# Patient Record
Sex: FEMALE | Race: BLACK OR AFRICAN AMERICAN | NOT HISPANIC OR LATINO | Employment: FULL TIME | URBAN - METROPOLITAN AREA
[De-identification: names, ages, dates, MRNs, and addresses within clinical notes are randomized per-mention and may not be internally consistent; named-entity substitution may affect disease eponyms.]

---

## 2017-01-19 RX ORDER — METFORMIN HYDROCHLORIDE 500 MG/1
TABLET ORAL
Qty: 90 TABLET | Refills: 0 | Status: SHIPPED | OUTPATIENT
Start: 2017-01-19 | End: 2017-06-27 | Stop reason: SDUPTHER

## 2017-01-31 ENCOUNTER — OFFICE VISIT (OUTPATIENT)
Dept: OPHTHALMOLOGY | Facility: CLINIC | Age: 47
End: 2017-01-31
Payer: COMMERCIAL

## 2017-01-31 DIAGNOSIS — H52.03 HYPEROPIA, BILATERAL: ICD-10-CM

## 2017-01-31 DIAGNOSIS — H52.4 BILATERAL PRESBYOPIA: ICD-10-CM

## 2017-01-31 DIAGNOSIS — E11.9 DIABETES MELLITUS TYPE 2 WITHOUT RETINOPATHY: Primary | ICD-10-CM

## 2017-01-31 PROCEDURE — 92015 DETERMINE REFRACTIVE STATE: CPT | Mod: S$GLB,,, | Performed by: OPTOMETRIST

## 2017-01-31 PROCEDURE — 99999 PR PBB SHADOW E&M-EST. PATIENT-LVL I: CPT | Mod: PBBFAC,,, | Performed by: OPTOMETRIST

## 2017-01-31 PROCEDURE — 92014 COMPRE OPH EXAM EST PT 1/>: CPT | Mod: S$GLB,,, | Performed by: OPTOMETRIST

## 2017-01-31 NOTE — PROGRESS NOTES
HPI     NIDDM exam. Decrease near visual acuity. Dizziness when wearing the   reading glasses. Last eye exam 01/26/2016 TRF.       Last edited by Mayelin Castorena on 1/31/2017  8:26 AM.         Assessment /Plan     For exam results, see Encounter Report.    Diabetes mellitus type 2 without retinopathy    Hyperopia, bilateral    Bilateral presbyopia    No Background Diabetic Retinopathy    Dispense Final Rx for glasses.  RTC 1 year

## 2017-02-06 ENCOUNTER — PATIENT MESSAGE (OUTPATIENT)
Dept: FAMILY MEDICINE | Facility: CLINIC | Age: 47
End: 2017-02-06

## 2017-02-06 DIAGNOSIS — S99.921A INJURY OF TOE ON RIGHT FOOT, INITIAL ENCOUNTER: ICD-10-CM

## 2017-02-06 RX ORDER — IBUPROFEN 800 MG/1
800 TABLET ORAL
Qty: 90 TABLET | Refills: 0 | OUTPATIENT
Start: 2017-02-06

## 2017-02-13 ENCOUNTER — TELEPHONE (OUTPATIENT)
Dept: OBSTETRICS AND GYNECOLOGY | Facility: CLINIC | Age: 47
End: 2017-02-13

## 2017-02-13 NOTE — TELEPHONE ENCOUNTER
----- Message from Lenka Zepeda sent at 2/13/2017  8:09 AM CST -----  Contact: [phone]   Pt would like to know if she can be worked in for an appt sooner than 03/16/17.  Please call back @ [phone].  Thanks-[facility]

## 2017-02-13 NOTE — TELEPHONE ENCOUNTER
"Spoke with pt. Pt states that she is having really bad hot flashes lately and "can't hardly take it." Pt did not want to wait until Dr. CARLOS Bazzi' next available appt. Pt desires to see Dr. TAYO Corey instead who had a sooner appt.   "

## 2017-02-13 NOTE — TELEPHONE ENCOUNTER
----- Message from Lenka Zepeda sent at 2/13/2017  8:09 AM CST -----  Contact: [phone]   Pt would like to know if she can be worked in for an appt sooner than 03/16/17.  Please call back @ [phone].  Thanks-[facility]

## 2017-02-21 ENCOUNTER — OFFICE VISIT (OUTPATIENT)
Dept: OBSTETRICS AND GYNECOLOGY | Facility: CLINIC | Age: 47
End: 2017-02-21
Payer: COMMERCIAL

## 2017-02-21 VITALS
HEIGHT: 64 IN | SYSTOLIC BLOOD PRESSURE: 110 MMHG | BODY MASS INDEX: 23.04 KG/M2 | WEIGHT: 134.94 LBS | DIASTOLIC BLOOD PRESSURE: 60 MMHG

## 2017-02-21 DIAGNOSIS — Z78.0 MENOPAUSE: Primary | ICD-10-CM

## 2017-02-21 PROCEDURE — 99999 PR PBB SHADOW E&M-EST. PATIENT-LVL II: CPT | Mod: PBBFAC,,, | Performed by: OBSTETRICS & GYNECOLOGY

## 2017-02-21 PROCEDURE — 99213 OFFICE O/P EST LOW 20 MIN: CPT | Mod: S$GLB,,, | Performed by: OBSTETRICS & GYNECOLOGY

## 2017-02-21 PROCEDURE — 1160F RVW MEDS BY RX/DR IN RCRD: CPT | Mod: S$GLB,,, | Performed by: OBSTETRICS & GYNECOLOGY

## 2017-02-21 RX ORDER — ESTRADIOL 1 MG/1
1 TABLET ORAL DAILY
Qty: 30 TABLET | Refills: 11 | Status: SHIPPED | OUTPATIENT
Start: 2017-02-21 | End: 2017-07-20

## 2017-02-21 NOTE — PROGRESS NOTES
Subjective:       Patient ID: Chan Dwyer is a 46 y.o. female.    Chief Complaint:  menopausal symptoms      History of Present Illness  HPI  Continues to have vasomotor symptoms.   Ovaries are intact, hysterectomy in the past, FSH 45 in ,   Has not started ERT as suggested because of fear of breast cancer.   Discussed risks of cancer and stroke with ERT   Reviewed other options and advised that none are proven effective .     GYN & OB History  No LMP recorded. Patient has had a hysterectomy.   Date of Last Pap: 10/24/2014    OB History    Para Term  AB SAB TAB Ectopic Multiple Living   2 2        2      # Outcome Date GA Lbr Rajesh/2nd Weight Sex Delivery Anes PTL Lv   2 Para            1 Para                   Review of Systems  Review of Systems        Objective:    Physical Exam       Assessment:        1. Menopause                Plan:      Chan was seen today for menopausal symptoms.    Diagnoses and all orders for this visit:    Menopause  -     estradiol (ESTRACE) 1 MG tablet; Take 1 tablet (1 mg total) by mouth once daily.

## 2017-03-14 ENCOUNTER — OFFICE VISIT (OUTPATIENT)
Dept: FAMILY MEDICINE | Facility: CLINIC | Age: 47
End: 2017-03-14
Payer: COMMERCIAL

## 2017-03-14 ENCOUNTER — LAB VISIT (OUTPATIENT)
Dept: LAB | Facility: HOSPITAL | Age: 47
End: 2017-03-14
Attending: FAMILY MEDICINE
Payer: COMMERCIAL

## 2017-03-14 VITALS
SYSTOLIC BLOOD PRESSURE: 116 MMHG | DIASTOLIC BLOOD PRESSURE: 74 MMHG | RESPIRATION RATE: 18 BRPM | OXYGEN SATURATION: 98 % | HEIGHT: 64 IN | HEART RATE: 76 BPM | BODY MASS INDEX: 22.81 KG/M2 | TEMPERATURE: 97 F | WEIGHT: 133.63 LBS

## 2017-03-14 DIAGNOSIS — E55.9 VITAMIN D DEFICIENCY: ICD-10-CM

## 2017-03-14 DIAGNOSIS — Z78.0 MENOPAUSE: ICD-10-CM

## 2017-03-14 DIAGNOSIS — E11.9 TYPE 2 DIABETES MELLITUS WITHOUT COMPLICATION, WITHOUT LONG-TERM CURRENT USE OF INSULIN: Primary | ICD-10-CM

## 2017-03-14 DIAGNOSIS — E11.9 TYPE 2 DIABETES MELLITUS WITHOUT COMPLICATION, WITHOUT LONG-TERM CURRENT USE OF INSULIN: ICD-10-CM

## 2017-03-14 LAB
ALBUMIN SERPL BCP-MCNC: 3.5 G/DL
ALP SERPL-CCNC: 73 U/L
ALT SERPL W/O P-5'-P-CCNC: 11 U/L
ANION GAP SERPL CALC-SCNC: 10 MMOL/L
AST SERPL-CCNC: 14 U/L
BILIRUB SERPL-MCNC: 0.3 MG/DL
BUN SERPL-MCNC: 13 MG/DL
CALCIUM SERPL-MCNC: 9.1 MG/DL
CHLORIDE SERPL-SCNC: 107 MMOL/L
CO2 SERPL-SCNC: 24 MMOL/L
CREAT SERPL-MCNC: 0.8 MG/DL
EST. GFR  (AFRICAN AMERICAN): >60 ML/MIN/1.73 M^2
EST. GFR  (NON AFRICAN AMERICAN): >60 ML/MIN/1.73 M^2
GLUCOSE SERPL-MCNC: 154 MG/DL
POTASSIUM SERPL-SCNC: 4.6 MMOL/L
PROT SERPL-MCNC: 7.2 G/DL
SODIUM SERPL-SCNC: 141 MMOL/L

## 2017-03-14 PROCEDURE — 36415 COLL VENOUS BLD VENIPUNCTURE: CPT | Mod: PO

## 2017-03-14 PROCEDURE — 99999 PR PBB SHADOW E&M-EST. PATIENT-LVL III: CPT | Mod: PBBFAC,,, | Performed by: FAMILY MEDICINE

## 2017-03-14 PROCEDURE — 99214 OFFICE O/P EST MOD 30 MIN: CPT | Mod: S$GLB,,, | Performed by: FAMILY MEDICINE

## 2017-03-14 PROCEDURE — 83036 HEMOGLOBIN GLYCOSYLATED A1C: CPT

## 2017-03-14 PROCEDURE — 80053 COMPREHEN METABOLIC PANEL: CPT

## 2017-03-14 PROCEDURE — 1160F RVW MEDS BY RX/DR IN RCRD: CPT | Mod: S$GLB,,, | Performed by: FAMILY MEDICINE

## 2017-03-14 PROCEDURE — 3044F HG A1C LEVEL LT 7.0%: CPT | Mod: S$GLB,,, | Performed by: FAMILY MEDICINE

## 2017-03-14 PROCEDURE — 3060F POS MICROALBUMINURIA REV: CPT | Mod: S$GLB,,, | Performed by: FAMILY MEDICINE

## 2017-03-14 RX ORDER — IBUPROFEN 800 MG/1
800 TABLET ORAL
Qty: 90 TABLET | Refills: 1 | Status: SHIPPED | OUTPATIENT
Start: 2017-03-14 | End: 2017-11-28

## 2017-03-14 NOTE — PATIENT INSTRUCTIONS
Okay to try over-the-counter Estroven or Black cohosh for hot flashes.    Please correspond with Dr. Manzo via My Chart for your results.     Thanks.

## 2017-03-14 NOTE — MR AVS SNAPSHOT
[facility]  [address] [address]  Phone: [phone]                  Chan Dwyer   3/14/2017 8:30 AM   Office Visit    Description:  Female : 1970   Provider:  Aziza Manzo MD   Department:  [facility]           Reason for Visit     Diabetes     Follow-up           Diagnoses this Visit        Comments    Type 2 diabetes mellitus without complication, without long-term current use of insulin    -  Primary     Vitamin D deficiency         Menopause                To Do List           Future Appointments        Provider Department Dept Phone    3/14/2017 9:30 AM LABORATORY, [address] [facility] [phone]    2017 8:30 AM Aziza Manzo MD [facility] [phone]      Goals (5 Years of Data)     None      Follow-Up and Disposition     Return in about 6 months (around 2017) for physical.       These Medications        Disp Refills Start End    ibuprofen (ADVIL,MOTRIN) 800 MG tablet 90 tablet 1 3/14/2017     Take 1 tablet (800 mg total) by mouth every meal as needed for Pain. - Oral    Pharmacy: [facility] [address] [place] [address] #: [phone]         [facility]     [facility] Nurse Care Line -  Assistance  Registered nurses in the [facility] Center provide clinical advisement, health education, appointment booking, and other advisory services.  Call for this free service at [phone].             Medications           Message regarding Medications     Verify the changes and/or additions to your medication regime listed below are the same as discussed with your clinician today.  If any of these changes or additions are incorrect, please notify your healthcare provider.             Verify that the below list of medications is an accurate representation of the medications you are currently taking.  If none  "reported, the list may be blank. If incorrect, please contact your healthcare provider. Carry this list with you in case of emergency.           Current Medications     blood sugar diagnostic Strp 1 strip by Misc.(Non-Drug; Combo Route) route 2 (two) times daily.    blood-glucose meter (FREESTYLE SYSTEM KIT) kit Use as instructed    lancets Misc 1 lancet by Misc.(Non-Drug; Combo Route) route 3 (three) times daily.    metformin (GLUCOPHAGE) 500 MG tablet TAKE ONE TABLET BY MOUTH ONCE DAILY WITH BREAKFAST    vitamin D 1000 units Tab Take 1,000 Units by mouth once daily.    estradiol (ESTRACE) 1 MG tablet Take 1 tablet (1 mg total) by mouth once daily.    ibuprofen (ADVIL,MOTRIN) 800 MG tablet Take 1 tablet (800 mg total) by mouth every meal as needed for Pain.           Clinical Reference Information           Your Vitals Were     BP Pulse Temp Resp    116/74 (BP Location: Left arm, Patient Position: Sitting, BP Method: Manual) 76 96.7 °F (35.9 °C) (Tympanic) 18    Height Weight SpO2 BMI    5' 3.5" (1.613 m) 60.6 kg (133 lb 9.6 oz) 98% 23.29 kg/m2      Blood Pressure          Most Recent Value    BP  116/74      Allergies as of 3/14/2017     Hydrocodone      Immunizations Administered on Date of Encounter - 3/14/2017     None      Orders Placed During Today's Visit     Future Labs/Procedures Expected by Expires    Comprehensive metabolic panel  3/14/2017 5/13/2018    Hemoglobin A1c  3/14/2017 5/13/2018      Instructions    Okay to try over-the-counter Estroven or Black cohosh for hot flashes.    Please correspond with Dr. Manzo via My Chart for your results.     Thanks.       Language Assistance Services     ATTENTION: Language assistance services are available, free of charge. Please call [phone].      ATENCIÓN: Si suzanla trevon, tiene a bliss disposición servicios gratuitos de asistencia lingüística. Llame al [phone].     CHÚ Ý: N?u b?n nói Ti?ng Vi?t, có các d?ch v? h? tr? ngôn ng? mi?n phí dành cho b?n. " G?i s? [phone].         [facility] complies with applicable Federal civil rights laws and does not discriminate on the basis of race, color, national origin, age, disability, or sex.

## 2017-03-15 LAB
ESTIMATED AVG GLUCOSE: 134 MG/DL
HBA1C MFR BLD HPLC: 6.3 %

## 2017-05-15 ENCOUNTER — TELEPHONE (OUTPATIENT)
Dept: HEMATOLOGY/ONCOLOGY | Facility: CLINIC | Age: 47
End: 2017-05-15

## 2017-05-15 DIAGNOSIS — R92.8 ABNORMAL MAMMOGRAM: Primary | ICD-10-CM

## 2017-05-15 NOTE — TELEPHONE ENCOUNTER
----- Message from Ashely Escobar NP sent at 5/15/2017  7:35 AM CDT -----  Contact: pt  Please call and schedule pt for an ultrasound of the left breast- orders are in epic. Also schedule an appt for her to see me same day for breast exam.  ----- Message -----     From: Ermias Garcia III, LPN     Sent: 5/12/2017   4:05 PM       To: Ashely Escobar NP        ----- Message -----     From: Katty Santana     Sent: 5/12/2017   2:23 PM       To: Shawn BERRIOS Staff    States she needs to schedule her mammogram. Please call pt at [phone]. Thank you

## 2017-05-15 NOTE — TELEPHONE ENCOUNTER
----- Message from Ashely Escobar NP sent at 5/15/2017  7:35 AM CDT -----  Contact: pt  Please call and schedule pt for an ultrasound of the left breast- orders are in epic. Also schedule an appt for her to see me same day for breast exam.  ----- Message -----     From: Ermias Garcia III, LPN     Sent: 5/12/2017   4:05 PM       To: Ashely Escobra NP        ----- Message -----     From: Katty Santana     Sent: 5/12/2017   2:23 PM       To: Shawn BERRIOS Staff    States she needs to schedule her mammogram. Please call pt at [phone]. Thank you

## 2017-06-05 ENCOUNTER — TELEPHONE (OUTPATIENT)
Dept: RADIOLOGY | Facility: HOSPITAL | Age: 47
End: 2017-06-05

## 2017-06-06 ENCOUNTER — OFFICE VISIT (OUTPATIENT)
Dept: HEMATOLOGY/ONCOLOGY | Facility: CLINIC | Age: 47
End: 2017-06-06
Payer: COMMERCIAL

## 2017-06-06 ENCOUNTER — OFFICE VISIT (OUTPATIENT)
Dept: FAMILY MEDICINE | Facility: CLINIC | Age: 47
End: 2017-06-06
Payer: COMMERCIAL

## 2017-06-06 ENCOUNTER — HOSPITAL ENCOUNTER (OUTPATIENT)
Dept: RADIOLOGY | Facility: HOSPITAL | Age: 47
Discharge: HOME | End: 2017-06-06
Attending: NURSE PRACTITIONER
Payer: COMMERCIAL

## 2017-06-06 ENCOUNTER — HOSPITAL ENCOUNTER (OUTPATIENT)
Dept: RADIOLOGY | Facility: HOSPITAL | Age: 47
Discharge: HOME | End: 2017-06-06
Attending: FAMILY MEDICINE
Payer: COMMERCIAL

## 2017-06-06 VITALS
OXYGEN SATURATION: 97 % | BODY MASS INDEX: 23.86 KG/M2 | DIASTOLIC BLOOD PRESSURE: 74 MMHG | SYSTOLIC BLOOD PRESSURE: 120 MMHG | WEIGHT: 134.69 LBS | RESPIRATION RATE: 18 BRPM | HEART RATE: 70 BPM | HEIGHT: 63 IN | TEMPERATURE: 97 F

## 2017-06-06 VITALS
TEMPERATURE: 96 F | HEIGHT: 64 IN | RESPIRATION RATE: 16 BRPM | SYSTOLIC BLOOD PRESSURE: 122 MMHG | WEIGHT: 135.13 LBS | BODY MASS INDEX: 23.07 KG/M2 | DIASTOLIC BLOOD PRESSURE: 80 MMHG | OXYGEN SATURATION: 100 % | HEART RATE: 60 BPM

## 2017-06-06 DIAGNOSIS — M15.1 HEBERDEN'S NODES (WITH ARTHROPATHY): ICD-10-CM

## 2017-06-06 DIAGNOSIS — N60.11 FIBROCYSTIC BREAST CHANGES, BILATERAL: Primary | ICD-10-CM

## 2017-06-06 DIAGNOSIS — H10.13 ALLERGIC CONJUNCTIVITIS OF BOTH EYES: ICD-10-CM

## 2017-06-06 DIAGNOSIS — R92.8 ABNORMAL MAMMOGRAM: ICD-10-CM

## 2017-06-06 DIAGNOSIS — N60.12 FIBROCYSTIC BREAST CHANGES, BILATERAL: Primary | ICD-10-CM

## 2017-06-06 DIAGNOSIS — R51.9 CHRONIC DAILY HEADACHE: ICD-10-CM

## 2017-06-06 DIAGNOSIS — R92.8 FOLLOW-UP EXAMINATION OF ABNORMAL MAMMOGRAM: ICD-10-CM

## 2017-06-06 PROCEDURE — 99213 OFFICE O/P EST LOW 20 MIN: CPT | Mod: S$GLB,,, | Performed by: NURSE PRACTITIONER

## 2017-06-06 PROCEDURE — 99214 OFFICE O/P EST MOD 30 MIN: CPT | Mod: S$GLB,,, | Performed by: FAMILY MEDICINE

## 2017-06-06 PROCEDURE — 99999 PR PBB SHADOW E&M-EST. PATIENT-LVL IV: CPT | Mod: PBBFAC,,, | Performed by: FAMILY MEDICINE

## 2017-06-06 PROCEDURE — 99999 PR PBB SHADOW E&M-EST. PATIENT-LVL III: CPT | Mod: PBBFAC,,, | Performed by: NURSE PRACTITIONER

## 2017-06-06 PROCEDURE — 73130 X-RAY EXAM OF HAND: CPT | Mod: 26,RT,, | Performed by: RADIOLOGY

## 2017-06-06 PROCEDURE — 76642 ULTRASOUND BREAST LIMITED: CPT | Mod: TC,PO,LT

## 2017-06-06 PROCEDURE — 76642 ULTRASOUND BREAST LIMITED: CPT | Mod: 26,LT,, | Performed by: RADIOLOGY

## 2017-06-06 PROCEDURE — 73130 X-RAY EXAM OF HAND: CPT | Mod: TC,PO,RT

## 2017-06-06 RX ORDER — MELOXICAM 15 MG/1
15 TABLET ORAL DAILY
Qty: 30 TABLET | Refills: 1 | Status: SHIPPED | OUTPATIENT
Start: 2017-06-06 | End: 2017-07-20

## 2017-06-06 RX ORDER — OLOPATADINE HYDROCHLORIDE 1 MG/ML
1 SOLUTION/ DROPS OPHTHALMIC 2 TIMES DAILY
Qty: 5 ML | Refills: 1 | Status: SHIPPED | OUTPATIENT
Start: 2017-06-06 | End: 2017-07-20

## 2017-06-06 NOTE — PATIENT INSTRUCTIONS
Manage Stress with a Healthy Lifestyle  Managing stress is easier if you take good care of yourself. Make time for rest and recreation. Eat healthier meals. Take a walk now and then. And don't forget to treat yourself. A little down time can go a long way.    Get enough rest  When you dont get enough sleep, you may be too tired to cope with stress. Also, stress can prevent you from sleeping well or may keep you awake. If this happens to you, try reading or listening to soothing music before you go to sleep.  Make time for yourself  In todays world, there is often too much to do in too little time. It may seem hard to make time for yourself. But try to spend just a few minutes each day doing something you enjoy. This can improve the quality of your life and your mental outlook. Also, youll be more productive when handling your day-to-day duties. And youll be in a better frame of mind to cope with stress.  Eat right  Its easy to react to stress by reaching for a bag of chips or a cup of coffee. This may give you a quick boost but may later drain your energy. To keep your energy level steady, eat healthy meals and snacks at home and at work. Try not to skip meals. Stick with a low-fat diet thats rich in whole grains and fresh fruits and vegetables.  Nourish your spirit  When life is hectic, its easy to forget what your values and goals are. To help prevent this from happening, find out what is most important in your life. Ask yourself, What would I miss most if I had to start a new life alone somewhere else? My work? My family or friends? Something I love doing? Then focus on embracing your values and what you want to achieve in your life.  Stay on the move  Exercise helps burn off the negative energy of stress. Doing something active that you enjoy also helps you get away from stressful situations. Try to walk, jog, skate, swim, dance, take a fitness class, or play a team sport on most days. Or, practice  yoga or jazmyne chi, which can help you relax.  Put some fun into your life  Some things you may enjoy doing are listed below. Check off the ones youll try. Then add your own.  · Try a new hobby  · Plan a fun trip  · Have lunch with a friend  · Learn a new sport or game  · Go see a movie  · Take a class on something you always wanted to learn   Date Last Reviewed: 12/22/2014  © 7385-8810 [employer]. [address] [place] [address], [place] [address]. All rights reserved. This information is not intended as a substitute for professional medical care. Always follow your healthcare professional's instructions.        Self-Care for Headaches  Most headaches aren't serious and can be relieved with self-care. But some headaches may be a sign of another health problem like eye trouble or high blood pressure. To find the best treatment, learn what kind of headaches you get. For tension headaches, self-care will usually help. To treat migraines, ask your healthcare provider for advice. It is also possible to get both tension and migraine headaches. Self-care involves relieving the pain and avoiding headache triggers if you can.    Ways to reduce pain and tension  Try these steps:  · Apply a cold compress or ice pack to the pain site.  · Drink fluids. If nausea makes it hard to drink, try sucking on ice.  · Rest. Protect yourself from bright light and loud noises.  · Calm your emotions by imagining a peaceful scene.  · Massage tight neck, shoulder, and head muscles.  · To relax muscles, soak in a hot bath or use a hot shower.  Use medicines  Aspirin or aspirin substitutes, such as ibuprofen and acetaminophen, can relieve headache. Remember: Never give aspirin to anyone 18 years old or younger because of the risk of developing Reye syndrome. Use pain medicines only when necessary.  Track your headaches  Keeping a headache diary can help you and your healthcare provider identify what's causing your headaches:  · Note when  "each headache happens.  · Identify your activities and the foods you've eaten 6 to 8 hours before the headache began.  · Look for any trends or "triggers."  Signs of tension headache  Any of the following can be signs:  · Dull pain or feeling of pressure in a tight band around your head  · Pain in your neck or shoulders  · Headache without a definite beginning or end  · Headache after an activity such as driving or working on a computer  Signs of migraine  Any of the following can be signs:  · Throbbing pain on one or both sides of your head  · Nausea or vomiting  · Extreme sensitivity to light, sound, and smells  · Bright spots, flashes, or other visual changes  · Pain or nausea so severe that you can't continue your daily activities  Call your healthcare provider   If you have any of the following symptoms, contact your healthcare provider:  · A headache that lingers after a recent injury or bump to the head.  · A fever with a stiff neck or pain when you bend your head toward your chest.  · A headache along with slurred speech, changes in your vision, or numbness or weakness in your arms or legs.  · A headache for longer than 3 days.  · Frequent headaches, especially in the morning.  · Headaches with seizures   · Seek immediate medical attention if you have a headache that you would call "the worst headache you have ever had."   Date Last Reviewed: 10/4/2015  © 7199-4295 [employer]. [address] [place] [address], [place] [address]. All rights reserved. This information is not intended as a substitute for professional medical care. Always follow your healthcare professional's instructions.        "

## 2017-06-06 NOTE — PROGRESS NOTES
Patient ID: Chan Dwyer is a 46 y.o. female.    Chief Complaint: Breast Cancer Screening (6 mon f/u complex cysts)    HPI: Patient presents for 6 mon f/u evaluation of an abnormal mammogram and ultrasound of both breasts- pt has simple and complex cysts that have been monitored since 2/2014. History of right breast complex cysts increasing in size 12/14 so pt was sent for core biopsy at [facility]- biopsy was cancelled since they could not find a complex cyst on exam to biopsy- noted all simple cysts at the time of the biopsy. Pt was scheduled for 6 mon f/u of the right breast in July 2015 and canceled the appts and again in August - canceled again- May 2016 imaging revealed la complex cysts and right breast simple cysts- la mammo and ultrasound rec for 6 mons- November 2016- simple cyst in right breast stable and complex cysts in left stable- 6 mon f/u left breast ultrasound only was recommended.    Pt had her ultrasound of the left breast today- results pending     Review of Systems   Constitutional: Negative.  Negative for appetite change and unexpected weight change.   HENT: Negative.    Eyes: Negative.  Negative for visual disturbance.   Respiratory: Negative.  Negative for cough and shortness of breath.    Cardiovascular: Negative.  Negative for chest pain.   Gastrointestinal: Negative.  Negative for abdominal pain and diarrhea.   Endocrine: Negative.    Genitourinary: Negative.  Negative for frequency.   Musculoskeletal: Negative.  Negative for back pain.   Skin: Negative.  Negative for rash.   Allergic/Immunologic: Negative.    Neurological: Negative.  Negative for headaches.   Hematological: Negative.  Negative for adenopathy.   Psychiatric/Behavioral: Negative.  The patient is not nervous/anxious.      Breast: No breast pain or nipple discharge. No palpable mass.        Current Outpatient Prescriptions   Medication Sig Dispense Refill    blood sugar diagnostic Strp 1 strip by Misc.(Non-Drug;  Combo Route) route 2 (two) times daily. 100 each 5    blood-glucose meter (FREESTYLE SYSTEM KIT) kit Use as instructed 1 each 0    estradiol (ESTRACE) 1 MG tablet Take 1 tablet (1 mg total) by mouth once daily. 30 tablet 11    ibuprofen (ADVIL,MOTRIN) 800 MG tablet Take 1 tablet (800 mg total) by mouth every meal as needed for Pain. 90 tablet 1    lancets Misc 1 lancet by Misc.(Non-Drug; Combo Route) route 3 (three) times daily. 100 each 5    metformin (GLUCOPHAGE) 500 MG tablet TAKE ONE TABLET BY MOUTH ONCE DAILY WITH BREAKFAST 90 tablet 0    vitamin D 1000 units Tab Take 1,000 Units by mouth once daily.       No current facility-administered medications for this visit.        Allergies   Allergen Reactions    Hydrocodone Hives and Rash       Past Medical History:   Diagnosis Date    Abnormal Pap smear     Abnormal Pap smear of cervix     Arthritis, low back     Diabetes 9/2014     BS 162am 09/16/2014    DM (diabetes mellitus) 09/2014     am 01/26/2016    DM (diabetes mellitus) 09/2014    BS didn't check 01/31/2017    GERD (gastroesophageal reflux disease)     Gestational diabetes     History of abnormal Pap smear     Surgical menopause     Vitamin D deficiency        Past Surgical History:   Procedure Laterality Date    COLONOSCOPY N/A 6/22/2016    Procedure: COLONOSCOPY;  Surgeon: Vishal Mary III, MD;  Location: [facility];  Service: Endoscopy;  Laterality: N/A;    Cryotherapy of the cervix  1999    DILATION AND CURETTAGE OF UTERUS      HYSTERECTOMY      [facility]  2/2012    TUBAL LIGATION         Family History   Problem Relation Age of Onset    Hypertension Mother     Diabetes Mother     Diabetes Father     Hypertension Father     Cancer Neg Hx     Heart disease Neg Hx     Stroke Neg Hx     Breast cancer Neg Hx     Colon cancer Neg Hx     Ovarian cancer Neg Hx        Social History     Social History    Marital status: Single     Spouse name: N/A    Number of children: 2     Years of education: N/A     Occupational History     [employer]     Social History Main Topics    Smoking status: Never Smoker    Smokeless tobacco: Never Used    Alcohol use No    Drug use: No    Sexual activity: Yes     Partners: Male     Birth control/ protection: Surgical     Other Topics Concern    Not on file     Social History Narrative    She wears seatbelt.       Vitals:    06/06/17 0933   BP: 122/80   Pulse: 60   Resp: 16   Temp: 96.1 °F (35.6 °C)       Physical Exam   Constitutional: She is oriented to person, place, and time. She appears well-developed and well-nourished.   HENT:   Head: Normocephalic and atraumatic.   Eyes: Conjunctivae are normal. Pupils are equal, round, and reactive to light. No scleral icterus.   Neck: Normal range of motion. Neck supple. No thyromegaly present.   Cardiovascular: Normal rate and regular rhythm.    Pulmonary/Chest: Effort normal and breath sounds normal. Right breast exhibits no inverted nipple, no mass, no nipple discharge, no skin change and no tenderness. Left breast exhibits no inverted nipple, no mass, no nipple discharge, no skin change and no tenderness. Breasts are symmetrical.       Abdominal: Soft. Bowel sounds are normal.   Musculoskeletal: Normal range of motion.   Lymphadenopathy:        Head (right side): No submental, no submandibular, no tonsillar, no preauricular, no posterior auricular and no occipital adenopathy present.        Head (left side): No submental, no submandibular, no tonsillar, no preauricular, no posterior auricular and no occipital adenopathy present.     She has no cervical adenopathy.        Right cervical: No superficial cervical and no posterior cervical adenopathy present.       Left cervical: No superficial cervical and no posterior cervical adenopathy present.     She has no axillary adenopathy.        Right: No supraclavicular adenopathy present.        Left: No  supraclavicular adenopathy present.   Neurological: She is alert and oriented to person, place, and time.   Skin: Skin is warm and dry.   Psychiatric: She has a normal mood and affect. Her behavior is normal. Judgment and thought content normal.     Menarche at 12 y/o    First preg at 15 y/o  LMP: 43 y/o  No oral hormones- uses vaginal hormones     FH: negative for cancer    IMAGING: today- pending left breast ultrasound    Assessment & Plan:  1. Abnormal mammogram bilateral breasts - right- simple cyst, left: 2 complex cysts - 6 mon f/u recommended of left breast with ultrasound only results pending..  2. Negative clinical examination   3. If ultrasound reveals stable complex cysts recommend bilateral mammogram to get back on her annual screening in November/December  4. BSE monthly- call for any changes.
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
## 2017-06-07 ENCOUNTER — DOCUMENTATION ONLY (OUTPATIENT)
Dept: RADIOLOGY | Facility: HOSPITAL | Age: 47
End: 2017-06-07

## 2017-06-07 NOTE — PROGRESS NOTES
Breast Care Management Follow-Up:    Date of Mammogram:06/06/17    Mammogram Reason:Follow-up at short-interval from prior study    Mammogram Results:Left U/S - relatively stable complex cysts in the left breast.      Referrals/Recommendations:6mo f/u left dx mammo and u/s at time of bilateral in November 2017        Patient Status:06/07/17 Results and rec given to pt by Ashely Escobar NP. Results letter and report mailed to pt.

## 2017-06-28 RX ORDER — METFORMIN HYDROCHLORIDE 500 MG/1
TABLET ORAL
Qty: 90 TABLET | Refills: 0 | Status: SHIPPED | OUTPATIENT
Start: 2017-06-28 | End: 2017-07-20 | Stop reason: SDUPTHER

## 2017-07-18 ENCOUNTER — TELEPHONE (OUTPATIENT)
Dept: ADMINISTRATIVE | Facility: HOSPITAL | Age: 47
End: 2017-07-18

## 2017-07-18 NOTE — TELEPHONE ENCOUNTER
Email received from [facility] Quality Navigator 7/18/17 @ 3:35PM:      Received:Jul 18, 2017 3:35 PM   From:@[email or web address]   Subject:[facility] MEMBER @PHI@     Chan Dwyer 1970 (Dr. Aziza Manzo)  The DM nurse spoke with the above patient today, July 18th. She reported experiencing glucose readings above 200 for the past couple of months. She also reports feeling hot and sweaty often. States that she had a f/u appt with Dr. Carmichael today, for finger pain, but she had to cancel. Her next appt with Dr. Manzo is in September. I was unable to access the members chart information in Epic to do further research.  KENDRA Honeycutt, Provider Liaison/Quality Navigator, [employer] [place]  Office: [phone] or [phone] ext. [phone].  Email: @[email or web address]    Please follow up with patient for any changes to plan of care or orders. Thank you,

## 2017-07-20 ENCOUNTER — OFFICE VISIT (OUTPATIENT)
Dept: FAMILY MEDICINE | Facility: CLINIC | Age: 47
End: 2017-07-20
Payer: COMMERCIAL

## 2017-07-20 ENCOUNTER — LAB VISIT (OUTPATIENT)
Dept: LAB | Facility: HOSPITAL | Age: 47
End: 2017-07-20
Attending: FAMILY MEDICINE
Payer: COMMERCIAL

## 2017-07-20 VITALS
OXYGEN SATURATION: 98 % | TEMPERATURE: 96 F | RESPIRATION RATE: 18 BRPM | HEART RATE: 72 BPM | DIASTOLIC BLOOD PRESSURE: 82 MMHG | HEIGHT: 63 IN | BODY MASS INDEX: 23.36 KG/M2 | WEIGHT: 131.81 LBS | SYSTOLIC BLOOD PRESSURE: 124 MMHG

## 2017-07-20 DIAGNOSIS — E11.65 TYPE 2 DIABETES MELLITUS WITH HYPERGLYCEMIA, WITHOUT LONG-TERM CURRENT USE OF INSULIN: ICD-10-CM

## 2017-07-20 DIAGNOSIS — E11.65 TYPE 2 DIABETES MELLITUS WITH HYPERGLYCEMIA, WITHOUT LONG-TERM CURRENT USE OF INSULIN: Primary | ICD-10-CM

## 2017-07-20 DIAGNOSIS — J30.2 SEASONAL ALLERGIC RHINITIS, UNSPECIFIED CHRONICITY, UNSPECIFIED TRIGGER: ICD-10-CM

## 2017-07-20 LAB
ALBUMIN SERPL BCP-MCNC: 3.6 G/DL
ALP SERPL-CCNC: 84 U/L
ALT SERPL W/O P-5'-P-CCNC: 12 U/L
ANION GAP SERPL CALC-SCNC: 9 MMOL/L
AST SERPL-CCNC: 13 U/L
BILIRUB SERPL-MCNC: 0.7 MG/DL
BILIRUB UR QL STRIP: NEGATIVE
BUN SERPL-MCNC: 11 MG/DL
CALCIUM SERPL-MCNC: 10.3 MG/DL
CHLORIDE SERPL-SCNC: 103 MMOL/L
CLARITY UR REFRACT.AUTO: ABNORMAL
CO2 SERPL-SCNC: 25 MMOL/L
COLOR UR AUTO: YELLOW
CREAT SERPL-MCNC: 0.8 MG/DL
EST. GFR  (AFRICAN AMERICAN): >60 ML/MIN/1.73 M^2
EST. GFR  (NON AFRICAN AMERICAN): >60 ML/MIN/1.73 M^2
GLUCOSE SERPL-MCNC: 113 MG/DL
GLUCOSE UR QL STRIP: ABNORMAL
HGB UR QL STRIP: NEGATIVE
KETONES UR QL STRIP: ABNORMAL
LEUKOCYTE ESTERASE UR QL STRIP: NEGATIVE
NITRITE UR QL STRIP: NEGATIVE
PH UR STRIP: 5 [PH] (ref 5–8)
POTASSIUM SERPL-SCNC: 4 MMOL/L
PROT SERPL-MCNC: 7.4 G/DL
PROT UR QL STRIP: NEGATIVE
SODIUM SERPL-SCNC: 137 MMOL/L
SP GR UR STRIP: 1.03 (ref 1–1.03)
URN SPEC COLLECT METH UR: ABNORMAL
UROBILINOGEN UR STRIP-ACNC: NEGATIVE EU/DL

## 2017-07-20 PROCEDURE — 83036 HEMOGLOBIN GLYCOSYLATED A1C: CPT

## 2017-07-20 PROCEDURE — 36415 COLL VENOUS BLD VENIPUNCTURE: CPT | Mod: PO

## 2017-07-20 PROCEDURE — 81003 URINALYSIS AUTO W/O SCOPE: CPT

## 2017-07-20 PROCEDURE — 99214 OFFICE O/P EST MOD 30 MIN: CPT | Mod: S$GLB,,, | Performed by: FAMILY MEDICINE

## 2017-07-20 PROCEDURE — 80053 COMPREHEN METABOLIC PANEL: CPT

## 2017-07-20 PROCEDURE — 3045F PR MOST RECENT HEMOGLOBIN A1C LEVEL 7.0-9.0%: CPT | Mod: S$GLB,,, | Performed by: FAMILY MEDICINE

## 2017-07-20 PROCEDURE — 99999 PR PBB SHADOW E&M-EST. PATIENT-LVL III: CPT | Mod: PBBFAC,,, | Performed by: FAMILY MEDICINE

## 2017-07-20 PROCEDURE — 3008F BODY MASS INDEX DOCD: CPT | Mod: S$GLB,,, | Performed by: FAMILY MEDICINE

## 2017-07-20 RX ORDER — METFORMIN HYDROCHLORIDE 500 MG/1
TABLET ORAL
Qty: 180 TABLET | Refills: 0 | Status: SHIPPED | OUTPATIENT
Start: 2017-07-20 | End: 2017-11-16 | Stop reason: SDUPTHER

## 2017-07-20 RX ORDER — FLUTICASONE PROPIONATE 50 MCG
2 SPRAY, SUSPENSION (ML) NASAL DAILY PRN
Qty: 16 G | Refills: 2 | Status: SHIPPED | OUTPATIENT
Start: 2017-07-20 | End: 2018-07-13

## 2017-07-20 NOTE — PROGRESS NOTES
Subjective:       Patient ID: Chan Dwyer is a 46 y.o. female.    Chief Complaint: Blood Sugar Problem    Ms. Dwyer comes in today with complaint of having elevated blood sugars since April 2017.  She states she awakens with glucose levels of 200's.  She reports glucose levels drop to 189 after eating months.  He currently takes metformin 500 mg daily.  She states she monitors her diet and walks a lot.  She reports having polydipsia, polyphagia without polyuria, urine frequency.  She reports having rare shortness of breath without cough, wheezing, chest pain, palpitations, leg swelling, abdominal pain, nausea, vomiting, diarrhea, constipation, fever, chills, fatigue.  She reports having watery eyes and blurred vision without other associated sinus symptoms not smoke.  Her last eye exam was on January 31, 2017 with Dr. Abrams.    She reports having intermittent headache at 7/10 on pain scale at worse.  She denies having head trauma.  She reports having posterior headache today.    Current Outpatient Prescriptions:  blood sugar diagnostic Strp, 1 strip by Misc.(Non-Drug; Combo Route) route 2 (two) times daily.  blood-glucose meter (FREESTYLE SYSTEM KIT) kit, Use as instructed  ibuprofen (ADVIL,MOTRIN) 800 MG tablet, Take 1 tablet (800 mg total) by mouth every meal as needed for Pain.  lancets Misc, 1 lancet by Misc.(Non-Drug; Combo Route) route 3 (three) times daily.  metformin (GLUCOPHAGE) 500 MG tablet, TAKE ONE TABLET BY MOUTH ONCE DAILY WITH BREAKFAST  vitamin D 1000 units Tab, Take 1,000 Units by mouth once daily.    Labs:               WBC                      6.82                09/13/2016                 HGB                      12.5                09/13/2016                 HCT                      40.0                09/13/2016                 PLT                      242                 09/13/2016                 LDLCALC                  90.0                09/13/2016                          CHOL                     154                 09/13/2016                 TRIG                     60                  09/13/2016                 HDL                      52                  09/13/2016                 ALT                      11                  03/14/2017                 AST                      14                  03/14/2017                 NA                       141                 03/14/2017                 K                        4.6                 03/14/2017                 CL                       107                 03/14/2017                 CREATININE               0.8                 03/14/2017                 BUN                      13                  03/14/2017                 CO2                      24                  03/14/2017                 TSH                      1.512               09/13/2016                 HGBA1C                   6.3 (H)             03/14/2017                  Review of Systems   Constitutional: Negative for activity change, appetite change, chills, fatigue and fever.        Weight 60.6 kg (133 lb 9.6 oz) at March 14, 2017 visit.   HENT: Negative for congestion, postnasal drip, rhinorrhea, sinus pressure, sneezing and sore throat.    Eyes: Positive for discharge and visual disturbance. Negative for pain, redness and itching.   Respiratory: Positive for shortness of breath. Negative for cough and wheezing.         Rare.   Cardiovascular: Negative for chest pain, palpitations and leg swelling.   Gastrointestinal: Negative for abdominal pain, constipation, diarrhea, nausea and vomiting.   Endocrine: Positive for polydipsia and polyphagia. Negative for polyuria.   Genitourinary: Negative for difficulty urinating and frequency.   Musculoskeletal: Negative for back pain.   Neurological: Positive for headaches.   Psychiatric/Behavioral: Negative for dysphoric mood. The patient is not nervous/anxious.        Objective:      Physical Exam   Constitutional: She  is oriented to person, place, and time. She appears well-developed and well-nourished. No distress.   Pleasant.   HENT:   Head: Normocephalic and atraumatic.   Right Ear: External ear normal.   Left Ear: External ear normal.   Nose: Nose normal.   Mouth/Throat: Oropharynx is clear and moist.   Non tender sinuses.  TM's shiny and clear. Nasal mucosa pink, congested without drainage noted.   Eyes: Conjunctivae and EOM are normal. Pupils are equal, round, and reactive to light. Right eye exhibits no discharge. Left eye exhibits no discharge.   Neck: Normal range of motion. Neck supple. No thyromegaly present.   Cardiovascular: Normal rate, regular rhythm and intact distal pulses.    No murmur heard.  Pulses:       Dorsalis pedis pulses are 3+ on the right side, and 3+ on the left side.        Posterior tibial pulses are 3+ on the right side, and 3+ on the left side.   Pulmonary/Chest: Effort normal and breath sounds normal. No respiratory distress. She has no wheezes.   Abdominal: Soft. Bowel sounds are normal. She exhibits no distension and no mass. There is no tenderness. There is no rebound and no guarding.   No CVA tenderness noted.   Musculoskeletal: Normal range of motion. She exhibits no edema or tenderness.   She is ambulatory without problems.  Feet look okay without ulcerations or skin breaks noted.   Feet:   Right Foot:   Protective Sensation: 7 sites tested. 7 sites sensed.   Skin Integrity: Negative for ulcer or skin breakdown.   Left Foot:   Protective Sensation: 7 sites tested. 7 sites sensed.   Skin Integrity: Negative for ulcer or skin breakdown.   Lymphadenopathy:     She has no cervical adenopathy.   Neurological: She is alert and oriented to person, place, and time.   Skin: She is not diaphoretic.   Psychiatric: She has a normal mood and affect. Her behavior is normal. Judgment and thought content normal.   Vitals reviewed.      Assessment:       1. Type 2 diabetes mellitus with hyperglycemia,  without long-term current use of insulin    2. Seasonal allergic rhinitis, unspecified chronicity, unspecified trigger        Plan:       1.  Labs:  CMP, A1c, urinalysis.  Patient advised to call for results.  2.  Increase Metformin 500 mg to twice daily, #180, 0 refill.   3.  Continue current medications, follow low sodium, low cholesterol, low carb diet, daily walks.  4.  Flonase 2 sprays each nostril daily prn, #1, 2 refills.  5.  Flu shot this fall.  6.  Keep 9/14/2017 scheduled physical appointment with me.  7.  Work excuse for today with return tomorrow.

## 2017-07-21 ENCOUNTER — TELEPHONE (OUTPATIENT)
Dept: FAMILY MEDICINE | Facility: CLINIC | Age: 47
End: 2017-07-21

## 2017-07-21 LAB
ESTIMATED AVG GLUCOSE: 174 MG/DL
HBA1C MFR BLD HPLC: 7.7 %

## 2017-07-21 NOTE — TELEPHONE ENCOUNTER
----- Message from Angela Brooks sent at 7/21/2017  1:09 PM CDT -----  Contact: Pt   Pt called and stated she needed to speak to the nurse. She stated she needs her test results. She can be reached at [phone].    Thanks,  TF

## 2017-07-24 NOTE — TELEPHONE ENCOUNTER
Advise pt diabetes level little higher with sugar, sl. Ketones in urine.  Increase Metformin as we discussed.  Drink water! Thanks for call.

## 2017-07-24 NOTE — TELEPHONE ENCOUNTER
----- Message from Renita Kulkarni sent at 7/24/2017  2:24 PM CDT -----  Pt at [phone]//states she is returning your call//please call again//thanks/daryl

## 2017-09-14 ENCOUNTER — LAB VISIT (OUTPATIENT)
Dept: LAB | Facility: HOSPITAL | Age: 47
End: 2017-09-14
Attending: FAMILY MEDICINE
Payer: COMMERCIAL

## 2017-09-14 ENCOUNTER — OFFICE VISIT (OUTPATIENT)
Dept: FAMILY MEDICINE | Facility: CLINIC | Age: 47
End: 2017-09-14
Payer: COMMERCIAL

## 2017-09-14 VITALS
RESPIRATION RATE: 18 BRPM | WEIGHT: 131.38 LBS | BODY MASS INDEX: 23.28 KG/M2 | DIASTOLIC BLOOD PRESSURE: 70 MMHG | HEART RATE: 76 BPM | TEMPERATURE: 98 F | OXYGEN SATURATION: 99 % | HEIGHT: 63 IN | SYSTOLIC BLOOD PRESSURE: 112 MMHG

## 2017-09-14 DIAGNOSIS — Z00.00 ANNUAL PHYSICAL EXAM: ICD-10-CM

## 2017-09-14 DIAGNOSIS — E55.9 VITAMIN D DEFICIENCY: ICD-10-CM

## 2017-09-14 DIAGNOSIS — E11.65 UNCONTROLLED TYPE 2 DIABETES MELLITUS WITH HYPERGLYCEMIA, WITHOUT LONG-TERM CURRENT USE OF INSULIN: ICD-10-CM

## 2017-09-14 DIAGNOSIS — Z23 NEED FOR HEPATITIS B VACCINATION: ICD-10-CM

## 2017-09-14 DIAGNOSIS — M15.1 HEBERDEN'S NODES (WITH ARTHROPATHY): ICD-10-CM

## 2017-09-14 DIAGNOSIS — E89.40 SURGICAL MENOPAUSE: ICD-10-CM

## 2017-09-14 LAB
25(OH)D3+25(OH)D2 SERPL-MCNC: 37 NG/ML
ALBUMIN SERPL BCP-MCNC: 3.4 G/DL
ALP SERPL-CCNC: 84 U/L
ALT SERPL W/O P-5'-P-CCNC: 14 U/L
ANION GAP SERPL CALC-SCNC: 10 MMOL/L
AST SERPL-CCNC: 18 U/L
BASOPHILS # BLD AUTO: 0.01 K/UL
BASOPHILS NFR BLD: 0.1 %
BILIRUB SERPL-MCNC: 0.5 MG/DL
BUN SERPL-MCNC: 10 MG/DL
CALCIUM SERPL-MCNC: 9.7 MG/DL
CHLORIDE SERPL-SCNC: 107 MMOL/L
CHOLEST SERPL-MCNC: 169 MG/DL
CHOLEST/HDLC SERPL: 3.3 {RATIO}
CO2 SERPL-SCNC: 23 MMOL/L
CREAT SERPL-MCNC: 0.7 MG/DL
CREAT UR-MCNC: 148 MG/DL
DIFFERENTIAL METHOD: NORMAL
EOSINOPHIL # BLD AUTO: 0.1 K/UL
EOSINOPHIL NFR BLD: 1.3 %
ERYTHROCYTE [DISTWIDTH] IN BLOOD BY AUTOMATED COUNT: 12.1 %
EST. GFR  (AFRICAN AMERICAN): >60 ML/MIN/1.73 M^2
EST. GFR  (NON AFRICAN AMERICAN): >60 ML/MIN/1.73 M^2
GLUCOSE SERPL-MCNC: 157 MG/DL
HCT VFR BLD AUTO: 41.2 %
HDLC SERPL-MCNC: 51 MG/DL
HDLC SERPL: 30.2 %
HGB BLD-MCNC: 13.3 G/DL
LDLC SERPL CALC-MCNC: 106.6 MG/DL
LYMPHOCYTES # BLD AUTO: 2.5 K/UL
LYMPHOCYTES NFR BLD: 28.4 %
MCH RBC QN AUTO: 28.9 PG
MCHC RBC AUTO-ENTMCNC: 32.3 G/DL
MCV RBC AUTO: 89 FL
MONOCYTES # BLD AUTO: 0.4 K/UL
MONOCYTES NFR BLD: 4.4 %
NEUTROPHILS # BLD AUTO: 5.7 K/UL
NEUTROPHILS NFR BLD: 65.3 %
NONHDLC SERPL-MCNC: 118 MG/DL
PLATELET # BLD AUTO: 253 K/UL
PMV BLD AUTO: 10.8 FL
POTASSIUM SERPL-SCNC: 3.8 MMOL/L
PROT SERPL-MCNC: 7.3 G/DL
PROT UR-MCNC: 13 MG/DL
PROT/CREAT RATIO, UR: 0.09
RBC # BLD AUTO: 4.61 M/UL
SODIUM SERPL-SCNC: 140 MMOL/L
TRIGL SERPL-MCNC: 57 MG/DL
TSH SERPL DL<=0.005 MIU/L-ACNC: 1.88 UIU/ML
WBC # BLD AUTO: 8.67 K/UL

## 2017-09-14 PROCEDURE — 82306 VITAMIN D 25 HYDROXY: CPT

## 2017-09-14 PROCEDURE — 84443 ASSAY THYROID STIM HORMONE: CPT

## 2017-09-14 PROCEDURE — 36415 COLL VENOUS BLD VENIPUNCTURE: CPT | Mod: PO

## 2017-09-14 PROCEDURE — 80053 COMPREHEN METABOLIC PANEL: CPT

## 2017-09-14 PROCEDURE — 99396 PREV VISIT EST AGE 40-64: CPT | Mod: 25,S$GLB,, | Performed by: FAMILY MEDICINE

## 2017-09-14 PROCEDURE — 80061 LIPID PANEL: CPT

## 2017-09-14 PROCEDURE — 90744 HEPB VACC 3 DOSE PED/ADOL IM: CPT | Mod: S$GLB,,, | Performed by: FAMILY MEDICINE

## 2017-09-14 PROCEDURE — 82570 ASSAY OF URINE CREATININE: CPT

## 2017-09-14 PROCEDURE — 99999 PR PBB SHADOW E&M-EST. PATIENT-LVL IV: CPT | Mod: PBBFAC,,, | Performed by: FAMILY MEDICINE

## 2017-09-14 PROCEDURE — 85025 COMPLETE CBC W/AUTO DIFF WBC: CPT

## 2017-09-14 PROCEDURE — 90471 IMMUNIZATION ADMIN: CPT | Mod: S$GLB,,, | Performed by: FAMILY MEDICINE

## 2017-09-14 PROCEDURE — 83036 HEMOGLOBIN GLYCOSYLATED A1C: CPT

## 2017-09-14 NOTE — PROGRESS NOTES
HISTORY OF PRESENT ILLNESS: Ms. Dwyer comes in today fasting and without taking medication for annual wellness examination.     END OF LIFE DECISION: She does not have a living will and does not desire life support.     Current Outpatient Prescriptions   Medication Sig    blood sugar diagnostic Strp 1 strip by Misc.(Non-Drug; Combo Route) route 2 (two) times daily.    fluticasone (FLONASE) 50 mcg/actuation nasal spray 2 sprays by Each Nare route daily as needed.    ibuprofen (ADVIL,MOTRIN) 800 MG tablet Take 1 tablet (800 mg total) by mouth every meal as needed for Pain.    lancets Misc 1 lancet by Misc.(Non-Drug; Combo Route) route 3 (three) times daily.    metformin (GLUCOPHAGE) 500 MG tablet TAKE ONE TABLET BY MOUTH TWICE DAILY WITH BREAKFAST    vitamin D 1000 units Tab Take 1,000 Units by mouth once daily.    blood-glucose meter (FREESTYLE SYSTEM KIT) kit Use as instructed     SCREENINGS:   Cholesterol: September 13, 2016.  FFS/Colonoscopy: June 22, 2016 - fariha; repeat in 10 years.   Mammogram: November 21, 2016 - benign with 6-month repeat advised.  GYN Exam: June 1, 2016 with GYN Dr. Divine Corey - fariha.  Dexa Scan: Never.  Eye Exam: January 31, 2017 with Dr. Abrams.    Dental Exam:  September 2017 with Dr. Lovell per patient.    PPD: Never.  Immunizations: Td/Tdap - September 11, 2014.  Gardasil - N./A.  Zostavax - N./A.  Pneumovax - December 16, 2014.  Seasonal Flu - December 11, 2015.  Hepatitis B vaccine series - October 13, 2016, September 13, 2016. (#3 shot due; she desires).      ROS:  GENERAL: Denies fever, chills, fatigue or unusual weight change. Appetite normal. Weight 59.8 kg (131 lb 13.4 oz) at  July 20, 2017 visit.  Exercises daily - 15 minutes at work. Monitors diet.  SKIN: Denies rashes, itching, changes in mole, color or texture of skin or easy bruising.  HEAD: Denies headaches or recent head trauma.  EYES: Denies change in vision, pain, diplopia, redness or discharge.  EARS:  "Denies ear pain, discharge, vertigo or decreased hearing.  NOSE: Denies loss of smell, epistaxis or rhinitis.  MOUTH & THROAT: Denies hoarseness or change in voice. Denies excessive gum bleeding or mouth sores. Denies sore throat.  NODES: Denies swollen glands.  CHEST: Denies GONZALES, wheezing, cough, or sputum production.  CARDIOVASCULAR: Denies chest pain, PND, orthopnea or reduced exercise tolerance. Denies palpitations.  ABDOMEN: Denies diarrhea, constipation, nausea, vomiting, abdominal pain, or blood in stool.  URINARY: Denies flank pain, dysuria or hematuria.  GENITOURINARY: Denies flank pain, dysuria, frequency or hematuria. Does not perform monthly breast self examination. Reports saw Dr. Divine Corey, GYN, in June 1, 2016 at which time ERT was prescribed but not started; then saw Dr. Herber Corey, GYN, on February 21, 2017 and again started on ERT.  Also, reports saw KARLIE Escobar on June 6, 2017 for breast examination and abnormal mammogram surveillance with 6-month repeat mammogram and breast examination advised.  HEME/LYMPH: Denies bleeding problems.  ENDOCRINE: Denies thyroid or cholesterol problems. Reports performs home glucose checks twice daily (mornings: 190's and evenings after meal: 180's).    PERIPHERAL VASCULAR:Denies claudication or cyanosis.  MUSCULOSKELETAL: Denies joint pain, stiffness, or swelling except reports intermittent painful fingers on both hands but takes ibuprofen with help.   Denies edema.  NEUROLOGIC: Denies history of seizures, tremors, paralysis, alteration of gait or coordination.  PSYCHIATRIC: Denies mood swings, depression, anxiety, homicidal or suicidal thoughts. Denies sleep problems.    PE:   VS: /70 (BP Location: Right arm, Patient Position: Sitting, BP Method: Medium (Manual))   Pulse 76   Temp 97.7 °F (36.5 °C) (Tympanic)   Resp 18   Ht 5' 3" (1.6 m)   Wt 59.6 kg (131 lb 6.3 oz)   SpO2 99%   BMI 23.28 kg/m²    APPEARANCE: Well nourished, well " developed female, pleasant, alert and oriented in no acute distress.   HEAD: Non tender. Full range of motion.  EYES: PERRL, conjunctiva pink, lids no edema.  EARS: External canal patent, no swelling or redness. TM's shiny and clear.  NOSE: Mucosa and turbinates pink, not swollen. No discharge. Non tender sinuses.  THROAT: No pharyngeal erythema or exudate. No stridor.   NECK: Supple, no mass but slight, non tender thyroid enlargement noted.  NODES: No cervical lymph node enlargement.  CHEST: Normal respiratory effort. Lungs clear to auscultation.  CARDIOVASCULAR: Normal S1, S2. No rubs, murmurs or gallops. PMI not displaced. No carotid bruit. Pedal pulses palpable bilaterally. No edema.  ABDOMEN: Bowel sounds present. Not distended. Soft. No tenderness, masses or organomegaly.  BREAST EXAM: No examined as deferred to GYN.  PELVIC EXAM: No examined as deferred to GYN.  RECTAL EXAM: Not examined.  MUSCULOSKELETAL: No joint deformities or stiffness and slightly tender at Heberden's nodes at fingers without warmth, redness or decreased range of motion noted.  She is ambulatory without problems.  SKIN: No rashes or suspicious lesions, normal color and turgor.  NEUROLOGIC: Cranial Nerves: II-XII grossly intact. DTR's: Knees, Ankles 2+ and equal bilaterally. Gait & Posture: Normal gait and fine motion.  PSYCHIATRIC: Patient alert, oriented x 3. Mood/Affect normal without acute anxiety and depression noted. Judgment/insight good as she makes appropriate decisions on today's examination.     Protective Sensation (w/ 10 gram monofilament):  Right: Intact  Left: Intact    Visual Inspection:  Normal -  Bilateral    Pedal Pulses:   Right: Present  Left: Present    Posterior tibialis:   Right:Present  Left: Present    Lab Results   Component Value Date    HGBA1C 7.7 (H) 07/20/2017     ASSESSMENT:    ICD-10-CM ICD-9-CM    1. Annual physical exam Z00.00 V70.0 Vitamin D      Comprehensive metabolic panel      Protein / creatinine  ratio, urine      Lipid panel      CBC auto differential      TSH      Hemoglobin A1c   2. Uncontrolled type 2 diabetes mellitus with hyperglycemia, without long-term current use of insulin E11.65 250.02    3. Vitamin D deficiency E55.9 268.9    4. Heberden's nodes (with arthropathy) M15.1 715.04    5. Surgical menopause E89.40 627.4    6. Need for hepatitis B vaccination Z23 V05.3 Hepatitis B Vaccine (Pediatric/Adolescent) (3-Dose) (IM)      PLAN:  1. Age-appropriate counseling-appropriate low-sodium, low-cholesterol, low carbohydrate diet and exercise daily, monthly breast self exam, annual wellness examination.  2. Patient advised to correspond with [facility] for results.  3.  Continue current medications.  4. Keep follow up with specialists (patient stated she will call for appointment with KARLIE Escobar for breast exam/mammogram follow up).  5. Annual dental examinations advised.  6. Again discussed diabetic prevention of kidney disease and heart disease with use ACE-inhibitor and Statin therapy respectively with patient; she again declines at this time.   7. See me in 3 months for diabetes follow up.  8. Work excuse to today with return tomorrow.

## 2017-09-15 LAB
ESTIMATED AVG GLUCOSE: 154 MG/DL
HBA1C MFR BLD HPLC: 7 %

## 2017-09-21 DIAGNOSIS — R92.8 FOLLOW-UP EXAMINATION OF ABNORMAL MAMMOGRAM: Primary | ICD-10-CM

## 2017-11-17 RX ORDER — METFORMIN HYDROCHLORIDE 500 MG/1
TABLET ORAL
Qty: 180 TABLET | Refills: 1 | Status: SHIPPED | OUTPATIENT
Start: 2017-11-17 | End: 2018-05-24 | Stop reason: SDUPTHER

## 2017-11-27 ENCOUNTER — TELEPHONE (OUTPATIENT)
Dept: FAMILY MEDICINE | Facility: CLINIC | Age: 47
End: 2017-11-27

## 2017-11-27 PROBLEM — H10.13 ALLERGIC CONJUNCTIVITIS OF BOTH EYES: Status: RESOLVED | Noted: 2017-06-06 | Resolved: 2017-11-27

## 2017-11-27 NOTE — TELEPHONE ENCOUNTER
----- Message from Charito Burns sent at 11/27/2017  1:43 PM CST -----  Pt is requesting a call from nurse to discuss pain in hands and knot on foot.          Please call pt back at [phone]

## 2017-11-28 ENCOUNTER — LAB VISIT (OUTPATIENT)
Dept: LAB | Facility: HOSPITAL | Age: 47
End: 2017-11-28
Attending: REGISTERED NURSE
Payer: COMMERCIAL

## 2017-11-28 ENCOUNTER — OFFICE VISIT (OUTPATIENT)
Dept: FAMILY MEDICINE | Facility: CLINIC | Age: 47
End: 2017-11-28
Payer: COMMERCIAL

## 2017-11-28 VITALS
SYSTOLIC BLOOD PRESSURE: 112 MMHG | HEART RATE: 75 BPM | TEMPERATURE: 97 F | RESPIRATION RATE: 17 BRPM | HEIGHT: 63 IN | DIASTOLIC BLOOD PRESSURE: 84 MMHG | OXYGEN SATURATION: 99 % | WEIGHT: 131.19 LBS | BODY MASS INDEX: 23.25 KG/M2

## 2017-11-28 DIAGNOSIS — M15.1 HEBERDEN NODES: ICD-10-CM

## 2017-11-28 DIAGNOSIS — M79.645 PAIN IN FINGER OF BOTH HANDS: Primary | ICD-10-CM

## 2017-11-28 DIAGNOSIS — M67.472 GANGLION CYST OF LEFT FOOT: ICD-10-CM

## 2017-11-28 DIAGNOSIS — M79.644 PAIN IN FINGER OF BOTH HANDS: Primary | ICD-10-CM

## 2017-11-28 DIAGNOSIS — M79.645 PAIN IN FINGER OF BOTH HANDS: ICD-10-CM

## 2017-11-28 DIAGNOSIS — M79.644 PAIN IN FINGER OF BOTH HANDS: ICD-10-CM

## 2017-11-28 LAB
ALBUMIN SERPL BCP-MCNC: 3.5 G/DL
ALP SERPL-CCNC: 85 U/L
ALT SERPL W/O P-5'-P-CCNC: 12 U/L
ANION GAP SERPL CALC-SCNC: 6 MMOL/L
AST SERPL-CCNC: 14 U/L
BASOPHILS # BLD AUTO: 0.03 K/UL
BASOPHILS NFR BLD: 0.5 %
BILIRUB SERPL-MCNC: 0.4 MG/DL
BUN SERPL-MCNC: 10 MG/DL
CALCIUM SERPL-MCNC: 9.3 MG/DL
CHLORIDE SERPL-SCNC: 104 MMOL/L
CO2 SERPL-SCNC: 30 MMOL/L
CREAT SERPL-MCNC: 0.7 MG/DL
CRP SERPL-MCNC: 7.3 MG/L
DIFFERENTIAL METHOD: ABNORMAL
EOSINOPHIL # BLD AUTO: 0.1 K/UL
EOSINOPHIL NFR BLD: 1.8 %
ERYTHROCYTE [DISTWIDTH] IN BLOOD BY AUTOMATED COUNT: 11.5 %
ERYTHROCYTE [SEDIMENTATION RATE] IN BLOOD BY WESTERGREN METHOD: 9 MM/HR
EST. GFR  (AFRICAN AMERICAN): >60 ML/MIN/1.73 M^2
EST. GFR  (NON AFRICAN AMERICAN): >60 ML/MIN/1.73 M^2
GLUCOSE SERPL-MCNC: 162 MG/DL
HCT VFR BLD AUTO: 42.9 %
HGB BLD-MCNC: 13 G/DL
IMM GRANULOCYTES # BLD AUTO: 0.02 K/UL
IMM GRANULOCYTES NFR BLD AUTO: 0.3 %
LYMPHOCYTES # BLD AUTO: 2 K/UL
LYMPHOCYTES NFR BLD: 30.9 %
MCH RBC QN AUTO: 28.8 PG
MCHC RBC AUTO-ENTMCNC: 30.3 G/DL
MCV RBC AUTO: 95 FL
MONOCYTES # BLD AUTO: 0.4 K/UL
MONOCYTES NFR BLD: 5.8 %
NEUTROPHILS # BLD AUTO: 4 K/UL
NEUTROPHILS NFR BLD: 60.7 %
NRBC BLD-RTO: 0 /100 WBC
PLATELET # BLD AUTO: 271 K/UL
PMV BLD AUTO: 10.6 FL
POTASSIUM SERPL-SCNC: 4.1 MMOL/L
PROT SERPL-MCNC: 7.2 G/DL
RBC # BLD AUTO: 4.52 M/UL
RHEUMATOID FACT SERPL-ACNC: <10 IU/ML
SODIUM SERPL-SCNC: 140 MMOL/L
WBC # BLD AUTO: 6.6 K/UL

## 2017-11-28 PROCEDURE — 85025 COMPLETE CBC W/AUTO DIFF WBC: CPT

## 2017-11-28 PROCEDURE — 86431 RHEUMATOID FACTOR QUANT: CPT

## 2017-11-28 PROCEDURE — 80053 COMPREHEN METABOLIC PANEL: CPT

## 2017-11-28 PROCEDURE — 86038 ANTINUCLEAR ANTIBODIES: CPT

## 2017-11-28 PROCEDURE — 99213 OFFICE O/P EST LOW 20 MIN: CPT | Mod: S$GLB,,, | Performed by: REGISTERED NURSE

## 2017-11-28 PROCEDURE — 86140 C-REACTIVE PROTEIN: CPT

## 2017-11-28 PROCEDURE — 85651 RBC SED RATE NONAUTOMATED: CPT

## 2017-11-28 PROCEDURE — 36415 COLL VENOUS BLD VENIPUNCTURE: CPT | Mod: PO

## 2017-11-28 PROCEDURE — 99999 PR PBB SHADOW E&M-EST. PATIENT-LVL III: CPT | Mod: PBBFAC,,, | Performed by: REGISTERED NURSE

## 2017-11-28 RX ORDER — PIROXICAM 20 MG/1
20 CAPSULE ORAL DAILY
Qty: 30 CAPSULE | Refills: 2 | Status: SHIPPED | OUTPATIENT
Start: 2017-11-28 | End: 2018-02-05

## 2017-11-28 NOTE — LETTER
November 28, 2017      [facility]  [address] [address]  Phone: [phone]       Patient: Chan Dwyer   YOB: 1970  Date of Visit: 11/28/2017    To Whom It May Concern:    Chan Dwyer  was at [facility] on 11/28/2017. She may return to work on 11/29/2017 with no restrictions. If you have any questions or concerns, or if I can be of further assistance, please do not hesitate to contact me.    Sincerely,    Eddie Miranda NP

## 2017-11-28 NOTE — PROGRESS NOTES
"Subjective:       Patient ID: Chan Dwyer is a 47 y.o. female.    Chief Complaint: Hand Pain      HPI    Chan is here today with persistent finger pain.  Was last seen in the office this past June for same issue, xrays showed arthritis.  Now developing nodules to DIP joints,  weak, hard to twist objects and turn hand.  Pain increased over the past week.  Mobic and ibuprofen have not helped.  Also with c/o knot to top of LT foot with pain over area.  Denies trauma.      Review of Systems   Constitutional: Negative.    Respiratory: Negative.    Cardiovascular: Negative.    Musculoskeletal: Positive for arthralgias. Negative for gait problem and joint swelling.   Neurological: Negative.          Patient Active Problem List   Diagnosis    Abnormal Pap smear    GERD (gastroesophageal reflux disease)    Arthritis, lumbar spine    Diabetes mellitus type 2, controlled, without complications    Vitamin D deficiency    Heberden nodes    Chronic daily headache    Uncontrolled type 2 diabetes mellitus with hyperglycemia, without long-term current use of insulin    Surgical menopause         Objective:     Vitals:    11/28/17 0823   BP: 112/84   BP Location: Left arm   Patient Position: Sitting   BP Method: Medium (Manual)   Pulse: 75   Resp: 17   Temp: 96.8 °F (36 °C)   TempSrc: Tympanic   SpO2: 99%   Weight: 59.5 kg (131 lb 2.8 oz)   Height: 5' 3" (1.6 m)       Physical Exam   Constitutional: She is oriented to person, place, and time. She appears well-developed and well-nourished.   Musculoskeletal: Normal range of motion. She exhibits tenderness (TTP to fingers with Heberden nodules noted to bilat pinky and index fingers). She exhibits no edema or deformity.        Feet:    Neurological: She is alert and oriented to person, place, and time.   Skin: No rash noted.         Medication List with Changes/Refills   New Medications    PIROXICAM (FELDENE) 20 MG CAPSULE    Take 1 capsule (20 mg total) " by mouth once daily.   Current Medications    BLOOD SUGAR DIAGNOSTIC STRP    1 strip by Misc.(Non-Drug; Combo Route) route 2 (two) times daily.    BLOOD-GLUCOSE METER (FREESTYLE SYSTEM KIT) KIT    Use as instructed    FLUTICASONE (FLONASE) 50 MCG/ACTUATION NASAL SPRAY    2 sprays by Each Nare route daily as needed.    LANCETS MISC    1 lancet by Misc.(Non-Drug; Combo Route) route 3 (three) times daily.    METFORMIN (GLUCOPHAGE) 500 MG TABLET    TAKE ONE TABLET BY MOUTH TWICE DAILY WITH BREAKFAST    VITAMIN D 1000 UNITS TAB    Take 1,000 Units by mouth once daily.   Discontinued Medications    IBUPROFEN (ADVIL,MOTRIN) 800 MG TABLET    Take 1 tablet (800 mg total) by mouth every meal as needed for Pain.           Diagnosis       1. Pain in finger of both hands    2. Heberden nodes    3. Ganglion cyst of left foot          Assessment/ Plan     Pain in finger of both hands  -     CBC auto differential; Future; Expected date: 11/28/2017  -     Comprehensive metabolic panel; Future; Expected date: 11/28/2017  -     Sedimentation rate, manual; Future; Expected date: 11/28/2017  -     NIKOLAS; Future; Expected date: 11/28/2017  -     C-reactive protein; Future; Expected date: 11/28/2017  -     Rheumatoid factor; Future; Expected date: 11/28/2017  -     piroxicam (FELDENE) 20 MG capsule; Take 1 capsule (20 mg total) by mouth once daily.  Dispense: 30 capsule; Refill: 2    Heberden nodes  -     CBC auto differential; Future; Expected date: 11/28/2017  -     Comprehensive metabolic panel; Future; Expected date: 11/28/2017  -     Sedimentation rate, manual; Future; Expected date: 11/28/2017  -     NIKOLAS; Future; Expected date: 11/28/2017  -     C-reactive protein; Future; Expected date: 11/28/2017  -     Rheumatoid factor; Future; Expected date: 11/28/2017    Ganglion cyst of left foot       -     Monitor for any changes.       -     To Surgeon if pain or growth worsens.        Lab pending.  Follow-up in clinic as  needed.        ANITA Sawant  [facility]

## 2017-11-29 ENCOUNTER — TELEPHONE (OUTPATIENT)
Dept: FAMILY MEDICINE | Facility: CLINIC | Age: 47
End: 2017-11-29

## 2017-11-29 LAB — ANA SER QL IF: NORMAL

## 2017-12-04 NOTE — PROGRESS NOTES
Patient ID: Chan Dwyer is a 47 y.o. female.    Chief Complaint: Breast Cancer Screening and complex cysts    HPI: Patient presents for one year f/u evaluation of an abnormal mammogram and ultrasound of both breasts- pt has simple and complex cysts that have been monitored since 2/2014. History of right breast complex cysts increasing in size 12/14 so pt was sent for core biopsy at [facility]- biopsy was cancelled since they could not find a complex cyst on exam to biopsy- noted all simple cysts at the time of the biopsy. Pt was scheduled for 6 mon f/u of the right breast in July 2015 and canceled the appts and again in August - canceled again- May 2016 imaging revealed la complex cysts and right breast simple cysts- la mammo and ultrasound rec for 6 mons- November 2016- simple cyst in right breast stable and complex cysts in left stable- 6 mon f/u left breast ultrasound only was recommended and stable 6/6/17.    Pt had her la mammo and ultrasound of the left breast today- results pending     Review of Systems   Constitutional: Negative.  Negative for appetite change and unexpected weight change.   HENT: Negative.    Eyes: Negative.  Negative for visual disturbance.   Respiratory: Negative.  Negative for cough and shortness of breath.    Cardiovascular: Negative.  Negative for chest pain.   Gastrointestinal: Negative.  Negative for abdominal pain and diarrhea.   Endocrine: Negative.    Genitourinary: Negative.  Negative for frequency.   Musculoskeletal: Negative.  Negative for back pain.   Skin: Negative.  Negative for rash.   Allergic/Immunologic: Negative.    Neurological: Negative.  Negative for headaches.   Hematological: Negative.  Negative for adenopathy.   Psychiatric/Behavioral: Negative.  The patient is not nervous/anxious.      Breast: No breast pain or nipple discharge. No palpable mass.  No change in her family history.      Current Outpatient Prescriptions   Medication Sig Dispense Refill     blood sugar diagnostic Strp 1 strip by Misc.(Non-Drug; Combo Route) route 2 (two) times daily. 100 each 5    blood-glucose meter (FREESTYLE SYSTEM KIT) kit Use as instructed 1 each 0    fluticasone (FLONASE) 50 mcg/actuation nasal spray 2 sprays by Each Nare route daily as needed. 16 g 2    lancets Misc 1 lancet by Misc.(Non-Drug; Combo Route) route 3 (three) times daily. 100 each 5    metFORMIN (GLUCOPHAGE) 500 MG tablet TAKE ONE TABLET BY MOUTH TWICE DAILY WITH BREAKFAST 180 tablet 1    piroxicam (FELDENE) 20 MG capsule Take 1 capsule (20 mg total) by mouth once daily. 30 capsule 2    vitamin D 1000 units Tab Take 1,000 Units by mouth once daily.       No current facility-administered medications for this visit.        Allergies   Allergen Reactions    Hydrocodone Hives and Rash       Past Medical History:   Diagnosis Date    Abnormal Pap smear     Abnormal Pap smear of cervix     Arthritis, low back     Diabetes 9/2014     BS 162am 09/16/2014    DM (diabetes mellitus) 09/2014     am 01/26/2016    DM (diabetes mellitus) 09/2014    BS didn't check 01/31/2017    GERD (gastroesophageal reflux disease)     Gestational diabetes     History of abnormal Pap smear     Surgical menopause     Vitamin D deficiency        Past Surgical History:   Procedure Laterality Date    COLONOSCOPY N/A 6/22/2016    Procedure: COLONOSCOPY;  Surgeon: Vishal Mary III, MD;  Location: [facility];  Service: Endoscopy;  Laterality: N/A;    Cryotherapy of the cervix  1999    DILATION AND CURETTAGE OF UTERUS      HYSTERECTOMY      [facility]  2/2012    TUBAL LIGATION         Family History   Problem Relation Age of Onset    Hypertension Mother     Diabetes Mother     Diabetes Father     Hypertension Father     No Known Problems Son     No Known Problems Son     Cancer Neg Hx     Heart disease Neg Hx     Stroke Neg Hx     Breast cancer Neg Hx     Colon cancer Neg Hx     Ovarian cancer Neg Hx         Social History     Social History    Marital status: Single     Spouse name: N/A    Number of children: 2    Years of education: N/A     Occupational History     [employer]     Social History Main Topics    Smoking status: Never Smoker    Smokeless tobacco: Never Used    Alcohol use No    Drug use: No    Sexual activity: Yes     Partners: Male     Birth control/ protection: Surgical     Other Topics Concern    Not on file     Social History Narrative    She wears seatbelt.       There were no vitals filed for this visit.    Physical Exam   Constitutional: She is oriented to person, place, and time. She appears well-developed and well-nourished.   HENT:   Head: Normocephalic and atraumatic.   Eyes: Conjunctivae are normal. Pupils are equal, round, and reactive to light. No scleral icterus.   Neck: Normal range of motion. Neck supple. No thyromegaly present.   Cardiovascular: Normal rate and regular rhythm.    Pulmonary/Chest: Effort normal and breath sounds normal. Right breast exhibits no inverted nipple, no mass, no nipple discharge, no skin change and no tenderness. Left breast exhibits no inverted nipple, no mass, no nipple discharge, no skin change and no tenderness. Breasts are symmetrical.       Abdominal: Soft. Bowel sounds are normal.   Musculoskeletal: Normal range of motion.   Lymphadenopathy:        Head (right side): No submental, no submandibular, no tonsillar, no preauricular, no posterior auricular and no occipital adenopathy present.        Head (left side): No submental, no submandibular, no tonsillar, no preauricular, no posterior auricular and no occipital adenopathy present.     She has no cervical adenopathy.        Right cervical: No superficial cervical and no posterior cervical adenopathy present.       Left cervical: No superficial cervical and no posterior cervical adenopathy present.     She has no axillary adenopathy.         Right: No supraclavicular adenopathy present.        Left: No supraclavicular adenopathy present.   Neurological: She is alert and oriented to person, place, and time.   Skin: Skin is warm and dry.   Psychiatric: She has a normal mood and affect. Her behavior is normal. Judgment and thought content normal.     Menarche at 14 y/o    First preg at 15 y/o  LMP: 43 y/o  No oral hormones- uses vaginal hormones     FH: negative for cancer    IMAGING: today- pending left breast ultrasound    Assessment & Plan:  1. History of an abnormal mammogram bilateral breasts - right- simple cyst, left: 2 complex cysts -  Bilateral mammo today and left ultrasound- results pending  2. Negative clinical examination   3. Depending on imaging results and recommendations as to when we will recommend follow-up. [facility]  4. BSE monthly- call for any changes.

## 2017-12-06 ENCOUNTER — HOSPITAL ENCOUNTER (OUTPATIENT)
Dept: RADIOLOGY | Facility: HOSPITAL | Age: 47
Discharge: HOME | End: 2017-12-06
Attending: NURSE PRACTITIONER
Payer: COMMERCIAL

## 2017-12-06 ENCOUNTER — OFFICE VISIT (OUTPATIENT)
Dept: SURGERY | Facility: CLINIC | Age: 47
End: 2017-12-06
Attending: NURSE PRACTITIONER
Payer: COMMERCIAL

## 2017-12-06 VITALS
HEART RATE: 60 BPM | BODY MASS INDEX: 21.53 KG/M2 | RESPIRATION RATE: 16 BRPM | SYSTOLIC BLOOD PRESSURE: 110 MMHG | HEIGHT: 64 IN | DIASTOLIC BLOOD PRESSURE: 62 MMHG | WEIGHT: 126.13 LBS

## 2017-12-06 DIAGNOSIS — N60.12 FIBROCYSTIC BREAST CHANGES OF BOTH BREASTS: Primary | ICD-10-CM

## 2017-12-06 DIAGNOSIS — N60.11 FIBROCYSTIC BREAST CHANGES OF BOTH BREASTS: Primary | ICD-10-CM

## 2017-12-06 DIAGNOSIS — R92.8 FOLLOW-UP EXAMINATION OF ABNORMAL MAMMOGRAM: ICD-10-CM

## 2017-12-06 DIAGNOSIS — N60.12 FIBROCYSTIC BREAST CHANGES, LEFT: ICD-10-CM

## 2017-12-06 PROCEDURE — 76642 ULTRASOUND BREAST LIMITED: CPT | Mod: 26,LT,, | Performed by: RADIOLOGY

## 2017-12-06 PROCEDURE — 99999 PR PBB SHADOW E&M-EST. PATIENT-LVL III: CPT | Mod: PBBFAC,,, | Performed by: NURSE PRACTITIONER

## 2017-12-06 PROCEDURE — 77066 DX MAMMO INCL CAD BI: CPT | Mod: 26,,, | Performed by: RADIOLOGY

## 2017-12-06 PROCEDURE — 77062 BREAST TOMOSYNTHESIS BI: CPT | Mod: TC,PO

## 2017-12-06 PROCEDURE — 99214 OFFICE O/P EST MOD 30 MIN: CPT | Mod: S$GLB,,, | Performed by: NURSE PRACTITIONER

## 2017-12-06 PROCEDURE — 77062 BREAST TOMOSYNTHESIS BI: CPT | Mod: 26,,, | Performed by: RADIOLOGY

## 2017-12-19 ENCOUNTER — OFFICE VISIT (OUTPATIENT)
Dept: FAMILY MEDICINE | Facility: CLINIC | Age: 47
End: 2017-12-19
Payer: COMMERCIAL

## 2017-12-19 ENCOUNTER — LAB VISIT (OUTPATIENT)
Dept: LAB | Facility: HOSPITAL | Age: 47
End: 2017-12-19
Payer: COMMERCIAL

## 2017-12-19 VITALS
OXYGEN SATURATION: 98 % | BODY MASS INDEX: 22.1 KG/M2 | TEMPERATURE: 98 F | HEIGHT: 64 IN | SYSTOLIC BLOOD PRESSURE: 100 MMHG | DIASTOLIC BLOOD PRESSURE: 78 MMHG | WEIGHT: 129.44 LBS | HEART RATE: 71 BPM | RESPIRATION RATE: 16 BRPM

## 2017-12-19 DIAGNOSIS — M13.841 ALLERGIC ARTHRITIS OF RIGHT HAND: ICD-10-CM

## 2017-12-19 DIAGNOSIS — E11.65 UNCONTROLLED TYPE 2 DIABETES MELLITUS WITH HYPERGLYCEMIA, WITHOUT LONG-TERM CURRENT USE OF INSULIN: ICD-10-CM

## 2017-12-19 DIAGNOSIS — E11.65 UNCONTROLLED TYPE 2 DIABETES MELLITUS WITH HYPERGLYCEMIA, WITHOUT LONG-TERM CURRENT USE OF INSULIN: Primary | ICD-10-CM

## 2017-12-19 DIAGNOSIS — M67.472 GANGLION CYST OF LEFT FOOT: ICD-10-CM

## 2017-12-19 LAB
ESTIMATED AVG GLUCOSE: 126 MG/DL
HBA1C MFR BLD HPLC: 6 %

## 2017-12-19 PROCEDURE — 99999 PR PBB SHADOW E&M-EST. PATIENT-LVL V: CPT | Mod: PBBFAC,,, | Performed by: FAMILY MEDICINE

## 2017-12-19 PROCEDURE — 36415 COLL VENOUS BLD VENIPUNCTURE: CPT | Mod: PO

## 2017-12-19 PROCEDURE — 90686 IIV4 VACC NO PRSV 0.5 ML IM: CPT | Mod: S$GLB,,, | Performed by: FAMILY MEDICINE

## 2017-12-19 PROCEDURE — 99214 OFFICE O/P EST MOD 30 MIN: CPT | Mod: 25,S$GLB,, | Performed by: FAMILY MEDICINE

## 2017-12-19 PROCEDURE — 90471 IMMUNIZATION ADMIN: CPT | Mod: S$GLB,,, | Performed by: FAMILY MEDICINE

## 2017-12-19 PROCEDURE — 83036 HEMOGLOBIN GLYCOSYLATED A1C: CPT

## 2017-12-19 NOTE — PROGRESS NOTES
Subjective:       Patient ID: Chan Dwyer is a 47 y.o. female.    Chief Complaint: Diabetes and Follow-up    Ms. Dwyer comes in today for diabetes follow-up.  She is fasting and has not taken medication today.  She states she monitors her diet but also states she walks at work 15 minutes every day.  She performs home glucose checks 3 times daily with levels ranging 146-200's; 164 this morning.    She reports having chronic right thumb, hand pain.  She saw NP Eddie Miranda on November 28, 2017 and was prescribed Feldene or pain which she states has not been helpful.  She states she has difficulty using her hand.  She is right handed.  She denies associated swelling, numbness or trauma.  She also states she continues to have ganglion cyst at her left foot.      Current Outpatient Prescriptions:  blood sugar diagnostic Strp, 1 strip by Misc.(Non-Drug; Combo Route) route 2 (two) times daily.  blood-glucose meter (FREESTYLE SYSTEM KIT) kit, Use as instructed  fluticasone (FLONASE) 50 mcg/actuation nasal spray, 2 sprays by Each Nare route daily as needed.  lancets Misc, 1 lancet by Misc.(Non-Drug; Combo Route) route 3 (three) times daily.  metFORMIN (GLUCOPHAGE) 500 MG tablet, TAKE ONE TABLET BY MOUTH TWICE DAILY WITH BREAKFAST  piroxicam (FELDENE) 20 MG capsule, Take 1 capsule (20 mg total) by mouth once daily.  vitamin D 1000 units Tab, Take 1,000 Units by mouth once daily.      Labs:                     WBC                      6.60                11/28/2017                 HGB                      13.0                11/28/2017                 HCT                      42.9                11/28/2017                 PLT                      271                 11/28/2017                 LDLCALC                  106.6               09/14/2017                         CHOL                     169                 09/14/2017                 TRIG                     57                  09/14/2017                  HDL                      51                  09/14/2017                 ALT                      12                  11/28/2017                 AST                      14                  11/28/2017                 NA                       140                 11/28/2017                 K                        4.1                 11/28/2017                 CL                       104                 11/28/2017                 CREATININE               0.7                 11/28/2017                 BUN                      10                  11/28/2017                 CO2                      30 (H)              11/28/2017                 TSH                      1.878               09/14/2017                 GLUF                     118 (H)             06/07/2010                 HGBA1C                   7.0 (H)             09/14/2017             RF                       <10.0               11/28/2017                 SEDRATE                  9                   11/28/2017 06/06/2017  Right hand x-rays: Findings: There is no evidence to suggest acute fracture or dislocation.  There is some joint space narrowing and osteophyte formation involving the interphalangeal joints greatest at the DIP joint of the 5th digit.  No obvious erosive changes are identified.      Diabetes   Pertinent negatives for hypoglycemia include no headaches or nervousness/anxiousness. Pertinent negatives for diabetes include no chest pain, no fatigue, no polydipsia, no polyphagia and no polyuria.     Review of Systems   Constitutional: Negative for activity change, appetite change, chills, fatigue and fever.        Weight 59.6 kg (131 lb 6.3 oz) at September 14, 2017 visit.   Eyes: Negative for discharge.   Respiratory: Negative for cough, shortness of breath and wheezing.         Rare.   Cardiovascular: Negative for chest pain, palpitations and leg swelling.   Gastrointestinal: Negative for abdominal pain, constipation,  diarrhea, nausea and vomiting.   Endocrine: Negative for polydipsia, polyphagia and polyuria.   Genitourinary: Negative for difficulty urinating and frequency.   Musculoskeletal: Positive for arthralgias. Negative for back pain and joint swelling.        Positive for right hand pain, decreased .   Neurological: Negative for headaches.   Psychiatric/Behavioral: Negative for dysphoric mood and suicidal ideas. The patient is not nervous/anxious.         Negative for homicidal ideas.       Objective:      Physical Exam   Constitutional: She is oriented to person, place, and time. She appears well-developed and well-nourished. No distress.   Pleasant.   Neck: Normal range of motion. Neck supple. No thyromegaly present.   Cardiovascular: Normal rate, regular rhythm and intact distal pulses.    No murmur heard.  Pulses:       Dorsalis pedis pulses are 3+ on the right side, and 3+ on the left side.        Posterior tibial pulses are 3+ on the right side, and 3+ on the left side.   Pulmonary/Chest: Effort normal and breath sounds normal. No respiratory distress. She has no wheezes.   Abdominal: Soft. Bowel sounds are normal. She exhibits no distension and no mass. There is no tenderness. There is no rebound and no guarding.   No CVA tenderness noted.   Musculoskeletal: Normal range of motion. She exhibits tenderness. She exhibits no edema.   She is ambulatory without problems.  Feet look okay without ulcerations or skin breaks noted. Slightly tender right hand with full range of motion and with equal  at both hands noted.   Feet:   Right Foot:   Protective Sensation: 7 sites tested. 7 sites sensed.   Skin Integrity: Negative for ulcer or skin breakdown.   Left Foot:   Protective Sensation: 7 sites tested. 7 sites sensed.   Skin Integrity: Negative for ulcer or skin breakdown.   Lymphadenopathy:     She has no cervical adenopathy.   Neurological: She is alert and oriented to person, place, and time.   Skin: She is  not diaphoretic.   Slightly tender ganglion cyst at dorsal-lateral aspect of left foot.   Psychiatric: She has a normal mood and affect. Her behavior is normal. Judgment and thought content normal.   Vitals reviewed.      Assessment:       1. Uncontrolled type 2 diabetes mellitus with hyperglycemia, without long-term current use of insulin    2. Allergic arthritis of right hand    3. Ganglion cyst of left foot        Plan:       1.  Labs:  A1c.  Patient advised to call for results.  2.  Continue current medications, follow low sodium, low cholesterol, low carb diet, daily walks.  3.  Physical Therapy consultation ([facility]/Physical Therapy - closer facility to her home).  4.  Podiatry consultation.  5.  Flu shot today.  6.  See me in 3 months for diabetes follow up.  7.  Work excuse for today with return tomorrow.

## 2018-01-03 ENCOUNTER — OFFICE VISIT (OUTPATIENT)
Dept: OBSTETRICS AND GYNECOLOGY | Facility: CLINIC | Age: 48
End: 2018-01-03
Payer: COMMERCIAL

## 2018-01-03 VITALS
BODY MASS INDEX: 22.47 KG/M2 | DIASTOLIC BLOOD PRESSURE: 84 MMHG | SYSTOLIC BLOOD PRESSURE: 130 MMHG | HEIGHT: 64 IN | WEIGHT: 131.63 LBS

## 2018-01-03 DIAGNOSIS — Z01.419 WELL WOMAN EXAM WITH ROUTINE GYNECOLOGICAL EXAM: Primary | ICD-10-CM

## 2018-01-03 DIAGNOSIS — N95.1 MENOPAUSE SYNDROME: ICD-10-CM

## 2018-01-03 DIAGNOSIS — N95.2 VAGINAL ATROPHY: ICD-10-CM

## 2018-01-03 PROBLEM — E89.40 SURGICAL MENOPAUSE: Status: RESOLVED | Noted: 2017-09-14 | Resolved: 2018-01-03

## 2018-01-03 PROCEDURE — 99396 PREV VISIT EST AGE 40-64: CPT | Mod: S$GLB,,, | Performed by: OBSTETRICS & GYNECOLOGY

## 2018-01-03 PROCEDURE — 99999 PR PBB SHADOW E&M-EST. PATIENT-LVL III: CPT | Mod: PBBFAC,,, | Performed by: OBSTETRICS & GYNECOLOGY

## 2018-01-03 RX ORDER — ESTRADIOL 0.1 MG/G
CREAM VAGINAL
Qty: 42.5 G | Refills: 6 | Status: SHIPPED | OUTPATIENT
Start: 2018-01-03 | End: 2018-04-12

## 2018-01-03 NOTE — PATIENT INSTRUCTIONS
Menopause: Effects of Low Estrogen Levels  When your estrogen level falls, you may have symptoms. You also may be at a greater risk for osteoporosis and heart disease. Your diet, family health history, lifestyle, and other factors affect your symptoms and risks.  Symptoms of low estrogen  · Flashes, flushes, and night sweats are the most common symptoms of low estrogen. At times, blood rushes to your skins surface. This can give you a feeling of warmth (hot flash). Your face may look flushed. Hot flashes while you are sleeping are called night sweats.  · Mood swings are another effect of low estrogen. You may feel sad, anxious, or frustrated. Shifting hormone levels and night sweats may disrupt your sleep. This can cause fatigue, which may make mood swings worse.  · Thinning tissues may cause discomfort. Skin may appear more wrinkled. Thinning in the urinary tract may lead to bladder infections. You may also have an urgent need to urinate. Or, you may lose bladder control (incontinence). Thinning of the vagina may cause dryness and painful sex.  Major health risks of low estrogen  Osteoporosis: Estrogen helps maintain strong bones by preventing calcium loss. Too little calcium can increase the risk of fractures in the spine, hips, and leg and arm bones. Women who drink a lot of alcohol, who smoke, who are not active, and who are thin or petite are at greater risk. A family history of osteoporosis may also increase risk.  Heart disease: Estrogen made by the body seems to protect against heart disease. It may do this by raising the level of HDL (good) cholesterol in the blood. After menopause, the risk of heart disease rises sharply. Talk to your doctor about ways to protect your heart health.  How HT helps  Hormone therapy (HT) replaces hormones your body no longer produces. Because of this, it reduces some symptoms of menopause that are linked to low hormone levels. HT may also help prevent osteoporosis in some  women. But it may increase the risk of other health conditions, including heart disease, breast cancer, and stroke. You may not need HT--not all women do. Talk to your doctor about whether or not HT is right for you.   Date Last Reviewed: 5/13/2015 © 2000-2017 [employer]. [address] [place] [address], [place] [address]. All rights reserved. This information is not intended as a substitute for professional medical care. Always follow your healthcare professional's instructions.        Hormone Therapy for Women    Hormone therapy (HT) increases the levels of the hormones estrogen and progesterone in your body. This can help reduce symptoms of menopause. HT may also help prevent osteoporosis in some women. But HT may increase risk for certain conditions, including blood clots, gallstones, heart disease, and stroke. However, HT may also reduce the risk of heart disease in some women.  How to take hormones  To get the best results, always take your hormones exactly as directed. Hormones can be taken in any of these ways:  · Pills containing estrogen, and sometimes other hormones, are taken as often as every day. This is the most common form of hormone therapy.  · A patch, spray, or gel releases estrogen into the bloodstream through the skin. There is also a patch that contains estrogen and progesterone. The patch can be worn on your hip. Most patches are changed once or twice a week.  · Vaginal ring containing estrogen.  · Cream used inside the vagina releases estrogen locally. Only a very small amount gets into the bloodstream. For this reason, vaginal creams can treat vaginal atrophy and dryness, but are not used to treat hot flashes. The creams do not significantly increase your risk for heart attack or stroke. However, if used more than twice a week in other than very small doses, estrogen does get into the bloodstream in significant amounts. This may affect the uterus if present.  · Prolonged exposure of  estrogen in the blood without the use of a progestin increases the risk of cancer of the uterus.  Follow up visits  Have regular visits with a healthcare provider. These visits are a way to fine-tune your therapy. You can also be checked for any problems that might require you to stop HT.  Call your healthcare provider  If you have any of the following symptoms, call your healthcare provider:  · Unexpected vaginal bleeding  · A breast lump, or breast tenderness that doesnt go away  · Severe headaches  · Aching muscles in your back or legs  · Sudden pain in your legs or chest  · Shortness of breath   Date Last Reviewed: 2/1/2017 © 2000-2017 [employer]. [address] [place] [address], [place] [address]. All rights reserved. This information is not intended as a substitute for professional medical care. Always follow your healthcare professional's instructions.        Paroxetine capsules  What is this medicine?  PAROXETINE (pa STEPH e teen) is used to treat hot flashes due to menopause.  How should I use this medicine?  Take this medicine by mouth once daily at bedtime. Follow the directions on the prescription label. This medicine can be taken with or without food. Take your medicine at regular intervals. Do not take your medicine more often than directed.  A special MedGuide will be given to you by the pharmacist with each prescription and refill. Be sure to read this information carefully each time.  What side effects may I notice from receiving this medicine?  Side effects that you should report to your doctor or health care professional as soon as possible:  · allergic reactions like skin rash, itching or hives, swelling of the face, lips, or tongue  · changes in emotions or moods  · confusion  · depression  · feeling faint or lightheaded, falls  · seizures  · suicidal thoughts or actions  · unusual bleeding or bruising  · unusually weak or tired  · weakness  Side effects that usually do not require medical  attention (Report these to your doctor or health care professional if they continue or are bothersome.):  · change in sex drive or performance  · fatigue  · drowsiness  · headache  · insomnia  · nausea/vomiting  · upset stomach  What may interact with this medicine?  Do not take this medicine with any of the following medications:  · linezolid  · MAOIs like Carbex, Eldepryl, Marplan, Nardil, and Parnate  · methylene blue (injected into a vein)  · pimozide  · thioridazine  This medicine may also interact with the following medications:  · alcohol  · aspirin and aspirin-like medicines  · atomoxetine  · certain medicines for depression, anxiety, or psychotic disturbances  · certain medicines for irregular heart beat like propafenone, flecainide, encainide, and quinidine  · certain medicines for migraine headache like almotriptan, eletriptan, frovatriptan, naratriptan, rizatriptan, sumatriptan, zolmitriptan  · cimetidine  · digoxin  · diuretics  · fentanyl  · fosamprenavir  · furazolidone  · isoniazid  · lithium  · medicines that treat or prevent blood clots like warfarin, enoxaparin, and dalteparin  · medicines for sleep  · NSAIDs, medicines for pain and inflammation, like ibuprofen or naproxen  · phenobarbital  · phenytoin  · procarbazine  · rasagiline  · ritonavir  · supplements like Keli's wort, kava kava, valerian  · tamoxifen  · tramadol  · tryptophan  What if I miss a dose?  If you miss a dose, take it as soon as you can. If it is almost time for your next dose, take only that dose. Do not take double or extra doses.  Where should I keep my medicine?  Keep out of the reach of children.  Store at room temperature between 20 and 25 degrees C (68 and 77 degrees F). Throw away any unused medicine after the expiration date.  What should I tell my health care provider before I take this medicine?  They need to know if you have any of these conditions:  · bleeding disorders  · glaucoma  · heart disease  · kidney  disease  · liver disease  · low levels of sodium in the blood  · jona or bipolar disorder  · seizures  · suicidal thoughts, plans, or attempt; a previous suicide attempt by you or a family member  · take MAOIs like Carbex, Eldepryl, Marplan, Nardil, and Parnate  · take medicines that treat or prevent blood clots  · an unusual or allergic reaction to paroxetine, other medicines, foods, dyes, or preservatives  · pregnant or trying to get pregnant  · breast-feeding  What should I watch for while using this medicine?  Tell your doctor or healthcare professional if your symptoms do not start to get better or if they get worse. Visit your doctor or health care professional for regular checks on your progress.  Patients and their families should watch out for new or worsening thoughts of suicide or depression. Also watch out for sudden changes in feelings such as feeling anxious, agitated, panicky, irritable, hostile, aggressive, impulsive, severely restless, overly excited and hyperactive, or not being able to sleep. If this happens, especially at the beginning of treatment or after a change in dose, call your health care professional.  You may get drowsy or dizzy. Do not drive, use machinery, or do anything that needs mental alertness until you know how this medicine affects you. Do not stand or sit up quickly, especially if you are an older patient. This reduces the risk of dizzy or fainting spells. Alcohol may interfere with the effect of this medicine. Avoid alcoholic drinks.  Your mouth may get dry. Chewing sugarless gum, sucking hard candy and drinking plenty of water will help. Contact your doctor if the problem does not go away or is severe.  Women should inform their doctor if they wish to become pregnant or think they might be pregnant. There is a potential for serious side effects to an unborn child. Talk to your health care professional or pharmacist for more information. Do not become pregnant while taking  this medicine.  NOTE:This sheet is a summary. It may not cover all possible information. If you have questions about this medicine, talk to your doctor, pharmacist, or health care provider. Copyright© 2017 [employer]

## 2018-01-03 NOTE — PROGRESS NOTES
Subjective:       Patient ID: Chan Dwyer is a 47 y.o. female.    Chief Complaint:  Well Woman      History of Present Illness  HPI  Presents for well-woman exam.  Has hx of RATLH for benign indications, still has both ovaries.  Pt has very bothersome vaginal dryness, hot flushes, night sweats, and low libido.  We treated this at her last visit with estrace 0.5mg.  She quit taking it b/c she had palpitations while on it.  She used vaginal premarin cream for vaginal symptoms, and that helped, but was too expensive.     Pt follows with Ashely Escobar for complex breast cysts.  Recent breast imaging 2017 was benign.  Colonoscopy 2016: normal    GYN & OB History  No LMP recorded. Patient has had a hysterectomy.   Date of Last Pap: 10/24/2014    OB History    Para Term  AB Living   2 2 2     2   SAB TAB Ectopic Multiple Live Births                  # Outcome Date GA Lbr Rajesh/2nd Weight Sex Delivery Anes PTL Lv   2 Term      Vag-Spont      1 Term      Vag-Spont             Review of Systems  Review of Systems   Constitutional: Negative for fatigue, fever and unexpected weight change.   Gastrointestinal: Negative for abdominal pain, bloating, blood in stool, constipation, diarrhea, nausea and vomiting.   Endocrine: Positive for hot flashes.   Genitourinary: Positive for decreased libido and vaginal pain (dryness). Negative for dyspareunia, dysuria, flank pain, frequency, genital sores, hematuria, pelvic pain, urgency, vaginal bleeding, vaginal discharge, urinary incontinence, postcoital bleeding, postmenopausal bleeding and vaginal odor.   Skin:  Negative for rash and hair changes.   Psychiatric/Behavioral: Positive for sleep disturbance (insomnia). Negative for depression. The patient is not nervous/anxious.    Breast: Negative for breast mass, breast pain, nipple discharge and skin changes          Objective:    Physical Exam:   Constitutional: She is oriented to person, place, and  time. She appears well-developed and well-nourished. No distress.             Abdominal: Soft. She exhibits no distension and no mass. There is no tenderness. There is no rebound and no guarding. Hernia confirmed negative in the right inguinal area and confirmed negative in the left inguinal area.     Genitourinary: Vagina normal. Pelvic exam was performed with patient supine. There is no rash, tenderness, lesion or injury on the right labia. There is no rash, tenderness, lesion or injury on the left labia. Uterus is absent. Right adnexum displays no mass, no tenderness and no fullness. Left adnexum displays no mass, no tenderness and no fullness. No erythema, tenderness, bleeding, rectocele, cystocele or unspecified prolapse of vaginal walls in the vagina. No foreign body in the vagina. No signs of injury around the vagina. No vaginal discharge found. Cervix exhibits absence.               Neurological: She is alert and oriented to person, place, and time.     Psychiatric: She has a normal mood and affect.          Assessment:        1. Well woman exam with routine gynecological exam    2. Menopause syndrome    3. Vaginal atrophy                Plan:      Chan was seen today for well woman.    Diagnoses and all orders for this visit:    Well woman exam with routine gynecological exam    Menopause syndrome    Vaginal atrophy  -     estradiol (ESTRACE) 0.01 % (0.1 mg/gram) vaginal cream; Place 1 gram vaginally daily x 2 weeks, then 1 gram vaginally twice weekly    A full discussion of the benefit-risk ratio of hormonal replacement therapy was carried out. Improvement in vasomotor and other climacteric symptoms is discussed, including possible improvements in sleep and mood. Reduction of risk for osteoporosis was explained. We discussed the study data showing increased risk of thrombo-embolic events such as myocardial infarction, stroke and also possibly breast cancer with estrogen replacement, and how this might  affect her. The range of side effects such as breast tenderness, weight gain and including possible increases in lifetime risk of breast cancer and possible thrombotic complications was discussed. We also discussed ACOG's recommendation to use hormone replacement therapy for the relief of hot flashes alone and to be on the lowest dose possible for the shortest amount of time.  Alternative such as herbal and soy-based products were reviewed. All of her questions about this therapy were answered.  Pt considering brisdelle or trying a different form of systemic ERT.  Info given in AVS.  She will let me know what she decides.  Reviewed updated recommendations for pap smears (no pap smear needed) in patients with a hysterectomy for benign indications.   Patient needs a pelvic exam every year.  Continue f/u with Ashely Shawn for complex breast cysts.  RTC 1 year or sooner prn.

## 2018-01-04 ENCOUNTER — OFFICE VISIT (OUTPATIENT)
Dept: PODIATRY | Facility: CLINIC | Age: 48
End: 2018-01-04
Payer: COMMERCIAL

## 2018-01-04 ENCOUNTER — HOSPITAL ENCOUNTER (OUTPATIENT)
Dept: RADIOLOGY | Facility: HOSPITAL | Age: 48
Discharge: HOME | End: 2018-01-04
Attending: PODIATRIST
Payer: COMMERCIAL

## 2018-01-04 VITALS
HEART RATE: 65 BPM | HEIGHT: 64 IN | WEIGHT: 129.19 LBS | SYSTOLIC BLOOD PRESSURE: 121 MMHG | BODY MASS INDEX: 22.06 KG/M2 | DIASTOLIC BLOOD PRESSURE: 67 MMHG

## 2018-01-04 DIAGNOSIS — M77.52 BONE SPUR OF LEFT FOOT: ICD-10-CM

## 2018-01-04 DIAGNOSIS — M89.8X9 EXOSTOSIS: ICD-10-CM

## 2018-01-04 DIAGNOSIS — M77.52 BONE SPUR OF LEFT FOOT: Primary | ICD-10-CM

## 2018-01-04 PROCEDURE — 99999 PR PBB SHADOW E&M-EST. PATIENT-LVL III: CPT | Mod: PBBFAC,,, | Performed by: PODIATRIST

## 2018-01-04 PROCEDURE — 73630 X-RAY EXAM OF FOOT: CPT | Mod: 26,FY,LT, | Performed by: RADIOLOGY

## 2018-01-04 PROCEDURE — 73630 X-RAY EXAM OF FOOT: CPT | Mod: TC,FY,PO,LT

## 2018-01-04 PROCEDURE — 99243 OFF/OP CNSLTJ NEW/EST LOW 30: CPT | Mod: S$GLB,,, | Performed by: PODIATRIST

## 2018-01-04 NOTE — PROGRESS NOTES
[facility]  PODIATRIC MEDICINE AND SURGERY  PROGRESS NOTE  1/4/2018    PODIATRY NOTE  PCP: Dr. Aziza Manzo MD    CHIEF COMPLAINT   Chief Complaint   Patient presents with    Foot Problem     Dorsal lateral left foot ganglion cyst. Present for about a couple of months. Tender to touch.       DALLAS Dwyer is a 47 y.o. female who has a past medical history of Abnormal Pap smear; Abnormal Pap smear of cervix; Arthritis, low back; Diabetes (9/2014); DM (diabetes mellitus) (09/2014); DM (diabetes mellitus) (09/2014); GERD (gastroesophageal reflux disease); Gestational diabetes; History of abnormal Pap smear; Surgical menopause; and Vitamin D deficiency.   Chan presents to clinic today complaining of left foot pain .    Patient describes pain as:   Location: dorsal lateral left foot   Quality:  Throbbing   Severity:6/10  Duration: 4 months   Modifying Factors (Aggravating): pressure, shoes   Modifying Factors (Alleviating): no pressure, open shoes     Pt was referred to podiatry for further evaluation by PCP Dr. Manzo.  Patient denies other pedal complaints at this time.     PMH  Past Medical History:   Diagnosis Date    Abnormal Pap smear     Abnormal Pap smear of cervix     Arthritis, low back     Diabetes 9/2014     BS 162am 09/16/2014    DM (diabetes mellitus) 09/2014     am 01/26/2016    DM (diabetes mellitus) 09/2014    BS didn't check 01/31/2017    GERD (gastroesophageal reflux disease)     Gestational diabetes     History of abnormal Pap smear     Surgical menopause     Vitamin D deficiency        PROBLEM LIST  Patient Active Problem List    Diagnosis Date Noted    Menopause syndrome 01/03/2018    Uncontrolled type 2 diabetes mellitus with hyperglycemia, without long-term current use of insulin 09/14/2017    [place] nodes 06/06/2017    Chronic daily headache 06/06/2017    Vitamin D deficiency 12/11/2015    Diabetes mellitus type 2, controlled, without  complications 09/16/2014    Arthritis, lumbar spine 09/01/2014    GERD (gastroesophageal reflux disease) 06/13/2013       MEDS  Current Outpatient Prescriptions on File Prior to Visit   Medication Sig Dispense Refill    blood sugar diagnostic Strp 1 strip by Misc.(Non-Drug; Combo Route) route 2 (two) times daily. 100 each 5    blood-glucose meter (FREESTYLE SYSTEM KIT) kit Use as instructed 1 each 0    estradiol (ESTRACE) 0.01 % (0.1 mg/gram) vaginal cream Place 1 gram vaginally daily x 2 weeks, then 1 gram vaginally twice weekly 42.5 g 6    fluticasone (FLONASE) 50 mcg/actuation nasal spray 2 sprays by Each Nare route daily as needed. 16 g 2    lancets Misc 1 lancet by Misc.(Non-Drug; Combo Route) route 3 (three) times daily. 100 each 5    metFORMIN (GLUCOPHAGE) 500 MG tablet TAKE ONE TABLET BY MOUTH TWICE DAILY WITH BREAKFAST 180 tablet 1    piroxicam (FELDENE) 20 MG capsule Take 1 capsule (20 mg total) by mouth once daily. 30 capsule 2    vitamin D 1000 units Tab Take 1,000 Units by mouth once daily.       No current facility-administered medications on file prior to visit.        Medication List with Changes/Refills   Current Medications    BLOOD SUGAR DIAGNOSTIC STRP    1 strip by Misc.(Non-Drug; Combo Route) route 2 (two) times daily.    BLOOD-GLUCOSE METER (FREESTYLE SYSTEM KIT) KIT    Use as instructed    ESTRADIOL (ESTRACE) 0.01 % (0.1 MG/GRAM) VAGINAL CREAM    Place 1 gram vaginally daily x 2 weeks, then 1 gram vaginally twice weekly    FLUTICASONE (FLONASE) 50 MCG/ACTUATION NASAL SPRAY    2 sprays by Each Nare route daily as needed.    LANCETS MISC    1 lancet by Misc.(Non-Drug; Combo Route) route 3 (three) times daily.    METFORMIN (GLUCOPHAGE) 500 MG TABLET    TAKE ONE TABLET BY MOUTH TWICE DAILY WITH BREAKFAST    PIROXICAM (FELDENE) 20 MG CAPSULE    Take 1 capsule (20 mg total) by mouth once daily.    VITAMIN D 1000 UNITS TAB    Take 1,000 Units by mouth once daily.       PSH     Past  "Surgical History:   Procedure Laterality Date    COLONOSCOPY N/A 6/22/2016    Procedure: COLONOSCOPY;  Surgeon: Vishal Mary III, MD;  Location: [facility];  Service: Endoscopy;  Laterality: N/A;    Cryotherapy of the cervix  1999    DILATION AND CURETTAGE OF UTERUS      HYSTERECTOMY      [facility]  2/2012    TUBAL LIGATION          ALL  Review of patient's allergies indicates:   Allergen Reactions    Hydrocodone Hives and Rash       SOC     Social History   Substance Use Topics    Smoking status: Never Smoker    Smokeless tobacco: Never Used    Alcohol use No         FAMILY HX    Family History   Problem Relation Age of Onset    Hypertension Mother     Diabetes Mother     Diabetes Father     Hypertension Father     No Known Problems Son     No Known Problems Son     Cancer Neg Hx     Heart disease Neg Hx     Stroke Neg Hx     Breast cancer Neg Hx     Colon cancer Neg Hx     Ovarian cancer Neg Hx             REVIEW OF SYSTEMS  Constitutional: Negative for chills and fever.   Respiratory: Negative for shortness of breath.    Cardiovascular: Negative for chest pain, palpitations, orthopnea, claudication and leg swelling.   Gastrointestinal: Negative for diarrhea, nausea and vomiting.   Musculoskeletal: Negative for joint pain.    Skin: Negative for rash.   Neurological: Negative for dizziness, tingling, sensory change, focal weakness and weakness.   Psychiatric/Behavioral: Negative.    PHYSICAL EXAM  Vitals:    01/04/18 0726   BP: 121/67   Pulse: 65   Weight: 58.6 kg (129 lb 3 oz)   Height: 5' 4" (1.626 m)   PainSc:   6   PainLoc: Foot       General: This patient is well-developed, well-nourished and appears stated age, well-oriented to person, place and time, and cooperative and pleasant on today's visit    LOWER EXTREMITY  Vascular exam:   · Dorsalis pedis and posterior tibial pulses palpable 2/4 bilaterally.   · Capillary refill time immediate to the toes.   · Feet are warm to the touch. Skin " temperature warm to warm from proximally to distally   · There are no varicosities, telangiectasias noted to bilateral foot and ankle regions.   · There are no ecchymoses noted to bilateral foot and ankle regions.   · There is no gross lower extremity edema.    Dermatologic exam:   · Skin moist with healthy texture and turgor.  · There are no open ulcerations, lacerations, or fissures to bilateral foot and ankle regions. There are no signs of infection as there is no erythema, no proximal-extending lymphangiitis, no fluctuance, or crepitus noted on palpation to bilateral foot and ankle regions.   · There is no interdigital maceration.   · There are no hyperkeratotic lesions noted to feet. Nails are well-trimmed.  · There is palpable bony prominence overlying left foot just proximal to fourth and fifth metatarsal base, measuring appx 1 cm x 1 cm     Neurologic exam:  · Epicritic sensation is intact as the patient is able to sense light touch to bilateral foot and ankle regions.   · Achilles and patellar deep tendon reflexes intact  · Babinski reflex absent    Musculoskeletal/Orthopedic exam:   · No symptomatic structural abnormalities noted  · Muscle strength AT/EHL/EDL/PT: 5/5; Achilles/Gastroc/Soleus: 5/5; PB/PL: 5/5 Muscle tone is normal.  · Ankle joint ROM  B/L supple DF/PF, non-crepitus  · STJ ROM supple inv/ev, non crepitus   · Mild tenderness overlying lateral aspect LEFT foot    IMAGING  xrays ordered    ASSESSMENT  Encounter Diagnoses   Name Primary?    Bone spur of left foot Yes    Exostosis - Left Foot          PLAN  1. Patient was educated about clinical and imaging findings, and verbalizes understanding of above.     Diagnoses and all orders for this visit:  Bone spur of left foot  -     X-Ray Foot Complete 3 view Left; Future; Expected date: 01/04/2018    Exostosis - Left Foot  -     X-Ray Foot Complete 3 view Left; Future; Expected date: 01/04/2018      2. Treatment plan:   -we discussed likely  etiology bone spur. Surgical and conservative treatment options discussed including steroid injection and topical pain cream, shoe modifications to prevent pressure on painful bone spur.   -pt would like to proceed with topical therapy  -Prescription for musculoskeletal pain prescribed for patient consisting of: Flurbiprofen 10%, cyclobenzaprine 2%,gabapentin 6%, lidocaine 2%, and prilocaine 2%.    3. RTC  for follow up/evaluation as scheduled     Future Appointments  Date Time Provider Department Center   3/22/2018 9:45 AM Aziza Manzo MD [facility] [address]       Report Electronically Signed By:  Roseanne Still DPM   Podiatric Medicine & Surgery  [facility]  1/4/2018

## 2018-01-04 NOTE — LETTER
January 4, 2018      [facility] - Podiatry  [address]  [place] [address]  Phone: [phone]  Fax: [phone]       Patient: Chan Dwyer   YOB: 1970  Date of Visit: 01/04/2018    To Whom It May Concern:    Antonio Dwyer  was at [facility] on 01/04/2018. She may return to work on 01/04/2018 with no restrictions. If you have any questions or concerns, or if I can be of further assistance, please do not hesitate to contact me.    Sincerely,                Arpita Blackman LPN  Office of Roseanne Still DPM  Podiatry Department, [facility]

## 2018-01-04 NOTE — LETTER
January 4, 2018      Aziza Manzo MD  [address] [place] [address]           [facility] - Podiatry  [address] [facility] [address] [address]  [place] [address]  Phone: [phone]  Fax: [phone]          Patient: Chan Dwyer   MR Number: [id number]   YOB: 1970   Date of Visit: 1/4/2018       Dear Dr. Aziza Manzo:    Thank you for referring Chan Dwyer to me for evaluation. Attached you will find relevant portions of my assessment and plan of care.    If you have questions, please do not hesitate to call me. I look forward to following Chan Dwyer along with you.    Sincerely,    Roseanne Still, JAZMIN    Enclosure  CC:  No Recipients    If you would like to receive this communication electronically, please contact [email or web address] or [phone] to request more information on [facility] Link access.    For providers and/or their staff who would like to refer a patient to [facility], please contact us through our one-stop-shop provider referral line, [facility], at [phone].    If you feel you have received this communication in error or would no longer like to receive these types of communications, please e-mail [email or web address]

## 2018-02-05 ENCOUNTER — OFFICE VISIT (OUTPATIENT)
Dept: OPHTHALMOLOGY | Facility: CLINIC | Age: 48
End: 2018-02-05
Payer: COMMERCIAL

## 2018-02-05 DIAGNOSIS — H52.03 HYPEROPIA, BILATERAL: ICD-10-CM

## 2018-02-05 DIAGNOSIS — E11.9 DIABETES MELLITUS TYPE 2 WITHOUT RETINOPATHY: Primary | ICD-10-CM

## 2018-02-05 DIAGNOSIS — H52.4 BILATERAL PRESBYOPIA: ICD-10-CM

## 2018-02-05 PROCEDURE — 92014 COMPRE OPH EXAM EST PT 1/>: CPT | Mod: S$GLB,,, | Performed by: OPTOMETRIST

## 2018-02-05 PROCEDURE — 99999 PR PBB SHADOW E&M-EST. PATIENT-LVL I: CPT | Mod: PBBFAC,,, | Performed by: OPTOMETRIST

## 2018-02-05 PROCEDURE — 92015 DETERMINE REFRACTIVE STATE: CPT | Mod: S$GLB,,, | Performed by: OPTOMETRIST

## 2018-02-05 NOTE — LETTER
[facility] - Ophthalmology  Ophthalmology  [address] [facility] [place] [address]  Phone: [phone]  Fax: [phone]   February 5, 2018     Patient: Chan Dwyer   YOB: 1970   Date of Visit: 2/5/2018       To Whom it May Concern:    Chan Dwyer was seen in my clinic on 2/5/2018. She may return to work on 2/5/2018.    If you have any questions or concerns, please don't hesitate to call.    Sincerely,         Fernie Abrams, OD

## 2018-02-05 NOTE — PROGRESS NOTES
HPI     Diabetic Eye Exam    Additional comments: Yearly           Comments   Last seen by TRF on 1/31/17 for yearly DM exam. Patient here today for   yearly DM exam. Patient states she did not check her blood sugar this   morning.   1. DM x2015  HPI    Any vision changes since last exam: No  Eye pain: No  Other ocular symptoms: No    Do you wear currently wear glasses or contacts? None    Interested in contacts today? No    Do you plan on getting new glasses today? If needed       Last edited by Lara Doan, PCT on 2/5/2018  8:31 AM. (History)            Assessment /Plan     For exam results, see Encounter Report.    Diabetes mellitus type 2 without retinopathy    Hyperopia, bilateral    Bilateral presbyopia      No Background Diabetic Retinopathy    Dispense Final Rx for glasses.  RTC 1 year

## 2018-04-12 ENCOUNTER — LAB VISIT (OUTPATIENT)
Dept: LAB | Facility: HOSPITAL | Age: 48
End: 2018-04-12
Attending: FAMILY MEDICINE
Payer: COMMERCIAL

## 2018-04-12 ENCOUNTER — OFFICE VISIT (OUTPATIENT)
Dept: FAMILY MEDICINE | Facility: CLINIC | Age: 48
End: 2018-04-12
Payer: COMMERCIAL

## 2018-04-12 VITALS
DIASTOLIC BLOOD PRESSURE: 78 MMHG | BODY MASS INDEX: 22.13 KG/M2 | SYSTOLIC BLOOD PRESSURE: 130 MMHG | OXYGEN SATURATION: 97 % | HEIGHT: 64 IN | WEIGHT: 129.63 LBS | TEMPERATURE: 97 F | HEART RATE: 66 BPM

## 2018-04-12 DIAGNOSIS — E11.9 CONTROLLED TYPE 2 DIABETES MELLITUS WITHOUT COMPLICATION, WITHOUT LONG-TERM CURRENT USE OF INSULIN: Primary | ICD-10-CM

## 2018-04-12 DIAGNOSIS — E11.9 CONTROLLED TYPE 2 DIABETES MELLITUS WITHOUT COMPLICATION, WITHOUT LONG-TERM CURRENT USE OF INSULIN: ICD-10-CM

## 2018-04-12 LAB
ALBUMIN SERPL BCP-MCNC: 3.8 G/DL
ALP SERPL-CCNC: 77 U/L
ALT SERPL W/O P-5'-P-CCNC: 16 U/L
ANION GAP SERPL CALC-SCNC: 8 MMOL/L
AST SERPL-CCNC: 15 U/L
BILIRUB SERPL-MCNC: 0.6 MG/DL
BUN SERPL-MCNC: 10 MG/DL
CALCIUM SERPL-MCNC: 9.8 MG/DL
CHLORIDE SERPL-SCNC: 105 MMOL/L
CO2 SERPL-SCNC: 30 MMOL/L
CREAT SERPL-MCNC: 0.7 MG/DL
EST. GFR  (AFRICAN AMERICAN): >60 ML/MIN/1.73 M^2
EST. GFR  (NON AFRICAN AMERICAN): >60 ML/MIN/1.73 M^2
ESTIMATED AVG GLUCOSE: 146 MG/DL
GLUCOSE SERPL-MCNC: 121 MG/DL
HBA1C MFR BLD HPLC: 6.7 %
POTASSIUM SERPL-SCNC: 4.4 MMOL/L
PROT SERPL-MCNC: 7.2 G/DL
SODIUM SERPL-SCNC: 143 MMOL/L

## 2018-04-12 PROCEDURE — 99999 PR PBB SHADOW E&M-EST. PATIENT-LVL III: CPT | Mod: PBBFAC,,, | Performed by: FAMILY MEDICINE

## 2018-04-12 PROCEDURE — 3044F HG A1C LEVEL LT 7.0%: CPT | Mod: CPTII,S$GLB,, | Performed by: FAMILY MEDICINE

## 2018-04-12 PROCEDURE — 99214 OFFICE O/P EST MOD 30 MIN: CPT | Mod: S$GLB,,, | Performed by: FAMILY MEDICINE

## 2018-04-12 PROCEDURE — 36415 COLL VENOUS BLD VENIPUNCTURE: CPT | Mod: PO

## 2018-04-12 PROCEDURE — 83036 HEMOGLOBIN GLYCOSYLATED A1C: CPT

## 2018-04-12 PROCEDURE — 80053 COMPREHEN METABOLIC PANEL: CPT

## 2018-04-12 NOTE — PROGRESS NOTES
Subjective:       Patient ID: Chan Dwyer is a 47 y.o. female.    Chief Complaint: Diabetes and Follow-up    Ms. Dwyer comes in today for 3-month diabetes follow-up.  She is fasting and has not taken medications today.  She states she monitors her diet and exercises by walking 15 minutes daily.  She states she performs home glucose check every morning with levels ranging 180's.      She reports having polyphagia and polydipsia, insomnia with hot flashes and reports seldomly having posterior headache.  She states she did not get estrogen vaginal cream prescription filled due to the expense.    Otherwise, she denies having fever, chills, fatigue, appetite change; shortness of breath, cough, wheezing; chest pain, palpitations, leg swelling; abdominal pain, nausea, vomiting, diarrhea, constipation; polyuria; unusual urinary symptoms; back pain; anxiety, depression, homicidal or suicidal thoughts.      Current Outpatient Prescriptions:  blood sugar diagnostic Strp, 1 strip by Misc.(Non-Drug; Combo Route) route 2 (two) times daily.  fluticasone (FLONASE) 50 mcg/actuation nasal spray, 2 sprays by Each Nare route daily as needed.  lancets Misc, 1 lancet by Misc.(Non-Drug; Combo Route) route 3 (three) times daily.  metFORMIN (GLUCOPHAGE) 500 MG tablet, TAKE ONE TABLET BY MOUTH TWICE DAILY WITH BREAKFAST  vitamin D 1000 units Tab, Take 1,000 Units by mouth once daily.  blood-glucose meter (FREESTYLE SYSTEM KIT) kit, Use as instructed      Labs:               WBC                      6.60                11/28/2017                 HGB                      13.0                11/28/2017                 HCT                      42.9                11/28/2017                 PLT                      271                 11/28/2017                 CHOL                     169                 09/14/2017                 TRIG                     57                  09/14/2017                 HDL                      51                   09/14/2017                 ALT                      12                  11/28/2017                 AST                      14                  11/28/2017                 NA                       140                 11/28/2017                 K                        4.1                 11/28/2017                 CL                       104                 11/28/2017                 CREATININE               0.7                 11/28/2017                 BUN                      10                  11/28/2017                 CO2                      30 (H)              11/28/2017                 TSH                      1.878               09/14/2017                 GLUF                     118 (H)             06/07/2010                 HGBA1C                   6.0 (H)             12/19/2017             LDLCALC                  106.6               09/14/2017                Diabetes   Pertinent negatives for hypoglycemia include no headaches or nervousness/anxiousness. Associated symptoms include polydipsia and polyphagia. Pertinent negatives for diabetes include no chest pain, no fatigue and no polyuria.     Review of Systems   Constitutional: Negative for activity change, appetite change, chills, fatigue and fever.        Weight 58.7 kg (129 lb 6.6 oz) at December 19, 2017 visit.   Respiratory: Negative for cough, shortness of breath and wheezing.    Cardiovascular: Negative for chest pain, palpitations and leg swelling.   Gastrointestinal: Negative for abdominal pain, constipation, diarrhea, nausea and vomiting.   Endocrine: Positive for heat intolerance, polydipsia and polyphagia. Negative for cold intolerance and polyuria.        See history of present illness.   Genitourinary: Negative for difficulty urinating and frequency.   Musculoskeletal: Negative for arthralgias, back pain and joint swelling.   Neurological: Negative for headaches.   Psychiatric/Behavioral: Positive for sleep disturbance.  Negative for dysphoric mood and suicidal ideas. The patient is not nervous/anxious.         Negative for homicidal ideas.       Objective:      Physical Exam   Constitutional: She is oriented to person, place, and time. She appears well-developed and well-nourished. No distress.   Pleasant.   Neck: Normal range of motion. Neck supple. No thyromegaly present.   Cardiovascular: Normal rate, regular rhythm and intact distal pulses.    No murmur heard.  Pulses:       Dorsalis pedis pulses are 3+ on the right side, and 3+ on the left side.        Posterior tibial pulses are 3+ on the right side, and 3+ on the left side.   Pulmonary/Chest: Effort normal and breath sounds normal. No respiratory distress. She has no wheezes.   Abdominal: Soft. Bowel sounds are normal. She exhibits no distension and no mass. There is no tenderness. There is no rebound and no guarding.   No CVA tenderness noted.   Musculoskeletal: Normal range of motion. She exhibits no edema or tenderness.   She is ambulatory without problems.  Feet look okay without ulcerations or skin breaks noted.    Feet:   Right Foot:   Protective Sensation: 7 sites tested. 7 sites sensed.   Skin Integrity: Negative for ulcer or skin breakdown.   Left Foot:   Protective Sensation: 7 sites tested. 7 sites sensed.   Skin Integrity: Negative for ulcer or skin breakdown.   Lymphadenopathy:     She has no cervical adenopathy.   Neurological: She is alert and oriented to person, place, and time.   Skin: She is not diaphoretic.   Psychiatric: She has a normal mood and affect. Her behavior is normal. Judgment and thought content normal.   Vitals reviewed.      Assessment:       1. Controlled type 2 diabetes mellitus without complication, without long-term current use of insulin        Plan:       1.  Labs:  A1c, CMP.  Patient advised to call for results.  2.  Continue current medications, follow low sodium, low cholesterol, low carb diet, daily walks.  3.  Again discussed  ACE-inhibitor and Statin therapies with patient for diabetic renal prevention and heart protection. She again declined.  4.  See me in 3 months for diabetes follow up.  5.  Work excuse for today with return today.

## 2018-04-12 NOTE — LETTER
April 12, 2018      [facility]  [address] [facility] [address] [facility] [address]  Phone: [phone]       Patient: Chan Dwyer   YOB: 1970  Date of Visit: 04/12/2018    To Whom It May Concern:    Antonio Dwyer  was at [facility] on 04/12/2018. She may return to work on 04/12/2018 with no restrictions. If you have any questions or concerns, or if I can be of further assistance, please do not hesitate to contact me.    Sincerely,    Aziza Manzo MD

## 2018-05-24 ENCOUNTER — PATIENT MESSAGE (OUTPATIENT)
Dept: FAMILY MEDICINE | Facility: CLINIC | Age: 48
End: 2018-05-24

## 2018-05-24 RX ORDER — IBUPROFEN 800 MG/1
TABLET ORAL
Qty: 90 TABLET | Refills: 1 | Status: CANCELLED | OUTPATIENT
Start: 2018-05-24

## 2018-05-24 RX ORDER — METFORMIN HYDROCHLORIDE 500 MG/1
TABLET ORAL
Qty: 180 TABLET | Refills: 1 | Status: SHIPPED | OUTPATIENT
Start: 2018-05-24 | End: 2018-07-15 | Stop reason: SDUPTHER

## 2018-05-24 RX ORDER — IBUPROFEN 800 MG/1
800 TABLET ORAL
Qty: 90 TABLET | Refills: 1 | Status: SHIPPED | OUTPATIENT
Start: 2018-05-24 | End: 2019-03-13 | Stop reason: SDUPTHER

## 2018-07-13 ENCOUNTER — LAB VISIT (OUTPATIENT)
Dept: LAB | Facility: HOSPITAL | Age: 48
End: 2018-07-13
Attending: FAMILY MEDICINE
Payer: COMMERCIAL

## 2018-07-13 ENCOUNTER — OFFICE VISIT (OUTPATIENT)
Dept: FAMILY MEDICINE | Facility: CLINIC | Age: 48
End: 2018-07-13
Payer: COMMERCIAL

## 2018-07-13 VITALS
DIASTOLIC BLOOD PRESSURE: 80 MMHG | TEMPERATURE: 96 F | HEIGHT: 64 IN | OXYGEN SATURATION: 97 % | SYSTOLIC BLOOD PRESSURE: 116 MMHG | HEART RATE: 76 BPM | BODY MASS INDEX: 21.53 KG/M2 | WEIGHT: 126.13 LBS

## 2018-07-13 DIAGNOSIS — E11.65 UNCONTROLLED TYPE 2 DIABETES MELLITUS WITH HYPERGLYCEMIA, WITHOUT LONG-TERM CURRENT USE OF INSULIN: Primary | ICD-10-CM

## 2018-07-13 DIAGNOSIS — E11.65 UNCONTROLLED TYPE 2 DIABETES MELLITUS WITH HYPERGLYCEMIA, WITHOUT LONG-TERM CURRENT USE OF INSULIN: ICD-10-CM

## 2018-07-13 DIAGNOSIS — M79.601 PAIN OF RIGHT UPPER EXTREMITY: ICD-10-CM

## 2018-07-13 DIAGNOSIS — R20.2 TINGLING OF RIGHT UPPER EXTREMITY: ICD-10-CM

## 2018-07-13 DIAGNOSIS — R35.0 URINE FREQUENCY: ICD-10-CM

## 2018-07-13 LAB
ANION GAP SERPL CALC-SCNC: 9 MMOL/L
BACTERIA #/AREA URNS AUTO: ABNORMAL /HPF
BILIRUB UR QL STRIP: NEGATIVE
BUN SERPL-MCNC: 11 MG/DL
CALCIUM SERPL-MCNC: 9.9 MG/DL
CHLORIDE SERPL-SCNC: 103 MMOL/L
CLARITY UR REFRACT.AUTO: CLEAR
CO2 SERPL-SCNC: 27 MMOL/L
COLOR UR AUTO: YELLOW
CREAT SERPL-MCNC: 0.9 MG/DL
EST. GFR  (AFRICAN AMERICAN): >60 ML/MIN/1.73 M^2
EST. GFR  (NON AFRICAN AMERICAN): >60 ML/MIN/1.73 M^2
ESTIMATED AVG GLUCOSE: 229 MG/DL
GLUCOSE SERPL-MCNC: 268 MG/DL
GLUCOSE UR QL STRIP: ABNORMAL
HBA1C MFR BLD HPLC: 9.6 %
HGB UR QL STRIP: NEGATIVE
KETONES UR QL STRIP: ABNORMAL
LEUKOCYTE ESTERASE UR QL STRIP: NEGATIVE
MICROSCOPIC COMMENT: ABNORMAL
NITRITE UR QL STRIP: NEGATIVE
PH UR STRIP: 5 [PH] (ref 5–8)
POTASSIUM SERPL-SCNC: 4.3 MMOL/L
PROT UR QL STRIP: NEGATIVE
RBC #/AREA URNS AUTO: 1 /HPF (ref 0–4)
SODIUM SERPL-SCNC: 139 MMOL/L
SP GR UR STRIP: >=1.03 (ref 1–1.03)
SQUAMOUS #/AREA URNS AUTO: 5 /HPF
URN SPEC COLLECT METH UR: ABNORMAL
UROBILINOGEN UR STRIP-ACNC: 2 EU/DL
WBC #/AREA URNS AUTO: 1 /HPF (ref 0–5)
YEAST UR QL AUTO: ABNORMAL

## 2018-07-13 PROCEDURE — 83036 HEMOGLOBIN GLYCOSYLATED A1C: CPT

## 2018-07-13 PROCEDURE — 3008F BODY MASS INDEX DOCD: CPT | Mod: CPTII,S$GLB,, | Performed by: FAMILY MEDICINE

## 2018-07-13 PROCEDURE — 81001 URINALYSIS AUTO W/SCOPE: CPT

## 2018-07-13 PROCEDURE — 36415 COLL VENOUS BLD VENIPUNCTURE: CPT | Mod: PO

## 2018-07-13 PROCEDURE — 99214 OFFICE O/P EST MOD 30 MIN: CPT | Mod: S$GLB,,, | Performed by: FAMILY MEDICINE

## 2018-07-13 PROCEDURE — 99999 PR PBB SHADOW E&M-EST. PATIENT-LVL III: CPT | Mod: PBBFAC,,, | Performed by: FAMILY MEDICINE

## 2018-07-13 PROCEDURE — 87086 URINE CULTURE/COLONY COUNT: CPT

## 2018-07-13 PROCEDURE — 80048 BASIC METABOLIC PNL TOTAL CA: CPT

## 2018-07-13 PROCEDURE — 3046F HEMOGLOBIN A1C LEVEL >9.0%: CPT | Mod: CPTII,S$GLB,, | Performed by: FAMILY MEDICINE

## 2018-07-13 RX ORDER — TIZANIDINE 4 MG/1
4 TABLET ORAL NIGHTLY PRN
Qty: 30 TABLET | Refills: 1 | Status: SHIPPED | OUTPATIENT
Start: 2018-07-13 | End: 2018-07-25

## 2018-07-13 NOTE — PROGRESS NOTES
Chan Gorman Reymundo    Chief Complaint   Patient presents with    Diabetes    Follow-up       History of Present Illness:   Ms. Dwyer comes in today for 3-month diabetes follow-up.  She is fasting and has not taken medications today.  She states she monitors her diet and exercises.  She states for 1 month of glucose levels have been ranging 200's; she checks every morning and at noontime following lunch.    She reports having chronic hot flashes; polyuria, polyphagia; urine frequency.  She denies having fever, chills, fatigue, appetite change; shortness of breath, cough, wheezing; sinus symptoms; chest pain, palpitations, leg swelling; abdominal pain, nausea, vomiting, diarrhea, constipation; polydipsia; back pain; headaches; anxiety, depression, homicidal or suicidal thoughts.    She reports for 2 weeks having intermittent pain at her right shoulder, right arm and fingers with numbness and tingling in all fingers.  She reports pain occurs mainly at night and causes insomnia.  She also reports having slight puffiness in her arm.  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  She states these are new symptoms for her.  She is right handed.  She has been taken ibuprofen, Aleve without help.  She reports history of GERD.    Labs:                     WBC                      6.60                11/28/2017                 HGB                      13.0                11/28/2017                 HCT                      42.9                11/28/2017                 PLT                      271                 11/28/2017                 CHOL                     169                 09/14/2017                 TRIG                     57                  09/14/2017 06/06/2017 Right Hand x-ray - Findings: There is no evidence to suggest acute fracture or dislocation.  There is some joint space narrowing and osteophyte formation involving the interphalangeal  joints greatest at the DIP joint of the 5th digit.  No obvious erosive changes are identified.       HDL                      51                  09/14/2017                 ALT                      16                  04/12/2018                 AST                      15                  04/12/2018                 NA                       143                 04/12/2018                 K                        4.4                 04/12/2018                 CL                       105                 04/12/2018                 CREATININE               0.7                 04/12/2018                 BUN                      10                  04/12/2018                 CO2                      30 (H)              04/12/2018                 TSH                      1.878               09/14/2017                 GLUF                     118 (H)             06/07/2010                 HGBA1C                   6.7 (H)             04/12/2018            LDLCALC                  106.6               09/14/2017                Diabetes   Pertinent negatives for hypoglycemia include no headaches or nervousness/anxiousness. Associated symptoms include polydipsia and polyphagia. Pertinent negatives for diabetes include no chest pain, no fatigue and no polyuria.       Current Outpatient Prescriptions   Medication Sig    blood sugar diagnostic Strp 1 strip by Misc.(Non-Drug; Combo Route) route 2 (two) times daily.    blood-glucose meter (FREESTYLE SYSTEM KIT) kit Use as instructed    ibuprofen (ADVIL,MOTRIN) 800 MG tablet Take 1 tablet (800 mg total) by mouth every meal as needed for Pain.    lancets Misc 1 lancet by Misc.(Non-Drug; Combo Route) route 3 (three) times daily.    metFORMIN (GLUCOPHAGE) 500 MG tablet TAKE ONE TABLET BY MOUTH TWICE DAILY WITH BREAKFAST    vitamin D 1000 units Tab Take 1,000 Units by mouth once daily.       Review of Systems   Constitutional: Negative for activity change, appetite change, chills,  fatigue and fever.        Weight 58.8 kg (129 lb 10.1 oz) at April 12, 2018 2017 visit.   HENT: Negative for congestion, postnasal drip, rhinorrhea, sinus pain, sinus pressure, sneezing and sore throat.    Respiratory: Negative for cough, shortness of breath and wheezing.    Cardiovascular: Negative for chest pain, palpitations and leg swelling.   Gastrointestinal: Negative for abdominal pain, constipation, diarrhea, nausea and vomiting.   Endocrine: Positive for heat intolerance, polydipsia and polyphagia. Negative for cold intolerance and polyuria.        See history of present illness.   Genitourinary: Negative for difficulty urinating, dysuria, frequency and hematuria.   Musculoskeletal: Positive for joint swelling. Negative for arthralgias, back pain and neck pain.   Neurological: Negative for headaches.   Psychiatric/Behavioral: Positive for sleep disturbance. Negative for dysphoric mood and suicidal ideas. The patient is not nervous/anxious.         Negative for homicidal ideas.       Objective:  Physical Exam   Constitutional: She is oriented to person, place, and time. She appears well-developed and well-nourished. No distress.   Pleasant.   Neck: Normal range of motion. Neck supple. No thyromegaly present.   Cardiovascular: Normal rate, regular rhythm and intact distal pulses.    No murmur heard.  Pulses:       Dorsalis pedis pulses are 3+ on the right side, and 3+ on the left side.        Posterior tibial pulses are 3+ on the right side, and 3+ on the left side.   Pulmonary/Chest: Effort normal and breath sounds normal. No respiratory distress. She has no wheezes.   Abdominal: Soft. Bowel sounds are normal. She exhibits no distension and no mass. There is no tenderness. There is no rebound and no guarding.   No CVA tenderness noted.   Musculoskeletal: Normal range of motion. She exhibits tenderness. She exhibits no edema.   She is ambulatory without problems.  Feet look okay without ulcerations or skin  breaks noted. Slightly tender across shoulders, upper arms (right > left) with full range of motion noted.  Non tender neck, back, and hands with full range of motion noted.   Feet:   Right Foot:   Protective Sensation: 7 sites tested. 7 sites sensed.   Skin Integrity: Negative for ulcer or skin breakdown.   Left Foot:   Protective Sensation: 7 sites tested. 7 sites sensed.   Skin Integrity: Negative for ulcer or skin breakdown.   Lymphadenopathy:     She has no cervical adenopathy.   Neurological: She is alert and oriented to person, place, and time.   Negative Tinel's at both wrist.  Faintly positive Phalen's at right wrist but negative at left wrist.   Skin: She is not diaphoretic.   Psychiatric: She has a normal mood and affect. Her behavior is normal. Judgment and thought content normal.   Vitals reviewed.      ASSESSMENT:  1. Uncontrolled type 2 diabetes mellitus with hyperglycemia, without long-term current use of insulin    2. Tingling of right upper extremity    3. Pain of right upper extremity    4. Urine frequency        PLAN:  Chan was seen today for diabetes and follow-up.    Diagnoses and all orders for this visit:    Uncontrolled type 2 diabetes mellitus with hyperglycemia, without long-term current use of insulin  -     Hemoglobin A1c; Future  -     Basic metabolic panel; Future    Tingling of right upper extremity  -     tiZANidine (ZANAFLEX) 4 MG tablet; Take 1 tablet (4 mg total) by mouth nightly as needed (muscle spasm).  -     Nerve conduction test; Future    Pain of right upper extremity  -     tiZANidine (ZANAFLEX) 4 MG tablet; Take 1 tablet (4 mg total) by mouth nightly as needed (muscle spasm).  -     Nerve conduction test; Future    Urine frequency  -     Urinalysis  -     Urine culture    Other orders  -     Urinalysis Microscopic      Patient advised to call for results.  Continue current medications (including NSAID with food), follow low sodium, low cholesterol, low carb diet,  daily walks.  Tizanidine at bedtime prn as directed; medication precautions discussed with patient.  Flu shot this fall.  Work excuse for today with return today.  Follow-up in about 2 months (around 9/14/2018) for physical.

## 2018-07-15 LAB
BACTERIA UR CULT: NORMAL
BACTERIA UR CULT: NORMAL

## 2018-07-15 RX ORDER — METFORMIN HYDROCHLORIDE 500 MG/1
TABLET ORAL
Qty: 270 TABLET | Refills: 1 | Status: SHIPPED | OUTPATIENT
Start: 2018-07-15 | End: 2018-07-25 | Stop reason: SDUPTHER

## 2018-07-16 ENCOUNTER — TELEPHONE (OUTPATIENT)
Dept: FAMILY MEDICINE | Facility: CLINIC | Age: 48
End: 2018-07-16

## 2018-07-16 NOTE — TELEPHONE ENCOUNTER
"Per Dr. Manzo, patient needs to be scheduled for a "NCS [nerve conduction test] on M, W, F ASAP." Please advise and/or schedule test and contact the patient accordingly.   "

## 2018-07-17 NOTE — TELEPHONE ENCOUNTER
Portal msg:  Your diabetes level is high with associated glucose and ketones in your urine but without infection.  I would like you to now take your Metformin 2 pills in the morning and 1 pill in the evening with your meals. Let's plan to recheck at your follow up visit with me. Thanks.       Spoke with pt voiced understanding of results.

## 2018-07-17 NOTE — TELEPHONE ENCOUNTER
----- Message from Aziza Lan sent at 7/17/2018 12:03 PM CDT -----  Contact: Patient  Patient is requesting her lab results, please call her back at [phone]. Thank you

## 2018-07-18 ENCOUNTER — TELEPHONE (OUTPATIENT)
Dept: PHYSICAL MEDICINE AND REHAB | Facility: CLINIC | Age: 48
End: 2018-07-18

## 2018-07-24 ENCOUNTER — OFFICE VISIT (OUTPATIENT)
Dept: URGENT CARE | Facility: CLINIC | Age: 48
End: 2018-07-24
Payer: COMMERCIAL

## 2018-07-24 ENCOUNTER — TELEPHONE (OUTPATIENT)
Dept: FAMILY MEDICINE | Facility: CLINIC | Age: 48
End: 2018-07-24

## 2018-07-24 VITALS
SYSTOLIC BLOOD PRESSURE: 160 MMHG | HEART RATE: 70 BPM | TEMPERATURE: 97 F | HEIGHT: 64 IN | DIASTOLIC BLOOD PRESSURE: 88 MMHG | WEIGHT: 127 LBS | OXYGEN SATURATION: 98 % | BODY MASS INDEX: 21.68 KG/M2 | RESPIRATION RATE: 20 BRPM

## 2018-07-24 DIAGNOSIS — R73.9 HYPERGLYCEMIA: Primary | ICD-10-CM

## 2018-07-24 LAB — GLUCOSE SERPL-MCNC: 269 MG/DL (ref 70–110)

## 2018-07-24 PROCEDURE — 82948 REAGENT STRIP/BLOOD GLUCOSE: CPT | Mod: S$GLB,,, | Performed by: FAMILY MEDICINE

## 2018-07-24 PROCEDURE — 99213 OFFICE O/P EST LOW 20 MIN: CPT | Mod: S$GLB,,, | Performed by: FAMILY MEDICINE

## 2018-07-24 PROCEDURE — 99999 PR PBB SHADOW E&M-EST. PATIENT-LVL III: CPT | Mod: PBBFAC,,, | Performed by: FAMILY MEDICINE

## 2018-07-24 PROCEDURE — 3008F BODY MASS INDEX DOCD: CPT | Mod: CPTII,S$GLB,, | Performed by: FAMILY MEDICINE

## 2018-07-24 NOTE — PROGRESS NOTES
"Subjective:       Patient ID: Chan Dwyer is a 47 y.o. female.    Chief Complaint: High blood sugar (dizziness )    BP (!) 160/88   Pulse 70   Temp 97.2 °F (36.2 °C) (Tympanic)   Resp 20   Ht 5' 4" (1.626 m)   Wt 57.6 kg (127 lb)   SpO2 98%   BMI 21.80 kg/m²     HPI  Pt with hx of diabetes here to have her sugar checked, feeling dizzy since last night. Sugar has been high - 280 this morning fasting, 269 now 3 hour postprandial. Ate a croissant and ham sandwich.   Hx of diabetes, last saw PCP one week ago, metformin changed for 2 pills a day to 3 pills a day. Follow up in 3 months  Pt took her metformin last night but not today    Review of Systems   Constitutional: Positive for activity change.   Neurological: Positive for dizziness and weakness.       Objective:      Physical Exam   Constitutional: She is oriented to person, place, and time. She appears well-developed and well-nourished. No distress.   HENT:   Head: Normocephalic and atraumatic.   Eyes: EOM are normal. Pupils are equal, round, and reactive to light. Right eye exhibits no discharge. Left eye exhibits no discharge.   Neck: Normal range of motion. Neck supple.   Cardiovascular: Normal rate, regular rhythm and normal heart sounds.    No murmur heard.  Pulmonary/Chest: Effort normal and breath sounds normal. No respiratory distress. She has no wheezes.   Musculoskeletal: Normal range of motion.   Neurological: She is alert and oriented to person, place, and time. No cranial nerve deficit.   Skin: Skin is warm and dry. No rash noted. She is not diaphoretic.   Nursing note and vitals reviewed.      Assessment:       1. Hyperglycemia        Plan:     Chan was seen today for high blood sugar.    Diagnoses and all orders for this visit:    Hyperglycemia  -     POCT Glucose - 269,  3 hour postprandial      Instructions  1. Take your metformin when you get home, and again after supper  2. Contact your PCP for follow up this week, bring " your blood log book  3. Go to ER if symptoms worsens  4. Drink plenty of water  5. Work excuse today.    Discussed with pt/family all information and results pertaining to this visit. Discussed diagnosis and plan of treatment.  All questions and concerns were addressed at this time. Pt/family expresses understanding of information and instructions.  Care and follow up instruction provided.

## 2018-07-24 NOTE — PATIENT INSTRUCTIONS
1. Take your metformin when you get home, and again after supper  2. Contact your PCP for follow up this week, bring your blood log book  3. Go to ER if symptoms worsens  4. Drink plenty of water  5. Work excuse today.

## 2018-07-24 NOTE — TELEPHONE ENCOUNTER
----- Message from Param Barajas sent at 7/24/2018  4:40 PM CDT -----  Contact: Renita Giles at [phone] regarding returning pt call about being worked in

## 2018-07-24 NOTE — TELEPHONE ENCOUNTER
----- Message from Heidy Garcias sent at 7/24/2018  1:51 PM CDT -----  Contact: Pt  Please give pt a call at ..[phone] (home) [phone] (work) regarding her sugar levels.

## 2018-07-25 ENCOUNTER — OFFICE VISIT (OUTPATIENT)
Dept: PHYSICAL MEDICINE AND REHAB | Facility: CLINIC | Age: 48
End: 2018-07-25
Payer: COMMERCIAL

## 2018-07-25 ENCOUNTER — OFFICE VISIT (OUTPATIENT)
Dept: FAMILY MEDICINE | Facility: CLINIC | Age: 48
End: 2018-07-25
Payer: COMMERCIAL

## 2018-07-25 VITALS
DIASTOLIC BLOOD PRESSURE: 68 MMHG | SYSTOLIC BLOOD PRESSURE: 126 MMHG | HEART RATE: 63 BPM | HEIGHT: 64 IN | BODY MASS INDEX: 21.68 KG/M2 | WEIGHT: 127 LBS

## 2018-07-25 VITALS
HEIGHT: 64 IN | HEART RATE: 60 BPM | TEMPERATURE: 96 F | BODY MASS INDEX: 21.53 KG/M2 | WEIGHT: 126.13 LBS | OXYGEN SATURATION: 97 % | SYSTOLIC BLOOD PRESSURE: 122 MMHG | DIASTOLIC BLOOD PRESSURE: 80 MMHG

## 2018-07-25 DIAGNOSIS — G56.03 BILATERAL CARPAL TUNNEL SYNDROME: Primary | ICD-10-CM

## 2018-07-25 DIAGNOSIS — G56.03 BILATERAL CARPAL TUNNEL SYNDROME: ICD-10-CM

## 2018-07-25 DIAGNOSIS — E11.65 UNCONTROLLED TYPE 2 DIABETES MELLITUS WITH HYPERGLYCEMIA, WITHOUT LONG-TERM CURRENT USE OF INSULIN: Primary | ICD-10-CM

## 2018-07-25 PROCEDURE — 99204 OFFICE O/P NEW MOD 45 MIN: CPT | Mod: 25,S$GLB,, | Performed by: PHYSICAL MEDICINE & REHABILITATION

## 2018-07-25 PROCEDURE — 3046F HEMOGLOBIN A1C LEVEL >9.0%: CPT | Mod: CPTII,S$GLB,, | Performed by: FAMILY MEDICINE

## 2018-07-25 PROCEDURE — 99214 OFFICE O/P EST MOD 30 MIN: CPT | Mod: S$GLB,,, | Performed by: FAMILY MEDICINE

## 2018-07-25 PROCEDURE — 3008F BODY MASS INDEX DOCD: CPT | Mod: CPTII,S$GLB,, | Performed by: PHYSICAL MEDICINE & REHABILITATION

## 2018-07-25 PROCEDURE — 95909 NRV CNDJ TST 5-6 STUDIES: CPT | Mod: S$GLB,,, | Performed by: PHYSICAL MEDICINE & REHABILITATION

## 2018-07-25 PROCEDURE — 99999 PR PBB SHADOW E&M-EST. PATIENT-LVL III: CPT | Mod: PBBFAC,,, | Performed by: PHYSICAL MEDICINE & REHABILITATION

## 2018-07-25 PROCEDURE — 99999 PR PBB SHADOW E&M-EST. PATIENT-LVL III: CPT | Mod: PBBFAC,,, | Performed by: FAMILY MEDICINE

## 2018-07-25 PROCEDURE — 3008F BODY MASS INDEX DOCD: CPT | Mod: CPTII,S$GLB,, | Performed by: FAMILY MEDICINE

## 2018-07-25 RX ORDER — METFORMIN HYDROCHLORIDE 500 MG/1
TABLET ORAL
Qty: 180 TABLET | Refills: 1
Start: 2018-07-25 | End: 2019-03-13 | Stop reason: SDUPTHER

## 2018-07-25 RX ORDER — GLIPIZIDE 5 MG/1
5 TABLET ORAL
Qty: 180 TABLET | Refills: 1 | Status: SHIPPED | OUTPATIENT
Start: 2018-07-25 | End: 2019-01-02

## 2018-07-25 RX ORDER — GABAPENTIN 100 MG/1
100 CAPSULE ORAL NIGHTLY
Qty: 30 CAPSULE | Refills: 5 | Status: SHIPPED | OUTPATIENT
Start: 2018-07-25 | End: 2019-01-02

## 2018-07-25 NOTE — PROGRESS NOTES
[facility] [address] [place] [address]  Phone: [phone]          Full Name: nick celis YOB: 1970  Patient ID: [id number]      Visit Date: 7/25/2018 08:04  Age: 47 Years 8 Months Old  Examining Physician: Divine Hayward M.D.  Referring Physician: Dr Manzo  Reason for Referral: uex pain        Chief Complaint   Patient presents with    Arm Pain     right       HPI: This is a 47 y.o.  female being seen in clinic today for evaluation of right arm achy pain and numbness/tingling into her fingers.  Her symptoms have been present over the past 3-4 weeks and often worsen at night when she's trying to rest.  Resting her hands, shaking them or changing position provide relief.  She is a  and uses her hands repetitively daily.  She is also a diabetic with her sugars being uncontrolled recently.    History obtained from patient    Past family, medical, social, and surgical history reviewed in chart    Review of Systems:     General- denies lethargy, weight change, fever, chills  Head/neck- denies swallowing difficulties  ENT- denies hearing changes  Cardiovascular-denies chest pain  Pulmonary- denies shortness of breath  GI- denies constipation or bowel incontinence  - denies bladder incontinence  Skin- denies wounds or rashes  Musculoskeletal- denies weakness, +pain  Neurologic- +numbness and tingling  Psychiatric- denies depressive or psychotic features, denies anxiety  Lymphatic-denies swelling  Endocrine- denies hypoglycemic symptoms/+DM history  All other pertinent systems negative     Physical Examination:  General: Well developed, well nourished female, NAD  HEENT:NCAT EOMI bilaterally   Pulmonary:Normal respirations    Spinal Examination: CERVICAL  Active ROM is within normal limits.  Inspection: No deformity of spinal alignment.    Spinal Examination: LUMBAR or THORACIC  Active ROM is within normal limits.  Inspection: No deformity of spinal alignment.       Musculoskeletal Tests:  Phalen:   Elbow compression (ulnar): +right  Tinels at wrist: + right    Bilateral Upper and Lower Extremities:  Pulses are 2+ at radial bilaterally.  Shoulder/Elbow/Wrist/Hand ROM wnl  Hip/Knee/Ankle ROM   Bilateral Extremities show normal capillary refill.  No signs of cyanosis, rubor, edema, skin changes, or dysvascular changes of appendages.  Nails appear intact.    Neurological Exam:  Cranial Nerves:  II-XII grossly intact    Manual Muscle Testing: (Motor 5=normal)  5/5 strength bilateral upper extremities except 4+5 at right apb    No focal atrophy is noted of either upper or lower extremity.    Bilateral Reflexes:2+bic tric br  Mina's response is absent bilaterally.    Sensation: tested to light touch  - intact in arms except dec at right fingertips  Gait: Narrow base and good arm swing.      Entire procedure explained to patient prior to proceeding.  Verbal consent obtained        SNC      Nerve / Sites Rec. Site Onset Lat Peak Lat Amp Segments Distance Velocity     ms ms µV  mm m/s   R Median - Digit II (Antidromic)      Wrist Dig II 3.2 4.1 18.4 Wrist - Dig  43   L Median - Digit II (Antidromic)      Wrist Dig II 3.3 4.1 19.8 Wrist - Dig  43   R Ulnar - Digit V (Antidromic)      Wrist Dig V 2.8 3.6 50.8 Wrist - Dig V 140 50   R Radial - Anatomical snuff box (Forearm)      Forearm Wrist 2.1 2.6 11.8 Forearm - Wrist 100 48       MNC      Nerve / Sites Muscle Latency Amplitude Duration Rel Amp Segments Distance Lat Diff Velocity     ms mV ms %  mm ms m/s   R Median - APB      Wrist APB 3.6 13.8 5.6 100 Wrist - APB 80        Elbow APB 7.6 10.5 6.1 76.4 Elbow - Wrist 200 4.0 51   R Ulnar - ADM      Wrist ADM 3.2 13.3 6.0 100 Wrist - ADM 80        B.Elbow ADM 7.3 12.4 6.1 93.1 B.Elbow - Wrist 210 4.1 52      A.Elbow ADM 9.5 8.1 6.2 65.7 A.Elbow - B.Elbow 110 2.2 50         A.Elbow - Wrist  6.3                               INTERPRETATION  -Right median motor nerve  conduction study showed normal latency, amplitude, and conduction velocity  -Bilateral median sensory nerve conduction study showed prolonged peak latency and normal amplitude  -Right ulnar motor nerve conduction study showed normal latency, amplitude, and conduction velocity  -Right ulnar sensory nerve conduction study showed normal peak latency and amplitude  -Right radial sensory nerve conduction study showed normal peak latency and amplitude      IMPRESSION  1. ABNORMAL study  2. There is electrodiagnostic evidence of a MILD demyelinating median neuropathy (Carpal tunnel syndrome) across BILATERAL wrists    PLAN  1. Follow up with referring provider: Dr. Aziza Manzo  2. Neutral wrist braces and CTS prevention handouts provided. Consider low dose gabapentin and referral to OT to focus on body mechanics and nerve desensitization   3. This study is good for one year. If symptoms worsen or do not improve, please re-consult.    Divine Hayward M.D.  Physical Medicine and Rehab

## 2018-07-25 NOTE — PATIENT INSTRUCTIONS
Carpal Tunnel Syndrome    Carpal tunnel syndrome is a painful condition of the wrist and arm. It is caused by pressure on the median nerve.  The median nerve is one of the nerves that give feeling and movement to the hand. It passes through a tunnel in the wrist called the carpal tunnel. This tunnel is made up of bones and ligaments. Narrowing of this tunnel or swelling of the tissues inside the tunnel puts pressure on the median nerve. This causes numbness, pins and needles, or electric shooting pains in your hand and forearm. Often the pain is worse at night and may wake you when you are asleep.  Carpal tunnel syndrome may occur during pregnancy and with use of birth control pills. It is more common in workers who must often bend their wrists. It is also common in people who work with power tools that cause strong vibrations.  Home care  · Rest the painful wrist. Avoid repeated bending of the wrist back and forth. This puts pressure on the median nerve. Avoid using power tools with strong vibrations.  · If you were given a splint, wear it at night while you sleep. You may also wear it during the day for comfort.  · Move your fingers and wrists often to avoid stiffness.  · Elevate your arms on pillows when you lie down.  · Try using the unaffected hand more.  · Try not to hold your wrists in a bent, downward position.  · Sometimes changes in the work place may ease symptoms. If you type most of the day, it may help to change the position of your keyboard or add a wrist support. Your wrist should be in a neutral position and not bent back when typing.  · You may use over-the-counter pain medicine to treat pain and inflammation, unless another medicine was prescribed. Anti-inflammatory pain medicines, such as ibuprofen or naproxen may be more effective than acetaminophen, which treats pain, but not inflammation. If you have chronic liver or kidney disease or ever had a stomach ulcer or GI bleeding, talk with your  doctor before using these medicines.  · Opioid pain medicine will only give temporary relief and does not treat the problem. If pain continues, you may need a shot of a steroid drug into your wrist.  · If the above methods fail, you may need surgery. This will open the carpal tunnel and release the pressure on the trapped nerve.  Follow-up care  Follow up with your healthcare provider, or as advised, if the pain doesnt begin to improve within the next week.  If X-rays were taken, you will be notified of any new findings that may affect your care.  When to seek medical advice  Call your healthcare provider right away if any of these occur:  · Pain not improving with the above treatment  · Fingers or hand become cold, blue, numb, or tingly  · Your whole arm becomes swollen or weak  Date Last Reviewed: 11/23/2015  © 7717-8684 [employer]. [address] [place] [address]. All rights reserved. This information is not intended as a substitute for professional medical care. Always follow your healthcare professional's instructions.        Carpal Tunnel Syndrome Prevention Tips  Some repetitive hand activities put you at higher risk for carpal tunnel syndrome (CTS). But you can reduce your risk. Learn how to change the way you use your hands. Below are tips for at home and on the job. Be sure to also follow the hand and wrist safety policies at your workplace.      Keep your wrist in a neutral (straight) position when exercising.      Keep your wrist in neutral  Keep a neutral (straight) wrist position as often as you can. Dont use your wrist in a bent (flexed) position for long periods of time. This includes extended or twisted positions.  Watch your   Dont just use your thumb and index finger to grasp or lift. This can put stress on your wrist. When you can, use your whole hand and all its fingers to grasp an object.  Minimize repetition  Dont move your arms or hands or hold an object in  the same way for long periods of time. Even simple, light tasks can cause injury this way. Instead, alternate tasks or switch hands.  Rest your hands  Give your hands a break from time to time with a rest. Even a few minutes once an hour can help.  Reduce speed and force  Slow down the speed in which you do a forceful, repetitive motion. This gives your wrist time to recover from the effort. Use power tools to help reduce the force.  Strengthen the muscles  Weak muscles may lead to a poor wrist or arm position. Exercises will make your hand and arm muscles stronger. This can help you keep a better position.  Date Last Reviewed: 9/11/2015 © 2000-2017 [employer]. [address] [place] [address], [place] [address]. All rights reserved. This information is not intended as a substitute for professional medical care. Always follow your healthcare professional's instructions.        Understanding Carpal Tunnel Syndrome    The carpal tunnel is a narrow space inside the wrist. It is ringed by bone and a band of tough tissue called the transverse carpal ligament. A major nerve called the median nerve runs from the forearm into the hand through the carpal tunnel. Tendons also run through the carpal tunnel.  With carpal tunnel syndrome, the tendons or nearby tissues within the carpal tunnel may swell or thicken. Or the transverse carpal ligament may harden and shorten. This narrows the space in the carpal tunnel and puts pressure on the median nerve. This pressure leads to tingling and numbness of the hand and wrist. In time, the condition can make even simple tasks hard to do.  What causes carpal tunnel syndrome?  Doctors arent entirely clear why the condition occurs. Certain things may make a person more likely to have it. These include:  · Being female  · Being pregnant  · Being overweight  · Having diabetes or rheumatoid arthritis  Symptoms of carpal tunnel syndrome  Symptoms often come and go. At first, symptoms  may occur mainly at night. Later, they may be noticed during the day as well. They may get worse with activities such as driving, reading, typing, or holding a phone. Symptoms can include:  · Tingling and numbness in the hand or wrist  · Sharp pain that shoots up the arm or down to the fingers  · Hand stiffness or cramping, especially in the morning  · Trouble making a fist  · Hand weakness and clumsiness  Treatment for carpal tunnel syndrome  Certain treatments help reduce the pressure on the median nerve and relieve symptoms. Choices for treatment may include one or more of the following:  · Wrist splint. This involves wearing a special brace on the wrist and hand. The splint holds the wrist straight, in a neutral position. This helps keep the carpal tunnel as open as possible.  · Cortisone shots. Cortisone is a medicine that helps reduce swelling. It is injected directly into the wrist. It helps shrink tissues inside the carpal tunnel. This relieves symptoms for a time.  · Pain medicines. You may take over-the-counter or prescription medicines to help reduce swelling and relieve symptoms.  · Surgery. If the condition doesnt respond to other treatments and doesnt go away on its own, you may need surgery. During surgery, the surgeon cuts the transverse carpal ligament to relieve pressure on the median nerve.     When to call your healthcare provider  Call your healthcare provider right away if you have any of these:  · Fever of 100.4°F (38°C) or higher, or as directed  · Symptoms that dont get better, or get worse  · New symptoms   Date Last Reviewed: 3/10/2016  © 0720-1922 [employer] [address] [place] [address], [place] [address]. All rights reserved. This information is not intended as a substitute for professional medical care. Always follow your healthcare professional's instructions.

## 2018-07-25 NOTE — PROGRESS NOTES
"Chan Dwyer    Chief Complaint   Patient presents with    Follow-up     was seen in after care with dizziness and nausea  blood sugar elevated    Dizziness     at present       History of Present Illness:   Ms. Dwyer comes in today with complaint of having problems with her diabetes (high blood sugar levels).  She states on Monday night she became dizzy with shortness of breath and abdominal pain and felt "presyncopal".  She states she again felt this way yesterday and immediately went to Urgent Care for evaluation.  Her postprandial glucose at Urgent Care was to 69.  She states she was told to take metformin 3 times daily as directed and to follow-up with me.    On July 15, 2018 I recommended she increase metformin 500 mg twice daily to 500 mg - 2 pills in the morning and 1 pill in the evening to help improve her diabetes control.  She states the medication changes as causes her to have GI side effects which she states she is not sure if she can continue to tolerate.    She reports having polydipsia, polyuria, occasional abdominal pain and states she feels slightly drowsy and tired today.  She reports fasting glucose was 214 this morning but 300's yesterday after meals. Otherwise, she denies having fever, chills, appetite change; shortness of breath, cough, wheezing; chest pain, palpitations, leg swelling; nausea, vomiting, diarrhea, constipation; polyphagia; dysuria, hematuria; back pain; headaches; anxiety, depression, homicidal or suicidal thoughts.    She reports no personal history of pancreatitis or gallbladder issues and reports no known family history of thyroid cancer.    He states he continues to have numbness in her hands and states she saw Dr. Hayward, physiatrist, today with nerve conduction study performed and was advised for bilateral CTS she needs occupational therapy to focus on body mechanics and nerve desensitization, given neutral wrist braces to wear, and provided CTS " prevention handouts provided. She also advised we consider adding low dose gabapentin.          Current Outpatient Prescriptions   Medication Sig    blood sugar diagnostic Strp 1 strip by Misc.(Non-Drug; Combo Route) route 2 (two) times daily.    blood-glucose meter (FREESTYLE SYSTEM KIT) kit Use as instructed    ibuprofen (ADVIL,MOTRIN) 800 MG tablet Take 1 tablet (800 mg total) by mouth every meal as needed for Pain.    lancets Misc 1 lancet by Misc.(Non-Drug; Combo Route) route 3 (three) times daily.    metFORMIN (GLUCOPHAGE) 500 MG tablet TAKE TWO TABLETS BY MOUTH in the morning and ONE TABLET BY MOUTH in the evening with meals    tiZANidine (ZANAFLEX) 4 MG tablet Take 1 tablet (4 mg total) by mouth nightly as needed (muscle spasm).    vitamin D 1000 units Tab Take 1,000 Units by mouth once daily.       Review of Systems   Constitutional: Positive for fatigue. Negative for appetite change, chills and fever.   Respiratory: Negative for cough, shortness of breath and wheezing.    Cardiovascular: Negative for chest pain, palpitations and leg swelling.   Gastrointestinal: Positive for abdominal pain. Negative for constipation, diarrhea, nausea and vomiting.   Endocrine: Positive for polydipsia and polyuria. Negative for polyphagia.        See history of present illness.   Genitourinary: Positive for frequency. Negative for difficulty urinating.   Musculoskeletal: Negative for back pain.   Neurological: Positive for numbness. Negative for headaches.   Psychiatric/Behavioral: Negative for dysphoric mood. The patient is not nervous/anxious.        Objective:  Physical Exam   Constitutional: She is oriented to person, place, and time. She appears well-developed and well-nourished. No distress.   Pleasant.   Neck: Normal range of motion. Neck supple. No thyromegaly present.   Cardiovascular: Normal rate, regular rhythm and intact distal pulses.    No murmur heard.  Pulses:       Dorsalis pedis pulses are 3+ on the  right side, and 3+ on the left side.        Posterior tibial pulses are 3+ on the right side, and 3+ on the left side.   Pulmonary/Chest: Effort normal and breath sounds normal. No respiratory distress. She has no wheezes.   Abdominal: Soft. Bowel sounds are normal. She exhibits no distension and no mass. There is no tenderness. There is no rebound and no guarding.   Musculoskeletal: Normal range of motion. She exhibits no edema or tenderness.   She is ambulatory without problems. She wears braces at both wrists.   Lymphadenopathy:     She has no cervical adenopathy.   Neurological: She is alert and oriented to person, place, and time.   Negative Tinel's at both wrist.  Faintly positive Phalen's at right wrist but negative at left wrist.   Skin: She is not diaphoretic.   Psychiatric: She has a normal mood and affect. Her behavior is normal. Judgment and thought content normal.   Vitals reviewed.      ASSESSMENT:  1. Uncontrolled type 2 diabetes mellitus with hyperglycemia, without long-term current use of insulin    2. Bilateral carpal tunnel syndrome        PLAN:  Chan was seen today for follow-up and dizziness.    Diagnoses and all orders for this visit:    Uncontrolled type 2 diabetes mellitus with hyperglycemia, without long-term current use of insulin  -     glipiZIDE (GLUCOTROL) 5 MG tablet; Take 1 tablet (5 mg total) by mouth 2 (two) times daily before meals.    Bilateral carpal tunnel syndrome  -     Ambulatory Referral to Physical/Occupational Therapy  -     gabapentin (NEURONTIN) 100 MG capsule; Take 1 capsule (100 mg total) by mouth every evening.    Other orders  -     metFORMIN (GLUCOPHAGE) 500 MG tablet; TAKE one tablet BY MOUTH in the morning and ONE TABLET BY MOUTH in the evening with meals       I discussed additional medication options for treatment of diabetes with patient; she desired to go back to Metformin 500 mg twice daily and add Glipizide5 mg twice daily with meals; medication  precautions discussed with patient.  I advised patient to make sure to drink adequate water and continue to perform home glucose checks but also advised she will need to be patient as glucose levels slowly improve. She verbalized understanding.  Try Gabapentin as noted above, #30, 2 refills; medication precautions discussed with patient.  Work excuse for today with return tomorrow.   Keep 9/26/2018 physical/diabetes follow up appointment with me.

## 2018-07-25 NOTE — LETTER
July 25, 2018      Aziza Manzo MD  [address]  [place] [address]           [facility] - Physiatry  [address]  [place] [address]  Phone: [phone]  Fax: [phone]          Patient: Chan Dwyer   MR Number: [id number]   YOB: 1970   Date of Visit: 7/25/2018       Dear Dr. Aziza Manzo:    Thank you for referring Chan Dwyer to me for evaluation. Attached you will find relevant portions of my assessment and plan of care.    If you have questions, please do not hesitate to call me. I look forward to following Chan Dwyer along with you.    Sincerely,    Divine Hayward MD    Enclosure  CC:  No Recipients    If you would like to receive this communication electronically, please contact [email or web address] or [phone] to request more information on [employer] access.    For providers and/or their staff who would like to refer a patient to [facility], please contact us through our one-stop-shop provider referral line, [facility] [place], at [phone].    If you feel you have received this communication in error or would no longer like to receive these types of communications, please e-mail [email or web address]

## 2018-07-25 NOTE — LETTER
July 25, 2018      [facility]  [address] [facility] [address]  Phone: [phone]       Patient: Chan Dwyer   YOB: 1970  Date of Visit: 07/25/2018    To Whom It May Concern:    Antonio Dwyer  was at [facility] on 07/25/2018. She may return to work on 07/26/2018 with no restrictions. If you have any questions or concerns, or if I can be of further assistance, please do not hesitate to contact me.    Sincerely,    Aziza Manzo MD

## 2018-08-22 ENCOUNTER — PATIENT MESSAGE (OUTPATIENT)
Dept: ADMINISTRATIVE | Facility: OTHER | Age: 48
End: 2018-08-22

## 2018-08-22 DIAGNOSIS — E11.9 TYPE 2 DIABETES MELLITUS: ICD-10-CM

## 2018-09-20 DIAGNOSIS — E11.9 TYPE 2 DIABETES MELLITUS: ICD-10-CM

## 2018-09-21 DIAGNOSIS — E11.9 TYPE 2 DIABETES MELLITUS WITHOUT COMPLICATION: ICD-10-CM

## 2018-09-25 ENCOUNTER — TELEPHONE (OUTPATIENT)
Dept: FAMILY MEDICINE | Facility: CLINIC | Age: 48
End: 2018-09-25

## 2018-09-25 NOTE — TELEPHONE ENCOUNTER
----- Message from Wilda Cameron sent at 9/25/2018 12:28 PM CDT -----  Pt returned the nurses call but did not disclose /please call [phone]/ma

## 2018-09-26 ENCOUNTER — OFFICE VISIT (OUTPATIENT)
Dept: FAMILY MEDICINE | Facility: CLINIC | Age: 48
End: 2018-09-26
Payer: COMMERCIAL

## 2018-09-26 ENCOUNTER — LAB VISIT (OUTPATIENT)
Dept: LAB | Facility: HOSPITAL | Age: 48
End: 2018-09-26
Attending: FAMILY MEDICINE
Payer: COMMERCIAL

## 2018-09-26 VITALS
DIASTOLIC BLOOD PRESSURE: 78 MMHG | TEMPERATURE: 98 F | BODY MASS INDEX: 22.43 KG/M2 | HEIGHT: 64 IN | RESPIRATION RATE: 17 BRPM | WEIGHT: 131.38 LBS | HEART RATE: 73 BPM | SYSTOLIC BLOOD PRESSURE: 102 MMHG | OXYGEN SATURATION: 98 %

## 2018-09-26 DIAGNOSIS — G56.03 BILATERAL CARPAL TUNNEL SYNDROME: ICD-10-CM

## 2018-09-26 DIAGNOSIS — E55.9 VITAMIN D DEFICIENCY: ICD-10-CM

## 2018-09-26 DIAGNOSIS — E89.40 SURGICAL MENOPAUSE: ICD-10-CM

## 2018-09-26 DIAGNOSIS — E11.65 UNCONTROLLED TYPE 2 DIABETES MELLITUS WITH HYPERGLYCEMIA, WITHOUT LONG-TERM CURRENT USE OF INSULIN: ICD-10-CM

## 2018-09-26 DIAGNOSIS — Z23 NEED FOR INFLUENZA VACCINATION: ICD-10-CM

## 2018-09-26 DIAGNOSIS — Z00.00 ANNUAL PHYSICAL EXAM: ICD-10-CM

## 2018-09-26 DIAGNOSIS — Z00.00 ANNUAL PHYSICAL EXAM: Primary | ICD-10-CM

## 2018-09-26 LAB
25(OH)D3+25(OH)D2 SERPL-MCNC: 31 NG/ML
ALBUMIN SERPL BCP-MCNC: 3.7 G/DL
ALBUMIN/CREAT UR: 4.1 UG/MG
ALP SERPL-CCNC: 93 U/L
ALT SERPL W/O P-5'-P-CCNC: 15 U/L
ANION GAP SERPL CALC-SCNC: 8 MMOL/L
AST SERPL-CCNC: 14 U/L
BACTERIA #/AREA URNS AUTO: ABNORMAL /HPF
BASOPHILS # BLD AUTO: 0.03 K/UL
BASOPHILS NFR BLD: 0.5 %
BILIRUB SERPL-MCNC: 0.6 MG/DL
BILIRUB UR QL STRIP: NEGATIVE
BUN SERPL-MCNC: 11 MG/DL
CALCIUM SERPL-MCNC: 9.8 MG/DL
CHLORIDE SERPL-SCNC: 104 MMOL/L
CHOLEST SERPL-MCNC: 152 MG/DL
CHOLEST/HDLC SERPL: 2.5 {RATIO}
CLARITY UR REFRACT.AUTO: ABNORMAL
CO2 SERPL-SCNC: 29 MMOL/L
COLOR UR AUTO: ABNORMAL
CREAT SERPL-MCNC: 0.8 MG/DL
CREAT UR-MCNC: 170 MG/DL
CREAT UR-MCNC: 170 MG/DL
DIFFERENTIAL METHOD: ABNORMAL
EOSINOPHIL # BLD AUTO: 0.1 K/UL
EOSINOPHIL NFR BLD: 1.9 %
ERYTHROCYTE [DISTWIDTH] IN BLOOD BY AUTOMATED COUNT: 11.4 %
EST. GFR  (AFRICAN AMERICAN): >60 ML/MIN/1.73 M^2
EST. GFR  (NON AFRICAN AMERICAN): >60 ML/MIN/1.73 M^2
ESTIMATED AVG GLUCOSE: 157 MG/DL
GLUCOSE SERPL-MCNC: 168 MG/DL
GLUCOSE UR QL STRIP: ABNORMAL
HBA1C MFR BLD HPLC: 7.1 %
HCT VFR BLD AUTO: 42 %
HDLC SERPL-MCNC: 61 MG/DL
HDLC SERPL: 40.1 %
HGB BLD-MCNC: 12.7 G/DL
HGB UR QL STRIP: NEGATIVE
IMM GRANULOCYTES # BLD AUTO: 0.01 K/UL
IMM GRANULOCYTES NFR BLD AUTO: 0.2 %
KETONES UR QL STRIP: NEGATIVE
LDLC SERPL CALC-MCNC: 80.4 MG/DL
LEUKOCYTE ESTERASE UR QL STRIP: NEGATIVE
LYMPHOCYTES # BLD AUTO: 1.9 K/UL
LYMPHOCYTES NFR BLD: 32.1 %
MCH RBC QN AUTO: 29.2 PG
MCHC RBC AUTO-ENTMCNC: 30.2 G/DL
MCV RBC AUTO: 97 FL
MICROALBUMIN UR DL<=1MG/L-MCNC: 7 UG/ML
MICROSCOPIC COMMENT: ABNORMAL
MONOCYTES # BLD AUTO: 0.3 K/UL
MONOCYTES NFR BLD: 5.6 %
NEUTROPHILS # BLD AUTO: 3.5 K/UL
NEUTROPHILS NFR BLD: 59.7 %
NITRITE UR QL STRIP: NEGATIVE
NONHDLC SERPL-MCNC: 91 MG/DL
NRBC BLD-RTO: 0 /100 WBC
PH UR STRIP: 5 [PH] (ref 5–8)
PLATELET # BLD AUTO: 225 K/UL
PMV BLD AUTO: 11 FL
POTASSIUM SERPL-SCNC: 4 MMOL/L
PROT SERPL-MCNC: 7.1 G/DL
PROT UR QL STRIP: NEGATIVE
PROT UR-MCNC: 12 MG/DL
PROT/CREAT UR: 0.07 MG/G{CREAT}
RBC # BLD AUTO: 4.35 M/UL
RBC #/AREA URNS AUTO: 1 /HPF (ref 0–4)
SODIUM SERPL-SCNC: 141 MMOL/L
SP GR UR STRIP: 1.02 (ref 1–1.03)
SQUAMOUS #/AREA URNS AUTO: 11 /HPF
TRIGL SERPL-MCNC: 53 MG/DL
TSH SERPL DL<=0.005 MIU/L-ACNC: 1.47 UIU/ML
URN SPEC COLLECT METH UR: ABNORMAL
UROBILINOGEN UR STRIP-ACNC: NEGATIVE EU/DL
WBC # BLD AUTO: 5.92 K/UL
WBC #/AREA URNS AUTO: 1 /HPF (ref 0–5)

## 2018-09-26 PROCEDURE — 84443 ASSAY THYROID STIM HORMONE: CPT

## 2018-09-26 PROCEDURE — 81001 URINALYSIS AUTO W/SCOPE: CPT

## 2018-09-26 PROCEDURE — 36415 COLL VENOUS BLD VENIPUNCTURE: CPT | Mod: PO

## 2018-09-26 PROCEDURE — 90471 IMMUNIZATION ADMIN: CPT | Mod: S$GLB,,, | Performed by: FAMILY MEDICINE

## 2018-09-26 PROCEDURE — 83036 HEMOGLOBIN GLYCOSYLATED A1C: CPT

## 2018-09-26 PROCEDURE — 85025 COMPLETE CBC W/AUTO DIFF WBC: CPT

## 2018-09-26 PROCEDURE — 90686 IIV4 VACC NO PRSV 0.5 ML IM: CPT | Mod: S$GLB,,, | Performed by: FAMILY MEDICINE

## 2018-09-26 PROCEDURE — 82043 UR ALBUMIN QUANTITATIVE: CPT

## 2018-09-26 PROCEDURE — 80053 COMPREHEN METABOLIC PANEL: CPT

## 2018-09-26 PROCEDURE — 82306 VITAMIN D 25 HYDROXY: CPT

## 2018-09-26 PROCEDURE — 84156 ASSAY OF PROTEIN URINE: CPT

## 2018-09-26 PROCEDURE — 3045F PR MOST RECENT HEMOGLOBIN A1C LEVEL 7.0-9.0%: CPT | Mod: CPTII,S$GLB,, | Performed by: FAMILY MEDICINE

## 2018-09-26 PROCEDURE — 80061 LIPID PANEL: CPT

## 2018-09-26 PROCEDURE — 99999 PR PBB SHADOW E&M-EST. PATIENT-LVL III: CPT | Mod: PBBFAC,,, | Performed by: FAMILY MEDICINE

## 2018-09-26 PROCEDURE — 99396 PREV VISIT EST AGE 40-64: CPT | Mod: 25,S$GLB,, | Performed by: FAMILY MEDICINE

## 2018-09-26 NOTE — PROGRESS NOTES
HISTORY OF PRESENT ILLNESS: Ms. Dwyer comes in today fasting and without taking medication for annual wellness examination.     END OF LIFE DECISION: She does not have a living will and does not desire life support.          Current Outpatient Prescriptions   Medication Sig    blood sugar diagnostic Strp 1 strip by Misc.(Non-Drug; Combo Route) route 2 (two) times daily.    fluticasone (FLONASE) 50 mcg/actuation nasal spray 2 sprays by Each Nare route daily as needed.    ibuprofen (ADVIL,MOTRIN) 800 MG tablet Take 1 tablet (800 mg total) by mouth every meal as needed for Pain.    lancets Misc 1 lancet by Misc.(Non-Drug; Combo Route) route 3 (three) times daily.    metformin (GLUCOPHAGE) 500 MG tablet TAKE ONE TABLET BY MOUTH TWICE DAILY WITH BREAKFAST    vitamin D 1000 units Tab Take 1,000 Units by mouth once daily.    blood-glucose meter (FREESTYLE SYSTEM KIT) kit Use as instructed      SCREENINGS:   Cholesterol: September 14, 2017.  FFS/Colonoscopy: June 22, 2016 - fariha; repeat in 10 years.   Mammogram: December 6, 2017 - benign with 1-year repeat advised.  GYN Exam: January 3, 2018 with GYN Dr. Divine Corey - fariha.  Dexa Scan: Never.  Eye Exam: February 5, 2018 with Dr. Abrams.    Dental Exam:  September 2017 with Dr. Lovell per patient.  Due.  PPD: Never.  Immunizations: Td/Tdap - September 11, 2014.  Gardasil - N./A.  Zostavax - N./A.  Pneumovax - December 16, 2014.  Seasonal Flu - December 19, 2017.  Hepatitis B vaccine series - October 13, 2016, September 13, 2016, September 14, 2017.      ROS:  GENERAL: Denies fever, chills, fatigue or unusual weight change. Appetite normal. Weight 59.8 kg (131 lb 13.4 oz) at  July 20, 2017 visit.  Exercises 5 times per week by walking  - 60 minutes. Monitors diet.  SKIN: Denies rashes, itching, changes in mole, color or texture of skin or easy bruising.  HEAD: Denies headaches or recent head trauma.  EYES: Denies change in vision, pain, diplopia, redness or  "discharge.  EARS: Denies ear pain, discharge, vertigo or decreased hearing.  NOSE: Denies loss of smell, epistaxis or rhinitis.  MOUTH & THROAT: Denies hoarseness or change in voice. Denies excessive gum bleeding or mouth sores. Denies sore throat.  NODES: Denies swollen glands.  CHEST: Denies GONZALES, wheezing, cough, or sputum production.  CARDIOVASCULAR: Denies chest pain, PND, orthopnea or reduced exercise tolerance. Denies palpitations.  ABDOMEN: Denies diarrhea, constipation, nausea, vomiting, abdominal pain, or blood in stool.  URINARY: Denies flank pain, dysuria or hematuria.  GENITOURINARY: Denies flank pain, dysuria, frequency or hematuria. Does not perform monthly breast self examination. Reports saw Dr. Divine Corey, GYN, in January 3, 2018.  Also, reports saw KARLIE Escobar on December 6, 2017 for breast examination and abnormal mammogram surveillance with 1-year repeat mammogram and breast examination advised.  HEME/LYMPH: Denies bleeding problems.  ENDOCRINE: Denies thyroid or cholesterol problems. Reports performs home glucose checks twice daily (160-180's).    PERIPHERAL VASCULAR:Denies claudication or cyanosis.  MUSCULOSKELETAL: Denies joint pain, stiffness, or swelling except reports saw Dr. Hayward on July 25, 2018 for bilataral CTS and wears braces with help. Denies edema.  NEUROLOGIC: Denies history of seizures, tremors, paralysis, alteration of gait or coordination.  PSYCHIATRIC: Denies mood swings, depression, anxiety, homicidal or suicidal thoughts. Denies sleep problems.    PE:   VS: /78 (BP Location: Left arm, Patient Position: Sitting, BP Method: Medium (Manual))   Pulse 73   Temp 98.2 °F (36.8 °C) (Tympanic)   Resp 17   Ht 5' 4" (1.626 m)   Wt 59.6 kg (131 lb 6.3 oz)   SpO2 98%   BMI 22.55 kg/m²   APPEARANCE: Well nourished, well developed female, pleasant, alert and oriented in no acute distress.   HEAD: Non tender. Full range of motion.  EYES: PERRL, conjunctiva pink, " lids no edema.  EARS: External canal patent, no swelling or redness. TM's shiny and clear.  NOSE: Mucosa and turbinates pink, not swollen. No discharge. Non tender sinuses.  THROAT: No pharyngeal erythema or exudate. No stridor.   NECK: Supple, no mass but slight, non tender thyroid enlargement noted.  NODES: No cervical lymph node enlargement.  CHEST: Normal respiratory effort. Lungs clear to auscultation.  CARDIOVASCULAR: Normal S1, S2. No rubs, murmurs or gallops. PMI not displaced. No carotid bruit. Pedal pulses palpable bilaterally. No edema.  ABDOMEN: Bowel sounds present. Not distended. Soft. No tenderness, masses or organomegaly.  BREAST EXAM: No examined as deferred to breast screening nurse.  PELVIC EXAM: No examined as deferred to GYN.  RECTAL EXAM: Not examined.  MUSCULOSKELETAL: No joint deformities or stiffness.  She is ambulatory without problems.  SKIN: No rashes or suspicious lesions, normal color and turgor.  NEUROLOGIC: Cranial Nerves: II-XII grossly intact. DTR's: Knees, Ankles 2+ and equal bilaterally. Gait & Posture: Normal gait and fine motion.  PSYCHIATRIC: Patient alert, oriented x 3. Mood/Affect normal without acute anxiety and depression noted. Judgment/insight good as she makes appropriate decisions on today's examination.     Protective Sensation (w/ 10 gram monofilament):  Right: Intact  Left: Intact    Visual Inspection:  Normal -  Bilateral    Pedal Pulses:   Right: Present  Left: Present    Posterior tibialis:   Right:Present  Left: Present    Lab Results   Component Value Date    HGBA1C 9.6 (H) 07/13/2018     ASSESSMENT:    ICD-10-CM ICD-9-CM    1. Annual physical exam Z00.00 V70.0 Microalbumin/creatinine urine ratio      Comprehensive metabolic panel      Protein / creatinine ratio, urine      Lipid panel      CBC auto differential      TSH      Hemoglobin A1c      Urinalysis      Vitamin D   2. Uncontrolled type 2 diabetes mellitus with hyperglycemia, without long-term current use  of insulin E11.65 250.02    3. Vitamin D deficiency E55.9 268.9    4. Bilateral carpal tunnel syndrome G56.03 354.0    5. Surgical menopause E89.40 627.4    6. Need for influenza vaccination Z23 V04.81 Influenza - Quadrivalent (3 years & older) (PF)     PLAN:  1. Age-appropriate counseling-appropriate low-sodium, low-cholesterol, low carbohydrate diet and exercise daily, monthly breast self exam, annual wellness examination.   2. Patient advised to call for results.  3. Continue current medications.  4. Keep follow up with specialists (patient stated she will call for appointment with KARLIE Escobar for breast exam/mammogram follow up).  5. Annual dental examinations advised.  6. Again discussed diabetic prevention of kidney disease and heart disease with use ACE-inhibitor and Statin therapy respectively with patient; she again declines at this time.   7. Work excuse to today with return today.        8. Follow-up in about 3 months (around 12/19/2018) for diabetes follow up.

## 2018-12-20 NOTE — PROGRESS NOTES
Patient ID: Chan Dwyer is a 48 y.o. female.    Chief Complaint: Breast Cancer Screening    HPI: Patient presents for her annual mammogram and exam.    History of simple and complex cysts that have been monitored since 2/2014. History of right breast complex cysts increasing in size 12/14 so pt was sent for core biopsy at [facility]- biopsy was cancelled since they could not find a complex cyst on exam to biopsy- noted all simple cysts at the time of the biopsy. Pt was scheduled for 6 mon f/u of the right breast in July 2015 and canceled the appts and again in August - canceled again- May 2016 imaging revealed la complex cysts and right breast simple cysts- la mammo and ultrasound rec for 6 mons- November 2016- simple cyst in right breast stable and complex cysts in left stable- 6 mon f/u left breast ultrasound only was recommended and stable 6/6/17.    Pt had her la mammo today- results pending     Review of Systems   Constitutional: Negative.  Negative for appetite change and unexpected weight change.   HENT: Negative.    Eyes: Negative.  Negative for visual disturbance.   Respiratory: Negative.  Negative for cough and shortness of breath.    Cardiovascular: Negative.  Negative for chest pain.   Gastrointestinal: Negative.  Negative for abdominal pain and diarrhea.   Endocrine: Negative.    Genitourinary: Negative.  Negative for frequency.   Musculoskeletal: Negative.  Negative for back pain.   Skin: Negative.  Negative for rash.   Allergic/Immunologic: Negative.    Neurological: Negative.  Negative for headaches.   Hematological: Negative.  Negative for adenopathy.   Psychiatric/Behavioral: Negative.  The patient is not nervous/anxious.      Breast: No breast pain or nipple discharge. No palpable mass.  No change in her family history.      Current Outpatient Medications   Medication Sig Dispense Refill    blood sugar diagnostic Strp 1 strip by Misc.(Non-Drug; Combo Route) route 2 (two) times daily.  100 each 5    blood-glucose meter (FREESTYLE SYSTEM KIT) kit Use as instructed 1 each 0    ibuprofen (ADVIL,MOTRIN) 800 MG tablet Take 1 tablet (800 mg total) by mouth every meal as needed for Pain. 90 tablet 1    lancets Misc 1 lancet by Misc.(Non-Drug; Combo Route) route 3 (three) times daily. 100 each 5    metFORMIN (GLUCOPHAGE) 500 MG tablet TAKE one tablet BY MOUTH in the morning and ONE TABLET BY MOUTH in the evening with meals 180 tablet 1    vitamin D 1000 units Tab Take 1,000 Units by mouth once daily.       No current facility-administered medications for this visit.        Allergies   Allergen Reactions    Hydrocodone Hives and Rash       Past Medical History:   Diagnosis Date    Abnormal Pap smear     Abnormal Pap smear of cervix     Arthritis, low back     Diabetes 9/2014     BS 162am 09/16/2014    DM (diabetes mellitus) 09/2014     am 01/26/2016    DM (diabetes mellitus) 09/2014    BS didn't check 01/31/2017    GERD (gastroesophageal reflux disease)     Gestational diabetes     History of abnormal Pap smear     Surgical menopause     Vitamin D deficiency        Past Surgical History:   Procedure Laterality Date    COLONOSCOPY N/A 6/22/2016    Performed [facility] Vishal Mary III[facility] at [facility]    Cryotherapy of the cervix  1999    DILATION AND CURETTAGE OF UTERUS      HYSTERECTOMY      [facility]  2/2012    TUBAL LIGATION         Family History   Problem Relation Age of Onset    Hypertension Mother     Diabetes Mother     Heart attack Mother     Diabetes Father     Hypertension Father     No Known Problems Son     No Known Problems Son     Cancer Neg Hx     Stroke Neg Hx     Breast cancer Neg Hx     Colon cancer Neg Hx     Ovarian cancer Neg Hx        Social History     Socioeconomic History    Marital status: Single     Spouse name: Not on file    Number of children: 2    Years of education: Not on file    Highest education level: Not on file   Social Needs     Financial resource strain: Not hard at all    Food insecurity - worry: Never true    Food insecurity - inability: Never true    Transportation needs - medical: No    Transportation needs - non-medical: No   Occupational History    Occupation:      Employer: [employer]     Employer: [employer]   Tobacco Use    Smoking status: Never Smoker    Smokeless tobacco: Never Used   Substance and Sexual Activity    Alcohol use: No     Alcohol/week: 0.0 oz    Drug use: No    Sexual activity: Yes     Partners: Male     Birth control/protection: Surgical   Other Topics Concern    Not on file   Social History Narrative    She wears seatbelt.       Vitals:    01/02/19 0814   BP: 130/74   Pulse: (!) 55   Resp: 16   Temp: 97.9 °F (36.6 °C)       Physical Exam   Constitutional: She is oriented to person, place, and time. She appears well-developed and well-nourished.   HENT:   Head: Normocephalic and atraumatic.   Eyes: Conjunctivae are normal. Pupils are equal, round, and reactive to light. No scleral icterus.   Neck: Normal range of motion. Neck supple. No thyromegaly present.   Cardiovascular: Normal rate and regular rhythm.   Pulmonary/Chest: Effort normal and breath sounds normal. Right breast exhibits mass. Right breast exhibits no inverted nipple, no nipple discharge, no skin change and no tenderness. Left breast exhibits no inverted nipple, no mass, no nipple discharge, no skin change and no tenderness. Breasts are symmetrical.       Abdominal: Soft. Bowel sounds are normal.   Musculoskeletal: Normal range of motion.   Lymphadenopathy:        Head (right side): No submental, no submandibular, no tonsillar, no preauricular, no posterior auricular and no occipital adenopathy present.        Head (left side): No submental, no submandibular, no tonsillar, no preauricular, no posterior auricular and no occipital adenopathy present.     She has no cervical adenopathy.        Right cervical:  No superficial cervical and no posterior cervical adenopathy present.       Left cervical: No superficial cervical and no posterior cervical adenopathy present.     She has no axillary adenopathy.        Right: No supraclavicular adenopathy present.        Left: No supraclavicular adenopathy present.   Neurological: She is alert and oriented to person, place, and time.   Skin: Skin is warm and dry.   Psychiatric: She has a normal mood and affect. Her behavior is normal. Judgment and thought content normal.     Menarche at 14 y/o    First preg at 15 y/o  LMP: 43 y/o  No oral hormones- uses vaginal hormones     FH: negative for cancer    IMAGING: today- pending     Assessment & Plan:  1. History of an abnormal mammogram bilateral breasts - right- simple cyst, left: 2 complex cysts -  Bilateral mammo today   2. Right breast 9 colock area of prominence   3. Awaiting mammo results- recommend proceed with right breast ultrasound for further evaluation of the finding at 9 oclock. Scheduled for 19 - will call pt with results.   4. BSE monthly- call for any changes.

## 2018-12-26 ENCOUNTER — OFFICE VISIT (OUTPATIENT)
Dept: FAMILY MEDICINE | Facility: CLINIC | Age: 48
End: 2018-12-26
Payer: COMMERCIAL

## 2018-12-26 ENCOUNTER — LAB VISIT (OUTPATIENT)
Dept: LAB | Facility: HOSPITAL | Age: 48
End: 2018-12-26
Attending: FAMILY MEDICINE
Payer: COMMERCIAL

## 2018-12-26 VITALS
DIASTOLIC BLOOD PRESSURE: 74 MMHG | TEMPERATURE: 98 F | BODY MASS INDEX: 22.17 KG/M2 | RESPIRATION RATE: 17 BRPM | SYSTOLIC BLOOD PRESSURE: 116 MMHG | HEART RATE: 80 BPM | OXYGEN SATURATION: 98 % | HEIGHT: 64 IN | WEIGHT: 129.88 LBS

## 2018-12-26 DIAGNOSIS — E11.9 CONTROLLED TYPE 2 DIABETES MELLITUS WITHOUT COMPLICATION, WITHOUT LONG-TERM CURRENT USE OF INSULIN: ICD-10-CM

## 2018-12-26 DIAGNOSIS — E55.9 VITAMIN D DEFICIENCY: ICD-10-CM

## 2018-12-26 DIAGNOSIS — E89.40 SURGICAL MENOPAUSE: ICD-10-CM

## 2018-12-26 DIAGNOSIS — E11.9 CONTROLLED TYPE 2 DIABETES MELLITUS WITHOUT COMPLICATION, WITHOUT LONG-TERM CURRENT USE OF INSULIN: Primary | ICD-10-CM

## 2018-12-26 LAB
ESTIMATED AVG GLUCOSE: 180 MG/DL
HBA1C MFR BLD HPLC: 7.9 %

## 2018-12-26 PROCEDURE — 3045F PR MOST RECENT HEMOGLOBIN A1C LEVEL 7.0-9.0%: CPT | Mod: CPTII,S$GLB,, | Performed by: FAMILY MEDICINE

## 2018-12-26 PROCEDURE — 99214 OFFICE O/P EST MOD 30 MIN: CPT | Mod: S$GLB,,, | Performed by: FAMILY MEDICINE

## 2018-12-26 PROCEDURE — 99999 PR PBB SHADOW E&M-EST. PATIENT-LVL IV: CPT | Mod: PBBFAC,,, | Performed by: FAMILY MEDICINE

## 2018-12-26 PROCEDURE — 3008F BODY MASS INDEX DOCD: CPT | Mod: CPTII,S$GLB,, | Performed by: FAMILY MEDICINE

## 2018-12-26 PROCEDURE — 83036 HEMOGLOBIN GLYCOSYLATED A1C: CPT

## 2018-12-26 PROCEDURE — 36415 COLL VENOUS BLD VENIPUNCTURE: CPT | Mod: PO

## 2019-01-02 ENCOUNTER — HOSPITAL ENCOUNTER (OUTPATIENT)
Dept: RADIOLOGY | Facility: HOSPITAL | Age: 49
Discharge: HOME | End: 2019-01-02
Attending: NURSE PRACTITIONER
Payer: COMMERCIAL

## 2019-01-02 ENCOUNTER — OFFICE VISIT (OUTPATIENT)
Dept: HEMATOLOGY/ONCOLOGY | Facility: CLINIC | Age: 49
End: 2019-01-02
Payer: COMMERCIAL

## 2019-01-02 VITALS
OXYGEN SATURATION: 96 % | HEART RATE: 55 BPM | BODY MASS INDEX: 22.02 KG/M2 | WEIGHT: 130.31 LBS | HEIGHT: 64 IN | TEMPERATURE: 98 F | HEIGHT: 63 IN | RESPIRATION RATE: 16 BRPM | BODY MASS INDEX: 23.09 KG/M2 | DIASTOLIC BLOOD PRESSURE: 74 MMHG | SYSTOLIC BLOOD PRESSURE: 130 MMHG | WEIGHT: 129 LBS

## 2019-01-02 DIAGNOSIS — Z12.39 BREAST CANCER SCREENING: Primary | ICD-10-CM

## 2019-01-02 DIAGNOSIS — R92.8 FOLLOW-UP EXAMINATION OF ABNORMAL MAMMOGRAM: ICD-10-CM

## 2019-01-02 DIAGNOSIS — N60.11 FIBROCYSTIC BREAST CHANGES OF BOTH BREASTS: ICD-10-CM

## 2019-01-02 DIAGNOSIS — N63.10 BREAST MASS, RIGHT: ICD-10-CM

## 2019-01-02 DIAGNOSIS — N60.12 FIBROCYSTIC BREAST CHANGES OF BOTH BREASTS: ICD-10-CM

## 2019-01-02 PROCEDURE — 77067 MAMMO DIGITAL SCREENING BILAT WITH TOMOSYNTHESIS_CAD: ICD-10-PCS | Mod: 26,,, | Performed by: RADIOLOGY

## 2019-01-02 PROCEDURE — 77063 MAMMO DIGITAL SCREENING BILAT WITH TOMOSYNTHESIS_CAD: ICD-10-PCS | Mod: 26,,, | Performed by: RADIOLOGY

## 2019-01-02 PROCEDURE — 77063 BREAST TOMOSYNTHESIS BI: CPT | Mod: 26,,, | Performed by: RADIOLOGY

## 2019-01-02 PROCEDURE — 77067 SCR MAMMO BI INCL CAD: CPT | Mod: 26,,, | Performed by: RADIOLOGY

## 2019-01-02 PROCEDURE — 77067 SCR MAMMO BI INCL CAD: CPT | Mod: TC,PO

## 2019-01-02 PROCEDURE — 99999 PR PBB SHADOW E&M-EST. PATIENT-LVL III: ICD-10-PCS | Mod: PBBFAC,,, | Performed by: NURSE PRACTITIONER

## 2019-01-02 PROCEDURE — 99999 PR PBB SHADOW E&M-EST. PATIENT-LVL III: CPT | Mod: PBBFAC,,, | Performed by: NURSE PRACTITIONER

## 2019-01-02 PROCEDURE — 99214 PR OFFICE/OUTPT VISIT, EST, LEVL IV, 30-39 MIN: ICD-10-PCS | Mod: S$GLB,,, | Performed by: NURSE PRACTITIONER

## 2019-01-02 PROCEDURE — 99214 OFFICE O/P EST MOD 30 MIN: CPT | Mod: S$GLB,,, | Performed by: NURSE PRACTITIONER

## 2019-01-02 PROCEDURE — 3008F BODY MASS INDEX DOCD: CPT | Mod: CPTII,S$GLB,, | Performed by: NURSE PRACTITIONER

## 2019-01-02 PROCEDURE — 3008F PR BODY MASS INDEX (BMI) DOCUMENTED: ICD-10-PCS | Mod: CPTII,S$GLB,, | Performed by: NURSE PRACTITIONER

## 2019-01-04 ENCOUNTER — HOSPITAL ENCOUNTER (OUTPATIENT)
Dept: RADIOLOGY | Facility: HOSPITAL | Age: 49
Discharge: HOME | End: 2019-01-04
Attending: NURSE PRACTITIONER
Payer: COMMERCIAL

## 2019-01-04 DIAGNOSIS — N63.10 BREAST MASS, RIGHT: ICD-10-CM

## 2019-01-04 PROCEDURE — 76642 ULTRASOUND BREAST LIMITED: CPT | Mod: TC,PO,RT

## 2019-01-04 PROCEDURE — 76642 US BREAST RIGHT LIMITED: ICD-10-PCS | Mod: 26,RT,, | Performed by: RADIOLOGY

## 2019-01-04 PROCEDURE — 76642 ULTRASOUND BREAST LIMITED: CPT | Mod: 26,RT,, | Performed by: RADIOLOGY

## 2019-02-11 ENCOUNTER — OFFICE VISIT (OUTPATIENT)
Dept: OPHTHALMOLOGY | Facility: CLINIC | Age: 49
End: 2019-02-11
Payer: COMMERCIAL

## 2019-02-11 DIAGNOSIS — H52.4 PRESBYOPIA: ICD-10-CM

## 2019-02-11 DIAGNOSIS — E11.9 TYPE 2 DIABETES MELLITUS WITHOUT RETINOPATHY: Primary | ICD-10-CM

## 2019-02-11 DIAGNOSIS — Z13.5 GLAUCOMA SCREENING: ICD-10-CM

## 2019-02-11 PROCEDURE — 92014 COMPRE OPH EXAM EST PT 1/>: CPT | Mod: S$GLB,,, | Performed by: OPTOMETRIST

## 2019-02-11 PROCEDURE — 99999 PR PBB SHADOW E&M-EST. PATIENT-LVL II: CPT | Mod: PBBFAC,,, | Performed by: OPTOMETRIST

## 2019-02-11 PROCEDURE — 92014 PR EYE EXAM, EST PATIENT,COMPREHESV: ICD-10-PCS | Mod: S$GLB,,, | Performed by: OPTOMETRIST

## 2019-02-11 PROCEDURE — 99999 PR PBB SHADOW E&M-EST. PATIENT-LVL II: ICD-10-PCS | Mod: PBBFAC,,, | Performed by: OPTOMETRIST

## 2019-02-11 NOTE — PROGRESS NOTES
HPI     Diabetic Eye Exam      Additional comments: Yearly              Comments     NP to [facility]  Last seen by TRF for DM eye exam  Patient here today for yearly DM eye exam  No noticeable changes in vision since last eye exam  Wears OTC readers when needed +1.00 or +1.25  No other complaints  No drops  1. DM          Last edited by Lara Doan, PCT on 2/11/2019  8:15 AM. (History)              Assessment /Plan     For exam results, see Encounter Report.    Type 2 diabetes mellitus without retinopathy    Glaucoma screening    Presbyopia      No diabetic retinopathy in either eye.  Glaucoma screening negative in both eyes.  Continue over the counter readers.  Return to clinic 1 yr.

## 2019-03-14 RX ORDER — IBUPROFEN 800 MG/1
800 TABLET ORAL
Qty: 90 TABLET | Refills: 1 | Status: SHIPPED | OUTPATIENT
Start: 2019-03-14 | End: 2021-02-05

## 2019-03-14 RX ORDER — METFORMIN HYDROCHLORIDE 500 MG/1
TABLET ORAL
Qty: 180 TABLET | Refills: 1
Start: 2019-03-14 | End: 2020-06-21 | Stop reason: SDUPTHER

## 2019-03-14 RX ORDER — LANCETS
1 EACH MISCELLANEOUS 3 TIMES DAILY
Qty: 100 EACH | Refills: 5 | Status: SHIPPED | OUTPATIENT
Start: 2019-03-14 | End: 2022-02-11 | Stop reason: CLARIF

## 2019-03-29 ENCOUNTER — OFFICE VISIT (OUTPATIENT)
Dept: FAMILY MEDICINE | Facility: CLINIC | Age: 49
End: 2019-03-29
Payer: COMMERCIAL

## 2019-03-29 ENCOUNTER — LAB VISIT (OUTPATIENT)
Dept: LAB | Facility: HOSPITAL | Age: 49
End: 2019-03-29
Attending: FAMILY MEDICINE
Payer: COMMERCIAL

## 2019-03-29 VITALS
RESPIRATION RATE: 17 BRPM | TEMPERATURE: 98 F | OXYGEN SATURATION: 98 % | HEART RATE: 80 BPM | SYSTOLIC BLOOD PRESSURE: 116 MMHG | WEIGHT: 130.5 LBS | HEIGHT: 63 IN | DIASTOLIC BLOOD PRESSURE: 74 MMHG | BODY MASS INDEX: 23.12 KG/M2

## 2019-03-29 DIAGNOSIS — E11.65 UNCONTROLLED TYPE 2 DIABETES MELLITUS WITH HYPERGLYCEMIA, WITHOUT LONG-TERM CURRENT USE OF INSULIN: ICD-10-CM

## 2019-03-29 DIAGNOSIS — J30.2 SEASONAL ALLERGIC RHINITIS, UNSPECIFIED TRIGGER: ICD-10-CM

## 2019-03-29 DIAGNOSIS — M70.62 GREATER TROCHANTERIC BURSITIS, LEFT: ICD-10-CM

## 2019-03-29 LAB
ALBUMIN SERPL BCP-MCNC: 3.7 G/DL (ref 3.5–5.2)
ALP SERPL-CCNC: 93 U/L (ref 55–135)
ALT SERPL W/O P-5'-P-CCNC: 13 U/L (ref 10–44)
ANION GAP SERPL CALC-SCNC: 9 MMOL/L (ref 8–16)
AST SERPL-CCNC: 16 U/L (ref 10–40)
BILIRUB SERPL-MCNC: 0.5 MG/DL (ref 0.1–1)
BUN SERPL-MCNC: 14 MG/DL (ref 6–20)
CALCIUM SERPL-MCNC: 10.1 MG/DL (ref 8.7–10.5)
CHLORIDE SERPL-SCNC: 103 MMOL/L (ref 95–110)
CO2 SERPL-SCNC: 29 MMOL/L (ref 23–29)
CREAT SERPL-MCNC: 0.8 MG/DL (ref 0.5–1.4)
EST. GFR  (AFRICAN AMERICAN): >60 ML/MIN/1.73 M^2
EST. GFR  (NON AFRICAN AMERICAN): >60 ML/MIN/1.73 M^2
ESTIMATED AVG GLUCOSE: 151 MG/DL (ref 68–131)
GLUCOSE SERPL-MCNC: 183 MG/DL (ref 70–110)
HBA1C MFR BLD HPLC: 6.9 % (ref 4–5.6)
POTASSIUM SERPL-SCNC: 4.9 MMOL/L (ref 3.5–5.1)
PROT SERPL-MCNC: 7.2 G/DL (ref 6–8.4)
SODIUM SERPL-SCNC: 141 MMOL/L (ref 136–145)

## 2019-03-29 PROCEDURE — 83036 HEMOGLOBIN GLYCOSYLATED A1C: CPT

## 2019-03-29 PROCEDURE — 36415 COLL VENOUS BLD VENIPUNCTURE: CPT | Mod: PO

## 2019-03-29 PROCEDURE — 99214 PR OFFICE/OUTPT VISIT, EST, LEVL IV, 30-39 MIN: ICD-10-PCS | Mod: S$GLB,,, | Performed by: FAMILY MEDICINE

## 2019-03-29 PROCEDURE — 3045F PR MOST RECENT HEMOGLOBIN A1C LEVEL 7.0-9.0%: CPT | Mod: CPTII,S$GLB,, | Performed by: FAMILY MEDICINE

## 2019-03-29 PROCEDURE — 3008F BODY MASS INDEX DOCD: CPT | Mod: CPTII,S$GLB,, | Performed by: FAMILY MEDICINE

## 2019-03-29 PROCEDURE — 99999 PR PBB SHADOW E&M-EST. PATIENT-LVL III: ICD-10-PCS | Mod: PBBFAC,,, | Performed by: FAMILY MEDICINE

## 2019-03-29 PROCEDURE — 3008F PR BODY MASS INDEX (BMI) DOCUMENTED: ICD-10-PCS | Mod: CPTII,S$GLB,, | Performed by: FAMILY MEDICINE

## 2019-03-29 PROCEDURE — 80053 COMPREHEN METABOLIC PANEL: CPT

## 2019-03-29 PROCEDURE — 3045F PR MOST RECENT HEMOGLOBIN A1C LEVEL 7.0-9.0%: ICD-10-PCS | Mod: CPTII,S$GLB,, | Performed by: FAMILY MEDICINE

## 2019-03-29 PROCEDURE — 99999 PR PBB SHADOW E&M-EST. PATIENT-LVL III: CPT | Mod: PBBFAC,,, | Performed by: FAMILY MEDICINE

## 2019-03-29 PROCEDURE — 99214 OFFICE O/P EST MOD 30 MIN: CPT | Mod: S$GLB,,, | Performed by: FAMILY MEDICINE

## 2019-03-29 RX ORDER — FLUTICASONE PROPIONATE 50 MCG
2 SPRAY, SUSPENSION (ML) NASAL DAILY PRN
Qty: 16 G | Refills: 5 | Status: SHIPPED | OUTPATIENT
Start: 2019-03-29 | End: 2019-07-01

## 2019-03-29 NOTE — LETTER
March 29, 2019      [facility]  [address] [place] [address]  Phone: [phone]  Fax: [phone]       Patient: Chan Dwyer   YOB: 1970  Date of Visit: 03/29/2019    To Whom It May Concern:    Antonio Dwyer  was at [facility] on 03/29/2019.She may return to work on 04/01/2019 with no restrictions. If you have any questions or concerns, or if I can be of further assistance, please do not hesitate to contact me.    Sincerely,    Aziza Manzo MD

## 2019-03-29 NOTE — PROGRESS NOTES
Chan Gorman Reymundo    Chief Complaint   Patient presents with    Diabetes    Follow-up       History of Present Illness:   Ms. Dwyer comes in today for 3-month diabetes follow-up.  She is fasting but has not taken medication today.  She states she just started exercising this week - 1-3 times per week.  She monitors her diet.  She performs home glucose checks 3 times daily with levels ranging 180-200's.    She complains of having new symptom of left posterior hip and thigh pain only with sitting or with lying for 1 week.  She denies associated trauma.  She denies associated numbness or tingling for rash at the area.  She states she takes ibuprofen which eases the pain slightly but mentions she is only taking ibuprofen 2 times since she started having symptoms.  She reports pain at 7/10 on pain scale now but 10/10 on pain scale at worst.  She has history of GERD.    She also complains of having dry cough with nasal congestion, runny nose, postnasal drip and sore throat without other associated sinus, ocular, or respiratory symptoms, fever or chills for 1 week.  She states she has been taking Robitussin DM with little help.  She does not smoke.     She states she feels okay today and denies having other acute problems. She denies having fever, chills, fatigue, appetite changes; shortness of breath, wheezing; chest pain, palpitations, leg swelling; abdominal pain, nausea, vomiting, diarrhea, constipation; unusual urinary symptoms; polydipsia, polyuria, polyphagia; back pain; headache; anxiety, depression, homicidal or suicidal thoughts.        Labs:                  WBC                      5.92                09/26/2018                 HGB                      12.7                09/26/2018                 HCT                      42.0                09/26/2018                 PLT                      225                 09/26/2018                 CHOL                     152                 09/26/2018                  TRIG                     53                  09/26/2018                 HDL                      61                  09/26/2018                 ALT                      15                  09/26/2018                 AST                      14                  09/26/2018                 NA                       141                 09/26/2018                 K                        4.0                 09/26/2018                 CL                       104                 09/26/2018                 CREATININE               0.8                 09/26/2018                 BUN                      11                  09/26/2018                 CO2                      29                  09/26/2018                 TSH                      1.474               09/26/2018                 GLUF                     118 (H)             06/07/2010                 HGBA1C                   7.9 (H)             12/26/2018               LDLCALC                  80.4                09/26/2018                Current Outpatient Medications   Medication Sig    blood sugar diagnostic Strp 1 strip by Misc.(Non-Drug; Combo Route) route 2 (two) times daily.    ibuprofen (ADVIL,MOTRIN) 800 MG tablet Take 1 tablet (800 mg total) by mouth every meal as needed for Pain.    lancets Misc 1 lancet by Misc.(Non-Drug; Combo Route) route 3 (three) times daily.    metFORMIN (GLUCOPHAGE) 500 MG tablet TAKE one tablet BY MOUTH in the morning and ONE TABLET BY MOUTH in the evening with meals    vitamin D 1000 units Tab Take 1,000 Units by mouth once daily.    blood-glucose meter (FREESTYLE SYSTEM KIT) kit Use as instructed       Review of Systems   Constitutional: Positive for activity change. Negative for appetite change, chills, fatigue and fever.        Weight 58.9 kg (129 lb 13.6 oz) at December 26, 2018 visit.   HENT: Positive for congestion, postnasal drip, rhinorrhea and sore throat. Negative for sinus pressure, sinus pain and  sneezing.    Eyes: Negative for pain, discharge, redness and itching.   Respiratory: Positive for cough. Negative for shortness of breath and wheezing.    Cardiovascular: Negative for chest pain, palpitations and leg swelling.   Gastrointestinal: Negative for abdominal pain, constipation, diarrhea, nausea and vomiting.   Endocrine: Negative for polydipsia, polyphagia and polyuria.        See history of present illness.   Genitourinary: Negative for difficulty urinating.   Musculoskeletal: Positive for myalgias. Negative for back pain and joint swelling.   Neurological: Negative for headaches.   Psychiatric/Behavioral: Negative for dysphoric mood and suicidal ideas. The patient is not nervous/anxious.         Negative for homicidal ideas.       Objective:  Physical Exam   Constitutional: She is oriented to person, place, and time. She appears well-developed and well-nourished. No distress.   Pleasant.   HENT:   Head: Normocephalic and atraumatic.   Right Ear: External ear normal.   Left Ear: External ear normal.   Nose: Nose normal.   Mouth/Throat: Oropharynx is clear and moist. No oropharyngeal exudate.   Non tender sinuses.  TM's shiny and clear. Nasal mucosa pale, slightly congested without drainage noted.   Eyes: Pupils are equal, round, and reactive to light. Conjunctivae and EOM are normal. Right eye exhibits no discharge. Left eye exhibits no discharge.   Neck: Normal range of motion. Neck supple. No thyromegaly present.   Cardiovascular: Normal rate, regular rhythm and intact distal pulses.   No murmur heard.  Pulses:       Dorsalis pedis pulses are 3+ on the right side, and 3+ on the left side.        Posterior tibial pulses are 3+ on the right side, and 3+ on the left side.   Pulmonary/Chest: Effort normal and breath sounds normal. No respiratory distress. She has no wheezes.   Abdominal: Soft. Bowel sounds are normal. She exhibits no distension and no mass. There is no tenderness. There is no rebound and  no guarding.   Musculoskeletal: Normal range of motion. She exhibits tenderness. She exhibits no edema.   She is ambulatory without problems.  Feet look okay without ulcerations or skin breaks noted. Slightly tender at left lateral hip/upper thigh with full range of motion noted.   Feet:   Right Foot:   Protective Sensation: 7 sites tested. 7 sites sensed.   Skin Integrity: Negative for ulcer or skin breakdown.   Left Foot:   Protective Sensation: 7 sites tested. 7 sites sensed.   Skin Integrity: Negative for ulcer or skin breakdown.   Lymphadenopathy:     She has no cervical adenopathy.   Neurological: She is alert and oriented to person, place, and time.   Skin: She is not diaphoretic.   Psychiatric: She has a normal mood and affect. Her behavior is normal. Judgment and thought content normal.   Vitals reviewed.      ASSESSMENT:  1. Uncontrolled type 2 diabetes mellitus with hyperglycemia, without long-term current use of insulin    2. Greater trochanteric bursitis, left    3. Seasonal allergic rhinitis, unspecified trigger        PLAN:  Chan was seen today for diabetes and follow-up.    Diagnoses and all orders for this visit:    Uncontrolled type 2 diabetes mellitus with hyperglycemia, without long-term current use of insulin  -     Comprehensive metabolic panel; Future  -     Hemoglobin A1c; Future    Greater trochanteric bursitis, left    Seasonal allergic rhinitis, unspecified trigger  -     fluticasone (FLONASE) 50 mcg/actuation nasal spray; 2 sprays (100 mcg total) by Each Nare route daily as needed for Rhinitis.       Patient advised to call for results.  Continue current medications, follow low sodium, low cholesterol, low carb diet, daily walks.  Increase Ibuprofen 800 mg daily prn to twice daily with food x 1 - 2 weeks.  Heat prn.  Add Flonase as directed.  I again discussed diabetic prevention of kidney disease and heart disease with use ACE-inhibitor and Statin therapy respectively with patient;  she again declined at this time.   Work excuse for today with return April 1, 2019.  Follow up in about 3 months (around 6/28/2019) for diabetes follow up.

## 2019-07-01 ENCOUNTER — OFFICE VISIT (OUTPATIENT)
Dept: FAMILY MEDICINE | Facility: CLINIC | Age: 49
End: 2019-07-01
Payer: COMMERCIAL

## 2019-07-01 ENCOUNTER — LAB VISIT (OUTPATIENT)
Dept: LAB | Facility: HOSPITAL | Age: 49
End: 2019-07-01
Attending: FAMILY MEDICINE
Payer: COMMERCIAL

## 2019-07-01 VITALS
HEART RATE: 64 BPM | DIASTOLIC BLOOD PRESSURE: 72 MMHG | HEIGHT: 64 IN | WEIGHT: 127.56 LBS | BODY MASS INDEX: 21.78 KG/M2 | OXYGEN SATURATION: 97 % | TEMPERATURE: 98 F | SYSTOLIC BLOOD PRESSURE: 128 MMHG

## 2019-07-01 DIAGNOSIS — E11.65 UNCONTROLLED TYPE 2 DIABETES MELLITUS WITH HYPERGLYCEMIA, WITHOUT LONG-TERM CURRENT USE OF INSULIN: ICD-10-CM

## 2019-07-01 DIAGNOSIS — K21.9 GASTROESOPHAGEAL REFLUX DISEASE, ESOPHAGITIS PRESENCE NOT SPECIFIED: ICD-10-CM

## 2019-07-01 DIAGNOSIS — E55.9 VITAMIN D DEFICIENCY: ICD-10-CM

## 2019-07-01 LAB
ESTIMATED AVG GLUCOSE: 174 MG/DL (ref 68–131)
HBA1C MFR BLD HPLC: 7.7 % (ref 4–5.6)

## 2019-07-01 PROCEDURE — 83036 HEMOGLOBIN GLYCOSYLATED A1C: CPT

## 2019-07-01 PROCEDURE — 99214 OFFICE O/P EST MOD 30 MIN: CPT | Mod: S$GLB,,, | Performed by: FAMILY MEDICINE

## 2019-07-01 PROCEDURE — 99214 PR OFFICE/OUTPT VISIT, EST, LEVL IV, 30-39 MIN: ICD-10-PCS | Mod: S$GLB,,, | Performed by: FAMILY MEDICINE

## 2019-07-01 PROCEDURE — 36415 COLL VENOUS BLD VENIPUNCTURE: CPT | Mod: PO

## 2019-07-01 PROCEDURE — 3045F PR MOST RECENT HEMOGLOBIN A1C LEVEL 7.0-9.0%: CPT | Mod: CPTII,S$GLB,, | Performed by: FAMILY MEDICINE

## 2019-07-01 PROCEDURE — 99999 PR PBB SHADOW E&M-EST. PATIENT-LVL III: ICD-10-PCS | Mod: PBBFAC,,, | Performed by: FAMILY MEDICINE

## 2019-07-01 PROCEDURE — 3008F BODY MASS INDEX DOCD: CPT | Mod: CPTII,S$GLB,, | Performed by: FAMILY MEDICINE

## 2019-07-01 PROCEDURE — 3008F PR BODY MASS INDEX (BMI) DOCUMENTED: ICD-10-PCS | Mod: CPTII,S$GLB,, | Performed by: FAMILY MEDICINE

## 2019-07-01 PROCEDURE — 99999 PR PBB SHADOW E&M-EST. PATIENT-LVL III: CPT | Mod: PBBFAC,,, | Performed by: FAMILY MEDICINE

## 2019-07-01 PROCEDURE — 3045F PR MOST RECENT HEMOGLOBIN A1C LEVEL 7.0-9.0%: ICD-10-PCS | Mod: CPTII,S$GLB,, | Performed by: FAMILY MEDICINE

## 2019-07-01 NOTE — PROGRESS NOTES
Chan Gorman Reymundo    Chief Complaint   Patient presents with    Diabetes    Follow-up       History of Present Illness:   Ms. Dwyer comes in today for diabetes follow-up.  She is fasting and has not taken medications today.  She states she walks 3 times a week, 1 hour each time; she states she exercises less now.  She states she monitors her diet.    She performs home glucose checks morning following meals with levels ranging 180's to 200's.    She states she has nausea with taking metformin morning dose; as a result, she states she has only been taking metformin the evening dose for 3 weeks.    Otherwise, she denies having fever, chills, fatigue, appetite changes; shortness of breath, cough, wheezing; chest pain, palpitations, leg swelling; abdominal pain, vomiting, diarrhea, constipation; unusual urinary symptoms; polydipsia, polyuria, polyphagia; back pain; headache; anxiety, depression, homicidal or suicidal thoughts.      Labs:                   WBC                      5.92                09/26/2018                 HGB                      12.7                09/26/2018                 HCT                      42.0                09/26/2018                 PLT                      225                 09/26/2018                 CHOL                     152                 09/26/2018                 TRIG                     53                  09/26/2018                 HDL                      61                  09/26/2018                 ALT                      13                  03/29/2019                 AST                      16                  03/29/2019                 NA                       141                 03/29/2019                 K                        4.9                 03/29/2019                 CL                       103                 03/29/2019                 CREATININE               0.8                 03/29/2019                 BUN                      14                   03/29/2019                 CO2                      29                  03/29/2019                 TSH                      1.474               09/26/2018                 GLUF                     118 (H)             06/07/2010                 HGBA1C                   6.9 (H)             03/29/2019                LDLCALC                  80.4                09/26/2018                Current Outpatient Medications   Medication Sig    blood sugar diagnostic Strp 1 strip by Misc.(Non-Drug; Combo Route) route 2 (two) times daily.    blood-glucose meter (FREESTYLE SYSTEM KIT) kit Use as instructed    ibuprofen (ADVIL,MOTRIN) 800 MG tablet Take 1 tablet (800 mg total) by mouth every meal as needed for Pain.    lancets Misc 1 lancet by Misc.(Non-Drug; Combo Route) route 3 (three) times daily.    metFORMIN (GLUCOPHAGE) 500 MG tablet TAKE one tablet BY MOUTH in the morning and ONE TABLET BY MOUTH in the evening with meals (Patient taking differently: Take 500 mg by mouth every evening. TAKE one tablet BY MOUTH in the morning and ONE TABLET BY MOUTH in the evening with meals) AS STATES AM DOSE CAUSES GI SYMPTOMS    vitamin D 1000 units Tab Take 1,000 Units by mouth once daily.       Review of Systems   Constitutional: Positive for activity change. Negative for appetite change, chills, fatigue and fever.        Weight 59.2 kg (130 lb 8.2 oz) at March 29, 2019 visit.   Respiratory: Negative for cough, shortness of breath and wheezing.    Cardiovascular: Negative for chest pain, palpitations and leg swelling.   Gastrointestinal: Positive for nausea. Negative for abdominal pain, constipation, diarrhea and vomiting.   Endocrine: Negative for polydipsia, polyphagia and polyuria.        See history of present illness.   Genitourinary: Negative for difficulty urinating.   Musculoskeletal: Negative for back pain.   Neurological: Negative for headaches.   Psychiatric/Behavioral: Negative for dysphoric mood and suicidal  ideas. The patient is not nervous/anxious.         Negative for homicidal ideas.       Objective:  Physical Exam   Constitutional: She is oriented to person, place, and time. She appears well-developed and well-nourished. No distress.   Pleasant.   Neck: Normal range of motion. Neck supple. No thyromegaly present.   Cardiovascular: Normal rate, regular rhythm and intact distal pulses.   No murmur heard.  Pulses:       Dorsalis pedis pulses are 3+ on the right side, and 3+ on the left side.        Posterior tibial pulses are 3+ on the right side, and 3+ on the left side.   Pulmonary/Chest: Effort normal and breath sounds normal. No respiratory distress. She has no wheezes.   Abdominal: Soft. Bowel sounds are normal. She exhibits no distension and no mass. There is no tenderness. There is no rebound and no guarding.   Musculoskeletal: Normal range of motion. She exhibits no edema or tenderness.   She is ambulatory without problems.  Feet look okay without ulcerations or skin breaks noted.    Feet:   Right Foot:   Protective Sensation: 7 sites tested. 7 sites sensed.   Skin Integrity: Negative for ulcer or skin breakdown.   Left Foot:   Protective Sensation: 7 sites tested. 7 sites sensed.   Skin Integrity: Negative for ulcer or skin breakdown.   Lymphadenopathy:     She has no cervical adenopathy.   Neurological: She is alert and oriented to person, place, and time.   Skin: She is not diaphoretic.   Psychiatric: She has a normal mood and affect. Her behavior is normal. Judgment and thought content normal.   Vitals reviewed.      ASSESSMENT:  1. Uncontrolled type 2 diabetes mellitus with hyperglycemia, without long-term current use of insulin    2. Vitamin D deficiency    3. Gastroesophageal reflux disease, esophagitis presence not specified        PLAN:  Chan was seen today for diabetes and follow-up.    Diagnoses and all orders for this visit:    Uncontrolled type 2 diabetes mellitus with hyperglycemia, without  long-term current use of insulin  -     Hemoglobin A1c; Future  -     Diabetes Digital Medicine (DDMP) Enrollment Order    Vitamin D deficiency    Gastroesophageal reflux disease, esophagitis presence not specified       Patient advised to call for results.  Continue current medications, follow low sodium, low cholesterol, low carb diet, daily walks.  Advised patient to try to take Metformin 500 mg - 2 pills in evening with meals - for diabetes control.  I again discussed diabetic prevention of kidney disease and heart disease with use ACE-inhibitor and Statin therapy respectively with patient; she again declined at this time.   Work excuse for 7/1/19 - 7/2/19 with return 7/3/19.  Flu shot this fall.  Follow up in about 3 months (around 9/26/2019) for physical.

## 2019-07-01 NOTE — LETTER
July 1, 2019      [facility]  [address] [place] [address]  Phone: [phone]  Fax: [phone]       Patient: Chan Dwyer   YOB: 1970  Date of Visit: 07/01/2019    To Whom It May Concern:    Antonio Dwyer  was at [facility] on 07/01/2019. She may return to work/school on 07/03/2019 with no restrictions. If you have any questions or concerns, or if I can be of further assistance, please do not hesitate to contact me.    Sincerely,      Aditi Patricia LPN

## 2019-10-02 ENCOUNTER — OFFICE VISIT (OUTPATIENT)
Dept: FAMILY MEDICINE | Facility: CLINIC | Age: 49
End: 2019-10-02
Payer: COMMERCIAL

## 2019-10-02 ENCOUNTER — LAB VISIT (OUTPATIENT)
Dept: LAB | Facility: HOSPITAL | Age: 49
End: 2019-10-02
Attending: FAMILY MEDICINE
Payer: COMMERCIAL

## 2019-10-02 VITALS
BODY MASS INDEX: 22.6 KG/M2 | OXYGEN SATURATION: 99 % | SYSTOLIC BLOOD PRESSURE: 112 MMHG | WEIGHT: 132.38 LBS | RESPIRATION RATE: 16 BRPM | TEMPERATURE: 98 F | HEIGHT: 64 IN | DIASTOLIC BLOOD PRESSURE: 72 MMHG | HEART RATE: 72 BPM

## 2019-10-02 DIAGNOSIS — Z00.00 ANNUAL PHYSICAL EXAM: Primary | ICD-10-CM

## 2019-10-02 DIAGNOSIS — Z23 NEED FOR INFLUENZA VACCINATION: ICD-10-CM

## 2019-10-02 DIAGNOSIS — E89.40 SURGICAL MENOPAUSE: ICD-10-CM

## 2019-10-02 DIAGNOSIS — E11.65 UNCONTROLLED TYPE 2 DIABETES MELLITUS WITH HYPERGLYCEMIA, WITHOUT LONG-TERM CURRENT USE OF INSULIN: ICD-10-CM

## 2019-10-02 DIAGNOSIS — Z00.00 ANNUAL PHYSICAL EXAM: ICD-10-CM

## 2019-10-02 DIAGNOSIS — E55.9 VITAMIN D DEFICIENCY: ICD-10-CM

## 2019-10-02 LAB
BASOPHILS # BLD AUTO: 0.02 K/UL (ref 0–0.2)
BASOPHILS NFR BLD: 0.3 % (ref 0–1.9)
DIFFERENTIAL METHOD: ABNORMAL
EOSINOPHIL # BLD AUTO: 0.1 K/UL (ref 0–0.5)
EOSINOPHIL NFR BLD: 1.1 % (ref 0–8)
ERYTHROCYTE [DISTWIDTH] IN BLOOD BY AUTOMATED COUNT: 12.1 % (ref 11.5–14.5)
HCT VFR BLD AUTO: 41.3 % (ref 37–48.5)
HGB BLD-MCNC: 11.9 G/DL (ref 12–16)
IMM GRANULOCYTES # BLD AUTO: 0.03 K/UL (ref 0–0.04)
IMM GRANULOCYTES NFR BLD AUTO: 0.5 % (ref 0–0.5)
LYMPHOCYTES # BLD AUTO: 2.1 K/UL (ref 1–4.8)
LYMPHOCYTES NFR BLD: 33.1 % (ref 18–48)
MCH RBC QN AUTO: 28.4 PG (ref 27–31)
MCHC RBC AUTO-ENTMCNC: 28.8 G/DL (ref 32–36)
MCV RBC AUTO: 99 FL (ref 82–98)
MONOCYTES # BLD AUTO: 0.3 K/UL (ref 0.3–1)
MONOCYTES NFR BLD: 5.2 % (ref 4–15)
NEUTROPHILS # BLD AUTO: 3.7 K/UL (ref 1.8–7.7)
NEUTROPHILS NFR BLD: 59.8 % (ref 38–73)
NRBC BLD-RTO: 0 /100 WBC
PLATELET # BLD AUTO: 242 K/UL (ref 150–350)
PMV BLD AUTO: 11.1 FL (ref 9.2–12.9)
RBC # BLD AUTO: 4.19 M/UL (ref 4–5.4)
WBC # BLD AUTO: 6.2 K/UL (ref 3.9–12.7)

## 2019-10-02 PROCEDURE — 3046F HEMOGLOBIN A1C LEVEL >9.0%: CPT | Mod: CPTII,S$GLB,, | Performed by: FAMILY MEDICINE

## 2019-10-02 PROCEDURE — 85025 COMPLETE CBC W/AUTO DIFF WBC: CPT

## 2019-10-02 PROCEDURE — 99999 PR PBB SHADOW E&M-EST. PATIENT-LVL IV: CPT | Mod: PBBFAC,,, | Performed by: FAMILY MEDICINE

## 2019-10-02 PROCEDURE — 99396 PR PREVENTIVE VISIT,EST,40-64: ICD-10-PCS | Mod: 25,S$GLB,, | Performed by: FAMILY MEDICINE

## 2019-10-02 PROCEDURE — 90471 IMMUNIZATION ADMIN: CPT | Mod: S$GLB,,, | Performed by: FAMILY MEDICINE

## 2019-10-02 PROCEDURE — 99396 PREV VISIT EST AGE 40-64: CPT | Mod: 25,S$GLB,, | Performed by: FAMILY MEDICINE

## 2019-10-02 PROCEDURE — 3046F PR MOST RECENT HEMOGLOBIN A1C LEVEL > 9.0%: ICD-10-PCS | Mod: CPTII,S$GLB,, | Performed by: FAMILY MEDICINE

## 2019-10-02 PROCEDURE — 90686 IIV4 VACC NO PRSV 0.5 ML IM: CPT | Mod: S$GLB,,, | Performed by: FAMILY MEDICINE

## 2019-10-02 PROCEDURE — 82306 VITAMIN D 25 HYDROXY: CPT

## 2019-10-02 PROCEDURE — 82570 ASSAY OF URINE CREATININE: CPT

## 2019-10-02 PROCEDURE — 84443 ASSAY THYROID STIM HORMONE: CPT

## 2019-10-02 PROCEDURE — 80061 LIPID PANEL: CPT

## 2019-10-02 PROCEDURE — 83036 HEMOGLOBIN GLYCOSYLATED A1C: CPT

## 2019-10-02 PROCEDURE — 36415 COLL VENOUS BLD VENIPUNCTURE: CPT | Mod: PO

## 2019-10-02 PROCEDURE — 90686 FLU VACCINE (QUAD) GREATER THAN OR EQUAL TO 3YO PRESERVATIVE FREE IM: ICD-10-PCS | Mod: S$GLB,,, | Performed by: FAMILY MEDICINE

## 2019-10-02 PROCEDURE — 90471 FLU VACCINE (QUAD) GREATER THAN OR EQUAL TO 3YO PRESERVATIVE FREE IM: ICD-10-PCS | Mod: S$GLB,,, | Performed by: FAMILY MEDICINE

## 2019-10-02 PROCEDURE — 99999 PR PBB SHADOW E&M-EST. PATIENT-LVL IV: ICD-10-PCS | Mod: PBBFAC,,, | Performed by: FAMILY MEDICINE

## 2019-10-02 PROCEDURE — 80053 COMPREHEN METABOLIC PANEL: CPT

## 2019-10-02 NOTE — PROGRESS NOTES
HISTORY OF PRESENT ILLNESS: Ms. Dwyer comes in today fasting and without taking medication for annual wellness examination.     END OF LIFE DECISION: She does not have a living will and does desire life support.    Current Outpatient Medications   Medication Sig    blood sugar diagnostic Strp 1 strip by Misc.(Non-Drug; Combo Route) route 2 (two) times daily.    ibuprofen (ADVIL,MOTRIN) 800 MG tablet Take 1 tablet (800 mg total) by mouth every meal as needed for Pain.    lancets Misc 1 lancet by Misc.(Non-Drug; Combo Route) route 3 (three) times daily.    metFORMIN (GLUCOPHAGE) 500 MG tablet TAKE one tablet BY MOUTH in the morning and ONE TABLET BY MOUTH in the evening with meals (Patient taking differently: Take 500 mg by mouth every evening. TAKE one tablet BY MOUTH in the morning and ONE TABLET BY MOUTH in the evening with meals)    vitamin D 1000 units Tab Take 1,000 Units by mouth once daily.    blood-glucose meter (FREESTYLE SYSTEM KIT) kit Use as instructed     SCREENINGS:   Cholesterol: September 26, 2018.  FFS/Colonoscopy: June 22, 2016 - fariha; repeat in 10 years.   Mammogram: January 2, 2019 - benign with 1-year repeat advised.  Breast: January 2, 2019 with NP Ashely Escobar.  GYN Exam: January 3, 2018 with GYN Dr. Divine Corey - fariha. She states she will schedule with her for this year.  Dexa Scan: Never.  Eye Exam: February 11, 2019 with Dr. Abrams.    Dental Exam:  2018 with Dr. Lovell per patient.  Due.  PPD: Never.  Immunizations: Td/Tdap - September 11, 2014.  Gardasil - N./A.  Zostavax - N./A.  Pneumovax - December 16, 2014.  Seasonal Flu - September 26, 2018. She desires.  Hepatitis B vaccine series - October 13, 2016, September 13, 2016, September 14, 2017.      ROS:  GENERAL: Denies fever, chills, fatigue or unusual weight change. Appetite normal. Weight 57.9 kg (127 lb 8.6 oz) at  July 1, 2019 visit.  Exercises 3 times per week by walking  - 30 minutes. Monitors diet.  SKIN: Denies  "rashes, itching, changes in mole, color or texture of skin or easy bruising.  HEAD: Denies headaches or recent head trauma.  EYES: Denies change in vision, pain, diplopia, redness or discharge.  EARS: Denies ear pain, discharge, vertigo or decreased hearing.  NOSE: Denies loss of smell, epistaxis or rhinitis.  MOUTH & THROAT: Denies hoarseness or change in voice. Denies excessive gum bleeding or mouth sores. Denies sore throat.  NODES: Denies swollen glands.  CHEST: Denies GONZALES, wheezing, cough, or sputum production.  CARDIOVASCULAR: Denies chest pain, PND, orthopnea or reduced exercise tolerance. Denies palpitations.  ABDOMEN: Denies diarrhea, constipation, nausea, vomiting, abdominal pain, or blood in stool.  URINARY: Denies flank pain, dysuria or hematuria.  GENITOURINARY: Denies flank pain, dysuria, frequency or hematuria. Does not perform monthly breast self examination. Reports saw Dr. Divine Corey, GYN, in January 3, 2018 and states she will schedule to see her this year.  Also, reports saw NP Ashely Escobar on January 2, 2019 for breast examination and abnormal mammogram surveillance with 1-year repeat mammogram and breast examination advised.  HEME/LYMPH: Denies bleeding problems.  ENDOCRINE: Denies thyroid or cholesterol problems. Reports performs home glucose checks twice daily (180-200's).    PERIPHERAL VASCULAR:Denies claudication or cyanosis.  MUSCULOSKELETAL: Denies joint pain, stiffness, or swelling except reports saw Dr. Hayward on July 25, 2018 for bilataral CTS and no longer wears braces as no longer with problem. Denies edema.  NEUROLOGIC: Denies history of seizures, tremors, paralysis, alteration of gait or coordination.  PSYCHIATRIC: Denies mood swings, depression, anxiety, homicidal or suicidal thoughts. Denies sleep problems.    PE:   VS: /72 (BP Location: Left arm)   Pulse 72   Temp 98.2 °F (36.8 °C) (Oral)   Resp 16   Ht 5' 4" (1.626 m)   Wt 60.1 kg (132 lb 6.2 oz)   SpO2 99% "   BMI 22.72 kg/m²   APPEARANCE: Well nourished, well developed female, pleasant, alert and oriented in no acute distress.   HEAD: Non tender. Full range of motion.  EYES: PERRL, conjunctiva pink, lids no edema.  EARS: External canal patent, no swelling or redness. TM's shiny and clear.  NOSE: Mucosa and turbinates pink, not swollen. No discharge. Non tender sinuses.  THROAT: No pharyngeal erythema or exudate. No stridor.   NECK: Supple, no mass but slight, non tender thyroid enlargement noted.  NODES: No cervical lymph node enlargement.  CHEST: Normal respiratory effort. Lungs clear to auscultation.  CARDIOVASCULAR: Normal S1, S2. No rubs, murmurs or gallops. PMI not displaced. No carotid bruit. Pedal pulses palpable bilaterally. No edema.  ABDOMEN: Bowel sounds present. Not distended. Soft. No tenderness, masses or organomegaly.  BREAST EXAM: No examined as deferred to breast screening nurse.  PELVIC EXAM: No examined as deferred to GYN.  RECTAL EXAM: Not examined.  MUSCULOSKELETAL: No joint deformities or stiffness.  She is ambulatory without problems.  SKIN: No rashes or suspicious lesions, normal color and turgor.  NEUROLOGIC: Cranial Nerves: II-XII grossly intact. DTR's: Knees, Ankles 2+ and equal bilaterally. Gait & Posture: Normal gait and fine motion.  PSYCHIATRIC: Patient alert, oriented x 3. Mood/Affect normal without acute anxiety and depression noted. Judgment/insight good as she makes appropriate decisions on today's examination.     Protective Sensation (w/ 10 gram monofilament):  Right: Intact  Left: Intact    Visual Inspection:  Normal -  Bilateral    Pedal Pulses:   Right: Present  Left: Present    Posterior tibialis:   Right:Present  Left: Present    Lab Results   Component Value Date    HGBA1C 7.7 (H) 07/01/2019     ASSESSMENT:    ICD-10-CM ICD-9-CM    1. Annual physical exam Z00.00 V70.0 Comprehensive metabolic panel      Protein / creatinine ratio, urine      Lipid panel      CBC auto  differential      TSH      Hemoglobin A1c      Vitamin D   2. Uncontrolled type 2 diabetes mellitus with hyperglycemia, without long-term current use of insulin E11.65 250.02    3. Vitamin D deficiency E55.9 268.9    4. Surgical menopause E89.40 627.4    5. Need for influenza vaccination Z23 V04.81 Influenza - Quadrivalent (6 months+) (PF)     PLAN:  1. Age-appropriate counseling-appropriate low-sodium, low-cholesterol, low carbohydrate diet and exercise daily, monthly breast self exam, annual wellness examination.   2. Patient advised to call for results.  3. Continue current medications.  4. Keep follow up with specialists (including see GYN this year).  5. Annual eye and dental examinations advised.   6. Again discussed diabetic prevention of kidney disease and heart disease with use ACE-inhibitor and Statin therapy respectively with patient; she again declines at this time.   7. Work excuse to today with return today.        8. Follow up in about 14 weeks (around 1/8/2020) for diabetes follow up.

## 2019-10-03 LAB
25(OH)D3+25(OH)D2 SERPL-MCNC: 30 NG/ML (ref 30–96)
ALBUMIN SERPL BCP-MCNC: 3.6 G/DL (ref 3.5–5.2)
ALP SERPL-CCNC: 87 U/L (ref 55–135)
ALT SERPL W/O P-5'-P-CCNC: 26 U/L (ref 10–44)
ANION GAP SERPL CALC-SCNC: 9 MMOL/L (ref 8–16)
AST SERPL-CCNC: 27 U/L (ref 10–40)
BILIRUB SERPL-MCNC: 0.3 MG/DL (ref 0.1–1)
BUN SERPL-MCNC: 9 MG/DL (ref 6–20)
CALCIUM SERPL-MCNC: 9.2 MG/DL (ref 8.7–10.5)
CHLORIDE SERPL-SCNC: 104 MMOL/L (ref 95–110)
CHOLEST SERPL-MCNC: 147 MG/DL (ref 120–199)
CHOLEST/HDLC SERPL: 2.8 {RATIO} (ref 2–5)
CO2 SERPL-SCNC: 27 MMOL/L (ref 23–29)
CREAT SERPL-MCNC: 0.7 MG/DL (ref 0.5–1.4)
CREAT UR-MCNC: 92 MG/DL (ref 15–325)
EST. GFR  (AFRICAN AMERICAN): >60 ML/MIN/1.73 M^2
EST. GFR  (NON AFRICAN AMERICAN): >60 ML/MIN/1.73 M^2
ESTIMATED AVG GLUCOSE: 229 MG/DL (ref 68–131)
GLUCOSE SERPL-MCNC: 220 MG/DL (ref 70–110)
HBA1C MFR BLD HPLC: 9.6 % (ref 4–5.6)
HDLC SERPL-MCNC: 53 MG/DL (ref 40–75)
HDLC SERPL: 36.1 % (ref 20–50)
LDLC SERPL CALC-MCNC: 84 MG/DL (ref 63–159)
NONHDLC SERPL-MCNC: 94 MG/DL
POTASSIUM SERPL-SCNC: 4.1 MMOL/L (ref 3.5–5.1)
PROT SERPL-MCNC: 6.8 G/DL (ref 6–8.4)
PROT UR-MCNC: 11 MG/DL (ref 0–15)
PROT/CREAT UR: 0.12 MG/G{CREAT} (ref 0–0.2)
SODIUM SERPL-SCNC: 140 MMOL/L (ref 136–145)
TRIGL SERPL-MCNC: 50 MG/DL (ref 30–150)
TSH SERPL DL<=0.005 MIU/L-ACNC: 1.54 UIU/ML (ref 0.4–4)

## 2019-11-11 ENCOUNTER — OFFICE VISIT (OUTPATIENT)
Dept: FAMILY MEDICINE | Facility: CLINIC | Age: 49
End: 2019-11-11
Payer: COMMERCIAL

## 2019-11-11 VITALS
HEART RATE: 98 BPM | TEMPERATURE: 98 F | HEIGHT: 64 IN | DIASTOLIC BLOOD PRESSURE: 70 MMHG | WEIGHT: 132.06 LBS | BODY MASS INDEX: 22.55 KG/M2 | SYSTOLIC BLOOD PRESSURE: 110 MMHG

## 2019-11-11 DIAGNOSIS — R05.9 COUGH: ICD-10-CM

## 2019-11-11 DIAGNOSIS — R07.89 MUSCULOSKELETAL CHEST PAIN: ICD-10-CM

## 2019-11-11 DIAGNOSIS — J30.9 ALLERGIC RHINITIS, UNSPECIFIED SEASONALITY, UNSPECIFIED TRIGGER: Primary | ICD-10-CM

## 2019-11-11 PROBLEM — E11.65 UNCONTROLLED TYPE 2 DIABETES MELLITUS WITH HYPERGLYCEMIA, WITHOUT LONG-TERM CURRENT USE OF INSULIN: Status: RESOLVED | Noted: 2017-09-14 | Resolved: 2019-11-11

## 2019-11-11 PROBLEM — E89.40 SURGICAL MENOPAUSE: Status: RESOLVED | Noted: 2017-09-14 | Resolved: 2019-11-11

## 2019-11-11 PROCEDURE — 96372 PR INJECTION,THERAP/PROPH/DIAG2ST, IM OR SUBCUT: ICD-10-PCS | Mod: S$GLB,,, | Performed by: REGISTERED NURSE

## 2019-11-11 PROCEDURE — 99999 PR PBB SHADOW E&M-EST. PATIENT-LVL IV: CPT | Mod: PBBFAC,,, | Performed by: REGISTERED NURSE

## 2019-11-11 PROCEDURE — 96372 THER/PROPH/DIAG INJ SC/IM: CPT | Mod: S$GLB,,, | Performed by: REGISTERED NURSE

## 2019-11-11 PROCEDURE — 3008F PR BODY MASS INDEX (BMI) DOCUMENTED: ICD-10-PCS | Mod: CPTII,S$GLB,, | Performed by: REGISTERED NURSE

## 2019-11-11 PROCEDURE — 3008F BODY MASS INDEX DOCD: CPT | Mod: CPTII,S$GLB,, | Performed by: REGISTERED NURSE

## 2019-11-11 PROCEDURE — 99214 PR OFFICE/OUTPT VISIT, EST, LEVL IV, 30-39 MIN: ICD-10-PCS | Mod: 25,S$GLB,, | Performed by: REGISTERED NURSE

## 2019-11-11 PROCEDURE — 99999 PR PBB SHADOW E&M-EST. PATIENT-LVL IV: ICD-10-PCS | Mod: PBBFAC,,, | Performed by: REGISTERED NURSE

## 2019-11-11 PROCEDURE — 99214 OFFICE O/P EST MOD 30 MIN: CPT | Mod: 25,S$GLB,, | Performed by: REGISTERED NURSE

## 2019-11-11 RX ORDER — PROMETHAZINE HYDROCHLORIDE AND DEXTROMETHORPHAN HYDROBROMIDE 6.25; 15 MG/5ML; MG/5ML
5 SYRUP ORAL NIGHTLY PRN
Qty: 35 ML | Refills: 0 | Status: SHIPPED | OUTPATIENT
Start: 2019-11-11 | End: 2020-04-17

## 2019-11-11 RX ORDER — BETAMETHASONE SODIUM PHOSPHATE AND BETAMETHASONE ACETATE 3; 3 MG/ML; MG/ML
12 INJECTION, SUSPENSION INTRA-ARTICULAR; INTRALESIONAL; INTRAMUSCULAR; SOFT TISSUE
Status: COMPLETED | OUTPATIENT
Start: 2019-11-11 | End: 2019-11-11

## 2019-11-11 RX ORDER — LEVOCETIRIZINE DIHYDROCHLORIDE 5 MG/1
5 TABLET, FILM COATED ORAL DAILY
Qty: 90 TABLET | Refills: 0 | Status: SHIPPED | OUTPATIENT
Start: 2019-11-11 | End: 2020-04-17

## 2019-11-11 RX ORDER — BENZONATATE 100 MG/1
100-200 CAPSULE ORAL 3 TIMES DAILY PRN
Qty: 60 CAPSULE | Refills: 0 | Status: SHIPPED | OUTPATIENT
Start: 2019-11-11 | End: 2020-04-17

## 2019-11-11 RX ADMIN — BETAMETHASONE SODIUM PHOSPHATE AND BETAMETHASONE ACETATE 12 MG: 3; 3 INJECTION, SUSPENSION INTRA-ARTICULAR; INTRALESIONAL; INTRAMUSCULAR; SOFT TISSUE at 03:11

## 2019-11-11 NOTE — PROGRESS NOTES
Subjective:       Patient ID: Chan Dwyer is a 48 y.o. female.    Chief Complaint   Patient presents with    Cough       Cough   This is a new problem. The current episode started in the past 7 days. The problem has been gradually worsening. The cough is non-productive. Associated symptoms include chest pain, nasal congestion, postnasal drip, rhinorrhea, a sore throat and wheezing. Pertinent negatives include no chills, ear congestion, ear pain, fever, headaches, hemoptysis, shortness of breath or sweats. The symptoms are aggravated by lying down. She has tried OTC cough suppressant for the symptoms. The treatment provided no relief. Her past medical history is significant for environmental allergies. There is no history of COPD or emphysema.         Review of Systems   Constitutional: Negative for chills and fever.   HENT: Positive for congestion, postnasal drip, rhinorrhea, sneezing and sore throat. Negative for ear pain and sinus pressure.    Eyes: Negative.    Respiratory: Positive for cough and wheezing. Negative for hemoptysis and shortness of breath.    Cardiovascular: Positive for chest pain.   Allergic/Immunologic: Positive for environmental allergies.   Neurological: Negative.  Negative for headaches.         Review of patient's allergies indicates:   Allergen Reactions    Hydrocodone Hives and Rash         Medication List with Changes/Refills   Current Medications    BLOOD SUGAR DIAGNOSTIC STRP    1 strip by Misc.(Non-Drug; Combo Route) route 2 (two) times daily.    BLOOD-GLUCOSE METER (FREESTYLE SYSTEM KIT) KIT    Use as instructed    IBUPROFEN (ADVIL,MOTRIN) 800 MG TABLET    Take 1 tablet (800 mg total) by mouth every meal as needed for Pain.    LANCETS MISC    1 lancet by Misc.(Non-Drug; Combo Route) route 3 (three) times daily.    METFORMIN (GLUCOPHAGE) 500 MG TABLET    TAKE one tablet BY MOUTH in the morning and ONE TABLET BY MOUTH in the evening with meals    VITAMIN D 1000 UNITS TAB   "  Take 1,000 Units by mouth once daily.       Patient Active Problem List   Diagnosis    GERD (gastroesophageal reflux disease)    Arthritis, lumbar spine    Diabetes mellitus type 2, controlled, without complications    Vitamin D deficiency    [place] nodes    Chronic daily headache    Menopause syndrome    Bilateral carpal tunnel syndrome         Past medical, surgical, family and social histories have been reviewed today.        Objective:     Vitals:    11/11/19 1433   BP: 110/70   Pulse: 98   Temp: 98.4 °F (36.9 °C)   Weight: 59.9 kg (132 lb 0.9 oz)   Height: 5' 4" (1.626 m)   PainSc:   7   PainLoc: Chest         Physical Exam   Constitutional: She is oriented to person, place, and time. She appears well-developed and well-nourished.   HENT:   Head: Normocephalic and atraumatic.   Right Ear: Tympanic membrane normal.   Left Ear: Tympanic membrane normal.   Nose: Mucosal edema and rhinorrhea (boggy/clear RN) present. Right sinus exhibits no maxillary sinus tenderness and no frontal sinus tenderness. Left sinus exhibits no maxillary sinus tenderness and no frontal sinus tenderness.   Mouth/Throat: Mucous membranes are normal. No oropharyngeal exudate, posterior oropharyngeal edema or posterior oropharyngeal erythema (increased clear PND noted).   Eyes: Pupils are equal, round, and reactive to light. Conjunctivae are normal. Right eye exhibits no discharge. Left eye exhibits no discharge.   Cardiovascular: Normal rate and regular rhythm.   Pulmonary/Chest: Effort normal and breath sounds normal. She exhibits tenderness. She exhibits no crepitus, no edema, no swelling and no retraction.       Musculoskeletal: Normal range of motion. She exhibits no edema.   Lymphadenopathy:     She has no cervical adenopathy.   Neurological: She is alert and oriented to person, place, and time.   Skin: Skin is warm and dry. Capillary refill takes less than 2 seconds. No rash noted.   Psychiatric: She has a normal mood and " affect. Her behavior is normal. Judgment and thought content normal.   Vitals reviewed.        Diagnosis       1. Allergic rhinitis, unspecified seasonality, unspecified trigger    2. Musculoskeletal chest pain    3. Cough          Assessment/ Plan     Allergic rhinitis, unspecified seasonality, unspecified trigger  -     betamethasone acetate-betamethasone sodium phosphate injection 12 mg  -     levocetirizine (XYZAL) 5 MG tablet; Take 1 tablet (5 mg total) by mouth once daily.  Dispense: 90 tablet; Refill: 0    Musculoskeletal chest pain  -     betamethasone acetate-betamethasone sodium phosphate injection 12 mg    Cough  -     betamethasone acetate-betamethasone sodium phosphate injection 12 mg  -     promethazine-dextromethorphan (PROMETHAZINE-DM) 6.25-15 mg/5 mL Syrp; Take 5 mLs by mouth nightly as needed.  Dispense: 35 mL; Refill: 0  -     benzonatate (TESSALON) 100 MG capsule; Take 1-2 capsules (100-200 mg total) by mouth 3 (three) times daily as needed for Cough.  Dispense: 60 capsule; Refill: 0        Injection today.  Medication discussed, as directed.  Symptomatic care, rest and fluids.  Follow-up in clinic as needed.        Future Appointments   Date Time Provider Department Center   1/6/2020  8:30 AM Aziza Manzo MD [facility]       Patient Care Team:  Aziza Manzo MD as PCP - General (Family Medicine)  Nicholas Adkins LPN as Care Coordinator (Internal Medicine)      ANITA Sawant  [facility]

## 2019-12-12 DIAGNOSIS — Z12.39 BREAST CANCER SCREENING: Primary | ICD-10-CM

## 2020-01-28 ENCOUNTER — HOSPITAL ENCOUNTER (OUTPATIENT)
Dept: RADIOLOGY | Facility: HOSPITAL | Age: 50
Discharge: HOME | End: 2020-01-28
Attending: NURSE PRACTITIONER
Payer: COMMERCIAL

## 2020-01-28 ENCOUNTER — OFFICE VISIT (OUTPATIENT)
Dept: HEMATOLOGY/ONCOLOGY | Facility: CLINIC | Age: 50
End: 2020-01-28
Payer: COMMERCIAL

## 2020-01-28 VITALS
WEIGHT: 132.06 LBS | BODY MASS INDEX: 22.55 KG/M2 | TEMPERATURE: 98 F | SYSTOLIC BLOOD PRESSURE: 106 MMHG | HEIGHT: 64 IN | BODY MASS INDEX: 22.44 KG/M2 | WEIGHT: 130.75 LBS | HEART RATE: 84 BPM | DIASTOLIC BLOOD PRESSURE: 59 MMHG

## 2020-01-28 DIAGNOSIS — Z12.39 BREAST CANCER SCREENING: ICD-10-CM

## 2020-01-28 DIAGNOSIS — Z12.39 BREAST CANCER SCREENING: Primary | ICD-10-CM

## 2020-01-28 PROCEDURE — 99214 PR OFFICE/OUTPT VISIT, EST, LEVL IV, 30-39 MIN: ICD-10-PCS | Mod: S$GLB,,, | Performed by: NURSE PRACTITIONER

## 2020-01-28 PROCEDURE — 3008F BODY MASS INDEX DOCD: CPT | Mod: CPTII,S$GLB,, | Performed by: NURSE PRACTITIONER

## 2020-01-28 PROCEDURE — 77063 MAMMO DIGITAL SCREENING BILAT WITH TOMOSYNTHESIS_CAD: ICD-10-PCS | Mod: 26,,, | Performed by: RADIOLOGY

## 2020-01-28 PROCEDURE — 77067 MAMMO DIGITAL SCREENING BILAT WITH TOMOSYNTHESIS_CAD: ICD-10-PCS | Mod: 26,,, | Performed by: RADIOLOGY

## 2020-01-28 PROCEDURE — 77067 SCR MAMMO BI INCL CAD: CPT | Mod: TC

## 2020-01-28 PROCEDURE — 3008F PR BODY MASS INDEX (BMI) DOCUMENTED: ICD-10-PCS | Mod: CPTII,S$GLB,, | Performed by: NURSE PRACTITIONER

## 2020-01-28 PROCEDURE — 77063 BREAST TOMOSYNTHESIS BI: CPT | Mod: 26,,, | Performed by: RADIOLOGY

## 2020-01-28 PROCEDURE — 99999 PR PBB SHADOW E&M-EST. PATIENT-LVL III: CPT | Mod: PBBFAC,,, | Performed by: NURSE PRACTITIONER

## 2020-01-28 PROCEDURE — 99214 OFFICE O/P EST MOD 30 MIN: CPT | Mod: S$GLB,,, | Performed by: NURSE PRACTITIONER

## 2020-01-28 PROCEDURE — 99999 PR PBB SHADOW E&M-EST. PATIENT-LVL III: ICD-10-PCS | Mod: PBBFAC,,, | Performed by: NURSE PRACTITIONER

## 2020-01-28 PROCEDURE — 77067 SCR MAMMO BI INCL CAD: CPT | Mod: 26,,, | Performed by: RADIOLOGY

## 2020-01-28 NOTE — PATIENT INSTRUCTIONS
If mammogram wnl today - pt may follow-up with her primary care provider for annual mammograms and exams.

## 2020-03-03 ENCOUNTER — PATIENT OUTREACH (OUTPATIENT)
Dept: ADMINISTRATIVE | Facility: HOSPITAL | Age: 50
End: 2020-03-03

## 2020-03-03 NOTE — PROGRESS NOTES
Pt Refused Statin Therapy at this time       Naomy HINOJOSA LPN Care Coordinator  Care Coordination Department  [facility]  [phone]

## 2020-03-10 ENCOUNTER — PATIENT OUTREACH (OUTPATIENT)
Dept: ADMINISTRATIVE | Facility: HOSPITAL | Age: 50
End: 2020-03-10

## 2020-03-10 NOTE — PROGRESS NOTES
PreVisit Chart Audit Perfomed         Naomy HINOJOSA LPN Care Coordinator  Care Coordination Department  [facility]  [phone]

## 2020-04-17 ENCOUNTER — OFFICE VISIT (OUTPATIENT)
Dept: INTERNAL MEDICINE | Facility: CLINIC | Age: 50
End: 2020-04-17
Payer: COMMERCIAL

## 2020-04-17 DIAGNOSIS — R51.9 CHRONIC NONINTRACTABLE HEADACHE, UNSPECIFIED HEADACHE TYPE: Primary | ICD-10-CM

## 2020-04-17 DIAGNOSIS — G89.29 CHRONIC NONINTRACTABLE HEADACHE, UNSPECIFIED HEADACHE TYPE: Primary | ICD-10-CM

## 2020-04-17 PROCEDURE — 99213 PR OFFICE/OUTPT VISIT, EST, LEVL III, 20-29 MIN: ICD-10-PCS | Mod: 95,,, | Performed by: FAMILY MEDICINE

## 2020-04-17 PROCEDURE — 99213 OFFICE O/P EST LOW 20 MIN: CPT | Mod: 95,,, | Performed by: FAMILY MEDICINE

## 2020-04-17 RX ORDER — CYCLOBENZAPRINE HCL 10 MG
10 TABLET ORAL 3 TIMES DAILY PRN
Qty: 30 TABLET | Refills: 0 | Status: SHIPPED | OUTPATIENT
Start: 2020-04-17 | End: 2020-06-19

## 2020-04-17 RX ORDER — BUTALBITAL, ACETAMINOPHEN AND CAFFEINE 50; 325; 40 MG/1; MG/1; MG/1
1 TABLET ORAL 3 TIMES DAILY PRN
Qty: 30 TABLET | Refills: 0 | Status: SHIPPED | OUTPATIENT
Start: 2020-04-17 | End: 2020-05-17

## 2020-04-17 NOTE — PROGRESS NOTES
The patient location is: home  The chief complaint leading to consultation is: headaches  Visit type: audiovisual  Total time spent with patient: 10 minutes  Each patient to whom he or she provides medical services by telemedicine is:  (1) informed of the relationship between the physician and patient and the respective role of any other health care provider with respect to management of the patient; and (2) notified that he or she may decline to receive medical services by telemedicine and may withdraw from such care at any time.    Notes:       Subjective:      Patient ID: Chan Dwyer is a 49 y.o. female.    Chief Complaint: Headache      Patient reports worsening headaches.  Reports she has had similar headaches years ago but for the past month they have been very bad.  Reports they often start on top of her head and will radiate down into the back of her head or the frontal area.  She reports the often wake her up during the night from sleeping, sometimes they happen during the day but have been waking her up every night for the past month.  The only medication she has tried for this is ibuprofen 800 mg, but this is no longer working for her at all.  She denies any associated symptoms such as visual changes, dizziness, paresthesias.    Review of Systems   Eyes: Negative for visual disturbance.   Neurological: Positive for headaches. Negative for dizziness, speech difficulty, weakness and light-headedness.     Past Medical History:   Diagnosis Date    Abnormal Pap smear     Abnormal Pap smear of cervix     Arthritis, low back     Diabetes 9/2014     BS 162am 09/16/2014    DM (diabetes mellitus) 09/2014     am 01/26/2016    DM (diabetes mellitus) 09/2014    BS didn't check 01/31/2017    GERD (gastroesophageal reflux disease)     Gestational diabetes     History of abnormal Pap smear     Surgical menopause     Vitamin D deficiency           Past Surgical History:   Procedure Laterality  Date    COLONOSCOPY N/A 6/22/2016    Procedure: COLONOSCOPY;  Surgeon: Vishal Mary III, MD;  Location: [facility] ENDO;  Service: Endoscopy;  Laterality: N/A;    Cryotherapy of the cervix  1999    DILATION AND CURETTAGE OF UTERUS      HYSTERECTOMY      [facility]  2/2012    TUBAL LIGATION       Family History   Problem Relation Age of Onset    Hypertension Mother     Diabetes Mother     Heart attack Mother     Diabetes Father     Hypertension Father     No Known Problems Son     No Known Problems Son     Cancer Neg Hx     Stroke Neg Hx     Breast cancer Neg Hx     Colon cancer Neg Hx     Ovarian cancer Neg Hx      Social History     Socioeconomic History    Marital status: Single     Spouse name: Not on file    Number of children: 2    Years of education: Not on file    Highest education level: Not on file   Occupational History    Occupation:      Employer: [employer]      Employer: [employer]   Social Needs    Financial resource strain: Not hard at all    Food insecurity:     Worry: Never true     Inability: Never true    Transportation needs:     Medical: No     Non-medical: No   Tobacco Use    Smoking status: Never Smoker    Smokeless tobacco: Never Used   Substance and Sexual Activity    Alcohol use: No     Alcohol/week: 0.0 standard drinks    Drug use: No    Sexual activity: Yes     Partners: Male     Birth control/protection: Surgical   Lifestyle    Physical activity:     Days per week: 3 days     Minutes per session: 30 min    Stress: Not at all   Relationships    Social connections:     Talks on phone: More than three times a week     Gets together: Once a week     Attends [employer] service: More than 4 times per year     Active member of club or organization: No     Attends meetings of clubs or organizations: Never     Relationship status:    Other Topics Concern    Not on file   Social History Narrative    She wears seatbelt.     Review of  patient's allergies indicates:   Allergen Reactions    Hydrocodone Hives and Rash       Objective:       There were no vitals taken for this visit.  Physical Exam   Constitutional: She is oriented to person, place, and time. She appears well-developed and well-nourished. No distress.   Neurological: She is alert and oriented to person, place, and time.   Skin: She is not diaphoretic.   Psychiatric: She has a normal mood and affect. Her behavior is normal. Judgment and thought content normal.     Assessment:     1. Chronic nonintractable headache, unspecified headache type      Plan:   Chronic nonintractable headache, unspecified headache type    Other orders  -     butalbital-acetaminophen-caffeine -40 mg (FIORICET, ESGIC) -40 mg per tablet; Take 1 tablet by mouth 3 (three) times daily as needed for Pain or Headaches.  Dispense: 30 tablet; Refill: 0  -     cyclobenzaprine (FLEXERIL) 10 MG tablet; Take 1 tablet (10 mg total) by mouth 3 (three) times daily as needed (headaches).  Dispense: 30 tablet; Refill: 0     She will update me via my chart message in several weeks to let me know if the medications are helping.  May consider C-spine x-ray in future when COVID restrictions are lifted  Medication List with Changes/Refills   New Medications    BUTALBITAL-ACETAMINOPHEN-CAFFEINE -40 MG (FIORICET, ESGIC) -40 MG PER TABLET    Take 1 tablet by mouth 3 (three) times daily as needed for Pain or Headaches.    CYCLOBENZAPRINE (FLEXERIL) 10 MG TABLET    Take 1 tablet (10 mg total) by mouth 3 (three) times daily as needed (headaches).   Current Medications    BLOOD SUGAR DIAGNOSTIC STRP    1 strip by Misc.(Non-Drug; Combo Route) route 2 (two) times daily.    BLOOD-GLUCOSE METER (FREESTYLE SYSTEM KIT) KIT    Use as instructed    IBUPROFEN (ADVIL,MOTRIN) 800 MG TABLET    Take 1 tablet (800 mg total) by mouth every meal as needed for Pain.    LANCETS MISC    1 lancet by Misc.(Non-Drug; Combo Route) route  3 (three) times daily.    METFORMIN (GLUCOPHAGE) 500 MG TABLET    TAKE one tablet BY MOUTH in the morning and ONE TABLET BY MOUTH in the evening with meals    VITAMIN D 1000 UNITS TAB    Take 1,000 Units by mouth once daily.   Discontinued Medications    BENZONATATE (TESSALON) 100 MG CAPSULE    Take 1-2 capsules (100-200 mg total) by mouth 3 (three) times daily as needed for Cough.    LEVOCETIRIZINE (XYZAL) 5 MG TABLET    Take 1 tablet (5 mg total) by mouth once daily.    PROMETHAZINE-DEXTROMETHORPHAN (PROMETHAZINE-DM) 6.25-15 MG/5 ML SYRP    Take 5 mLs by mouth nightly as needed.

## 2020-06-19 ENCOUNTER — LAB VISIT (OUTPATIENT)
Dept: LAB | Facility: HOSPITAL | Age: 50
End: 2020-06-19
Attending: FAMILY MEDICINE
Payer: COMMERCIAL

## 2020-06-19 ENCOUNTER — OFFICE VISIT (OUTPATIENT)
Dept: INTERNAL MEDICINE | Facility: CLINIC | Age: 50
End: 2020-06-19
Payer: COMMERCIAL

## 2020-06-19 VITALS
OXYGEN SATURATION: 98 % | HEART RATE: 68 BPM | SYSTOLIC BLOOD PRESSURE: 122 MMHG | HEIGHT: 64 IN | BODY MASS INDEX: 21.46 KG/M2 | DIASTOLIC BLOOD PRESSURE: 74 MMHG | WEIGHT: 125.69 LBS

## 2020-06-19 DIAGNOSIS — E55.9 VITAMIN D DEFICIENCY: ICD-10-CM

## 2020-06-19 DIAGNOSIS — E11.65 UNCONTROLLED TYPE 2 DIABETES MELLITUS WITH HYPERGLYCEMIA, WITHOUT LONG-TERM CURRENT USE OF INSULIN: ICD-10-CM

## 2020-06-19 DIAGNOSIS — E11.65 UNCONTROLLED TYPE 2 DIABETES MELLITUS WITH HYPERGLYCEMIA, WITHOUT LONG-TERM CURRENT USE OF INSULIN: Primary | ICD-10-CM

## 2020-06-19 LAB
25(OH)D3+25(OH)D2 SERPL-MCNC: 32 NG/ML (ref 30–96)
ALBUMIN SERPL BCP-MCNC: 3.6 G/DL (ref 3.5–5.2)
ALP SERPL-CCNC: 91 U/L (ref 55–135)
ALT SERPL W/O P-5'-P-CCNC: 16 U/L (ref 10–44)
ANION GAP SERPL CALC-SCNC: 10 MMOL/L (ref 8–16)
AST SERPL-CCNC: 16 U/L (ref 10–40)
BASOPHILS # BLD AUTO: 0.02 K/UL (ref 0–0.2)
BASOPHILS NFR BLD: 0.4 % (ref 0–1.9)
BILIRUB SERPL-MCNC: 0.6 MG/DL (ref 0.1–1)
BUN SERPL-MCNC: 10 MG/DL (ref 6–20)
CALCIUM SERPL-MCNC: 9.5 MG/DL (ref 8.7–10.5)
CHLORIDE SERPL-SCNC: 104 MMOL/L (ref 95–110)
CHOLEST SERPL-MCNC: 144 MG/DL (ref 120–199)
CHOLEST/HDLC SERPL: 2.7 {RATIO} (ref 2–5)
CO2 SERPL-SCNC: 27 MMOL/L (ref 23–29)
CREAT SERPL-MCNC: 0.8 MG/DL (ref 0.5–1.4)
DIFFERENTIAL METHOD: ABNORMAL
EOSINOPHIL # BLD AUTO: 0.1 K/UL (ref 0–0.5)
EOSINOPHIL NFR BLD: 1.9 % (ref 0–8)
ERYTHROCYTE [DISTWIDTH] IN BLOOD BY AUTOMATED COUNT: 11.4 % (ref 11.5–14.5)
EST. GFR  (AFRICAN AMERICAN): >60 ML/MIN/1.73 M^2
EST. GFR  (NON AFRICAN AMERICAN): >60 ML/MIN/1.73 M^2
GLUCOSE SERPL-MCNC: 286 MG/DL (ref 70–110)
HCT VFR BLD AUTO: 40.8 % (ref 37–48.5)
HDLC SERPL-MCNC: 54 MG/DL (ref 40–75)
HDLC SERPL: 37.5 % (ref 20–50)
HGB BLD-MCNC: 12.6 G/DL (ref 12–16)
IMM GRANULOCYTES # BLD AUTO: 0.02 K/UL (ref 0–0.04)
IMM GRANULOCYTES NFR BLD AUTO: 0.4 % (ref 0–0.5)
LDLC SERPL CALC-MCNC: 75 MG/DL (ref 63–159)
LYMPHOCYTES # BLD AUTO: 2.3 K/UL (ref 1–4.8)
LYMPHOCYTES NFR BLD: 40.9 % (ref 18–48)
MCH RBC QN AUTO: 28.8 PG (ref 27–31)
MCHC RBC AUTO-ENTMCNC: 30.9 G/DL (ref 32–36)
MCV RBC AUTO: 93 FL (ref 82–98)
MONOCYTES # BLD AUTO: 0.3 K/UL (ref 0.3–1)
MONOCYTES NFR BLD: 6 % (ref 4–15)
NEUTROPHILS # BLD AUTO: 2.9 K/UL (ref 1.8–7.7)
NEUTROPHILS NFR BLD: 50.4 % (ref 38–73)
NONHDLC SERPL-MCNC: 90 MG/DL
NRBC BLD-RTO: 0 /100 WBC
PLATELET # BLD AUTO: 209 K/UL (ref 150–350)
PMV BLD AUTO: 11.9 FL (ref 9.2–12.9)
POTASSIUM SERPL-SCNC: 3.8 MMOL/L (ref 3.5–5.1)
PROT SERPL-MCNC: 7 G/DL (ref 6–8.4)
RBC # BLD AUTO: 4.38 M/UL (ref 4–5.4)
SODIUM SERPL-SCNC: 141 MMOL/L (ref 136–145)
TRIGL SERPL-MCNC: 75 MG/DL (ref 30–150)
TSH SERPL DL<=0.005 MIU/L-ACNC: 1.06 UIU/ML (ref 0.4–4)
WBC # BLD AUTO: 5.65 K/UL (ref 3.9–12.7)

## 2020-06-19 PROCEDURE — 99999 PR PBB SHADOW E&M-EST. PATIENT-LVL III: CPT | Mod: PBBFAC,,, | Performed by: FAMILY MEDICINE

## 2020-06-19 PROCEDURE — 80061 LIPID PANEL: CPT

## 2020-06-19 PROCEDURE — 3008F PR BODY MASS INDEX (BMI) DOCUMENTED: ICD-10-PCS | Mod: CPTII,S$GLB,, | Performed by: FAMILY MEDICINE

## 2020-06-19 PROCEDURE — 36415 COLL VENOUS BLD VENIPUNCTURE: CPT | Mod: PO

## 2020-06-19 PROCEDURE — 80053 COMPREHEN METABOLIC PANEL: CPT

## 2020-06-19 PROCEDURE — 85025 COMPLETE CBC W/AUTO DIFF WBC: CPT

## 2020-06-19 PROCEDURE — 3008F BODY MASS INDEX DOCD: CPT | Mod: CPTII,S$GLB,, | Performed by: FAMILY MEDICINE

## 2020-06-19 PROCEDURE — 99999 PR PBB SHADOW E&M-EST. PATIENT-LVL III: ICD-10-PCS | Mod: PBBFAC,,, | Performed by: FAMILY MEDICINE

## 2020-06-19 PROCEDURE — 3046F HEMOGLOBIN A1C LEVEL >9.0%: CPT | Mod: CPTII,S$GLB,, | Performed by: FAMILY MEDICINE

## 2020-06-19 PROCEDURE — 99213 PR OFFICE/OUTPT VISIT, EST, LEVL III, 20-29 MIN: ICD-10-PCS | Mod: S$GLB,,, | Performed by: FAMILY MEDICINE

## 2020-06-19 PROCEDURE — 3046F PR MOST RECENT HEMOGLOBIN A1C LEVEL > 9.0%: ICD-10-PCS | Mod: CPTII,S$GLB,, | Performed by: FAMILY MEDICINE

## 2020-06-19 PROCEDURE — 83036 HEMOGLOBIN GLYCOSYLATED A1C: CPT

## 2020-06-19 PROCEDURE — 82306 VITAMIN D 25 HYDROXY: CPT

## 2020-06-19 PROCEDURE — 84443 ASSAY THYROID STIM HORMONE: CPT

## 2020-06-19 PROCEDURE — 99213 OFFICE O/P EST LOW 20 MIN: CPT | Mod: S$GLB,,, | Performed by: FAMILY MEDICINE

## 2020-06-19 NOTE — PROGRESS NOTES
Subjective:      Patient ID: Chan Dwyer is a 49 y.o. female.    Chief Complaint: Annual Exam      Patient here today for routine annual exam.  She is due for routine labs as well.  She reports taking metformin only at night, if she takes it in mornings has severe nausea and diarrhea.  She has never been on any medication for diabetes, she has not been checking her sugars regularly at home.  She does endorse about 10 lb unintended weight loss recently, increased thirst, increased urination.   she has no acute complaints today    Review of Systems   Constitutional: Positive for unexpected weight change. Negative for activity change, appetite change and fatigue.   Respiratory: Negative for shortness of breath.    Cardiovascular: Negative for leg swelling.   Gastrointestinal: Negative for abdominal pain, constipation, diarrhea and vomiting.   Endocrine: Positive for polydipsia, polyphagia and polyuria.   Neurological: Negative for headaches.     Past Medical History:   Diagnosis Date    Abnormal Pap smear     Abnormal Pap smear of cervix     Arthritis, low back     Diabetes 9/2014     BS 162am 09/16/2014    DM (diabetes mellitus) 09/2014     am 01/26/2016    DM (diabetes mellitus) 09/2014    BS didn't check 01/31/2017    GERD (gastroesophageal reflux disease)     Gestational diabetes     History of abnormal Pap smear     Surgical menopause     Vitamin D deficiency           Past Surgical History:   Procedure Laterality Date    COLONOSCOPY N/A 6/22/2016    Procedure: COLONOSCOPY;  Surgeon: Vishal Mary III, MD;  Location: [facility];  Service: Endoscopy;  Laterality: N/A;    Cryotherapy of the cervix  1999    DILATION AND CURETTAGE OF UTERUS      HYSTERECTOMY      [facility]  2/2012    TUBAL LIGATION       Family History   Problem Relation Age of Onset    Hypertension Mother     Diabetes Mother     Heart attack Mother     Diabetes Father     Hypertension Father     No Known  "Problems Son     No Known Problems Son     Cancer Neg Hx     Stroke Neg Hx     Breast cancer Neg Hx     Colon cancer Neg Hx     Ovarian cancer Neg Hx      Social History     Socioeconomic History    Marital status: Single     Spouse name: Not on file    Number of children: 2    Years of education: Not on file    Highest education level: Not on file   Occupational History    Occupation:      Employer: [employer]     Employer: [employer]   Social Needs    Financial resource strain: Not hard at all    Food insecurity     Worry: Never true     Inability: Never true    Transportation needs     Medical: No     Non-medical: No   Tobacco Use    Smoking status: Never Smoker    Smokeless tobacco: Never Used   Substance and Sexual Activity    Alcohol use: No     Alcohol/week: 0.0 standard drinks    Drug use: No    Sexual activity: Yes     Partners: Male     Birth control/protection: Surgical   Lifestyle    Physical activity     Days per week: 3 days     Minutes per session: 30 min    Stress: Not at all   Relationships    Social connections     Talks on phone: More than three times a week     Gets together: Once a week     Attends Christianity service: More than 4 times per year     Active member of club or organization: No     Attends meetings of clubs or organizations: Never     Relationship status:    Other Topics Concern    Not on file   Social History Narrative    She wears seatbelt.     Review of patient's allergies indicates:   Allergen Reactions    Hydrocodone Hives and Rash       Objective:       /74 (BP Location: Right arm)   Pulse 68   Ht 5' 4" (1.626 m)   Wt 57 kg (125 lb 10.6 oz)   SpO2 98%   BMI 21.57 kg/m²   Physical Exam  Vitals signs reviewed.   Constitutional:       General: She is not in acute distress.     Appearance: Normal appearance. She is well-developed. She is not ill-appearing or diaphoretic.   HENT:      Head: Normocephalic and " atraumatic.      Right Ear: Hearing, tympanic membrane, ear canal and external ear normal.      Left Ear: Hearing, tympanic membrane, ear canal and external ear normal.      Nose: Nose normal.      Mouth/Throat:      Pharynx: Uvula midline. No oropharyngeal exudate.   Eyes:      Conjunctiva/sclera: Conjunctivae normal.      Pupils: Pupils are equal, round, and reactive to light.   Neck:      Musculoskeletal: Normal range of motion and neck supple.      Thyroid: No thyromegaly.      Trachea: No tracheal deviation.   Cardiovascular:      Rate and Rhythm: Normal rate and regular rhythm.      Heart sounds: Normal heart sounds. No murmur.   Pulmonary:      Effort: Pulmonary effort is normal. No respiratory distress.      Breath sounds: Normal breath sounds.   Abdominal:      General: Bowel sounds are normal.      Palpations: Abdomen is soft.      Tenderness: There is no abdominal tenderness. There is no guarding.      Hernia: No hernia is present.   Musculoskeletal: Normal range of motion.   Lymphadenopathy:      Cervical: No cervical adenopathy.   Skin:     General: Skin is warm and dry.      Capillary Refill: Capillary refill takes less than 2 seconds.   Neurological:      Mental Status: She is alert and oriented to person, place, and time.   Psychiatric:         Mood and Affect: Mood normal.         Behavior: Behavior normal.         Thought Content: Thought content normal.         Judgment: Judgment normal.       Assessment:     1. Uncontrolled type 2 diabetes mellitus with hyperglycemia, without long-term current use of insulin    2. Vitamin D deficiency      Plan:   Uncontrolled type 2 diabetes mellitus with hyperglycemia, without long-term current use of insulin  -     Comprehensive metabolic panel; Future; Expected date: 06/19/2020  -     Hemoglobin A1C; Future; Expected date: 06/19/2020  -     CBC auto differential; Future; Expected date: 06/19/2020  -     Lipid Panel; Future; Expected date: 06/19/2020  -      Protein / creatinine ratio, urine; Future  -     TSH; Future; Expected date: 06/19/2020    Vitamin D deficiency  -     Vitamin D; Future; Expected date: 06/19/2020    continue current medications. Suspect will need to add different diabetic med when get lab results.  Medication List with Changes/Refills   Current Medications    BLOOD SUGAR DIAGNOSTIC STRP    1 strip by Misc.(Non-Drug; Combo Route) route 2 (two) times daily.    BLOOD-GLUCOSE METER (FREESTYLE SYSTEM KIT) KIT    Use as instructed    IBUPROFEN (ADVIL,MOTRIN) 800 MG TABLET    Take 1 tablet (800 mg total) by mouth every meal as needed for Pain.    LANCETS MISC    1 lancet by Misc.(Non-Drug; Combo Route) route 3 (three) times daily.    METFORMIN (GLUCOPHAGE) 500 MG TABLET    TAKE one tablet BY MOUTH in the morning and ONE TABLET BY MOUTH in the evening with meals    VITAMIN D 1000 UNITS TAB    Take 1,000 Units by mouth once daily.   Discontinued Medications    CYCLOBENZAPRINE (FLEXERIL) 10 MG TABLET    Take 1 tablet (10 mg total) by mouth 3 (three) times daily as needed (headaches).

## 2020-06-20 LAB
ESTIMATED AVG GLUCOSE: 263 MG/DL (ref 68–131)
HBA1C MFR BLD HPLC: 10.8 % (ref 4–5.6)

## 2020-06-21 DIAGNOSIS — E11.65 UNCONTROLLED TYPE 2 DIABETES MELLITUS WITH HYPERGLYCEMIA, WITHOUT LONG-TERM CURRENT USE OF INSULIN: Primary | ICD-10-CM

## 2020-06-21 RX ORDER — METFORMIN HYDROCHLORIDE 500 MG/1
500 TABLET ORAL NIGHTLY
Qty: 90 TABLET | Refills: 0
Start: 2020-06-21 | End: 2020-10-19 | Stop reason: SDUPTHER

## 2020-06-24 ENCOUNTER — TELEPHONE (OUTPATIENT)
Dept: INTERNAL MEDICINE | Facility: CLINIC | Age: 50
End: 2020-06-24

## 2020-06-24 NOTE — TELEPHONE ENCOUNTER
----- Message from Tristian Bearden MD sent at 6/23/2020  4:55 PM CDT -----  Please notify the hot flashes are probably because of menopause. I would recommend she try black cohosh supplement over the counter. Let me know next time she comes in how it is working for her.   ----- Message -----  From: Mayte Arriola MA  Sent: 6/22/2020   5:39 PM CDT  To: Tristian Bearden MD    I informed patient of results. She will take Januvia with Metformin. She will also repeat in 3 months. She said she forgot to mention she's been experiencing a lot of hot flashes. She wanted to know if that had something to do with her diabetes.

## 2020-07-14 ENCOUNTER — PATIENT OUTREACH (OUTPATIENT)
Dept: ADMINISTRATIVE | Facility: OTHER | Age: 50
End: 2020-07-14

## 2020-07-14 NOTE — PROGRESS NOTES
Patient's chart was reviewed for overdue TYRON topics.  Immunizations not reconciled due to LINKS not refreshing.  Eye exam scheduled 7/21/20.

## 2020-07-15 ENCOUNTER — OFFICE VISIT (OUTPATIENT)
Dept: OBSTETRICS AND GYNECOLOGY | Facility: CLINIC | Age: 50
End: 2020-07-15
Payer: COMMERCIAL

## 2020-07-15 VITALS
WEIGHT: 128.31 LBS | HEIGHT: 64 IN | BODY MASS INDEX: 21.91 KG/M2 | SYSTOLIC BLOOD PRESSURE: 116 MMHG | DIASTOLIC BLOOD PRESSURE: 62 MMHG

## 2020-07-15 DIAGNOSIS — Z01.419 WELL WOMAN EXAM WITH ROUTINE GYNECOLOGICAL EXAM: Primary | ICD-10-CM

## 2020-07-15 DIAGNOSIS — N95.1 MENOPAUSE SYNDROME: ICD-10-CM

## 2020-07-15 PROCEDURE — 99396 PREV VISIT EST AGE 40-64: CPT | Mod: S$GLB,,, | Performed by: OBSTETRICS & GYNECOLOGY

## 2020-07-15 PROCEDURE — 99999 PR PBB SHADOW E&M-EST. PATIENT-LVL III: CPT | Mod: PBBFAC,,, | Performed by: OBSTETRICS & GYNECOLOGY

## 2020-07-15 PROCEDURE — 99396 PR PREVENTIVE VISIT,EST,40-64: ICD-10-PCS | Mod: S$GLB,,, | Performed by: OBSTETRICS & GYNECOLOGY

## 2020-07-15 PROCEDURE — 3008F BODY MASS INDEX DOCD: CPT | Mod: CPTII,S$GLB,, | Performed by: OBSTETRICS & GYNECOLOGY

## 2020-07-15 PROCEDURE — 3008F PR BODY MASS INDEX (BMI) DOCUMENTED: ICD-10-PCS | Mod: CPTII,S$GLB,, | Performed by: OBSTETRICS & GYNECOLOGY

## 2020-07-15 PROCEDURE — 99999 PR PBB SHADOW E&M-EST. PATIENT-LVL III: ICD-10-PCS | Mod: PBBFAC,,, | Performed by: OBSTETRICS & GYNECOLOGY

## 2020-07-15 NOTE — PROGRESS NOTES
Subjective:       Patient ID: Chan Dwyer is a 49 y.o. female.    Chief Complaint:  Well Woman      History of Present Illness  HPI  Presents for well-woman exam.  Pt has hx of RATLH for benign indications.  Still has both ovaries.  Tried estradiol ERT in the past for symptomatic menopause, but stopped it due to palpitations.  States hot flushes are gradually improving, and mainly just has them at night now.  No other complaints.  Pt's  is recovering from open heart surgery.   MM20: normal; follows with Ashely Escobar for breast cysts  Colonoscopy: 2016: normal; repeat in 10 years.    GYN & OB History  No LMP recorded. Patient has had a hysterectomy.   Date of Last Pap: 10/24/2014    OB History    Para Term  AB Living   2 2 2     2   SAB TAB Ectopic Multiple Live Births                  # Outcome Date GA Lbr Rajesh/2nd Weight Sex Delivery Anes PTL Lv   2 Term      Vag-Spont      1 Term      Vag-Spont          Review of Systems  Review of Systems   Constitutional: Negative for fatigue, fever and unexpected weight change.   Gastrointestinal: Negative for abdominal pain, bloating, blood in stool, constipation, diarrhea, nausea and vomiting.   Endocrine: Positive for hot flashes (gradually improving).   Genitourinary: Negative for decreased libido, dyspareunia, dysuria, flank pain, frequency, genital sores, hematuria, pelvic pain, urgency, vaginal bleeding, vaginal discharge, vaginal pain, urinary incontinence, postcoital bleeding, postmenopausal bleeding and vaginal odor.   Integumentary:  Negative for rash, hair changes, breast mass, nipple discharge and breast skin changes.   Psychiatric/Behavioral: Negative for depression. The patient is not nervous/anxious.    Breast: Negative for mass, mastodynia, nipple discharge and skin changes          Objective:    Physical Exam:   Constitutional: She is oriented to person, place, and time. She appears well-developed and  well-nourished. No distress.             Abdominal: Soft. She exhibits no distension and no mass. There is no abdominal tenderness. There is no rebound and no guarding. Hernia confirmed negative in the right inguinal area and confirmed negative in the left inguinal area.     Genitourinary:    Vagina normal.      Pelvic exam was performed with patient supine.   There is no rash, tenderness, lesion or injury on the right labia. There is no rash, tenderness, lesion or injury on the left labia. Uterus is absent. Right adnexum displays no mass, no tenderness and no fullness. Left adnexum displays no mass, no tenderness and no fullness. No erythema, tenderness, bleeding, rectocele, cystocele or unspecified prolapse of vaginal walls in the vagina.    No foreign body in the vagina.      No signs of injury in the vagina.   Cervix exhibits absence. negative for vaginal discharge              Neurological: She is alert and oriented to person, place, and time.     Psychiatric: She has a normal mood and affect.          Assessment:        1. Well woman exam with routine gynecological exam    2. Menopause syndrome                Plan:      Chan was seen today for well woman.    Diagnoses and all orders for this visit:    Well woman exam with routine gynecological exam    Menopause syndrome    Reviewed updated recommendations for pap smears (no pap smear needed) in patients with a hysterectomy for benign indications.   Patient needs a pelvic exam every 1-2 years.  Reviewed recommendations for annual CBE and annual MMG.  RTC 1-2 years for WWE.

## 2020-07-16 ENCOUNTER — TELEPHONE (OUTPATIENT)
Dept: INTERNAL MEDICINE | Facility: CLINIC | Age: 50
End: 2020-07-16

## 2020-07-16 NOTE — TELEPHONE ENCOUNTER
----- Message from Renita Kulkarni sent at 7/16/2020  8:28 AM CDT -----  Type:  Needs Medical Advice    Who Called:  Pt  Quintessa  Symptoms (please be specific):  Pt is calling to ask if  can ok time off for her from her job because she is a high risk pt and there are many cases of coronavirus at her work place   How long has patient had these symptoms:     Pharmacy name and phone #:     Would the patient rather a call back or a response via [facility]?    Call back  Best Call Back Number:    [phone]  Additional Information:   Please call to discuss//thanks/deondre

## 2020-07-16 NOTE — TELEPHONE ENCOUNTER
Pt is calling to ask if  can ok time off for her from her job because she is a high risk pt and there are many cases of coronavirus at her work place . Works at [employer]. Is requesting a month off or whatever you recommend.

## 2020-07-21 ENCOUNTER — OFFICE VISIT (OUTPATIENT)
Dept: OPHTHALMOLOGY | Facility: CLINIC | Age: 50
End: 2020-07-21
Payer: COMMERCIAL

## 2020-07-21 DIAGNOSIS — E11.9 TYPE 2 DIABETES MELLITUS WITHOUT RETINOPATHY: Primary | ICD-10-CM

## 2020-07-21 PROCEDURE — 92014 COMPRE OPH EXAM EST PT 1/>: CPT | Mod: S$GLB,,, | Performed by: OPHTHALMOLOGY

## 2020-07-21 PROCEDURE — 99999 PR PBB SHADOW E&M-EST. PATIENT-LVL III: CPT | Mod: PBBFAC,,, | Performed by: OPHTHALMOLOGY

## 2020-07-21 PROCEDURE — 99999 PR PBB SHADOW E&M-EST. PATIENT-LVL III: ICD-10-PCS | Mod: PBBFAC,,, | Performed by: OPHTHALMOLOGY

## 2020-07-21 PROCEDURE — 92014 PR EYE EXAM, EST PATIENT,COMPREHESV: ICD-10-PCS | Mod: S$GLB,,, | Performed by: OPHTHALMOLOGY

## 2020-07-21 NOTE — PROGRESS NOTES
SUBJECTIVE  Chan Dwyer is 49 y.o. female  Uncorrected distance visual acuity was 20/40 in the right eye and 20/40 in the left eye.   Chief Complaint   Patient presents with    Diabetic Eye Exam     Diabetic Exam          HPI     Diabetic Eye Exam      Additional comments: Diabetic Exam              Comments     Patient states VA has decreased at all ranges over the last 2 months,   states blood sugar has been running really high and that her PCP is aware   and following up for medication changes.      [facility] PT.    1. +DM  2. REFRACTIVE ERROR  FBS: 190 a week a go    The patient is here for her annual DM eye exam. The patients last A1C was   10.8. The patient states her eyes are doing fine and she wade any ocular   pain at this time.  Hemoglobin A1C       Date                     Value               Ref Range             Status                06/19/2020               10.8 (H)            4.0 - 5.6 %           Final              Comment:                  Last edited by Ayaka Contreras, Patient Care Assistant on 7/21/2020  3:33   PM. (History)         Assessment /Plan :  1. Type 2 diabetes mellitus without retinopathy No diabetic retinopathy at this time. Reviewed diabetic eye precautions including avoiding tobacco use,  Good glucose control, and importance of regular follow up.      RTC in 1 year or prn any changes

## 2020-09-15 DIAGNOSIS — E11.9 CONTROLLED TYPE 2 DIABETES MELLITUS WITHOUT COMPLICATION, WITHOUT LONG-TERM CURRENT USE OF INSULIN: Primary | ICD-10-CM

## 2020-09-27 ENCOUNTER — PATIENT OUTREACH (OUTPATIENT)
Dept: ADMINISTRATIVE | Facility: OTHER | Age: 50
End: 2020-09-27

## 2020-09-28 ENCOUNTER — LAB VISIT (OUTPATIENT)
Dept: LAB | Facility: HOSPITAL | Age: 50
End: 2020-09-28
Attending: SPECIALIST
Payer: COMMERCIAL

## 2020-09-28 ENCOUNTER — CLINICAL SUPPORT (OUTPATIENT)
Dept: DIABETES | Facility: CLINIC | Age: 50
End: 2020-09-28
Payer: COMMERCIAL

## 2020-09-28 VITALS — BODY MASS INDEX: 20.89 KG/M2 | WEIGHT: 122.38 LBS | HEIGHT: 64 IN

## 2020-09-28 DIAGNOSIS — E11.9 CONTROLLED TYPE 2 DIABETES MELLITUS WITHOUT COMPLICATION, WITHOUT LONG-TERM CURRENT USE OF INSULIN: ICD-10-CM

## 2020-09-28 DIAGNOSIS — E11.65 UNCONTROLLED TYPE 2 DIABETES MELLITUS WITH HYPERGLYCEMIA, WITHOUT LONG-TERM CURRENT USE OF INSULIN: ICD-10-CM

## 2020-09-28 DIAGNOSIS — E11.9 CONTROLLED TYPE 2 DIABETES MELLITUS WITHOUT COMPLICATION, WITHOUT LONG-TERM CURRENT USE OF INSULIN: Primary | ICD-10-CM

## 2020-09-28 LAB
C PEPTIDE SERPL-MCNC: 1.41 NG/ML (ref 0.78–5.19)
ESTIMATED AVG GLUCOSE: 255 MG/DL (ref 68–131)
GLUCOSE SERPL-MCNC: 291 MG/DL (ref 70–110)
HBA1C MFR BLD HPLC: 10.5 % (ref 4–5.6)

## 2020-09-28 PROCEDURE — 84681 ASSAY OF C-PEPTIDE: CPT

## 2020-09-28 PROCEDURE — G0108 DIAB MANAGE TRN  PER INDIV: HCPCS | Mod: S$GLB,,, | Performed by: DIETITIAN, REGISTERED

## 2020-09-28 PROCEDURE — 83036 HEMOGLOBIN GLYCOSYLATED A1C: CPT

## 2020-09-28 PROCEDURE — 82947 ASSAY GLUCOSE BLOOD QUANT: CPT

## 2020-09-28 PROCEDURE — 86341 ISLET CELL ANTIBODY: CPT

## 2020-09-28 PROCEDURE — 99999 PR PBB SHADOW E&M-EST. PATIENT-LVL III: ICD-10-PCS | Mod: PBBFAC,,, | Performed by: DIETITIAN, REGISTERED

## 2020-09-28 PROCEDURE — 99999 PR PBB SHADOW E&M-EST. PATIENT-LVL III: CPT | Mod: PBBFAC,,, | Performed by: DIETITIAN, REGISTERED

## 2020-09-28 PROCEDURE — G0108 PR DIAB MANAGE TRN  PER INDIV: ICD-10-PCS | Mod: S$GLB,,, | Performed by: DIETITIAN, REGISTERED

## 2020-09-28 PROCEDURE — 36415 COLL VENOUS BLD VENIPUNCTURE: CPT

## 2020-09-28 RX ORDER — BLOOD-GLUCOSE CONTROL, NORMAL
1 EACH MISCELLANEOUS 4 TIMES DAILY PRN
Qty: 120 EACH | Refills: 11 | Status: SHIPPED | OUTPATIENT
Start: 2020-09-28 | End: 2021-09-28

## 2020-09-28 RX ORDER — DEXTROSE 4 G
1 TABLET,CHEWABLE ORAL 4 TIMES DAILY
Qty: 120 EACH | Refills: 0 | Status: SHIPPED | OUTPATIENT
Start: 2020-09-28 | End: 2022-02-11 | Stop reason: ALTCHOICE

## 2020-09-28 RX ORDER — LANCING DEVICE
1 EACH MISCELLANEOUS 3 TIMES DAILY PRN
Qty: 1 EACH | Refills: 0 | Status: SHIPPED | OUTPATIENT
Start: 2020-09-28 | End: 2022-02-11 | Stop reason: ALTCHOICE

## 2020-09-28 NOTE — LETTER
September 28, 2020        Tristian Bearden MD  [id number] [facility] [id number] [place] [address] [facility]  [address] [facility] [address]  Phone: [phone]  Fax: [phone]   Patient: Chan Dwyer   MR Number: [id number]   YOB: 1970   Date of Visit: 9/28/2020       Dear Dr. Bearden:    Thank you for referring Chan Dwyer to me for evaluation. Below are the relevant portions of my assessment and plan of care.            If you have questions, please do not hesitate to call me. I look forward to following Chan along with you.    Sincerely,      Jonah Roman RD           CC  No Recipients

## 2020-09-28 NOTE — PROGRESS NOTES
"Diabetes Education  Author: Jonah Roman RD  Date: 9/28/2020    Diabetes Care Management Summary  Diabetes Education Record Assessment/Progress: Initial  Current Diabetes Risk Level: High     Referring Provider: Tirstian Bearden MD  49 y.o. female in clinic today for diabetes education. Patient has taken diabetes education courses in the past.   Pt had GDM 17 yrs ago    Weight: 55.5 kg (122 lb 5.7 oz)   Height: 5' 4" (162.6 cm)   Body mass index is 21 kg/m².    Lab Results   Component Value Date    HGBA1C 10.8 (H) 06/19/2020       Diabetes Type  Diabetes Type : Type II    Diabetes History  Diabetes Diagnosis: >10 years  Current Treatment: Diet, Oral Medication(metformin, 500mg at night   //  Januvia, 100mg)  Reviewed Problem List with Patient: Yes    Pt reports frequent bowel movements with metformin.   She also reports decreased appetite and weight loss over the last couple of months, which could have started with addition of Januvia.     Health Maintenance was reviewed today with patient. Discussed with patient importance of routine eye exams, foot exams/foot care, blood work (i.e.: A1c, microalbumin, and lipid), dental visits, yearly flu vaccine, and pneumonia vaccine as indicated by PCP. Patient verbalized understanding.     Health Maintenance Topics with due status: Not Due       Topic Last Completion Date    TETANUS VACCINE 09/11/2014    Colorectal Cancer Screening 06/22/2016    Mammogram 01/28/2020    Lipid Panel 06/19/2020    Hemoglobin A1c 06/19/2020    Urine Microalbumin 06/19/2020    Eye Exam 07/21/2020     Health Maintenance Due   Topic Date Due    Hepatitis C Screening  1970    HIV Screening  11/13/1984    Shingles Vaccine (1 of 2) 11/13/2019    Influenza Vaccine (1) 08/01/2020    Foot Exam  10/02/2020       Nutrition  Meal Planning: skipping meals, water(will often miss breakfast during workweek and misses lunch during the weekend)  What type of sweetener do you use?: none  What " type of beverages do you drink?: water, diet soda/tea, juice, milk(sometimes drinks Hawaiian punch)  Meal Plan 24 Hour Recall - Breakfast: 10a - grits, eggs, vizcaino, Qatari toast, water  Meal Plan 24 Hour Recall - Lunch: none  Meal Plan 24 Hour Recall - Dinner: 8p - salad, pc of hamburger ross and steak, water  Meal Plan 24 Hour Recall - Snack: none    Monitoring   Self Monitoring : checks 4-5times per week in am // per pt recall, fasting, 180-220  Blood Glucose Logs: No  Do you use a personal continuous glucose monitor?: No  In the last month, how often have you had a low blood sugar reaction?: never  Can you tell when your blood sugar is too high?: yes(blurred vision, headache)  How do you treat high blood sugar?: drink water    Exercise   Exercise Type: walking  Intensity: Low  Frequency: 3-5 Times per week  Duration: 30 min    Current Diabetes Treatment   Current Treatment: Diet, Oral Medication(metformin, 500mg at night   //  Januvia, 100mg)    Social History  Primary Support: Self, Spouse  Occupation: works at [employer]            DDS-2 Score  ( > 3 = SIGNIFICANT DISTRESS): 2                   Barriers to Change  Barriers to Change: None  Learning Challenges : None    Readiness to Learn   Readiness to Learn : Eager    Cultural Influences  Cultural Influences: No    Diabetes Education Assessment/Progress  Diabetes Disease Process (diabetes disease process and treatment options): Discussion, Individual Session, Comprehends Key Points  Nutrition (Incorporating nutritional management into one's lifestyle): Demonstration, Discussion, Instructed, Individual Session, Demonstrates Understanding/Competency (verbalizes/demonstrates), Written Materials Provided  Physical Activity (incorporating physical activity into one's lifestyle): Discussion, Individual Session, Comprehends Key Points, Written Materials Provided  Medications (states correct name, dose, onset, peak, duration, side effects & timing of meds): Discussion,  Individual Session, Comprehends Key Points, Written Materials Provided  Monitoring (monitoring blood glucose/other parameters & using results): Discussion, Individual Session, Demonstrates Understanding/Competency (verbalizes/demonstrates), Instructed, Written Materials Provided  Acute Complications (preventing, detecting, and treating acute complications): Discussion, Instructed, Individual Session, Written Materials Provided  Chronic Complications (preventing, detecting, and treating chronic complications): Discussion, Individual Session, Comprehends Key Points  Clinical (diabetes, other pertinent medical history, and relevant comorbidities reviewed during visit): Discussion, Individual Session  Cognitive (knowledge of self-management skills, functional health literacy): Discussion, Individual Session  Psychosocial (emotional response to diabetes): Discussion, Individual Session  Diabetes Distress and Support Systems: Individual Session  Behavioral (readiness for change, lifestyle practices, self-care behaviors): Discussion, Individual Session    Goals  Patient has selected/evaluated goals during today's session: Yes, selected  Healthy Eating: Set(Include carbs in all meals, 30-45 grams // avoid sugary beverages)  Start Date: 09/28/20  Target Date: 12/28/20  Monitoring: Set(Check BG 2-3 times daily - fasting and pp)  Start Date: 09/28/20  Target Date: 12/28/20         Diabetes Care Plan/Intervention  Education Plan/Intervention: Other, Endocrine Provider Visit Set Up, Individual Follow-Up DSMT   Requested appt with Odilia Shaffer for medication management.   Spoke with Odilia about labs and medication   Scheduled a1c today and added c-peptide and ARISTEO - awaiting results for further recs.   F/u in 1 month      Diabetes Meal Plan  Calories: 1400  Carbohydrate Per Meal: 30-45g  Carbohydrate Per Snack : 15-20g    Today's Self-Management Care Plan was developed with the patient's input and is based on barriers  identified during today's assessment.    The long and short-term goals in the care plan were written with the patient/caregiver's input. The patient has agreed to work toward these goals to improve her overall diabetes control.      The patient received a copy of today's self-management plan and verbalized understanding of the care plan, goals, and all of today's instructions.      The patient was encouraged to communicate with her physician and care team regarding her condition(s) and treatment.  I provided the patient with my contact information today and encouraged her to contact me via phone or patient portal as needed.     Education Units of Time   Time Spent: 60 min

## 2020-10-02 LAB — GAD65 AB SER-SCNC: 0 NMOL/L

## 2020-10-05 ENCOUNTER — IMMUNIZATION (OUTPATIENT)
Dept: INTERNAL MEDICINE | Facility: CLINIC | Age: 50
End: 2020-10-05
Payer: COMMERCIAL

## 2020-10-05 PROCEDURE — 90471 FLU VACCINE (QUAD) GREATER THAN OR EQUAL TO 3YO PRESERVATIVE FREE IM: ICD-10-PCS | Mod: S$GLB,,, | Performed by: FAMILY MEDICINE

## 2020-10-05 PROCEDURE — 90686 IIV4 VACC NO PRSV 0.5 ML IM: CPT | Mod: S$GLB,,, | Performed by: FAMILY MEDICINE

## 2020-10-05 PROCEDURE — 90471 IMMUNIZATION ADMIN: CPT | Mod: S$GLB,,, | Performed by: FAMILY MEDICINE

## 2020-10-05 PROCEDURE — 90686 FLU VACCINE (QUAD) GREATER THAN OR EQUAL TO 3YO PRESERVATIVE FREE IM: ICD-10-PCS | Mod: S$GLB,,, | Performed by: FAMILY MEDICINE

## 2020-10-05 PROCEDURE — 99999 PR PBB SHADOW E&M-EST. PATIENT-LVL II: CPT | Mod: PBBFAC,,,

## 2020-10-05 PROCEDURE — 99999 PR PBB SHADOW E&M-EST. PATIENT-LVL II: ICD-10-PCS | Mod: PBBFAC,,,

## 2020-10-19 ENCOUNTER — OFFICE VISIT (OUTPATIENT)
Dept: INTERNAL MEDICINE | Facility: CLINIC | Age: 50
End: 2020-10-19
Payer: COMMERCIAL

## 2020-10-19 VITALS
RESPIRATION RATE: 20 BRPM | HEART RATE: 68 BPM | BODY MASS INDEX: 22.38 KG/M2 | SYSTOLIC BLOOD PRESSURE: 120 MMHG | TEMPERATURE: 98 F | DIASTOLIC BLOOD PRESSURE: 60 MMHG | WEIGHT: 126.31 LBS | HEIGHT: 63 IN

## 2020-10-19 DIAGNOSIS — M54.12 CERVICAL RADICULOPATHY: ICD-10-CM

## 2020-10-19 DIAGNOSIS — R61 POSTMENOPAUSAL HYPERHIDROSIS: ICD-10-CM

## 2020-10-19 DIAGNOSIS — N95.1 POSTMENOPAUSAL HYPERHIDROSIS: ICD-10-CM

## 2020-10-19 DIAGNOSIS — R51.9 NONINTRACTABLE HEADACHE, UNSPECIFIED CHRONICITY PATTERN, UNSPECIFIED HEADACHE TYPE: ICD-10-CM

## 2020-10-19 DIAGNOSIS — E11.65 UNCONTROLLED TYPE 2 DIABETES MELLITUS WITH HYPERGLYCEMIA, WITHOUT LONG-TERM CURRENT USE OF INSULIN: Primary | ICD-10-CM

## 2020-10-19 PROCEDURE — 3008F BODY MASS INDEX DOCD: CPT | Mod: CPTII,S$GLB,, | Performed by: FAMILY MEDICINE

## 2020-10-19 PROCEDURE — 99214 PR OFFICE/OUTPT VISIT, EST, LEVL IV, 30-39 MIN: ICD-10-PCS | Mod: 25,S$GLB,, | Performed by: FAMILY MEDICINE

## 2020-10-19 PROCEDURE — 3046F PR MOST RECENT HEMOGLOBIN A1C LEVEL > 9.0%: ICD-10-PCS | Mod: CPTII,S$GLB,, | Performed by: FAMILY MEDICINE

## 2020-10-19 PROCEDURE — 3008F PR BODY MASS INDEX (BMI) DOCUMENTED: ICD-10-PCS | Mod: CPTII,S$GLB,, | Performed by: FAMILY MEDICINE

## 2020-10-19 PROCEDURE — 3046F HEMOGLOBIN A1C LEVEL >9.0%: CPT | Mod: CPTII,S$GLB,, | Performed by: FAMILY MEDICINE

## 2020-10-19 PROCEDURE — 96372 THER/PROPH/DIAG INJ SC/IM: CPT | Mod: S$GLB,,, | Performed by: FAMILY MEDICINE

## 2020-10-19 PROCEDURE — 99999 PR PBB SHADOW E&M-EST. PATIENT-LVL III: CPT | Mod: PBBFAC,,, | Performed by: FAMILY MEDICINE

## 2020-10-19 PROCEDURE — 96372 PR INJECTION,THERAP/PROPH/DIAG2ST, IM OR SUBCUT: ICD-10-PCS | Mod: S$GLB,,, | Performed by: FAMILY MEDICINE

## 2020-10-19 PROCEDURE — 99999 PR PBB SHADOW E&M-EST. PATIENT-LVL III: ICD-10-PCS | Mod: PBBFAC,,, | Performed by: FAMILY MEDICINE

## 2020-10-19 PROCEDURE — 99214 OFFICE O/P EST MOD 30 MIN: CPT | Mod: 25,S$GLB,, | Performed by: FAMILY MEDICINE

## 2020-10-19 RX ORDER — TIZANIDINE 2 MG/1
2 TABLET ORAL EVERY 6 HOURS PRN
Qty: 20 TABLET | Refills: 1 | Status: SHIPPED | OUTPATIENT
Start: 2020-10-19 | End: 2020-10-29

## 2020-10-19 RX ORDER — KETOROLAC TROMETHAMINE 30 MG/ML
60 INJECTION, SOLUTION INTRAMUSCULAR; INTRAVENOUS
Status: COMPLETED | OUTPATIENT
Start: 2020-10-19 | End: 2020-10-19

## 2020-10-19 RX ORDER — PAROXETINE HYDROCHLORIDE 20 MG/1
20 TABLET, FILM COATED ORAL EVERY MORNING
Qty: 30 TABLET | Refills: 6 | Status: SHIPPED | OUTPATIENT
Start: 2020-10-19 | End: 2020-12-15

## 2020-10-19 RX ORDER — BUTALBITAL, ACETAMINOPHEN AND CAFFEINE 50; 325; 40 MG/1; MG/1; MG/1
1 TABLET ORAL EVERY 4 HOURS PRN
Qty: 12 TABLET | Refills: 0 | Status: SHIPPED | OUTPATIENT
Start: 2020-10-19 | End: 2020-11-18

## 2020-10-19 RX ORDER — METFORMIN HYDROCHLORIDE 1000 MG/1
1000 TABLET ORAL NIGHTLY
Qty: 30 TABLET | Refills: 3 | Status: SHIPPED | OUTPATIENT
Start: 2020-10-19 | End: 2020-12-10

## 2020-10-19 RX ADMIN — KETOROLAC TROMETHAMINE 60 MG: 30 INJECTION, SOLUTION INTRAMUSCULAR; INTRAVENOUS at 09:10

## 2020-10-19 NOTE — TELEPHONE ENCOUNTER
----- Message from Lela Bena sent at 10/19/2020  2:17 PM CDT -----  Regarding: pharmacy call  .Type:  Pharmacy Calling to Clarify an RX    Name of Caller: Maury  Pharmacy Name: [facility]   Prescription Name: Metformin   What do they need to clarify?: clarification on directions   Best Call Back Number: [phone]   Additional Information:

## 2020-10-19 NOTE — TELEPHONE ENCOUNTER
Pharmacy called to clarify directions of metformin/ 1000mg, taking one nightly/ please advise/written differently /done/

## 2020-10-19 NOTE — PROGRESS NOTES
Subjective:      Patient ID: Chan Dwyer is a 49 y.o. female.    Chief Complaint: Headache and Shoulder Pain (right)      Patient reports right-sided headache which started yesterday, worsened during the night and was very bad when she woke up this morning.  Also this morning having pain extending from right neck down to her right hand, burning in sensation, rates pain 8/10 in severity currently.  She also reports taking metformin only once at bedtime, does not want take in the morning because it causes her to go to the and does not want that to happen when she goes to work.  A1c drawn 2 weeks ago very elevated still at 10.5.  She has started seeing Diabetes Management, has follow-up later this week.  She also reports recent worsening of hot flashes, mostly at night but also during the day.    Review of Systems   Constitutional: Positive for unexpected weight change (loss). Negative for activity change, appetite change and fatigue.   Respiratory: Negative for shortness of breath.    Cardiovascular: Negative for palpitations and leg swelling.   Gastrointestinal: Negative for abdominal pain.   Endocrine: Positive for heat intolerance and polyuria. Negative for cold intolerance, polydipsia and polyphagia.   Genitourinary: Negative for dysuria.   Musculoskeletal: Positive for myalgias. Negative for arthralgias.   Neurological: Positive for headaches. Negative for numbness.     Past Medical History:   Diagnosis Date    Abnormal Pap smear     Abnormal Pap smear of cervix     Arthritis, low back     Diabetes 9/2014     BS 162am 09/16/2014    DM (diabetes mellitus) 09/2014     am 01/26/2016    DM (diabetes mellitus) 09/2014    BS didn't check 01/31/2017    GERD (gastroesophageal reflux disease)     Gestational diabetes     History of abnormal Pap smear     Surgical menopause     Vitamin D deficiency           Past Surgical History:   Procedure Laterality Date    COLONOSCOPY N/A 6/22/2016     Procedure: COLONOSCOPY;  Surgeon: Vishal Mary III, MD;  Location: [facility];  Service: Endoscopy;  Laterality: N/A;    Cryotherapy of the cervix  1999    DILATION AND CURETTAGE OF UTERUS      HYSTERECTOMY      [facility]  2/2012    TUBAL LIGATION       Family History   Problem Relation Age of Onset    Hypertension Mother     Diabetes Mother     Heart attack Mother     Diabetes Father     Hypertension Father     Stomach cancer Sister     No Known Problems Son     No Known Problems Son     Cancer Neg Hx     Stroke Neg Hx     Colon cancer Neg Hx     Ovarian cancer Neg Hx      Social History     Socioeconomic History    Marital status: Single     Spouse name: Not on file    Number of children: 2    Years of education: Not on file    Highest education level: Not on file   Occupational History    Occupation:      Employer: [employer]      Employer: [employer]   Social Needs    Financial resource strain: Not hard at all    Food insecurity     Worry: Never true     Inability: Never true    Transportation needs     Medical: No     Non-medical: No   Tobacco Use    Smoking status: Never Smoker    Smokeless tobacco: Never Used   Substance and Sexual Activity    Alcohol use: Yes     Alcohol/week: 0.0 standard drinks     Comment: rare    Drug use: No    Sexual activity: Yes     Partners: Male     Birth control/protection: Surgical   Lifestyle    Physical activity     Days per week: 3 days     Minutes per session: 30 min    Stress: Not at all   Relationships    Social connections     Talks on phone: More than three times a week     Gets together: Once a week     Attends Spiritism service: More than 4 times per year     Active member of club or organization: No     Attends meetings of clubs or organizations: Never     Relationship status:    Other Topics Concern    Not on file   Social History Narrative    She wears seatbelt.     Review of patient's allergies  "indicates:   Allergen Reactions    Hydrocodone Hives and Rash       Objective:       /60 (BP Location: Left arm, Patient Position: Sitting, BP Method: Medium (Manual))   Pulse 68   Temp 98.1 °F (36.7 °C) (Temporal)   Resp 20   Ht 5' 3" (1.6 m)   Wt 57.3 kg (126 lb 5.2 oz)   BMI 22.38 kg/m²   Physical Exam  Vitals signs reviewed.   Constitutional:       General: She is not in acute distress.     Appearance: Normal appearance. She is well-developed. She is not ill-appearing or diaphoretic.   HENT:      Head: Normocephalic and atraumatic.      Right Ear: Hearing, tympanic membrane, ear canal and external ear normal.      Left Ear: Hearing, tympanic membrane, ear canal and external ear normal.      Nose: Nose normal.      Mouth/Throat:      Pharynx: Uvula midline. No oropharyngeal exudate.   Eyes:      Conjunctiva/sclera: Conjunctivae normal.      Pupils: Pupils are equal, round, and reactive to light.   Neck:      Musculoskeletal: Normal range of motion and neck supple. No neck rigidity or muscular tenderness.      Thyroid: No thyromegaly.      Trachea: No tracheal deviation.   Cardiovascular:      Rate and Rhythm: Normal rate and regular rhythm.      Heart sounds: Normal heart sounds. No murmur.   Pulmonary:      Effort: Pulmonary effort is normal. No respiratory distress.      Breath sounds: Normal breath sounds.   Musculoskeletal: Normal range of motion.         General: Tenderness (right shoulder) present. No swelling or deformity.   Lymphadenopathy:      Cervical: No cervical adenopathy.   Skin:     General: Skin is warm and dry.      Capillary Refill: Capillary refill takes less than 2 seconds.   Neurological:      General: No focal deficit present.      Mental Status: She is alert and oriented to person, place, and time.   Psychiatric:         Mood and Affect: Mood normal.         Behavior: Behavior normal.         Thought Content: Thought content normal.         Judgment: Judgment normal. "       Assessment:     1. Uncontrolled type 2 diabetes mellitus with hyperglycemia, without long-term current use of insulin    2. Nonintractable headache, unspecified chronicity pattern, unspecified headache type    3. Cervical radiculopathy    4. Postmenopausal hyperhidrosis      Plan:   Uncontrolled type 2 diabetes mellitus with hyperglycemia, without long-term current use of insulin    Nonintractable headache, unspecified chronicity pattern, unspecified headache type    Cervical radiculopathy    Postmenopausal hyperhidrosis    Other orders  -     metFORMIN (GLUCOPHAGE) 1000 MG tablet; Take 1 tablet (1,000 mg total) by mouth every evening. TAKE one tablet BY MOUTH in the morning and ONE TABLET BY MOUTH in the evening with meals  Dispense: 30 tablet; Refill: 3  -     ketorolac injection 60 mg  -     tiZANidine (ZANAFLEX) 2 MG tablet; Take 1 tablet (2 mg total) by mouth every 6 (six) hours as needed.  Dispense: 20 tablet; Refill: 1  -     butalbital-acetaminophen-caffeine -40 mg (FIORICET, ESGIC) -40 mg per tablet; Take 1 tablet by mouth every 4 (four) hours as needed for Headaches.  Dispense: 12 tablet; Refill: 0  -     paroxetine (PAXIL) 20 MG tablet; Take 1 tablet (20 mg total) by mouth every morning.  Dispense: 30 tablet; Refill: 6     Will increase evening dose of metformin to 1000 mg as this is the only time patient wants to take it.  Continue all other current medications.     Medication List with Changes/Refills   New Medications    BUTALBITAL-ACETAMINOPHEN-CAFFEINE -40 MG (FIORICET, ESGIC) -40 MG PER TABLET    Take 1 tablet by mouth every 4 (four) hours as needed for Headaches.    PAROXETINE (PAXIL) 20 MG TABLET    Take 1 tablet (20 mg total) by mouth every morning.    TIZANIDINE (ZANAFLEX) 2 MG TABLET    Take 1 tablet (2 mg total) by mouth every 6 (six) hours as needed.   Current Medications    BLOOD SUGAR DIAGNOSTIC STRP    To check BG 4 times daily, to use with insurance preferred  meter    BLOOD-GLUCOSE METER MISC    1 each by Misc.(Non-Drug; Combo Route) route 4 (four) times daily.    IBUPROFEN (ADVIL,MOTRIN) 800 MG TABLET    Take 1 tablet (800 mg total) by mouth every meal as needed for Pain.    LANCETS 30 GAUGE MISC    1 lancet by Misc.(Non-Drug; Combo Route) route 4 (four) times daily as needed.    LANCETS MISC    1 lancet by Misc.(Non-Drug; Combo Route) route 3 (three) times daily.    LANCING DEVICE MISC    1 each by Misc.(Non-Drug; Combo Route) route 3 (three) times daily as needed.    SITAGLIPTIN (JANUVIA) 100 MG TAB    Take 1 tablet (100 mg total) by mouth once daily.    VITAMIN D 1000 UNITS TAB    Take 1,000 Units by mouth once daily.   Changed and/or Refilled Medications    Modified Medication Previous Medication    METFORMIN (GLUCOPHAGE) 1000 MG TABLET metFORMIN (GLUCOPHAGE) 500 MG tablet       Take 1 tablet (1,000 mg total) by mouth every evening. TAKE one tablet BY MOUTH in the morning and ONE TABLET BY MOUTH in the evening with meals    Take 1 tablet (500 mg total) by mouth every evening. TAKE one tablet BY MOUTH in the morning and ONE TABLET BY MOUTH in the evening with meals

## 2020-10-26 ENCOUNTER — CLINICAL SUPPORT (OUTPATIENT)
Dept: DIABETES | Facility: CLINIC | Age: 50
End: 2020-10-26
Payer: COMMERCIAL

## 2020-10-26 VITALS — HEIGHT: 63 IN | WEIGHT: 124.31 LBS | BODY MASS INDEX: 22.03 KG/M2

## 2020-10-26 DIAGNOSIS — E11.9 CONTROLLED TYPE 2 DIABETES MELLITUS WITHOUT COMPLICATION, WITHOUT LONG-TERM CURRENT USE OF INSULIN: Primary | ICD-10-CM

## 2020-10-26 PROCEDURE — G0108 DIAB MANAGE TRN  PER INDIV: HCPCS | Mod: S$GLB,,, | Performed by: DIETITIAN, REGISTERED

## 2020-10-26 PROCEDURE — G0108 PR DIAB MANAGE TRN  PER INDIV: ICD-10-PCS | Mod: S$GLB,,, | Performed by: DIETITIAN, REGISTERED

## 2020-10-26 PROCEDURE — 99999 PR PBB SHADOW E&M-EST. PATIENT-LVL II: CPT | Mod: PBBFAC,,, | Performed by: DIETITIAN, REGISTERED

## 2020-10-26 PROCEDURE — 99999 PR PBB SHADOW E&M-EST. PATIENT-LVL II: ICD-10-PCS | Mod: PBBFAC,,, | Performed by: DIETITIAN, REGISTERED

## 2020-10-26 NOTE — PROGRESS NOTES
"Diabetes Education  Author: Jonah Roman RD  Date: 10/26/2020    Diabetes Care Management Summary  Diabetes Education Record Assessment/Progress: (Follow up)  Current Diabetes Risk Level: High     Referring Provider: Tristian Bearden MD  49 y.o. female in clinic today for diabetes education f/u.     Weight: 56.4 kg (124 lb 5.4 oz)   Height: 5' 3" (160 cm)   Body mass index is 22.03 kg/m².    Lab Results   Component Value Date    HGBA1C 10.5 (H) 09/28/2020     ARISTEO is negative  C-peptide 1.41    Diabetes Type  Diabetes Type : Type II    Diabetes History  Diabetes Diagnosis: >10 years  Current Treatment: Diet, Oral Medication  Reviewed Problem List with Patient: Yes   CURRENT DM MEDICATIONS:    Metformin, 500mg bid   Januvia, 100mg - pt has not taken in 3 wks, needs to  at pharmacy      Health Maintenance was reviewed today with patient. Discussed with patient importance of routine eye exams, foot exams/foot care, blood work (i.e.: A1c, microalbumin, and lipid), dental visits, yearly flu vaccine, and pneumonia vaccine as indicated by PCP. Patient verbalized understanding.     Health Maintenance Topics with due status: Not Due       Topic Last Completion Date    TETANUS VACCINE 09/11/2014    Colorectal Cancer Screening 06/22/2016    Mammogram 01/28/2020    Lipid Panel 06/19/2020    Urine Microalbumin 06/19/2020    Eye Exam 07/21/2020    Hemoglobin A1c 09/28/2020     Health Maintenance Due   Topic Date Due    Hepatitis C Screening  1970    HIV Screening  11/13/1984    Low Dose Statin  11/13/1990    Shingles Vaccine (1 of 2) 11/13/2019    Foot Exam  10/02/2020       Nutrition  What type of sweetener do you use?: none  What type of beverages do you drink?: water, diet soda/tea, milk(milk only with cereal)  Meal Plan 24 Hour Recall - Breakfast: 9a - chicken liver, ti slaw, cornbread; water  Meal Plan 24 Hour Recall - Lunch: butter beans, shrimp, sausage, small portion of rice, green beans, " turkey wings; water  Meal Plan 24 Hour Recall - Dinner: fried breaded pork chop, loaded baked potato, cornbread; water  Meal Plan 24 Hour Recall - Snack: none    Changes since initial appt - cut back on fried food and not eating fast food, avoiding sugary beverages, decreased portion size of cereal      Monitoring   Self Monitoring : checking fasting BG - per record review, 230, 186, 201, 185, 176  Blood Glucose Logs: No  Do you use a personal continuous glucose monitor?: No  In the last month, how often have you had a low blood sugar reaction?: never  Can you tell when your blood sugar is too high?: no    Exercise   Exercise Type: (walks up and down stairs at work)  Intensity: Low    Current Diabetes Treatment   Current Treatment: Diet, Oral Medication    Social History  Preferred Learning Method: Face to Face  Primary Support: Self, Spouse  Occupation:  at [employer]                                Barriers to Change  Barriers to Change: None  Learning Challenges : None    Readiness to Learn   Readiness to Learn : Eager    Cultural Influences  Cultural Influences: No    Diabetes Education Assessment/Progress  Diabetes Disease Process (diabetes disease process and treatment options): Discussion, Individual Session, Comprehends Key Points  Nutrition (Incorporating nutritional management into one's lifestyle): Demonstration, Discussion, Individual Session, Demonstrates Understanding/Competency (verbalizes/demonstrates)  Physical Activity (incorporating physical activity into one's lifestyle): Discussion, Individual Session, Comprehends Key Points  Medications (states correct name, dose, onset, peak, duration, side effects & timing of meds): Discussion, Individual Session, Comprehends Key Points  Monitoring (monitoring blood glucose/other parameters & using results): Discussion, Individual Session, Demonstrates Understanding/Competency (verbalizes/demonstrates), Instructed, Written Materials  Provided  Acute Complications (preventing, detecting, and treating acute complications): Discussion, Instructed, Individual Session  Chronic Complications (preventing, detecting, and treating chronic complications): Discussion, Individual Session, Comprehends Key Points  Clinical (diabetes, other pertinent medical history, and relevant comorbidities reviewed during visit): Discussion, Individual Session  Cognitive (knowledge of self-management skills, functional health literacy): Discussion, Individual Session  Psychosocial (emotional response to diabetes): Discussion, Individual Session  Diabetes Distress and Support Systems: Individual Session  Behavioral (readiness for change, lifestyle practices, self-care behaviors): Discussion, Individual Session    Goals  Patient has selected/evaluated goals during today's session: Yes, selected  Healthy Eating: In Progress(Include carbs in all meals, 30-45 grams // avoid sugary beverages)  Physical Activity: Set(walk around neighborhood for 30 min 3-5 days/wk)  Start Date: 10/26/20  Target Date: 01/29/21  Monitoring: Set(Check BG 2 times daily - fasting and pp; keep BG log)  Start Date: 10/26/20  Target Date: 01/29/21    Pt is to  refills of medication - metformin will be increased to 1000mg bid. If side effects occur, pt should back down to 500mg in am and 1000mg at night until symptoms decrease.        Diabetes Care Plan/Intervention  Education Plan/Intervention: Individual Follow-Up DSMT(f/u in 3 months  // Appt with Odilia Shaffer 12/1/20)         Diabetes Meal Plan  Calories: 1400  Carbohydrate Per Meal: 30-45g  Carbohydrate Per Snack : 15-20g    Today's Self-Management Care Plan was developed with the patient's input and is based on barriers identified during today's assessment.    The long and short-term goals in the care plan were written with the patient/caregiver's input. The patient has agreed to work toward these goals to improve her overall diabetes  control.      The patient received a copy of today's self-management plan and verbalized understanding of the care plan, goals, and all of today's instructions.      The patient was encouraged to communicate with her physician and care team regarding her condition(s) and treatment.  I provided the patient with my contact information today and encouraged her to contact me via phone or patient portal as needed.     Education Units of Time   Time Spent: 60 min

## 2020-11-09 ENCOUNTER — TELEPHONE (OUTPATIENT)
Dept: INTERNAL MEDICINE | Facility: CLINIC | Age: 50
End: 2020-11-09

## 2020-11-09 NOTE — TELEPHONE ENCOUNTER
----- Message from Tristian Bearden MD sent at 11/9/2020  9:36 AM CST -----  Contact: Chan  If she means the pill size, no. There is lower doses but they are the same size pill  ----- Message -----  From: Haley Webster MA  Sent: 11/9/2020   9:35 AM CST  To: Tristian Bearden MD    Is there a smaller form or metformin? Please advise  ----- Message -----  From: Nia Shi  Sent: 11/9/2020   8:08 AM CST  To: Suleiman Giles would like a call back at [phone], Regards to getting a prescription change for a smaller pill size for Metformin 1000.    Thanks  Td

## 2020-11-10 ENCOUNTER — TELEPHONE (OUTPATIENT)
Dept: INTERNAL MEDICINE | Facility: CLINIC | Age: 50
End: 2020-11-10

## 2020-11-10 NOTE — TELEPHONE ENCOUNTER
pts sugars are 289 this morning and yesterday am was 316 she said the metformin is making her sugars go high? She said she is taking it correctly. Please advise

## 2020-11-10 NOTE — TELEPHONE ENCOUNTER
What dose of the metformin is she on now?    It looks like her metformin was increased to 1000 mg b.i.d. about 3 weeks ago by diabetes management.  Is she taking Januvia? at the time of that visit she had been out of it.

## 2020-11-10 NOTE — TELEPHONE ENCOUNTER
----- Message from Alayna Dickerson sent at 11/10/2020  8:31 AM CST -----  Contact: [phone]  Would like to consult with the nurse, patient states that her new Rx is making her sugar go up High, patient would like to speak with the nurse concerning  going back to her other one  please call back thanks sj

## 2020-11-11 NOTE — TELEPHONE ENCOUNTER
Pt says she is on 1000 mg twice daily and is taking januvia  Also/ she has two machines the one touch is the newest one and it says 235 she has a free style a nd her number was 176 so she dont know why /which machine is telling her the truth? Please advise

## 2020-11-11 NOTE — TELEPHONE ENCOUNTER
Please notify I'm not sure which one of her meters is more accurate, would recommend she bring them both next time she comes here or goes to diabetes management to compare which one is more accurate.  It looks like she is scheduled to see the diabetes specialist in about 3 weeks. Would also recommend she bring her log of readings with her when she goes.   I would continue her current medication, make sure she is sticking to her diet.

## 2020-11-11 NOTE — TELEPHONE ENCOUNTER
Spoke with pt inform of Dr. Bearden advices concerning machines, reading from log and diet; advices to bring to Diabetic management office visit; pt verbalized understanding

## 2020-12-01 ENCOUNTER — PATIENT MESSAGE (OUTPATIENT)
Dept: DIABETES | Facility: CLINIC | Age: 50
End: 2020-12-01

## 2020-12-01 ENCOUNTER — OFFICE VISIT (OUTPATIENT)
Dept: DIABETES | Facility: CLINIC | Age: 50
End: 2020-12-01
Payer: COMMERCIAL

## 2020-12-01 VITALS
SYSTOLIC BLOOD PRESSURE: 125 MMHG | WEIGHT: 125.69 LBS | BODY MASS INDEX: 22.26 KG/M2 | DIASTOLIC BLOOD PRESSURE: 69 MMHG | HEART RATE: 71 BPM

## 2020-12-01 DIAGNOSIS — E11.65 TYPE 2 DIABETES MELLITUS WITH HYPERGLYCEMIA, WITHOUT LONG-TERM CURRENT USE OF INSULIN: Primary | ICD-10-CM

## 2020-12-01 DIAGNOSIS — E55.9 VITAMIN D DEFICIENCY: ICD-10-CM

## 2020-12-01 LAB — GLUCOSE SERPL-MCNC: 290 MG/DL (ref 70–110)

## 2020-12-01 PROCEDURE — 3046F PR MOST RECENT HEMOGLOBIN A1C LEVEL > 9.0%: ICD-10-PCS | Mod: CPTII,S$GLB,, | Performed by: PHYSICIAN ASSISTANT

## 2020-12-01 PROCEDURE — 82962 POCT GLUCOSE, HAND-HELD DEVICE: ICD-10-PCS | Mod: S$GLB,,, | Performed by: PHYSICIAN ASSISTANT

## 2020-12-01 PROCEDURE — 99999 PR PBB SHADOW E&M-EST. PATIENT-LVL III: ICD-10-PCS | Mod: PBBFAC,,, | Performed by: PHYSICIAN ASSISTANT

## 2020-12-01 PROCEDURE — 99215 PR OFFICE/OUTPT VISIT, EST, LEVL V, 40-54 MIN: ICD-10-PCS | Mod: S$GLB,,, | Performed by: PHYSICIAN ASSISTANT

## 2020-12-01 PROCEDURE — 99999 PR PBB SHADOW E&M-EST. PATIENT-LVL III: CPT | Mod: PBBFAC,,, | Performed by: PHYSICIAN ASSISTANT

## 2020-12-01 PROCEDURE — 3046F HEMOGLOBIN A1C LEVEL >9.0%: CPT | Mod: CPTII,S$GLB,, | Performed by: PHYSICIAN ASSISTANT

## 2020-12-01 PROCEDURE — 3008F BODY MASS INDEX DOCD: CPT | Mod: CPTII,S$GLB,, | Performed by: PHYSICIAN ASSISTANT

## 2020-12-01 PROCEDURE — 99215 OFFICE O/P EST HI 40 MIN: CPT | Mod: S$GLB,,, | Performed by: PHYSICIAN ASSISTANT

## 2020-12-01 PROCEDURE — 82962 GLUCOSE BLOOD TEST: CPT | Mod: S$GLB,,, | Performed by: PHYSICIAN ASSISTANT

## 2020-12-01 PROCEDURE — 3008F PR BODY MASS INDEX (BMI) DOCUMENTED: ICD-10-PCS | Mod: CPTII,S$GLB,, | Performed by: PHYSICIAN ASSISTANT

## 2020-12-01 RX ORDER — EMPAGLIFLOZIN 10 MG/1
10 TABLET, FILM COATED ORAL DAILY
Qty: 30 TABLET | Refills: 11 | Status: SHIPPED | OUTPATIENT
Start: 2020-12-01 | End: 2021-02-05

## 2020-12-01 RX ORDER — METFORMIN HYDROCHLORIDE 500 MG/1
1000 TABLET, EXTENDED RELEASE ORAL
Qty: 30 TABLET | Refills: 11 | Status: SHIPPED | OUTPATIENT
Start: 2020-12-01 | End: 2022-02-11 | Stop reason: SDUPTHER

## 2020-12-01 RX ORDER — BLOOD-GLUCOSE METER
1 KIT MISCELLANEOUS
Qty: 120 STRIP | Refills: 11 | Status: SHIPPED | OUTPATIENT
Start: 2020-12-01 | End: 2022-02-11 | Stop reason: CLARIF

## 2020-12-01 RX ORDER — LANCETS
1 EACH MISCELLANEOUS 4 TIMES DAILY
Qty: 100 EACH | Refills: 11 | Status: SHIPPED | OUTPATIENT
Start: 2020-12-01 | End: 2022-12-29

## 2020-12-01 NOTE — LETTER
December 1, 2020      [place]  [address] [place] [address]           [facility]  [address]  [place] [address]  Phone: [phone]  Fax: [phone]          Patient: Chan Dwyer   MR Number: [id number]   YOB: 1970   Date of Visit: 12/1/2020       Dear Jonah Roman:    Thank you for referring Chan Dwyer to me for evaluation. Attached you will find relevant portions of my assessment and plan of care.    If you have questions, please do not hesitate to call me. I look forward to following Chan Dwyer along with you.    Sincerely,    Odilia Shaffer PA-C    Enclosure  CC:  No Recipients    If you would like to receive this communication electronically, please contact [email or web address] or [phone] to request more information on [employer] access.    For providers and/or their staff who would like to refer a patient to [facility], please contact us through our one-stop-shop provider referral line, [facility] , at [phone].    If you feel you have received this communication in error or would no longer like to receive these types of communications, please e-mail [email or web address]

## 2020-12-01 NOTE — PATIENT INSTRUCTIONS
CURRENT DM MEDICATIONS:    Metformin XR 1000 mg qhs    Januvia 100 mg daily    Jardiance 10 mg

## 2020-12-01 NOTE — PROGRESS NOTES
PCP: Tristian Bearden MD    Subjective:     Chief Complaint: Diabetes - Established Patient    HISTORY OF PRESENT ILLNESS: 50 y.o.   female presenting for diabetes management visit.   Patient has previously been established with the Diabetes Management Department and was last seen by Leilani Manzo NP on 12/30/2015.   Patient is new to me and is here for establishment of future care .   Patient has had Type II diabetes since 5 years or more.  Pertinent to decision making is the following comorbidities: Vitamin D Deficiency and Gestational DM in Pregnancy 2003  Patient has the following Diabetes complications: without complications  She  has attended diabetes education in the past.     Patient's most recent A1c of 10.5% was completed 2 months ago.   Patient states since Her last A1c Her blood glucose levels have been high  in the morning / fasting.   Patient monitors blood glucose 1 times per day with meter : Fasting.   Patient blood glucose monitoring device will not be uploaded into Media Section today secondary to patient forgetting device.   Per patient recall, fasting blood sugars are ranging from 200 - 300.   Patient endorses the following diabetes related symptoms: None.   Patient is due today for the following diabetes-related health maintenance standards: Foot Exam  and Statin therapy per ADA guidelines.   She denies recent hospital admissions or emergency room visits.   She denies having hypoglycemia.   Patient's concerns today include glycemic control.     GLP-1 Therapy Initiation Considerations:   Family history of pancreatic cancer in first-degree relative: no  Personal history of pancreatitis: no  Family history of MTC/MEN/endocrine tumors: no  Personal history of Gastroparesis: no  Past use of GLP-1 Therapy with adverse effects: no  Past Bariatric Surgery: no  Personal history of Diabetic Retinopathy: no  Most Recent GFR: > 60 completed on 06/2020  Need for Cardiovascular Risk  Reduction: no  Need for Potential Weight Loss: no    Patient medication regimen is as below.     CURRENT DM MEDICATIONS:    Metformin 1000 mg qhs    Januvia 100 mg daily     Patient has failed the following Diabetes medications:    Glipizide      Labs Reviewed.       Lab Results   Component Value Date    CPEPTIDE 1.41 09/28/2020     Lab Results   Component Value Date    GLUTAMICACID 0.00 09/28/2020          //  Weight: 57 kg (125 lb 10.6 oz), Body mass index is 22.26 kg/m².  Her blood sugar in clinic today is:    Lab Results   Component Value Date    POCGLU 290 (A) 12/01/2020       Review of Systems   Constitutional: Negative for activity change, appetite change, chills and fever.   HENT: Negative for dental problem, mouth sores, nosebleeds, sore throat and trouble swallowing.    Eyes: Negative for pain and discharge.   Respiratory: Negative for shortness of breath, wheezing and stridor.    Cardiovascular: Negative for chest pain, palpitations and leg swelling.   Gastrointestinal: Negative for abdominal pain, diarrhea, nausea and vomiting.   Endocrine: Negative for polydipsia, polyphagia and polyuria.   Genitourinary: Negative for dysuria, frequency and urgency.   Musculoskeletal: Negative for joint swelling and myalgias.   Skin: Negative for rash and wound.   Neurological: Negative for dizziness, syncope, weakness and headaches.   Psychiatric/Behavioral: Negative for behavioral problems and dysphoric mood.         Diabetes Management Status  Statin: Not taking  ACE/ARB: Not taking    Screening or Prevention Patient's value Goal Complete/Controlled?   HgA1C Testing and Control   Lab Results   Component Value Date    HGBA1C 10.5 (H) 09/28/2020      Annually/Less than 8% No   Lipid profile : 06/19/2020 Annually Yes   LDL control  Lab Results   Component Value Date    LDLCALC 75.0 06/19/2020    Annually/Less than 100 mg/dl  Yes   Nephropathy screening Lab Results   Component Value Date    MICALBCREAT 4.1 09/26/2018      Lab Results   Component Value Date    PROTEINUA Negative 09/26/2018    Annually No   Blood pressure BP Readings from Last 1 Encounters:   12/01/20 125/69    Less than 140/90 Yes   Dilated retinal exam : 07/21/2020 Annually Yes    Foot exam   : 10/02/2019 Annually No     Social History     Socioeconomic History    Marital status: Single     Spouse name: Not on file    Number of children: 2    Years of education: Not on file    Highest education level: Not on file   Occupational History    Occupation:      Employer: [employer]     Employer: [employer]   Social Needs    Financial resource strain: Not hard at all    Food insecurity     Worry: Never true     Inability: Never true    Transportation needs     Medical: No     Non-medical: No   Tobacco Use    Smoking status: Never Smoker    Smokeless tobacco: Never Used   Substance and Sexual Activity    Alcohol use: Yes     Alcohol/week: 0.0 standard drinks     Comment: rare    Drug use: No    Sexual activity: Yes     Partners: Male     Birth control/protection: Surgical   Lifestyle    Physical activity     Days per week: 3 days     Minutes per session: 30 min    Stress: Not at all   Relationships    Social connections     Talks on phone: More than three times a week     Gets together: Once a week     Attends Mandaeism service: More than 4 times per year     Active member of club or organization: No     Attends meetings of clubs or organizations: Never     Relationship status:    Other Topics Concern    Not on file   Social History Narrative    She wears seatbelt.     Past Medical History:   Diagnosis Date    Abnormal Pap smear     Abnormal Pap smear of cervix     Arthritis, low back     Diabetes 9/2014     BS 162am 09/16/2014    DM (diabetes mellitus) 09/2014     am 01/26/2016    DM (diabetes mellitus) 09/2014    BS didn't check 01/31/2017    GERD (gastroesophageal reflux disease)     Gestational  diabetes     History of abnormal Pap smear     Surgical menopause     Vitamin D deficiency        Objective:        Physical Exam  Constitutional:       General: She is not in acute distress.     Appearance: She is well-developed. She is not diaphoretic.   HENT:      Head: Normocephalic and atraumatic.      Right Ear: External ear normal.      Left Ear: External ear normal.      Nose: Nose normal.   Eyes:      General:         Right eye: No discharge.         Left eye: No discharge.      Pupils: Pupils are equal, round, and reactive to light.   Neck:      Musculoskeletal: Normal range of motion and neck supple.   Cardiovascular:      Rate and Rhythm: Normal rate and regular rhythm.      Pulses:           Dorsalis pedis pulses are 2+ on the right side and 2+ on the left side.        Posterior tibial pulses are 2+ on the right side and 2+ on the left side.      Heart sounds: Normal heart sounds.   Pulmonary:      Effort: Pulmonary effort is normal.      Breath sounds: Normal breath sounds.   Abdominal:      Palpations: Abdomen is soft.   Musculoskeletal: Normal range of motion.      Right foot: Normal range of motion. No deformity.      Left foot: Normal range of motion. No deformity.   Feet:      Right foot:      Protective Sensation: 6 sites tested. 6 sites sensed.      Skin integrity: Dry skin present. No ulcer, blister, skin breakdown, erythema, warmth or callus.      Left foot:      Protective Sensation: 6 sites tested. 6 sites sensed.      Skin integrity: Dry skin present. No ulcer, blister, skin breakdown, erythema, warmth or callus.   Skin:     General: Skin is warm and dry.      Capillary Refill: Capillary refill takes less than 2 seconds.   Neurological:      Mental Status: She is oriented to person, place, and time.   Psychiatric:         Behavior: Behavior normal.         Thought Content: Thought content normal.         Judgment: Judgment normal.           Assessment / Plan:     Type 2 diabetes mellitus  with hyperglycemia, without long-term current use of insulin  -     POCT Glucose, Hand-Held Device  -     blood sugar diagnostic Strp; 1 each by Misc.(Non-Drug; Combo Route) route 4 (four) times daily.  Dispense: 100 each; Refill: 11  -     metFORMIN (GLUCOPHAGE-XR) 500 MG ER 24hr tablet; Take 2 tablets (1,000 mg total) by mouth daily with breakfast.  Dispense: 30 tablet; Refill: 11  -     empagliflozin (JARDIANCE) 10 mg tablet; Take 1 tablet (10 mg total) by mouth once daily.  Dispense: 30 tablet; Refill: 11  -     Hemoglobin A1C; Standing  -     blood sugar diagnostic (FREESTYLE LITE STRIPS) Strp; 1 strip by Misc.(Non-Drug; Combo Route) route 4 (four) times daily with meals and nightly.  Dispense: 120 strip; Refill: 11  -     lancets Misc; 1 each by Misc.(Non-Drug; Combo Route) route 4 (four) times daily.  Dispense: 100 each; Refill: 11    Vitamin D deficiency      Additional Plan Details:    - POCT Glucose  - Encouraged continuation of lifestyle changes including regular exercise and limiting carbohydrates to 30-45 grams per meal threes times daily and 15 grams per snack with a limit of two daily.   - Encouraged continued monitoring of blood glucose with an increase to 2 times daily at Fasting and Before Bed. Rx Strips and Lancets.   - Current DM Medication Regimen: Start Jardiance 10 mg - educated about potential side effects and need to increase fluid intake. Change Metformin to XR 1000 mg at night. Continue Januvia 100 mg daily. Discussed changing Januvia to once weekly GLP-1. Patient would like to opt out and maximize SGLT-2 prior.   - Health Maintenance standards addressed today: Foot Exam - completed today and documented in physical exam with feedback to patient about proper diabetic foot care and findings and Statin therapy - defer to PCP  - Nursing Visit: Patient is age 79 or younger with an A1c of 7.5 or greater and will have check in message in 4 weeks.   - Follow up in 3 months with A1c prior. Follow  up with COLEMAN Cooper in 6 weeks.       Blakeney McKnight, PA-C [facility]    A total of 60 minutes was spent in face to face time, of which over 50 % was spent in counseling patient on disease process, complications, treatment, and side effects of medications.

## 2020-12-10 ENCOUNTER — OFFICE VISIT (OUTPATIENT)
Dept: INTERNAL MEDICINE | Facility: CLINIC | Age: 50
End: 2020-12-10
Payer: COMMERCIAL

## 2020-12-10 ENCOUNTER — TELEPHONE (OUTPATIENT)
Dept: INTERNAL MEDICINE | Facility: CLINIC | Age: 50
End: 2020-12-10

## 2020-12-10 VITALS
BODY MASS INDEX: 21.56 KG/M2 | OXYGEN SATURATION: 97 % | TEMPERATURE: 98 F | DIASTOLIC BLOOD PRESSURE: 86 MMHG | HEART RATE: 78 BPM | SYSTOLIC BLOOD PRESSURE: 122 MMHG | HEIGHT: 64 IN | WEIGHT: 126.31 LBS

## 2020-12-10 DIAGNOSIS — R51.9 INTRACTABLE HEADACHE, UNSPECIFIED CHRONICITY PATTERN, UNSPECIFIED HEADACHE TYPE: ICD-10-CM

## 2020-12-10 DIAGNOSIS — R51.9 CHRONIC DAILY HEADACHE: Primary | ICD-10-CM

## 2020-12-10 PROCEDURE — 1125F PR PAIN SEVERITY QUANTIFIED, PAIN PRESENT: ICD-10-PCS | Mod: S$GLB,,, | Performed by: FAMILY MEDICINE

## 2020-12-10 PROCEDURE — 99999 PR PBB SHADOW E&M-EST. PATIENT-LVL V: ICD-10-PCS | Mod: PBBFAC,,, | Performed by: FAMILY MEDICINE

## 2020-12-10 PROCEDURE — 99213 PR OFFICE/OUTPT VISIT, EST, LEVL III, 20-29 MIN: ICD-10-PCS | Mod: S$GLB,,, | Performed by: FAMILY MEDICINE

## 2020-12-10 PROCEDURE — 3008F PR BODY MASS INDEX (BMI) DOCUMENTED: ICD-10-PCS | Mod: CPTII,S$GLB,, | Performed by: FAMILY MEDICINE

## 2020-12-10 PROCEDURE — 3008F BODY MASS INDEX DOCD: CPT | Mod: CPTII,S$GLB,, | Performed by: FAMILY MEDICINE

## 2020-12-10 PROCEDURE — 99213 OFFICE O/P EST LOW 20 MIN: CPT | Mod: S$GLB,,, | Performed by: FAMILY MEDICINE

## 2020-12-10 PROCEDURE — 99999 PR PBB SHADOW E&M-EST. PATIENT-LVL V: CPT | Mod: PBBFAC,,, | Performed by: FAMILY MEDICINE

## 2020-12-10 PROCEDURE — 1125F AMNT PAIN NOTED PAIN PRSNT: CPT | Mod: S$GLB,,, | Performed by: FAMILY MEDICINE

## 2020-12-10 RX ORDER — SUMATRIPTAN SUCCINATE 100 MG/1
TABLET ORAL
Qty: 4 TABLET | Refills: 0 | Status: SHIPPED | OUTPATIENT
Start: 2020-12-10 | End: 2020-12-11

## 2020-12-10 NOTE — PROGRESS NOTES
Subjective:      Patient ID: Chan Dwyer is a 50 y.o. female.    Chief Complaint: Headache      Patient reports continued headaches, every day, often wake her up in the middle the night and are there when she 1st awakens.  She reports this has been going on daily since June of this year.  Discussed her last visit with me about 3 months ago, no improvement with Fioricet, Flexeril, over-the-counter medications.  She describes the pain as aching, says it often seems to move around to different areas of her head, currently on right parietal area.  She denies any visual changes or aura.  No photophobia.    Review of Systems   Constitutional: Positive for fatigue. Negative for activity change, appetite change and fever.   HENT: Negative for congestion, postnasal drip, sinus pressure, sinus pain and sore throat.    Neurological: Positive for headaches. Negative for dizziness and light-headedness.     Past Medical History:   Diagnosis Date    Abnormal Pap smear     Abnormal Pap smear of cervix     Arthritis, low back     Diabetes 9/2014     BS 162am 09/16/2014    DM (diabetes mellitus) 09/2014     am 01/26/2016    DM (diabetes mellitus) 09/2014    BS didn't check 01/31/2017    GERD (gastroesophageal reflux disease)     Gestational diabetes     History of abnormal Pap smear     Surgical menopause     Vitamin D deficiency           Past Surgical History:   Procedure Laterality Date    COLONOSCOPY N/A 6/22/2016    Procedure: COLONOSCOPY;  Surgeon: Vishal Mary III, MD;  Location[facility];  Service: Endoscopy;  Laterality: N/A;    Cryotherapy of the cervix  1999    DILATION AND CURETTAGE OF UTERUS      HYSTERECTOMY      [facility]  2/2012    TUBAL LIGATION       Family History   Problem Relation Age of Onset    Hypertension Mother     Diabetes Mother     Heart attack Mother     Diabetes Father     Hypertension Father     Stomach cancer Sister     No Known Problems Son     No Known  "Problems Son     Cancer Neg Hx     Stroke Neg Hx     Colon cancer Neg Hx     Ovarian cancer Neg Hx      Social History     Socioeconomic History    Marital status: Single     Spouse name: Not on file    Number of children: 2    Years of education: Not on file    Highest education level: Not on file   Occupational History    Occupation:      Employer: [employer]     Employer: [employer]   Social Needs    Financial resource strain: Not hard at all    Food insecurity     Worry: Never true     Inability: Never true    Transportation needs     Medical: No     Non-medical: No   Tobacco Use    Smoking status: Never Smoker    Smokeless tobacco: Never Used   Substance and Sexual Activity    Alcohol use: Yes     Alcohol/week: 0.0 standard drinks     Comment: rare    Drug use: No    Sexual activity: Yes     Partners: Male     Birth control/protection: Surgical   Lifestyle    Physical activity     Days per week: 3 days     Minutes per session: 30 min    Stress: Not at all   Relationships    Social connections     Talks on phone: More than three times a week     Gets together: Once a week     Attends Mandaeism service: More than 4 times per year     Active member of club or organization: No     Attends meetings of clubs or organizations: Never     Relationship status:    Other Topics Concern    Not on file   Social History Narrative    She wears seatbelt.     Review of patient's allergies indicates:   Allergen Reactions    Hydrocodone Hives and Rash       Objective:       /86 (BP Location: Left arm)   Pulse 78   Temp 98.1 °F (36.7 °C) (Tympanic)   Ht 5' 4" (1.626 m)   Wt 57.3 kg (126 lb 5.2 oz)   SpO2 97%   BMI 21.68 kg/m²   Physical Exam  Constitutional:       General: She is not in acute distress.     Appearance: Normal appearance. She is well-developed. She is not ill-appearing or diaphoretic.   Cardiovascular:      Rate and Rhythm: Normal rate and " regular rhythm.      Heart sounds: Normal heart sounds.   Neurological:      General: No focal deficit present.      Mental Status: She is alert and oriented to person, place, and time.   Psychiatric:         Mood and Affect: Mood normal.         Behavior: Behavior normal.         Thought Content: Thought content normal.         Judgment: Judgment normal.       Assessment:     1. Chronic daily headache    2. Intractable headache, unspecified chronicity pattern, unspecified headache type      Plan:   Chronic daily headache    Intractable headache, unspecified chronicity pattern, unspecified headache type  -     MRI Brain Without Contrast; Future; Expected date: 12/10/2020  -     Ambulatory referral/consult to Neurology; Future; Expected date: 12/17/2020    Other orders  -     sumatriptan (IMITREX) 100 MG tablet; Take one tablet at onset of headache, may repeat once if no improvement in 2 hours.  Dispense: 4 tablet; Refill: 0      Medication List with Changes/Refills   New Medications    SUMATRIPTAN (IMITREX) 100 MG TABLET    Take one tablet at onset of headache, may repeat once if no improvement in 2 hours.   Current Medications    BLOOD SUGAR DIAGNOSTIC (FREESTYLE LITE STRIPS) STRP    1 strip by Misc.(Non-Drug; Combo Route) route 4 (four) times daily with meals and nightly.    BLOOD SUGAR DIAGNOSTIC STRP    To check BG 4 times daily, to use with insurance preferred meter    BLOOD SUGAR DIAGNOSTIC STRP    1 each by Misc.(Non-Drug; Combo Route) route 4 (four) times daily.    BLOOD-GLUCOSE METER MISC    1 each by Misc.(Non-Drug; Combo Route) route 4 (four) times daily.    EMPAGLIFLOZIN (JARDIANCE) 10 MG TABLET    Take 1 tablet (10 mg total) by mouth once daily.    IBUPROFEN (ADVIL,MOTRIN) 800 MG TABLET    Take 1 tablet (800 mg total) by mouth every meal as needed for Pain.    LANCETS 30 GAUGE MISC    1 lancet by Misc.(Non-Drug; Combo Route) route 4 (four) times daily as needed.    LANCETS MISC    1 lancet by  Misc.(Non-Drug; Combo Route) route 3 (three) times daily.    LANCETS MISC    1 each by Misc.(Non-Drug; Combo Route) route 4 (four) times daily.    LANCING DEVICE MISC    1 each by Misc.(Non-Drug; Combo Route) route 3 (three) times daily as needed.    METFORMIN (GLUCOPHAGE-XR) 500 MG ER 24HR TABLET    Take 2 tablets (1,000 mg total) by mouth daily with breakfast.    PAROXETINE (PAXIL) 20 MG TABLET    Take 1 tablet (20 mg total) by mouth every morning.    SITAGLIPTIN (JANUVIA) 100 MG TAB    Take 1 tablet (100 mg total) by mouth once daily.    VITAMIN D 1000 UNITS TAB    Take 1,000 Units by mouth once daily.   Discontinued Medications    METFORMIN (GLUCOPHAGE) 1000 MG TABLET    Take 1 tablet (1,000 mg total) by mouth every evening. TAKE one tablet BY MOUTH in the morning and ONE TABLET BY MOUTH in the evening with meals

## 2020-12-11 ENCOUNTER — OFFICE VISIT (OUTPATIENT)
Dept: NEUROLOGY | Facility: CLINIC | Age: 50
End: 2020-12-11
Payer: COMMERCIAL

## 2020-12-11 DIAGNOSIS — K21.9 GASTROESOPHAGEAL REFLUX DISEASE, UNSPECIFIED WHETHER ESOPHAGITIS PRESENT: ICD-10-CM

## 2020-12-11 DIAGNOSIS — E11.65 UNCONTROLLED TYPE 2 DIABETES MELLITUS WITH HYPERGLYCEMIA, WITHOUT LONG-TERM CURRENT USE OF INSULIN: ICD-10-CM

## 2020-12-11 DIAGNOSIS — E11.9 CONTROLLED TYPE 2 DIABETES MELLITUS WITHOUT COMPLICATION, WITHOUT LONG-TERM CURRENT USE OF INSULIN: ICD-10-CM

## 2020-12-11 DIAGNOSIS — E55.9 VITAMIN D DEFICIENCY: ICD-10-CM

## 2020-12-11 DIAGNOSIS — M15.1 HEBERDEN NODES: ICD-10-CM

## 2020-12-11 DIAGNOSIS — N95.1 MENOPAUSE SYNDROME: ICD-10-CM

## 2020-12-11 DIAGNOSIS — M47.816 ARTHRITIS, LUMBAR SPINE: ICD-10-CM

## 2020-12-11 DIAGNOSIS — R51.9 INTRACTABLE HEADACHE, UNSPECIFIED CHRONICITY PATTERN, UNSPECIFIED HEADACHE TYPE: ICD-10-CM

## 2020-12-11 DIAGNOSIS — R51.9 CHRONIC DAILY HEADACHE: ICD-10-CM

## 2020-12-11 DIAGNOSIS — G56.03 BILATERAL CARPAL TUNNEL SYNDROME: ICD-10-CM

## 2020-12-11 DIAGNOSIS — M54.81 OCCIPITAL NEURALGIA, UNSPECIFIED LATERALITY: Primary | ICD-10-CM

## 2020-12-11 PROCEDURE — 99243 OFF/OP CNSLTJ NEW/EST LOW 30: CPT | Mod: 95,,, | Performed by: PSYCHIATRY & NEUROLOGY

## 2020-12-11 PROCEDURE — 99243 PR OFFICE CONSULTATION,LEVEL III: ICD-10-PCS | Mod: 95,,, | Performed by: PSYCHIATRY & NEUROLOGY

## 2020-12-11 RX ORDER — AMITRIPTYLINE HYDROCHLORIDE 50 MG/1
50 TABLET, FILM COATED ORAL NIGHTLY
Qty: 90 TABLET | Refills: 3 | Status: SHIPPED | OUTPATIENT
Start: 2020-12-11 | End: 2021-01-22

## 2020-12-11 NOTE — PROGRESS NOTES
Subjective:       Patient ID: Chan Dwyer is a 50 y.o. female.    Chief Complaint: Headache          HPI     The patient was referred by Dr. Bearden for headache evaluation.      The headaches started in .  The pain starts in the neck and radiates upwards to the scalp and sometimes wake the patient up from sleep with 05-10/10 severity, almost on daily basis. Reports downwards radiation to the arms. No associated symptoms. No specific triggering or alleviating factors. OTC NSAIDs and Tylenol in addition to Triptans (Imitrex) did not help at all. No history of trauma. The patient is scheduled for Brain MRI on 12-.  The patient reports similar headaches back in 2015 that ultimately subsided. On 04- CTH was NL.            The originating site (patient location) is: Home.      The distant site (neurologist location) is: Neurology Clinic at [facility].      The chief complaint leading to consultation is: HEADACHE      Visit type: Virtual visit with synchronous audio and video.      Total time spent with patient: 40 minutes       Special circumstances: This visit occurred during COVID-19 Pandemic Public Health Emergency.       Consent: The patient verbally consented to participating in the video visit and informed that may decline to receive medical services by telemedicine and may withdraw from such care at any time.      I discussed with the patient the nature of our telemedicine visits, that:      I  would evaluate the patient and recommend diagnostics and treatments based on my assessment.    Our sessions are not being recorded and that personal health information is protected.    Our team would provide follow up care in person if/when the patient needs it.    Virtual (video/telemedicine) visits have significant limitations. A telemedicine exam is primarily focused on the history and what I can observe. Several critical parts of the neurological exam cannot be performed.          Review of Systems   Constitutional: Positive for fatigue. Negative for appetite change.   HENT: Negative for hearing loss and tinnitus.    Eyes: Negative for photophobia and visual disturbance.   Respiratory: Negative for apnea and shortness of breath.    Cardiovascular: Negative for chest pain and palpitations.   Gastrointestinal: Negative for nausea and vomiting.   Endocrine: Negative for cold intolerance and heat intolerance.   Genitourinary: Negative for difficulty urinating and urgency.   Musculoskeletal: Positive for back pain and neck pain. Negative for arthralgias, gait problem, joint swelling, myalgias and neck stiffness.   Skin: Negative for color change and rash.   Allergic/Immunologic: Negative for environmental allergies and immunocompromised state.   Neurological: Positive for numbness and headaches. Negative for dizziness, tremors, seizures, syncope, facial asymmetry, speech difficulty, weakness and light-headedness.   Hematological: Negative for adenopathy. Does not bruise/bleed easily.   Psychiatric/Behavioral: Positive for sleep disturbance. Negative for agitation, behavioral problems, confusion, decreased concentration, dysphoric mood, hallucinations, self-injury and suicidal ideas. The patient is not hyperactive.                  Current Outpatient Medications:     blood sugar diagnostic (FREESTYLE LITE STRIPS) Strp, 1 strip by Misc.(Non-Drug; Combo Route) route 4 (four) times daily with meals and nightly., Disp: 120 strip, Rfl: 11    blood sugar diagnostic Strp, To check BG 4 times daily, to use with insurance preferred meter, Disp: 120 each, Rfl: 11    blood sugar diagnostic Strp, 1 each by Misc.(Non-Drug; Combo Route) route 4 (four) times daily., Disp: 100 each, Rfl: 11    blood-glucose meter Misc, 1 each by Misc.(Non-Drug; Combo Route) route 4 (four) times daily., Disp: 120 each, Rfl: 0    empagliflozin (JARDIANCE) 10 mg tablet, Take 1 tablet (10 mg total) by mouth once daily.,  Disp: 30 tablet, Rfl: 11    ibuprofen (ADVIL,MOTRIN) 800 MG tablet, Take 1 tablet (800 mg total) by mouth every meal as needed for Pain., Disp: 90 tablet, Rfl: 1    lancets 30 gauge Misc, 1 lancet by Misc.(Non-Drug; Combo Route) route 4 (four) times daily as needed., Disp: 120 each, Rfl: 11    lancets Misc, 1 lancet by Misc.(Non-Drug; Combo Route) route 3 (three) times daily., Disp: 100 each, Rfl: 5    lancets Misc, 1 each by Misc.(Non-Drug; Combo Route) route 4 (four) times daily., Disp: 100 each, Rfl: 11    lancing device Misc, 1 each by Misc.(Non-Drug; Combo Route) route 3 (three) times daily as needed., Disp: 1 each, Rfl: 0    metFORMIN (GLUCOPHAGE-XR) 500 MG ER 24hr tablet, Take 2 tablets (1,000 mg total) by mouth daily with breakfast., Disp: 30 tablet, Rfl: 11    paroxetine (PAXIL) 20 MG tablet, Take 1 tablet (20 mg total) by mouth every morning., Disp: 30 tablet, Rfl: 6    SITagliptin (JANUVIA) 100 MG Tab, Take 1 tablet (100 mg total) by mouth once daily., Disp: 90 tablet, Rfl: 3    vitamin D 1000 units Tab, Take 1,000 Units by mouth once daily., Disp: , Rfl:     amitriptyline (ELAVIL) 50 MG tablet, Take 1 tablet (50 mg total) by mouth every evening. Start 1/2 tab QHS for 1 week then 1 tab QHS., Disp: 90 tablet, Rfl: 3  Past Medical History:   Diagnosis Date    Abnormal Pap smear     Abnormal Pap smear of cervix     Arthritis, low back     Diabetes 9/2014     BS 162am 09/16/2014    DM (diabetes mellitus) 09/2014     am 01/26/2016    DM (diabetes mellitus) 09/2014    BS didn't check 01/31/2017    GERD (gastroesophageal reflux disease)     Gestational diabetes     History of abnormal Pap smear     Surgical menopause     Vitamin D deficiency      Past Surgical History:   Procedure Laterality Date    COLONOSCOPY N/A 6/22/2016    Procedure: COLONOSCOPY;  Surgeon: Vishal Mary III, MD;  Location: [facility];  Service: Endoscopy;  Laterality: N/A;    Cryotherapy of the cervix  1999     DILATION AND CURETTAGE OF UTERUS      HYSTERECTOMY      [facility]  2/2012    TUBAL LIGATION       Social History     Socioeconomic History    Marital status: Single     Spouse name: Not on file    Number of children: 2    Years of education: Not on file    Highest education level: Not on file   Occupational History    Occupation:      Employer: [employer]      Employer: [employer]   Social Needs    Financial resource strain: Not hard at all    Food insecurity     Worry: Never true     Inability: Never true    Transportation needs     Medical: No     Non-medical: No   Tobacco Use    Smoking status: Never Smoker    Smokeless tobacco: Never Used   Substance and Sexual Activity    Alcohol use: Yes     Alcohol/week: 0.0 standard drinks     Comment: rare    Drug use: No    Sexual activity: Yes     Partners: Male     Birth control/protection: Surgical   Lifestyle    Physical activity     Days per week: 3 days     Minutes per session: 30 min    Stress: Not at all   Relationships    Social connections     Talks on phone: More than three times a week     Gets together: Once a week     Attends Zoroastrianism service: More than 4 times per year     Active member of club or organization: No     Attends meetings of clubs or organizations: Never     Relationship status:    Other Topics Concern    Not on file   Social History Narrative    She wears seatbelt.             Past/Current Medical/Surgical History, Past/Current Social History, Past/Current Family History and Past/Current Medications were reviewed in detail.        Objective:             GENERAL APPEARANCE:     The patient looks comfortable.    Body habitus is normal.    No signs of respiratory distress.    Normal breathing pattern.    No dysmorphic features    Normal eye contact.     GENERAL MEDICAL EXAM:    HEENT:  Head is atraumatic normocephalic.     Neck and Axillae: No JVD. No visible lesions.    Cardiopulmonary: No  cyanosis. No tachypnea. Normal respiratory effort.    Gastrointestinal/Urogenital:  No jaundice. No stomas or lesions. No visible hernias. No catheters.     Skin, Hair and Nails: No pathognonomic skin rash. No neurofibromatosis. No visible lesions.No stigmata of autoimmune disease. No clubbing.    Limbs: No varicose veins. No visible swelling.    Muskoskeletal: No visible deformities.No visible lesions.           Neurologic Exam     Mental Status   Oriented to person, place, and time.   Registration: recalls 3 of 3 objects. Recall at 5 minutes: recalls 3 of 3 objects. Follows 3 step commands.   Attention: normal. Concentration: normal.   Speech: speech is normal   Level of consciousness: alert  Knowledge: good. Able to perform simple calculations.   Able to name object. Able to read. Able to repeat. Able to write. Normal comprehension.     Cranial Nerves     CN III, IV, VI   Extraocular motions are normal.   Right pupil: Shape: regular.   Left pupil: Shape: regular.   CN III: no CN III palsy  CN VI: no CN VI palsy  Nystagmus: none   Upgaze: normal  Downgaze: normal  Conjugate gaze: present    Motor Exam   Muscle bulk: normal  Right arm pronator drift: absent  Left arm pronator drift: absent  Moves 4 extremities symmetrically      Sensory Exam     Sensory exam cannot be done.      Gait, Coordination, and Reflexes     Gait  Gait: normal    Coordination   Romberg: negative  Heel to shin coordination: normal    Tremor   Resting tremor: absent  Intention tremor: absent  Action tremor: absent  MSRs cannot be done.       Lab Results   Component Value Date    WBC 5.65 06/19/2020    HGB 12.6 06/19/2020    HCT 40.8 06/19/2020    MCV 93 06/19/2020     06/19/2020     Sodium   Date Value Ref Range Status   06/19/2020 141 136 - 145 mmol/L Final     Potassium   Date Value Ref Range Status   06/19/2020 3.8 3.5 - 5.1 mmol/L Final     Chloride   Date Value Ref Range Status   06/19/2020 104 95 - 110 mmol/L Final     CO2   Date  Value Ref Range Status   06/19/2020 27 23 - 29 mmol/L Final     Glucose   Date Value Ref Range Status   06/19/2020 286 (H) 70 - 110 mg/dL Final     BUN   Date Value Ref Range Status   06/19/2020 10 6 - 20 mg/dL Final     Creatinine   Date Value Ref Range Status   06/19/2020 0.8 0.5 - 1.4 mg/dL Final     Calcium   Date Value Ref Range Status   06/19/2020 9.5 8.7 - 10.5 mg/dL Final     Total Protein   Date Value Ref Range Status   06/19/2020 7.0 6.0 - 8.4 g/dL Final     Albumin   Date Value Ref Range Status   06/19/2020 3.6 3.5 - 5.2 g/dL Final     Total Bilirubin   Date Value Ref Range Status   06/19/2020 0.6 0.1 - 1.0 mg/dL Final     Comment:     For infants and newborns, interpretation of results should be based  on gestational age, weight and in agreement with clinical  observations.  Premature Infant recommended reference ranges:  Up to 24 hours.............<8.0 mg/dL  Up to 48 hours............<12.0 mg/dL  3-5 days..................<15.0 mg/dL  6-29 days.................<15.0 mg/dL       Alkaline Phosphatase   Date Value Ref Range Status   06/19/2020 91 55 - 135 U/L Final     AST   Date Value Ref Range Status   06/19/2020 16 10 - 40 U/L Final     ALT   Date Value Ref Range Status   06/19/2020 16 10 - 44 U/L Final     Anion Gap   Date Value Ref Range Status   06/19/2020 10 8 - 16 mmol/L Final     eGFR if    Date Value Ref Range Status   06/19/2020 >60.0 >60 mL/min/1.73 m^2 Final     eGFR if non    Date Value Ref Range Status   06/19/2020 >60.0 >60 mL/min/1.73 m^2 Final     Comment:     Calculation used to obtain the estimated glomerular filtration  rate (eGFR) is the CKD-EPI equation.        Lab Results   Component Value Date    KRBWMGWK95 970 (H) 06/13/2013     Lab Results   Component Value Date    TSH 1.061 06/19/2020    FREET4 1.09 05/28/2007 04-    CT NL       Reviewed the neuroimaging independently       Assessment:       1. Occipital neuralgia, unspecified  laterality    2. Intractable headache, unspecified chronicity pattern, unspecified headache type    3. Gastroesophageal reflux disease, unspecified whether esophagitis present    4. Arthritis, lumbar spine    5. Controlled type 2 diabetes mellitus without complication, without long-term current use of insulin    6. Bilateral carpal tunnel syndrome    7. Menopause syndrome    8. Uncontrolled type 2 diabetes mellitus with hyperglycemia, without long-term current use of insulin    9. Chronic daily headache    10. [place] nodes    11. Vitamin D deficiency        Plan:       OCCIPITAL NEURALGIA       Try Amitriptyline/Elavil. Will titrate very slowly to 50 mg QHS which can cause sleepiness, dry eyes, dry mouth, urinary retention and rarely cardiac arrhythmias. The patient verbalized understanding of the most common SEs and was encouraged to read the leaflet for more details.     Will review Brain MRI once completed.     Heating pads PRN    Improve posture    Avoid heavy lifting or sudden spine movements     Will discuss PT and/or Pain Management down the road if symptoms become intractable.          MEDICAL/SURGICAL COMORBIDITIES     All relevant medical comorbidities noted and managed by primary care physician and medical care team.          MISCELLANEOUS MEDICAL PROBLEMS       HEALTHY LIFESTYLE AND PREVENTATIVE CARE    Encouraged the patient to adhere to the age-appropriate health maintenance guidelines including screening tests and vaccinations.     Discussed the overall importance of healthy lifestyle, optimal weight, exercise, healthy diet, good sleep hygiene and avoiding drugs including smoking, alcohol and recreational drugs. The patient verbalized full understanding.       Advised the patient to follow COVID-19 prevention measures.       I spent 40 minutes face to face with the patient    More than 20  minutes of the time spent in counseling and coordination of care including discussions etiology of diagnosis  (OCCIPITAL NEURALGIA), pathogenesis of diagnosis, prognosis of diagnosis,, diagnostic results, impression and recommendations, diagnostic studies, management, risks and benefits of treatment, instructions of disease self-management, treatment instructions, follow up requirements, patient and family counseling/involvement in care compliance with treatment regimen. All of the patient's questions were answered during this discussion.        RTC in 8 weeks               Steve Rosales MD, FAAN    Attending Neurologist/Epileptologist         Diplomate, American Board of Psychiatry and Neurology    Diplomate, American Board of Clinical Neurophysiology     Fellow, American Academy of Neurology

## 2020-12-11 NOTE — LETTER
December 11, 2020      Tristian Bearden MD  [address] [address]  [id number] [place] [address]  Phone: [phone]  Fax: [phone]          Patient: Chan Dwyer   MR Number: [id number]   YOB: 1970   Date of Visit: 12/11/2020       Dear Dr. Tristian Bearden:    Thank you for referring Chan Dwyer to me for evaluation. Attached you will find relevant portions of my assessment and plan of care.    If you have questions, please do not hesitate to call me. I look forward to following Chan Dwyer along with you.    Sincerely,    Steve Rosales MD    Enclosure  CC:  No Recipients    If you would like to receive this communication electronically, please contact [email or web address] or [phone] to request more information on [employer] Link access.    For providers and/or their staff who would like to refer a patient to [facility], please contact us through our one-stop-shop provider referral line, [facility], at [phone].    If you feel you have received this communication in error or would no longer like to receive these types of communications, please e-mail [email or web address]

## 2020-12-15 ENCOUNTER — OFFICE VISIT (OUTPATIENT)
Dept: URGENT CARE | Facility: CLINIC | Age: 50
End: 2020-12-15
Payer: COMMERCIAL

## 2020-12-15 ENCOUNTER — TELEPHONE (OUTPATIENT)
Dept: INTERNAL MEDICINE | Facility: CLINIC | Age: 50
End: 2020-12-15

## 2020-12-15 VITALS
DIASTOLIC BLOOD PRESSURE: 72 MMHG | HEART RATE: 89 BPM | HEIGHT: 64 IN | RESPIRATION RATE: 16 BRPM | BODY MASS INDEX: 21.1 KG/M2 | SYSTOLIC BLOOD PRESSURE: 114 MMHG | OXYGEN SATURATION: 97 % | WEIGHT: 123.56 LBS

## 2020-12-15 DIAGNOSIS — U07.1 COVID-19: Primary | ICD-10-CM

## 2020-12-15 DIAGNOSIS — E11.65 UNCONTROLLED TYPE 2 DIABETES MELLITUS WITH HYPERGLYCEMIA, WITHOUT LONG-TERM CURRENT USE OF INSULIN: ICD-10-CM

## 2020-12-15 LAB
CTP QC/QA: YES
SARS-COV-2 RDRP RESP QL NAA+PROBE: POSITIVE

## 2020-12-15 PROCEDURE — 3046F PR MOST RECENT HEMOGLOBIN A1C LEVEL > 9.0%: ICD-10-PCS | Mod: CPTII,S$GLB,, | Performed by: PHYSICIAN ASSISTANT

## 2020-12-15 PROCEDURE — U0002: ICD-10-PCS | Mod: QW,S$GLB,, | Performed by: PHYSICIAN ASSISTANT

## 2020-12-15 PROCEDURE — 3008F PR BODY MASS INDEX (BMI) DOCUMENTED: ICD-10-PCS | Mod: CPTII,S$GLB,, | Performed by: PHYSICIAN ASSISTANT

## 2020-12-15 PROCEDURE — 3008F BODY MASS INDEX DOCD: CPT | Mod: CPTII,S$GLB,, | Performed by: PHYSICIAN ASSISTANT

## 2020-12-15 PROCEDURE — 99214 PR OFFICE/OUTPT VISIT, EST, LEVL IV, 30-39 MIN: ICD-10-PCS | Mod: S$GLB,,, | Performed by: PHYSICIAN ASSISTANT

## 2020-12-15 PROCEDURE — 1125F PR PAIN SEVERITY QUANTIFIED, PAIN PRESENT: ICD-10-PCS | Mod: S$GLB,,, | Performed by: PHYSICIAN ASSISTANT

## 2020-12-15 PROCEDURE — 99999 PR PBB SHADOW E&M-EST. PATIENT-LVL V: ICD-10-PCS | Mod: PBBFAC,,, | Performed by: PHYSICIAN ASSISTANT

## 2020-12-15 PROCEDURE — 99214 OFFICE O/P EST MOD 30 MIN: CPT | Mod: S$GLB,,, | Performed by: PHYSICIAN ASSISTANT

## 2020-12-15 PROCEDURE — 1125F AMNT PAIN NOTED PAIN PRSNT: CPT | Mod: S$GLB,,, | Performed by: PHYSICIAN ASSISTANT

## 2020-12-15 PROCEDURE — 99999 PR PBB SHADOW E&M-EST. PATIENT-LVL V: CPT | Mod: PBBFAC,,, | Performed by: PHYSICIAN ASSISTANT

## 2020-12-15 PROCEDURE — U0002 COVID-19 LAB TEST NON-CDC: HCPCS | Mod: QW,S$GLB,, | Performed by: PHYSICIAN ASSISTANT

## 2020-12-15 PROCEDURE — 3046F HEMOGLOBIN A1C LEVEL >9.0%: CPT | Mod: CPTII,S$GLB,, | Performed by: PHYSICIAN ASSISTANT

## 2020-12-15 RX ORDER — PROMETHAZINE HYDROCHLORIDE AND DEXTROMETHORPHAN HYDROBROMIDE 6.25; 15 MG/5ML; MG/5ML
5 SYRUP ORAL NIGHTLY PRN
Qty: 118 ML | Refills: 0 | Status: SHIPPED | OUTPATIENT
Start: 2020-12-15 | End: 2020-12-25

## 2020-12-15 NOTE — TELEPHONE ENCOUNTER
----- Message from Renita Kulkarni sent at 12/15/2020 10:54 AM CST -----  Type:  Needs Medical Advice    Who Called:  Pt  Quinannelisesa   Symptoms (please be specific):   States her son has been tested for the coronavirus and he is positive//she is concerned about herself//if she should be taking medication    How long has patient had these symptoms:     Pharmacy name and phone #:     Would the patient rather a call back or a response via [facility]?   Call back   Best Call Back Number:   [phone]  Additional Information:   please call to discuss//thanks/daryl

## 2020-12-15 NOTE — PATIENT INSTRUCTIONS
Instructions for Patients with Confirmed or Suspected COVID-19    If you are awaiting your test result, you will either be called or it will be released to the patient portal.  If you have any questions about your test, please visit [email or web address] or call our COVID-19 information line at [phone].      Instructions for non-hospitalized or discharged patients with confirmed or suspected COVID-19:       Stay home except to get medical care.    Separate yourself from other people and animals in your home.    Call ahead before visiting your doctor.    Wear a face mask.    Cover your coughs and sneezes.    Clean your hands often.    Avoid sharing personal household items.    Clean all high-touch surfaces every day.    Monitor your symptoms. Seek prompt medical attention if your illness is worsening (e.g., difficulty breathing). Before seeking care, call your healthcare provider.    If you have a medical emergency and must call 911, notify the dispatcher that you have or are being evaluated for COVID-19. If possible, put on a face mask before emergency medical services arrive.    Use the following symptom-based strategy to return to normal activity following a suspected or confirmed case of COVID-19. Continue isolation until:   o At least 3 days (72 hours) have passed since recovery defined as resolution of fever without the use of fever-reducing medications and improvement in respiratory symptoms (e.g. cough, shortness of breath), and   o At least 10 days have passed since the first positive test.       As one of the next steps, you will receive a call or text from the [place] Department of Health ([facility]) COVID-19 Tracing Team. See the contact information below so you know not to ignore the health departments call. It is important that you contact them back immediately so they can help.     Contact Tracer Number:  [phone]  Caller ID for most carriers: [place] Dept [facility]    What is  contact tracing?   Contact tracing is a process that helps identify everyone who has been in close contact with an infected person. Contact tracers let those people know they may have been exposed and guide them on next steps. Confidentiality is important for everyone; no one will be told who may have exposed them to the virus.   Your involvement is important. The more we know about where and how this virus is spreading, the better chance we have at stopping it from spreading further.  What does exposure mean?   Exposure means you have been within 6 feet for more than 15 minutes with a person who has or had COVID-19.  What kind of questions do the contact tracers ask?   A contact tracer will confirm your basic contact information including name, address, phone number, and next of kin, as well as asking about any symptoms you may have had. Theyll also ask you how you think you may have gotten sick, such as places where you may have been exposed to the virus, and people you were with. Those names will never be shared with anyone outside of that call, and will only be used to help trace and stop the spread of the virus.   I have privacy concerns. How will the state use my information?   Your privacy about your health is important. All calls are completed using call centers that use the appropriate health privacy protection measures (HIPAA compliance), meaning that your patient information is safe. No one will ever ask you any questions related to immigration status. Your health comes first.   Do I have to participate?   You do not have to participate, but we strongly encourage you to. Contact tracing can help us catch and control new outbreaks as theyre developing to keep your friends and family safe.   What if I dont hear from anyone?   If you dont receive a call within 24 hours, you can call the number above right away to inquire about your status. That line is open from 8:00 am - 8:00 p.m., 7 days a  week.  Contact tracing saves lives! Together, we have the power to beat this virus and keep our loved ones and neighbors safe.       Instructions for household members, intimate partners and caregivers in a non-healthcare setting of a patient with confirmed or suspected COVID-19:         Close contacts should monitor their health and call their healthcare provider right away if they develop symptoms suggestive of COVID-19 (e.g., fever, cough, shortness of breath).    Stay home except to get medical care. Separate yourself from other people and animals in the home.   Monitor the patients symptoms. If the patient is getting sicker, call his or her healthcare provider. If the patient has a medical emergency and you need to call 911, notify the dispatch personnel that the patient has or is being evaluated for COVID-19.    Wear a facemask when around other people such as sharing a room or vehicle and before entering a healthcare provider's office.   Cover coughs and sneezes with a tissue. Throw used tissues in a lined trash can immediately and wash hands.   Clean hands often with soap and water for at least 20 seconds or with an alcohol-based hand , rubbing hands together until they feel dry. Avoid touching your eyes, nose, and mouth with unwashed hands.   Clean all high-touch; surfaces every day, including counters, tabletops, doorknobs, bathroom fixtures, toilets, phones, keyboards, tablets, bedside tables, etc. Use a household cleaning spray or wipe according to label instructions.   Avoid sharing personal household items such as dishes, drinking glasses, cups, towels, bedding, etc. After these items are used, they should be washed thoroughly with soap and water.   Continue isolation until:   At least 3 days (72 hours) have passed since recovery defined as resolution of fever without the use of fever-reducing medications and improvement in respiratory symptoms (e.g. cough, shortness of breath),  and    At least 10 days have passed since the patients first positive test.    https://www.cdc.gov/coronavirus/2019-ncov/your-health/index.htm

## 2020-12-15 NOTE — TELEPHONE ENCOUNTER
Spoke with pt advices given to take isolate son in room, good hand washing technique,  usage of single bedroom, plastic dinnerware and follow instruction given by physician and CDC ; Mother verbalized understanding

## 2020-12-15 NOTE — PROGRESS NOTES
"Subjective:      Patient ID: Chan Dwyer is a 50 y.o. female.    Chief Complaint: Headache    Chan Dwyer is a 50 y.o. female w/ PMH Diabetes who presents to clinic for URI symptoms that first began last night.  Admits headache, cough, stomach ache, diarrhea.  No nausea/vomiting.  No fevers/chills.  No sob. Took theraflu this morning.  Was exposed to positive COVID-19 patient on Saturday.      Review of Systems   Constitutional: Negative for chills, diaphoresis, fatigue and fever.   HENT: Negative for congestion, ear discharge, ear pain, postnasal drip, rhinorrhea, sinus pressure, sinus pain, sneezing and sore throat.    Respiratory: Positive for cough. Negative for shortness of breath and wheezing.    Cardiovascular: Negative for chest pain and palpitations.   Gastrointestinal: Positive for abdominal pain and diarrhea.   Musculoskeletal: Negative for back pain and myalgias.   Neurological: Positive for headaches.       Objective:   /72 (BP Location: Right arm)   Pulse 89   Resp 16   Ht 5' 4" (1.626 m)   Wt 56.1 kg (123 lb 9.1 oz)   SpO2 97%   BMI 21.21 kg/m²   Physical Exam  Vitals signs reviewed.   Constitutional:       General: She is not in acute distress.     Appearance: She is well-developed. She is not diaphoretic.      Comments: Video visit performed    HENT:      Head: Normocephalic and atraumatic.      Right Ear: External ear normal.      Left Ear: External ear normal.      Nose: Nose normal. No rhinorrhea.   Pulmonary:      Effort: Pulmonary effort is normal. No respiratory distress.   Musculoskeletal: Normal range of motion.   Skin:     Capillary Refill: Capillary refill takes less than 2 seconds.   Neurological:      Mental Status: She is alert and oriented to person, place, and time.      Motor: No abnormal muscle tone.   Psychiatric:         Mood and Affect: Mood normal.         Behavior: Behavior normal.         Thought Content: Thought content normal. "       Assessment:      1. COVID-19    2. Uncontrolled type 2 diabetes mellitus with hyperglycemia, without long-term current use of insulin       Plan:   COVID-19  Comments:  rapid COVID-19 positive, discussed infusion treatment, gave infusion handout, COVID surveillance program, disc. CDC guidelines   Orders:  -     POCT COVID-19 Rapid Screening; Future; Expected date: 12/15/2020  -     promethazine-dextromethorphan (PROMETHAZINE-DM) 6.25-15 mg/5 mL Syrp; Take 5 mLs by mouth nightly as needed.  Dispense: 118 mL; Refill: 0  -     promethazine-dextromethorphan (PROMETHAZINE-DM) 6.25-15 mg/5 mL Syrp; Take 5 mLs by mouth nightly as needed.  Dispense: 118 mL; Refill: 0  -     Ambulatory referral/consult to [facility]; Future; Expected date: 12/16/2020  -     COVID-19 Surveillance Program    Uncontrolled type 2 diabetes mellitus with hyperglycemia, without long-term current use of insulin  Comments:  patient is at inc risk of developing severe symptoms from COVID-19, enrolled in covid monitoring program, infusion treatment disc. w/ patient and order placed           Deepika Pascual PA-C   Physician Assistant   [facility]

## 2020-12-16 ENCOUNTER — TELEPHONE (OUTPATIENT)
Dept: INTERNAL MEDICINE | Facility: CLINIC | Age: 50
End: 2020-12-16

## 2020-12-16 NOTE — TELEPHONE ENCOUNTER
----- Message from Cate Enciso sent at 12/16/2020  8:15 AM CST -----  Contact: Chan Giles is calling because she has tested positive for Covid and wont be able to make it to her MRI and needs it rescheduled. I tried to reschedule it but nothing was available. May you please give her a call back at [phone].    Thanks  KT

## 2020-12-16 NOTE — TELEPHONE ENCOUNTER
S/AVE Giles and she states she cancelled her MRI due to her testing positive for Covid on yesterday. She would like to reschedule the MRI 10 days out to be sure she has done her quarantine. Advised her I will call to schedule it and give her a call back. She understood.     Called patient back and advised her the MRI has been rescheduled to 12/31/20 @ 6:15 pm. She understood with no further questions.

## 2020-12-17 ENCOUNTER — NURSE TRIAGE (OUTPATIENT)
Dept: ADMINISTRATIVE | Facility: CLINIC | Age: 50
End: 2020-12-17

## 2020-12-17 ENCOUNTER — TELEPHONE (OUTPATIENT)
Dept: ADMINISTRATIVE | Facility: CLINIC | Age: 50
End: 2020-12-17

## 2020-12-17 ENCOUNTER — PATIENT MESSAGE (OUTPATIENT)
Dept: ADMINISTRATIVE | Facility: OTHER | Age: 50
End: 2020-12-17

## 2020-12-17 NOTE — TELEPHONE ENCOUNTER
Pt contacted for Surveillance no response escalation - Pt reports she just picked up her pulse ox and will enter VS once she is in her house.  Denies any fever or SOB, pt able to speak in full sentences without noted SOB or cough. Info only  protocol used and pt advised on home care. Pt instructed to call OOC for worsening of symptoms or health questions.

## 2020-12-17 NOTE — TELEPHONE ENCOUNTER
Called pt for enrollment in Covid Surveillance program. Pt verified name and . Pulse ox was not ordered for pt so had ARLEN place order. Explained how to use pulse ox and advised if SpO2 ever falls below 94 and does not increase after a few deep breaths to go to ED for evaluation. Got symptom info over the phone and all WNL so no further triage required at this time. Gave number to OOC for worsening symptoms or any further concerns. PES agent, AS, will call pt back for completion of enrollment process.    Sp02: no pulse ox    Pulse: no pulse ox    Temperature: no thermometer    SOB at rest (0-5): 0    SOB with activity (0-5): 0    Worsening symptoms: no    Fever symptoms: no    Increased home oxygen: n/a    Called patient to review COVID-19 Surveillance Program enrollment process.    Smartphone: yes    [facility]: yes    Program consent: pending    Pulse oximeter status: pending p/u    Verified emergency contacts: pending    Program Overview: Reviewed , no response process, and importance of correct emergency contacts in event that well-being check is warranted. Patient will call [phone]  to speak with an OnCall nurse, if needed. Pt aware that if Sp02 <94% or if they have any worsening symptoms, they need to go to the emergency department. If they are having a medical emergency, they will call 911. Otherwise, patient will continue to submit data as scheduled. Reviewed importance of wearing mask if self or family members leave the house.     Patient had no further questions.        Reason for Disposition   Information only question and nurse able to answer    Protocols used: INFORMATION ONLY CALL - NO TRIAGE-A-OH

## 2020-12-18 ENCOUNTER — NURSE TRIAGE (OUTPATIENT)
Dept: ADMINISTRATIVE | Facility: CLINIC | Age: 50
End: 2020-12-18

## 2020-12-18 ENCOUNTER — PATIENT MESSAGE (OUTPATIENT)
Dept: ADMINISTRATIVE | Facility: OTHER | Age: 50
End: 2020-12-18

## 2020-12-18 NOTE — TELEPHONE ENCOUNTER
O2 98%, HR 88, T ?, Fever N, Worse N, Home oxygen N/A, SOB at rest 0 / with activity 0       Covid surveillance program escalation due to no response after 8 am push. Follow up call, assessment above. Patient reports headache and nausea without vomiting. denies diarrhea, SOB, chest pain or pressure.     Reason for Disposition   Information only question and nurse able to answer    Additional Information   Negative: Nursing judgment   Negative: Nursing judgment   Negative: Nursing judgment   Negative: Nursing judgment    Protocols used: INFORMATION ONLY CALL - NO TRIAGE-A-OH

## 2020-12-18 NOTE — TELEPHONE ENCOUNTER
Patient escalated for SOB 4/5 at rest and with activity. Unable to reach pt or EC listed. Televerdet message sent.    Reason for Disposition   Caller has cancelled the call before the first contact    Additional Information   Negative: Caller has already spoken with the PCP (or office), and has no further questions   Negative: Caller has already spoken with another triager and has no further questions   Negative: Caller has already spoken with another triager or PCP (or office), and has further questions and triager able to answer questions.   Negative: Busy signal.  First attempt to contact caller.  Follow-up call scheduled within 15 minutes.   Negative: No answer.  First attempt to contact caller.  Follow-up call scheduled within 15 minutes.   Negative: Message left on identified voicemail   Negative: Message left on unidentified voice mail. Phone number verified.   Negative: Message left with person in household   Negative: Wrong number reached. Phone number verified.   Negative: Second attempt to contact family AND no contact made. Phone number verified.   Negative: Cell phone out of range. Phone number verified.   Negative: Patient already left for the hospital/clinic   Negative: Unable to complete triage due to phone connection issues    Protocols used: NO CONTACT OR DUPLICATE CONTACT CALL-A-OH

## 2020-12-18 NOTE — TELEPHONE ENCOUNTER
Unable to reach patient at this time.    Reason for Disposition   Caller has already spoken with another triager and has no further questions    Additional Information   Negative: Caller has already spoken with the PCP (or office), and has no further questions    Protocols used: NO CONTACT OR DUPLICATE CONTACT CALL-A-OH

## 2020-12-18 NOTE — TELEPHONE ENCOUNTER
Patient initially escalated due to no response, 1st attempt to call: no answer; no contact; verified phone number; LM on VM to call for further assistance [phone] or call 911 or go to nearest ER in an emergency situation. DropShip reminder sent.      Reason for Disposition   Message left on unidentified voice mail. Phone number verified.    Protocols used: NO CONTACT OR DUPLICATE CONTACT CALL-A-OH

## 2020-12-19 ENCOUNTER — NURSE TRIAGE (OUTPATIENT)
Dept: ADMINISTRATIVE | Facility: CLINIC | Age: 50
End: 2020-12-19

## 2020-12-19 ENCOUNTER — PATIENT MESSAGE (OUTPATIENT)
Dept: ADMINISTRATIVE | Facility: OTHER | Age: 50
End: 2020-12-19

## 2020-12-19 NOTE — TELEPHONE ENCOUNTER
Second consecutive no response escalation    Covid surveillance escalation due to no response after 8 am push. Follow up call attempted, no contact made. Left VM message. Sent Catapultt message.       Reason for Disposition   Message left on unidentified voice mail.  Phone number verified.    Additional Information   Negative: Caller is angry or rude (e.g., hangs up, verbally abusive, yelling)   Negative: Caller hangs up   Negative: Caller has already spoken with the PCP and has no further questions.   Negative: Caller has already spoken with another triager and has no further questions.   Negative: Caller has already spoken with another triager or PCP AND has further questions AND triager able to answer questions.   Negative: Busy signal.  First attempt to contact caller.  Follow-up call scheduled within 15 minutes.   Negative: No answer.  First attempt to contact caller.  Follow-up call scheduled within 15 minutes.   Negative: Message left on identified voice mail    Protocols used: NO CONTACT OR DUPLICATE CONTACT CALL-A-

## 2020-12-20 ENCOUNTER — NURSE TRIAGE (OUTPATIENT)
Dept: ADMINISTRATIVE | Facility: CLINIC | Age: 50
End: 2020-12-20

## 2020-12-20 NOTE — TELEPHONE ENCOUNTER
Pt contacted through Covid Surveillance Program for No response escalation. Pt in ED with  and is not howard to take vitals right now. States she is feeling fine. Speaking in complete sentences without difficulty. No audible wheezes noted. Advised to call back with concerns or worsening condition. Verbalized understanding.     Reason for Disposition   [1] Follow-up call to recent contact AND [2] information only call, no triage required    Additional Information   Negative: [1] Caller is not with the adult (patient) AND [2] reporting urgent symptoms   Negative: Lab result questions   Negative: Medication questions   Negative: Caller can't be reached by phone   Negative: Caller has already spoken to PCP or another triager   Negative: RN needs further essential information from caller in order to complete triage   Negative: Requesting regular office appointment   Negative: [1] Caller requesting NON-URGENT health information AND [2] PCP's office is the best resource    Protocols used: INFORMATION ONLY CALL - NO TRIAGE-A-

## 2020-12-20 NOTE — TELEPHONE ENCOUNTER
Patient escalated for no response. Patient stated that she's in the hospital with her  ( admitted).  Pt denies SOB, CP, or fever.     Reason for Disposition   Information only question and nurse able to answer    Additional Information   Negative: Nursing judgment   Negative: Nursing judgment   Negative: Nursing judgment   Negative: Nursing judgment    Protocols used: INFORMATION ONLY CALL - NO TRIAGE-A-OH

## 2020-12-21 ENCOUNTER — PATIENT MESSAGE (OUTPATIENT)
Dept: ADMINISTRATIVE | Facility: OTHER | Age: 50
End: 2020-12-21

## 2020-12-21 ENCOUNTER — NURSE TRIAGE (OUTPATIENT)
Dept: ADMINISTRATIVE | Facility: CLINIC | Age: 50
End: 2020-12-21

## 2020-12-21 NOTE — TELEPHONE ENCOUNTER
[id number] Call placed to [phone]: COVID-19 surveillance program patient; Patient initially escalated due to no response, however patient states that she is at the hospital with her  and will enter her VS/Symptoms in when she gets home later; patient answered symptoms questions during call and they did not trigger a second escalation; therefore, no follow up call is needed at this time.    SOB at rest (0-5): 0  SOB with activity (0-5): 0  Worsening symptoms: no  Fever symptoms: no  Increased home oxygen: n/a      Reason for Disposition   Health Information question, no triage required and triager able to answer question    Protocols used: INFORMATION ONLY CALL - NO TRIAGE-A-

## 2020-12-22 ENCOUNTER — PATIENT MESSAGE (OUTPATIENT)
Dept: ADMINISTRATIVE | Facility: OTHER | Age: 50
End: 2020-12-22

## 2020-12-22 ENCOUNTER — NURSE TRIAGE (OUTPATIENT)
Dept: ADMINISTRATIVE | Facility: CLINIC | Age: 50
End: 2020-12-22

## 2020-12-22 NOTE — TELEPHONE ENCOUNTER
"Call placed to [phone]: COVID-19 surveillance program patient; patient's identity verified with name and ; patient denies chest pain, fever, SOB, or difficulty breathing; Patient initially escalated due to answering "yes" to worsening symptoms, however patient states that she felt worse this morning but feels better now; Vital signs and symptoms did not trigger a second escalation and patient has no further questions therefore, no further triagel is needed at this time. Advised to call back if further assistance is needed; with chest pains, 93% SpO2 or lower, or difficulty breathing to call 911 or go to the nearest ED, wear a mask and call ahead to alert them of COVID-19 status Patient verbalizes understanding and agrees to follow care advice given.    Sp02: 97  Pulse: 90  Temperature: none entered  SOB at rest (0-5): 1  SOB with activity (0-5): 1  Worsening symptoms: yes (states that she is better now)  Fever symptoms: no  Increased home oxygen: no      Reason for Disposition   Information only question and nurse able to answer    Protocols used: INFORMATION ONLY CALL - NO TRIAGE-A-OH      "

## 2020-12-22 NOTE — TELEPHONE ENCOUNTER
Pt called due to no response and she said that she had worsening symptoms of no taste pt denied any issues she said that she jusr got home with her  and will restart his as well. Explained that she had been with her  but told her she has to be laura to put in vitals everyday and she said yes.    Reason for Disposition   Health Information question, no triage required and triager able to answer question    Protocols used: INFORMATION ONLY CALL - NO TRIAGE-A-

## 2020-12-23 ENCOUNTER — NURSE TRIAGE (OUTPATIENT)
Dept: ADMINISTRATIVE | Facility: CLINIC | Age: 50
End: 2020-12-23

## 2020-12-23 ENCOUNTER — PATIENT MESSAGE (OUTPATIENT)
Dept: ADMINISTRATIVE | Facility: OTHER | Age: 50
End: 2020-12-23

## 2020-12-24 ENCOUNTER — NURSE TRIAGE (OUTPATIENT)
Dept: ADMINISTRATIVE | Facility: CLINIC | Age: 50
End: 2020-12-24

## 2020-12-24 ENCOUNTER — PATIENT MESSAGE (OUTPATIENT)
Dept: ADMINISTRATIVE | Facility: OTHER | Age: 50
End: 2020-12-24

## 2020-12-25 ENCOUNTER — PATIENT MESSAGE (OUTPATIENT)
Dept: ADMINISTRATIVE | Facility: OTHER | Age: 50
End: 2020-12-25

## 2020-12-25 ENCOUNTER — TELEPHONE (OUTPATIENT)
Dept: ADMINISTRATIVE | Facility: CLINIC | Age: 50
End: 2020-12-25

## 2020-12-26 ENCOUNTER — PATIENT MESSAGE (OUTPATIENT)
Dept: ADMINISTRATIVE | Facility: OTHER | Age: 50
End: 2020-12-26

## 2020-12-26 ENCOUNTER — NURSE TRIAGE (OUTPATIENT)
Dept: ADMINISTRATIVE | Facility: CLINIC | Age: 50
End: 2020-12-26

## 2020-12-27 ENCOUNTER — PATIENT MESSAGE (OUTPATIENT)
Dept: ADMINISTRATIVE | Facility: OTHER | Age: 50
End: 2020-12-27

## 2020-12-27 ENCOUNTER — TELEPHONE (OUTPATIENT)
Dept: ADMINISTRATIVE | Facility: CLINIC | Age: 50
End: 2020-12-27

## 2020-12-28 ENCOUNTER — PATIENT MESSAGE (OUTPATIENT)
Dept: ADMINISTRATIVE | Facility: OTHER | Age: 50
End: 2020-12-28

## 2020-12-29 ENCOUNTER — PATIENT MESSAGE (OUTPATIENT)
Dept: ADMINISTRATIVE | Facility: OTHER | Age: 50
End: 2020-12-29

## 2020-12-30 ENCOUNTER — HOSPITAL ENCOUNTER (OUTPATIENT)
Dept: RADIOLOGY | Facility: HOSPITAL | Age: 50
Discharge: HOME | End: 2020-12-30
Attending: FAMILY MEDICINE
Payer: COMMERCIAL

## 2020-12-30 DIAGNOSIS — R51.9 INTRACTABLE HEADACHE, UNSPECIFIED CHRONICITY PATTERN, UNSPECIFIED HEADACHE TYPE: ICD-10-CM

## 2020-12-30 PROCEDURE — 70551 MRI BRAIN WITHOUT CONTRAST: ICD-10-PCS | Mod: 26,,, | Performed by: RADIOLOGY

## 2020-12-30 PROCEDURE — 70551 MRI BRAIN STEM W/O DYE: CPT | Mod: 26,,, | Performed by: RADIOLOGY

## 2020-12-30 PROCEDURE — 70551 MRI BRAIN STEM W/O DYE: CPT | Mod: TC

## 2020-12-31 ENCOUNTER — OFFICE VISIT (OUTPATIENT)
Dept: INTERNAL MEDICINE | Facility: CLINIC | Age: 50
End: 2020-12-31
Payer: COMMERCIAL

## 2020-12-31 DIAGNOSIS — R05.9 COUGH: Primary | ICD-10-CM

## 2020-12-31 PROCEDURE — 3072F LOW RISK FOR RETINOPATHY: CPT | Mod: ,,, | Performed by: FAMILY MEDICINE

## 2020-12-31 PROCEDURE — 3072F PR LOW RISK FOR RETINOPATHY: ICD-10-PCS | Mod: ,,, | Performed by: FAMILY MEDICINE

## 2020-12-31 PROCEDURE — 99213 PR OFFICE/OUTPT VISIT, EST, LEVL III, 20-29 MIN: ICD-10-PCS | Mod: 95,,, | Performed by: FAMILY MEDICINE

## 2020-12-31 PROCEDURE — 99213 OFFICE O/P EST LOW 20 MIN: CPT | Mod: 95,,, | Performed by: FAMILY MEDICINE

## 2020-12-31 RX ORDER — AZITHROMYCIN 250 MG/1
TABLET, FILM COATED ORAL
Qty: 6 TABLET | Refills: 0 | Status: SHIPPED | OUTPATIENT
Start: 2020-12-31 | End: 2021-01-05

## 2020-12-31 RX ORDER — PROMETHAZINE HYDROCHLORIDE AND DEXTROMETHORPHAN HYDROBROMIDE 6.25; 15 MG/5ML; MG/5ML
5 SYRUP ORAL EVERY 4 HOURS PRN
Qty: 150 ML | Refills: 1 | Status: SHIPPED | OUTPATIENT
Start: 2020-12-31 | End: 2021-01-10

## 2021-01-07 ENCOUNTER — OFFICE VISIT (OUTPATIENT)
Dept: INTERNAL MEDICINE | Facility: CLINIC | Age: 51
End: 2021-01-07
Payer: COMMERCIAL

## 2021-01-07 VITALS
DIASTOLIC BLOOD PRESSURE: 70 MMHG | TEMPERATURE: 98 F | HEART RATE: 78 BPM | WEIGHT: 122.38 LBS | HEIGHT: 64 IN | SYSTOLIC BLOOD PRESSURE: 122 MMHG | BODY MASS INDEX: 20.89 KG/M2 | OXYGEN SATURATION: 95 %

## 2021-01-07 DIAGNOSIS — R35.0 URINARY FREQUENCY: Primary | ICD-10-CM

## 2021-01-07 DIAGNOSIS — H60.93 OTITIS EXTERNA OF BOTH EARS, UNSPECIFIED CHRONICITY, UNSPECIFIED TYPE: ICD-10-CM

## 2021-01-07 LAB
BILIRUB SERPL-MCNC: ABNORMAL MG/DL
BLOOD URINE, POC: ABNORMAL
CLARITY, POC UA: CLEAR
COLOR, POC UA: ABNORMAL
GLUCOSE UR QL STRIP: ABNORMAL
KETONES UR QL STRIP: ABNORMAL
LEUKOCYTE ESTERASE URINE, POC: ABNORMAL
NITRITE, POC UA: ABNORMAL
PH, POC UA: 6
PROTEIN, POC: ABNORMAL
SPECIFIC GRAVITY, POC UA: 1.01
UROBILINOGEN, POC UA: NORMAL

## 2021-01-07 PROCEDURE — 87077 CULTURE AEROBIC IDENTIFY: CPT

## 2021-01-07 PROCEDURE — 1125F PR PAIN SEVERITY QUANTIFIED, PAIN PRESENT: ICD-10-PCS | Mod: S$GLB,,, | Performed by: FAMILY MEDICINE

## 2021-01-07 PROCEDURE — 81002 POCT URINE DIPSTICK WITHOUT MICROSCOPE: ICD-10-PCS | Mod: S$GLB,,, | Performed by: FAMILY MEDICINE

## 2021-01-07 PROCEDURE — 87086 URINE CULTURE/COLONY COUNT: CPT

## 2021-01-07 PROCEDURE — 87088 URINE BACTERIA CULTURE: CPT

## 2021-01-07 PROCEDURE — 1125F AMNT PAIN NOTED PAIN PRSNT: CPT | Mod: S$GLB,,, | Performed by: FAMILY MEDICINE

## 2021-01-07 PROCEDURE — 87186 SC STD MICRODIL/AGAR DIL: CPT

## 2021-01-07 PROCEDURE — 81002 URINALYSIS NONAUTO W/O SCOPE: CPT | Mod: S$GLB,,, | Performed by: FAMILY MEDICINE

## 2021-01-07 PROCEDURE — 99213 PR OFFICE/OUTPT VISIT, EST, LEVL III, 20-29 MIN: ICD-10-PCS | Mod: 25,S$GLB,, | Performed by: FAMILY MEDICINE

## 2021-01-07 PROCEDURE — 99999 PR PBB SHADOW E&M-EST. PATIENT-LVL III: ICD-10-PCS | Mod: PBBFAC,,, | Performed by: FAMILY MEDICINE

## 2021-01-07 PROCEDURE — 99213 OFFICE O/P EST LOW 20 MIN: CPT | Mod: 25,S$GLB,, | Performed by: FAMILY MEDICINE

## 2021-01-07 PROCEDURE — 3072F PR LOW RISK FOR RETINOPATHY: ICD-10-PCS | Mod: S$GLB,,, | Performed by: FAMILY MEDICINE

## 2021-01-07 PROCEDURE — 99999 PR PBB SHADOW E&M-EST. PATIENT-LVL III: CPT | Mod: PBBFAC,,, | Performed by: FAMILY MEDICINE

## 2021-01-07 PROCEDURE — 3008F PR BODY MASS INDEX (BMI) DOCUMENTED: ICD-10-PCS | Mod: CPTII,S$GLB,, | Performed by: FAMILY MEDICINE

## 2021-01-07 PROCEDURE — 3072F LOW RISK FOR RETINOPATHY: CPT | Mod: S$GLB,,, | Performed by: FAMILY MEDICINE

## 2021-01-07 PROCEDURE — 3008F BODY MASS INDEX DOCD: CPT | Mod: CPTII,S$GLB,, | Performed by: FAMILY MEDICINE

## 2021-01-07 RX ORDER — NEOMYCIN SULFATE, POLYMYXIN B SULFATE AND HYDROCORTISONE 10; 3.5; 1 MG/ML; MG/ML; [USP'U]/ML
3 SUSPENSION/ DROPS AURICULAR (OTIC) 3 TIMES DAILY
Qty: 10 ML | Refills: 1 | Status: SHIPPED | OUTPATIENT
Start: 2021-01-07 | End: 2021-01-22

## 2021-01-10 LAB — BACTERIA UR CULT: ABNORMAL

## 2021-01-11 RX ORDER — AMOXICILLIN 875 MG/1
875 TABLET, FILM COATED ORAL EVERY 12 HOURS
Qty: 20 TABLET | Refills: 0 | Status: SHIPPED | OUTPATIENT
Start: 2021-01-11 | End: 2021-01-22

## 2021-01-15 ENCOUNTER — PATIENT OUTREACH (OUTPATIENT)
Dept: ADMINISTRATIVE | Facility: HOSPITAL | Age: 51
End: 2021-01-15

## 2021-01-22 ENCOUNTER — TELEPHONE (OUTPATIENT)
Dept: INTERNAL MEDICINE | Facility: CLINIC | Age: 51
End: 2021-01-22

## 2021-01-22 ENCOUNTER — OFFICE VISIT (OUTPATIENT)
Dept: INTERNAL MEDICINE | Facility: CLINIC | Age: 51
End: 2021-01-22
Payer: COMMERCIAL

## 2021-01-22 DIAGNOSIS — R63.4 WEIGHT LOSS: ICD-10-CM

## 2021-01-22 DIAGNOSIS — Z00.129 ENCOUNTER FOR ROUTINE CHILD HEALTH EXAMINATION WITHOUT ABNORMAL FINDINGS: ICD-10-CM

## 2021-01-22 DIAGNOSIS — Z12.31 ENCOUNTER FOR SCREENING MAMMOGRAM FOR MALIGNANT NEOPLASM OF BREAST: Primary | ICD-10-CM

## 2021-01-22 DIAGNOSIS — R30.0 DYSURIA: Primary | ICD-10-CM

## 2021-01-22 PROCEDURE — 99213 OFFICE O/P EST LOW 20 MIN: CPT | Mod: 95,,, | Performed by: FAMILY MEDICINE

## 2021-01-22 PROCEDURE — 3072F PR LOW RISK FOR RETINOPATHY: ICD-10-PCS | Mod: ,,, | Performed by: FAMILY MEDICINE

## 2021-01-22 PROCEDURE — 99213 PR OFFICE/OUTPT VISIT, EST, LEVL III, 20-29 MIN: ICD-10-PCS | Mod: 95,,, | Performed by: FAMILY MEDICINE

## 2021-01-22 PROCEDURE — 3072F LOW RISK FOR RETINOPATHY: CPT | Mod: ,,, | Performed by: FAMILY MEDICINE

## 2021-01-22 RX ORDER — CIPROFLOXACIN 500 MG/1
500 TABLET ORAL 2 TIMES DAILY
Qty: 10 TABLET | Refills: 0 | Status: SHIPPED | OUTPATIENT
Start: 2021-01-22 | End: 2021-02-05

## 2021-01-22 RX ORDER — FLUCONAZOLE 150 MG/1
TABLET ORAL
Qty: 2 TABLET | Refills: 0 | Status: SHIPPED | OUTPATIENT
Start: 2021-01-22 | End: 2021-02-05

## 2021-01-26 ENCOUNTER — LAB VISIT (OUTPATIENT)
Dept: LAB | Facility: HOSPITAL | Age: 51
End: 2021-01-26
Payer: COMMERCIAL

## 2021-01-26 ENCOUNTER — LAB VISIT (OUTPATIENT)
Dept: LAB | Facility: HOSPITAL | Age: 51
End: 2021-01-26
Attending: PHYSICIAN ASSISTANT
Payer: COMMERCIAL

## 2021-01-26 ENCOUNTER — CLINICAL SUPPORT (OUTPATIENT)
Dept: DIABETES | Facility: CLINIC | Age: 51
End: 2021-01-26
Payer: COMMERCIAL

## 2021-01-26 VITALS — HEIGHT: 64 IN | BODY MASS INDEX: 20.85 KG/M2 | WEIGHT: 122.13 LBS

## 2021-01-26 DIAGNOSIS — E11.65 TYPE 2 DIABETES MELLITUS WITH HYPERGLYCEMIA, WITHOUT LONG-TERM CURRENT USE OF INSULIN: Primary | ICD-10-CM

## 2021-01-26 DIAGNOSIS — E11.65 TYPE 2 DIABETES MELLITUS WITH HYPERGLYCEMIA, WITHOUT LONG-TERM CURRENT USE OF INSULIN: ICD-10-CM

## 2021-01-26 DIAGNOSIS — R30.0 DYSURIA: ICD-10-CM

## 2021-01-26 LAB
BACTERIA #/AREA URNS HPF: ABNORMAL /HPF
BILIRUB UR QL STRIP: NEGATIVE
CLARITY UR: ABNORMAL
COLOR UR: YELLOW
ESTIMATED AVG GLUCOSE: 137 MG/DL (ref 68–131)
GLUCOSE UR QL STRIP: ABNORMAL
HBA1C MFR BLD HPLC: 6.4 % (ref 4–5.6)
HGB UR QL STRIP: NEGATIVE
KETONES UR QL STRIP: ABNORMAL
LEUKOCYTE ESTERASE UR QL STRIP: NEGATIVE
MICROSCOPIC COMMENT: ABNORMAL
NITRITE UR QL STRIP: NEGATIVE
PH UR STRIP: 6 [PH] (ref 5–8)
PROT UR QL STRIP: NEGATIVE
RBC #/AREA URNS HPF: 5 /HPF (ref 0–4)
SP GR UR STRIP: >=1.03 (ref 1–1.03)
SQUAMOUS #/AREA URNS HPF: 13 /HPF
URN SPEC COLLECT METH UR: ABNORMAL
YEAST URNS QL MICRO: ABNORMAL

## 2021-01-26 PROCEDURE — 87086 URINE CULTURE/COLONY COUNT: CPT

## 2021-01-26 PROCEDURE — 83036 HEMOGLOBIN GLYCOSYLATED A1C: CPT

## 2021-01-26 PROCEDURE — G0108 DIAB MANAGE TRN  PER INDIV: HCPCS | Mod: S$GLB,,, | Performed by: DIETITIAN, REGISTERED

## 2021-01-26 PROCEDURE — 99999 PR PBB SHADOW E&M-EST. PATIENT-LVL II: ICD-10-PCS | Mod: PBBFAC,,, | Performed by: DIETITIAN, REGISTERED

## 2021-01-26 PROCEDURE — 99999 PR PBB SHADOW E&M-EST. PATIENT-LVL II: CPT | Mod: PBBFAC,,, | Performed by: DIETITIAN, REGISTERED

## 2021-01-26 PROCEDURE — 81000 URINALYSIS NONAUTO W/SCOPE: CPT

## 2021-01-26 PROCEDURE — G0108 PR DIAB MANAGE TRN  PER INDIV: ICD-10-PCS | Mod: S$GLB,,, | Performed by: DIETITIAN, REGISTERED

## 2021-01-26 PROCEDURE — 36415 COLL VENOUS BLD VENIPUNCTURE: CPT

## 2021-01-27 LAB — BACTERIA UR CULT: NO GROWTH

## 2021-01-28 ENCOUNTER — TELEPHONE (OUTPATIENT)
Dept: INTERNAL MEDICINE | Facility: CLINIC | Age: 51
End: 2021-01-28

## 2021-01-28 ENCOUNTER — PATIENT OUTREACH (OUTPATIENT)
Dept: ADMINISTRATIVE | Facility: HOSPITAL | Age: 51
End: 2021-01-28

## 2021-02-04 ENCOUNTER — PATIENT OUTREACH (OUTPATIENT)
Dept: ADMINISTRATIVE | Facility: OTHER | Age: 51
End: 2021-02-04

## 2021-02-05 ENCOUNTER — OFFICE VISIT (OUTPATIENT)
Dept: NEUROLOGY | Facility: CLINIC | Age: 51
End: 2021-02-05
Payer: COMMERCIAL

## 2021-02-05 VITALS
HEART RATE: 66 BPM | HEIGHT: 64 IN | SYSTOLIC BLOOD PRESSURE: 124 MMHG | DIASTOLIC BLOOD PRESSURE: 70 MMHG | BODY MASS INDEX: 20.82 KG/M2 | WEIGHT: 121.94 LBS

## 2021-02-05 DIAGNOSIS — N95.1 MENOPAUSE SYNDROME: ICD-10-CM

## 2021-02-05 DIAGNOSIS — M54.81 OCCIPITAL NEURALGIA, UNSPECIFIED LATERALITY: Primary | ICD-10-CM

## 2021-02-05 DIAGNOSIS — M47.816 ARTHRITIS, LUMBAR SPINE: ICD-10-CM

## 2021-02-05 DIAGNOSIS — E11.65 UNCONTROLLED TYPE 2 DIABETES MELLITUS WITH HYPERGLYCEMIA, WITHOUT LONG-TERM CURRENT USE OF INSULIN: ICD-10-CM

## 2021-02-05 DIAGNOSIS — M15.1 HEBERDEN NODES: ICD-10-CM

## 2021-02-05 DIAGNOSIS — R51.9 INTRACTABLE HEADACHE, UNSPECIFIED CHRONICITY PATTERN, UNSPECIFIED HEADACHE TYPE: ICD-10-CM

## 2021-02-05 DIAGNOSIS — G56.03 BILATERAL CARPAL TUNNEL SYNDROME: ICD-10-CM

## 2021-02-05 DIAGNOSIS — E11.9 CONTROLLED TYPE 2 DIABETES MELLITUS WITHOUT COMPLICATION, WITHOUT LONG-TERM CURRENT USE OF INSULIN: ICD-10-CM

## 2021-02-05 DIAGNOSIS — K21.9 GASTROESOPHAGEAL REFLUX DISEASE, UNSPECIFIED WHETHER ESOPHAGITIS PRESENT: ICD-10-CM

## 2021-02-05 DIAGNOSIS — E55.9 VITAMIN D DEFICIENCY: ICD-10-CM

## 2021-02-05 DIAGNOSIS — R51.9 CHRONIC DAILY HEADACHE: ICD-10-CM

## 2021-02-05 PROCEDURE — 99214 PR OFFICE/OUTPT VISIT, EST, LEVL IV, 30-39 MIN: ICD-10-PCS | Mod: S$GLB,,, | Performed by: NURSE PRACTITIONER

## 2021-02-05 PROCEDURE — 3044F PR MOST RECENT HEMOGLOBIN A1C LEVEL <7.0%: ICD-10-PCS | Mod: CPTII,S$GLB,, | Performed by: NURSE PRACTITIONER

## 2021-02-05 PROCEDURE — 3072F LOW RISK FOR RETINOPATHY: CPT | Mod: S$GLB,,, | Performed by: NURSE PRACTITIONER

## 2021-02-05 PROCEDURE — 99214 OFFICE O/P EST MOD 30 MIN: CPT | Mod: S$GLB,,, | Performed by: NURSE PRACTITIONER

## 2021-02-05 PROCEDURE — 3072F PR LOW RISK FOR RETINOPATHY: ICD-10-PCS | Mod: S$GLB,,, | Performed by: NURSE PRACTITIONER

## 2021-02-05 PROCEDURE — 3044F HG A1C LEVEL LT 7.0%: CPT | Mod: CPTII,S$GLB,, | Performed by: NURSE PRACTITIONER

## 2021-02-05 PROCEDURE — 99999 PR PBB SHADOW E&M-EST. PATIENT-LVL IV: CPT | Mod: PBBFAC,,, | Performed by: NURSE PRACTITIONER

## 2021-02-05 PROCEDURE — 99999 PR PBB SHADOW E&M-EST. PATIENT-LVL IV: ICD-10-PCS | Mod: PBBFAC,,, | Performed by: NURSE PRACTITIONER

## 2021-02-05 PROCEDURE — 3008F BODY MASS INDEX DOCD: CPT | Mod: CPTII,S$GLB,, | Performed by: NURSE PRACTITIONER

## 2021-02-05 PROCEDURE — 3008F PR BODY MASS INDEX (BMI) DOCUMENTED: ICD-10-PCS | Mod: CPTII,S$GLB,, | Performed by: NURSE PRACTITIONER

## 2021-02-05 PROCEDURE — 1126F PR PAIN SEVERITY QUANTIFIED, NO PAIN PRESENT: ICD-10-PCS | Mod: S$GLB,,, | Performed by: NURSE PRACTITIONER

## 2021-02-05 PROCEDURE — 1126F AMNT PAIN NOTED NONE PRSNT: CPT | Mod: S$GLB,,, | Performed by: NURSE PRACTITIONER

## 2021-02-05 RX ORDER — AMITRIPTYLINE HYDROCHLORIDE 50 MG/1
50 TABLET, FILM COATED ORAL NIGHTLY
Qty: 30 TABLET | Refills: 11 | Status: SHIPPED | OUTPATIENT
Start: 2021-02-05 | End: 2021-07-22

## 2021-02-09 ENCOUNTER — HOSPITAL ENCOUNTER (OUTPATIENT)
Dept: RADIOLOGY | Facility: HOSPITAL | Age: 51
Discharge: HOME | End: 2021-02-09
Attending: FAMILY MEDICINE
Payer: COMMERCIAL

## 2021-02-09 ENCOUNTER — TELEPHONE (OUTPATIENT)
Dept: INTERNAL MEDICINE | Facility: CLINIC | Age: 51
End: 2021-02-09

## 2021-02-09 DIAGNOSIS — Z12.31 ENCOUNTER FOR SCREENING MAMMOGRAM FOR MALIGNANT NEOPLASM OF BREAST: ICD-10-CM

## 2021-02-09 PROCEDURE — 77063 MAMMO DIGITAL SCREENING BILAT WITH TOMO: ICD-10-PCS | Mod: 26,,, | Performed by: RADIOLOGY

## 2021-02-09 PROCEDURE — 77067 SCR MAMMO BI INCL CAD: CPT | Mod: TC

## 2021-02-09 PROCEDURE — 77063 BREAST TOMOSYNTHESIS BI: CPT | Mod: 26,,, | Performed by: RADIOLOGY

## 2021-02-09 PROCEDURE — 77067 SCR MAMMO BI INCL CAD: CPT | Mod: 26,,, | Performed by: RADIOLOGY

## 2021-02-09 PROCEDURE — 77067 MAMMO DIGITAL SCREENING BILAT WITH TOMO: ICD-10-PCS | Mod: 26,,, | Performed by: RADIOLOGY

## 2021-02-19 ENCOUNTER — OFFICE VISIT (OUTPATIENT)
Dept: INTERNAL MEDICINE | Facility: CLINIC | Age: 51
End: 2021-02-19
Payer: COMMERCIAL

## 2021-02-19 ENCOUNTER — APPOINTMENT (OUTPATIENT)
Dept: RADIOLOGY | Facility: HOSPITAL | Age: 51
End: 2021-02-19
Attending: FAMILY MEDICINE
Payer: COMMERCIAL

## 2021-02-19 VITALS — BODY MASS INDEX: 21.5 KG/M2 | WEIGHT: 125.94 LBS | HEIGHT: 64 IN | TEMPERATURE: 98 F

## 2021-02-19 DIAGNOSIS — M79.672 LEFT FOOT PAIN: Primary | ICD-10-CM

## 2021-02-19 DIAGNOSIS — M79.672 LEFT FOOT PAIN: ICD-10-CM

## 2021-02-19 PROCEDURE — 73630 X-RAY EXAM OF FOOT: CPT | Mod: 26,LT,, | Performed by: RADIOLOGY

## 2021-02-19 PROCEDURE — 3072F PR LOW RISK FOR RETINOPATHY: ICD-10-PCS | Mod: S$GLB,,, | Performed by: FAMILY MEDICINE

## 2021-02-19 PROCEDURE — 73630 X-RAY EXAM OF FOOT: CPT | Mod: TC,PO,LT

## 2021-02-19 PROCEDURE — 99999 PR PBB SHADOW E&M-EST. PATIENT-LVL IV: ICD-10-PCS | Mod: PBBFAC,,, | Performed by: FAMILY MEDICINE

## 2021-02-19 PROCEDURE — 3008F PR BODY MASS INDEX (BMI) DOCUMENTED: ICD-10-PCS | Mod: CPTII,S$GLB,, | Performed by: FAMILY MEDICINE

## 2021-02-19 PROCEDURE — 3072F LOW RISK FOR RETINOPATHY: CPT | Mod: S$GLB,,, | Performed by: FAMILY MEDICINE

## 2021-02-19 PROCEDURE — 99213 PR OFFICE/OUTPT VISIT, EST, LEVL III, 20-29 MIN: ICD-10-PCS | Mod: S$GLB,,, | Performed by: FAMILY MEDICINE

## 2021-02-19 PROCEDURE — 3008F BODY MASS INDEX DOCD: CPT | Mod: CPTII,S$GLB,, | Performed by: FAMILY MEDICINE

## 2021-02-19 PROCEDURE — 99999 PR PBB SHADOW E&M-EST. PATIENT-LVL IV: CPT | Mod: PBBFAC,,, | Performed by: FAMILY MEDICINE

## 2021-02-19 PROCEDURE — 1125F PR PAIN SEVERITY QUANTIFIED, PAIN PRESENT: ICD-10-PCS | Mod: S$GLB,,, | Performed by: FAMILY MEDICINE

## 2021-02-19 PROCEDURE — 1125F AMNT PAIN NOTED PAIN PRSNT: CPT | Mod: S$GLB,,, | Performed by: FAMILY MEDICINE

## 2021-02-19 PROCEDURE — 73630 XR FOOT COMPLETE 3 VIEW LEFT: ICD-10-PCS | Mod: 26,LT,, | Performed by: RADIOLOGY

## 2021-02-19 PROCEDURE — 99213 OFFICE O/P EST LOW 20 MIN: CPT | Mod: S$GLB,,, | Performed by: FAMILY MEDICINE

## 2021-02-19 RX ORDER — DICLOFENAC SODIUM 10 MG/G
2 GEL TOPICAL 4 TIMES DAILY
Qty: 100 G | Refills: 1 | Status: SHIPPED | OUTPATIENT
Start: 2021-02-19 | End: 2021-04-09

## 2021-02-19 RX ORDER — MELOXICAM 15 MG/1
15 TABLET ORAL DAILY
Qty: 10 TABLET | Refills: 0 | Status: SHIPPED | OUTPATIENT
Start: 2021-02-19 | End: 2021-04-09

## 2021-03-11 ENCOUNTER — OFFICE VISIT (OUTPATIENT)
Dept: DIABETES | Facility: CLINIC | Age: 51
End: 2021-03-11
Payer: COMMERCIAL

## 2021-03-11 VITALS
WEIGHT: 127.88 LBS | RESPIRATION RATE: 18 BRPM | SYSTOLIC BLOOD PRESSURE: 121 MMHG | DIASTOLIC BLOOD PRESSURE: 69 MMHG | HEIGHT: 64 IN | BODY MASS INDEX: 21.83 KG/M2 | HEART RATE: 66 BPM

## 2021-03-11 DIAGNOSIS — E11.65 TYPE 2 DIABETES MELLITUS WITH HYPERGLYCEMIA, WITHOUT LONG-TERM CURRENT USE OF INSULIN: Primary | ICD-10-CM

## 2021-03-11 DIAGNOSIS — E55.9 VITAMIN D DEFICIENCY: ICD-10-CM

## 2021-03-11 LAB — GLUCOSE SERPL-MCNC: 170 MG/DL (ref 70–110)

## 2021-03-11 PROCEDURE — 99214 PR OFFICE/OUTPT VISIT, EST, LEVL IV, 30-39 MIN: ICD-10-PCS | Mod: S$GLB,,, | Performed by: PHYSICIAN ASSISTANT

## 2021-03-11 PROCEDURE — 3008F PR BODY MASS INDEX (BMI) DOCUMENTED: ICD-10-PCS | Mod: CPTII,S$GLB,, | Performed by: PHYSICIAN ASSISTANT

## 2021-03-11 PROCEDURE — 3072F LOW RISK FOR RETINOPATHY: CPT | Mod: S$GLB,,, | Performed by: PHYSICIAN ASSISTANT

## 2021-03-11 PROCEDURE — 99999 PR PBB SHADOW E&M-EST. PATIENT-LVL IV: ICD-10-PCS | Mod: PBBFAC,,, | Performed by: PHYSICIAN ASSISTANT

## 2021-03-11 PROCEDURE — 3072F PR LOW RISK FOR RETINOPATHY: ICD-10-PCS | Mod: S$GLB,,, | Performed by: PHYSICIAN ASSISTANT

## 2021-03-11 PROCEDURE — 3044F PR MOST RECENT HEMOGLOBIN A1C LEVEL <7.0%: ICD-10-PCS | Mod: CPTII,S$GLB,, | Performed by: PHYSICIAN ASSISTANT

## 2021-03-11 PROCEDURE — 1126F PR PAIN SEVERITY QUANTIFIED, NO PAIN PRESENT: ICD-10-PCS | Mod: S$GLB,,, | Performed by: PHYSICIAN ASSISTANT

## 2021-03-11 PROCEDURE — 99999 PR PBB SHADOW E&M-EST. PATIENT-LVL IV: CPT | Mod: PBBFAC,,, | Performed by: PHYSICIAN ASSISTANT

## 2021-03-11 PROCEDURE — 99214 OFFICE O/P EST MOD 30 MIN: CPT | Mod: S$GLB,,, | Performed by: PHYSICIAN ASSISTANT

## 2021-03-11 PROCEDURE — 3044F HG A1C LEVEL LT 7.0%: CPT | Mod: CPTII,S$GLB,, | Performed by: PHYSICIAN ASSISTANT

## 2021-03-11 PROCEDURE — 82962 POCT GLUCOSE, HAND-HELD DEVICE: ICD-10-PCS | Mod: S$GLB,,, | Performed by: PHYSICIAN ASSISTANT

## 2021-03-11 PROCEDURE — 82962 GLUCOSE BLOOD TEST: CPT | Mod: S$GLB,,, | Performed by: PHYSICIAN ASSISTANT

## 2021-03-11 PROCEDURE — 1126F AMNT PAIN NOTED NONE PRSNT: CPT | Mod: S$GLB,,, | Performed by: PHYSICIAN ASSISTANT

## 2021-03-11 PROCEDURE — 3008F BODY MASS INDEX DOCD: CPT | Mod: CPTII,S$GLB,, | Performed by: PHYSICIAN ASSISTANT

## 2021-03-11 RX ORDER — ORAL SEMAGLUTIDE 3 MG/1
3 TABLET ORAL DAILY
Qty: 30 TABLET | Refills: 0 | Status: SHIPPED | OUTPATIENT
Start: 2021-03-11 | End: 2021-04-12

## 2021-03-22 ENCOUNTER — IMMUNIZATION (OUTPATIENT)
Dept: PRIMARY CARE CLINIC | Facility: CLINIC | Age: 51
End: 2021-03-22

## 2021-03-22 DIAGNOSIS — Z23 NEED FOR VACCINATION: Primary | ICD-10-CM

## 2021-03-22 PROCEDURE — 0001A COVID-19, MRNA, LNP-S, PF, 30 MCG/0.3 ML DOSE VACCINE: CPT | Mod: CV19,S$GLB,, | Performed by: FAMILY MEDICINE

## 2021-03-22 PROCEDURE — 91300 COVID-19, MRNA, LNP-S, PF, 30 MCG/0.3 ML DOSE VACCINE: ICD-10-PCS | Mod: S$GLB,,, | Performed by: FAMILY MEDICINE

## 2021-03-22 PROCEDURE — 0001A COVID-19, MRNA, LNP-S, PF, 30 MCG/0.3 ML DOSE VACCINE: ICD-10-PCS | Mod: CV19,S$GLB,, | Performed by: FAMILY MEDICINE

## 2021-03-22 PROCEDURE — 91300 COVID-19, MRNA, LNP-S, PF, 30 MCG/0.3 ML DOSE VACCINE: CPT | Mod: S$GLB,,, | Performed by: FAMILY MEDICINE

## 2021-04-09 ENCOUNTER — OFFICE VISIT (OUTPATIENT)
Dept: NEUROLOGY | Facility: CLINIC | Age: 51
End: 2021-04-09
Payer: COMMERCIAL

## 2021-04-09 ENCOUNTER — TELEPHONE (OUTPATIENT)
Dept: NEUROLOGY | Facility: CLINIC | Age: 51
End: 2021-04-09

## 2021-04-09 DIAGNOSIS — E11.65 UNCONTROLLED TYPE 2 DIABETES MELLITUS WITH HYPERGLYCEMIA, WITHOUT LONG-TERM CURRENT USE OF INSULIN: ICD-10-CM

## 2021-04-09 DIAGNOSIS — G56.03 BILATERAL CARPAL TUNNEL SYNDROME: ICD-10-CM

## 2021-04-09 DIAGNOSIS — N95.1 MENOPAUSE SYNDROME: ICD-10-CM

## 2021-04-09 DIAGNOSIS — E55.9 VITAMIN D DEFICIENCY: ICD-10-CM

## 2021-04-09 DIAGNOSIS — E11.9 CONTROLLED TYPE 2 DIABETES MELLITUS WITHOUT COMPLICATION, WITHOUT LONG-TERM CURRENT USE OF INSULIN: ICD-10-CM

## 2021-04-09 DIAGNOSIS — M54.81 OCCIPITAL NEURALGIA, UNSPECIFIED LATERALITY: Primary | ICD-10-CM

## 2021-04-09 DIAGNOSIS — K21.9 GASTROESOPHAGEAL REFLUX DISEASE, UNSPECIFIED WHETHER ESOPHAGITIS PRESENT: ICD-10-CM

## 2021-04-09 DIAGNOSIS — R51.9 CHRONIC DAILY HEADACHE: ICD-10-CM

## 2021-04-09 DIAGNOSIS — M15.1 HEBERDEN NODES: ICD-10-CM

## 2021-04-09 DIAGNOSIS — R51.9 INTRACTABLE HEADACHE, UNSPECIFIED CHRONICITY PATTERN, UNSPECIFIED HEADACHE TYPE: ICD-10-CM

## 2021-04-09 DIAGNOSIS — M47.816 ARTHRITIS, LUMBAR SPINE: ICD-10-CM

## 2021-04-09 PROCEDURE — 99214 OFFICE O/P EST MOD 30 MIN: CPT | Mod: 95,,, | Performed by: NURSE PRACTITIONER

## 2021-04-09 PROCEDURE — 3072F LOW RISK FOR RETINOPATHY: CPT | Mod: ,,, | Performed by: NURSE PRACTITIONER

## 2021-04-09 PROCEDURE — 3072F PR LOW RISK FOR RETINOPATHY: ICD-10-PCS | Mod: ,,, | Performed by: NURSE PRACTITIONER

## 2021-04-09 PROCEDURE — 99214 PR OFFICE/OUTPT VISIT, EST, LEVL IV, 30-39 MIN: ICD-10-PCS | Mod: 95,,, | Performed by: NURSE PRACTITIONER

## 2021-04-12 ENCOUNTER — OFFICE VISIT (OUTPATIENT)
Dept: DIABETES | Facility: CLINIC | Age: 51
End: 2021-04-12
Payer: COMMERCIAL

## 2021-04-12 ENCOUNTER — IMMUNIZATION (OUTPATIENT)
Dept: PRIMARY CARE CLINIC | Facility: CLINIC | Age: 51
End: 2021-04-12

## 2021-04-12 DIAGNOSIS — E11.65 TYPE 2 DIABETES MELLITUS WITH HYPERGLYCEMIA, WITHOUT LONG-TERM CURRENT USE OF INSULIN: Primary | ICD-10-CM

## 2021-04-12 DIAGNOSIS — E55.9 VITAMIN D DEFICIENCY: ICD-10-CM

## 2021-04-12 DIAGNOSIS — Z23 NEED FOR VACCINATION: Primary | ICD-10-CM

## 2021-04-12 PROCEDURE — 0002A COVID-19, MRNA, LNP-S, PF, 30 MCG/0.3 ML DOSE VACCINE: CPT | Mod: CV19,S$GLB,, | Performed by: FAMILY MEDICINE

## 2021-04-12 PROCEDURE — 3044F PR MOST RECENT HEMOGLOBIN A1C LEVEL <7.0%: ICD-10-PCS | Mod: CPTII,,, | Performed by: PHYSICIAN ASSISTANT

## 2021-04-12 PROCEDURE — 0002A COVID-19, MRNA, LNP-S, PF, 30 MCG/0.3 ML DOSE VACCINE: ICD-10-PCS | Mod: CV19,S$GLB,, | Performed by: FAMILY MEDICINE

## 2021-04-12 PROCEDURE — 3044F HG A1C LEVEL LT 7.0%: CPT | Mod: CPTII,,, | Performed by: PHYSICIAN ASSISTANT

## 2021-04-12 PROCEDURE — 99212 PR OFFICE/OUTPT VISIT, EST, LEVL II, 10-19 MIN: ICD-10-PCS | Mod: 95,,, | Performed by: PHYSICIAN ASSISTANT

## 2021-04-12 PROCEDURE — 3072F LOW RISK FOR RETINOPATHY: CPT | Mod: ,,, | Performed by: PHYSICIAN ASSISTANT

## 2021-04-12 PROCEDURE — 91300 COVID-19, MRNA, LNP-S, PF, 30 MCG/0.3 ML DOSE VACCINE: ICD-10-PCS | Mod: S$GLB,,, | Performed by: FAMILY MEDICINE

## 2021-04-12 PROCEDURE — 91300 COVID-19, MRNA, LNP-S, PF, 30 MCG/0.3 ML DOSE VACCINE: CPT | Mod: S$GLB,,, | Performed by: FAMILY MEDICINE

## 2021-04-12 PROCEDURE — 99212 OFFICE O/P EST SF 10 MIN: CPT | Mod: 95,,, | Performed by: PHYSICIAN ASSISTANT

## 2021-04-12 PROCEDURE — 3072F PR LOW RISK FOR RETINOPATHY: ICD-10-PCS | Mod: ,,, | Performed by: PHYSICIAN ASSISTANT

## 2021-04-12 RX ORDER — ORAL SEMAGLUTIDE 7 MG/1
7 TABLET ORAL DAILY
Qty: 30 TABLET | Refills: 11 | Status: SHIPPED | OUTPATIENT
Start: 2021-04-12 | End: 2021-06-09

## 2021-04-14 ENCOUNTER — PATIENT MESSAGE (OUTPATIENT)
Dept: DIABETES | Facility: CLINIC | Age: 51
End: 2021-04-14

## 2021-04-28 ENCOUNTER — TELEPHONE (OUTPATIENT)
Dept: DIABETES | Facility: CLINIC | Age: 51
End: 2021-04-28

## 2021-04-28 ENCOUNTER — LAB VISIT (OUTPATIENT)
Dept: LAB | Facility: HOSPITAL | Age: 51
End: 2021-04-28
Payer: COMMERCIAL

## 2021-04-28 ENCOUNTER — LAB VISIT (OUTPATIENT)
Dept: LAB | Facility: HOSPITAL | Age: 51
End: 2021-04-28
Attending: FAMILY MEDICINE
Payer: COMMERCIAL

## 2021-04-28 ENCOUNTER — CLINICAL SUPPORT (OUTPATIENT)
Dept: DIABETES | Facility: CLINIC | Age: 51
End: 2021-04-28
Payer: COMMERCIAL

## 2021-04-28 VITALS — BODY MASS INDEX: 21 KG/M2 | WEIGHT: 123 LBS | HEIGHT: 64 IN

## 2021-04-28 DIAGNOSIS — E55.9 VITAMIN D DEFICIENCY: ICD-10-CM

## 2021-04-28 DIAGNOSIS — E11.65 TYPE 2 DIABETES MELLITUS WITH HYPERGLYCEMIA, WITHOUT LONG-TERM CURRENT USE OF INSULIN: ICD-10-CM

## 2021-04-28 DIAGNOSIS — E11.65 TYPE 2 DIABETES MELLITUS WITH HYPERGLYCEMIA, WITHOUT LONG-TERM CURRENT USE OF INSULIN: Primary | ICD-10-CM

## 2021-04-28 LAB
25(OH)D3+25(OH)D2 SERPL-MCNC: 35 NG/ML (ref 30–96)
ALBUMIN SERPL BCP-MCNC: 4 G/DL (ref 3.5–5.2)
ALBUMIN/CREAT UR: 8.3 UG/MG (ref 0–30)
ALP SERPL-CCNC: 106 U/L (ref 55–135)
ALT SERPL W/O P-5'-P-CCNC: 16 U/L (ref 10–44)
ANION GAP SERPL CALC-SCNC: 9 MMOL/L (ref 8–16)
AST SERPL-CCNC: 14 U/L (ref 10–40)
BASOPHILS # BLD AUTO: 0.02 K/UL (ref 0–0.2)
BASOPHILS NFR BLD: 0.3 % (ref 0–1.9)
BILIRUB SERPL-MCNC: 0.7 MG/DL (ref 0.1–1)
BUN SERPL-MCNC: 11 MG/DL (ref 6–20)
C PEPTIDE SERPL-MCNC: 1.58 NG/ML (ref 0.78–5.19)
CALCIUM SERPL-MCNC: 9.7 MG/DL (ref 8.7–10.5)
CHLORIDE SERPL-SCNC: 104 MMOL/L (ref 95–110)
CHOLEST SERPL-MCNC: 185 MG/DL (ref 120–199)
CHOLEST/HDLC SERPL: 2.7 {RATIO} (ref 2–5)
CO2 SERPL-SCNC: 29 MMOL/L (ref 23–29)
CREAT SERPL-MCNC: 0.8 MG/DL (ref 0.5–1.4)
CREAT UR-MCNC: 121 MG/DL (ref 15–325)
DIFFERENTIAL METHOD: ABNORMAL
EOSINOPHIL # BLD AUTO: 0.1 K/UL (ref 0–0.5)
EOSINOPHIL NFR BLD: 1.9 % (ref 0–8)
ERYTHROCYTE [DISTWIDTH] IN BLOOD BY AUTOMATED COUNT: 11.8 % (ref 11.5–14.5)
EST. GFR  (AFRICAN AMERICAN): >60 ML/MIN/1.73 M^2
EST. GFR  (NON AFRICAN AMERICAN): >60 ML/MIN/1.73 M^2
ESTIMATED AVG GLUCOSE: 194 MG/DL (ref 68–131)
GLUCOSE SERPL-MCNC: 293 MG/DL (ref 70–110)
GLUCOSE SERPL-MCNC: 293 MG/DL (ref 70–110)
HBA1C MFR BLD: 8.4 % (ref 4–5.6)
HCT VFR BLD AUTO: 42.8 % (ref 37–48.5)
HDLC SERPL-MCNC: 69 MG/DL (ref 40–75)
HDLC SERPL: 37.3 % (ref 20–50)
HGB BLD-MCNC: 13.6 G/DL (ref 12–16)
IMM GRANULOCYTES # BLD AUTO: 0.02 K/UL (ref 0–0.04)
IMM GRANULOCYTES NFR BLD AUTO: 0.3 % (ref 0–0.5)
LDLC SERPL CALC-MCNC: 99.4 MG/DL (ref 63–159)
LYMPHOCYTES # BLD AUTO: 2.3 K/UL (ref 1–4.8)
LYMPHOCYTES NFR BLD: 36.9 % (ref 18–48)
MCH RBC QN AUTO: 28.2 PG (ref 27–31)
MCHC RBC AUTO-ENTMCNC: 31.8 G/DL (ref 32–36)
MCV RBC AUTO: 89 FL (ref 82–98)
MICROALBUMIN UR DL<=1MG/L-MCNC: 10 UG/ML
MONOCYTES # BLD AUTO: 0.2 K/UL (ref 0.3–1)
MONOCYTES NFR BLD: 3.3 % (ref 4–15)
NEUTROPHILS # BLD AUTO: 3.6 K/UL (ref 1.8–7.7)
NEUTROPHILS NFR BLD: 57.3 % (ref 38–73)
NONHDLC SERPL-MCNC: 116 MG/DL
NRBC BLD-RTO: 0 /100 WBC
PLATELET # BLD AUTO: 273 K/UL (ref 150–450)
PMV BLD AUTO: 11.3 FL (ref 9.2–12.9)
POTASSIUM SERPL-SCNC: 3.9 MMOL/L (ref 3.5–5.1)
PROT SERPL-MCNC: 8 G/DL (ref 6–8.4)
RBC # BLD AUTO: 4.82 M/UL (ref 4–5.4)
SODIUM SERPL-SCNC: 142 MMOL/L (ref 136–145)
TRIGL SERPL-MCNC: 83 MG/DL (ref 30–150)
TSH SERPL DL<=0.005 MIU/L-ACNC: 1.37 UIU/ML (ref 0.4–4)
WBC # BLD AUTO: 6.31 K/UL (ref 3.9–12.7)

## 2021-04-28 PROCEDURE — 83036 HEMOGLOBIN GLYCOSYLATED A1C: CPT | Performed by: PHYSICIAN ASSISTANT

## 2021-04-28 PROCEDURE — G0108 DIAB MANAGE TRN  PER INDIV: HCPCS | Mod: S$GLB,,, | Performed by: DIETITIAN, REGISTERED

## 2021-04-28 PROCEDURE — 82570 ASSAY OF URINE CREATININE: CPT | Performed by: PHYSICIAN ASSISTANT

## 2021-04-28 PROCEDURE — 80053 COMPREHEN METABOLIC PANEL: CPT | Performed by: PHYSICIAN ASSISTANT

## 2021-04-28 PROCEDURE — 84681 ASSAY OF C-PEPTIDE: CPT | Performed by: PHYSICIAN ASSISTANT

## 2021-04-28 PROCEDURE — 99999 PR PBB SHADOW E&M-EST. PATIENT-LVL II: ICD-10-PCS | Mod: PBBFAC,,, | Performed by: DIETITIAN, REGISTERED

## 2021-04-28 PROCEDURE — G0108 PR DIAB MANAGE TRN  PER INDIV: ICD-10-PCS | Mod: S$GLB,,, | Performed by: DIETITIAN, REGISTERED

## 2021-04-28 PROCEDURE — 82043 UR ALBUMIN QUANTITATIVE: CPT | Performed by: PHYSICIAN ASSISTANT

## 2021-04-28 PROCEDURE — 80061 LIPID PANEL: CPT | Performed by: PHYSICIAN ASSISTANT

## 2021-04-28 PROCEDURE — 85025 COMPLETE CBC W/AUTO DIFF WBC: CPT | Performed by: PHYSICIAN ASSISTANT

## 2021-04-28 PROCEDURE — 82306 VITAMIN D 25 HYDROXY: CPT | Performed by: PHYSICIAN ASSISTANT

## 2021-04-28 PROCEDURE — 36415 COLL VENOUS BLD VENIPUNCTURE: CPT | Performed by: PHYSICIAN ASSISTANT

## 2021-04-28 PROCEDURE — 82947 ASSAY GLUCOSE BLOOD QUANT: CPT | Performed by: PHYSICIAN ASSISTANT

## 2021-04-28 PROCEDURE — 99999 PR PBB SHADOW E&M-EST. PATIENT-LVL II: CPT | Mod: PBBFAC,,, | Performed by: DIETITIAN, REGISTERED

## 2021-04-28 PROCEDURE — 84443 ASSAY THYROID STIM HORMONE: CPT | Performed by: PHYSICIAN ASSISTANT

## 2021-04-28 RX ORDER — PIOGLITAZONEHYDROCHLORIDE 15 MG/1
15 TABLET ORAL DAILY
Qty: 30 TABLET | Refills: 11 | Status: SHIPPED | OUTPATIENT
Start: 2021-04-28 | End: 2022-02-24 | Stop reason: SDUPTHER

## 2021-06-03 ENCOUNTER — OFFICE VISIT (OUTPATIENT)
Dept: INTERNAL MEDICINE | Facility: CLINIC | Age: 51
End: 2021-06-03
Payer: COMMERCIAL

## 2021-06-03 VITALS
RESPIRATION RATE: 16 BRPM | OXYGEN SATURATION: 97 % | BODY MASS INDEX: 21.12 KG/M2 | HEART RATE: 80 BPM | TEMPERATURE: 98 F | WEIGHT: 123 LBS | DIASTOLIC BLOOD PRESSURE: 80 MMHG | SYSTOLIC BLOOD PRESSURE: 118 MMHG

## 2021-06-03 DIAGNOSIS — K59.00 CONSTIPATION, UNSPECIFIED CONSTIPATION TYPE: ICD-10-CM

## 2021-06-03 DIAGNOSIS — K64.9 HEMORRHOIDS, UNSPECIFIED HEMORRHOID TYPE: Primary | ICD-10-CM

## 2021-06-03 PROCEDURE — 99214 PR OFFICE/OUTPT VISIT, EST, LEVL IV, 30-39 MIN: ICD-10-PCS | Mod: S$GLB,,, | Performed by: FAMILY MEDICINE

## 2021-06-03 PROCEDURE — 99214 OFFICE O/P EST MOD 30 MIN: CPT | Mod: S$GLB,,, | Performed by: FAMILY MEDICINE

## 2021-06-03 PROCEDURE — 3072F LOW RISK FOR RETINOPATHY: CPT | Mod: S$GLB,,, | Performed by: FAMILY MEDICINE

## 2021-06-03 PROCEDURE — 1125F AMNT PAIN NOTED PAIN PRSNT: CPT | Mod: S$GLB,,, | Performed by: FAMILY MEDICINE

## 2021-06-03 PROCEDURE — 1125F PR PAIN SEVERITY QUANTIFIED, PAIN PRESENT: ICD-10-PCS | Mod: S$GLB,,, | Performed by: FAMILY MEDICINE

## 2021-06-03 PROCEDURE — 99999 PR PBB SHADOW E&M-EST. PATIENT-LVL IV: ICD-10-PCS | Mod: PBBFAC,,, | Performed by: FAMILY MEDICINE

## 2021-06-03 PROCEDURE — 3072F PR LOW RISK FOR RETINOPATHY: ICD-10-PCS | Mod: S$GLB,,, | Performed by: FAMILY MEDICINE

## 2021-06-03 PROCEDURE — 3008F PR BODY MASS INDEX (BMI) DOCUMENTED: ICD-10-PCS | Mod: CPTII,S$GLB,, | Performed by: FAMILY MEDICINE

## 2021-06-03 PROCEDURE — 3008F BODY MASS INDEX DOCD: CPT | Mod: CPTII,S$GLB,, | Performed by: FAMILY MEDICINE

## 2021-06-03 PROCEDURE — 99999 PR PBB SHADOW E&M-EST. PATIENT-LVL IV: CPT | Mod: PBBFAC,,, | Performed by: FAMILY MEDICINE

## 2021-06-03 RX ORDER — HYDROCORTISONE ACETATE 25 MG/1
25 SUPPOSITORY RECTAL 2 TIMES DAILY
Qty: 20 SUPPOSITORY | Refills: 0 | Status: SHIPPED | OUTPATIENT
Start: 2021-06-03 | End: 2021-06-13

## 2021-06-09 ENCOUNTER — CLINICAL SUPPORT (OUTPATIENT)
Dept: DIABETES | Facility: CLINIC | Age: 51
End: 2021-06-09
Payer: COMMERCIAL

## 2021-06-09 ENCOUNTER — LAB VISIT (OUTPATIENT)
Dept: LAB | Facility: HOSPITAL | Age: 51
End: 2021-06-09
Attending: PHYSICIAN ASSISTANT
Payer: COMMERCIAL

## 2021-06-09 ENCOUNTER — OFFICE VISIT (OUTPATIENT)
Dept: DIABETES | Facility: CLINIC | Age: 51
End: 2021-06-09
Payer: COMMERCIAL

## 2021-06-09 VITALS
BODY MASS INDEX: 20.63 KG/M2 | SYSTOLIC BLOOD PRESSURE: 117 MMHG | DIASTOLIC BLOOD PRESSURE: 62 MMHG | HEIGHT: 64 IN | WEIGHT: 120.81 LBS | RESPIRATION RATE: 18 BRPM | HEART RATE: 72 BPM

## 2021-06-09 DIAGNOSIS — E55.9 VITAMIN D DEFICIENCY: ICD-10-CM

## 2021-06-09 DIAGNOSIS — E11.65 TYPE 2 DIABETES MELLITUS WITH HYPERGLYCEMIA, WITHOUT LONG-TERM CURRENT USE OF INSULIN: ICD-10-CM

## 2021-06-09 DIAGNOSIS — E11.65 TYPE 2 DIABETES MELLITUS WITH HYPERGLYCEMIA, WITHOUT LONG-TERM CURRENT USE OF INSULIN: Primary | ICD-10-CM

## 2021-06-09 LAB
ESTIMATED AVG GLUCOSE: 214 MG/DL (ref 68–131)
GLUCOSE SERPL-MCNC: 225 MG/DL (ref 70–110)
HBA1C MFR BLD: 9.1 % (ref 4–5.6)

## 2021-06-09 PROCEDURE — 1126F PR PAIN SEVERITY QUANTIFIED, NO PAIN PRESENT: ICD-10-PCS | Mod: S$GLB,,, | Performed by: PHYSICIAN ASSISTANT

## 2021-06-09 PROCEDURE — 3008F PR BODY MASS INDEX (BMI) DOCUMENTED: ICD-10-PCS | Mod: CPTII,S$GLB,, | Performed by: PHYSICIAN ASSISTANT

## 2021-06-09 PROCEDURE — 99999 PR PBB SHADOW E&M-EST. PATIENT-LVL IV: CPT | Mod: PBBFAC,,, | Performed by: PHYSICIAN ASSISTANT

## 2021-06-09 PROCEDURE — 82962 POCT GLUCOSE, HAND-HELD DEVICE: ICD-10-PCS | Mod: S$GLB,,, | Performed by: PHYSICIAN ASSISTANT

## 2021-06-09 PROCEDURE — 3072F LOW RISK FOR RETINOPATHY: CPT | Mod: S$GLB,,, | Performed by: PHYSICIAN ASSISTANT

## 2021-06-09 PROCEDURE — 36415 COLL VENOUS BLD VENIPUNCTURE: CPT | Performed by: PHYSICIAN ASSISTANT

## 2021-06-09 PROCEDURE — 99999 PR PBB SHADOW E&M-EST. PATIENT-LVL IV: ICD-10-PCS | Mod: PBBFAC,,, | Performed by: PHYSICIAN ASSISTANT

## 2021-06-09 PROCEDURE — 82962 GLUCOSE BLOOD TEST: CPT | Mod: S$GLB,,, | Performed by: PHYSICIAN ASSISTANT

## 2021-06-09 PROCEDURE — 99214 PR OFFICE/OUTPT VISIT, EST, LEVL IV, 30-39 MIN: ICD-10-PCS | Mod: S$GLB,,, | Performed by: PHYSICIAN ASSISTANT

## 2021-06-09 PROCEDURE — 3008F BODY MASS INDEX DOCD: CPT | Mod: CPTII,S$GLB,, | Performed by: PHYSICIAN ASSISTANT

## 2021-06-09 PROCEDURE — 1126F AMNT PAIN NOTED NONE PRSNT: CPT | Mod: S$GLB,,, | Performed by: PHYSICIAN ASSISTANT

## 2021-06-09 PROCEDURE — 83036 HEMOGLOBIN GLYCOSYLATED A1C: CPT | Performed by: PHYSICIAN ASSISTANT

## 2021-06-09 PROCEDURE — 99214 OFFICE O/P EST MOD 30 MIN: CPT | Mod: S$GLB,,, | Performed by: PHYSICIAN ASSISTANT

## 2021-06-09 PROCEDURE — 3072F PR LOW RISK FOR RETINOPATHY: ICD-10-PCS | Mod: S$GLB,,, | Performed by: PHYSICIAN ASSISTANT

## 2021-06-09 PROCEDURE — 3046F HEMOGLOBIN A1C LEVEL >9.0%: CPT | Mod: CPTII,S$GLB,, | Performed by: PHYSICIAN ASSISTANT

## 2021-06-09 PROCEDURE — 3046F PR MOST RECENT HEMOGLOBIN A1C LEVEL > 9.0%: ICD-10-PCS | Mod: CPTII,S$GLB,, | Performed by: PHYSICIAN ASSISTANT

## 2021-06-09 RX ORDER — INSULIN GLARGINE 100 [IU]/ML
10 INJECTION, SOLUTION SUBCUTANEOUS NIGHTLY
Qty: 3 ML | Refills: 11 | Status: SHIPPED | OUTPATIENT
Start: 2021-06-09 | End: 2021-06-23 | Stop reason: SDUPTHER

## 2021-06-09 RX ORDER — ISOPROPYL ALCOHOL 70 ML/100ML
1 SWAB TOPICAL 4 TIMES DAILY
Qty: 100 EACH | Refills: 11 | Status: SHIPPED | OUTPATIENT
Start: 2021-06-09 | End: 2023-08-14

## 2021-06-09 RX ORDER — PEN NEEDLE, DIABETIC 30 GX3/16"
1 NEEDLE, DISPOSABLE MISCELLANEOUS
Qty: 100 EACH | Refills: 3 | Status: SHIPPED | OUTPATIENT
Start: 2021-06-09 | End: 2022-06-09

## 2021-06-10 ENCOUNTER — PATIENT MESSAGE (OUTPATIENT)
Dept: DIABETES | Facility: CLINIC | Age: 51
End: 2021-06-10

## 2021-06-14 ENCOUNTER — OFFICE VISIT (OUTPATIENT)
Dept: DIABETES | Facility: CLINIC | Age: 51
End: 2021-06-14
Payer: COMMERCIAL

## 2021-06-14 DIAGNOSIS — E11.65 TYPE 2 DIABETES MELLITUS WITH HYPERGLYCEMIA, WITHOUT LONG-TERM CURRENT USE OF INSULIN: Primary | ICD-10-CM

## 2021-06-14 DIAGNOSIS — E55.9 VITAMIN D DEFICIENCY: ICD-10-CM

## 2021-06-14 PROCEDURE — 3072F LOW RISK FOR RETINOPATHY: CPT | Mod: ,,, | Performed by: PHYSICIAN ASSISTANT

## 2021-06-14 PROCEDURE — 99214 OFFICE O/P EST MOD 30 MIN: CPT | Mod: 95,,, | Performed by: PHYSICIAN ASSISTANT

## 2021-06-14 PROCEDURE — 99214 PR OFFICE/OUTPT VISIT, EST, LEVL IV, 30-39 MIN: ICD-10-PCS | Mod: 95,,, | Performed by: PHYSICIAN ASSISTANT

## 2021-06-14 PROCEDURE — 3072F PR LOW RISK FOR RETINOPATHY: ICD-10-PCS | Mod: ,,, | Performed by: PHYSICIAN ASSISTANT

## 2021-06-14 PROCEDURE — 3046F HEMOGLOBIN A1C LEVEL >9.0%: CPT | Mod: CPTII,,, | Performed by: PHYSICIAN ASSISTANT

## 2021-06-14 PROCEDURE — 3046F PR MOST RECENT HEMOGLOBIN A1C LEVEL > 9.0%: ICD-10-PCS | Mod: CPTII,,, | Performed by: PHYSICIAN ASSISTANT

## 2021-06-14 RX ORDER — BLOOD-GLUCOSE SENSOR
1 EACH MISCELLANEOUS
Qty: 3 EACH | Refills: 11 | Status: SHIPPED | OUTPATIENT
Start: 2021-06-14 | End: 2021-10-05

## 2021-06-14 RX ORDER — BLOOD-GLUCOSE TRANSMITTER
1 EACH MISCELLANEOUS
Qty: 1 EACH | Refills: 3 | Status: SHIPPED | OUTPATIENT
Start: 2021-06-14 | End: 2021-10-05

## 2021-06-18 ENCOUNTER — OFFICE VISIT (OUTPATIENT)
Dept: DIABETES | Facility: CLINIC | Age: 51
End: 2021-06-18
Payer: COMMERCIAL

## 2021-06-18 DIAGNOSIS — E11.65 TYPE 2 DIABETES MELLITUS WITH HYPERGLYCEMIA, WITHOUT LONG-TERM CURRENT USE OF INSULIN: Primary | ICD-10-CM

## 2021-06-18 DIAGNOSIS — E55.9 VITAMIN D DEFICIENCY: ICD-10-CM

## 2021-06-18 PROCEDURE — 99213 OFFICE O/P EST LOW 20 MIN: CPT | Mod: 95,,, | Performed by: PHYSICIAN ASSISTANT

## 2021-06-18 PROCEDURE — 3072F LOW RISK FOR RETINOPATHY: CPT | Mod: ,,, | Performed by: PHYSICIAN ASSISTANT

## 2021-06-18 PROCEDURE — 3046F HEMOGLOBIN A1C LEVEL >9.0%: CPT | Mod: CPTII,,, | Performed by: PHYSICIAN ASSISTANT

## 2021-06-18 PROCEDURE — 3046F PR MOST RECENT HEMOGLOBIN A1C LEVEL > 9.0%: ICD-10-PCS | Mod: CPTII,,, | Performed by: PHYSICIAN ASSISTANT

## 2021-06-18 PROCEDURE — 99213 PR OFFICE/OUTPT VISIT, EST, LEVL III, 20-29 MIN: ICD-10-PCS | Mod: 95,,, | Performed by: PHYSICIAN ASSISTANT

## 2021-06-18 PROCEDURE — 3072F PR LOW RISK FOR RETINOPATHY: ICD-10-PCS | Mod: ,,, | Performed by: PHYSICIAN ASSISTANT

## 2021-06-23 ENCOUNTER — OFFICE VISIT (OUTPATIENT)
Dept: DIABETES | Facility: CLINIC | Age: 51
End: 2021-06-23
Payer: COMMERCIAL

## 2021-06-23 ENCOUNTER — TELEPHONE (OUTPATIENT)
Dept: DIABETES | Facility: CLINIC | Age: 51
End: 2021-06-23

## 2021-06-23 DIAGNOSIS — E11.65 TYPE 2 DIABETES MELLITUS WITH HYPERGLYCEMIA, WITHOUT LONG-TERM CURRENT USE OF INSULIN: ICD-10-CM

## 2021-06-23 DIAGNOSIS — E55.9 VITAMIN D DEFICIENCY: Primary | ICD-10-CM

## 2021-06-23 PROCEDURE — 3046F PR MOST RECENT HEMOGLOBIN A1C LEVEL > 9.0%: ICD-10-PCS | Mod: CPTII,,, | Performed by: PHYSICIAN ASSISTANT

## 2021-06-23 PROCEDURE — 99499 NO LOS: ICD-10-PCS | Mod: 95,,, | Performed by: PHYSICIAN ASSISTANT

## 2021-06-23 PROCEDURE — 3046F HEMOGLOBIN A1C LEVEL >9.0%: CPT | Mod: CPTII,,, | Performed by: PHYSICIAN ASSISTANT

## 2021-06-23 PROCEDURE — 3072F PR LOW RISK FOR RETINOPATHY: ICD-10-PCS | Mod: ,,, | Performed by: PHYSICIAN ASSISTANT

## 2021-06-23 PROCEDURE — 99499 UNLISTED E&M SERVICE: CPT | Mod: 95,,, | Performed by: PHYSICIAN ASSISTANT

## 2021-06-23 PROCEDURE — 3072F LOW RISK FOR RETINOPATHY: CPT | Mod: ,,, | Performed by: PHYSICIAN ASSISTANT

## 2021-06-23 RX ORDER — INSULIN GLARGINE 100 [IU]/ML
20 INJECTION, SOLUTION SUBCUTANEOUS NIGHTLY
Qty: 6 ML | Refills: 11 | Status: SHIPPED | OUTPATIENT
Start: 2021-06-23 | End: 2022-01-27 | Stop reason: SDUPTHER

## 2021-07-01 ENCOUNTER — CLINICAL SUPPORT (OUTPATIENT)
Dept: DIABETES | Facility: CLINIC | Age: 51
End: 2021-07-01
Payer: COMMERCIAL

## 2021-07-01 ENCOUNTER — PATIENT OUTREACH (OUTPATIENT)
Dept: ADMINISTRATIVE | Facility: OTHER | Age: 51
End: 2021-07-01

## 2021-07-01 VITALS — BODY MASS INDEX: 21.3 KG/M2 | WEIGHT: 124.75 LBS | HEIGHT: 64 IN

## 2021-07-01 DIAGNOSIS — E11.65 TYPE 2 DIABETES MELLITUS WITH HYPERGLYCEMIA, WITHOUT LONG-TERM CURRENT USE OF INSULIN: Primary | ICD-10-CM

## 2021-07-01 PROCEDURE — 99999 PR PBB SHADOW E&M-EST. PATIENT-LVL II: ICD-10-PCS | Mod: PBBFAC,,, | Performed by: DIETITIAN, REGISTERED

## 2021-07-01 PROCEDURE — 99999 PR PBB SHADOW E&M-EST. PATIENT-LVL II: CPT | Mod: PBBFAC,,, | Performed by: DIETITIAN, REGISTERED

## 2021-07-01 PROCEDURE — G0108 PR DIAB MANAGE TRN  PER INDIV: ICD-10-PCS | Mod: S$GLB,,, | Performed by: DIETITIAN, REGISTERED

## 2021-07-01 PROCEDURE — G0108 DIAB MANAGE TRN  PER INDIV: HCPCS | Mod: S$GLB,,, | Performed by: DIETITIAN, REGISTERED

## 2021-07-13 ENCOUNTER — PATIENT MESSAGE (OUTPATIENT)
Dept: DIABETES | Facility: CLINIC | Age: 51
End: 2021-07-13

## 2021-07-14 DIAGNOSIS — E11.65 TYPE 2 DIABETES MELLITUS WITH HYPERGLYCEMIA, WITHOUT LONG-TERM CURRENT USE OF INSULIN: Primary | ICD-10-CM

## 2021-07-21 ENCOUNTER — TELEPHONE (OUTPATIENT)
Dept: DIABETES | Facility: CLINIC | Age: 51
End: 2021-07-21

## 2021-07-22 ENCOUNTER — OFFICE VISIT (OUTPATIENT)
Dept: OBSTETRICS AND GYNECOLOGY | Facility: CLINIC | Age: 51
End: 2021-07-22
Payer: COMMERCIAL

## 2021-07-22 VITALS
HEIGHT: 64 IN | BODY MASS INDEX: 22.1 KG/M2 | WEIGHT: 129.44 LBS | SYSTOLIC BLOOD PRESSURE: 132 MMHG | DIASTOLIC BLOOD PRESSURE: 76 MMHG

## 2021-07-22 DIAGNOSIS — Z01.419 WELL WOMAN EXAM WITH ROUTINE GYNECOLOGICAL EXAM: Primary | ICD-10-CM

## 2021-07-22 PROCEDURE — 99999 PR PBB SHADOW E&M-EST. PATIENT-LVL III: CPT | Mod: PBBFAC,,, | Performed by: OBSTETRICS & GYNECOLOGY

## 2021-07-22 PROCEDURE — 3046F PR MOST RECENT HEMOGLOBIN A1C LEVEL > 9.0%: ICD-10-PCS | Mod: CPTII,S$GLB,, | Performed by: OBSTETRICS & GYNECOLOGY

## 2021-07-22 PROCEDURE — 3072F PR LOW RISK FOR RETINOPATHY: ICD-10-PCS | Mod: CPTII,S$GLB,, | Performed by: OBSTETRICS & GYNECOLOGY

## 2021-07-22 PROCEDURE — 3046F HEMOGLOBIN A1C LEVEL >9.0%: CPT | Mod: CPTII,S$GLB,, | Performed by: OBSTETRICS & GYNECOLOGY

## 2021-07-22 PROCEDURE — 99396 PR PREVENTIVE VISIT,EST,40-64: ICD-10-PCS | Mod: S$GLB,,, | Performed by: OBSTETRICS & GYNECOLOGY

## 2021-07-22 PROCEDURE — 1126F PR PAIN SEVERITY QUANTIFIED, NO PAIN PRESENT: ICD-10-PCS | Mod: CPTII,S$GLB,, | Performed by: OBSTETRICS & GYNECOLOGY

## 2021-07-22 PROCEDURE — 1126F AMNT PAIN NOTED NONE PRSNT: CPT | Mod: CPTII,S$GLB,, | Performed by: OBSTETRICS & GYNECOLOGY

## 2021-07-22 PROCEDURE — 3008F BODY MASS INDEX DOCD: CPT | Mod: CPTII,S$GLB,, | Performed by: OBSTETRICS & GYNECOLOGY

## 2021-07-22 PROCEDURE — 99396 PREV VISIT EST AGE 40-64: CPT | Mod: S$GLB,,, | Performed by: OBSTETRICS & GYNECOLOGY

## 2021-07-22 PROCEDURE — 99999 PR PBB SHADOW E&M-EST. PATIENT-LVL III: ICD-10-PCS | Mod: PBBFAC,,, | Performed by: OBSTETRICS & GYNECOLOGY

## 2021-07-22 PROCEDURE — 3008F PR BODY MASS INDEX (BMI) DOCUMENTED: ICD-10-PCS | Mod: CPTII,S$GLB,, | Performed by: OBSTETRICS & GYNECOLOGY

## 2021-07-22 PROCEDURE — 3072F LOW RISK FOR RETINOPATHY: CPT | Mod: CPTII,S$GLB,, | Performed by: OBSTETRICS & GYNECOLOGY

## 2021-07-23 ENCOUNTER — OFFICE VISIT (OUTPATIENT)
Dept: OPHTHALMOLOGY | Facility: CLINIC | Age: 51
End: 2021-07-23
Payer: COMMERCIAL

## 2021-07-23 DIAGNOSIS — E11.9 TYPE 2 DIABETES MELLITUS WITHOUT RETINOPATHY: Primary | ICD-10-CM

## 2021-07-23 PROCEDURE — 3046F HEMOGLOBIN A1C LEVEL >9.0%: CPT | Mod: CPTII,S$GLB,, | Performed by: OPHTHALMOLOGY

## 2021-07-23 PROCEDURE — 3046F PR MOST RECENT HEMOGLOBIN A1C LEVEL > 9.0%: ICD-10-PCS | Mod: CPTII,S$GLB,, | Performed by: OPHTHALMOLOGY

## 2021-07-23 PROCEDURE — 2023F DILAT RTA XM W/O RTNOPTHY: CPT | Mod: CPTII,S$GLB,, | Performed by: OPHTHALMOLOGY

## 2021-07-23 PROCEDURE — 99999 PR PBB SHADOW E&M-EST. PATIENT-LVL III: CPT | Mod: PBBFAC,,, | Performed by: OPHTHALMOLOGY

## 2021-07-23 PROCEDURE — 2023F PR DILATED RETINAL EXAM W/O EVID OF RETINOPATHY: ICD-10-PCS | Mod: CPTII,S$GLB,, | Performed by: OPHTHALMOLOGY

## 2021-07-23 PROCEDURE — 1126F PR PAIN SEVERITY QUANTIFIED, NO PAIN PRESENT: ICD-10-PCS | Mod: CPTII,S$GLB,, | Performed by: OPHTHALMOLOGY

## 2021-07-23 PROCEDURE — 92014 COMPRE OPH EXAM EST PT 1/>: CPT | Mod: S$GLB,,, | Performed by: OPHTHALMOLOGY

## 2021-07-23 PROCEDURE — 99999 PR PBB SHADOW E&M-EST. PATIENT-LVL III: ICD-10-PCS | Mod: PBBFAC,,, | Performed by: OPHTHALMOLOGY

## 2021-07-23 PROCEDURE — 1126F AMNT PAIN NOTED NONE PRSNT: CPT | Mod: CPTII,S$GLB,, | Performed by: OPHTHALMOLOGY

## 2021-07-23 PROCEDURE — 92014 PR EYE EXAM, EST PATIENT,COMPREHESV: ICD-10-PCS | Mod: S$GLB,,, | Performed by: OPHTHALMOLOGY

## 2021-08-04 ENCOUNTER — LAB VISIT (OUTPATIENT)
Dept: LAB | Facility: HOSPITAL | Age: 51
End: 2021-08-04
Attending: FAMILY MEDICINE
Payer: COMMERCIAL

## 2021-08-04 ENCOUNTER — CLINICAL SUPPORT (OUTPATIENT)
Dept: DIABETES | Facility: CLINIC | Age: 51
End: 2021-08-04
Payer: COMMERCIAL

## 2021-08-04 VITALS — BODY MASS INDEX: 21.71 KG/M2 | WEIGHT: 127.19 LBS | HEIGHT: 64 IN

## 2021-08-04 DIAGNOSIS — E11.65 TYPE 2 DIABETES MELLITUS WITH HYPERGLYCEMIA, WITHOUT LONG-TERM CURRENT USE OF INSULIN: ICD-10-CM

## 2021-08-04 DIAGNOSIS — E11.65 UNCONTROLLED TYPE 2 DIABETES MELLITUS WITH HYPERGLYCEMIA, WITHOUT LONG-TERM CURRENT USE OF INSULIN: ICD-10-CM

## 2021-08-04 LAB
ALBUMIN SERPL BCP-MCNC: 3.8 G/DL (ref 3.5–5.2)
ALP SERPL-CCNC: 97 U/L (ref 55–135)
ALT SERPL W/O P-5'-P-CCNC: 15 U/L (ref 10–44)
ANION GAP SERPL CALC-SCNC: 8 MMOL/L (ref 8–16)
AST SERPL-CCNC: 20 U/L (ref 10–40)
BILIRUB SERPL-MCNC: 0.6 MG/DL (ref 0.1–1)
BUN SERPL-MCNC: 8 MG/DL (ref 6–20)
CALCIUM SERPL-MCNC: 10 MG/DL (ref 8.7–10.5)
CHLORIDE SERPL-SCNC: 103 MMOL/L (ref 95–110)
CO2 SERPL-SCNC: 26 MMOL/L (ref 23–29)
CREAT SERPL-MCNC: 0.6 MG/DL (ref 0.5–1.4)
EST. GFR  (AFRICAN AMERICAN): >60 ML/MIN/1.73 M^2
EST. GFR  (NON AFRICAN AMERICAN): >60 ML/MIN/1.73 M^2
GLUCOSE SERPL-MCNC: 95 MG/DL (ref 70–110)
POTASSIUM SERPL-SCNC: 3.6 MMOL/L (ref 3.5–5.1)
PROT SERPL-MCNC: 7.4 G/DL (ref 6–8.4)
SODIUM SERPL-SCNC: 137 MMOL/L (ref 136–145)

## 2021-08-04 PROCEDURE — 99999 PR PBB SHADOW E&M-EST. PATIENT-LVL III: ICD-10-PCS | Mod: PBBFAC,,, | Performed by: DIETITIAN, REGISTERED

## 2021-08-04 PROCEDURE — G0108 DIAB MANAGE TRN  PER INDIV: HCPCS | Mod: S$GLB,,, | Performed by: DIETITIAN, REGISTERED

## 2021-08-04 PROCEDURE — 80053 COMPREHEN METABOLIC PANEL: CPT | Performed by: FAMILY MEDICINE

## 2021-08-04 PROCEDURE — 83036 HEMOGLOBIN GLYCOSYLATED A1C: CPT | Performed by: PHYSICIAN ASSISTANT

## 2021-08-04 PROCEDURE — G0108 PR DIAB MANAGE TRN  PER INDIV: ICD-10-PCS | Mod: S$GLB,,, | Performed by: DIETITIAN, REGISTERED

## 2021-08-04 PROCEDURE — 36415 COLL VENOUS BLD VENIPUNCTURE: CPT | Performed by: FAMILY MEDICINE

## 2021-08-04 PROCEDURE — 99999 PR PBB SHADOW E&M-EST. PATIENT-LVL III: CPT | Mod: PBBFAC,,, | Performed by: DIETITIAN, REGISTERED

## 2021-08-05 ENCOUNTER — HOSPITAL ENCOUNTER (OUTPATIENT)
Dept: RADIOLOGY | Facility: HOSPITAL | Age: 51
Discharge: HOME | End: 2021-08-05
Attending: FAMILY MEDICINE
Payer: COMMERCIAL

## 2021-08-05 ENCOUNTER — OFFICE VISIT (OUTPATIENT)
Dept: INTERNAL MEDICINE | Facility: CLINIC | Age: 51
End: 2021-08-05
Payer: COMMERCIAL

## 2021-08-05 VITALS
HEIGHT: 64 IN | SYSTOLIC BLOOD PRESSURE: 144 MMHG | DIASTOLIC BLOOD PRESSURE: 65 MMHG | OXYGEN SATURATION: 98 % | WEIGHT: 128.06 LBS | TEMPERATURE: 99 F | BODY MASS INDEX: 21.86 KG/M2 | HEART RATE: 63 BPM

## 2021-08-05 DIAGNOSIS — M53.3 COCCYX PAIN: ICD-10-CM

## 2021-08-05 DIAGNOSIS — G89.29 CHRONIC RIGHT SHOULDER PAIN: Primary | ICD-10-CM

## 2021-08-05 DIAGNOSIS — G89.29 CHRONIC RIGHT SHOULDER PAIN: ICD-10-CM

## 2021-08-05 DIAGNOSIS — M25.511 CHRONIC RIGHT SHOULDER PAIN: ICD-10-CM

## 2021-08-05 DIAGNOSIS — M25.511 CHRONIC RIGHT SHOULDER PAIN: Primary | ICD-10-CM

## 2021-08-05 LAB
ESTIMATED AVG GLUCOSE: 183 MG/DL (ref 68–131)
HBA1C MFR BLD: 8 % (ref 4–5.6)

## 2021-08-05 PROCEDURE — 1125F AMNT PAIN NOTED PAIN PRSNT: CPT | Mod: CPTII,S$GLB,, | Performed by: FAMILY MEDICINE

## 2021-08-05 PROCEDURE — 72220 XR SACRUM AND COCCYX: ICD-10-PCS | Mod: 26,,, | Performed by: RADIOLOGY

## 2021-08-05 PROCEDURE — 3078F PR MOST RECENT DIASTOLIC BLOOD PRESSURE < 80 MM HG: ICD-10-PCS | Mod: CPTII,S$GLB,, | Performed by: FAMILY MEDICINE

## 2021-08-05 PROCEDURE — 1125F PR PAIN SEVERITY QUANTIFIED, PAIN PRESENT: ICD-10-PCS | Mod: CPTII,S$GLB,, | Performed by: FAMILY MEDICINE

## 2021-08-05 PROCEDURE — 99999 PR PBB SHADOW E&M-EST. PATIENT-LVL IV: ICD-10-PCS | Mod: PBBFAC,,, | Performed by: FAMILY MEDICINE

## 2021-08-05 PROCEDURE — 3077F SYST BP >= 140 MM HG: CPT | Mod: CPTII,S$GLB,, | Performed by: FAMILY MEDICINE

## 2021-08-05 PROCEDURE — 73030 X-RAY EXAM OF SHOULDER: CPT | Mod: TC,PO,RT

## 2021-08-05 PROCEDURE — 99213 PR OFFICE/OUTPT VISIT, EST, LEVL III, 20-29 MIN: ICD-10-PCS | Mod: S$GLB,,, | Performed by: FAMILY MEDICINE

## 2021-08-05 PROCEDURE — 3052F PR MOST RECENT HEMOGLOBIN A1C LEVEL 8.0 - < 9.0%: ICD-10-PCS | Mod: CPTII,S$GLB,, | Performed by: FAMILY MEDICINE

## 2021-08-05 PROCEDURE — 99213 OFFICE O/P EST LOW 20 MIN: CPT | Mod: S$GLB,,, | Performed by: FAMILY MEDICINE

## 2021-08-05 PROCEDURE — 1159F MED LIST DOCD IN RCRD: CPT | Mod: CPTII,S$GLB,, | Performed by: FAMILY MEDICINE

## 2021-08-05 PROCEDURE — 72220 X-RAY EXAM SACRUM TAILBONE: CPT | Mod: TC,PO

## 2021-08-05 PROCEDURE — 3052F HG A1C>EQUAL 8.0%<EQUAL 9.0%: CPT | Mod: CPTII,S$GLB,, | Performed by: FAMILY MEDICINE

## 2021-08-05 PROCEDURE — 3008F BODY MASS INDEX DOCD: CPT | Mod: CPTII,S$GLB,, | Performed by: FAMILY MEDICINE

## 2021-08-05 PROCEDURE — 3072F PR LOW RISK FOR RETINOPATHY: ICD-10-PCS | Mod: CPTII,S$GLB,, | Performed by: FAMILY MEDICINE

## 2021-08-05 PROCEDURE — 1159F PR MEDICATION LIST DOCUMENTED IN MEDICAL RECORD: ICD-10-PCS | Mod: CPTII,S$GLB,, | Performed by: FAMILY MEDICINE

## 2021-08-05 PROCEDURE — 3078F DIAST BP <80 MM HG: CPT | Mod: CPTII,S$GLB,, | Performed by: FAMILY MEDICINE

## 2021-08-05 PROCEDURE — 73030 X-RAY EXAM OF SHOULDER: CPT | Mod: 26,RT,, | Performed by: RADIOLOGY

## 2021-08-05 PROCEDURE — 3072F LOW RISK FOR RETINOPATHY: CPT | Mod: CPTII,S$GLB,, | Performed by: FAMILY MEDICINE

## 2021-08-05 PROCEDURE — 72220 X-RAY EXAM SACRUM TAILBONE: CPT | Mod: 26,,, | Performed by: RADIOLOGY

## 2021-08-05 PROCEDURE — 3077F PR MOST RECENT SYSTOLIC BLOOD PRESSURE >= 140 MM HG: ICD-10-PCS | Mod: CPTII,S$GLB,, | Performed by: FAMILY MEDICINE

## 2021-08-05 PROCEDURE — 99999 PR PBB SHADOW E&M-EST. PATIENT-LVL IV: CPT | Mod: PBBFAC,,, | Performed by: FAMILY MEDICINE

## 2021-08-05 PROCEDURE — 3008F PR BODY MASS INDEX (BMI) DOCUMENTED: ICD-10-PCS | Mod: CPTII,S$GLB,, | Performed by: FAMILY MEDICINE

## 2021-08-05 PROCEDURE — 73030 XR SHOULDER COMPLETE 2 OR MORE VIEWS RIGHT: ICD-10-PCS | Mod: 26,RT,, | Performed by: RADIOLOGY

## 2021-08-05 RX ORDER — MELOXICAM 15 MG/1
15 TABLET ORAL DAILY
Qty: 14 TABLET | Refills: 0 | Status: SHIPPED | OUTPATIENT
Start: 2021-08-05 | End: 2021-08-08 | Stop reason: ALTCHOICE

## 2021-08-08 ENCOUNTER — OFFICE VISIT (OUTPATIENT)
Dept: URGENT CARE | Facility: CLINIC | Age: 51
End: 2021-08-08
Payer: COMMERCIAL

## 2021-08-08 VITALS
TEMPERATURE: 97 F | DIASTOLIC BLOOD PRESSURE: 76 MMHG | HEART RATE: 55 BPM | OXYGEN SATURATION: 97 % | WEIGHT: 122.81 LBS | BODY MASS INDEX: 20.97 KG/M2 | HEIGHT: 64 IN | SYSTOLIC BLOOD PRESSURE: 132 MMHG

## 2021-08-08 DIAGNOSIS — J01.90 ACUTE BACTERIAL SINUSITIS: ICD-10-CM

## 2021-08-08 DIAGNOSIS — Z20.822 CLOSE EXPOSURE TO COVID-19 VIRUS: ICD-10-CM

## 2021-08-08 DIAGNOSIS — B96.89 ACUTE BACTERIAL SINUSITIS: ICD-10-CM

## 2021-08-08 DIAGNOSIS — R51.9 SINUS HEADACHE: ICD-10-CM

## 2021-08-08 DIAGNOSIS — R05.9 COUGH: Primary | ICD-10-CM

## 2021-08-08 DIAGNOSIS — E11.9 CONTROLLED TYPE 2 DIABETES MELLITUS WITHOUT COMPLICATION, WITHOUT LONG-TERM CURRENT USE OF INSULIN: ICD-10-CM

## 2021-08-08 LAB
CTP QC/QA: YES
SARS-COV-2 RDRP RESP QL NAA+PROBE: NEGATIVE

## 2021-08-08 PROCEDURE — 3078F PR MOST RECENT DIASTOLIC BLOOD PRESSURE < 80 MM HG: ICD-10-PCS | Mod: CPTII,S$GLB,, | Performed by: NURSE PRACTITIONER

## 2021-08-08 PROCEDURE — 1126F AMNT PAIN NOTED NONE PRSNT: CPT | Mod: CPTII,S$GLB,, | Performed by: NURSE PRACTITIONER

## 2021-08-08 PROCEDURE — 99999 PR PBB SHADOW E&M-EST. PATIENT-LVL V: CPT | Mod: PBBFAC,,, | Performed by: NURSE PRACTITIONER

## 2021-08-08 PROCEDURE — 99999 PR PBB SHADOW E&M-EST. PATIENT-LVL V: ICD-10-PCS | Mod: PBBFAC,,, | Performed by: NURSE PRACTITIONER

## 2021-08-08 PROCEDURE — 3075F SYST BP GE 130 - 139MM HG: CPT | Mod: CPTII,S$GLB,, | Performed by: NURSE PRACTITIONER

## 2021-08-08 PROCEDURE — 3052F HG A1C>EQUAL 8.0%<EQUAL 9.0%: CPT | Mod: CPTII,S$GLB,, | Performed by: NURSE PRACTITIONER

## 2021-08-08 PROCEDURE — 99214 OFFICE O/P EST MOD 30 MIN: CPT | Mod: S$GLB,,, | Performed by: NURSE PRACTITIONER

## 2021-08-08 PROCEDURE — 3008F PR BODY MASS INDEX (BMI) DOCUMENTED: ICD-10-PCS | Mod: CPTII,S$GLB,, | Performed by: NURSE PRACTITIONER

## 2021-08-08 PROCEDURE — 3072F LOW RISK FOR RETINOPATHY: CPT | Mod: CPTII,S$GLB,, | Performed by: NURSE PRACTITIONER

## 2021-08-08 PROCEDURE — U0002 COVID-19 LAB TEST NON-CDC: HCPCS | Mod: QW,S$GLB,, | Performed by: NURSE PRACTITIONER

## 2021-08-08 PROCEDURE — 99214 PR OFFICE/OUTPT VISIT, EST, LEVL IV, 30-39 MIN: ICD-10-PCS | Mod: S$GLB,,, | Performed by: NURSE PRACTITIONER

## 2021-08-08 PROCEDURE — 3052F PR MOST RECENT HEMOGLOBIN A1C LEVEL 8.0 - < 9.0%: ICD-10-PCS | Mod: CPTII,S$GLB,, | Performed by: NURSE PRACTITIONER

## 2021-08-08 PROCEDURE — 3075F PR MOST RECENT SYSTOLIC BLOOD PRESS GE 130-139MM HG: ICD-10-PCS | Mod: CPTII,S$GLB,, | Performed by: NURSE PRACTITIONER

## 2021-08-08 PROCEDURE — 1160F RVW MEDS BY RX/DR IN RCRD: CPT | Mod: CPTII,S$GLB,, | Performed by: NURSE PRACTITIONER

## 2021-08-08 PROCEDURE — U0002: ICD-10-PCS | Mod: QW,S$GLB,, | Performed by: NURSE PRACTITIONER

## 2021-08-08 PROCEDURE — 3078F DIAST BP <80 MM HG: CPT | Mod: CPTII,S$GLB,, | Performed by: NURSE PRACTITIONER

## 2021-08-08 PROCEDURE — 1159F PR MEDICATION LIST DOCUMENTED IN MEDICAL RECORD: ICD-10-PCS | Mod: CPTII,S$GLB,, | Performed by: NURSE PRACTITIONER

## 2021-08-08 PROCEDURE — 1160F PR REVIEW ALL MEDS BY PRESCRIBER/CLIN PHARMACIST DOCUMENTED: ICD-10-PCS | Mod: CPTII,S$GLB,, | Performed by: NURSE PRACTITIONER

## 2021-08-08 PROCEDURE — 3008F BODY MASS INDEX DOCD: CPT | Mod: CPTII,S$GLB,, | Performed by: NURSE PRACTITIONER

## 2021-08-08 PROCEDURE — 1159F MED LIST DOCD IN RCRD: CPT | Mod: CPTII,S$GLB,, | Performed by: NURSE PRACTITIONER

## 2021-08-08 PROCEDURE — 1126F PR PAIN SEVERITY QUANTIFIED, NO PAIN PRESENT: ICD-10-PCS | Mod: CPTII,S$GLB,, | Performed by: NURSE PRACTITIONER

## 2021-08-08 PROCEDURE — 3072F PR LOW RISK FOR RETINOPATHY: ICD-10-PCS | Mod: CPTII,S$GLB,, | Performed by: NURSE PRACTITIONER

## 2021-08-08 RX ORDER — PROMETHAZINE HYDROCHLORIDE AND DEXTROMETHORPHAN HYDROBROMIDE 6.25; 15 MG/5ML; MG/5ML
5 SYRUP ORAL
Qty: 120 ML | Refills: 0 | Status: SHIPPED | OUTPATIENT
Start: 2021-08-08 | End: 2021-11-09

## 2021-08-08 RX ORDER — AMOXICILLIN AND CLAVULANATE POTASSIUM 875; 125 MG/1; MG/1
1 TABLET, FILM COATED ORAL EVERY 12 HOURS
Qty: 14 TABLET | Refills: 0 | Status: SHIPPED | OUTPATIENT
Start: 2021-08-08 | End: 2021-08-15

## 2021-08-08 RX ORDER — FLUTICASONE PROPIONATE 50 MCG
2 SPRAY, SUSPENSION (ML) NASAL DAILY
Qty: 16 G | Refills: 1 | Status: SHIPPED | OUTPATIENT
Start: 2021-08-08 | End: 2021-11-09

## 2021-08-11 ENCOUNTER — TELEPHONE (OUTPATIENT)
Dept: INTERNAL MEDICINE | Facility: CLINIC | Age: 51
End: 2021-08-11

## 2021-08-12 ENCOUNTER — TELEPHONE (OUTPATIENT)
Dept: INTERNAL MEDICINE | Facility: CLINIC | Age: 51
End: 2021-08-12

## 2021-08-12 DIAGNOSIS — G89.29 CHRONIC RIGHT SHOULDER PAIN: ICD-10-CM

## 2021-08-12 DIAGNOSIS — M25.511 CHRONIC RIGHT SHOULDER PAIN: ICD-10-CM

## 2021-08-16 ENCOUNTER — TELEPHONE (OUTPATIENT)
Dept: DIABETES | Facility: CLINIC | Age: 51
End: 2021-08-16

## 2021-08-21 ENCOUNTER — HOSPITAL ENCOUNTER (OUTPATIENT)
Dept: RADIOLOGY | Facility: HOSPITAL | Age: 51
Discharge: HOME | End: 2021-08-21
Attending: FAMILY MEDICINE
Payer: COMMERCIAL

## 2021-08-21 DIAGNOSIS — G89.29 CHRONIC RIGHT SHOULDER PAIN: ICD-10-CM

## 2021-08-21 DIAGNOSIS — M25.511 CHRONIC RIGHT SHOULDER PAIN: ICD-10-CM

## 2021-08-21 PROCEDURE — 73221 MRI JOINT UPR EXTREM W/O DYE: CPT | Mod: TC,RT

## 2021-08-21 PROCEDURE — 73221 MRI JOINT UPR EXTREM W/O DYE: CPT | Mod: 26,RT,, | Performed by: RADIOLOGY

## 2021-08-21 PROCEDURE — 73221 MRI SHOULDER WITHOUT CONTRAST RIGHT: ICD-10-PCS | Mod: 26,RT,, | Performed by: RADIOLOGY

## 2021-08-23 ENCOUNTER — TELEPHONE (OUTPATIENT)
Dept: INTERNAL MEDICINE | Facility: CLINIC | Age: 51
End: 2021-08-23

## 2021-08-23 RX ORDER — MELOXICAM 15 MG/1
15 TABLET ORAL DAILY
Qty: 14 TABLET | Refills: 0 | Status: SHIPPED | OUTPATIENT
Start: 2021-08-23 | End: 2022-01-27 | Stop reason: SDUPTHER

## 2021-10-05 ENCOUNTER — OFFICE VISIT (OUTPATIENT)
Dept: INTERNAL MEDICINE | Facility: CLINIC | Age: 51
End: 2021-10-05
Payer: COMMERCIAL

## 2021-10-05 VITALS
WEIGHT: 129.63 LBS | DIASTOLIC BLOOD PRESSURE: 63 MMHG | OXYGEN SATURATION: 98 % | SYSTOLIC BLOOD PRESSURE: 137 MMHG | BODY MASS INDEX: 22.13 KG/M2 | HEART RATE: 67 BPM | HEIGHT: 64 IN | TEMPERATURE: 98 F

## 2021-10-05 DIAGNOSIS — M77.8 SUBSCAPULARIS TENDINITIS OF RIGHT SHOULDER: Primary | ICD-10-CM

## 2021-10-05 DIAGNOSIS — M25.511 CHRONIC RIGHT SHOULDER PAIN: ICD-10-CM

## 2021-10-05 DIAGNOSIS — G89.29 CHRONIC RIGHT SHOULDER PAIN: ICD-10-CM

## 2021-10-05 PROCEDURE — 1160F RVW MEDS BY RX/DR IN RCRD: CPT | Mod: CPTII,S$GLB,, | Performed by: NURSE PRACTITIONER

## 2021-10-05 PROCEDURE — 3066F PR DOCUMENTATION OF TREATMENT FOR NEPHROPATHY: ICD-10-PCS | Mod: CPTII,S$GLB,, | Performed by: NURSE PRACTITIONER

## 2021-10-05 PROCEDURE — 99213 OFFICE O/P EST LOW 20 MIN: CPT | Mod: 25,S$GLB,, | Performed by: NURSE PRACTITIONER

## 2021-10-05 PROCEDURE — 1159F MED LIST DOCD IN RCRD: CPT | Mod: CPTII,S$GLB,, | Performed by: NURSE PRACTITIONER

## 2021-10-05 PROCEDURE — 96372 THER/PROPH/DIAG INJ SC/IM: CPT | Mod: S$GLB,,, | Performed by: NURSE PRACTITIONER

## 2021-10-05 PROCEDURE — 1159F PR MEDICATION LIST DOCUMENTED IN MEDICAL RECORD: ICD-10-PCS | Mod: CPTII,S$GLB,, | Performed by: NURSE PRACTITIONER

## 2021-10-05 PROCEDURE — 3008F PR BODY MASS INDEX (BMI) DOCUMENTED: ICD-10-PCS | Mod: CPTII,S$GLB,, | Performed by: NURSE PRACTITIONER

## 2021-10-05 PROCEDURE — 3052F HG A1C>EQUAL 8.0%<EQUAL 9.0%: CPT | Mod: CPTII,S$GLB,, | Performed by: NURSE PRACTITIONER

## 2021-10-05 PROCEDURE — 3075F PR MOST RECENT SYSTOLIC BLOOD PRESS GE 130-139MM HG: ICD-10-PCS | Mod: CPTII,S$GLB,, | Performed by: NURSE PRACTITIONER

## 2021-10-05 PROCEDURE — 3072F LOW RISK FOR RETINOPATHY: CPT | Mod: CPTII,S$GLB,, | Performed by: NURSE PRACTITIONER

## 2021-10-05 PROCEDURE — 96372 PR INJECTION,THERAP/PROPH/DIAG2ST, IM OR SUBCUT: ICD-10-PCS | Mod: S$GLB,,, | Performed by: NURSE PRACTITIONER

## 2021-10-05 PROCEDURE — 3075F SYST BP GE 130 - 139MM HG: CPT | Mod: CPTII,S$GLB,, | Performed by: NURSE PRACTITIONER

## 2021-10-05 PROCEDURE — 99213 PR OFFICE/OUTPT VISIT, EST, LEVL III, 20-29 MIN: ICD-10-PCS | Mod: 25,S$GLB,, | Performed by: NURSE PRACTITIONER

## 2021-10-05 PROCEDURE — 99999 PR PBB SHADOW E&M-EST. PATIENT-LVL V: CPT | Mod: PBBFAC,,, | Performed by: NURSE PRACTITIONER

## 2021-10-05 PROCEDURE — 3052F PR MOST RECENT HEMOGLOBIN A1C LEVEL 8.0 - < 9.0%: ICD-10-PCS | Mod: CPTII,S$GLB,, | Performed by: NURSE PRACTITIONER

## 2021-10-05 PROCEDURE — 3078F PR MOST RECENT DIASTOLIC BLOOD PRESSURE < 80 MM HG: ICD-10-PCS | Mod: CPTII,S$GLB,, | Performed by: NURSE PRACTITIONER

## 2021-10-05 PROCEDURE — 3061F PR NEG MICROALBUMINURIA RESULT DOCUMENTED/REVIEW: ICD-10-PCS | Mod: CPTII,S$GLB,, | Performed by: NURSE PRACTITIONER

## 2021-10-05 PROCEDURE — 3061F NEG MICROALBUMINURIA REV: CPT | Mod: CPTII,S$GLB,, | Performed by: NURSE PRACTITIONER

## 2021-10-05 PROCEDURE — 99999 PR PBB SHADOW E&M-EST. PATIENT-LVL V: ICD-10-PCS | Mod: PBBFAC,,, | Performed by: NURSE PRACTITIONER

## 2021-10-05 PROCEDURE — 3066F NEPHROPATHY DOC TX: CPT | Mod: CPTII,S$GLB,, | Performed by: NURSE PRACTITIONER

## 2021-10-05 PROCEDURE — 1160F PR REVIEW ALL MEDS BY PRESCRIBER/CLIN PHARMACIST DOCUMENTED: ICD-10-PCS | Mod: CPTII,S$GLB,, | Performed by: NURSE PRACTITIONER

## 2021-10-05 PROCEDURE — 3072F PR LOW RISK FOR RETINOPATHY: ICD-10-PCS | Mod: CPTII,S$GLB,, | Performed by: NURSE PRACTITIONER

## 2021-10-05 PROCEDURE — 3008F BODY MASS INDEX DOCD: CPT | Mod: CPTII,S$GLB,, | Performed by: NURSE PRACTITIONER

## 2021-10-05 PROCEDURE — 3078F DIAST BP <80 MM HG: CPT | Mod: CPTII,S$GLB,, | Performed by: NURSE PRACTITIONER

## 2021-10-05 RX ORDER — CYCLOBENZAPRINE HCL 10 MG
10 TABLET ORAL 2 TIMES DAILY PRN
Qty: 20 TABLET | Refills: 0 | Status: SHIPPED | OUTPATIENT
Start: 2021-10-05 | End: 2021-10-15

## 2021-10-05 RX ORDER — KETOROLAC TROMETHAMINE 30 MG/ML
30 INJECTION, SOLUTION INTRAMUSCULAR; INTRAVENOUS
Status: COMPLETED | OUTPATIENT
Start: 2021-10-05 | End: 2021-10-05

## 2021-10-05 RX ADMIN — KETOROLAC TROMETHAMINE 30 MG: 30 INJECTION, SOLUTION INTRAMUSCULAR; INTRAVENOUS at 09:10

## 2021-10-25 ENCOUNTER — TELEPHONE (OUTPATIENT)
Dept: INTERNAL MEDICINE | Facility: CLINIC | Age: 51
End: 2021-10-25
Payer: COMMERCIAL

## 2021-10-27 ENCOUNTER — PATIENT OUTREACH (OUTPATIENT)
Dept: ADMINISTRATIVE | Facility: OTHER | Age: 51
End: 2021-10-27
Payer: COMMERCIAL

## 2021-10-28 ENCOUNTER — OFFICE VISIT (OUTPATIENT)
Dept: DIABETES | Facility: CLINIC | Age: 51
End: 2021-10-28
Payer: COMMERCIAL

## 2021-10-28 ENCOUNTER — TELEPHONE (OUTPATIENT)
Dept: INTERNAL MEDICINE | Facility: CLINIC | Age: 51
End: 2021-10-28
Payer: COMMERCIAL

## 2021-10-28 ENCOUNTER — LAB VISIT (OUTPATIENT)
Dept: LAB | Facility: HOSPITAL | Age: 51
End: 2021-10-28
Attending: PHYSICIAN ASSISTANT
Payer: COMMERCIAL

## 2021-10-28 VITALS
HEART RATE: 69 BPM | DIASTOLIC BLOOD PRESSURE: 80 MMHG | BODY MASS INDEX: 21.83 KG/M2 | SYSTOLIC BLOOD PRESSURE: 154 MMHG | WEIGHT: 127.19 LBS

## 2021-10-28 DIAGNOSIS — E55.9 VITAMIN D DEFICIENCY: ICD-10-CM

## 2021-10-28 DIAGNOSIS — M75.80: Primary | ICD-10-CM

## 2021-10-28 DIAGNOSIS — E11.65 TYPE 2 DIABETES MELLITUS WITH HYPERGLYCEMIA, WITHOUT LONG-TERM CURRENT USE OF INSULIN: ICD-10-CM

## 2021-10-28 DIAGNOSIS — E11.65 TYPE 2 DIABETES MELLITUS WITH HYPERGLYCEMIA, WITHOUT LONG-TERM CURRENT USE OF INSULIN: Primary | ICD-10-CM

## 2021-10-28 LAB
ESTIMATED AVG GLUCOSE: 203 MG/DL (ref 68–131)
HBA1C MFR BLD: 8.7 % (ref 4–5.6)

## 2021-10-28 PROCEDURE — 99999 PR PBB SHADOW E&M-EST. PATIENT-LVL III: ICD-10-PCS | Mod: PBBFAC,,, | Performed by: PHYSICIAN ASSISTANT

## 2021-10-28 PROCEDURE — 3079F PR MOST RECENT DIASTOLIC BLOOD PRESSURE 80-89 MM HG: ICD-10-PCS | Mod: CPTII,S$GLB,, | Performed by: PHYSICIAN ASSISTANT

## 2021-10-28 PROCEDURE — 3066F NEPHROPATHY DOC TX: CPT | Mod: CPTII,S$GLB,, | Performed by: PHYSICIAN ASSISTANT

## 2021-10-28 PROCEDURE — 3079F DIAST BP 80-89 MM HG: CPT | Mod: CPTII,S$GLB,, | Performed by: PHYSICIAN ASSISTANT

## 2021-10-28 PROCEDURE — 3077F SYST BP >= 140 MM HG: CPT | Mod: CPTII,S$GLB,, | Performed by: PHYSICIAN ASSISTANT

## 2021-10-28 PROCEDURE — 99999 PR PBB SHADOW E&M-EST. PATIENT-LVL III: CPT | Mod: PBBFAC,,, | Performed by: PHYSICIAN ASSISTANT

## 2021-10-28 PROCEDURE — 3072F LOW RISK FOR RETINOPATHY: CPT | Mod: CPTII,S$GLB,, | Performed by: PHYSICIAN ASSISTANT

## 2021-10-28 PROCEDURE — 3066F PR DOCUMENTATION OF TREATMENT FOR NEPHROPATHY: ICD-10-PCS | Mod: CPTII,S$GLB,, | Performed by: PHYSICIAN ASSISTANT

## 2021-10-28 PROCEDURE — 36415 COLL VENOUS BLD VENIPUNCTURE: CPT | Performed by: PHYSICIAN ASSISTANT

## 2021-10-28 PROCEDURE — 3077F PR MOST RECENT SYSTOLIC BLOOD PRESSURE >= 140 MM HG: ICD-10-PCS | Mod: CPTII,S$GLB,, | Performed by: PHYSICIAN ASSISTANT

## 2021-10-28 PROCEDURE — 3008F BODY MASS INDEX DOCD: CPT | Mod: CPTII,S$GLB,, | Performed by: PHYSICIAN ASSISTANT

## 2021-10-28 PROCEDURE — 99214 OFFICE O/P EST MOD 30 MIN: CPT | Mod: S$GLB,,, | Performed by: PHYSICIAN ASSISTANT

## 2021-10-28 PROCEDURE — 3061F NEG MICROALBUMINURIA REV: CPT | Mod: CPTII,S$GLB,, | Performed by: PHYSICIAN ASSISTANT

## 2021-10-28 PROCEDURE — 83036 HEMOGLOBIN GLYCOSYLATED A1C: CPT | Performed by: PHYSICIAN ASSISTANT

## 2021-10-28 PROCEDURE — 3072F PR LOW RISK FOR RETINOPATHY: ICD-10-PCS | Mod: CPTII,S$GLB,, | Performed by: PHYSICIAN ASSISTANT

## 2021-10-28 PROCEDURE — 3061F PR NEG MICROALBUMINURIA RESULT DOCUMENTED/REVIEW: ICD-10-PCS | Mod: CPTII,S$GLB,, | Performed by: PHYSICIAN ASSISTANT

## 2021-10-28 PROCEDURE — 3008F PR BODY MASS INDEX (BMI) DOCUMENTED: ICD-10-PCS | Mod: CPTII,S$GLB,, | Performed by: PHYSICIAN ASSISTANT

## 2021-10-28 PROCEDURE — 3052F PR MOST RECENT HEMOGLOBIN A1C LEVEL 8.0 - < 9.0%: ICD-10-PCS | Mod: CPTII,S$GLB,, | Performed by: PHYSICIAN ASSISTANT

## 2021-10-28 PROCEDURE — 3052F HG A1C>EQUAL 8.0%<EQUAL 9.0%: CPT | Mod: CPTII,S$GLB,, | Performed by: PHYSICIAN ASSISTANT

## 2021-10-28 PROCEDURE — 99214 PR OFFICE/OUTPT VISIT, EST, LEVL IV, 30-39 MIN: ICD-10-PCS | Mod: S$GLB,,, | Performed by: PHYSICIAN ASSISTANT

## 2021-11-01 ENCOUNTER — CLINICAL SUPPORT (OUTPATIENT)
Dept: DIABETES | Facility: CLINIC | Age: 51
End: 2021-11-01
Payer: COMMERCIAL

## 2021-11-01 ENCOUNTER — TELEPHONE (OUTPATIENT)
Dept: INTERNAL MEDICINE | Facility: CLINIC | Age: 51
End: 2021-11-01
Payer: COMMERCIAL

## 2021-11-01 VITALS — BODY MASS INDEX: 21.98 KG/M2 | WEIGHT: 128.75 LBS | HEIGHT: 64 IN

## 2021-11-01 DIAGNOSIS — E11.65 TYPE 2 DIABETES MELLITUS WITH HYPERGLYCEMIA, WITHOUT LONG-TERM CURRENT USE OF INSULIN: Primary | ICD-10-CM

## 2021-11-01 PROCEDURE — 99999 PR PBB SHADOW E&M-EST. PATIENT-LVL I: CPT | Mod: PBBFAC,,, | Performed by: DIETITIAN, REGISTERED

## 2021-11-01 PROCEDURE — G0108 DIAB MANAGE TRN  PER INDIV: HCPCS | Mod: S$GLB,,, | Performed by: DIETITIAN, REGISTERED

## 2021-11-01 PROCEDURE — G0108 PR DIAB MANAGE TRN  PER INDIV: ICD-10-PCS | Mod: S$GLB,,, | Performed by: DIETITIAN, REGISTERED

## 2021-11-01 PROCEDURE — 99999 PR PBB SHADOW E&M-EST. PATIENT-LVL I: ICD-10-PCS | Mod: PBBFAC,,, | Performed by: DIETITIAN, REGISTERED

## 2021-11-08 ENCOUNTER — CLINICAL SUPPORT (OUTPATIENT)
Dept: REHABILITATION | Facility: HOSPITAL | Age: 51
End: 2021-11-08
Payer: COMMERCIAL

## 2021-11-08 DIAGNOSIS — M75.80: ICD-10-CM

## 2021-11-08 DIAGNOSIS — M25.511 CHRONIC RIGHT SHOULDER PAIN: ICD-10-CM

## 2021-11-08 DIAGNOSIS — G89.29 CHRONIC RIGHT SHOULDER PAIN: ICD-10-CM

## 2021-11-08 DIAGNOSIS — M25.611 SHOULDER STIFFNESS, RIGHT: ICD-10-CM

## 2021-11-08 PROCEDURE — 97110 THERAPEUTIC EXERCISES: CPT

## 2021-11-08 PROCEDURE — 97161 PT EVAL LOW COMPLEX 20 MIN: CPT

## 2021-11-09 ENCOUNTER — OFFICE VISIT (OUTPATIENT)
Dept: INTERNAL MEDICINE | Facility: CLINIC | Age: 51
End: 2021-11-09
Payer: COMMERCIAL

## 2021-11-09 VITALS
WEIGHT: 131.38 LBS | SYSTOLIC BLOOD PRESSURE: 135 MMHG | HEIGHT: 64 IN | OXYGEN SATURATION: 99 % | TEMPERATURE: 98 F | DIASTOLIC BLOOD PRESSURE: 63 MMHG | HEART RATE: 72 BPM | BODY MASS INDEX: 22.43 KG/M2

## 2021-11-09 DIAGNOSIS — E11.65 UNCONTROLLED TYPE 2 DIABETES MELLITUS WITH HYPERGLYCEMIA, WITHOUT LONG-TERM CURRENT USE OF INSULIN: Primary | ICD-10-CM

## 2021-11-09 DIAGNOSIS — R03.0 ELEVATED BLOOD PRESSURE READING: ICD-10-CM

## 2021-11-09 PROCEDURE — 1159F MED LIST DOCD IN RCRD: CPT | Mod: CPTII,S$GLB,, | Performed by: FAMILY MEDICINE

## 2021-11-09 PROCEDURE — 99999 PR PBB SHADOW E&M-EST. PATIENT-LVL IV: CPT | Mod: PBBFAC,,, | Performed by: FAMILY MEDICINE

## 2021-11-09 PROCEDURE — 90471 IMMUNIZATION ADMIN: CPT | Mod: S$GLB,,, | Performed by: FAMILY MEDICINE

## 2021-11-09 PROCEDURE — 3066F PR DOCUMENTATION OF TREATMENT FOR NEPHROPATHY: ICD-10-PCS | Mod: CPTII,S$GLB,, | Performed by: FAMILY MEDICINE

## 2021-11-09 PROCEDURE — 99999 PR PBB SHADOW E&M-EST. PATIENT-LVL IV: ICD-10-PCS | Mod: PBBFAC,,, | Performed by: FAMILY MEDICINE

## 2021-11-09 PROCEDURE — 3008F BODY MASS INDEX DOCD: CPT | Mod: CPTII,S$GLB,, | Performed by: FAMILY MEDICINE

## 2021-11-09 PROCEDURE — 3072F PR LOW RISK FOR RETINOPATHY: ICD-10-PCS | Mod: CPTII,S$GLB,, | Performed by: FAMILY MEDICINE

## 2021-11-09 PROCEDURE — 3078F PR MOST RECENT DIASTOLIC BLOOD PRESSURE < 80 MM HG: ICD-10-PCS | Mod: CPTII,S$GLB,, | Performed by: FAMILY MEDICINE

## 2021-11-09 PROCEDURE — 99214 OFFICE O/P EST MOD 30 MIN: CPT | Mod: 25,S$GLB,, | Performed by: FAMILY MEDICINE

## 2021-11-09 PROCEDURE — 99214 PR OFFICE/OUTPT VISIT, EST, LEVL IV, 30-39 MIN: ICD-10-PCS | Mod: 25,S$GLB,, | Performed by: FAMILY MEDICINE

## 2021-11-09 PROCEDURE — 3052F PR MOST RECENT HEMOGLOBIN A1C LEVEL 8.0 - < 9.0%: ICD-10-PCS | Mod: CPTII,S$GLB,, | Performed by: FAMILY MEDICINE

## 2021-11-09 PROCEDURE — 90694 VACC AIIV4 NO PRSRV 0.5ML IM: CPT | Mod: S$GLB,,, | Performed by: FAMILY MEDICINE

## 2021-11-09 PROCEDURE — 90694 FLU VACCINE - QUADRIVALENT - ADJUVANTED: ICD-10-PCS | Mod: S$GLB,,, | Performed by: FAMILY MEDICINE

## 2021-11-09 PROCEDURE — 3061F PR NEG MICROALBUMINURIA RESULT DOCUMENTED/REVIEW: ICD-10-PCS | Mod: CPTII,S$GLB,, | Performed by: FAMILY MEDICINE

## 2021-11-09 PROCEDURE — 3066F NEPHROPATHY DOC TX: CPT | Mod: CPTII,S$GLB,, | Performed by: FAMILY MEDICINE

## 2021-11-09 PROCEDURE — 90471 FLU VACCINE - QUADRIVALENT - ADJUVANTED: ICD-10-PCS | Mod: S$GLB,,, | Performed by: FAMILY MEDICINE

## 2021-11-09 PROCEDURE — 3052F HG A1C>EQUAL 8.0%<EQUAL 9.0%: CPT | Mod: CPTII,S$GLB,, | Performed by: FAMILY MEDICINE

## 2021-11-09 PROCEDURE — 3008F PR BODY MASS INDEX (BMI) DOCUMENTED: ICD-10-PCS | Mod: CPTII,S$GLB,, | Performed by: FAMILY MEDICINE

## 2021-11-09 PROCEDURE — 3075F PR MOST RECENT SYSTOLIC BLOOD PRESS GE 130-139MM HG: ICD-10-PCS | Mod: CPTII,S$GLB,, | Performed by: FAMILY MEDICINE

## 2021-11-09 PROCEDURE — 3072F LOW RISK FOR RETINOPATHY: CPT | Mod: CPTII,S$GLB,, | Performed by: FAMILY MEDICINE

## 2021-11-09 PROCEDURE — 3061F NEG MICROALBUMINURIA REV: CPT | Mod: CPTII,S$GLB,, | Performed by: FAMILY MEDICINE

## 2021-11-09 PROCEDURE — 3075F SYST BP GE 130 - 139MM HG: CPT | Mod: CPTII,S$GLB,, | Performed by: FAMILY MEDICINE

## 2021-11-09 PROCEDURE — 1159F PR MEDICATION LIST DOCUMENTED IN MEDICAL RECORD: ICD-10-PCS | Mod: CPTII,S$GLB,, | Performed by: FAMILY MEDICINE

## 2021-11-09 PROCEDURE — 3078F DIAST BP <80 MM HG: CPT | Mod: CPTII,S$GLB,, | Performed by: FAMILY MEDICINE

## 2021-11-17 ENCOUNTER — OFFICE VISIT (OUTPATIENT)
Dept: HEMATOLOGY/ONCOLOGY | Facility: CLINIC | Age: 51
End: 2021-11-17
Payer: COMMERCIAL

## 2021-11-17 VITALS
DIASTOLIC BLOOD PRESSURE: 68 MMHG | OXYGEN SATURATION: 99 % | HEART RATE: 84 BPM | WEIGHT: 130.75 LBS | HEIGHT: 64 IN | TEMPERATURE: 97 F | SYSTOLIC BLOOD PRESSURE: 141 MMHG | BODY MASS INDEX: 22.32 KG/M2

## 2021-11-17 DIAGNOSIS — Z71.89 COUNSELING ON HEALTH PROMOTION AND DISEASE PREVENTION: ICD-10-CM

## 2021-11-17 DIAGNOSIS — Z71.9 HEALTH EDUCATION/COUNSELING: ICD-10-CM

## 2021-11-17 DIAGNOSIS — N64.4 MASTODYNIA: ICD-10-CM

## 2021-11-17 DIAGNOSIS — N63.0 BREAST MASS: Primary | ICD-10-CM

## 2021-11-17 DIAGNOSIS — N60.11: ICD-10-CM

## 2021-11-17 DIAGNOSIS — Z71.89 COUNSELING AND COORDINATION OF CARE: ICD-10-CM

## 2021-11-17 DIAGNOSIS — Z12.39 ENCOUNTER FOR BREAST CANCER SCREENING USING NON-MAMMOGRAM MODALITY: ICD-10-CM

## 2021-11-17 PROCEDURE — 3078F PR MOST RECENT DIASTOLIC BLOOD PRESSURE < 80 MM HG: ICD-10-PCS | Mod: CPTII,S$GLB,, | Performed by: NURSE PRACTITIONER

## 2021-11-17 PROCEDURE — 99214 OFFICE O/P EST MOD 30 MIN: CPT | Mod: S$GLB,,, | Performed by: NURSE PRACTITIONER

## 2021-11-17 PROCEDURE — 3072F LOW RISK FOR RETINOPATHY: CPT | Mod: CPTII,S$GLB,, | Performed by: NURSE PRACTITIONER

## 2021-11-17 PROCEDURE — 3052F HG A1C>EQUAL 8.0%<EQUAL 9.0%: CPT | Mod: CPTII,S$GLB,, | Performed by: NURSE PRACTITIONER

## 2021-11-17 PROCEDURE — 3008F BODY MASS INDEX DOCD: CPT | Mod: CPTII,S$GLB,, | Performed by: NURSE PRACTITIONER

## 2021-11-17 PROCEDURE — 1159F MED LIST DOCD IN RCRD: CPT | Mod: CPTII,S$GLB,, | Performed by: NURSE PRACTITIONER

## 2021-11-17 PROCEDURE — 99999 PR PBB SHADOW E&M-EST. PATIENT-LVL IV: CPT | Mod: PBBFAC,,, | Performed by: NURSE PRACTITIONER

## 2021-11-17 PROCEDURE — 3077F SYST BP >= 140 MM HG: CPT | Mod: CPTII,S$GLB,, | Performed by: NURSE PRACTITIONER

## 2021-11-17 PROCEDURE — 3061F NEG MICROALBUMINURIA REV: CPT | Mod: CPTII,S$GLB,, | Performed by: NURSE PRACTITIONER

## 2021-11-17 PROCEDURE — 3066F PR DOCUMENTATION OF TREATMENT FOR NEPHROPATHY: ICD-10-PCS | Mod: CPTII,S$GLB,, | Performed by: NURSE PRACTITIONER

## 2021-11-17 PROCEDURE — 1159F PR MEDICATION LIST DOCUMENTED IN MEDICAL RECORD: ICD-10-PCS | Mod: CPTII,S$GLB,, | Performed by: NURSE PRACTITIONER

## 2021-11-17 PROCEDURE — 3072F PR LOW RISK FOR RETINOPATHY: ICD-10-PCS | Mod: CPTII,S$GLB,, | Performed by: NURSE PRACTITIONER

## 2021-11-17 PROCEDURE — 3061F PR NEG MICROALBUMINURIA RESULT DOCUMENTED/REVIEW: ICD-10-PCS | Mod: CPTII,S$GLB,, | Performed by: NURSE PRACTITIONER

## 2021-11-17 PROCEDURE — 3052F PR MOST RECENT HEMOGLOBIN A1C LEVEL 8.0 - < 9.0%: ICD-10-PCS | Mod: CPTII,S$GLB,, | Performed by: NURSE PRACTITIONER

## 2021-11-17 PROCEDURE — 1160F RVW MEDS BY RX/DR IN RCRD: CPT | Mod: CPTII,S$GLB,, | Performed by: NURSE PRACTITIONER

## 2021-11-17 PROCEDURE — 1160F PR REVIEW ALL MEDS BY PRESCRIBER/CLIN PHARMACIST DOCUMENTED: ICD-10-PCS | Mod: CPTII,S$GLB,, | Performed by: NURSE PRACTITIONER

## 2021-11-17 PROCEDURE — 3077F PR MOST RECENT SYSTOLIC BLOOD PRESSURE >= 140 MM HG: ICD-10-PCS | Mod: CPTII,S$GLB,, | Performed by: NURSE PRACTITIONER

## 2021-11-17 PROCEDURE — 99999 PR PBB SHADOW E&M-EST. PATIENT-LVL IV: ICD-10-PCS | Mod: PBBFAC,,, | Performed by: NURSE PRACTITIONER

## 2021-11-17 PROCEDURE — 3066F NEPHROPATHY DOC TX: CPT | Mod: CPTII,S$GLB,, | Performed by: NURSE PRACTITIONER

## 2021-11-17 PROCEDURE — 3078F DIAST BP <80 MM HG: CPT | Mod: CPTII,S$GLB,, | Performed by: NURSE PRACTITIONER

## 2021-11-17 PROCEDURE — 99214 PR OFFICE/OUTPT VISIT, EST, LEVL IV, 30-39 MIN: ICD-10-PCS | Mod: S$GLB,,, | Performed by: NURSE PRACTITIONER

## 2021-11-17 PROCEDURE — 3008F PR BODY MASS INDEX (BMI) DOCUMENTED: ICD-10-PCS | Mod: CPTII,S$GLB,, | Performed by: NURSE PRACTITIONER

## 2021-11-18 ENCOUNTER — TELEPHONE (OUTPATIENT)
Dept: RADIOLOGY | Facility: HOSPITAL | Age: 51
End: 2021-11-18
Payer: COMMERCIAL

## 2021-11-18 ENCOUNTER — CLINICAL SUPPORT (OUTPATIENT)
Dept: REHABILITATION | Facility: HOSPITAL | Age: 51
End: 2021-11-18
Payer: COMMERCIAL

## 2021-11-18 DIAGNOSIS — G89.29 CHRONIC RIGHT SHOULDER PAIN: ICD-10-CM

## 2021-11-18 DIAGNOSIS — M25.511 CHRONIC RIGHT SHOULDER PAIN: ICD-10-CM

## 2021-11-18 DIAGNOSIS — M25.611 SHOULDER STIFFNESS, RIGHT: ICD-10-CM

## 2021-11-18 PROCEDURE — 97110 THERAPEUTIC EXERCISES: CPT

## 2021-11-18 PROCEDURE — 97140 MANUAL THERAPY 1/> REGIONS: CPT

## 2021-11-19 ENCOUNTER — HOSPITAL ENCOUNTER (OUTPATIENT)
Dept: RADIOLOGY | Facility: HOSPITAL | Age: 51
Discharge: HOME | End: 2021-11-19
Attending: NURSE PRACTITIONER
Payer: COMMERCIAL

## 2021-11-19 ENCOUNTER — CLINICAL SUPPORT (OUTPATIENT)
Dept: REHABILITATION | Facility: HOSPITAL | Age: 51
End: 2021-11-19
Payer: COMMERCIAL

## 2021-11-19 DIAGNOSIS — M25.611 SHOULDER STIFFNESS, RIGHT: ICD-10-CM

## 2021-11-19 DIAGNOSIS — G89.29 CHRONIC RIGHT SHOULDER PAIN: ICD-10-CM

## 2021-11-19 DIAGNOSIS — M25.511 CHRONIC RIGHT SHOULDER PAIN: ICD-10-CM

## 2021-11-19 DIAGNOSIS — N63.0 BREAST MASS: Primary | ICD-10-CM

## 2021-11-19 DIAGNOSIS — N63.0 BREAST MASS: ICD-10-CM

## 2021-11-19 PROCEDURE — 77061 BREAST TOMOSYNTHESIS UNI: CPT | Mod: 26,RT,, | Performed by: RADIOLOGY

## 2021-11-19 PROCEDURE — 76642 US BREAST RIGHT LIMITED: ICD-10-PCS | Mod: 26,RT,, | Performed by: RADIOLOGY

## 2021-11-19 PROCEDURE — 76642 ULTRASOUND BREAST LIMITED: CPT | Mod: TC,RT

## 2021-11-19 PROCEDURE — 77065 MAMMO DIGITAL DIAGNOSTIC RIGHT WITH TOMO: ICD-10-PCS | Mod: 26,RT,, | Performed by: RADIOLOGY

## 2021-11-19 PROCEDURE — 76642 ULTRASOUND BREAST LIMITED: CPT | Mod: 26,RT,, | Performed by: RADIOLOGY

## 2021-11-19 PROCEDURE — 77065 DX MAMMO INCL CAD UNI: CPT | Mod: 26,RT,, | Performed by: RADIOLOGY

## 2021-11-19 PROCEDURE — 77061 BREAST TOMOSYNTHESIS UNI: CPT | Mod: TC,RT

## 2021-11-19 PROCEDURE — 77061 MAMMO DIGITAL DIAGNOSTIC RIGHT WITH TOMO: ICD-10-PCS | Mod: 26,RT,, | Performed by: RADIOLOGY

## 2021-11-19 PROCEDURE — 97110 THERAPEUTIC EXERCISES: CPT

## 2021-11-29 ENCOUNTER — PATIENT OUTREACH (OUTPATIENT)
Dept: ADMINISTRATIVE | Facility: OTHER | Age: 51
End: 2021-11-29
Payer: COMMERCIAL

## 2021-11-30 ENCOUNTER — OFFICE VISIT (OUTPATIENT)
Dept: DIABETES | Facility: CLINIC | Age: 51
End: 2021-11-30
Payer: COMMERCIAL

## 2021-11-30 DIAGNOSIS — E11.65 TYPE 2 DIABETES MELLITUS WITH HYPERGLYCEMIA, WITHOUT LONG-TERM CURRENT USE OF INSULIN: Primary | ICD-10-CM

## 2021-11-30 DIAGNOSIS — E55.9 VITAMIN D DEFICIENCY: ICD-10-CM

## 2021-11-30 PROCEDURE — 3072F LOW RISK FOR RETINOPATHY: CPT | Mod: CPTII,95,, | Performed by: PHYSICIAN ASSISTANT

## 2021-11-30 PROCEDURE — 3066F PR DOCUMENTATION OF TREATMENT FOR NEPHROPATHY: ICD-10-PCS | Mod: CPTII,95,, | Performed by: PHYSICIAN ASSISTANT

## 2021-11-30 PROCEDURE — 3066F NEPHROPATHY DOC TX: CPT | Mod: CPTII,95,, | Performed by: PHYSICIAN ASSISTANT

## 2021-11-30 PROCEDURE — 99214 OFFICE O/P EST MOD 30 MIN: CPT | Mod: 95,,, | Performed by: PHYSICIAN ASSISTANT

## 2021-11-30 PROCEDURE — 3061F PR NEG MICROALBUMINURIA RESULT DOCUMENTED/REVIEW: ICD-10-PCS | Mod: CPTII,95,, | Performed by: PHYSICIAN ASSISTANT

## 2021-11-30 PROCEDURE — 3072F PR LOW RISK FOR RETINOPATHY: ICD-10-PCS | Mod: CPTII,95,, | Performed by: PHYSICIAN ASSISTANT

## 2021-11-30 PROCEDURE — 99214 PR OFFICE/OUTPT VISIT, EST, LEVL IV, 30-39 MIN: ICD-10-PCS | Mod: 95,,, | Performed by: PHYSICIAN ASSISTANT

## 2021-11-30 PROCEDURE — 3061F NEG MICROALBUMINURIA REV: CPT | Mod: CPTII,95,, | Performed by: PHYSICIAN ASSISTANT

## 2021-12-02 ENCOUNTER — CLINICAL SUPPORT (OUTPATIENT)
Dept: REHABILITATION | Facility: HOSPITAL | Age: 51
End: 2021-12-02
Payer: COMMERCIAL

## 2021-12-02 ENCOUNTER — TELEPHONE (OUTPATIENT)
Dept: RADIOLOGY | Facility: HOSPITAL | Age: 51
End: 2021-12-02
Payer: COMMERCIAL

## 2021-12-02 DIAGNOSIS — G89.29 CHRONIC RIGHT SHOULDER PAIN: ICD-10-CM

## 2021-12-02 DIAGNOSIS — M25.511 CHRONIC RIGHT SHOULDER PAIN: ICD-10-CM

## 2021-12-02 DIAGNOSIS — M25.611 SHOULDER STIFFNESS, RIGHT: ICD-10-CM

## 2021-12-02 PROCEDURE — 97140 MANUAL THERAPY 1/> REGIONS: CPT

## 2021-12-02 PROCEDURE — 97110 THERAPEUTIC EXERCISES: CPT

## 2021-12-03 ENCOUNTER — HOSPITAL ENCOUNTER (OUTPATIENT)
Dept: RADIOLOGY | Facility: HOSPITAL | Age: 51
Discharge: HOME | End: 2021-12-03
Attending: NURSE PRACTITIONER
Payer: COMMERCIAL

## 2021-12-03 DIAGNOSIS — N63.0 BREAST MASS: ICD-10-CM

## 2021-12-03 PROCEDURE — 19001 PUNCTURE ASPIR CYST BRST EA: CPT

## 2021-12-03 PROCEDURE — 19000 US ASPIRATION BREAST CYST: ICD-10-PCS | Mod: ,,, | Performed by: RADIOLOGY

## 2021-12-03 PROCEDURE — 76942 US ASPIRATION BREAST CYST: ICD-10-PCS | Mod: 26,,, | Performed by: RADIOLOGY

## 2021-12-03 PROCEDURE — 19000 PUNCTURE ASPIR CYST BREAST: CPT | Mod: ,,, | Performed by: RADIOLOGY

## 2021-12-03 PROCEDURE — 76942 ECHO GUIDE FOR BIOPSY: CPT | Mod: 26,,, | Performed by: RADIOLOGY

## 2021-12-03 PROCEDURE — 76942 ECHO GUIDE FOR BIOPSY: CPT | Mod: TC

## 2021-12-06 ENCOUNTER — CLINICAL SUPPORT (OUTPATIENT)
Dept: REHABILITATION | Facility: HOSPITAL | Age: 51
End: 2021-12-06
Payer: COMMERCIAL

## 2021-12-06 DIAGNOSIS — M25.611 SHOULDER STIFFNESS, RIGHT: ICD-10-CM

## 2021-12-06 DIAGNOSIS — M25.511 CHRONIC RIGHT SHOULDER PAIN: ICD-10-CM

## 2021-12-06 DIAGNOSIS — G89.29 CHRONIC RIGHT SHOULDER PAIN: ICD-10-CM

## 2021-12-06 PROCEDURE — 97110 THERAPEUTIC EXERCISES: CPT

## 2021-12-06 PROCEDURE — 97140 MANUAL THERAPY 1/> REGIONS: CPT

## 2021-12-09 ENCOUNTER — CLINICAL SUPPORT (OUTPATIENT)
Dept: REHABILITATION | Facility: HOSPITAL | Age: 51
End: 2021-12-09
Payer: COMMERCIAL

## 2021-12-09 DIAGNOSIS — G89.29 CHRONIC RIGHT SHOULDER PAIN: ICD-10-CM

## 2021-12-09 DIAGNOSIS — M25.511 CHRONIC RIGHT SHOULDER PAIN: ICD-10-CM

## 2021-12-09 DIAGNOSIS — M25.611 SHOULDER STIFFNESS, RIGHT: ICD-10-CM

## 2021-12-09 PROCEDURE — 97110 THERAPEUTIC EXERCISES: CPT

## 2021-12-09 PROCEDURE — 97140 MANUAL THERAPY 1/> REGIONS: CPT

## 2021-12-13 ENCOUNTER — IMMUNIZATION (OUTPATIENT)
Dept: PHARMACY | Facility: CLINIC | Age: 51
End: 2021-12-13
Payer: COMMERCIAL

## 2021-12-13 DIAGNOSIS — Z23 NEED FOR VACCINATION: Primary | ICD-10-CM

## 2021-12-22 ENCOUNTER — CLINICAL SUPPORT (OUTPATIENT)
Dept: REHABILITATION | Facility: HOSPITAL | Age: 51
End: 2021-12-22
Payer: COMMERCIAL

## 2021-12-22 DIAGNOSIS — M25.611 SHOULDER STIFFNESS, RIGHT: ICD-10-CM

## 2021-12-22 DIAGNOSIS — G89.29 CHRONIC RIGHT SHOULDER PAIN: ICD-10-CM

## 2021-12-22 DIAGNOSIS — M25.511 CHRONIC RIGHT SHOULDER PAIN: ICD-10-CM

## 2021-12-22 PROCEDURE — 97110 THERAPEUTIC EXERCISES: CPT

## 2021-12-22 PROCEDURE — 97140 MANUAL THERAPY 1/> REGIONS: CPT

## 2021-12-27 ENCOUNTER — TELEPHONE (OUTPATIENT)
Dept: INTERNAL MEDICINE | Facility: CLINIC | Age: 51
End: 2021-12-27
Payer: COMMERCIAL

## 2021-12-29 ENCOUNTER — CLINICAL SUPPORT (OUTPATIENT)
Dept: REHABILITATION | Facility: HOSPITAL | Age: 51
End: 2021-12-29
Payer: COMMERCIAL

## 2021-12-29 DIAGNOSIS — G89.29 CHRONIC RIGHT SHOULDER PAIN: ICD-10-CM

## 2021-12-29 DIAGNOSIS — M25.511 CHRONIC RIGHT SHOULDER PAIN: ICD-10-CM

## 2021-12-29 DIAGNOSIS — M25.611 SHOULDER STIFFNESS, RIGHT: ICD-10-CM

## 2021-12-29 PROCEDURE — 97110 THERAPEUTIC EXERCISES: CPT

## 2021-12-29 PROCEDURE — 97140 MANUAL THERAPY 1/> REGIONS: CPT

## 2022-01-06 ENCOUNTER — TELEPHONE (OUTPATIENT)
Dept: REHABILITATION | Facility: HOSPITAL | Age: 52
End: 2022-01-06
Payer: COMMERCIAL

## 2022-01-06 NOTE — TELEPHONE ENCOUNTER
Patient reports that she was going to try and call and cancel but she was in an area that had no service.

## 2022-01-13 ENCOUNTER — OFFICE VISIT (OUTPATIENT)
Dept: INTERNAL MEDICINE | Facility: CLINIC | Age: 52
End: 2022-01-13
Payer: COMMERCIAL

## 2022-01-13 ENCOUNTER — HOSPITAL ENCOUNTER (OUTPATIENT)
Dept: RADIOLOGY | Facility: HOSPITAL | Age: 52
Discharge: HOME | End: 2022-01-13
Attending: NURSE PRACTITIONER
Payer: COMMERCIAL

## 2022-01-13 ENCOUNTER — CLINICAL SUPPORT (OUTPATIENT)
Dept: REHABILITATION | Facility: HOSPITAL | Age: 52
End: 2022-01-13
Payer: COMMERCIAL

## 2022-01-13 VITALS
BODY MASS INDEX: 21.61 KG/M2 | DIASTOLIC BLOOD PRESSURE: 72 MMHG | WEIGHT: 125.88 LBS | SYSTOLIC BLOOD PRESSURE: 118 MMHG | RESPIRATION RATE: 16 BRPM | TEMPERATURE: 98 F

## 2022-01-13 DIAGNOSIS — M25.611 SHOULDER STIFFNESS, RIGHT: ICD-10-CM

## 2022-01-13 DIAGNOSIS — M65.341 TRIGGER RING FINGER OF RIGHT HAND: ICD-10-CM

## 2022-01-13 DIAGNOSIS — M79.641 RIGHT HAND PAIN: Primary | ICD-10-CM

## 2022-01-13 DIAGNOSIS — M25.511 CHRONIC RIGHT SHOULDER PAIN: ICD-10-CM

## 2022-01-13 DIAGNOSIS — G89.29 CHRONIC RIGHT SHOULDER PAIN: ICD-10-CM

## 2022-01-13 PROCEDURE — 3074F PR MOST RECENT SYSTOLIC BLOOD PRESSURE < 130 MM HG: ICD-10-PCS | Mod: CPTII,S$GLB,, | Performed by: NURSE PRACTITIONER

## 2022-01-13 PROCEDURE — 3072F LOW RISK FOR RETINOPATHY: CPT | Mod: CPTII,S$GLB,, | Performed by: NURSE PRACTITIONER

## 2022-01-13 PROCEDURE — 99999 PR PBB SHADOW E&M-EST. PATIENT-LVL V: CPT | Mod: PBBFAC,,, | Performed by: NURSE PRACTITIONER

## 2022-01-13 PROCEDURE — 99213 OFFICE O/P EST LOW 20 MIN: CPT | Mod: S$GLB,,, | Performed by: NURSE PRACTITIONER

## 2022-01-13 PROCEDURE — 3008F BODY MASS INDEX DOCD: CPT | Mod: CPTII,S$GLB,, | Performed by: NURSE PRACTITIONER

## 2022-01-13 PROCEDURE — 73130 X-RAY EXAM OF HAND: CPT | Mod: 26,LT,, | Performed by: RADIOLOGY

## 2022-01-13 PROCEDURE — 1159F PR MEDICATION LIST DOCUMENTED IN MEDICAL RECORD: ICD-10-PCS | Mod: CPTII,S$GLB,, | Performed by: NURSE PRACTITIONER

## 2022-01-13 PROCEDURE — 97110 THERAPEUTIC EXERCISES: CPT

## 2022-01-13 PROCEDURE — 1160F RVW MEDS BY RX/DR IN RCRD: CPT | Mod: CPTII,S$GLB,, | Performed by: NURSE PRACTITIONER

## 2022-01-13 PROCEDURE — 3074F SYST BP LT 130 MM HG: CPT | Mod: CPTII,S$GLB,, | Performed by: NURSE PRACTITIONER

## 2022-01-13 PROCEDURE — 73130 X-RAY EXAM OF HAND: CPT | Mod: TC,LT

## 2022-01-13 PROCEDURE — 3078F PR MOST RECENT DIASTOLIC BLOOD PRESSURE < 80 MM HG: ICD-10-PCS | Mod: CPTII,S$GLB,, | Performed by: NURSE PRACTITIONER

## 2022-01-13 PROCEDURE — 99999 PR PBB SHADOW E&M-EST. PATIENT-LVL V: ICD-10-PCS | Mod: PBBFAC,,, | Performed by: NURSE PRACTITIONER

## 2022-01-13 PROCEDURE — 1160F PR REVIEW ALL MEDS BY PRESCRIBER/CLIN PHARMACIST DOCUMENTED: ICD-10-PCS | Mod: CPTII,S$GLB,, | Performed by: NURSE PRACTITIONER

## 2022-01-13 PROCEDURE — 3078F DIAST BP <80 MM HG: CPT | Mod: CPTII,S$GLB,, | Performed by: NURSE PRACTITIONER

## 2022-01-13 PROCEDURE — 99213 PR OFFICE/OUTPT VISIT, EST, LEVL III, 20-29 MIN: ICD-10-PCS | Mod: S$GLB,,, | Performed by: NURSE PRACTITIONER

## 2022-01-13 PROCEDURE — 97140 MANUAL THERAPY 1/> REGIONS: CPT

## 2022-01-13 PROCEDURE — 73130 XR HAND COMPLETE 3 VIEW LEFT: ICD-10-PCS | Mod: 26,LT,, | Performed by: RADIOLOGY

## 2022-01-13 PROCEDURE — 3072F PR LOW RISK FOR RETINOPATHY: ICD-10-PCS | Mod: CPTII,S$GLB,, | Performed by: NURSE PRACTITIONER

## 2022-01-13 PROCEDURE — 3008F PR BODY MASS INDEX (BMI) DOCUMENTED: ICD-10-PCS | Mod: CPTII,S$GLB,, | Performed by: NURSE PRACTITIONER

## 2022-01-13 PROCEDURE — 1159F MED LIST DOCD IN RCRD: CPT | Mod: CPTII,S$GLB,, | Performed by: NURSE PRACTITIONER

## 2022-01-13 NOTE — PROGRESS NOTES
[facility]   Physical Therapy Treatment Note     Name: Chan Gorman [facility]  Clinic Number: [id number]    Therapy Diagnosis:   Encounter Diagnoses   Name Primary?    Chronic right shoulder pain     Shoulder stiffness, right      Physician: Meme Enciso NP    Visit Date: 1/13/2022    Physician Orders: PT Eval and Treat  Medical Diagnosis from Referral: M75.80 (ICD-10-CM) - Subscapularis tendinitis, unspecified laterality  Evaluation Date: 11/8/2021  Authorization Period Expiration: 12/31/21  Plan of Care Expiration: 2/23/22  Progress Note Due: 1/29/2022  Visit # / Visits authorized: 1/20 (+1 Evaluation) - Episode Visits: 8  FOTO: 2/3      Precautions: Standard and Diabetes    PTA Visit #: 0/5     Time In: 12:15 pm  Time Out: 12:52 pm  Total Billable Time: 31 minutes    SUBJECTIVE     Patient reports: that her pain is no longer as intense as it has been previously - she feels that her motion is also progressing well since we last saw her.    He/She was compliant with home exercise program.  Response to previous treatment: no significant change  Functional change: improvements in range of motion   Pain: 4/10     Location: right shoulder    OBJECTIVE     Objective Measures updated at progress report unless specified.     TREATMENT     Chan received the treatments listed below:     MANUAL THERAPY TECHNIQUES including Joint mobilizations were applied to right shoulder for 8 minutes.    Manual Intervention Performed Today    Soft Tissue Mobilization x Subscapularis, latissimus dorsi, scapular tilting, upper trapezial, levator scapula, pectoral major and minor, posterior deltoid muscle.    Joint Mobilizations x right GH mobilization - inferior glide/posterior glide grade 1-2      Plan for Next Visit: prn       THERAPEUTIC EXERCISES to develop  strength, endurance, ROM, flexibility, posture and core stabilization for 23 minutes including assessment, patient education, and the  following:    Intervention    1/13/2022 Sets/Reps/Resistance   UBE x 2' forward/ 2' backward   Pulleys Flexion  3 minutes   Pulleys Scaption  3 minutes   Standing Shoulder Flexion x 3x10 - 0#   Standing Shoulder Abduction x 3x10 - 0#   active assited range of motion - PVC - Flexion  10x /3 sets   active assited range of motion - PVC - external rotation supine  10x /3 sets   Standing Lower trapezial activation  10x   Standing shoulder extension with stick  10x   Seated lat stretch  10s holds, 10x   side lying external rotation  x 30x - 1#   Wall Slides - Flexion  30x   Wall Slides - Abduction   30x   theraband rows x 3x10 - 20#   theraband extensions x 3x10 - 10#   theraband external rotation - isometric walkouts  3x10 - Yellow Theraband    1/2 foam roll thoracic mob - in chair  x 2 minutes   1/2 foam roll stretch - pectorals x 3 min   sidelying abduction   10x   sidelying flexion   10x   Foam roll series  Hugs, scapular retraction/protraction 8x/each   Open books  5x each     Plan for Next Visit: continue to progress shoulder strength and range of motion        PATIENT EDUCATION AND HOME EXERCISES     Home Exercises Provided and Patient Education Provided     Education provided: (included in therex) minutes  -importance of maintaining ROM and scapular mobility, pain will decrease as ROM improves.    Written Home Exercises Provided: Patient instructed to cont prior HEP. Exercises were reviewed and Chan was able to demonstrate them prior to the end of the session.  Chna demonstrated good  understanding of the education provided. See EMR under Patient Instructions for exercises provided during therapy sessions      ASSESSMENT     Patient tolerated the addition of all exercises with reports of only muscle fatigue. Patient required verbal cueing and tactile cueing in order to eliminate upper trap compensation with forward flexion. Patient remains limited with passive range of motion. Patient's range of motion  is progressing well compared to her intial evaluation. Continue to progress patient's POC as tolerated.    Chan is progressing well towards her goals.   Pt prognosis is Good.     Pt will continue to benefit from skilled outpatient physical therapy to address the deficits listed in the problem list box on initial evaluation, provide pt/family education and to maximize pt's level of independence in the home and community environment.     Pt's spiritual, cultural and educational needs considered and pt agreeable to plan of care and goals.     Anticipated Barriers for therapy: co-morbidities      Reviewed: 1/13/2022         SHORT TERM GOALS:  4 weeks     1. Recent signs and systems trend is improving in order to progress towards LTG's. MET, 12/29/21   2. Patient will be independent with HEP in order to further progress and return to maximal function. PC, 12/29/21     3. Pain rating at Worst: 5/10 in order to progress towards increased independence with activity. PC, 12/29/21   4. Patient will be able to correct postural deviations in sitting and standing, to decrease pain and promote postural awareness for injury prevention.  PC, 12/29/21      LONG TERM GOALS: 8 weeks     1. Patient will return to normal ADL, recreational, and work related activities with less pain and limitation.  PC, 12/29/21   2. Patient will improve AROM to stated goals in order to return to maximal functional potential.  PC, 12/29/21   3. Patient will improve Strength to stated goals of appropriate musculature in order to improve functional independence.  PC, 12/29/21   4. Pain Rating at Best: 1/10 to improve Quality of Life.  PC, 12/29/21   5. Patient will meet predicted functional outcome (FOTO) score: 65% to increase self-worth & perceived functional ability. Not tested, 12/29/21   6. Patient will have met/partially met personal goal of: return to working without pain.  PC, 12/29/21          Goals Key:  PC= progressing/continue;        PM=  partially met;             DC= discontinue        PLAN   Updated Certification Period: 12/29/21 to 2/23/22   Recommended Treatment Plan: 2 times per week for 8 weeks:  Electrical Stimulation PRN, Manual Therapy, Moist Heat/ Ice, Neuromuscular Re-ed, Patient Education, Self Care, Therapeutic Activities, Therapeutic Exercise and FDN/PRN    Monitor response to today's treatment and progress as tolerated.      Yehuda Shi, PT

## 2022-01-13 NOTE — PROGRESS NOTES
Subjective:       Patient ID: Chan Dwyer is a 51 y.o. female.    Chief Complaint: Hand Pain    Patient presents for right hand pain.  Started about one month ago.  Intermittent.  Reports stiffness, swelling, and pain.   Very active with hands on her job.  Currently in therapy for her right shoulder.          Review of Systems   Constitutional: Negative for chills, fatigue and fever.   Respiratory: Negative for cough and shortness of breath.    Gastrointestinal: Negative for abdominal pain.   Musculoskeletal: Positive for arthralgias and joint swelling.   Integumentary:  Negative for color change and wound.   Psychiatric/Behavioral: Negative for agitation and confusion.         Objective:      Physical Exam  Vitals reviewed.   Constitutional:       Appearance: Normal appearance.   Cardiovascular:      Rate and Rhythm: Normal rate and regular rhythm.   Musculoskeletal:         General: Swelling and tenderness present.      Comments: Unable to perform finger  to left hand-more to left ring finger. Some swelling noted.  No erythema.     Neurological:      General: No focal deficit present.      Mental Status: She is alert and oriented to person, place, and time.   Psychiatric:         Mood and Affect: Mood normal.         Behavior: Behavior normal. Behavior is cooperative.         Assessment:       Problem List Items Addressed This Visit    None     Visit Diagnoses     Right hand pain    -  Primary    Trigger ring finger of right hand        Relevant Orders    X-Ray Hand Complete Right    Ambulatory referral/consult to Physical/Occupational Therapy          Plan:         Right hand pain    Trigger ring finger of right hand  -     X-Ray Hand Complete Right; Future; Expected date: 01/13/2022  -     Ambulatory referral/consult to Physical/Occupational Therapy; Future; Expected date: 01/20/2022          Will send referral to add PT of right hand to sess.     X-ray today after therapy     Ortho referral if  no improvement.

## 2022-01-13 NOTE — LETTER
January 13, 2022    Chan Dwyer  [id number] [facility] [id number]             [facility]  Internal Medicine  [address] [place] [address]  [place] [address]  Phone: [phone]  Fax: [phone]   January 13, 2022     Patient: Chan Dwyer   YOB: 1970   Date of Visit: 1/13/2022       To Whom it May Concern:    Chan Dwyer was seen in my clinic on 1/13/2022. She may return to work on 01/18/2022..    Please excuse her from any work missed.    If you have any questions or concerns, please don't hesitate to call.    Sincerely,         Meme Enciso NP

## 2022-01-18 NOTE — PROGRESS NOTES
Patient ID: Chan Dwyer is a 51 y.o. female.    Chief Complaint: history of right breast mass s/p ultrasound guided aspiration    HPI: Patient presents for f/u after undergoing right breast cyst aspiration on 12/3/2021- pt had a history of right breast pain and palpable thickening noted Nov 2021.    History of simple and complex cysts that have been monitored since 2/2014. History of right breast complex cysts increasing in size 12/14 so pt was sent for core biopsy at [facility]- biopsy was cancelled since they could not find a complex cyst on exam to biopsy- noted all simple cysts at the time of the biopsy. Pt was scheduled for 6 mon f/u of the right breast in July 2015 and canceled the appts and again in August - canceled again- May 2016 imaging revealed la complex cysts and right breast simple cysts- la mammo and ultrasound rec for 6 mons- November 2016- simple cyst in right breast stable and complex cysts in left stable- 6 mon f/u left breast ultrasound only was recommended and stable 6/6/17.    Pt had her la mammo 2/9/2021- wnl- risk score is 3.25%     November 2021 pt presented with palpable mass and thickening right breast- breast imaging revealed large cyst that correlates with palpable abnormality. Ultrasound guided cyst aspiration preformed 12/3/2021 with complete resolution of the cyst.    Pt denies any recurrence of the cyst or pain    Review of Systems   Constitutional: Negative.  Negative for appetite change and unexpected weight change.   HENT: Negative.    Eyes: Negative.  Negative for visual disturbance.   Respiratory: Negative.  Negative for cough and shortness of breath.    Cardiovascular: Negative.  Negative for chest pain.   Gastrointestinal: Negative.  Negative for abdominal pain and diarrhea.        No reflux   Endocrine: Negative.    Genitourinary: Negative.  Negative for frequency.   Musculoskeletal: Negative.  Negative for back pain.   Skin: Negative.  Negative for rash.  "  Allergic/Immunologic: Negative.    Neurological: Negative.  Negative for headaches.   Hematological: Negative.  Negative for adenopathy.   Psychiatric/Behavioral: Negative.  The patient is not nervous/anxious.      Breast: right breast pain resolved and no palpable mass since aspiration.  No change in her family history.      Current Outpatient Medications   Medication Sig Dispense Refill    alcohol swabs (EASY COMFORT ALCOHOL PAD) PadM Apply 1 each topically 4 (four) times daily. 100 each 11    blood sugar diagnostic (FREESTYLE LITE STRIPS) Strp 1 strip by Misc.(Non-Drug; Combo Route) route 4 (four) times daily with meals and nightly. 120 strip 11    blood sugar diagnostic Strp To check BG 4 times daily, to use with insurance preferred meter 120 each 11    blood sugar diagnostic Strp 1 each by Misc.(Non-Drug; Combo Route) route 4 (four) times daily. 100 each 11    blood-glucose meter Misc 1 each by Misc.(Non-Drug; Combo Route) route 4 (four) times daily. 120 each 0    insulin (LANTUS SOLOSTAR U-100 INSULIN) glargine 100 units/mL (3mL) SubQ pen Inject 20 Units into the skin every evening. (Patient taking differently: Inject 23 Units into the skin every evening.) 6 mL 11    lancets Misc 1 lancet by Misc.(Non-Drug; Combo Route) route 3 (three) times daily. 100 each 5    lancets Misc 1 each by Misc.(Non-Drug; Combo Route) route 4 (four) times daily. 100 each 11    lancing device Misc 1 each by Misc.(Non-Drug; Combo Route) route 3 (three) times daily as needed. 1 each 0    meloxicam (MOBIC) 15 MG tablet Take 1 tablet (15 mg total) by mouth once daily. Take with meal. 14 tablet 0    metFORMIN (GLUCOPHAGE-XR) 500 MG ER 24hr tablet Take 2 tablets (1,000 mg total) by mouth daily with breakfast. 30 tablet 11    pen needle, diabetic (BD ULTRA-FINE KIYA PEN NEEDLE) 32 gauge x 5/32" Ndle 1 each by Misc.(Non-Drug; Combo Route) route 4 (four) times daily with meals and nightly. 100 each 3    pioglitazone (ACTOS) 15 " MG tablet Take 1 tablet (15 mg total) by mouth once daily. 30 tablet 11    sars-cov-2, covid-19, (PFIZER COVID-19) 30 mcg/0.3 ml injection Inject into the muscle. 0.3 mL 0    SITagliptin (JANUVIA) 100 MG Tab Take 1 tablet (100 mg total) by mouth once daily. 90 tablet 3    vitamin D 1000 units Tab Take 1,000 Units by mouth once daily.       No current facility-administered medications for this visit.       Allergies   Allergen Reactions    Hydrocodone Hives and Rash       Past Medical History:   Diagnosis Date    Abnormal Pap smear     Abnormal Pap smear of cervix     Arthritis, low back     Diabetes 9/2014     BS 162am 09/16/2014    DM (diabetes mellitus) 09/2014     am 01/26/2016    DM (diabetes mellitus) 09/2014    BS didn't check 01/31/2017    GERD (gastroesophageal reflux disease)     Gestational diabetes     History of abnormal Pap smear     Surgical menopause     Vitamin D deficiency        Past Surgical History:   Procedure Laterality Date    COLONOSCOPY N/A 6/22/2016    Procedure: COLONOSCOPY;  Surgeon: Vishal Mary III, MD;  Location: [facility];  Service: Endoscopy;  Laterality: N/A;    Cryotherapy of the cervix  1999    DILATION AND CURETTAGE OF UTERUS      HYSTERECTOMY      [facility]  2/2012    TUBAL LIGATION         Family History   Problem Relation Age of Onset    Hypertension Mother     Diabetes Mother     Heart attack Mother     Diabetes Father     Hypertension Father     Stomach cancer Sister     No Known Problems Son     No Known Problems Son     Cancer Neg Hx     Stroke Neg Hx     Colon cancer Neg Hx     Ovarian cancer Neg Hx        Social History     Socioeconomic History    Marital status: Single    Number of children: 2   Occupational History    Occupation:      Employer: [employer]     Employer: [employer]   Tobacco Use    Smoking status: Never Smoker    Smokeless tobacco: Never Used   Substance and Sexual Activity     Alcohol use: Yes     Alcohol/week: 0.0 standard drinks     Comment: rare    Drug use: No    Sexual activity: Yes     Partners: Male     Birth control/protection: Surgical   Social History Narrative    She wears seatbelt.       There were no vitals filed for this visit.    Physical Exam  Constitutional:       Appearance: Normal appearance. She is well-developed.   HENT:      Head: Normocephalic and atraumatic.      Nose: Nose normal.      Mouth/Throat:      Pharynx: No oropharyngeal exudate.   Eyes:      General: No scleral icterus.     Conjunctiva/sclera: Conjunctivae normal.      Pupils: Pupils are equal, round, and reactive to light.   Neck:      Thyroid: No thyromegaly.      Vascular: No JVD.      Trachea: No tracheal deviation.   Cardiovascular:      Rate and Rhythm: Normal rate and regular rhythm.      Heart sounds: Normal heart sounds. No murmur heard.  No friction rub. No gallop.    Pulmonary:      Effort: Pulmonary effort is normal.      Breath sounds: Normal breath sounds. No wheezing or rales.   Chest:      Chest wall: No tenderness.   Breasts: Breasts are symmetrical.      Right: No inverted nipple, mass, nipple discharge, skin change, tenderness, axillary adenopathy or supraclavicular adenopathy.      Left: No inverted nipple, mass, nipple discharge, skin change, tenderness, axillary adenopathy or supraclavicular adenopathy.         Abdominal:      General: Bowel sounds are normal. There is no distension.      Palpations: Abdomen is soft. There is no mass.      Tenderness: There is no abdominal tenderness. There is no guarding or rebound.   Genitourinary:     Vagina: Normal. No vaginal discharge.      Rectum: Guaiac result negative.   Musculoskeletal:         General: No tenderness. Normal range of motion.      Right shoulder: No crepitus. Normal strength.      Cervical back: Normal range of motion and neck supple.   Lymphadenopathy:      Head:      Right side of head: No submental, submandibular,  tonsillar, preauricular, posterior auricular or occipital adenopathy.      Left side of head: No submental, submandibular, tonsillar, preauricular, posterior auricular or occipital adenopathy.      Cervical: No cervical adenopathy.      Right cervical: No superficial or posterior cervical adenopathy.     Left cervical: No superficial or posterior cervical adenopathy.      Upper Body:      Right upper body: No supraclavicular or axillary adenopathy.      Left upper body: No supraclavicular or axillary adenopathy.      Lower Body: No right inguinal adenopathy. No left inguinal adenopathy.   Skin:     General: Skin is warm and dry.      Coloration: Skin is not pale.      Findings: No erythema or rash.   Neurological:      Mental Status: She is alert and oriented to person, place, and time.      Cranial Nerves: No cranial nerve deficit.      Motor: No abnormal muscle tone.      Deep Tendon Reflexes: Reflexes are normal and symmetric.   Psychiatric:         Behavior: Behavior normal.         Thought Content: Thought content normal.         Judgment: Judgment normal.       Menarche at 12 y/o    First preg at 15 y/o  LMP: 43 y/o- partial hyst  No oral hormones- uses vaginal hormones     FH: sister stomach cancer -63 y/o    IMAGING: la breast 2021- wnl    2021  Result:   Mammo Digital Diagnostic Right with Haroon  US Breast Right Limited     History:  Patient is 51 y.o. and is seen for diagnostic imaging. She presents for evaluation of a tender palpable area of concern within the right breast.         Films Compared:  Prior images (if available) were compared.     Findings:  This procedure was performed using tomosynthesis. Computer-aided detection was utilized in the interpretation of this examination.      The right breast has scattered areas of fibroglandular density.      Mammogram:      Triangular cutaneous marker was placed on the outer right breast at the patient's palpable area of concern.   Corresponding to the palpable area there is a 3 x 2.4 cm oval circumscribed mass which is similar in size and appearance compared to prior exam from February 2021.  There are smaller oval circumscribed and obscured masses seen adjacent to the posterior aspect of this palpable mass.  For example, there is a 10 mm oval mass which has decreased from size of 13 mm on prior study.      No new abnormality identified within the right breast.      Ultrasound:      Targeted sonogram of the right breast was performed at the 9 o'clock position 5 cm from the nipple at the patient's palpable area of concern.  Corresponding to the palpable abnormality there is a 2.9 x 1.8 x 2.0 cm oval complicated cyst which lacks detectable internal vascularity.  This cyst has developed diffuse internal debris in comparison to prior ultrasound from January 2019 likely secondary to inflammation or hemorrhage.       Adjacent to the dominant palpable cyst is a smaller 8 mm oval complicated cyst also containing internal debris likely corresponding to the decreasing oval mass seen on mammogram.      No suspicious abnormality visualized.         Impression:  Multiple benign appearing complicated cysts at the 9 o'clock position of the right breast including a dominant 3 cm cyst which corresponds to the patient's tender palpable area of concern. These cysts demonstrate new diffuse internal debris in comparison to prior sonogram from 2019 likely representing sequela of cyst inflammation or hemorrhage. Consider cyst aspiration for symptom relief given focal tenderness at this site.         BI-RADS Category:   Overall: 2 - Benign     Recommendation:  Routine screening mammogram in 1 year is recommended.  Consider cyst aspiration for symptom relief.      Your estimated lifetime risk of breast cancer (to age 85) based on Tyrer-Cuzick risk assessment model is Tyrer-Cuzick: 3.11 %. According to the American Cancer Society, patients with a lifetime breast  cancer risk of 20% or higher might benefit from supplemental screening tests.    12/3/2021  Result:   US Aspiration Breast Cyst     History: 51-year-old woman presented for evaluation of a tender palpable area of concern of the right breast. Previous diagnostic workup revealed a 3 cm complicated cyst corresponding to the palpable abnormality. Patient presents today for ultrasound-guided cyst aspiration for symptomatic relief.      A cyst aspiration was performed with sonographic guidance on the right breast at 9 o'clock, 5 cm from the nipple, using a 18 gauge needle and approximately 8 ml of brown fluid was removed. Lesion did completely resolve. Fluid not sent for cytologic evaluation.    Assessment & Plan:  1. History of an abnormal mammogram bilateral breasts - right- simple cyst, left: 2 complex cysts -   2. Right breast palpable mass - 3 cm complex cyst   Pt underwent ultrasound guided cyst aspiration 12/3/2021- with complete resolution of the cyst  3. recommend annual mammo after 2//9/2022. If wnl recommend la mammo and exam 2/2023 and f/u with pcp  4. BSE monthly- call for any changes.

## 2022-01-24 ENCOUNTER — CLINICAL SUPPORT (OUTPATIENT)
Dept: REHABILITATION | Facility: HOSPITAL | Age: 52
End: 2022-01-24
Payer: COMMERCIAL

## 2022-01-24 DIAGNOSIS — M25.60 DECREASED RANGE OF MOTION: ICD-10-CM

## 2022-01-24 DIAGNOSIS — Z78.9 SELF-CARE DEFICIT: ICD-10-CM

## 2022-01-24 DIAGNOSIS — M65.341 TRIGGER RING FINGER OF RIGHT HAND: ICD-10-CM

## 2022-01-24 PROCEDURE — 97165 OT EVAL LOW COMPLEX 30 MIN: CPT | Performed by: OCCUPATIONAL THERAPIST

## 2022-01-24 NOTE — PROGRESS NOTES
[facility]  Occupational Therapy Initial Evaluation    Date: 1/24/2022  [facility]  [facility] Number[facility] [id number]    Therapy Diagnosis:   Encounter Diagnosis   Name Primary?    Trigger ring finger of right hand      Physician: Meme Enciso NP    Physician Orders: Evaluation and treatment   Medical Diagnosis: M65.341 (ICD-10-CM) - Trigger ring finger of right hand  Surgical Procedure and Date: NA,   Evaluation Date: 1/24/2022  Insurance Authorization Period Expiration: 1/13/2023  Plan of Care Certification Period: 2/24/2022  Progress Note Due: 2/24/2022   Date of Return to MD: None scheduled  Visit # / Visits authorized: 1 / 1  FOTO: 1/3    Precautions:  Diabetes    Time In:1:25 pm  Time Out: 2:15 pm  Total Appointment Time (timed & untimed codes): 50 minutes    SUBJECTIVE     Date of Onset: 4-5 months    History of Current Condition/Mechanism of Injury: Chan reports: states she couldn't get an appointment with her physician. She tries not to use her left hand and only use her right hand It locks all the time    Falls: NONE    Involved Side: left  Dominant Side: Right  Imaging: X-ray FINDINGS:  There is no radiographic evidence of acute osseous, articular, or soft tissue abnormality.  Mild-to-moderate scattered degenerative findings are seen throughout the DIP joints, most severe at the 5th digit.  No erosive changes demonstrated.        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     Current Functional Status   Home/Living environment: lives with their family      Limitation of Functional Status as follows:   ADLs/IADLs:     - Feeding: Mild difficulty    - Bathing: mild difficulty    - Dressing/Grooming:  Moderate difficulty    - Driving: only uses her right hand     Leisure: work    Pain:  Functional Pain Scale Rating 0-10: Current 8/10, worst 10/10, best 2/10   Location: ring finger A-1 pully in pal  Description: Aching  Aggravating Factors: Flexing, Lifting .  Easing Factors: rest    Patient's Goals for Therapy: to be able to drive with her left hand.    Medical History:   Past Medical History:   Diagnosis Date    Abnormal Pap smear     Abnormal Pap smear of cervix     Arthritis, low back     Diabetes 9/2014     BS 162am 09/16/2014    DM (diabetes mellitus) 09/2014     am 01/26/2016    DM (diabetes mellitus) 09/2014    BS didn't check 01/31/2017    GERD (gastroesophageal reflux disease)     Gestational diabetes     History of abnormal Pap smear     Surgical menopause     Vitamin D deficiency        Surgical History:    has a past surgical history that includes Tubal ligation; Cryotherapy of the cervix (1999); RALH (2/2012); Dilation and curettage of uterus; Hysterectomy; and Colonoscopy (N/A, 6/22/2016).    Medications:   has a current medication list which includes the following prescription(s): alcohol swabs, freestyle lite strips, blood sugar diagnostic, blood sugar diagnostic, blood-glucose meter, lantus solostar u-100 insulin, lancets, lancets, lancing device, meloxicam, metformin, pen needle, diabetic, pioglitazone, sars-cov-2 (covid-19), sitagliptin, and vitamin d.    Allergies:   Review of patient's allergies indicates:   Allergen Reactions    Hydrocodone Hives and Rash          OBJECTIVE     Observation: Skin intact  Palpation: pain with palpation over the A-1 pulley   Sensation:  Intact for light touch    Edema: Circumferential measurements: N/A  Range of Motion: left Active    Right ring finger MP:73  PROXIMAL INTERPHALANGEAL JOINT:72 distal interphalangeal joint: 54    Thumb Opposition: WITHIN NORMAL LIMITS   Palmar Abduction: WITHIN NORMAL LIMITS   Radial Abduction: WITHIN NORMAL  LIMITS     Wrist Ext/Flex: WITHIN NORMAL LIMITS   Wrist RD/UD: WITHIN NORMAL LIMITS   Supination/Pronation: WITHIN NORMAL LIMITS   Elbow extension/flexion: WITHIN NORMAL LIMITS     Manual Muscle Test:   Muscle   Strength  Elbow extension: 4-/5  Elbow flexion: 4+/5  Forearm pronation/supination: -4/5  Wrist flexion/extension: 5/5  Shoulder flexion: 5/5  Shoulder abduction: -4/5  Shoulder extension: -4/5  External/internal rotation:     Strength: (KATLIN Dynamometer in lbs.) Average 3 trials, Position II  Right: 30#  Left: 10#    Pinch Strength: (Pinch Gauge in psi's), Average 3 trials  Shaw Pinch    L) with pain  3pt Pinch     L) with pain  2pt Pinch     L) with pain    Fine Yady Coordination Tests: within normal limits       Functional Limitations: Patient present    Limitation/Restriction for FOTO Hand Survey    Therapist reviewed FOTO scores for Chan Dwyer on 1/24/2022.   FOTO documents entered into EPIC - see Media section.    Limitation Score: 73%         Treatment   Total Treatment time (time-based codes) separate from Evaluation: 5 minutes    Chan received the treatments listed below:     Therapeutic exercises to develop ROM and flexibility for 5 minutes, including:  Ring finger extension stretches: 6/10 second holds  Passive range of motion of digit 2-5 at the proximal interphalangeal joint and distal interphalangeal joint's.  Distal interphalangeal joint blocking: 10x    Patient fitted with Oval 8 splint size 8       Patient Education and Home Exercises      Education provided:   - soft tissue massage of the palm of her left hand  -reviewed importance of skin precautions and joint mobility and instructed patient to wear brace during the during functional activity.    Written Home Exercises Provided: yes.  Exercises were reviewed and Chan was able to demonstrate them prior to the end of the session.  Chan demonstrated good  understanding of the education provided. See EMR under  Patient Instructions for exercises provided during therapy sessions.     Pt was advised to perform these exercises free of pain, and to stop performing them if pain occurs.    Patient/Family Education: role of OT, goals for OT, scheduling/cancellations - pt verbalized understanding. Discussed insurance limitations with patient.      ASSESSMENT     Chan Dwyer is a 51 y.o. female referred to outpatient occupational therapy and presents with a medical diagnosis of Trigger ring finger of left hand.  Patient presents with the following therapy deficits: Decreased ROM, Decreased  strength, Decreased pinch strength, Decreased muscle strength, Decreased functional hand use, Increased pain and Joint Stiffness and demonstrates limitations as described in the chart below. Following medical record review it is determined that pt will benefit from occupational therapy services in order to maximize pain free and/or functional use of left hand. The following goals were discussed with the patient and patient is in agreement with them as to be addressed in the treatment plan. The patient's rehab potential is Good.     Anticipated barriers to occupational therapy: bilateral upper extremity weakness.  Pt has no cultural, educational or language barriers to learning provided.    Profile and History Assessment of Occupational Performance Level of Clinical Decision Making Complexity Score   Occupational Profile:   Chan Dwyer is a 51 y.o. female who lives with their family and is currently employed Chan Dwyer has difficulty with  ADLs and IADLs as listed previously, which  Affecting herdaily functional abilities.      Comorbidities:    has a past medical history of Abnormal Pap smear, Abnormal Pap smear of cervix, Arthritis, low back, Diabetes, DM (diabetes mellitus), DM (diabetes mellitus), GERD (gastroesophageal reflux disease), Gestational diabetes, History of abnormal Pap smear, Surgical  menopause, and Vitamin D deficiency.    Medical and Therapy History Review:   Brief               Performance Deficits    Physical:  Muscle Power/Strength  Muscle Endurance   Strength  Pinch Strength  Postural Control  Pain    Cognitive:  No Deficits    Psychosocial:    No Deficits     Clinical Decision Making:  low    Assessment Process:  Problem-Focused Assessments    Modification/Need for Assistance:  Not Necessary    Intervention Selection:  Limited Treatment Options       low  Based on PMHX, co morbidities , data from assessments and functional level of assistance required with task and clinical presentation directly impacting function.       The following goals were discussed with the patient and patient is in agreement with them as to be addressed in the treatment plan.     GOALS:     Short Term Goals:  2 weeks     1. Pain: Pt will demonstrate improved pain by reports of less than or equal to 1/10 worst pain on the verbal rating scale in order to progress toward maximal functional ability and improve QOL.     2. Mobility: Patient will improve AROM to 50% of stated goals, listed in objective measures above, in order to progress towards independence with functional activities.      3. Strength: Patient will improve strength to 50% of stated goals, listed in objective measures above, in order to progress towards independence with functional activities.      4. Self Care: Patient will demonstrate improved self care skills listed in objective measure above,in order to progress towards independence with functional activities.      5. HEP: Patient will demonstrate independence with HEP in order to progress toward functional independence.        Long Term Goals:  4 weeks     1. Pain: Pt will demonstrate improved pain by reports of less than or equal to 0/10 worst pain on the verbal rating scale in order to progress toward maximal functional ability and improve QOL.       2. Function: Patient will demonstrate  improved function as indicated by a functional limitation score of less than or equal to 43 out of 100 on FOTO.     3. Mobility: Patient will improve AROM to stated goals, listed in objective measures above, in order to return to maximal functional potential and improve quality of life.     4. Strength: Patient will improve strength to stated goals, listed in objective measures above, in order to improve functional independence and quality of life.     5. Self Care: Patient will demonstrate increased self care skills to an independent level or modified independent level with adaptive equipment as needed.     6. Patient will return to normal ADL's, IADL's, community involvement, recreational activities, and work-related activities with less than or equal to 0/10 pain and maximal function.              PLAN   Plan of Care Certification: 1/24/2022 to 2/24/2022.     Outpatient Occupational Therapy 2 times weekly for 4 weeks to include the following interventions: Manual therapy/joint mobilizations, Modalities for pain management, Therapeutic exercises/activities., Strengthening and postural control during upper extremity activities.      Nisa Hayes OTR      I CERTIFY THE NEED FOR THESE SERVICES FURNISHED UNDER THIS PLAN OF TREATMENT AND WHILE UNDER MY CARE

## 2022-01-25 ENCOUNTER — OFFICE VISIT (OUTPATIENT)
Dept: URGENT CARE | Facility: CLINIC | Age: 52
End: 2022-01-25
Payer: COMMERCIAL

## 2022-01-25 VITALS
OXYGEN SATURATION: 98 % | HEART RATE: 65 BPM | TEMPERATURE: 99 F | SYSTOLIC BLOOD PRESSURE: 127 MMHG | WEIGHT: 119 LBS | HEIGHT: 64 IN | BODY MASS INDEX: 20.32 KG/M2 | DIASTOLIC BLOOD PRESSURE: 62 MMHG | RESPIRATION RATE: 18 BRPM

## 2022-01-25 DIAGNOSIS — H93.8X3 EAR FULLNESS, BILATERAL: Primary | ICD-10-CM

## 2022-01-25 DIAGNOSIS — H61.23 IMPACTED CERUMEN OF BOTH EARS: ICD-10-CM

## 2022-01-25 PROCEDURE — 3074F SYST BP LT 130 MM HG: CPT | Mod: CPTII,S$GLB,, | Performed by: PHYSICIAN ASSISTANT

## 2022-01-25 PROCEDURE — 1160F RVW MEDS BY RX/DR IN RCRD: CPT | Mod: CPTII,S$GLB,, | Performed by: PHYSICIAN ASSISTANT

## 2022-01-25 PROCEDURE — 1160F PR REVIEW ALL MEDS BY PRESCRIBER/CLIN PHARMACIST DOCUMENTED: ICD-10-PCS | Mod: CPTII,S$GLB,, | Performed by: PHYSICIAN ASSISTANT

## 2022-01-25 PROCEDURE — 3078F PR MOST RECENT DIASTOLIC BLOOD PRESSURE < 80 MM HG: ICD-10-PCS | Mod: CPTII,S$GLB,, | Performed by: PHYSICIAN ASSISTANT

## 2022-01-25 PROCEDURE — 99213 OFFICE O/P EST LOW 20 MIN: CPT | Mod: S$GLB,,, | Performed by: PHYSICIAN ASSISTANT

## 2022-01-25 PROCEDURE — 3008F BODY MASS INDEX DOCD: CPT | Mod: CPTII,S$GLB,, | Performed by: PHYSICIAN ASSISTANT

## 2022-01-25 PROCEDURE — 99213 PR OFFICE/OUTPT VISIT, EST, LEVL III, 20-29 MIN: ICD-10-PCS | Mod: S$GLB,,, | Performed by: PHYSICIAN ASSISTANT

## 2022-01-25 PROCEDURE — 3072F PR LOW RISK FOR RETINOPATHY: ICD-10-PCS | Mod: CPTII,S$GLB,, | Performed by: PHYSICIAN ASSISTANT

## 2022-01-25 PROCEDURE — 3078F DIAST BP <80 MM HG: CPT | Mod: CPTII,S$GLB,, | Performed by: PHYSICIAN ASSISTANT

## 2022-01-25 PROCEDURE — 1159F MED LIST DOCD IN RCRD: CPT | Mod: CPTII,S$GLB,, | Performed by: PHYSICIAN ASSISTANT

## 2022-01-25 PROCEDURE — 3074F PR MOST RECENT SYSTOLIC BLOOD PRESSURE < 130 MM HG: ICD-10-PCS | Mod: CPTII,S$GLB,, | Performed by: PHYSICIAN ASSISTANT

## 2022-01-25 PROCEDURE — 1159F PR MEDICATION LIST DOCUMENTED IN MEDICAL RECORD: ICD-10-PCS | Mod: CPTII,S$GLB,, | Performed by: PHYSICIAN ASSISTANT

## 2022-01-25 PROCEDURE — 3008F PR BODY MASS INDEX (BMI) DOCUMENTED: ICD-10-PCS | Mod: CPTII,S$GLB,, | Performed by: PHYSICIAN ASSISTANT

## 2022-01-25 PROCEDURE — 3072F LOW RISK FOR RETINOPATHY: CPT | Mod: CPTII,S$GLB,, | Performed by: PHYSICIAN ASSISTANT

## 2022-01-25 NOTE — PROGRESS NOTES
"Subjective:       Patient ID: Chan Dwyer is a 51 y.o. female.    Vitals:  height is 5' 4" (1.626 m) and weight is 54 kg (119 lb). Her tympanic temperature is 98.7 °F (37.1 °C). Her blood pressure is 127/62 and her pulse is 65. Her respiration is 18 and oxygen saturation is 98%.     Chief Complaint: Ear Fullness    Pt presents with ear fullness (both). Pt states that for appx 1 week she has been experiencing ear fullness and loss of hearing. She states that its a constant feeling. Pt hasnt taking anything for her symptoms. Denies pain, drainage, or fever. Has had ear wax build up in the past.     Ear Fullness   There is pain in both ears. This is a new problem. The current episode started in the past 7 days. The problem occurs constantly. The problem has been gradually worsening. The patient is experiencing no pain. Associated symptoms include hearing loss. Pertinent negatives include no abdominal pain, coughing, diarrhea, ear discharge, headaches, neck pain, rash, rhinorrhea, sore throat or vomiting. She has tried nothing for the symptoms. The treatment provided no relief.       Constitution: Negative.   HENT: Positive for hearing loss. Negative for ear pain, ear discharge and sore throat.    Neck: Negative for neck pain.   Respiratory: Negative for cough.    Gastrointestinal: Negative for abdominal pain, vomiting and diarrhea.   Skin: Negative for rash.   Neurological: Negative for headaches.       Objective:      Physical Exam   Constitutional: She does not appear ill. No distress. normal  HENT:   Head: Normocephalic and atraumatic.   Ears:   Right Ear: No drainage or tenderness. impacted cerumen  Left Ear: No drainage or tenderness. impacted cerumen  Nose: Nose normal.   Cerumen removal performed by MA using irrigation and curette. Cerumen completely removed from bilaterally. TMs visualized- no erythema, bulging, or perforation. Pt tolerated procedure well without complication.         Comments: " Cerumen removal performed by MA using irrigation and curette. Cerumen completely removed from bilaterally. TMs visualized- no erythema, bulging, or perforation. Pt tolerated procedure well without complication.     Eyes: Conjunctivae are normal.   Neck: Neck supple.   Pulmonary/Chest: Effort normal.   Abdominal: Normal appearance.   Neurological: no focal deficit. She is alert.   Skin: Skin is warm and dry.         Assessment:       1. Ear fullness, bilateral    2. Impacted cerumen of both ears          Plan:       Pt reports symptoms improved after ear wax removal. No sign of infection and no c/o pain at this time. Continue to monitor. Debrox drops otc to help prevent wax build up. RTC or see pcp if any further issues.     Ear fullness, bilateral    Impacted cerumen of both ears

## 2022-01-25 NOTE — PATIENT INSTRUCTIONS
"Patient Education       Ear Wax Impaction   The Basics   Written by the doctors and editors at [facility]   What is ear wax impaction? -- Ear wax impaction is when ear wax builds up enough to cause symptoms. Normally, ear wax helps to protect the insides of the ears and prevents injury or infection (figure 1). But having too much ear wax can cause symptoms such as pain and trouble hearing. The medical term for ear wax is "cerumen."  Young children and older adults are more likely than others to have ear wax impaction.  What causes ear wax impaction? -- Several different things can cause ear wax impaction:  · Diseases that affect the ear - Some health problems can affect the shape of the inside of the ear, and make it hard for wax to move out. For example, skin problems that cause skin cells to shed a lot can lead to wax build-up in the ears.  · A narrow ear canal (figure 2) - In some people, the ear canals are narrower than in others. These people might be more likely to have ear wax impaction. A person's ear canal can become narrower after an ear injury or after severe or multiple ear infections.  · Changes in ear wax and lining due to aging - As people get older, their ear wax gets harder and thicker. This makes it difficult for the wax to move out of the ear as it should.  · Bad ear-cleaning habits - Some people try to clean their ears using cotton swabs (Q-Tips) or other tools. This can actually push the wax deeper into the ear instead of getting it out. Over time, this can cause ear wax impaction.  · Making too much ear wax - Some people make more ear wax than others. This can happen when water gets trapped in the ear, or when the ear is injured. But some people have a lot of ear wax for no obvious reason.  What are the symptoms of ear wax impaction? -- The symptoms include:  · Trouble hearing  · Pain in the ear  · Hearing a ringing noise in the ear  · Feeling like the ear is blocked or plugged  These symptoms " "can happen in one or both ears.  Should I see a doctor or nurse? -- Yes. If you or your child has any of these symptoms, see a doctor or nurse. They can check the insides of the ears to figure out if the symptoms are caused by ear wax impaction or another problem, such as an ear infection.  Should I clean my (or my child's) ears at home? -- No. The insides of the ears do not usually need to be cleaned. Sticking anything into the ears can push the wax in deeper and cause impaction.  "Ear candling" involves lighting one end of a hollow candle, and putting the other end in the ear. Ear candling is not recommended for ear wax removal. It does not remove ear wax and can even cause ear injuries and burns.  How is ear wax impaction treated? -- There are several treatments to remove impacted ear wax. Doctors and nurses offer these treatments only to people who have bothersome symptoms. They do not recommend treatments for removing ear wax in people who have no symptoms, even if their ears are impacted.  In some cases, doctors and nurses will remove ear wax in people whose ears are impacted and who aren't able to let others know if they have symptoms or not. This can include young children, and people who are confused or have trouble speaking, including some older adults.  There are several different ways to remove ear wax:  · Ear drops - Special ear drops can soften ear wax and help it to drain out. Ear drops are not usually safe for people with an ear infection or damage to the eardrum.  · Rinsing - In some cases, a doctor or nurse can remove impacted ear wax by squirting water (or a special liquid) into the ear to rinse it out.  · Special tools - A doctor or nurse might use a special tool to remove ear wax. There are different types of tools that can do this safely. These include small sticks, hooks, and spoons. There are also tools that use suction to pull the wax out.  All topics are updated as new evidence becomes " available and our peer review process is complete.  This topic retrieved from [employer] on: Sep 21, 2021.  Topic 47243 Version 12.0  Release: 29.4.2 - C29.263  © 2021 UpToDate, Inc. and/or its affiliates. All rights reserved.  figure 1: Ear wax impaction     This drawing shows where ear wax can build up (become impacted) in the ear canal.  Graphic 84638 Version 2.0    figure 2: Normal ear     This figure shows the normal parts of the outer, middle, and inner ear.  Graphic 83304 Version 5.0    Consumer Information Use and Disclaimer   This information is not specific medical advice and does not replace information you receive from your health care provider. This is only a brief summary of general information. It does NOT include all information about conditions, illnesses, injuries, tests, procedures, treatments, therapies, discharge instructions or life-style choices that may apply to you. You must talk with your health care provider for complete information about your health and treatment options. This information should not be used to decide whether or not to accept your health care provider's advice, instructions or recommendations. Only your health care provider has the knowledge and training to provide advice that is right for you. The use of this information is governed by the [employer] End User License Agreement, available at https://www.[employer]/en/solutions/Ki[employer]/about/baljit.The use of [employer] content is governed by the [employer] Terms of Use. ©2021 UpToDate, Inc. All rights reserved.  Copyright   © 2021 UpToDate, Inc. and/or its affiliates. All rights reserved.

## 2022-01-26 ENCOUNTER — CLINICAL SUPPORT (OUTPATIENT)
Dept: REHABILITATION | Facility: HOSPITAL | Age: 52
End: 2022-01-26
Payer: COMMERCIAL

## 2022-01-26 DIAGNOSIS — M25.511 CHRONIC RIGHT SHOULDER PAIN: ICD-10-CM

## 2022-01-26 DIAGNOSIS — M25.611 SHOULDER STIFFNESS, RIGHT: ICD-10-CM

## 2022-01-26 DIAGNOSIS — G89.29 CHRONIC RIGHT SHOULDER PAIN: ICD-10-CM

## 2022-01-26 PROCEDURE — 97110 THERAPEUTIC EXERCISES: CPT

## 2022-01-26 PROCEDURE — 97140 MANUAL THERAPY 1/> REGIONS: CPT

## 2022-01-26 NOTE — PROGRESS NOTES
[facility]   Physical Therapy Treatment Note     Name: Chan Gorman [facility]  Clinic Number: [id number]    Therapy Diagnosis:   Encounter Diagnoses   Name Primary?    Chronic right shoulder pain     Shoulder stiffness, right      Physician: Meme Enciso NP    Visit Date: 1/26/2022    Physician Orders: PT Eval and Treat  Medical Diagnosis from Referral: M75.80 (ICD-10-CM) - Subscapularis tendinitis, unspecified laterality  Evaluation Date: 11/8/2021  Authorization Period Expiration: 12/31/21  Plan of Care Expiration: 2/23/22  Progress Note Due: 1/29/2022  Visit # / Visits authorized: 2/20 (+1 Evaluation) - Episode Visits: 9  FOTO: 3/3      Precautions: Standard and Diabetes    PTA Visit #: 0/5     Time In: 11:48 pm  Time Out: 12:25 pm  Total Billable Time: 33 minutes    SUBJECTIVE     Patient reports: that her pain is no longer as intense as it has been previously - she feels her shoulder has improved greatly.    He/She was compliant with home exercise program.  Response to previous treatment: no significant change  Functional change: improvements in range of motion   Pain: 2/10     Location: right shoulder    OBJECTIVE     Objective Measures updated at progress report unless specified.         TREATMENT     Chan received the treatments listed below:     MANUAL THERAPY TECHNIQUES including Joint mobilizations were applied to right shoulder for 10 minutes.    Manual Intervention Performed Today    Soft Tissue Mobilization x Subscapularis, latissimus dorsi, scapular tilting, upper trapezial, levator scapula, pectoral major and minor, posterior deltoid muscle.    Joint Mobilizations x right GH mobilization - inferior glide/posterior glide grade 1-2      Plan for Next Visit: prn       THERAPEUTIC EXERCISES to develop  strength, endurance, ROM, flexibility, posture and core stabilization for 23 minutes including assessment, patient education, and the following:    Intervention     "1/26/2022 Sets/Reps/Resistance   UBE x 2' forward/ 2' backward   Pulleys Flexion  3 minutes   Pulleys Scaption  3 minutes   Standing Shoulder Flexion x 3x10 - 1#   Standing Shoulder Abduction x 3x10 - 1#   active assited range of motion - PVC - Flexion  10x /3 sets   active assited range of motion - PVC - external rotation supine  10x /3 sets   Standing Lower trapezial activation  10x   Standing shoulder extension with stick  10x   Seated lat stretch  10s holds, 10x   Belly Press Manual Isometrics x 7x 5" holds   side lying external rotation  x 30x - 1#   Wall Slides - Flexion  30x   Wall Slides - Abduction   30x   theraband rows x 3x10 - 20#   theraband extensions x 3x10 - 10#   theraband internal rotations x 3x10 - Yellow Theraband    theraband external rotation - isometric walkouts  3x10 - Yellow Theraband    1/2 foam roll thoracic mob - in chair   2 minutes   1/2 foam roll stretch - pectorals  3 min   sidelying abduction   10x   sidelying flexion   10x   Foam roll series  Hugs, scapular retraction/protraction 8x/each   Open books  5x each     Plan for Next Visit: continue to progress shoulder strength and range of motion        PATIENT EDUCATION AND HOME EXERCISES     Home Exercises Provided and Patient Education Provided     Education provided: (included in therex) minutes  -importance of maintaining ROM and scapular mobility, pain will decrease as ROM improves.    Written Home Exercises Provided: Patient instructed to cont prior HEP. Exercises were reviewed and Chan was able to demonstrate them prior to the end of the session.  Chan demonstrated good  understanding of the education provided. See EMR under Patient Instructions for exercises provided during therapy sessions      ASSESSMENT     Patient tolerated the addition of all exercises with reports of only muscle fatigue. Patient required verbal cueing and tactile cueing in order to eliminate upper trap compensation with forward flexion but was " able to tolerate increased loads. Patient remains limited with passive range of motion and combined movements of extension, internal rotation, and adduction. Patient's range of motion is progressing well compared to her intial evaluation. Continue to progress patient's POC as tolerated.    Chan is progressing well towards her goals.   Pt prognosis is Good.     Pt will continue to benefit from skilled outpatient physical therapy to address the deficits listed in the problem list box on initial evaluation, provide pt/family education and to maximize pt's level of independence in the home and community environment.     Pt's spiritual, cultural and educational needs considered and pt agreeable to plan of care and goals.     Anticipated Barriers for therapy: co-morbidities      Reviewed: 1/26/2022         SHORT TERM GOALS:  4 weeks     1. Recent signs and systems trend is improving in order to progress towards LTG's. MET, 12/29/21   2. Patient will be independent with HEP in order to further progress and return to maximal function. PC, 12/29/21     3. Pain rating at Worst: 5/10 in order to progress towards increased independence with activity. PC, 12/29/21   4. Patient will be able to correct postural deviations in sitting and standing, to decrease pain and promote postural awareness for injury prevention.  PC, 12/29/21      LONG TERM GOALS: 8 weeks     1. Patient will return to normal ADL, recreational, and work related activities with less pain and limitation.  PC, 12/29/21   2. Patient will improve AROM to stated goals in order to return to maximal functional potential.  PC, 12/29/21   3. Patient will improve Strength to stated goals of appropriate musculature in order to improve functional independence.  PC, 12/29/21   4. Pain Rating at Best: 1/10 to improve Quality of Life.  PC, 12/29/21   5. Patient will meet predicted functional outcome (FOTO) score: 65% to increase self-worth & perceived functional ability.  Not tested, 12/29/21   6. Patient will have met/partially met personal goal of: return to working without pain.  PC, 12/29/21          Goals Key:  PC= progressing/continue;        PM= partially met;             DC= discontinue        PLAN   Updated Certification Period: 12/29/21 to 2/23/22   Recommended Treatment Plan: 2 times per week for 8 weeks:  Electrical Stimulation PRN, Manual Therapy, Moist Heat/ Ice, Neuromuscular Re-ed, Patient Education, Self Care, Therapeutic Activities, Therapeutic Exercise and FDN/PRN    Monitor response to today's treatment and progress as tolerated.      Yehuda Shi, PT

## 2022-01-27 ENCOUNTER — OFFICE VISIT (OUTPATIENT)
Dept: INTERNAL MEDICINE | Facility: CLINIC | Age: 52
End: 2022-01-27
Payer: COMMERCIAL

## 2022-01-27 ENCOUNTER — LAB VISIT (OUTPATIENT)
Dept: LAB | Facility: HOSPITAL | Age: 52
End: 2022-01-27
Attending: FAMILY MEDICINE
Payer: COMMERCIAL

## 2022-01-27 VITALS
WEIGHT: 126.75 LBS | DIASTOLIC BLOOD PRESSURE: 63 MMHG | TEMPERATURE: 98 F | BODY MASS INDEX: 21.64 KG/M2 | OXYGEN SATURATION: 100 % | HEIGHT: 64 IN | SYSTOLIC BLOOD PRESSURE: 134 MMHG | HEART RATE: 59 BPM

## 2022-01-27 DIAGNOSIS — M79.641 RIGHT HAND PAIN: Primary | ICD-10-CM

## 2022-01-27 DIAGNOSIS — E11.65 TYPE 2 DIABETES MELLITUS WITH HYPERGLYCEMIA, WITHOUT LONG-TERM CURRENT USE OF INSULIN: ICD-10-CM

## 2022-01-27 DIAGNOSIS — M65.341 TRIGGER RING FINGER OF RIGHT HAND: ICD-10-CM

## 2022-01-27 DIAGNOSIS — E11.65 UNCONTROLLED TYPE 2 DIABETES MELLITUS WITH HYPERGLYCEMIA, WITHOUT LONG-TERM CURRENT USE OF INSULIN: ICD-10-CM

## 2022-01-27 DIAGNOSIS — H93.8X9 PRESSURE SENSATION IN EAR, UNSPECIFIED LATERALITY: ICD-10-CM

## 2022-01-27 LAB
BASOPHILS # BLD AUTO: 0.04 K/UL (ref 0–0.2)
BASOPHILS NFR BLD: 0.7 % (ref 0–1.9)
CHOLEST SERPL-MCNC: 158 MG/DL (ref 120–199)
CHOLEST/HDLC SERPL: 3 {RATIO} (ref 2–5)
DIFFERENTIAL METHOD: ABNORMAL
EOSINOPHIL # BLD AUTO: 0.1 K/UL (ref 0–0.5)
EOSINOPHIL NFR BLD: 1.5 % (ref 0–8)
ERYTHROCYTE [DISTWIDTH] IN BLOOD BY AUTOMATED COUNT: 11.4 % (ref 11.5–14.5)
HCT VFR BLD AUTO: 40.4 % (ref 37–48.5)
HDLC SERPL-MCNC: 52 MG/DL (ref 40–75)
HDLC SERPL: 32.9 % (ref 20–50)
HGB BLD-MCNC: 12.5 G/DL (ref 12–16)
IMM GRANULOCYTES # BLD AUTO: 0.01 K/UL (ref 0–0.04)
IMM GRANULOCYTES NFR BLD AUTO: 0.2 % (ref 0–0.5)
LDLC SERPL CALC-MCNC: 95.6 MG/DL (ref 63–159)
LYMPHOCYTES # BLD AUTO: 2.6 K/UL (ref 1–4.8)
LYMPHOCYTES NFR BLD: 42.5 % (ref 18–48)
MCH RBC QN AUTO: 28.9 PG (ref 27–31)
MCHC RBC AUTO-ENTMCNC: 30.9 G/DL (ref 32–36)
MCV RBC AUTO: 93 FL (ref 82–98)
MONOCYTES # BLD AUTO: 0.3 K/UL (ref 0.3–1)
MONOCYTES NFR BLD: 4.6 % (ref 4–15)
NEUTROPHILS # BLD AUTO: 3.1 K/UL (ref 1.8–7.7)
NEUTROPHILS NFR BLD: 50.5 % (ref 38–73)
NONHDLC SERPL-MCNC: 106 MG/DL
NRBC BLD-RTO: 0 /100 WBC
PLATELET # BLD AUTO: 234 K/UL (ref 150–450)
PMV BLD AUTO: 10.9 FL (ref 9.2–12.9)
RBC # BLD AUTO: 4.33 M/UL (ref 4–5.4)
TRIGL SERPL-MCNC: 52 MG/DL (ref 30–150)
TSH SERPL DL<=0.005 MIU/L-ACNC: 1.16 UIU/ML (ref 0.4–4)
WBC # BLD AUTO: 6.05 K/UL (ref 3.9–12.7)

## 2022-01-27 PROCEDURE — 3075F PR MOST RECENT SYSTOLIC BLOOD PRESS GE 130-139MM HG: ICD-10-PCS | Mod: CPTII,S$GLB,, | Performed by: FAMILY MEDICINE

## 2022-01-27 PROCEDURE — 3078F DIAST BP <80 MM HG: CPT | Mod: CPTII,S$GLB,, | Performed by: FAMILY MEDICINE

## 2022-01-27 PROCEDURE — 85025 COMPLETE CBC W/AUTO DIFF WBC: CPT | Performed by: FAMILY MEDICINE

## 2022-01-27 PROCEDURE — 3072F PR LOW RISK FOR RETINOPATHY: ICD-10-PCS | Mod: CPTII,S$GLB,, | Performed by: FAMILY MEDICINE

## 2022-01-27 PROCEDURE — 84443 ASSAY THYROID STIM HORMONE: CPT | Performed by: FAMILY MEDICINE

## 2022-01-27 PROCEDURE — 3075F SYST BP GE 130 - 139MM HG: CPT | Mod: CPTII,S$GLB,, | Performed by: FAMILY MEDICINE

## 2022-01-27 PROCEDURE — 99999 PR PBB SHADOW E&M-EST. PATIENT-LVL V: CPT | Mod: PBBFAC,,, | Performed by: FAMILY MEDICINE

## 2022-01-27 PROCEDURE — 1159F PR MEDICATION LIST DOCUMENTED IN MEDICAL RECORD: ICD-10-PCS | Mod: CPTII,S$GLB,, | Performed by: FAMILY MEDICINE

## 2022-01-27 PROCEDURE — 3078F PR MOST RECENT DIASTOLIC BLOOD PRESSURE < 80 MM HG: ICD-10-PCS | Mod: CPTII,S$GLB,, | Performed by: FAMILY MEDICINE

## 2022-01-27 PROCEDURE — 36415 COLL VENOUS BLD VENIPUNCTURE: CPT | Mod: PO | Performed by: FAMILY MEDICINE

## 2022-01-27 PROCEDURE — 3008F PR BODY MASS INDEX (BMI) DOCUMENTED: ICD-10-PCS | Mod: CPTII,S$GLB,, | Performed by: FAMILY MEDICINE

## 2022-01-27 PROCEDURE — 3072F LOW RISK FOR RETINOPATHY: CPT | Mod: CPTII,S$GLB,, | Performed by: FAMILY MEDICINE

## 2022-01-27 PROCEDURE — 80061 LIPID PANEL: CPT | Performed by: FAMILY MEDICINE

## 2022-01-27 PROCEDURE — 3052F PR MOST RECENT HEMOGLOBIN A1C LEVEL 8.0 - < 9.0%: ICD-10-PCS | Mod: CPTII,S$GLB,, | Performed by: FAMILY MEDICINE

## 2022-01-27 PROCEDURE — 3052F HG A1C>EQUAL 8.0%<EQUAL 9.0%: CPT | Mod: CPTII,S$GLB,, | Performed by: FAMILY MEDICINE

## 2022-01-27 PROCEDURE — 99214 OFFICE O/P EST MOD 30 MIN: CPT | Mod: S$GLB,,, | Performed by: FAMILY MEDICINE

## 2022-01-27 PROCEDURE — 3008F BODY MASS INDEX DOCD: CPT | Mod: CPTII,S$GLB,, | Performed by: FAMILY MEDICINE

## 2022-01-27 PROCEDURE — 1159F MED LIST DOCD IN RCRD: CPT | Mod: CPTII,S$GLB,, | Performed by: FAMILY MEDICINE

## 2022-01-27 PROCEDURE — 99999 PR PBB SHADOW E&M-EST. PATIENT-LVL V: ICD-10-PCS | Mod: PBBFAC,,, | Performed by: FAMILY MEDICINE

## 2022-01-27 PROCEDURE — 99214 PR OFFICE/OUTPT VISIT, EST, LEVL IV, 30-39 MIN: ICD-10-PCS | Mod: S$GLB,,, | Performed by: FAMILY MEDICINE

## 2022-01-27 RX ORDER — MELOXICAM 15 MG/1
15 TABLET ORAL DAILY
Qty: 14 TABLET | Refills: 0 | Status: SHIPPED | OUTPATIENT
Start: 2022-01-27 | End: 2022-02-11 | Stop reason: ALTCHOICE

## 2022-01-27 RX ORDER — AZELASTINE 1 MG/ML
1 SPRAY, METERED NASAL 2 TIMES DAILY
Qty: 30 ML | Refills: 1 | Status: SHIPPED | OUTPATIENT
Start: 2022-01-27 | End: 2022-08-19 | Stop reason: SDUPTHER

## 2022-01-27 RX ORDER — INSULIN GLARGINE 100 [IU]/ML
20 INJECTION, SOLUTION SUBCUTANEOUS NIGHTLY
Qty: 6 ML | Refills: 11 | Status: SHIPPED | OUTPATIENT
Start: 2022-01-27 | End: 2022-02-24 | Stop reason: SDUPTHER

## 2022-01-27 NOTE — PROGRESS NOTES
Subjective:      Patient ID: Chan Dwyer is a 51 y.o. female.    Chief Complaint: Ear Fullness and Hand Pain      Patient reports popping in both ears-went to urgent care for this recently, had bilateral ear cerumen lavaged, reports improvement but still not feeling back to normal  Also reports continued pain in her right hand, had seen nurse practitioner for this earlier in the month, had x-ray done at that time, 4th finger particularly painful, no recent injury or trauma to the area.  Requesting work excuse for light duty-works as  at [employer]    Review of Systems   Constitutional: Negative for activity change and appetite change.   Musculoskeletal: Positive for arthralgias and joint swelling.     Past Medical History:   Diagnosis Date    Abnormal Pap smear     Abnormal Pap smear of cervix     Arthritis, low back     Diabetes 9/2014     BS 162am 09/16/2014    DM (diabetes mellitus) 09/2014     am 01/26/2016    DM (diabetes mellitus) 09/2014    BS didn't check 01/31/2017    GERD (gastroesophageal reflux disease)     Gestational diabetes     History of abnormal Pap smear     Surgical menopause     Vitamin D deficiency           Past Surgical History:   Procedure Laterality Date    COLONOSCOPY N/A 6/22/2016    Procedure: COLONOSCOPY;  Surgeon: Vishal Mary III, MD;  Location: [facility];  Service: Endoscopy;  Laterality: N/A;    Cryotherapy of the cervix  1999    DILATION AND CURETTAGE OF UTERUS      HYSTERECTOMY      [facility]  2/2012    TUBAL LIGATION       Family History   Problem Relation Age of Onset    Hypertension Mother     Diabetes Mother     Heart attack Mother     Diabetes Father     Hypertension Father     Stomach cancer Sister     No Known Problems Son     No Known Problems Son     Cancer Neg Hx     Stroke Neg Hx     Colon cancer Neg Hx     Ovarian cancer Neg Hx      Social History     Socioeconomic History    Marital status: Single    Number of  "children: 2   Occupational History    Occupation:      Employer: [employer]     Employer: [employer]   Tobacco Use    Smoking status: Never Smoker    Smokeless tobacco: Never Used   Substance and Sexual Activity    Alcohol use: Yes     Alcohol/week: 0.0 standard drinks     Comment: rare    Drug use: No    Sexual activity: Yes     Partners: Male     Birth control/protection: Surgical   Social History Narrative    She wears seatbelt.     Review of patient's allergies indicates:   Allergen Reactions    Hydrocodone Hives and Rash       Objective:       /63   Pulse (!) 59   Temp 97.5 °F (36.4 °C) (Temporal)   Ht 5' 4" (1.626 m)   Wt 57.5 kg (126 lb 12.2 oz)   SpO2 100%   BMI 21.76 kg/m²   Physical Exam  Constitutional:       General: She is not in acute distress.     Appearance: Normal appearance. She is well-developed. She is not ill-appearing or diaphoretic.   HENT:      Right Ear: Ear canal and external ear normal. There is no impacted cerumen. Tympanic membrane is bulging.      Left Ear: Ear canal and external ear normal. There is no impacted cerumen. Tympanic membrane is bulging.   Cardiovascular:      Rate and Rhythm: Normal rate and regular rhythm.   Pulmonary:      Effort: Pulmonary effort is normal.      Breath sounds: Normal breath sounds.   Neurological:      Mental Status: She is alert and oriented to person, place, and time.   Psychiatric:         Mood and Affect: Mood normal.         Behavior: Behavior normal.         Thought Content: Thought content normal.         Judgment: Judgment normal.       Assessment:     1. Right hand pain    2. Trigger ring finger of right hand    3. Pressure sensation in ear, unspecified laterality    4. Uncontrolled type 2 diabetes mellitus with hyperglycemia, without long-term current use of insulin    5. Type 2 diabetes mellitus with hyperglycemia, without long-term current use of insulin      Plan:   Right hand pain    Trigger " ring finger of right hand  -     Ambulatory referral/consult to Orthopedics; Future; Expected date: 02/03/2022    Pressure sensation in ear, unspecified laterality    Uncontrolled type 2 diabetes mellitus with hyperglycemia, without long-term current use of insulin  -     Ambulatory referral/consult to Diabetic Advanced Practice Providers (Medical Management); Future; Expected date: 02/03/2022    Type 2 diabetes mellitus with hyperglycemia, without long-term current use of insulin  -     insulin (LANTUS SOLOSTAR U-100 INSULIN) glargine 100 units/mL (3mL) SubQ pen; Inject 20 Units into the skin every evening.  Dispense: 6 mL; Refill: 11    Other orders  -     meloxicam (MOBIC) 15 MG tablet; Take 1 tablet (15 mg total) by mouth once daily. Take with meal.  Dispense: 14 tablet; Refill: 0  -     azelastine (ASTELIN) 137 mcg (0.1 %) nasal spray; 1 spray (137 mcg total) by Nasal route 2 (two) times daily.  Dispense: 30 mL; Refill: 1    Will have routine labs drawn today previously ordered  Discussed I believe popping she is feeling in her ear probably from chronic seasonal allergies, will try Astelin      Medication List with Changes/Refills   New Medications    AZELASTINE (ASTELIN) 137 MCG (0.1 %) NASAL SPRAY    1 spray (137 mcg total) by Nasal route 2 (two) times daily.   Current Medications    ALCOHOL SWABS (EASY COMFORT ALCOHOL PAD) PADM    Apply 1 each topically 4 (four) times daily.    BLOOD SUGAR DIAGNOSTIC (FREESTYLE LITE STRIPS) STRP    1 strip by Misc.(Non-Drug; Combo Route) route 4 (four) times daily with meals and nightly.    BLOOD SUGAR DIAGNOSTIC STRP    To check BG 4 times daily, to use with insurance preferred meter    BLOOD SUGAR DIAGNOSTIC STRP    1 each by Misc.(Non-Drug; Combo Route) route 4 (four) times daily.    BLOOD-GLUCOSE METER MISC    1 each by Misc.(Non-Drug; Combo Route) route 4 (four) times daily.    LANCETS MISC    1 lancet by Misc.(Non-Drug; Combo Route) route 3 (three) times daily.     "LANCETS MISC    1 each by Misc.(Non-Drug; Combo Route) route 4 (four) times daily.    LANCING DEVICE MISC    1 each by Misc.(Non-Drug; Combo Route) route 3 (three) times daily as needed.    METFORMIN (GLUCOPHAGE-XR) 500 MG ER 24HR TABLET    Take 2 tablets (1,000 mg total) by mouth daily with breakfast.    PEN NEEDLE, DIABETIC (BD ULTRA-FINE KIYA PEN NEEDLE) 32 GAUGE X 5/32" NDLE    1 each by Misc.(Non-Drug; Combo Route) route 4 (four) times daily with meals and nightly.    PIOGLITAZONE (ACTOS) 15 MG TABLET    Take 1 tablet (15 mg total) by mouth once daily.    SITAGLIPTIN (JANUVIA) 100 MG TAB    Take 1 tablet (100 mg total) by mouth once daily.    VITAMIN D 1000 UNITS TAB    Take 1,000 Units by mouth once daily.   Changed and/or Refilled Medications    Modified Medication Previous Medication    INSULIN (LANTUS SOLOSTAR U-100 INSULIN) GLARGINE 100 UNITS/ML (3ML) SUBQ PEN insulin (LANTUS SOLOSTAR U-100 INSULIN) glargine 100 units/mL (3mL) SubQ pen       Inject 20 Units into the skin every evening.    Inject 20 Units into the skin every evening.    MELOXICAM (MOBIC) 15 MG TABLET meloxicam (MOBIC) 15 MG tablet       Take 1 tablet (15 mg total) by mouth once daily. Take with meal.    Take 1 tablet (15 mg total) by mouth once daily. Take with meal.   Discontinued Medications    SARS-COV-2, COVID-19, (PFIZER COVID-19) 30 MCG/0.3 ML INJECTION    Inject into the muscle.       "

## 2022-01-28 ENCOUNTER — TELEPHONE (OUTPATIENT)
Dept: URGENT CARE | Facility: CLINIC | Age: 52
End: 2022-01-28
Payer: COMMERCIAL

## 2022-01-28 ENCOUNTER — CLINICAL SUPPORT (OUTPATIENT)
Dept: REHABILITATION | Facility: HOSPITAL | Age: 52
End: 2022-01-28
Payer: COMMERCIAL

## 2022-01-28 DIAGNOSIS — M25.611 SHOULDER STIFFNESS, RIGHT: ICD-10-CM

## 2022-01-28 DIAGNOSIS — G89.29 CHRONIC RIGHT SHOULDER PAIN: ICD-10-CM

## 2022-01-28 DIAGNOSIS — M25.511 CHRONIC RIGHT SHOULDER PAIN: ICD-10-CM

## 2022-01-28 PROCEDURE — 97140 MANUAL THERAPY 1/> REGIONS: CPT

## 2022-01-28 PROCEDURE — 97110 THERAPEUTIC EXERCISES: CPT

## 2022-01-28 NOTE — PROGRESS NOTES
[facility]   Physical Therapy Treatment Note     Name: Chan Gorman [facility] Number: [id number]    Therapy Diagnosis:   Encounter Diagnoses   Name Primary?    Chronic right shoulder pain     Shoulder stiffness, right      Physician: Meme Enciso NP    Visit Date: 1/28/2022    Physician Orders: PT Eval and Treat  Medical Diagnosis from Referral: M75.80 (ICD-10-CM) - Subscapularis tendinitis, unspecified laterality  Evaluation Date: 11/8/2021  Authorization Period Expiration: 12/31/21  Plan of Care Expiration: 2/23/22  Progress Note Due: 1/29/2022  Visit # / Visits authorized: 3/20 (+1 Evaluation) - Episode Visits: 9  FOTO: 3/3      Precautions: Standard and Diabetes    PTA Visit #: 0/5     Time In: 12:00 pm  Time Out: 12:45 pm  Total Billable Time: 40 minutes    SUBJECTIVE     Patient reports: she has no pain today.  She did great after last visit and had no pain or soreness.  She feels 60% improved and needs 40% more improvement.  She is unable to reach behind her back.  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    He/She was compliant with home exercise program.  Response to previous treatment: no adverse effects noted.  Functional change: She is able to get dressed better and drive better.    Pain: 0/10     Location: right shoulder    OBJECTIVE     Objective Measures updated at progress report unless specified.         TREATMENT     Chan received the treatments listed below:     MANUAL THERAPY TECHNIQUES including Joint mobilizations were applied to right shoulder for 10 minutes.    Manual Intervention Performed Today    Soft Tissue Mobilization x Subscapularis, latissimus dorsi, scapular tilting, upper trapezial, levator scapula, pectoral major and minor, posterior deltoid muscle.  Scapular releases   Joint Mobilizations x right GH mobilization - inferior glide/posterior glide grade 1-2      Plan for Next Visit: prn       THERAPEUTIC EXERCISES to develop   "strength, endurance, ROM, flexibility, posture and core stabilization for 30 minutes including assessment, patient education, and the following:    Intervention    1/28/2022 Sets/Reps/Resistance   UBE x 2' forward/ 2' backward   Pulleys Flexion X 3 minutes   Pulleys Scaption X 3 minutes   Standing Shoulder Flexion  3x10 - 1#   Standing Shoulder Abduction  3x10 - 1#   active assited range of motion - PVC - Flexion X 10x /3 sets, 2 pound weight on wand   active assited range of motion - PVC - external rotation supine X 10x /3 sets, 2 pound weight on wand   Standing Lower trapezial activation  10x   Standing shoulder extension with stick  10x   Seated lat stretch  10s holds, 10x   Belly Press Manual Isometrics  7x 5" holds   side lying external rotation  X 30x - 1#   Wall Slides - Flexion  30x   Wall Slides - Abduction   30x   theraband rows x 3x10 - 20# single arm   theraband extensions x 3x10 - 20# single arm   theraband internal rotations x 3x10 - Yellow Theraband , patient required assistance from therapy for technique   theraband external rotation - isometric walkouts X 3x10 - Yellow Theraband , no isometric walkouts, patient required assistance from therapist for technique   1/2 foam roll thoracic mob - in chair   2 minutes   1/2 foam roll stretch - pectorals  3 min   sidelying abduction  X 10x   sidelying flexion   10x   Foam roll series  Hugs, scapular retraction/protraction 8x/each   Open books  5x each   Active assisted range of motion -PVC- abduction X 10x/3 sets, 2 pound weight on wand   Standing active assisted range of motion - PVC - internal rotation X 10x/3 sets with wand   passive range of motion to right shoulder X 5 minutes     Plan for Next Visit: continue to progress shoulder strength and range of motion        PATIENT EDUCATION AND HOME EXERCISES     Home Exercises Provided and Patient Education Provided     Education provided: (included in therex) minutes  -importance of maintaining ROM and " scapular mobility, pain will decrease as ROM improves.    Written Home Exercises Provided: Patient instructed to cont prior HEP. Exercises were reviewed and Chan was able to demonstrate them prior to the end of the session.  Chan demonstrated good  understanding of the education provided. See EMR under Patient Instructions for exercises provided during therapy sessions      ASSESSMENT     Patient with increased tissue tension noted in right biceps, triceps, deltoid, subscapularis, and latissimus dorsi.  Patient tolerated exercises well but did require maximal tactile cues with standing internal and external rotation with theraband for technique.  Patient was trying to use excessive trunk rotation and write extension to get more shoulder mobility.  Patient also required verbal cues for decreased speed of exercises, to take rest breaks as well as not use trunk rotation to move right arm.  Patient unable to perform scapular retractions and shoulder extension with bilateral upper extremities today.     Chan is progressing well towards her goals.   Pt prognosis is Good.     Pt will continue to benefit from skilled outpatient physical therapy to address the deficits listed in the problem list box on initial evaluation, provide pt/family education and to maximize pt's level of independence in the home and community environment.     Pt's spiritual, cultural and educational needs considered and pt agreeable to plan of care and goals.     Anticipated Barriers for therapy: co-morbidities      Reviewed: 1/28/2022         SHORT TERM GOALS:  4 weeks     1. Recent signs and systems trend is improving in order to progress towards LTG's. MET, 12/29/21   2. Patient will be independent with HEP in order to further progress and return to maximal function. PC, 12/29/21     3. Pain rating at Worst: 5/10 in order to progress towards increased independence with activity. PC, 12/29/21   4. Patient will be able to correct  postural deviations in sitting and standing, to decrease pain and promote postural awareness for injury prevention.  PC, 12/29/21      LONG TERM GOALS: 8 weeks     1. Patient will return to normal ADL, recreational, and work related activities with less pain and limitation.  PC, 12/29/21   2. Patient will improve AROM to stated goals in order to return to maximal functional potential.  PC, 12/29/21   3. Patient will improve Strength to stated goals of appropriate musculature in order to improve functional independence.  PC, 12/29/21   4. Pain Rating at Best: 1/10 to improve Quality of Life.  PC, 12/29/21   5. Patient will meet predicted functional outcome (FOTO) score: 65% to increase self-worth & perceived functional ability. Not tested, 12/29/21   6. Patient will have met/partially met personal goal of: return to working without pain.  PC, 12/29/21          Goals Key:  PC= progressing/continue;        PM= partially met;             DC= discontinue        PLAN   Updated Certification Period: 12/29/21 to 2/23/22   Recommended Treatment Plan: 2 times per week for 8 weeks:  Electrical Stimulation PRN, Manual Therapy, Moist Heat/ Ice, Neuromuscular Re-ed, Patient Education, Self Care, Therapeutic Activities, Therapeutic Exercise and FDN/PRN    Monitor response to today's treatment and progress as tolerated.      Azalia Morgan, PT, DPT

## 2022-01-31 ENCOUNTER — CLINICAL SUPPORT (OUTPATIENT)
Dept: REHABILITATION | Facility: HOSPITAL | Age: 52
End: 2022-01-31
Payer: COMMERCIAL

## 2022-01-31 DIAGNOSIS — M25.511 CHRONIC RIGHT SHOULDER PAIN: ICD-10-CM

## 2022-01-31 DIAGNOSIS — G89.29 CHRONIC RIGHT SHOULDER PAIN: ICD-10-CM

## 2022-01-31 DIAGNOSIS — M25.611 SHOULDER STIFFNESS, RIGHT: ICD-10-CM

## 2022-01-31 PROCEDURE — 97140 MANUAL THERAPY 1/> REGIONS: CPT

## 2022-01-31 PROCEDURE — 97110 THERAPEUTIC EXERCISES: CPT

## 2022-01-31 NOTE — PROGRESS NOTES
[facility]   Physical Therapy Treatment Note     Name: Chan Gorman [facility]  Clinic Number: [id number]    Therapy Diagnosis:   Encounter Diagnoses   Name Primary?    Chronic right shoulder pain     Shoulder stiffness, right      Physician: Meme Enciso NP    Visit Date: 1/31/2022    Physician Orders: PT Eval and Treat  Medical Diagnosis from Referral: M75.80 (ICD-10-CM) - Subscapularis tendinitis, unspecified laterality  Evaluation Date: 11/8/2021  Authorization Period Expiration: 12/31/21  Plan of Care Expiration: 2/23/22  Progress Note Due: 1/29/2022  Visit # / Visits authorized: 4/20 (+1 Evaluation) - Episode Visits: 10  FOTO: 3/3      Precautions: Standard and Diabetes    PTA Visit #: 0/5     Time In: 12:16 pm  Time Out: 12:53 pm  Total Billable Time: 31 minutes    SUBJECTIVE     Patient reports: no adverse reactions to therapy session - still remains limited with internal rotation.    He/She was compliant with home exercise program.  Response to previous treatment: no adverse effects noted.  Functional change: She is able to get dressed better and drive better.    Pain: 0/10     Location: right shoulder    OBJECTIVE     Objective Measures updated at progress report unless specified.         TREATMENT     Chan received the treatments listed below:     MANUAL THERAPY TECHNIQUES including Joint mobilizations were applied to right shoulder for 8 minutes.    Manual Intervention Performed Today    Soft Tissue Mobilization x Subscapularis, latissimus dorsi, scapular tilting, upper trapezial, levator scapula, pectoral major and minor, posterior deltoid muscle.  Scapular releases   Joint Mobilizations x right GH mobilization - inferior glide/posterior glide grade 1-2      Plan for Next Visit: prn       THERAPEUTIC EXERCISES to develop  strength, endurance, ROM, flexibility, posture and core stabilization for 23 minutes including assessment, patient education, and the  "following:    Intervention    1/31/2022 Sets/Reps/Resistance   UBE x 2' forward/ 2' backward   Standing Shoulder Flexion  3x10 - 1#   Standing Shoulder internal rotation stretch strap  x 10x10"   Standing Shoulder Abduction  3x10 - 1#   Standing Lower trapezial activation  10x   Standing shoulder extension with stick  10x   Seated lat stretch  10s holds, 10x   Belly Press Manual Isometrics  7x 5" holds   side lying external rotation  X 3x10 - 1#   Wall Slides - Flexion x 2 minutes   Wall Slides - Abduction  x 2 minutes   Supine Serratus Punch x 3x10 - 1#   theraband rows  3x10 - 20# single arm   theraband extensions  3x10 - 20# single arm   theraband internal rotations  3x10 - Yellow Theraband , patient required assistance from therapy for technique   theraband external rotation - isometric walkouts X 3x10 - Yellow Theraband , no isometric walkouts, patient required assistance from therapist for technique   1/2 foam roll thoracic mob - in chair   2 minutes   1/2 foam roll stretch - pectorals  3 min   sidelying flexion   10x   Foam roll series  Hugs, scapular retraction/protraction 8x/each   Open books  5x each     Plan for Next Visit: continue to progress shoulder strength and range of motion        PATIENT EDUCATION AND HOME EXERCISES     Home Exercises Provided and Patient Education Provided     Education provided: (included in therex) minutes  -importance of maintaining ROM and scapular mobility, pain will decrease as ROM improves.    Written Home Exercises Provided: Patient instructed to cont prior HEP. Exercises were reviewed and Chan was able to demonstrate them prior to the end of the session.  Chan demonstrated good  understanding of the education provided. See EMR under Patient Instructions for exercises provided during therapy sessions      ASSESSMENT     Patient tolerated the addition of all exercises with reports of only muscle fatigue. Increased volume with wall slides in order to promote " rotator cuff endurance. Patient continues to demonstrate improvements in motor control but her gross upper extremity strength is limited. Continue to progress plan of care with an emphasis on rotator cuff strengthening an rotator cuff endurance.    Chan is progressing well towards her goals.   Pt prognosis is Good.     Pt will continue to benefit from skilled outpatient physical therapy to address the deficits listed in the problem list box on initial evaluation, provide pt/family education and to maximize pt's level of independence in the home and community environment.     Pt's spiritual, cultural and educational needs considered and pt agreeable to plan of care and goals.     Anticipated Barriers for therapy: co-morbidities      Reviewed: 1/31/2022         SHORT TERM GOALS:  4 weeks     1. Recent signs and systems trend is improving in order to progress towards LTG's. MET, 12/29/21   2. Patient will be independent with HEP in order to further progress and return to maximal function. PC, 12/29/21     3. Pain rating at Worst: 5/10 in order to progress towards increased independence with activity. PC, 12/29/21   4. Patient will be able to correct postural deviations in sitting and standing, to decrease pain and promote postural awareness for injury prevention.  PC, 12/29/21      LONG TERM GOALS: 8 weeks     1. Patient will return to normal ADL, recreational, and work related activities with less pain and limitation.  PC, 12/29/21   2. Patient will improve AROM to stated goals in order to return to maximal functional potential.  PC, 12/29/21   3. Patient will improve Strength to stated goals of appropriate musculature in order to improve functional independence.  PC, 12/29/21   4. Pain Rating at Best: 1/10 to improve Quality of Life.  PC, 12/29/21   5. Patient will meet predicted functional outcome (FOTO) score: 65% to increase self-worth & perceived functional ability. Not tested, 12/29/21   6. Patient will  have met/partially met personal goal of: return to working without pain.  PC, 12/29/21          Goals Key:  PC= progressing/continue;        PM= partially met;             DC= discontinue        PLAN   Updated Certification Period: 12/29/21 to 2/23/22   Recommended Treatment Plan: 2 times per week for 8 weeks:  Electrical Stimulation PRN, Manual Therapy, Moist Heat/ Ice, Neuromuscular Re-ed, Patient Education, Self Care, Therapeutic Activities, Therapeutic Exercise and FDN/PRN    Monitor response to today's treatment and progress as tolerated.      Yehuda Shi, PT, DPT

## 2022-02-01 ENCOUNTER — OFFICE VISIT (OUTPATIENT)
Dept: HEMATOLOGY/ONCOLOGY | Facility: CLINIC | Age: 52
End: 2022-02-01
Payer: COMMERCIAL

## 2022-02-01 ENCOUNTER — CLINICAL SUPPORT (OUTPATIENT)
Dept: DIABETES | Facility: CLINIC | Age: 52
End: 2022-02-01
Payer: COMMERCIAL

## 2022-02-01 ENCOUNTER — PATIENT OUTREACH (OUTPATIENT)
Dept: ADMINISTRATIVE | Facility: OTHER | Age: 52
End: 2022-02-01
Payer: COMMERCIAL

## 2022-02-01 ENCOUNTER — LAB VISIT (OUTPATIENT)
Dept: LAB | Facility: HOSPITAL | Age: 52
End: 2022-02-01
Attending: PHYSICIAN ASSISTANT
Payer: COMMERCIAL

## 2022-02-01 VITALS
BODY MASS INDEX: 21.38 KG/M2 | TEMPERATURE: 98 F | OXYGEN SATURATION: 99 % | RESPIRATION RATE: 14 BRPM | HEIGHT: 64 IN | SYSTOLIC BLOOD PRESSURE: 135 MMHG | HEART RATE: 58 BPM | WEIGHT: 125.25 LBS | DIASTOLIC BLOOD PRESSURE: 73 MMHG

## 2022-02-01 VITALS — WEIGHT: 125.88 LBS | BODY MASS INDEX: 21.49 KG/M2 | HEIGHT: 64 IN

## 2022-02-01 DIAGNOSIS — Z71.89 COUNSELING ON HEALTH PROMOTION AND DISEASE PREVENTION: ICD-10-CM

## 2022-02-01 DIAGNOSIS — Z12.39 ENCOUNTER FOR BREAST CANCER SCREENING USING NON-MAMMOGRAM MODALITY: ICD-10-CM

## 2022-02-01 DIAGNOSIS — Z71.9 HEALTH EDUCATION/COUNSELING: ICD-10-CM

## 2022-02-01 DIAGNOSIS — E11.65 TYPE 2 DIABETES MELLITUS WITH HYPERGLYCEMIA, WITHOUT LONG-TERM CURRENT USE OF INSULIN: Primary | ICD-10-CM

## 2022-02-01 DIAGNOSIS — Z71.89 COUNSELING AND COORDINATION OF CARE: ICD-10-CM

## 2022-02-01 DIAGNOSIS — E11.65 TYPE 2 DIABETES MELLITUS WITH HYPERGLYCEMIA, WITHOUT LONG-TERM CURRENT USE OF INSULIN: ICD-10-CM

## 2022-02-01 DIAGNOSIS — N60.11 FIBROCYSTIC BREAST CHANGES, RIGHT: Primary | ICD-10-CM

## 2022-02-01 LAB
ESTIMATED AVG GLUCOSE: 160 MG/DL (ref 68–131)
HBA1C MFR BLD: 7.2 % (ref 4–5.6)

## 2022-02-01 PROCEDURE — 3072F LOW RISK FOR RETINOPATHY: CPT | Mod: CPTII,S$GLB,, | Performed by: NURSE PRACTITIONER

## 2022-02-01 PROCEDURE — 1160F PR REVIEW ALL MEDS BY PRESCRIBER/CLIN PHARMACIST DOCUMENTED: ICD-10-PCS | Mod: CPTII,S$GLB,, | Performed by: NURSE PRACTITIONER

## 2022-02-01 PROCEDURE — 83036 HEMOGLOBIN GLYCOSYLATED A1C: CPT | Performed by: PHYSICIAN ASSISTANT

## 2022-02-01 PROCEDURE — 99999 PR PBB SHADOW E&M-EST. PATIENT-LVL V: ICD-10-PCS | Mod: PBBFAC,,, | Performed by: NURSE PRACTITIONER

## 2022-02-01 PROCEDURE — 99214 PR OFFICE/OUTPT VISIT, EST, LEVL IV, 30-39 MIN: ICD-10-PCS | Mod: S$GLB,,, | Performed by: NURSE PRACTITIONER

## 2022-02-01 PROCEDURE — 1159F MED LIST DOCD IN RCRD: CPT | Mod: CPTII,S$GLB,, | Performed by: NURSE PRACTITIONER

## 2022-02-01 PROCEDURE — 3075F SYST BP GE 130 - 139MM HG: CPT | Mod: CPTII,S$GLB,, | Performed by: NURSE PRACTITIONER

## 2022-02-01 PROCEDURE — 99999 PR PBB SHADOW E&M-EST. PATIENT-LVL II: CPT | Mod: PBBFAC,,, | Performed by: DIETITIAN, REGISTERED

## 2022-02-01 PROCEDURE — G0108 DIAB MANAGE TRN  PER INDIV: HCPCS | Mod: S$GLB,,, | Performed by: DIETITIAN, REGISTERED

## 2022-02-01 PROCEDURE — 1160F RVW MEDS BY RX/DR IN RCRD: CPT | Mod: CPTII,S$GLB,, | Performed by: NURSE PRACTITIONER

## 2022-02-01 PROCEDURE — G0108 PR DIAB MANAGE TRN  PER INDIV: ICD-10-PCS | Mod: S$GLB,,, | Performed by: DIETITIAN, REGISTERED

## 2022-02-01 PROCEDURE — 3078F DIAST BP <80 MM HG: CPT | Mod: CPTII,S$GLB,, | Performed by: NURSE PRACTITIONER

## 2022-02-01 PROCEDURE — 3008F PR BODY MASS INDEX (BMI) DOCUMENTED: ICD-10-PCS | Mod: CPTII,S$GLB,, | Performed by: NURSE PRACTITIONER

## 2022-02-01 PROCEDURE — 1159F PR MEDICATION LIST DOCUMENTED IN MEDICAL RECORD: ICD-10-PCS | Mod: CPTII,S$GLB,, | Performed by: NURSE PRACTITIONER

## 2022-02-01 PROCEDURE — 99999 PR PBB SHADOW E&M-EST. PATIENT-LVL V: CPT | Mod: PBBFAC,,, | Performed by: NURSE PRACTITIONER

## 2022-02-01 PROCEDURE — 99214 OFFICE O/P EST MOD 30 MIN: CPT | Mod: S$GLB,,, | Performed by: NURSE PRACTITIONER

## 2022-02-01 PROCEDURE — 3078F PR MOST RECENT DIASTOLIC BLOOD PRESSURE < 80 MM HG: ICD-10-PCS | Mod: CPTII,S$GLB,, | Performed by: NURSE PRACTITIONER

## 2022-02-01 PROCEDURE — 3072F PR LOW RISK FOR RETINOPATHY: ICD-10-PCS | Mod: CPTII,S$GLB,, | Performed by: NURSE PRACTITIONER

## 2022-02-01 PROCEDURE — 36415 COLL VENOUS BLD VENIPUNCTURE: CPT | Performed by: PHYSICIAN ASSISTANT

## 2022-02-01 PROCEDURE — 3075F PR MOST RECENT SYSTOLIC BLOOD PRESS GE 130-139MM HG: ICD-10-PCS | Mod: CPTII,S$GLB,, | Performed by: NURSE PRACTITIONER

## 2022-02-01 PROCEDURE — 99999 PR PBB SHADOW E&M-EST. PATIENT-LVL II: ICD-10-PCS | Mod: PBBFAC,,, | Performed by: DIETITIAN, REGISTERED

## 2022-02-01 PROCEDURE — 3008F BODY MASS INDEX DOCD: CPT | Mod: CPTII,S$GLB,, | Performed by: NURSE PRACTITIONER

## 2022-02-01 NOTE — LETTER
February 1, 2022      Tristian Bearden MD  [address] [address]         Patient: Chan Dwyer   MR Number: [id number]   YOB: 1970   Date of Visit: 2/1/2022       Dear Dr. Bearden:    Thank you for referring Chan for diabetes self-management education and support services. She has completed all components of our Diabetes Management Program. Below is a summary of her clinical outcomes and goal progress.    Patient Outcomes:    A1c Status:   Lab Results   Component Value Date    HGBA1C 8.7 (H) 10/28/2021    HGBA1C 8.0 (H) 08/04/2021     A1c Pre Diabetes Care Specialist Intervention:  10.5%    Care Plan: Diabetes Management   Updates made since 1/2/2022 12:00 AM      Problem: Healthy Eating       Goal: Eat 3 meals daily with 30-45g/2-3 servings of Carbohydrate per meal.    Start Date: 1/26/2021   Expected End Date: 4/26/2021   This Visit's Progress: Met   Recent Progress: On track   Priority: High   Barriers: No Barriers Identified   Note:    Reviewed carb foods and portion sizes  Pt was aware of her carb goal per meal. She does avoid carbs at times.   Reminded to try to keep carb intake consistent for each meal.      Problem: Blood Glucose Self-Monitoring       Goal: Patient agrees to check and record blood sugars 2 times day.    Start Date: 1/26/2021   Expected End Date: 4/26/2021   This Visit's Progress: Met   Recent Progress: On track   Priority: High   Barriers: No Barriers Identified   Note:    Pt is keeping a BG log, but forgot it at work today.   BG readings per pt report are within goal.      Problem: Medications       Goal: Patient Agrees to take Diabetes Medication(s) as prescribed.    This Visit's Progress: Met   Recent Progress: On track      Problem: Physical Activity and Exercise       Goal: Patient agrees to increase physical activity - walking for 30 min, 3-5 days per week.    Start Date: 7/1/2021   Expected End Date: 10/1/2021   This Visit's Progress: Not met    Recent Progress: Not met   Priority: Low   Note:    Pt does get exercise on work days, usually reaching 6,000 to 10,000 steps daily.   Pt plans to walk in the afternoon on days she is less active at work.          Follow up:   · Chan to attend medical appointments as scheduled  · Chan to update you on her DM education progress as needed      If you have questions, please do not hesitate to call me. I look forward to providing additional education and support as needed.    Sincerely,    Jonah Roman RD  Diabetes Care and

## 2022-02-01 NOTE — PROGRESS NOTES
"Diabetes Care Specialist Follow-up Note  Author: Jonah Roman RD  Date: 2/1/2022    Program Intake  Reason for Diabetes Program Visit:: Intervention  Type of Intervention:: Individual  Individual: Education  Education: Self-Management Skill Review,Nutrition and Meal Planning    Lab Results   Component Value Date    HGBA1C 8.7 (H) 10/28/2021     A1c Pre Diabetes Care Specialist Intervention:  10.5%    CURRENT DM MEDICATIONS:   · Metformin ER, 500 mg daily   · Pioglitazone, 15 mg  · Lantus, 25 units at night      Clinical    Weight: 57.1 kg (125 lb 14.1 oz)   Height: 5' 4" (162.6 cm)   Body mass index is 21.61 kg/m².  Wt loss of 3  lbs since last visit on 11/1/2021    Nutritional Status  Meal Plan 24 Hour Recall: Breakfast,Lunch,Dinner,Snack  Meal Plan 24 Hour Recall - Breakfast: small pc of lasagna, water  Meal Plan 24 Hour Recall - Lunch: baked fish with crabmeat, shrimp and crawfish, steamed vegetables, water  Meal Plan 24 Hour Recall - Dinner: [employer] red beans and rice, 1/2 pc of chicken breast; water  Meal Plan 24 Hour Recall - Snack: none     Physical activity/Exercise:   No formal exercise, but walks up and down steps all day with job - 10,000 steps daily     SMBG: checking BG 2-3 times per day   Fasting,   Post prandial lunch, 160 or less   Bedtime,        Diabetes Self-Support Plan    Diabetes Self-Management Support Plan  Diabetes learning:: DM websites  Exercise/nutrition:: apps,websites  Stress management:: Baptism,family  Medication:: pharmacy  Review status:: Patient has selected and agrees to support plan.    During today's follow-up visit,  the following areas required further assessment and content was provided/reviewed.    Based on today's diabetes care assessment, the following areas of need were identified:      Social 1/26/2021   Support No   Access to Mass Media/Tech No   Cognitive/Behavioral Health No   Culture/Rastafari No   Communication No   Health Literacy No        Clinical " 1/26/2021   Medication Adherence No   Lab Compliance No   Nutritional Status No        Diabetes Self-Management Skills 7/1/2021   Diabetes Disease Process/Treatment Options    Nutrition/Healthy Eating  Reviewed    Physical Activity/Exercise  Reviewed    Medication  Reviewed    Home Blood Glucose Monitoring  Reviewed    Acute Complications    Chronic Complications    Psychosocial/Coping         Today's interventions were provided through individual discussion, instruction, and written materials were provided.    Patient verbalized understanding of instruction and written materials.  Pt was able to return back demonstration of instructions today. Patient understood key points, needs reinforcement and further instruction.     Diabetes Self-Management Care Plan Review:  Diabetes Self-Management Care Plan Review and Evaluation of Progress:    During today's follow-up Chan's Diabetes Self-Management Care Plan progress was reviewed and progress was evaluated including his/her input. Chan has agreed to continue his/her journey to improve/maintain overall diabetes control by continuing to set health goals. See care plan progress below.      Care Plan: Diabetes Management   Updates made since 1/2/2022 12:00 AM      Problem: Healthy Eating       Goal: Eat 3 meals daily with 30-45g/2-3 servings of Carbohydrate per meal.    Start Date: 1/26/2021   Expected End Date: 4/26/2021   This Visit's Progress: Met   Recent Progress: On track   Priority: High   Barriers: No Barriers Identified   Note:    Reviewed carb foods and portion sizes  Pt was aware of her carb goal per meal. She does avoid carbs at times.   Reminded to try to keep carb intake consistent for each meal.      Problem: Blood Glucose Self-Monitoring       Goal: Patient agrees to check and record blood sugars 2 times day.    Start Date: 1/26/2021   Expected End Date: 4/26/2021   This Visit's Progress: Met   Recent Progress: On track   Priority: High   Barriers:  No Barriers Identified   Note:    Pt is keeping a BG log, but forgot it at work today.   BG readings per pt report are within goal.      Problem: Medications       Goal: Patient Agrees to take Diabetes Medication(s) as prescribed.    This Visit's Progress: Met   Recent Progress: On track      Problem: Physical Activity and Exercise       Goal: Patient agrees to increase physical activity - walking for 30 min, 3-5 days per week.    Start Date: 7/1/2021   Expected End Date: 10/1/2021   This Visit's Progress: Not met   Recent Progress: Not met   Priority: Low   Note:    Pt does get exercise on work days, usually reaching 6,000 to 10,000 steps daily.   Pt plans to walk in the afternoon on days she is less active at work.          Follow Up Plan     Follow up in about 1 year (around 2/1/2023).    Today's care plan and follow up schedule was discussed with patient.  Quintessa verbalized understanding of the care plan, goals, and agrees to follow up plan.        The patient was encouraged to communicate with his/her health care provider/physician and care team regarding his/her condition(s) and treatment.  I provided the patient with my contact information today and encouraged to contact me via phone or [facility] Patient Portal as needed.     Length of Visit   Total Time: 60 Minutes

## 2022-02-01 NOTE — LETTER
February 1, 2022    Chan Dwyer  [address] [address] [facility] [address]             [facility] Diabetes Education  Diabetes  [address] [address] [address]  Phone: [phone]  Fax: [phone]   February 1, 2022     Patient: Chan Dwyer   YOB: 1970   Date of Visit: 2/1/2022       To Whom it May Concern:    Chan Dwyer was seen in my clinic on 2/1/2022.  Please excuse her from any work missed.    If you have any questions or concerns, please don't hesitate to call.    Sincerely,         Jonah Roman RD

## 2022-02-03 ENCOUNTER — CLINICAL SUPPORT (OUTPATIENT)
Dept: REHABILITATION | Facility: HOSPITAL | Age: 52
End: 2022-02-03
Payer: COMMERCIAL

## 2022-02-03 DIAGNOSIS — M25.511 CHRONIC RIGHT SHOULDER PAIN: ICD-10-CM

## 2022-02-03 DIAGNOSIS — M25.611 SHOULDER STIFFNESS, RIGHT: ICD-10-CM

## 2022-02-03 DIAGNOSIS — G89.29 CHRONIC RIGHT SHOULDER PAIN: ICD-10-CM

## 2022-02-03 PROCEDURE — 97140 MANUAL THERAPY 1/> REGIONS: CPT

## 2022-02-03 PROCEDURE — 97110 THERAPEUTIC EXERCISES: CPT

## 2022-02-03 NOTE — PROGRESS NOTES
[facility]   Physical Therapy Treatment Note     Name: Chan Gorman [facility]  Clinic Number: [id number]    Therapy Diagnosis:   Encounter Diagnoses   Name Primary?    Chronic right shoulder pain     Shoulder stiffness, right      Physician: Meme Enciso NP    Visit Date: 2/3/2022    Physician Orders: PT Eval and Treat  Medical Diagnosis from Referral: M75.80 (ICD-10-CM) - Subscapularis tendinitis, unspecified laterality  Evaluation Date: 11/8/2021  Authorization Period Expiration: 12/31/21  Plan of Care Expiration: 2/23/22  Progress Note Due: 1/29/2022  Visit # / Visits authorized: 5/20 (+1 Evaluation) - Episode Visits: 12  FOTO: 3/3      Precautions: Standard and Diabetes    PTA Visit #: 0/5     Time In: 12:16 pm  Time Out: 1:00 pm  Total Billable Time: 39 minutes    SUBJECTIVE     Patient reports: no adverse reactions to therapy session - still remains limited with internal rotation.    He/She was compliant with home exercise program.  Response to previous treatment: no adverse effects noted.  Functional change: She is able to get dressed better and drive better.    Pain: 0/10     Location: right shoulder    OBJECTIVE     Objective Measures updated at progress report unless specified.         TREATMENT     Chan received the treatments listed below:     MANUAL THERAPY TECHNIQUES including Joint mobilizations were applied to right shoulder for 10 minutes.    Manual Intervention Performed Today    Soft Tissue Mobilization x Subscapularis, latissimus dorsi, scapular tilting, upper trapezial, levator scapula, pectoral major and minor, posterior deltoid muscle.  Scapular releases   Joint Mobilizations x right GH mobilization - inferior glide/posterior glide grade 1-2      Plan for Next Visit: prn       THERAPEUTIC EXERCISES to develop  strength, endurance, ROM, flexibility, posture and core stabilization for 29 minutes including assessment, patient education, and the  "following:    Intervention    2/3/2022 Sets/Reps/Resistance   UBE x 3' forward/ 3' backward   Standing Shoulder Flexion x 3x10 - 0#   Standing Shoulder internal rotation stretch strap   10x10"   Standing Shoulder Abduction x 3x10 - 0#   Belly Press Manual Isometrics  7x 5" holds   side lying external rotation  x 2x10 - 2#   Wall Slides - Flexion x 20x   Wall Slides - Abduction  x 20x   Supine Serratus Punch x 3x10 - 2#   theraband rows x 3x10 - 20# single arm   theraband extensions x 3x10 - 20# single arm   theraband internal rotations  3x10 - Yellow Theraband , patient required assistance from therapy for technique   theraband external rotation - isometric walkouts  3x10 - Yellow Theraband , no isometric walkouts, patient required assistance from therapist for technique   1/2 foam roll stretch - pectorals x 3 min   sidelying flexion   10x   Foam roll series  Hugs, scapular retraction/protraction 8x/each   Open books  5x each     Plan for Next Visit: continue to progress shoulder strength and range of motion        PATIENT EDUCATION AND HOME EXERCISES     Home Exercises Provided and Patient Education Provided     Education provided: (included in therex) minutes  -importance of maintaining ROM and scapular mobility, pain will decrease as ROM improves.    Written Home Exercises Provided: Patient instructed to cont prior HEP. Exercises were reviewed and Chan was able to demonstrate them prior to the end of the session.  Chan demonstrated good  understanding of the education provided. See EMR under Patient Instructions for exercises provided during therapy sessions      ASSESSMENT     Patient tolerated the addition of all exercises with reports of only muscle fatigue. Increased load with side lying external rotation in order to promote rotator cuff strength. Increased tissue tension noted with subscapularis muscle release. Patient continues to demonstrate improvements in motor control but her gross upper " extremity strength is limited. Continue to progress plan of care with an emphasis on rotator cuff strengthening an rotator cuff endurance.    Chan is progressing well towards her goals.   Pt prognosis is Good.     Pt will continue to benefit from skilled outpatient physical therapy to address the deficits listed in the problem list box on initial evaluation, provide pt/family education and to maximize pt's level of independence in the home and community environment.     Pt's spiritual, cultural and educational needs considered and pt agreeable to plan of care and goals.     Anticipated Barriers for therapy: co-morbidities      Reviewed: 2/3/2022         SHORT TERM GOALS:  4 weeks     1. Recent signs and systems trend is improving in order to progress towards LTG's. MET, 12/29/21   2. Patient will be independent with HEP in order to further progress and return to maximal function. PC, 12/29/21     3. Pain rating at Worst: 5/10 in order to progress towards increased independence with activity. PC, 12/29/21   4. Patient will be able to correct postural deviations in sitting and standing, to decrease pain and promote postural awareness for injury prevention.  PC, 12/29/21      LONG TERM GOALS: 8 weeks     1. Patient will return to normal ADL, recreational, and work related activities with less pain and limitation.  PC, 12/29/21   2. Patient will improve AROM to stated goals in order to return to maximal functional potential.  PC, 12/29/21   3. Patient will improve Strength to stated goals of appropriate musculature in order to improve functional independence.  PC, 12/29/21   4. Pain Rating at Best: 1/10 to improve Quality of Life.  PC, 12/29/21   5. Patient will meet predicted functional outcome (FOTO) score: 65% to increase self-worth & perceived functional ability. Not tested, 12/29/21   6. Patient will have met/partially met personal goal of: return to working without pain.  PC, 12/29/21          Goals Key:  PC=  progressing/continue;        PM= partially met;             DC= discontinue        PLAN   Updated Certification Period: 12/29/21 to 2/23/22   Recommended Treatment Plan: 2 times per week for 8 weeks:  Electrical Stimulation PRN, Manual Therapy, Moist Heat/ Ice, Neuromuscular Re-ed, Patient Education, Self Care, Therapeutic Activities, Therapeutic Exercise and FDN/PRN    Monitor response to today's treatment and progress as tolerated.      Yehuda Shi, PT, DPT

## 2022-02-04 ENCOUNTER — CLINICAL SUPPORT (OUTPATIENT)
Dept: REHABILITATION | Facility: HOSPITAL | Age: 52
End: 2022-02-04
Payer: COMMERCIAL

## 2022-02-04 DIAGNOSIS — Z78.9 SELF-CARE DEFICIT: ICD-10-CM

## 2022-02-04 DIAGNOSIS — M25.60 DECREASED RANGE OF MOTION: ICD-10-CM

## 2022-02-04 PROCEDURE — 97110 THERAPEUTIC EXERCISES: CPT | Performed by: OCCUPATIONAL THERAPIST

## 2022-02-04 PROCEDURE — 97140 MANUAL THERAPY 1/> REGIONS: CPT | Performed by: OCCUPATIONAL THERAPIST

## 2022-02-04 NOTE — PROGRESS NOTES
OCCUPATIONAL THERAPY DAILY NOTE    Name: Chan Gorman [facility] Number: [id number]    Therapy Diagnosis:   Encounter Diagnoses   Name Primary?    Decreased range of motion     Self-care deficit      Physician: Meme Enciso NP    Visit Date: 2/4/2022  Therapy Diagnosis:        Encounter Diagnosis   Name Primary?    Trigger ring finger of right hand        Physician: Meme Enciso NP     Physician Orders: Evaluation and treatment   Medical Diagnosis: M65.341 (ICD-10-CM) - Trigger ring finger of right hand  Surgical Procedure and Date: NA,   Evaluation Date: 1/24/2022  Insurance Authorization Period Expiration: 1/13/2023  Plan of Care Certification Period: 2/24/2022  Progress Note Due: 2/24/2022   Date of Return to MD: None scheduled  Visit # / Visits authorized: 2/ 20  FOTO: 1/3     Precautions:  Diabetes     Time In:12:00 pm  Time Out: 12:30 pm  Total Appointment Time (timed & untimed codes): 30 minutes      SUBJECTIVE     Today, pt reports: that her left hand pain is severe.  He/She was compliant with home exercise program.  Response to previous treatment: No change  Functional change: No change    Pre-Treatment Pain: 7/10  Post-Treatment Pain: 3/10  Location: A-1 pulley of the ring finger left hand  TREATMENT     Chan received therapeutic exercises to develop ROM and flexibility for 19 minutes including:    Exercise 2/4/2022   Therapeutic exercises to develop ROM and flexibility for 5 minutes, including:  Ring finger extension stretches: 6/10 second holds  Passive range of motion of digit 2-5 at the proximal interphalangeal joint and distal interphalangeal joint's.  Distal interphalangeal joint blocking: 10x                                    Chan received the following manual therapy techniques: Soft tissue Mobilization were applied to the: left hand for 8 minutes, including:  STM /IASTMof left palm    Chan received the following supervised modalities after being cleared for  contradictions:     Chan received hot pack for 5 minutes to left hand.    Chan received cold pack for 0 minutes .      Home Exercises Provided and Patient Education Provided   Education/Self-Care provided: (1 minutes)   Patient educated on biomechanical justification for therapeutic exercise and importance of compliance with HEP in order to improve overall impairments and QOL    Patient educated on postural awareness. Not today    Patient educated on proper ergonomics at the work station in order to maintain optimal alignment of the musculoskeletal system and improve efficiency in the work environment. Not today    Reviewed precautions for oval 8 brace and donning instructions.    Written Home Exercises Provided: Patient instructed to cont prior HEP.  Exercises were reviewed and Chan was able to demonstrate them prior to the end of the session.  Chan demonstrated good  understanding of the education provided.     See EMR under Patient Instructions for exercises provided 1/24/2022.    ASSESSMENT   Pt tolerated manual therapy well with reports of decreased pain and tension in musculature following intervention. Pt tolerated exercise well with reports of increased fatigue but no increased pain. Pt demonstrated good understanding of exercises and required minimal cueing to maintain proper form.  Patient was wearing her oval 8 brace on the wrong side of her finger without change in symptoms.    Chan Is not progressing well towards her goals.   Pt prognosis is Good.     Pt will continue to benefit from skilled outpatient occupational therapy to address the deficits listed in the problem list box on initial evaluation, provide pt/family education and to maximize pt's level of independence in the home and community environment.     Pt's spiritual, cultural and educational needs considered and pt agreeable to plan of care and goals.     Anticipated barriers to occupational therapy: bilateral  upper extremity weakness    GOALS:     Short Term Goals:  2 weeks     1. Pain: Pt will demonstrate improved pain by reports of less than or equal to 1/10 worst pain on the verbal rating scale in order to progress toward maximal functional ability and improve QOL.     2. Mobility: Patient will improve AROM to 50% of stated goals, listed in objective measures above, in order to progress towards independence with functional activities.      3. Strength: Patient will improve strength to 50% of stated goals, listed in objective measures above, in order to progress towards independence with functional activities.      4. Self Care: Patient will demonstrate improved self care skills listed in objective measure above,in order to progress towards independence with functional activities.      5. HEP: Patient will demonstrate independence with HEP in order to progress toward functional independence.        Long Term Goals:  4 weeks     1. Pain: Pt will demonstrate improved pain by reports of less than or equal to 0/10 worst pain on the verbal rating scale in order to progress toward maximal functional ability and improve QOL.       2. Function: Patient will demonstrate improved function as indicated by a functional limitation score of less than or equal to 43 out of 100 on FOTO.     3. Mobility: Patient will improve AROM to stated goals, listed in objective measures above, in order to return to maximal functional potential and improve quality of life.     4. Strength: Patient will improve strength to stated goals, listed in objective measures above, in order to improve functional independence and quality of life.     5. Self Care: Patient will demonstrate increased self care skills to an independent level or modified independent level with adaptive equipment as needed.     6. Patient will return to normal ADL's, IADL's, community involvement, recreational activities, and work-related activities with less than or equal to 0/10  pain and maximal function.              PLAN   Continue Plan of Care (POC) and progress per patient tolerance.    Nisa Hayes, OTR, ADAM

## 2022-02-10 ENCOUNTER — OFFICE VISIT (OUTPATIENT)
Dept: URGENT CARE | Facility: CLINIC | Age: 52
End: 2022-02-10
Payer: COMMERCIAL

## 2022-02-10 ENCOUNTER — CLINICAL SUPPORT (OUTPATIENT)
Dept: REHABILITATION | Facility: HOSPITAL | Age: 52
End: 2022-02-10
Payer: COMMERCIAL

## 2022-02-10 VITALS
OXYGEN SATURATION: 98 % | RESPIRATION RATE: 18 BRPM | BODY MASS INDEX: 21.34 KG/M2 | TEMPERATURE: 98 F | HEIGHT: 64 IN | HEART RATE: 79 BPM | WEIGHT: 125 LBS | DIASTOLIC BLOOD PRESSURE: 63 MMHG | SYSTOLIC BLOOD PRESSURE: 142 MMHG

## 2022-02-10 DIAGNOSIS — R06.02 SOB (SHORTNESS OF BREATH): ICD-10-CM

## 2022-02-10 DIAGNOSIS — M25.511 CHRONIC RIGHT SHOULDER PAIN: ICD-10-CM

## 2022-02-10 DIAGNOSIS — M25.611 SHOULDER STIFFNESS, RIGHT: ICD-10-CM

## 2022-02-10 DIAGNOSIS — R07.89 CHEST WALL PAIN: ICD-10-CM

## 2022-02-10 DIAGNOSIS — R51.9 SINUS HEADACHE: ICD-10-CM

## 2022-02-10 DIAGNOSIS — R07.9 CHEST PAIN, UNSPECIFIED TYPE: Primary | ICD-10-CM

## 2022-02-10 DIAGNOSIS — G89.29 CHRONIC RIGHT SHOULDER PAIN: ICD-10-CM

## 2022-02-10 DIAGNOSIS — Z20.822 SUSPECTED COVID-19 VIRUS INFECTION: ICD-10-CM

## 2022-02-10 LAB
CTP QC/QA: YES
SARS-COV-2 RDRP RESP QL NAA+PROBE: NEGATIVE

## 2022-02-10 PROCEDURE — 3051F PR MOST RECENT HEMOGLOBIN A1C LEVEL 7.0 - < 8.0%: ICD-10-PCS | Mod: CPTII,S$GLB,, | Performed by: NURSE PRACTITIONER

## 2022-02-10 PROCEDURE — 93010 EKG 12-LEAD: ICD-10-PCS | Mod: S$GLB,,, | Performed by: INTERNAL MEDICINE

## 2022-02-10 PROCEDURE — 3008F PR BODY MASS INDEX (BMI) DOCUMENTED: ICD-10-PCS | Mod: CPTII,S$GLB,, | Performed by: NURSE PRACTITIONER

## 2022-02-10 PROCEDURE — 1159F PR MEDICATION LIST DOCUMENTED IN MEDICAL RECORD: ICD-10-PCS | Mod: CPTII,S$GLB,, | Performed by: NURSE PRACTITIONER

## 2022-02-10 PROCEDURE — 99214 OFFICE O/P EST MOD 30 MIN: CPT | Mod: S$GLB,,, | Performed by: NURSE PRACTITIONER

## 2022-02-10 PROCEDURE — 93005 ELECTROCARDIOGRAM TRACING: CPT | Mod: S$GLB,,, | Performed by: NURSE PRACTITIONER

## 2022-02-10 PROCEDURE — 3078F PR MOST RECENT DIASTOLIC BLOOD PRESSURE < 80 MM HG: ICD-10-PCS | Mod: CPTII,S$GLB,, | Performed by: NURSE PRACTITIONER

## 2022-02-10 PROCEDURE — 3077F PR MOST RECENT SYSTOLIC BLOOD PRESSURE >= 140 MM HG: ICD-10-PCS | Mod: CPTII,S$GLB,, | Performed by: NURSE PRACTITIONER

## 2022-02-10 PROCEDURE — 1159F MED LIST DOCD IN RCRD: CPT | Mod: CPTII,S$GLB,, | Performed by: NURSE PRACTITIONER

## 2022-02-10 PROCEDURE — 1160F PR REVIEW ALL MEDS BY PRESCRIBER/CLIN PHARMACIST DOCUMENTED: ICD-10-PCS | Mod: CPTII,S$GLB,, | Performed by: NURSE PRACTITIONER

## 2022-02-10 PROCEDURE — 3077F SYST BP >= 140 MM HG: CPT | Mod: CPTII,S$GLB,, | Performed by: NURSE PRACTITIONER

## 2022-02-10 PROCEDURE — 3072F PR LOW RISK FOR RETINOPATHY: ICD-10-PCS | Mod: CPTII,S$GLB,, | Performed by: NURSE PRACTITIONER

## 2022-02-10 PROCEDURE — 1160F RVW MEDS BY RX/DR IN RCRD: CPT | Mod: CPTII,S$GLB,, | Performed by: NURSE PRACTITIONER

## 2022-02-10 PROCEDURE — 97110 THERAPEUTIC EXERCISES: CPT

## 2022-02-10 PROCEDURE — 3078F DIAST BP <80 MM HG: CPT | Mod: CPTII,S$GLB,, | Performed by: NURSE PRACTITIONER

## 2022-02-10 PROCEDURE — 3051F HG A1C>EQUAL 7.0%<8.0%: CPT | Mod: CPTII,S$GLB,, | Performed by: NURSE PRACTITIONER

## 2022-02-10 PROCEDURE — 93005 EKG 12-LEAD: ICD-10-PCS | Mod: S$GLB,,, | Performed by: NURSE PRACTITIONER

## 2022-02-10 PROCEDURE — 3072F LOW RISK FOR RETINOPATHY: CPT | Mod: CPTII,S$GLB,, | Performed by: NURSE PRACTITIONER

## 2022-02-10 PROCEDURE — 99214 PR OFFICE/OUTPT VISIT, EST, LEVL IV, 30-39 MIN: ICD-10-PCS | Mod: S$GLB,,, | Performed by: NURSE PRACTITIONER

## 2022-02-10 PROCEDURE — 93010 ELECTROCARDIOGRAM REPORT: CPT | Mod: S$GLB,,, | Performed by: INTERNAL MEDICINE

## 2022-02-10 PROCEDURE — U0002 COVID-19 LAB TEST NON-CDC: HCPCS | Mod: QW,S$GLB,, | Performed by: NURSE PRACTITIONER

## 2022-02-10 PROCEDURE — U0002: ICD-10-PCS | Mod: QW,S$GLB,, | Performed by: NURSE PRACTITIONER

## 2022-02-10 PROCEDURE — 3008F BODY MASS INDEX DOCD: CPT | Mod: CPTII,S$GLB,, | Performed by: NURSE PRACTITIONER

## 2022-02-10 NOTE — PROGRESS NOTES
[facility]   Physical Therapy Treatment Note     Name: Chan Gorman [facility]  Clinic Number: [id number]    Therapy Diagnosis:   Encounter Diagnoses   Name Primary?    Chronic right shoulder pain     Shoulder stiffness, right      Physician: Meme Enciso NP    Visit Date: 2/10/2022    Physician Orders: PT Eval and Treat  Medical Diagnosis from Referral: M75.80 (ICD-10-CM) - Subscapularis tendinitis, unspecified laterality  Evaluation Date: 11/8/2021  Authorization Period Expiration: 12/31/21  Plan of Care Expiration: 2/23/22  Progress Note Due: 03/11/2022  Visit # / Visits authorized: 6/20 (+1 Evaluation) - Episode Visits: 13  FOTO: 3/3      Precautions: Standard and Diabetes    PTA Visit #: 0/5     Time In: 12:16 pm  Time Out: 1:00 pm  Total Billable Time: 38 minutes    SUBJECTIVE     Patient reports: no adverse reaction to last therapy session - shoulder feels like it is improving.    He/She was compliant with home exercise program.  Response to previous treatment: no adverse effects noted.  Functional change: She is able to get dressed better and drive better.    Pain: 0/10     Location: right shoulder    OBJECTIVE     Objective Measures updated at progress report unless specified.        Shoulder Left Right Goals   Shoulder flexion: 4-/5 4-/5 5/5   Shoulder Abduction: 4-/5 4-/5 5/5   Shoulder ER 4-/5 4-/5 5/5   Shoulder IR 4-/5 4-/5 5/5      Shoulder Left Right Goals   Supraspinatus 4-/5 4-/5 5/5         Shoulder Left Right Goal   Flexion full 165 (180) 180   Abduction full 155 (165) 180   ER at 90 full 60 (70) 90   IR full 55 (65) 80         TREATMENT     Chan received the treatments listed below:     MANUAL THERAPY TECHNIQUES including Joint mobilizations were applied to right shoulder for 0 minutes.    Manual Intervention Performed Today    Soft Tissue Mobilization  Subscapularis, latissimus dorsi, scapular tilting, upper trapezial, levator scapula, pectoral major and  "minor, posterior deltoid muscle.  Scapular releases   Joint Mobilizations  right GH mobilization - inferior glide/posterior glide grade 1-2      Plan for Next Visit: prn       THERAPEUTIC EXERCISES to develop  strength, endurance, ROM, flexibility, posture and core stabilization for 38 minutes including assessment, patient education, and the following:    Intervention    2/10/2022 Sets/Reps/Resistance   UBE x 3' forward/ 3' backward   Standing Shoulder Flexion x 3x10 - 1#   Standing Shoulder internal rotation stretch strap   10x10"   Standing Shoulder Abduction x 3x10 - 1#   Belly Press Manual Isometrics  7x 5" holds   side lying external rotation  x 3x12 - 1#   Wall Slides - Flexion x 20x   Wall Slides - Abduction  x 20x   Supine Serratus Punch  3x10 - 2#   theraband rows x 3x10 - 20#    theraband extensions  3x10 - 20#    theraband internal rotations  3x10 - Yellow Theraband , patient required assistance from therapy for technique   theraband external rotation - isometric walkouts  3x10 - Yellow Theraband , no isometric walkouts, patient required assistance from therapist for technique   Corner stretch x 3x30"   sidelying flexion   10x   Foam roll series  Hugs, scapular retraction/protraction 8x/each   Open books  5x each     Plan for Next Visit: continue to progress shoulder strength and range of motion        PATIENT EDUCATION AND HOME EXERCISES     Home Exercises Provided and Patient Education Provided     Education provided: (included in therex) minutes  -importance of maintaining ROM and scapular mobility, pain will decrease as ROM improves.    Written Home Exercises Provided: Patient instructed to cont prior HEP. Exercises were reviewed and Chan was able to demonstrate them prior to the end of the session.  Chan demonstrated good  understanding of the education provided. See EMR under Patient Instructions for exercises provided during therapy sessions      ASSESSMENT     Patient tolerated the " addition of all exercises with reports of only muscle fatigue. Increased load with active shoulder flexion and abduction in order to promote rotator cuff strength. Patient continues to demonstrate improvements in motor control but her gross upper extremity strength is limited. Continue to progress plan of care with an emphasis on rotator cuff strengthening an rotator cuff endurance.    Chan is progressing well towards her goals.   Pt prognosis is Good.     Pt will continue to benefit from skilled outpatient physical therapy to address the deficits listed in the problem list box on initial evaluation, provide pt/family education and to maximize pt's level of independence in the home and community environment.     Pt's spiritual, cultural and educational needs considered and pt agreeable to plan of care and goals.     Anticipated Barriers for therapy: co-morbidities      Reviewed: 2/10/2022         SHORT TERM GOALS:  4 weeks     1. Recent signs and systems trend is improving in order to progress towards LTG's. MET, 12/29/21   2. Patient will be independent with HEP in order to further progress and return to maximal function. PC, 12/29/21     3. Pain rating at Worst: 5/10 in order to progress towards increased independence with activity. PC, 12/29/21   4. Patient will be able to correct postural deviations in sitting and standing, to decrease pain and promote postural awareness for injury prevention.  PC, 12/29/21      LONG TERM GOALS: 8 weeks     1. Patient will return to normal ADL, recreational, and work related activities with less pain and limitation.  PC, 12/29/21   2. Patient will improve AROM to stated goals in order to return to maximal functional potential.  PC, 12/29/21   3. Patient will improve Strength to stated goals of appropriate musculature in order to improve functional independence.  PC, 12/29/21   4. Pain Rating at Best: 1/10 to improve Quality of Life.  PC, 12/29/21   5. Patient will meet  predicted functional outcome (FOTO) score: 65% to increase self-worth & perceived functional ability. Not tested, 12/29/21   6. Patient will have met/partially met personal goal of: return to working without pain.  PC, 12/29/21          Goals Key:  PC= progressing/continue;        PM= partially met;             DC= discontinue        PLAN   Updated Certification Period: 12/29/21 to 2/23/22   Recommended Treatment Plan: 2 times per week for 8 weeks:  Electrical Stimulation PRN, Manual Therapy, Moist Heat/ Ice, Neuromuscular Re-ed, Patient Education, Self Care, Therapeutic Activities, Therapeutic Exercise and FDN/PRN    Monitor response to today's treatment and progress as tolerated.      Yehuda Shi, PT, DPT

## 2022-02-10 NOTE — LETTER
February 10, 2022      [facility]  [address], [address]  [place] [address]  Phone: [phone]  Fax: [phone]       Patient: Chan Dwyer   YOB: 1970  Date of Visit: 02/10/2022    To Whom It May Concern:    Antonio Dwyer  was at [facility] on 02/10/2022. She may return to work/school on 2/14/2022 with no restrictions. If you have any questions or concerns, or if I can be of further assistance, please do not hesitate to contact me.    Sincerely,    Nalini Rust MA

## 2022-02-11 ENCOUNTER — HOSPITAL ENCOUNTER (EMERGENCY)
Facility: HOSPITAL | Age: 52
Discharge: HOME | End: 2022-02-11
Attending: EMERGENCY MEDICINE
Payer: COMMERCIAL

## 2022-02-11 VITALS
RESPIRATION RATE: 17 BRPM | WEIGHT: 128.75 LBS | HEIGHT: 64 IN | DIASTOLIC BLOOD PRESSURE: 65 MMHG | OXYGEN SATURATION: 99 % | BODY MASS INDEX: 21.98 KG/M2 | HEART RATE: 63 BPM | TEMPERATURE: 98 F | SYSTOLIC BLOOD PRESSURE: 154 MMHG

## 2022-02-11 DIAGNOSIS — R07.9 CHEST PAIN: Primary | ICD-10-CM

## 2022-02-11 LAB
ALBUMIN SERPL BCP-MCNC: 3.5 G/DL (ref 3.5–5.2)
ALP SERPL-CCNC: 101 U/L (ref 55–135)
ALT SERPL W/O P-5'-P-CCNC: 14 U/L (ref 10–44)
ANION GAP SERPL CALC-SCNC: 7 MMOL/L (ref 8–16)
AST SERPL-CCNC: 14 U/L (ref 10–40)
BASOPHILS # BLD AUTO: 0.03 K/UL (ref 0–0.2)
BASOPHILS NFR BLD: 0.5 % (ref 0–1.9)
BILIRUB SERPL-MCNC: 0.7 MG/DL (ref 0.1–1)
BNP SERPL-MCNC: 14 PG/ML (ref 0–99)
BUN SERPL-MCNC: 10 MG/DL (ref 6–20)
CALCIUM SERPL-MCNC: 8.9 MG/DL (ref 8.7–10.5)
CHLORIDE SERPL-SCNC: 105 MMOL/L (ref 95–110)
CK SERPL-CCNC: 50 U/L (ref 20–180)
CO2 SERPL-SCNC: 28 MMOL/L (ref 23–29)
CREAT SERPL-MCNC: 0.6 MG/DL (ref 0.5–1.4)
DIFFERENTIAL METHOD: ABNORMAL
EOSINOPHIL # BLD AUTO: 0.1 K/UL (ref 0–0.5)
EOSINOPHIL NFR BLD: 1.6 % (ref 0–8)
ERYTHROCYTE [DISTWIDTH] IN BLOOD BY AUTOMATED COUNT: 11.8 % (ref 11.5–14.5)
EST. GFR  (AFRICAN AMERICAN): >60 ML/MIN/1.73 M^2
EST. GFR  (NON AFRICAN AMERICAN): >60 ML/MIN/1.73 M^2
GLUCOSE SERPL-MCNC: 119 MG/DL (ref 70–110)
HCT VFR BLD AUTO: 38.9 % (ref 37–48.5)
HCV AB SERPL QL IA: POSITIVE
HGB BLD-MCNC: 12 G/DL (ref 12–16)
HIV 1+2 AB+HIV1 P24 AG SERPL QL IA: NEGATIVE
IMM GRANULOCYTES # BLD AUTO: 0.01 K/UL (ref 0–0.04)
IMM GRANULOCYTES NFR BLD AUTO: 0.2 % (ref 0–0.5)
LYMPHOCYTES # BLD AUTO: 2.2 K/UL (ref 1–4.8)
LYMPHOCYTES NFR BLD: 34.4 % (ref 18–48)
MCH RBC QN AUTO: 28.5 PG (ref 27–31)
MCHC RBC AUTO-ENTMCNC: 30.8 G/DL (ref 32–36)
MCV RBC AUTO: 92 FL (ref 82–98)
MONOCYTES # BLD AUTO: 0.4 K/UL (ref 0.3–1)
MONOCYTES NFR BLD: 5.8 % (ref 4–15)
NEUTROPHILS # BLD AUTO: 3.6 K/UL (ref 1.8–7.7)
NEUTROPHILS NFR BLD: 57.5 % (ref 38–73)
NRBC BLD-RTO: 0 /100 WBC
PLATELET # BLD AUTO: 224 K/UL (ref 150–450)
PLATELET BLD QL SMEAR: ABNORMAL
PMV BLD AUTO: 10.1 FL (ref 9.2–12.9)
POTASSIUM SERPL-SCNC: 4 MMOL/L (ref 3.5–5.1)
PROT SERPL-MCNC: 6.7 G/DL (ref 6–8.4)
RBC # BLD AUTO: 4.21 M/UL (ref 4–5.4)
SODIUM SERPL-SCNC: 140 MMOL/L (ref 136–145)
TROPONIN I SERPL DL<=0.01 NG/ML-MCNC: <0.006 NG/ML (ref 0–0.03)
WBC # BLD AUTO: 6.25 K/UL (ref 3.9–12.7)

## 2022-02-11 PROCEDURE — 93005 ELECTROCARDIOGRAM TRACING: CPT

## 2022-02-11 PROCEDURE — 36415 COLL VENOUS BLD VENIPUNCTURE: CPT | Performed by: EMERGENCY MEDICINE

## 2022-02-11 PROCEDURE — 84484 ASSAY OF TROPONIN QUANT: CPT | Performed by: EMERGENCY MEDICINE

## 2022-02-11 PROCEDURE — 85025 COMPLETE CBC W/AUTO DIFF WBC: CPT | Performed by: EMERGENCY MEDICINE

## 2022-02-11 PROCEDURE — 80053 COMPREHEN METABOLIC PANEL: CPT | Performed by: EMERGENCY MEDICINE

## 2022-02-11 PROCEDURE — 93010 ELECTROCARDIOGRAM REPORT: CPT | Mod: ,,, | Performed by: INTERNAL MEDICINE

## 2022-02-11 PROCEDURE — 87522 HEPATITIS C REVRS TRNSCRPJ: CPT | Performed by: EMERGENCY MEDICINE

## 2022-02-11 PROCEDURE — 93010 EKG 12-LEAD: ICD-10-PCS | Mod: ,,, | Performed by: INTERNAL MEDICINE

## 2022-02-11 PROCEDURE — 99284 EMERGENCY DEPT VISIT MOD MDM: CPT | Mod: 25

## 2022-02-11 PROCEDURE — 82550 ASSAY OF CK (CPK): CPT | Performed by: EMERGENCY MEDICINE

## 2022-02-11 PROCEDURE — 87389 HIV-1 AG W/HIV-1&-2 AB AG IA: CPT | Performed by: EMERGENCY MEDICINE

## 2022-02-11 PROCEDURE — 83880 ASSAY OF NATRIURETIC PEPTIDE: CPT | Performed by: EMERGENCY MEDICINE

## 2022-02-11 PROCEDURE — 86803 HEPATITIS C AB TEST: CPT | Performed by: EMERGENCY MEDICINE

## 2022-02-11 RX ORDER — ACETAMINOPHEN 500 MG
1000 TABLET ORAL EVERY 6 HOURS PRN
COMMUNITY
End: 2022-12-29

## 2022-02-11 RX ORDER — METFORMIN HYDROCHLORIDE 500 MG/1
500 TABLET, EXTENDED RELEASE ORAL
COMMUNITY
End: 2022-02-24 | Stop reason: SINTOL

## 2022-02-11 NOTE — PHARMACY MED REC
"Admission Medication History     The home medication history was taken by Marco A Dahl.    You may go to "Admission" then "Reconcile Home Medications" tabs to review and/or act upon these items.      The home medication list has been updated by the Pharmacy department.    Please read ALL comments highlighted in yellow.    Please address this information as you see fit.     Feel free to contact us if you have any questions or require assistance.      The medications listed below were removed from the home medication list. Please reorder if appropriate:  Patient reports no longer taking the following medication(s):   MELOXICAM 15 MG TABLET (Therapy completed.)    Medications listed below were obtained from: Patient/family, Analytic software- [employer] and Medical records  (Not in a hospital admission)      Potential issues to be addressed PRIOR TO DISCHARGE: NONE      Marco A Dahl [facility] [phone]      Current Outpatient Medications on File Prior to Encounter   Medication Sig Dispense Refill Last Dose    acetaminophen (TYLENOL) 500 MG tablet Take 1,000 mg by mouth every 6 (six) hours as needed for Pain.   2/10/2022 at Unknown time    azelastine (ASTELIN) 137 mcg (0.1 %) nasal spray 1 spray (137 mcg total) by Nasal route 2 (two) times daily. 30 mL 1 2/10/2022 at Unknown time    insulin (LANTUS SOLOSTAR U-100 INSULIN) glargine 100 units/mL (3mL) SubQ pen Inject 20 Units into the skin every evening. (Patient taking differently: Inject 22 Units into the skin every evening.) 6 mL 11 2/9/2022    metFORMIN (GLUCOPHAGE-XR) 500 MG ER 24hr tablet Take 500 mg by mouth daily with breakfast.   2/10/2022 at Unknown time    pioglitazone (ACTOS) 15 MG tablet Take 1 tablet (15 mg total) by mouth once daily. 30 tablet 11 2/10/2022 at Unknown time    SITagliptin (JANUVIA) 100 MG Tab Take 1 tablet (100 mg total) by mouth once daily. 90 tablet 3 2/10/2022 at Unknown time    vitamin D 1000 units Tab Take 1,000 " "Units by mouth once daily.   2/10/2022 at Unknown time    alcohol swabs (EASY COMFORT ALCOHOL PAD) PadM Apply 1 each topically 4 (four) times daily. 100 each 11     blood sugar diagnostic Strp To check BG 4 times daily, to use with insurance preferred meter 120 each 11     lancets Misc 1 each by Misc.(Non-Drug; Combo Route) route 4 (four) times daily. 100 each 11     pen needle, diabetic (BD ULTRA-FINE KIYA PEN NEEDLE) 32 gauge x 5/32" Ndle 1 each by Misc.(Non-Drug; Combo Route) route 4 (four) times daily with meals and nightly. 100 each 3     [DISCONTINUED] meloxicam (MOBIC) 15 MG tablet Take 1 tablet (15 mg total) by mouth once daily. Take with meal. 14 tablet 0                                 .          "

## 2022-02-11 NOTE — PATIENT INSTRUCTIONS
PLAN: Lab work POCT rapid Covid 19 screen/   EKG  Advise increase p.o. fluids--water/juice & rest  Advise use of Flonase   Simply saline nasal wash to irrigate sinuses and for congestion/runny nose.  Cool mist humidifier/vaporizer.  Practice good handwashing.  Advise use of green tea with honey  Tylenol or Ibuprofen for fever, headache and body aches.  Warm salt water gargles for throat comfort.  Chloraseptic spray or lozenges for throat comfort.  Advise follow up with PCP in 2-3 days for recheck  Advise go to ER if symptoms worsen or fail to improve with treatment.  Instructions, follow up, and supportive care as per AVS.  AVS provided and reviewed with patient including supportive care, follow up, and red flag symptoms.   Patient verbalizes understanding and agrees with treatment plan. Discharged from Urgent Care in stable condition.  You have tested negative for COVID-19 today. If you did not have any close exposure as defined below, then effective today, you can return to your normal daily activities including social distancing, wearing masks, and frequent handwashing.

## 2022-02-11 NOTE — PROGRESS NOTES
"Subjective:       Patient ID: Chan Dwyer is a 51 y.o. female.    Vitals:  height is 5' 4" (1.626 m) and weight is 56.7 kg (125 lb). Her tympanic temperature is 98.2 °F (36.8 °C). Her blood pressure is 142/63 (abnormal) and her pulse is 79. Her respiration is 18 and oxygen saturation is 98%.     Chief Complaint: Chest Pain and Shortness of Breath    Pt presents today with Chest Pressure/ fullness. Pt states that starting at 4 am this morning she starting experieincing headaches, feeling like her head is spinning, disorientation. Pt states that she hadn't taken anything over the counter for her symptoms.     Chest Pain   This is a new problem. The current episode started today. The onset quality is sudden. The problem occurs constantly. The problem has been gradually worsening. The pain is at a severity of 8/10. The pain is moderate. The quality of the pain is described as heavy, pressure and tightness. Associated symptoms include dizziness, headaches and shortness of breath. Pertinent negatives include no abdominal pain, back pain, claudication, cough, diaphoresis, exertional chest pressure, fever, hemoptysis, irregular heartbeat, leg pain, lower extremity edema, malaise/fatigue, nausea, near-syncope, numbness, palpitations, PND, sputum production, syncope, vomiting or weakness. The pain is aggravated by nothing. The treatment provided no relief.   Shortness of Breath  Associated symptoms include chest pain and headaches. Pertinent negatives include no abdominal pain, claudication, fever, hemoptysis, leg pain, PND, sputum production, syncope or vomiting.       Constitution: Negative for sweating and fever.   Cardiovascular: Positive for chest pain. Negative for palpitations and passing out.   Respiratory: Positive for shortness of breath. Negative for cough, sputum production and bloody sputum.    Gastrointestinal: Negative for abdominal pain, nausea and vomiting.   Musculoskeletal: Negative for back " pain.   Neurological: Positive for dizziness and headaches. Negative for numbness.          Past Medical History:   Diagnosis Date    Abnormal Pap smear     Abnormal Pap smear of cervix     Arthritis, low back     Diabetes 9/2014     BS 162am 09/16/2014    DM (diabetes mellitus) 09/2014     am 01/26/2016    DM (diabetes mellitus) 09/2014    BS didn't check 01/31/2017    GERD (gastroesophageal reflux disease)     Gestational diabetes     History of abnormal Pap smear     Surgical menopause     Vitamin D deficiency          .  Past Surgical History:   Procedure Laterality Date    COLONOSCOPY N/A 6/22/2016    Procedure: COLONOSCOPY;  Surgeon: Vishal Mary III, MD;  [facility]: [facility] ENDO;  Service: Endoscopy;  Laterality: N/A;    Cryotherapy of the cervix  1999    DILATION AND CURETTAGE OF UTERUS      HYSTERECTOMY      [facility]  2/2012    TUBAL LIGATION           Social History     Socioeconomic History    Marital status: Single    Number of children: 2   Occupational History    Occupation:      Employer: [employer]      Employer: [employer]   Tobacco Use    Smoking status: Never Smoker    Smokeless tobacco: Never Used   Substance and Sexual Activity    Alcohol use: Yes     Alcohol/week: 0.0 standard drinks     Comment: rare    Drug use: No    Sexual activity: Yes     Partners: Male     Birth control/protection: Surgical   Social History Narrative    She wears seatbelt.       Family History   Problem Relation Age of Onset    Hypertension Mother     Diabetes Mother     Heart attack Mother     Diabetes Father     Hypertension Father     Stomach cancer Sister     No Known Problems Son     No Known Problems Son     Cancer Neg Hx     Stroke Neg Hx     Colon cancer Neg Hx     Ovarian cancer Neg Hx          ROS:  GENERAL: fever, chills with body aches  SKIN: No rashes, itching or changes in color or texture of skin.   HEENT:  Reports  runny nose, nasal  congestion, dizziness, sinus headache  NODES: No masses or lesions. Denies swollen glands.   CHEST: reports cough   CARDIOVASCULAR: chest pain, shortness of breath.  ABDOMEN: Appetite fine. No weight loss. Denies diarrhea, abdominal pain  MUSCULOSKELETAL: No joint stiffness or swelling. Denies back pain.  NEUROLOGIC: No history of seizures, paralysis, alteration of gait or coordination.  PSYCHIATRIC: Denies mood swings, depression or suicidal thoughts.    PE:   APPEARANCE: Well nourished, well developed, in mild distress.  TEMP 98.2°, PULSE OX 90%  V/S: Reviewed.  SKIN: Normal skin turgor, no lesions.  HEENT: Turbinates injected, TM's poor light reflex bilateral, no facial tenderness.  CHEST: Lungs clear to auscultation. No wheezing  CARDIOVASCULAR: Regular rate and rhythm.  MUSCULOSKELETAL:  Cervical region with minimal limited range of motion due to muscle pain, right anterior chest wall with tenderness to touch  NEUROLOGIC: No sensory deficits. Gait & Posture: Normal, No cerebellar signs.  MENTAL STATUS: Patient alert, oriented x 3 & conversant.    PLAN: Lab work POCT rapid Covid 19 screen/   EKG  Advise increase p.o. fluids--water/juice & rest  Advise use of Flonase   Simply saline nasal wash to irrigate sinuses and for congestion/runny nose.  Cool mist humidifier/vaporizer.  Practice good handwashing.  Advise use of green tea with honey  Tylenol or Ibuprofen for fever, headache and body aches.  Warm salt water gargles for throat comfort.  Chloraseptic spray or lozenges for throat comfort.  Advise follow up with PCP in 2-3 days for recheck  Advise go to ER if symptoms worsen or fail to improve with treatment.  Instructions, follow up, and supportive care as per AVS.  AVS provided and reviewed with patient including supportive care, follow up, and red flag symptoms.   Patient verbalizes understanding and agrees with treatment plan. Discharged from Urgent Care in stable condition.  You have tested negative for  COVID-19 today. If you did not have any close exposure as defined below, then effective today, you can return to your normal daily activities including social distancing, wearing masks, and frequent handwashing.    DIAGNOSIS:  Sinus headache  Chest wall pain  Flu like symptoms  Suspect COVID-19  Right shoulder pain

## 2022-02-11 NOTE — ED PROVIDER NOTES
SCRIBE #1 NOTE: IRaúl, felix scribing for, and in the presence of, Prem Bose MD. I have scribed the entire note.       History     Chief Complaint   Patient presents with    Shortness of Breath     Pt complaining of shortness of breath since yesterday, pt also complaining of headaches and stomach pain     Review of patient's allergies indicates:   Allergen Reactions    Hydrocodone Hives and Rash         History of Present Illness     HPI    2/11/2022, 8:33 AM  History obtained from the patient      History of Present Illness: Chan Dwyer is a 51 y.o. female patient with a PMHx of DM, GERD who presents to the Emergency Department for evaluation of shortness of breath which onset gradually yesterday PTA. Symptoms are constant and moderate in severity. No mitigating or exacerbating factors reported. Associated sxs include R-sided chest pain. Patient denies any n/v/d, fever, chills, and all other sxs at this time. No prior Tx reported. No further complaints or concerns at this time.       Arrival mode: Personal vehicle    PCP: Tristian Bearden MD        Past Medical History:  Past Medical History:   Diagnosis Date    Abnormal Pap smear     Abnormal Pap smear of cervix     Arthritis, low back     Diabetes 9/2014     BS 162am 09/16/2014    DM (diabetes mellitus) 09/2014     am 01/26/2016    DM (diabetes mellitus) 09/2014    BS didn't check 01/31/2017    GERD (gastroesophageal reflux disease)     Gestational diabetes     History of abnormal Pap smear     Surgical menopause     Vitamin D deficiency        Past Surgical History:  Past Surgical History:   Procedure Laterality Date    COLONOSCOPY N/A 6/22/2016    Procedure: COLONOSCOPY;  Surgeon: Vishal Mary III, MD;  [facility];  Service: Endoscopy;  Laterality: N/A;    Cryotherapy of the cervix  1999    DILATION AND CURETTAGE OF UTERUS      HYSTERECTOMY      [facility]  2/2012    TUBAL LIGATION            Family History:  Family History   Problem Relation Age of Onset    Hypertension Mother     Diabetes Mother     Heart attack Mother     Diabetes Father     Hypertension Father     Stomach cancer Sister     No Known Problems Son     No Known Problems Son     Cancer Neg Hx     Stroke Neg Hx     Colon cancer Neg Hx     Ovarian cancer Neg Hx        Social History:  Social History     Tobacco Use    Smoking status: Never Smoker    Smokeless tobacco: Never Used   Substance and Sexual Activity    Alcohol use: Yes     Alcohol/week: 0.0 standard drinks     Comment: rare    Drug use: No    Sexual activity: Yes     Partners: Male     Birth control/protection: Surgical        Review of Systems     Review of Systems   Constitutional: Negative for chills and fever.   HENT: Negative for sore throat.    Respiratory: Positive for shortness of breath.    Cardiovascular: Positive for chest pain (R-sided).   Gastrointestinal: Negative for diarrhea, nausea and vomiting.   Genitourinary: Negative for dysuria.   Musculoskeletal: Negative for back pain.   Skin: Negative for rash.   Neurological: Negative for weakness.   Hematological: Does not bruise/bleed easily.   All other systems reviewed and are negative.     Physical Exam     Initial Vitals [02/11/22 0826]   BP Pulse Resp Temp SpO2   (!) 145/68 70 20 98.2 °F (36.8 °C) 100 %      MAP       --          Physical Exam  Nursing Notes and Vital Signs Reviewed.  Constitutional: Patient is in no acute distress. Well-developed and well-nourished.  Head: Atraumatic. Normocephalic.  Eyes: PERRL. EOM intact. Conjunctivae are not pale. No scleral icterus.  ENT: Mucous membranes are moist. Oropharynx is clear and symmetric.    Neck: Supple. Full ROM. No lymphadenopathy.  Cardiovascular: Regular rate. Regular rhythm. No murmurs, rubs, or gallops. Distal pulses are 2+ and symmetric.  Pulmonary/Chest: No respiratory distress. Clear to auscultation bilaterally. No wheezing or  "rales.  Abdominal: Soft and non-distended.  There is no tenderness.  No rebound, guarding, or rigidity. Good bowel sounds.  Genitourinary: No CVA tenderness  Musculoskeletal: Moves all extremities. No obvious deformities. No edema. No calf tenderness.  Skin: Warm and dry.  Neurological:  Alert, awake, and appropriate.  Normal speech.  No acute focal neurological deficits are appreciated.  Psychiatric: Normal affect. Good eye contact. Appropriate in content.     ED Course   Procedures  ED Vital Signs:  Vitals:    02/11/22 0826 02/11/22 0844   BP: (!) 145/68 (!) 146/65   Pulse: 70 63   Resp: 20 19   Temp: 98.2 °F (36.8 °C)    TempSrc: Oral    SpO2: 100% 99%   Weight: 58.4 kg (128 lb 12 oz)    Height: 5' 4" (1.626 m)        Abnormal Lab Results:  Labs Reviewed   CBC W/ AUTO DIFFERENTIAL - Abnormal; Notable for the following components:       Result Value    MCHC 30.8 (*)     All other components within normal limits   COMPREHENSIVE METABOLIC PANEL - Abnormal; Notable for the following components:    Glucose 119 (*)     Anion Gap 7 (*)     All other components within normal limits    Narrative:     Release to patient->Immediate   B-TYPE NATRIURETIC PEPTIDE    Narrative:     Release to patient->Immediate   CK    Narrative:     Release to patient->Immediate   TROPONIN I    Narrative:     Release to patient->Immediate   URINALYSIS, REFLEX TO URINE CULTURE   HEPATITIS C ANTIBODY   HIV 1 / 2 ANTIBODY        All Lab Results:  Results for orders placed or performed during the hospital encounter of 02/11/22   CBC Auto Differential   Result Value Ref Range    WBC 6.25 3.90 - 12.70 K/uL    RBC 4.21 4.00 - 5.40 M/uL    Hemoglobin 12.0 12.0 - 16.0 g/dL    Hematocrit 38.9 37.0 - 48.5 %    MCV 92 82 - 98 fL    MCH 28.5 27.0 - 31.0 pg    MCHC 30.8 (L) 32.0 - 36.0 g/dL    RDW 11.8 11.5 - 14.5 %    Platelets 224 150 - 450 K/uL    MPV 10.1 9.2 - 12.9 fL    Immature Granulocytes 0.2 0.0 - 0.5 %    Gran # (ANC) 3.6 1.8 - 7.7 K/uL    " Immature Grans (Abs) 0.01 0.00 - 0.04 K/uL    Lymph # 2.2 1.0 - 4.8 K/uL    Mono # 0.4 0.3 - 1.0 K/uL    Eos # 0.1 0.0 - 0.5 K/uL    Baso # 0.03 0.00 - 0.20 K/uL    nRBC 0 0 /100 WBC    Gran % 57.5 38.0 - 73.0 %    Lymph % 34.4 18.0 - 48.0 %    Mono % 5.8 4.0 - 15.0 %    Eosinophil % 1.6 0.0 - 8.0 %    Basophil % 0.5 0.0 - 1.9 %    Platelet Estimate Appears normal     Differential Method Automated    Comprehensive metabolic panel   Result Value Ref Range    Sodium 140 136 - 145 mmol/L    Potassium 4.0 3.5 - 5.1 mmol/L    Chloride 105 95 - 110 mmol/L    CO2 28 23 - 29 mmol/L    Glucose 119 (H) 70 - 110 mg/dL    BUN 10 6 - 20 mg/dL    Creatinine 0.6 0.5 - 1.4 mg/dL    Calcium 8.9 8.7 - 10.5 mg/dL    Total Protein 6.7 6.0 - 8.4 g/dL    Albumin 3.5 3.5 - 5.2 g/dL    Total Bilirubin 0.7 0.1 - 1.0 mg/dL    Alkaline Phosphatase 101 55 - 135 U/L    AST 14 10 - 40 U/L    ALT 14 10 - 44 U/L    Anion Gap 7 (L) 8 - 16 mmol/L    eGFR if African American >60 >60 mL/min/1.73 m^2    eGFR if non African American >60 >60 mL/min/1.73 m^2   Brain natriuretic peptide   Result Value Ref Range    BNP 14 0 - 99 pg/mL   CPK   Result Value Ref Range    CPK 50 20 - 180 U/L   Troponin I   Result Value Ref Range    Troponin I <0.006 0.000 - 0.026 ng/mL         Imaging Results:  Imaging Results          X-Ray Chest AP Portable (Final result)  Result time 02/11/22 08:53:25    Final result by Darius Cai MD (02/11/22 08:53:25)                 Impression:      No acute process seen.      Electronically signed by: Darius Cai MD  Date:    02/11/2022  Time:    08:53             Narrative:    EXAMINATION:  XR CHEST AP PORTABLE    CLINICAL HISTORY:  chest pain;    FINDINGS:  Single view of the chest.    No comparison    Cardiac silhouette is normal.  The lungs demonstrate no evidence of active disease.  No evidence of pleural effusion or pneumothorax.  Bones appear intact.  Mild scattered degenerative change.                                 The  EKG was ordered, reviewed, and independently interpreted by the ED provider.  Interpretation time: 0834  Rate: 58 BPM  Rhythm: sinus bradycardia  Interpretation: Nonspecific T wave abnormality. Abnormal EKG. No STEMI.             The Emergency Provider reviewed the vital signs and test results, which are outlined above.     ED Discussion       10:54 AM: Reassessed pt at this time.  Discussed with pt all pertinent ED information and results. Discussed pt dx and plan of tx. Gave pt all f/u and return to the ED instructions. All questions and concerns were addressed at this time. Pt expresses understanding of information and instructions, and is comfortable with plan to discharge. Pt is stable for discharge.    I discussed with patient and/or family/caretaker that evaluation in the ED does not suggest any emergent or life threatening medical conditions requiring immediate intervention beyond what was provided in the ED, and I believe patient is safe for discharge.  Regardless, an unremarkable evaluation in the ED does not preclude the development or presence of a serious of life threatening condition. As such, patient was instructed to return immediately for any worsening or change in current symptoms.         Medical Decision Making:   Clinical Tests:   Lab Tests: Ordered and Reviewed  Radiological Study: Ordered and Reviewed  Medical Tests: Ordered and Reviewed           ED Medication(s):  Medications - No data to display    New Prescriptions    No medications on file        Follow-up Information     Tristian Bearden MD.    Specialty: Internal Medicine  Contact information:  [address] [facility] [address]  [phone]                             Scribe Attestation:   Scribe #1: I performed the above scribed service and the documentation accurately describes the services I performed. I attest to the accuracy of the note.     Attending:   Physician Attestation Statement for Scribe #1: IPrem,  MD, personally performed the services described in this documentation, as scribed by Raúl Chi, in my presence, and it is both accurate and complete.           Clinical Impression       ICD-10-CM ICD-9-CM   1. Chest pain  R07.9 786.50       Disposition:   Disposition: Discharged  Condition: Stable         Prem Bose MD  02/11/22 1109

## 2022-02-14 ENCOUNTER — OFFICE VISIT (OUTPATIENT)
Dept: CARDIOLOGY | Facility: CLINIC | Age: 52
End: 2022-02-14
Payer: COMMERCIAL

## 2022-02-14 ENCOUNTER — CLINICAL SUPPORT (OUTPATIENT)
Dept: REHABILITATION | Facility: HOSPITAL | Age: 52
End: 2022-02-14
Payer: COMMERCIAL

## 2022-02-14 ENCOUNTER — OFFICE VISIT (OUTPATIENT)
Dept: INTERNAL MEDICINE | Facility: CLINIC | Age: 52
End: 2022-02-14
Payer: COMMERCIAL

## 2022-02-14 VITALS
WEIGHT: 129 LBS | DIASTOLIC BLOOD PRESSURE: 80 MMHG | SYSTOLIC BLOOD PRESSURE: 130 MMHG | HEART RATE: 67 BPM | OXYGEN SATURATION: 99 % | BODY MASS INDEX: 22.02 KG/M2 | HEIGHT: 64 IN

## 2022-02-14 VITALS
TEMPERATURE: 98 F | DIASTOLIC BLOOD PRESSURE: 61 MMHG | HEIGHT: 64 IN | WEIGHT: 127.88 LBS | SYSTOLIC BLOOD PRESSURE: 138 MMHG | HEART RATE: 62 BPM | BODY MASS INDEX: 21.83 KG/M2 | OXYGEN SATURATION: 100 %

## 2022-02-14 DIAGNOSIS — K21.00 GASTROESOPHAGEAL REFLUX DISEASE WITH ESOPHAGITIS WITHOUT HEMORRHAGE: ICD-10-CM

## 2022-02-14 DIAGNOSIS — M25.60 DECREASED RANGE OF MOTION: ICD-10-CM

## 2022-02-14 DIAGNOSIS — G89.29 CHRONIC RIGHT SHOULDER PAIN: ICD-10-CM

## 2022-02-14 DIAGNOSIS — R07.9 CHEST PAIN, UNSPECIFIED TYPE: Primary | ICD-10-CM

## 2022-02-14 DIAGNOSIS — R06.09 DOE (DYSPNEA ON EXERTION): ICD-10-CM

## 2022-02-14 DIAGNOSIS — M25.511 CHRONIC RIGHT SHOULDER PAIN: ICD-10-CM

## 2022-02-14 DIAGNOSIS — E11.9 CONTROLLED TYPE 2 DIABETES MELLITUS WITHOUT COMPLICATION, WITHOUT LONG-TERM CURRENT USE OF INSULIN: Primary | ICD-10-CM

## 2022-02-14 DIAGNOSIS — Z78.9 SELF-CARE DEFICIT: ICD-10-CM

## 2022-02-14 DIAGNOSIS — R07.9 CHEST PAIN, MODERATE CORONARY ARTERY RISK: ICD-10-CM

## 2022-02-14 DIAGNOSIS — R07.9 CHEST PAIN, UNSPECIFIED TYPE: ICD-10-CM

## 2022-02-14 DIAGNOSIS — R76.8 HEPATITIS C ANTIBODY POSITIVE IN BLOOD: ICD-10-CM

## 2022-02-14 DIAGNOSIS — M25.611 SHOULDER STIFFNESS, RIGHT: ICD-10-CM

## 2022-02-14 PROCEDURE — 3051F HG A1C>EQUAL 7.0%<8.0%: CPT | Mod: CPTII,S$GLB,, | Performed by: FAMILY MEDICINE

## 2022-02-14 PROCEDURE — 1159F PR MEDICATION LIST DOCUMENTED IN MEDICAL RECORD: ICD-10-PCS | Mod: CPTII,S$GLB,, | Performed by: FAMILY MEDICINE

## 2022-02-14 PROCEDURE — 97140 MANUAL THERAPY 1/> REGIONS: CPT

## 2022-02-14 PROCEDURE — 3078F DIAST BP <80 MM HG: CPT | Mod: CPTII,S$GLB,, | Performed by: FAMILY MEDICINE

## 2022-02-14 PROCEDURE — 3075F PR MOST RECENT SYSTOLIC BLOOD PRESS GE 130-139MM HG: ICD-10-PCS | Mod: CPTII,S$GLB,, | Performed by: FAMILY MEDICINE

## 2022-02-14 PROCEDURE — 99205 PR OFFICE/OUTPT VISIT, NEW, LEVL V, 60-74 MIN: ICD-10-PCS | Mod: S$GLB,,, | Performed by: INTERNAL MEDICINE

## 2022-02-14 PROCEDURE — 3072F PR LOW RISK FOR RETINOPATHY: ICD-10-PCS | Mod: CPTII,S$GLB,, | Performed by: FAMILY MEDICINE

## 2022-02-14 PROCEDURE — 99999 PR PBB SHADOW E&M-EST. PATIENT-LVL V: ICD-10-PCS | Mod: PBBFAC,,, | Performed by: FAMILY MEDICINE

## 2022-02-14 PROCEDURE — 99205 OFFICE O/P NEW HI 60 MIN: CPT | Mod: S$GLB,,, | Performed by: INTERNAL MEDICINE

## 2022-02-14 PROCEDURE — 3072F LOW RISK FOR RETINOPATHY: CPT | Mod: CPTII,S$GLB,, | Performed by: FAMILY MEDICINE

## 2022-02-14 PROCEDURE — 3075F SYST BP GE 130 - 139MM HG: CPT | Mod: CPTII,S$GLB,, | Performed by: FAMILY MEDICINE

## 2022-02-14 PROCEDURE — 99213 PR OFFICE/OUTPT VISIT, EST, LEVL III, 20-29 MIN: ICD-10-PCS | Mod: S$GLB,,, | Performed by: FAMILY MEDICINE

## 2022-02-14 PROCEDURE — 99999 PR PBB SHADOW E&M-EST. PATIENT-LVL V: CPT | Mod: PBBFAC,,, | Performed by: FAMILY MEDICINE

## 2022-02-14 PROCEDURE — 3078F PR MOST RECENT DIASTOLIC BLOOD PRESSURE < 80 MM HG: ICD-10-PCS | Mod: CPTII,S$GLB,, | Performed by: FAMILY MEDICINE

## 2022-02-14 PROCEDURE — 99213 OFFICE O/P EST LOW 20 MIN: CPT | Mod: S$GLB,,, | Performed by: FAMILY MEDICINE

## 2022-02-14 PROCEDURE — 1159F MED LIST DOCD IN RCRD: CPT | Mod: CPTII,S$GLB,, | Performed by: FAMILY MEDICINE

## 2022-02-14 PROCEDURE — 99999 PR PBB SHADOW E&M-EST. PATIENT-LVL III: ICD-10-PCS | Mod: PBBFAC,,, | Performed by: INTERNAL MEDICINE

## 2022-02-14 PROCEDURE — 3008F BODY MASS INDEX DOCD: CPT | Mod: CPTII,S$GLB,, | Performed by: FAMILY MEDICINE

## 2022-02-14 PROCEDURE — 97110 THERAPEUTIC EXERCISES: CPT | Performed by: OCCUPATIONAL THERAPIST

## 2022-02-14 PROCEDURE — 3051F PR MOST RECENT HEMOGLOBIN A1C LEVEL 7.0 - < 8.0%: ICD-10-PCS | Mod: CPTII,S$GLB,, | Performed by: FAMILY MEDICINE

## 2022-02-14 PROCEDURE — 99999 PR PBB SHADOW E&M-EST. PATIENT-LVL III: CPT | Mod: PBBFAC,,, | Performed by: INTERNAL MEDICINE

## 2022-02-14 PROCEDURE — 97110 THERAPEUTIC EXERCISES: CPT

## 2022-02-14 PROCEDURE — 3008F PR BODY MASS INDEX (BMI) DOCUMENTED: ICD-10-PCS | Mod: CPTII,S$GLB,, | Performed by: FAMILY MEDICINE

## 2022-02-14 RX ORDER — ASPIRIN 81 MG/1
81 TABLET ORAL DAILY
Qty: 30 TABLET | Refills: 11
Start: 2022-02-14 | End: 2024-01-30

## 2022-02-14 NOTE — PROGRESS NOTES
Subjective:      Patient ID: Chan Dwyer is a 51 y.o. female.    Chief Complaint: Hospital Follow Up      Patient here today for ER visit follow-up.  Had gone to urgent care on Thursday then ER on Friday, was having substernal chest pain with exertion, also shortness of breath with exertion.  Reports over the weekend still having symptoms, has never had cardiac workup.  Cardiac workup in ER was negative, however hep C antibody was positive, confirmatory test is still pending.  Patient has never been told she had hepatitis C in the past.    Review of Systems   Constitutional: Negative for fatigue and fever.   Respiratory: Positive for shortness of breath.    Cardiovascular: Positive for chest pain.   Gastrointestinal: Negative for abdominal pain.     Past Medical History:   Diagnosis Date    Abnormal Pap smear     Abnormal Pap smear of cervix     Arthritis, low back     Diabetes 9/2014     BS 162am 09/16/2014    DM (diabetes mellitus) 09/2014     am 01/26/2016    DM (diabetes mellitus) 09/2014    BS didn't check 01/31/2017    GERD (gastroesophageal reflux disease)     Gestational diabetes     Hepatitis C antibody positive in blood 02/11/2022    History of abnormal Pap smear     Surgical menopause     Vitamin D deficiency           Past Surgical History:   Procedure Laterality Date    COLONOSCOPY N/A 6/22/2016    Procedure: COLONOSCOPY;  Surgeon: Vishal Mary III, MD;  Location: [facility];  Service: Endoscopy;  Laterality: N/A;    Cryotherapy of the cervix  1999    DILATION AND CURETTAGE OF UTERUS      HYSTERECTOMY      [facility]  2/2012    TUBAL LIGATION       Family History   Problem Relation Age of Onset    Hypertension Mother     Diabetes Mother     Heart attack Mother     Diabetes Father     Hypertension Father     Stomach cancer Sister     No Known Problems Son     No Known Problems Son     Cancer Neg Hx     Stroke Neg Hx     Colon cancer Neg Hx     Ovarian cancer  "Neg Hx      Social History     Socioeconomic History    Marital status: Single    Number of children: 2   Occupational History    Occupation:      Employer: [employer]     Employer: [employer]   Tobacco Use    Smoking status: Never Smoker    Smokeless tobacco: Never Used   Substance and Sexual Activity    Alcohol use: Yes     Alcohol/week: 0.0 standard drinks     Comment: rare    Drug use: No    Sexual activity: Yes     Partners: Male     Birth control/protection: Surgical   Social History Narrative    She wears seatbelt.     Review of patient's allergies indicates:   Allergen Reactions    Hydrocodone Hives and Rash       Objective:       /61 (BP Location: Left arm, Patient Position: Sitting, BP Method: Large (Automatic))   Pulse 62   Temp 98.2 °F (36.8 °C) (Tympanic)   Ht 5' 4" (1.626 m)   Wt 58 kg (127 lb 13.9 oz)   SpO2 100%   BMI 21.95 kg/m²   Physical Exam  Constitutional:       General: She is not in acute distress.     Appearance: Normal appearance. She is well-developed. She is not ill-appearing or diaphoretic.   Cardiovascular:      Rate and Rhythm: Normal rate and regular rhythm.      Heart sounds: Normal heart sounds.   Pulmonary:      Effort: Pulmonary effort is normal.      Breath sounds: Normal breath sounds.   Neurological:      Mental Status: She is alert and oriented to person, place, and time.   Psychiatric:         Mood and Affect: Mood normal.         Behavior: Behavior normal.         Thought Content: Thought content normal.         Judgment: Judgment normal.       Assessment:     1. Chest pain, unspecified type    2. GONZALES (dyspnea on exertion)    3. Hepatitis C antibody positive in blood      Plan:   Chest pain, unspecified type  -     Ambulatory referral/consult to Cardiology; Future; Expected date: 02/21/2022    GONZALES (dyspnea on exertion)  -     Ambulatory referral/consult to Cardiology; Future; Expected date: 02/21/2022    Hepatitis C antibody " "positive in blood    Discussed with patient that I think she needs full cardiac workup, we will get her scheduled with Dr. Lambert per her request as he is her 's cardiologist.  Will follow hep C viral load test still pending  Medication List with Changes/Refills   Current Medications    ACETAMINOPHEN (TYLENOL) 500 MG TABLET    Take 1,000 mg by mouth every 6 (six) hours as needed for Pain.    ALCOHOL SWABS (EASY COMFORT ALCOHOL PAD) PADM    Apply 1 each topically 4 (four) times daily.    AZELASTINE (ASTELIN) 137 MCG (0.1 %) NASAL SPRAY    1 spray (137 mcg total) by Nasal route 2 (two) times daily.    BLOOD SUGAR DIAGNOSTIC STRP    To check BG 4 times daily, to use with insurance preferred meter    INSULIN (LANTUS SOLOSTAR U-100 INSULIN) GLARGINE 100 UNITS/ML (3ML) SUBQ PEN    Inject 20 Units into the skin every evening.    LANCETS MISC    1 each by Misc.(Non-Drug; Combo Route) route 4 (four) times daily.    METFORMIN (GLUCOPHAGE-XR) 500 MG ER 24HR TABLET    Take 500 mg by mouth daily with breakfast.    PEN NEEDLE, DIABETIC (BD ULTRA-FINE KIYA PEN NEEDLE) 32 GAUGE X 5/32" NDLE    1 each by Misc.(Non-Drug; Combo Route) route 4 (four) times daily with meals and nightly.    PIOGLITAZONE (ACTOS) 15 MG TABLET    Take 1 tablet (15 mg total) by mouth once daily.    SITAGLIPTIN (JANUVIA) 100 MG TAB    Take 1 tablet (100 mg total) by mouth once daily.    VITAMIN D 1000 UNITS TAB    Take 1,000 Units by mouth once daily.       "

## 2022-02-14 NOTE — PROGRESS NOTES
OCCUPATIONAL THERAPY DAILY NOTE     Name: Chan Gorman [facility] Number: [id number]     Therapy Diagnosis:        Encounter Diagnoses   Name Primary?    Decreased range of motion      Self-care deficit        Physician: Meme Enciso NP     Visit Date: 2/4/2022       Physician: Meme Enciso NP     Physician Orders: Evaluation and treatment   Medical Diagnosis: M65.341 (ICD-10-CM) - Trigger ring finger of right hand(left)  Surgical Procedure and Date: NA,   Evaluation Date: 1/24/2022  Insurance Authorization Period Expiration: 1/13/2023  Plan of Care Certification Period: 2/24/2022  Progress Note Due: 2/24/2022   Date of Return to MD: None scheduled  Visit # / Visits authorized: 3/ 20  FOTO: 1/3     Precautions:  Diabetes     Time In:12:50 pm  Time Out: 1:20 pm  Total Appointment Time (timed & untimed codes): 30 minutes        SUBJECTIVE      Today, pt reports: that her left hand pain is worst at night. She used ice on her hand but like the heat better.She is seeing  for an injection.    He/She was compliant with home exercise program.  Response to previous treatment: No change  Functional change: No change     Pre-Treatment Pain: 7/10  Post-Treatment Pain: 7/10  Location: A-1 pulley of the ring finger left hand  TREATMENT      Chan received therapeutic exercises to develop ROM and flexibility for 20 minutes including:     Exercise 2/4/2022   Therapeutic exercises to develop ROM and flexibility for 5 minutes, including:  Ring finger extension stretches: 6/10 second holds  Passive range of motion of digit 2-5 at the proximal interphalangeal joint and distal interphalangeal joint's.  Distal interphalangeal joint blocking: 10x                                                     Chan received the following manual therapy techniques: Soft tissue Mobilization were applied to the: left hand for 5 minutes, including:  STM /IASMT left palm     Chan received the following  supervised modalities after being cleared for contradictions:      Chan received hot pack for 5 minutes to left hand.     Chan received cold pack for 0 minutes .        Home Exercises Provided and Patient Education Provided   Education/Self-Care provided: (1 minutes)  · Patient educated on biomechanical justification for therapeutic exercise and importance of compliance with HEP in order to improve overall impairments and QOL   · Patient educated on postural awareness. Not today   · Patient educated on proper ergonomics at the work station in order to maintain optimal alignment of the musculoskeletal system and improve efficiency in the work environment. Not today   · Reviewed precautions for oval 8 brace and donning instructions.     Written Home Exercises Provided: Patient instructed to cont prior HEP.  Exercises were reviewed and Chan was able to demonstrate them prior to the end of the session.  Chan demonstrated good  understanding of the education provided.      See EMR under Patient Instructions for exercises provided 1/24/2022.     ASSESSMENT   Pt did not tolerate manual therapy well. Pt tolerated exercise well with reports of increased fatigue but no increased pain. Pt demonstrated good understanding of exercises and required minimal cueing to maintain proper form.  Patient does not appear to be responding to treatment. Recommended that she try to wear her splint at night and avoid locking. She may benefit from another brace to block MP flexion. Recommendations were made on where to purchase one online and orderd some for her next visit.     Chan Is not progressing well towards her goals.   Pt prognosis is Good.      Pt will continue to benefit from skilled outpatient occupational therapy to address the deficits listed in the problem list box on initial evaluation, provide pt/family education and to maximize pt's level of independence in the home and community environment.      Pt's  spiritual, cultural and educational needs considered and pt agreeable to plan of care and goals.     Anticipated barriers to occupational therapy: bilateral upper extremity weakness     GOALS:     Short Term Goals:  2 weeks     1. Pain: Pt will demonstrate improved pain by reports of less than or equal to 1/10 worst pain on the verbal rating scale in order to progress toward maximal functional ability and improve QOL.     2. Mobility: Patient will improve AROM to 50% of stated goals, listed in objective measures above, in order to progress towards independence with functional activities.      3. Strength: Patient will improve strength to 50% of stated goals, listed in objective measures above, in order to progress towards independence with functional activities.      4. Self Care: Patient will demonstrate improved self care skills listed in objective measure above,in order to progress towards independence with functional activities.      5. HEP: Patient will demonstrate independence with HEP in order to progress toward functional independence.        Long Term Goals:  4 weeks     1. Pain: Pt will demonstrate improved pain by reports of less than or equal to 0/10 worst pain on the verbal rating scale in order to progress toward maximal functional ability and improve QOL.       2. Function: Patient will demonstrate improved function as indicated by a functional limitation score of less than or equal to 43 out of 100 on FOTO.     3. Mobility: Patient will improve AROM to stated goals, listed in objective measures above, in order to return to maximal functional potential and improve quality of life.     4. Strength: Patient will improve strength to stated goals, listed in objective measures above, in order to improve functional independence and quality of life.     5. Self Care: Patient will demonstrate increased self care skills to an independent level or modified independent level with adaptive equipment as needed.      6. Patient will return to normal ADL's, IADL's, community involvement, recreational activities, and work-related activities with less than or equal to 0/10 pain and maximal function.               PLAN   Continue Plan of Care (POC) and progress per patient tolerance.     Nisa Hayes, OTR, LOTR

## 2022-02-14 NOTE — PROGRESS NOTES
Subjective:   Patient ID:  Chan Dwyer is a 51 y.o. female who presents for follow-up of No chief complaint on file.  Pt with CP presenting to ER. W/u apparently negative.  CP at rest, constant, sharp, R sided. Associated sx SOB,   BP apparently ok.EKG wnl    CRF- DM, family hx of CAD    Chest Pain   This is a new problem. The current episode started 1 to 4 weeks ago. The problem occurs intermittently. The problem has been waxing and waning. The pain is present in the lateral region. The pain is mild. The quality of the pain is described as dull. The pain does not radiate. Associated symptoms include shortness of breath. Pertinent negatives include no dizziness or palpitations. The pain is aggravated by exertion. She has tried rest for the symptoms. The treatment provided mild relief.   Her past medical history is significant for diabetes.   Pertinent negatives for past medical history include no muscle weakness.       Review of Systems   Constitutional: Negative. Negative for weight gain.   HENT: Negative.    Eyes: Negative.    Cardiovascular: Positive for chest pain. Negative for leg swelling and palpitations.   Respiratory: Positive for shortness of breath.    Endocrine: Negative.    Hematologic/Lymphatic: Negative.    Skin: Negative.    Musculoskeletal: Negative for muscle weakness.   Gastrointestinal: Negative.    Genitourinary: Negative.    Neurological: Negative.  Negative for dizziness.   Psychiatric/Behavioral: Negative.    Allergic/Immunologic: Negative.      Family History   Problem Relation Age of Onset    Hypertension Mother     Diabetes Mother     Heart attack Mother     Diabetes Father     Hypertension Father     Stomach cancer Sister     No Known Problems Son     No Known Problems Son     Cancer Neg Hx     Stroke Neg Hx     Colon cancer Neg Hx     Ovarian cancer Neg Hx      Past Medical History:   Diagnosis Date    Abnormal Pap smear     Abnormal Pap smear of cervix      Arthritis, low back     Diabetes 9/2014     BS 162am 09/16/2014    DM (diabetes mellitus) 09/2014     am 01/26/2016    DM (diabetes mellitus) 09/2014    BS didn't check 01/31/2017    GERD (gastroesophageal reflux disease)     Gestational diabetes     Hepatitis C antibody positive in blood 02/11/2022    History of abnormal Pap smear     Surgical menopause     Vitamin D deficiency      Social History     Socioeconomic History    Marital status: Single    Number of children: 2   Occupational History    Occupation:      Employer: [employer]     Employer: [employer]   Tobacco Use    Smoking status: Never Smoker    Smokeless tobacco: Never Used   Substance and Sexual Activity    Alcohol use: Yes     Alcohol/week: 0.0 standard drinks     Comment: rare    Drug use: No    Sexual activity: Yes     Partners: Male     Birth control/protection: Surgical   Social History Narrative    She wears seatbelt.     Current Outpatient Medications on File Prior to Visit   Medication Sig Dispense Refill    acetaminophen (TYLENOL) 500 MG tablet Take 1,000 mg by mouth every 6 (six) hours as needed for Pain.      alcohol swabs (EASY COMFORT ALCOHOL PAD) PadM Apply 1 each topically 4 (four) times daily. 100 each 11    azelastine (ASTELIN) 137 mcg (0.1 %) nasal spray 1 spray (137 mcg total) by Nasal route 2 (two) times daily. 30 mL 1    blood sugar diagnostic Strp To check BG 4 times daily, to use with insurance preferred meter 120 each 11    insulin (LANTUS SOLOSTAR U-100 INSULIN) glargine 100 units/mL (3mL) SubQ pen Inject 20 Units into the skin every evening. (Patient taking differently: Inject 22 Units into the skin every evening.) 6 mL 11    lancets Misc 1 each by Misc.(Non-Drug; Combo Route) route 4 (four) times daily. 100 each 11    metFORMIN (GLUCOPHAGE-XR) 500 MG ER 24hr tablet Take 500 mg by mouth daily with breakfast.      pen needle, diabetic (BD ULTRA-FINE KIYA PEN NEEDLE)  "32 gauge x 5/32" Ndle 1 each by Misc.(Non-Drug; Combo Route) route 4 (four) times daily with meals and nightly. 100 each 3    pioglitazone (ACTOS) 15 MG tablet Take 1 tablet (15 mg total) by mouth once daily. 30 tablet 11    SITagliptin (JANUVIA) 100 MG Tab Take 1 tablet (100 mg total) by mouth once daily. 90 tablet 3    vitamin D 1000 units Tab Take 1,000 Units by mouth once daily.       No current facility-administered medications on file prior to visit.     Review of patient's allergies indicates:   Allergen Reactions    Hydrocodone Hives and Rash       Objective:     Physical Exam  Vitals and nursing note reviewed.   Constitutional:       Appearance: She is well-developed.   HENT:      Head: Normocephalic and atraumatic.   Eyes:      Conjunctiva/sclera: Conjunctivae normal.      Pupils: Pupils are equal, round, and reactive to light.   Cardiovascular:      Rate and Rhythm: Normal rate and regular rhythm.      Pulses: Intact distal pulses.      Heart sounds: Normal heart sounds.   Pulmonary:      Effort: Pulmonary effort is normal.      Breath sounds: Normal breath sounds.   Abdominal:      General: Bowel sounds are normal.      Palpations: Abdomen is soft.   Musculoskeletal:         General: Normal range of motion.      Cervical back: Normal range of motion and neck supple.   Skin:     General: Skin is warm and dry.   Neurological:      Mental Status: She is alert and oriented to person, place, and time.         Assessment:     1. Controlled type 2 diabetes mellitus without complication, without long-term current use of insulin    2. Chest pain, unspecified type    3. GONZALES (dyspnea on exertion)    4. Gastroesophageal reflux disease with esophagitis without hemorrhage    5. Chest pain, moderate coronary artery risk        Plan:     Controlled type 2 diabetes mellitus without complication, without long-term current use of insulin    Chest pain, unspecified type  -     Ambulatory referral/consult to " Cardiology    GONZALES (dyspnea on exertion)  -     Ambulatory referral/consult to Cardiology    Gastroesophageal reflux disease with esophagitis without hemorrhage    Chest pain, moderate coronary artery risk      Asa- primary prevention  Echo, stress test

## 2022-02-14 NOTE — LETTER
February 14, 2022      [facility]  [address] [address]  Phone: [phone]  Fax: [phone]       Patient: Chan Dwyer   YOB: 1970  Date of Visit: 02/14/2022    To Whom It May Concern:    Antonio Dwyer  was at [facility] on 02/14/2022. The patient may return to work/school on 2/15/2022 with no restrictions. If you have any questions or concerns, or if I can be of further assistance, please do not hesitate to contact me.    Sincerely,      Jean-Pierre Yanez LPN

## 2022-02-14 NOTE — PROGRESS NOTES
[facility]   Physical Therapy Treatment Note     Name: Chan Gorman [facility]  Clinic Number: [id number]    Therapy Diagnosis:   Encounter Diagnoses   Name Primary?    Chronic right shoulder pain     Shoulder stiffness, right      Physician: Meme Enciso NP    Visit Date: 2/14/2022    Physician Orders: PT Eval and Treat  Medical Diagnosis from Referral: M75.80 (ICD-10-CM) - Subscapularis tendinitis, unspecified laterality  Evaluation Date: 11/8/2021  Authorization Period Expiration: 12/31/21  Plan of Care Expiration: 2/23/22  Progress Note Due: 03/11/2022  Visit # / Visits authorized: 7/20 (+1 Evaluation) - Episode Visits: 13  FOTO: 3/3      Precautions: Standard and Diabetes    PTA Visit #: 0/5     Time In: 11:56 am  Time Out: 12:40 pm  Total Billable Time: 37 minutes    SUBJECTIVE     Patient reports: no adverse reaction to last therapy session - she reports that she is still having trouble with reaching overhead    He/She was compliant with home exercise program.  Response to previous treatment: no adverse effects noted.  Functional change: She is able to get dressed better and drive better.    Pain: 0/10     Location: right shoulder    OBJECTIVE     Objective Measures updated at progress report unless specified.     TREATMENT     Chan received the treatments listed below:     MANUAL THERAPY TECHNIQUES including Joint mobilizations were applied to right shoulder for 8 minutes.    Manual Intervention Performed Today    Soft Tissue Mobilization x Subscapularis, latissimus dorsi, scapular tilting, upper trapezial, levator scapula, pectoral major and minor, posterior deltoid muscle.  Scapular releases   Joint Mobilizations x right GH mobilization - inferior glide/posterior glide grade 1-2      Plan for Next Visit: prn       THERAPEUTIC EXERCISES to develop  strength, endurance, ROM, flexibility, posture and core stabilization for 29 minutes including assessment, patient  "education, and the following:    Intervention    2/14/2022 Sets/Reps/Resistance   UBE  3' forward/ 3' backward   Lacrosse Ball mobilization + shoulder flexion x 30x   Standing Shoulder Flexion x 3x10 - 1#   Standing Shoulder internal rotation stretch strap   10x10"   Standing Shoulder Abduction x 3x10 - 1#   side lying external rotation  x 3x12 - 1#   Wall Slides - Flexion x 20x - lean into stretch   Wall Slides - Abduction  x 20x   theraband rows x 3x10 - 20#    theraband extensions x 3x10 - 20#    Corner stretch x 3x30"   theraband external rotation  x Yellow Theraband - 3x10    Serratus wall slide + foam roller x 3x12   sidelying flexion   10x   Open books  5x each     Plan for Next Visit: continue to progress shoulder strength and range of motion        PATIENT EDUCATION AND HOME EXERCISES     Home Exercises Provided and Patient Education Provided     Education provided: (included in therex) minutes  -importance of maintaining ROM and scapular mobility, pain will decrease as ROM improves.    Written Home Exercises Provided: Patient instructed to cont prior HEP. Exercises were reviewed and Chan was able to demonstrate them prior to the end of the session.  Chan demonstrated good  understanding of the education provided. See EMR under Patient Instructions for exercises provided during therapy sessions      ASSESSMENT     Patient tolerated the addition of all exercises with reports of only muscle fatigue. Addition of serratus wall slides in order to promote motor control of the scapula with dynamic movements. Patient continues to demonstrate improvements in motor control but her gross upper extremity strength is limited. Continue to progress plan of care with an emphasis on rotator cuff strengthening an rotator cuff endurance.    Chan is progressing well towards her goals.   Pt prognosis is Good.     Pt will continue to benefit from skilled outpatient physical therapy to address the deficits listed " in the problem list box on initial evaluation, provide pt/family education and to maximize pt's level of independence in the home and community environment.     Pt's spiritual, cultural and educational needs considered and pt agreeable to plan of care and goals.     Anticipated Barriers for therapy: co-morbidities      Reviewed: 2/14/2022         SHORT TERM GOALS:  4 weeks     1. Recent signs and systems trend is improving in order to progress towards LTG's. MET, 12/29/21   2. Patient will be independent with HEP in order to further progress and return to maximal function. PC, 12/29/21     3. Pain rating at Worst: 5/10 in order to progress towards increased independence with activity. PC, 12/29/21   4. Patient will be able to correct postural deviations in sitting and standing, to decrease pain and promote postural awareness for injury prevention.  PC, 12/29/21      LONG TERM GOALS: 8 weeks     1. Patient will return to normal ADL, recreational, and work related activities with less pain and limitation.  PC, 12/29/21   2. Patient will improve AROM to stated goals in order to return to maximal functional potential.  PC, 12/29/21   3. Patient will improve Strength to stated goals of appropriate musculature in order to improve functional independence.  PC, 12/29/21   4. Pain Rating at Best: 1/10 to improve Quality of Life.  PC, 12/29/21   5. Patient will meet predicted functional outcome (FOTO) score: 65% to increase self-worth & perceived functional ability. Not tested, 12/29/21   6. Patient will have met/partially met personal goal of: return to working without pain.  PC, 12/29/21          Goals Key:  PC= progressing/continue;        PM= partially met;             DC= discontinue        PLAN   Updated Certification Period: 12/29/21 to 2/23/22   Recommended Treatment Plan: 2 times per week for 8 weeks:  Electrical Stimulation PRN, Manual Therapy, Moist Heat/ Ice, Neuromuscular Re-ed, Patient Education, Self Care,  Therapeutic Activities, Therapeutic Exercise and FDN/PRN    Monitor response to today's treatment and progress as tolerated.      Yehuda Shi, PT, DPT

## 2022-02-15 LAB — HEPATITIS C VIRUS (HCV) RNA DETECTION/QUANTIFICATION RT-PCR: NORMAL IU/ML

## 2022-02-16 ENCOUNTER — PATIENT MESSAGE (OUTPATIENT)
Dept: INTERNAL MEDICINE | Facility: CLINIC | Age: 52
End: 2022-02-16
Payer: COMMERCIAL

## 2022-02-16 ENCOUNTER — OFFICE VISIT (OUTPATIENT)
Dept: ORTHOPEDICS | Facility: CLINIC | Age: 52
End: 2022-02-16
Payer: COMMERCIAL

## 2022-02-16 VITALS
WEIGHT: 128 LBS | HEART RATE: 66 BPM | SYSTOLIC BLOOD PRESSURE: 126 MMHG | BODY MASS INDEX: 21.85 KG/M2 | DIASTOLIC BLOOD PRESSURE: 68 MMHG | HEIGHT: 64 IN

## 2022-02-16 DIAGNOSIS — M65.341 TRIGGER RING FINGER OF RIGHT HAND: Primary | ICD-10-CM

## 2022-02-16 PROCEDURE — 3074F PR MOST RECENT SYSTOLIC BLOOD PRESSURE < 130 MM HG: ICD-10-PCS | Mod: CPTII,S$GLB,, | Performed by: ORTHOPAEDIC SURGERY

## 2022-02-16 PROCEDURE — 1159F PR MEDICATION LIST DOCUMENTED IN MEDICAL RECORD: ICD-10-PCS | Mod: CPTII,S$GLB,, | Performed by: ORTHOPAEDIC SURGERY

## 2022-02-16 PROCEDURE — 1160F PR REVIEW ALL MEDS BY PRESCRIBER/CLIN PHARMACIST DOCUMENTED: ICD-10-PCS | Mod: CPTII,S$GLB,, | Performed by: ORTHOPAEDIC SURGERY

## 2022-02-16 PROCEDURE — 3074F SYST BP LT 130 MM HG: CPT | Mod: CPTII,S$GLB,, | Performed by: ORTHOPAEDIC SURGERY

## 2022-02-16 PROCEDURE — 99999 PR PBB SHADOW E&M-EST. PATIENT-LVL IV: CPT | Mod: PBBFAC,,, | Performed by: ORTHOPAEDIC SURGERY

## 2022-02-16 PROCEDURE — 99999 PR PBB SHADOW E&M-EST. PATIENT-LVL IV: ICD-10-PCS | Mod: PBBFAC,,, | Performed by: ORTHOPAEDIC SURGERY

## 2022-02-16 PROCEDURE — 1160F RVW MEDS BY RX/DR IN RCRD: CPT | Mod: CPTII,S$GLB,, | Performed by: ORTHOPAEDIC SURGERY

## 2022-02-16 PROCEDURE — 1159F MED LIST DOCD IN RCRD: CPT | Mod: CPTII,S$GLB,, | Performed by: ORTHOPAEDIC SURGERY

## 2022-02-16 PROCEDURE — 3051F HG A1C>EQUAL 7.0%<8.0%: CPT | Mod: CPTII,S$GLB,, | Performed by: ORTHOPAEDIC SURGERY

## 2022-02-16 PROCEDURE — 3072F LOW RISK FOR RETINOPATHY: CPT | Mod: CPTII,S$GLB,, | Performed by: ORTHOPAEDIC SURGERY

## 2022-02-16 PROCEDURE — 20550 TENDON SHEATH: ICD-10-PCS | Mod: F3,S$GLB,, | Performed by: ORTHOPAEDIC SURGERY

## 2022-02-16 PROCEDURE — 99203 PR OFFICE/OUTPT VISIT, NEW, LEVL III, 30-44 MIN: ICD-10-PCS | Mod: 25,S$GLB,, | Performed by: ORTHOPAEDIC SURGERY

## 2022-02-16 PROCEDURE — 3051F PR MOST RECENT HEMOGLOBIN A1C LEVEL 7.0 - < 8.0%: ICD-10-PCS | Mod: CPTII,S$GLB,, | Performed by: ORTHOPAEDIC SURGERY

## 2022-02-16 PROCEDURE — 3072F PR LOW RISK FOR RETINOPATHY: ICD-10-PCS | Mod: CPTII,S$GLB,, | Performed by: ORTHOPAEDIC SURGERY

## 2022-02-16 PROCEDURE — 3078F DIAST BP <80 MM HG: CPT | Mod: CPTII,S$GLB,, | Performed by: ORTHOPAEDIC SURGERY

## 2022-02-16 PROCEDURE — 3008F BODY MASS INDEX DOCD: CPT | Mod: CPTII,S$GLB,, | Performed by: ORTHOPAEDIC SURGERY

## 2022-02-16 PROCEDURE — 20550 NJX 1 TENDON SHEATH/LIGAMENT: CPT | Mod: F3,S$GLB,, | Performed by: ORTHOPAEDIC SURGERY

## 2022-02-16 PROCEDURE — 3078F PR MOST RECENT DIASTOLIC BLOOD PRESSURE < 80 MM HG: ICD-10-PCS | Mod: CPTII,S$GLB,, | Performed by: ORTHOPAEDIC SURGERY

## 2022-02-16 PROCEDURE — 99203 OFFICE O/P NEW LOW 30 MIN: CPT | Mod: 25,S$GLB,, | Performed by: ORTHOPAEDIC SURGERY

## 2022-02-16 PROCEDURE — 3008F PR BODY MASS INDEX (BMI) DOCUMENTED: ICD-10-PCS | Mod: CPTII,S$GLB,, | Performed by: ORTHOPAEDIC SURGERY

## 2022-02-16 RX ORDER — TRIAMCINOLONE ACETONIDE 40 MG/ML
40 INJECTION, SUSPENSION INTRA-ARTICULAR; INTRAMUSCULAR
Status: DISCONTINUED | OUTPATIENT
Start: 2022-02-16 | End: 2022-02-16 | Stop reason: HOSPADM

## 2022-02-16 RX ADMIN — TRIAMCINOLONE ACETONIDE 40 MG: 40 INJECTION, SUSPENSION INTRA-ARTICULAR; INTRAMUSCULAR at 09:02

## 2022-02-16 NOTE — PROCEDURES
Tendon Sheath    Date/Time: 2/16/2022 9:00 AM  Performed by: Adarsh Chavez MD  Authorized by: Adarsh Chavez MD     Consent Done?:  Yes (Verbal)  Indications:  Pain  Timeout: prior to procedure the correct patient, procedure, and site was verified    Prep: patient was prepped and draped in usual sterile fashion      Local anesthesia used?: Yes    Local anesthetic:  Lidocaine 2% without epinephrine  Anesthetic total (ml):  0.5    Location:  Ring finger  Site:  L ring flexor tendon sheath  Ultrasonic guidance for needle placement?: No    Needle size:  25 G  Approach:  Volar  Medications:  40 mg triamcinolone acetonide 40 mg/mL

## 2022-02-16 NOTE — PROGRESS NOTES
Subjective:     Patient ID: Chan Dwyer is a 51 y.o. female.    Chief Complaint: Pain of the Left Hand      HPI:  The patient is a 51-year-old female with left ring trigger finger for the last 3 months.  She has not tried injection as yet    Past Medical History:   Diagnosis Date    Abnormal Pap smear     Abnormal Pap smear of cervix     Arthritis, low back     Diabetes 9/2014     BS 162am 09/16/2014    DM (diabetes mellitus) 09/2014     am 01/26/2016    DM (diabetes mellitus) 09/2014    BS didn't check 01/31/2017    GERD (gastroesophageal reflux disease)     Gestational diabetes     Hepatitis C antibody positive in blood 02/11/2022    History of abnormal Pap smear     Surgical menopause     Vitamin D deficiency      Past Surgical History:   Procedure Laterality Date    COLONOSCOPY N/A 6/22/2016    Procedure: COLONOSCOPY;  Surgeon: Vishal Mary III, MD;  Location: [facility];  Service: Endoscopy;  Laterality: N/A;    Cryotherapy of the cervix  1999    DILATION AND CURETTAGE OF UTERUS      HYSTERECTOMY      [facility]  2/2012    TUBAL LIGATION       Family History   Problem Relation Age of Onset    Hypertension Mother     Diabetes Mother     Heart attack Mother     Diabetes Father     Hypertension Father     Stomach cancer Sister     No Known Problems Son     No Known Problems Son     Cancer Neg Hx     Stroke Neg Hx     Colon cancer Neg Hx     Ovarian cancer Neg Hx      Social History     Socioeconomic History    Marital status: Single    Number of children: 2   Occupational History    Occupation:      Employer: [employer]      Employer: [employer]   Tobacco Use    Smoking status: Never Smoker    Smokeless tobacco: Never Used   Substance and Sexual Activity    Alcohol use: Yes     Alcohol/week: 0.0 standard drinks     Comment: rare    Drug use: No    Sexual activity: Yes     Partners: Male     Birth control/protection: Surgical  "  Social History Narrative    She wears seatbelt.     Medication List with Changes/Refills   Current Medications    ACETAMINOPHEN (TYLENOL) 500 MG TABLET    Take 1,000 mg by mouth every 6 (six) hours as needed for Pain.    ALCOHOL SWABS (EASY COMFORT ALCOHOL PAD) PADM    Apply 1 each topically 4 (four) times daily.    ASPIRIN (ECOTRIN) 81 MG EC TABLET    Take 1 tablet (81 mg total) by mouth once daily.    AZELASTINE (ASTELIN) 137 MCG (0.1 %) NASAL SPRAY    1 spray (137 mcg total) by Nasal route 2 (two) times daily.    BLOOD SUGAR DIAGNOSTIC STRP    To check BG 4 times daily, to use with insurance preferred meter    INSULIN (LANTUS SOLOSTAR U-100 INSULIN) GLARGINE 100 UNITS/ML (3ML) SUBQ PEN    Inject 20 Units into the skin every evening.    LANCETS MISC    1 each by Misc.(Non-Drug; Combo Route) route 4 (four) times daily.    METFORMIN (GLUCOPHAGE-XR) 500 MG ER 24HR TABLET    Take 500 mg by mouth daily with breakfast.    PEN NEEDLE, DIABETIC (BD ULTRA-FINE KIYA PEN NEEDLE) 32 GAUGE X 5/32" NDLE    1 each by Misc.(Non-Drug; Combo Route) route 4 (four) times daily with meals and nightly.    PIOGLITAZONE (ACTOS) 15 MG TABLET    Take 1 tablet (15 mg total) by mouth once daily.    SITAGLIPTIN (JANUVIA) 100 MG TAB    Take 1 tablet (100 mg total) by mouth once daily.    VITAMIN D 1000 UNITS TAB    Take 1,000 Units by mouth once daily.     Review of patient's allergies indicates:   Allergen Reactions    Hydrocodone Hives and Rash     Review of Systems   Constitutional: Negative for malaise/fatigue.   HENT: Negative for hearing loss.    Eyes: Negative for double vision and visual disturbance.   Cardiovascular: Negative for chest pain.   Respiratory: Negative for shortness of breath.    Endocrine: Negative for cold intolerance.   Hematologic/Lymphatic: Does not bruise/bleed easily.   Skin: Negative for poor wound healing and suspicious lesions.   Musculoskeletal: Positive for arthritis, joint pain, joint swelling and " stiffness. Negative for gout.   Gastrointestinal: Positive for heartburn. Negative for nausea and vomiting.   Genitourinary: Negative for dysuria.   Neurological: Positive for headaches, numbness, paresthesias and sensory change.   Psychiatric/Behavioral: Negative for depression, memory loss and substance abuse. The patient is not nervous/anxious.    Allergic/Immunologic: Negative for persistent infections.       Objective:   Body mass index is 21.97 kg/m².  Vitals:    02/16/22 0853   BP: 126/68   Pulse: 66                General    Constitutional: She is oriented to person, place, and time. She appears well-developed and well-nourished. No distress.   HENT:   Head: Normocephalic.   Eyes: EOM are normal.   Pulmonary/Chest: Effort normal.   Neurological: She is oriented to person, place, and time.   Psychiatric: She has a normal mood and affect.         Left Hand/Wrist Exam     Inspection   Scars: Wrist - absent Hand -  absent  Effusion: Wrist - absent Hand -  absent    Pain   Hand - The patient exhibits pain of the ring MCP.    Other     Sensory Exam  Median Distribution: normal  Ulnar Distribution: normal  Radial Distribution: normal    Comments:  There is active triggering left ring finger with a palpable nodule that moves with tendon excursion.  There are no motor or sensory deficits.          Vascular Exam       Capillary Refill  Left Hand: normal capillary refill      Relevant imaging results reviewed and interpreted by me, discussed with the patient and / or family today radiographs left hand showed osteoarthritic change in multiple small joints and is otherwise unrevealing  Assessment:     Encounter Diagnosis   Name Primary?    Trigger ring finger of left ring finger      Plan:       The patient injected left ring trigger finger with 0.5 cc of Kenalog and 0.5 cc of 2% plain lidocaine under sterile technique.  She will return in 3 weeks if not improved.              Disclaimer: This note was prepared using a  voice recognition system and is likely to have sound alike errors within the text.

## 2022-02-24 ENCOUNTER — OFFICE VISIT (OUTPATIENT)
Dept: DIABETES | Facility: CLINIC | Age: 52
End: 2022-02-24
Payer: COMMERCIAL

## 2022-02-24 ENCOUNTER — PATIENT MESSAGE (OUTPATIENT)
Dept: DIABETES | Facility: CLINIC | Age: 52
End: 2022-02-24

## 2022-02-24 VITALS
DIASTOLIC BLOOD PRESSURE: 63 MMHG | BODY MASS INDEX: 21.65 KG/M2 | HEART RATE: 58 BPM | SYSTOLIC BLOOD PRESSURE: 116 MMHG | WEIGHT: 126.13 LBS

## 2022-02-24 DIAGNOSIS — E11.65 UNCONTROLLED TYPE 2 DIABETES MELLITUS WITH HYPERGLYCEMIA, WITHOUT LONG-TERM CURRENT USE OF INSULIN: Primary | ICD-10-CM

## 2022-02-24 DIAGNOSIS — E55.9 VITAMIN D DEFICIENCY: ICD-10-CM

## 2022-02-24 LAB — GLUCOSE SERPL-MCNC: 146 MG/DL (ref 70–110)

## 2022-02-24 PROCEDURE — 82962 GLUCOSE BLOOD TEST: CPT | Mod: S$GLB,,, | Performed by: PHYSICIAN ASSISTANT

## 2022-02-24 PROCEDURE — 3051F PR MOST RECENT HEMOGLOBIN A1C LEVEL 7.0 - < 8.0%: ICD-10-PCS | Mod: CPTII,S$GLB,, | Performed by: PHYSICIAN ASSISTANT

## 2022-02-24 PROCEDURE — 99214 PR OFFICE/OUTPT VISIT, EST, LEVL IV, 30-39 MIN: ICD-10-PCS | Mod: S$GLB,,, | Performed by: PHYSICIAN ASSISTANT

## 2022-02-24 PROCEDURE — 1159F MED LIST DOCD IN RCRD: CPT | Mod: CPTII,S$GLB,, | Performed by: PHYSICIAN ASSISTANT

## 2022-02-24 PROCEDURE — 3074F PR MOST RECENT SYSTOLIC BLOOD PRESSURE < 130 MM HG: ICD-10-PCS | Mod: CPTII,S$GLB,, | Performed by: PHYSICIAN ASSISTANT

## 2022-02-24 PROCEDURE — 3078F PR MOST RECENT DIASTOLIC BLOOD PRESSURE < 80 MM HG: ICD-10-PCS | Mod: CPTII,S$GLB,, | Performed by: PHYSICIAN ASSISTANT

## 2022-02-24 PROCEDURE — 99214 OFFICE O/P EST MOD 30 MIN: CPT | Mod: S$GLB,,, | Performed by: PHYSICIAN ASSISTANT

## 2022-02-24 PROCEDURE — 3051F HG A1C>EQUAL 7.0%<8.0%: CPT | Mod: CPTII,S$GLB,, | Performed by: PHYSICIAN ASSISTANT

## 2022-02-24 PROCEDURE — 99999 PR PBB SHADOW E&M-EST. PATIENT-LVL IV: ICD-10-PCS | Mod: PBBFAC,,, | Performed by: PHYSICIAN ASSISTANT

## 2022-02-24 PROCEDURE — 3074F SYST BP LT 130 MM HG: CPT | Mod: CPTII,S$GLB,, | Performed by: PHYSICIAN ASSISTANT

## 2022-02-24 PROCEDURE — 3072F PR LOW RISK FOR RETINOPATHY: ICD-10-PCS | Mod: CPTII,S$GLB,, | Performed by: PHYSICIAN ASSISTANT

## 2022-02-24 PROCEDURE — 3008F PR BODY MASS INDEX (BMI) DOCUMENTED: ICD-10-PCS | Mod: CPTII,S$GLB,, | Performed by: PHYSICIAN ASSISTANT

## 2022-02-24 PROCEDURE — 3072F LOW RISK FOR RETINOPATHY: CPT | Mod: CPTII,S$GLB,, | Performed by: PHYSICIAN ASSISTANT

## 2022-02-24 PROCEDURE — 1159F PR MEDICATION LIST DOCUMENTED IN MEDICAL RECORD: ICD-10-PCS | Mod: CPTII,S$GLB,, | Performed by: PHYSICIAN ASSISTANT

## 2022-02-24 PROCEDURE — 82962 POCT GLUCOSE, HAND-HELD DEVICE: ICD-10-PCS | Mod: S$GLB,,, | Performed by: PHYSICIAN ASSISTANT

## 2022-02-24 PROCEDURE — 3078F DIAST BP <80 MM HG: CPT | Mod: CPTII,S$GLB,, | Performed by: PHYSICIAN ASSISTANT

## 2022-02-24 PROCEDURE — 99999 PR PBB SHADOW E&M-EST. PATIENT-LVL IV: CPT | Mod: PBBFAC,,, | Performed by: PHYSICIAN ASSISTANT

## 2022-02-24 PROCEDURE — 3008F BODY MASS INDEX DOCD: CPT | Mod: CPTII,S$GLB,, | Performed by: PHYSICIAN ASSISTANT

## 2022-02-24 RX ORDER — PIOGLITAZONEHYDROCHLORIDE 15 MG/1
15 TABLET ORAL DAILY
Qty: 30 TABLET | Refills: 11 | Status: SHIPPED | OUTPATIENT
Start: 2022-02-24 | End: 2022-06-29

## 2022-02-24 RX ORDER — INSULIN GLARGINE 100 [IU]/ML
22 INJECTION, SOLUTION SUBCUTANEOUS NIGHTLY
Qty: 9 ML | Refills: 8 | Status: SHIPPED | OUTPATIENT
Start: 2022-02-24 | End: 2022-05-06 | Stop reason: ALTCHOICE

## 2022-02-24 NOTE — PROGRESS NOTES
PCP: Tristian Bearden MD    Subjective:     Chief Complaint: Diabetes - Established Patient    HISTORY OF PRESENT ILLNESS: 51 y.o.   female presenting for diabetes management visit.   The patient's last visit with me was on 11/30/2021.  Patient has had Type II diabetes since 5 years or more.  Pertinent to decision making is the following comorbidities: Vitamin D Deficiency and Gestational DM in Pregnancy 2003  Patient has the following Diabetes complications: without complications  She  has attended diabetes education in the past.     Patient's most recent A1c of 7.2% was completed 2 weeks ago.   Patient states since Her last A1c Her blood glucose levels have been high  after meals.   Patient monitors blood glucose 2 times per day with meter : Fasting and Before Bed.   Patient blood glucose monitoring device will not be uploaded into Media Section today secondary to patient forgetting device.   Per patient recall, fasting blood sugars are ranging from 61 - 148, after breakfast 122 - 172, after lunch 190, after dinner 166 - 170, and before bed 99 - 180. Infrequent evening checks.   Patient endorses the following diabetes related symptoms: Diarrhea. Associated with Metformin use.   Patient is due today for the following diabetes-related health maintenance standards: Statin therapy per ADA guidelines.   She denies recent hospital admissions or emergency room visits.   She voices having hypoglycemia fasting.   Patient's concerns today include glycemic control.   Patient medication regimen is as below.     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       Lab Results    Component Value Date    CPEPTIDE 1.58 04/28/2021     Lab Results   Component Value Date    GLUTAMICACID 0.00 09/28/2020          //  Weight: 57.2 kg (126 lb 1.7 oz), Body mass index is 21.65 kg/m².  Her blood sugar in clinic today is:    Lab Results   Component Value Date    POCGLU 146 (A) 02/24/2022       Review of Systems   Constitutional: Negative for activity change, appetite change, chills and fever.   HENT: Negative for dental problem, mouth sores, nosebleeds, sore throat and trouble swallowing.    Eyes: Negative for pain and discharge.   Respiratory: Negative for shortness of breath, wheezing and stridor.    Cardiovascular: Negative for chest pain, palpitations and leg swelling.   Gastrointestinal: Negative for abdominal pain, diarrhea, nausea and vomiting.   Endocrine: Negative for polydipsia, polyphagia and polyuria.   Genitourinary: Negative for dysuria, frequency and urgency.   Musculoskeletal: Negative for joint swelling and myalgias.   Skin: Negative for rash and wound.   Neurological: Negative for dizziness, syncope, weakness and headaches.   Psychiatric/Behavioral: Negative for behavioral problems and dysphoric mood.         Diabetes Management Status  Statin: Not taking  ACE/ARB: Not taking    Screening or Prevention Patient's value Goal Complete/Controlled?   HgA1C Testing and Control   Lab Results   Component Value Date    HGBA1C 7.2 (H) 02/01/2022      Annually/Less than 8% No   Lipid profile : 01/27/2022 Annually Yes   LDL control  Lab Results   Component Value Date    LDLCALC 95.6 01/27/2022    Annually/Less than 100 mg/dl  Yes   Nephropathy screening Lab Results   Component Value Date    MICALBCREAT 8.3 04/28/2021     Lab Results   Component Value Date    PROTEINUA Negative 01/26/2021    Annually No   Blood pressure BP Readings from Last 1 Encounters:   02/24/22 116/63    Less than 140/90 Yes   Dilated retinal exam : 07/23/2021 Annually Yes    Foot exam   : 11/30/2021 Annually No     Social  History     Socioeconomic History    Marital status: Single    Number of children: 2   Occupational History    Occupation:      Employer: [employer]     Employer: [employer]   Tobacco Use    Smoking status: Never Smoker    Smokeless tobacco: Never Used   Substance and Sexual Activity    Alcohol use: Yes     Alcohol/week: 0.0 standard drinks     Comment: rare    Drug use: No    Sexual activity: Yes     Partners: Male     Birth control/protection: Surgical   Social History Narrative    She wears seatbelt.     Past Medical History:   Diagnosis Date    Abnormal Pap smear     Abnormal Pap smear of cervix     Arthritis, low back     Diabetes 09/2014     BS 162am 09/16/2014    DM (diabetes mellitus) 09/2014     am 01/26/2016    DM (diabetes mellitus) 09/2014    BS didn't check 01/31/2017    GERD (gastroesophageal reflux disease)     Gestational diabetes     Hepatitis C antibody positive in blood 02/11/2022    RNA NEGATIVE    History of abnormal Pap smear     Surgical menopause     Vitamin D deficiency        Objective:        Physical Exam  Constitutional:       General: She is not in acute distress.     Appearance: She is well-developed. She is not diaphoretic.   HENT:      Head: Normocephalic and atraumatic.      Right Ear: External ear normal.      Left Ear: External ear normal.      Nose: Nose normal.   Eyes:      General:         Right eye: No discharge.         Left eye: No discharge.      Pupils: Pupils are equal, round, and reactive to light.   Cardiovascular:      Rate and Rhythm: Normal rate and regular rhythm.      Heart sounds: Normal heart sounds.   Pulmonary:      Effort: Pulmonary effort is normal.      Breath sounds: Normal breath sounds.   Abdominal:      Palpations: Abdomen is soft.   Musculoskeletal:         General: Normal range of motion.      Cervical back: Normal range of motion and neck supple.   Skin:     General: Skin is warm and dry.       Capillary Refill: Capillary refill takes less than 2 seconds.   Neurological:      Mental Status: She is alert and oriented to person, place, and time. Mental status is at baseline.      Motor: No abnormal muscle tone.      Coordination: Coordination normal.   Psychiatric:         Mood and Affect: Mood normal.         Behavior: Behavior normal.         Thought Content: Thought content normal.         Judgment: Judgment normal.             Assessment / Plan:     Uncontrolled type 2 diabetes mellitus with hyperglycemia, without long-term current use of insulin  -     Ambulatory referral/consult to Diabetic Advanced Practice Providers (Medical Management)  -     POCT Glucose, Hand-Held Device  -     pioglitazone (ACTOS) 15 MG tablet; Take 1 tablet (15 mg total) by mouth once daily.  Dispense: 30 tablet; Refill: 11  -     SITagliptin (JANUVIA) 100 MG Tab; Take 1 tablet (100 mg total) by mouth once daily.  Dispense: 30 tablet; Refill: 11  -     insulin (LANTUS SOLOSTAR U-100 INSULIN) glargine 100 units/mL (3mL) SubQ pen; Inject 22 Units into the skin every evening.  Dispense: 9 mL; Refill: 8    Vitamin D deficiency      Additional Plan Details:    - POCT Glucose  - Encouraged continuation of lifestyle changes including regular exercise and limiting carbohydrates to 30-45 grams per meal threes times daily and 15 grams per snack with a limit of two daily.   - Encouraged continued monitoring of blood glucose with maintenance of 4 times daily at Fasting, Before Lunch, Before Dinner and Before Bed.   - Current DM Medication Regimen: Change Lantus 22 units daily. Stop Metformin  mg at night. Continue Actos 15 mg daily - will plan to transition away if meal time insulin utilized. Dietary changes. Continue Januvia 100 mg daily. If no improvement - will start meal time insulin.   - Health Maintenance standards addressed today: Statin therapy - - defer to PCP  - Nursing Visit: Patient is below goal range for nursing visit  for age group and will not need nursing visit at this time .   - Follow up in 6 weeks.       Blakeney McKnight, PA-C [facility]     A total of 30 minutes was spent in face to face time, of which over 50 % was spent in counseling patient on disease process, complications, treatment, and side effects of medications.

## 2022-02-24 NOTE — PATIENT INSTRUCTIONS
CURRENT DM MEDICATIONS:   Metformin XR - stop   Lantus 22 units at night  Actos 15 mg daily - will plan to transition away   Januvia 100 mg daily

## 2022-02-25 ENCOUNTER — TELEPHONE (OUTPATIENT)
Dept: DIABETES | Facility: CLINIC | Age: 52
End: 2022-02-25
Payer: COMMERCIAL

## 2022-03-02 ENCOUNTER — CLINICAL SUPPORT (OUTPATIENT)
Dept: REHABILITATION | Facility: HOSPITAL | Age: 52
End: 2022-03-02
Payer: COMMERCIAL

## 2022-03-02 DIAGNOSIS — M25.611 SHOULDER STIFFNESS, RIGHT: ICD-10-CM

## 2022-03-02 DIAGNOSIS — M25.511 CHRONIC RIGHT SHOULDER PAIN: Primary | ICD-10-CM

## 2022-03-02 DIAGNOSIS — G89.29 CHRONIC RIGHT SHOULDER PAIN: Primary | ICD-10-CM

## 2022-03-02 PROCEDURE — 97110 THERAPEUTIC EXERCISES: CPT

## 2022-03-02 NOTE — PROGRESS NOTES
[facility]   Physical Therapy Discharge Note     Name: Chan Gorman [facility] Number: [id number]    Therapy Diagnosis:   Encounter Diagnoses   Name Primary?    Chronic right shoulder pain Yes    Shoulder stiffness, right      Physician: Meme Enciso NP    Visit Date: 3/2/2022    Physician Orders: PT Eval and Treat  Medical Diagnosis from Referral: M75.80 (ICD-10-CM) - Subscapularis tendinitis, unspecified laterality  Evaluation Date: 11/8/2021  Authorization Period Expiration: 12/31/21  Plan of Care Expiration: 2/23/22  Progress Note Due: 03/11/2022  Visit # / Visits authorized: 8/20 (+1 Evaluation) - Episode Visits: 16  FOTO: 3/3      Precautions: Standard and Diabetes    PTA Visit #: 0/5     Time In: 11:45 am  Time Out: 12:15 pm  Total Billable Time: 25 minutes    SUBJECTIVE     Patient reports: no adverse reaction to last therapy session - ready to discharge today     He/She was compliant with home exercise program.  Response to previous treatment: no adverse effects noted.  Functional change: She is able to get dressed better and drive better.    Pain: 0/10     Location: right shoulder    OBJECTIVE     Objective Measures updated at progress report unless specified.          Shoulder Left Right Goal   Flexion full full 180   Abduction full full 180   ER at 90 full full 90   IR full full 80        Shoulder Left Right Goals   Shoulder flexion: 4-/5 4-/5 5/5   Shoulder Abduction: 4-/5 4-/5 5/5   Shoulder ER 4-/5 4-/5 5/5   Shoulder IR 4-/5 4-/5 5/5      Shoulder Left Right Goals   Supraspinatus 4-/5 4-/5 5/5          TREATMENT     Chan received the treatments listed below:     MANUAL THERAPY TECHNIQUES including Joint mobilizations were applied to right shoulder for 8 minutes.    Manual Intervention Performed Today    Soft Tissue Mobilization  Subscapularis, latissimus dorsi, scapular tilting, upper trapezial, levator scapula, pectoral major and minor, posterior  "deltoid muscle.  Scapular releases   Joint Mobilizations  right GH mobilization - inferior glide/posterior glide grade 1-2      Plan for Next Visit: prn       THERAPEUTIC EXERCISES to develop  strength, endurance, ROM, flexibility, posture and core stabilization for 29 minutes including assessment, patient education, and the following:    Intervention    3/2/2022 Sets/Reps/Resistance   UBE  3' forward/ 3' backward   Lacrosse Ball mobilization + shoulder flexion x 30x   Standing Shoulder Flexion  3x10 - 1#   Standing Shoulder internal rotation stretch strap   10x10"   Standing Shoulder Abduction  3x10 - 1#   side lying external rotation  x 3x12 - 1#   Wall Slides - Flexion x 30x - lean into stretch   Wall Slides - Abduction  x 30x   Cable rows (increased) x 3x10 - 30#    Cable extensions x 3x10 - 20#    Single arm lat pulldown - cable - seated (increased) x 3x10 - 40#   Corner stretch  3x30"   theraband external rotation   Yellow Theraband - 3x10    Serratus wall slide + foam roller  3x12   sidelying flexion   10x   Open books  5x each     Plan for Next Visit: continue to progress shoulder strength and range of motion        PATIENT EDUCATION AND HOME EXERCISES     Home Exercises Provided and Patient Education Provided     Education provided: (included in therex) minutes  -importance of maintaining ROM and scapular mobility, pain will decrease as ROM improves.  -home exercise program progression    Written Home Exercises Provided: yes. Exercises were reviewed and Chan was able to demonstrate them prior to the end of the session.  Chan demonstrated good  understanding of the education provided. See EMR under Patient Instructions for exercises provided during therapy sessions      ASSESSMENT     Patient range of motion is now full and strength equal bilaterally. Patient reports that she would like to continue working at home on her home exercise program and if she need to come back she will get in touch. " Patient to be discharged today with home exercise program.    Chan is progressing well towards her goals.   Pt prognosis is Good.     Pt will continue to benefit from skilled outpatient physical therapy to address the deficits listed in the problem list box on initial evaluation, provide pt/family education and to maximize pt's level of independence in the home and community environment.     Pt's spiritual, cultural and educational needs considered and pt agreeable to plan of care and goals.     Anticipated Barriers for therapy: co-morbidities      Reviewed: 3/2/2022         SHORT TERM GOALS:  4 weeks     1. Recent signs and systems trend is improving in order to progress towards LTG's. MET, 12/29/21   2. Patient will be independent with HEP in order to further progress and return to maximal function. MET, 12/29/21     3. Pain rating at Worst: 5/10 in order to progress towards increased independence with activity. MET, 12/29/21   4. Patient will be able to correct postural deviations in sitting and standing, to decrease pain and promote postural awareness for injury prevention.  MET 12/29/21      LONG TERM GOALS: 8 weeks     1. Patient will return to normal ADL, recreational, and work related activities with less pain and limitation.  MET, 12/29/21   2. Patient will improve AROM to stated goals in order to return to maximal functional potential.  MET 12/29/21   3. Patient will improve Strength to stated goals of appropriate musculature in order to improve functional independence.  PM 12/29/21   4. Pain Rating at Best: 1/10 to improve Quality of Life.  MET 12/29/21   5. Patient will meet predicted functional outcome (FOTO) score: 65% to increase self-worth & perceived functional ability. PM 12/29/21   6. Patient will have met/partially met personal goal of: return to working without pain.  MET, 12/29/21          Goals Key:  PC= progressing/continue;        PM= partially met;             DC=  discontinue        PLAN     Patient to be discharged today 03/02/2022 with home exercise program.    Yehuda Shi, PT, DPT

## 2022-03-09 ENCOUNTER — HOSPITAL ENCOUNTER (OUTPATIENT)
Dept: RADIOLOGY | Facility: HOSPITAL | Age: 52
Discharge: HOME | End: 2022-03-09
Attending: INTERNAL MEDICINE
Payer: COMMERCIAL

## 2022-03-09 ENCOUNTER — HOSPITAL ENCOUNTER (OUTPATIENT)
Dept: CARDIOLOGY | Facility: HOSPITAL | Age: 52
Discharge: HOME | End: 2022-03-09
Attending: INTERNAL MEDICINE
Payer: COMMERCIAL

## 2022-03-09 DIAGNOSIS — R07.9 CHEST PAIN, MODERATE CORONARY ARTERY RISK: ICD-10-CM

## 2022-03-09 DIAGNOSIS — R07.9 CHEST PAIN, UNSPECIFIED TYPE: ICD-10-CM

## 2022-03-09 LAB
CV STRESS BASE HR: 52 BPM
DIASTOLIC BLOOD PRESSURE: 83 MMHG
NUC REST EJECTION FRACTION: 61
NUC STRESS EJECTION FRACTION: 56 %
OHS CV CPX 1 MINUTE RECOVERY HEART RATE: 120 BPM
OHS CV CPX 85 PERCENT MAX PREDICTED HEART RATE MALE: 136
OHS CV CPX ESTIMATED METS: 7
OHS CV CPX MAX PREDICTED HEART RATE: 160
OHS CV CPX PATIENT IS FEMALE: 1
OHS CV CPX PATIENT IS MALE: 0
OHS CV CPX PEAK DIASTOLIC BLOOD PRESSURE: 93 MMHG
OHS CV CPX PEAK HEAR RATE: 144 BPM
OHS CV CPX PEAK RATE PRESSURE PRODUCT: NORMAL
OHS CV CPX PEAK SYSTOLIC BLOOD PRESSURE: 171 MMHG
OHS CV CPX PERCENT MAX PREDICTED HEART RATE ACHIEVED: 90
OHS CV CPX RATE PRESSURE PRODUCT PRESENTING: 7592
STRESS ECHO POST EXERCISE DUR MIN: 6 MINUTES
SYSTOLIC BLOOD PRESSURE: 146 MMHG

## 2022-03-09 PROCEDURE — 93016 STRESS TEST WITH MYOCARDIAL PERFUSION (CUPID ONLY): ICD-10-PCS | Mod: ,,, | Performed by: INTERNAL MEDICINE

## 2022-03-09 PROCEDURE — 93016 CV STRESS TEST SUPVJ ONLY: CPT | Mod: ,,, | Performed by: INTERNAL MEDICINE

## 2022-03-09 PROCEDURE — 93018 CV STRESS TEST I&R ONLY: CPT | Mod: ,,, | Performed by: INTERNAL MEDICINE

## 2022-03-09 PROCEDURE — 93306 ECHO (CUPID ONLY): ICD-10-PCS | Mod: 26,,, | Performed by: INTERNAL MEDICINE

## 2022-03-09 PROCEDURE — 78452 HT MUSCLE IMAGE SPECT MULT: CPT | Mod: 26,,, | Performed by: INTERNAL MEDICINE

## 2022-03-09 PROCEDURE — A9502 TC99M TETROFOSMIN: HCPCS

## 2022-03-09 PROCEDURE — 93306 TTE W/DOPPLER COMPLETE: CPT

## 2022-03-09 PROCEDURE — 93017 CV STRESS TEST TRACING ONLY: CPT

## 2022-03-09 PROCEDURE — 93306 TTE W/DOPPLER COMPLETE: CPT | Mod: 26,,, | Performed by: INTERNAL MEDICINE

## 2022-03-09 PROCEDURE — 78452 STRESS TEST WITH MYOCARDIAL PERFUSION (CUPID ONLY): ICD-10-PCS | Mod: 26,,, | Performed by: INTERNAL MEDICINE

## 2022-03-09 PROCEDURE — 93018 STRESS TEST WITH MYOCARDIAL PERFUSION (CUPID ONLY): ICD-10-PCS | Mod: ,,, | Performed by: INTERNAL MEDICINE

## 2022-03-10 LAB
AV INDEX (PROSTH): 0.6
AV MEAN GRADIENT: 4 MMHG
AV PEAK GRADIENT: 6 MMHG
AV VALVE AREA: 1.69 CM2
AV VELOCITY RATIO: 0.59
CV ECHO LV RWT: 0.49 CM
DOP CALC AO PEAK VEL: 1.26 M/S
DOP CALC AO VTI: 29.5 CM
DOP CALC LVOT AREA: 2.8 CM2
DOP CALC LVOT DIAMETER: 1.9 CM
DOP CALC LVOT PEAK VEL: 0.74 M/S
DOP CALC LVOT STROKE VOLUME: 49.88 CM3
DOP CALC RVOT PEAK VEL: 0.48 M/S
DOP CALC RVOT VTI: 12.9 CM
DOP CALCLVOT PEAK VEL VTI: 17.6 CM
E WAVE DECELERATION TIME: 165.94 MSEC
E/A RATIO: 1.55
E/E' RATIO: 7.81 M/S
ECHO EF ESTIMATED: 60 %
ECHO LV POSTERIOR WALL: 1.13 CM (ref 0.6–1.1)
EJECTION FRACTION: 55 %
FRACTIONAL SHORTENING: 32 % (ref 28–44)
INTERVENTRICULAR SEPTUM: 1.09 CM (ref 0.6–1.1)
IVRT: 97.05 MSEC
LA MAJOR: 3.8 CM
LA WIDTH: 3.27 CM
LEFT ATRIUM SIZE: 3.17 CM
LEFT INTERNAL DIMENSION IN SYSTOLE: 3.13 CM (ref 2.1–4)
LEFT VENTRICLE DIASTOLIC VOLUME: 97.62 ML
LEFT VENTRICLE SYSTOLIC VOLUME: 38.85 ML
LEFT VENTRICULAR INTERNAL DIMENSION IN DIASTOLE: 4.61 CM (ref 3.5–6)
LEFT VENTRICULAR MASS: 184.17 G
LV LATERAL E/E' RATIO: 6.31 M/S
LV SEPTAL E/E' RATIO: 10.25 M/S
LVOT MG: 1.11 MMHG
LVOT MV: 0.48 CM/S
MV PEAK A VEL: 0.53 M/S
MV PEAK E VEL: 0.82 M/S
PULM VEIN S/D RATIO: 1.35
PV MEAN GRADIENT: 0.59 MMHG
PV PEAK D VEL: 0.37 M/S
PV PEAK S VEL: 0.5 M/S
PV PEAK VELOCITY: 0.61 CM/S
RA MAJOR: 3.72 CM
RA PRESSURE: 3 MMHG
RA WIDTH: 2.39 CM
RIGHT VENTRICULAR END-DIASTOLIC DIMENSION: 2.65 CM
SINUS: 2.76 CM
STJ: 2.75 CM
TDI LATERAL: 0.13 M/S
TDI SEPTAL: 0.08 M/S
TDI: 0.11 M/S
TRICUSPID ANNULAR PLANE SYSTOLIC EXCURSION: 1.76 CM

## 2022-03-11 ENCOUNTER — TELEPHONE (OUTPATIENT)
Dept: CARDIOLOGY | Facility: CLINIC | Age: 52
End: 2022-03-11
Payer: COMMERCIAL

## 2022-03-11 ENCOUNTER — HOSPITAL ENCOUNTER (OUTPATIENT)
Dept: RADIOLOGY | Facility: HOSPITAL | Age: 52
Discharge: HOME | End: 2022-03-11
Attending: NURSE PRACTITIONER
Payer: COMMERCIAL

## 2022-03-11 DIAGNOSIS — N60.11 FIBROCYSTIC BREAST CHANGES, RIGHT: ICD-10-CM

## 2022-03-11 PROCEDURE — 77063 BREAST TOMOSYNTHESIS BI: CPT | Mod: TC

## 2022-03-11 PROCEDURE — 77067 MAMMO DIGITAL SCREENING BILAT WITH TOMO: ICD-10-PCS | Mod: 26,,, | Performed by: RADIOLOGY

## 2022-03-11 PROCEDURE — 77067 SCR MAMMO BI INCL CAD: CPT | Mod: 26,,, | Performed by: RADIOLOGY

## 2022-03-11 PROCEDURE — 77063 MAMMO DIGITAL SCREENING BILAT WITH TOMO: ICD-10-PCS | Mod: 26,,, | Performed by: RADIOLOGY

## 2022-03-11 PROCEDURE — 77063 BREAST TOMOSYNTHESIS BI: CPT | Mod: 26,,, | Performed by: RADIOLOGY

## 2022-03-11 NOTE — TELEPHONE ENCOUNTER
The patient has been notified of this information and all questions answered.  Stress test is nml. Patient verbalized understanding.

## 2022-03-11 NOTE — TELEPHONE ENCOUNTER
----- Message from Lee Lambert MD sent at 3/10/2022 11:14 AM CST -----  Please tell pt:  Stress test is nml

## 2022-03-11 NOTE — TELEPHONE ENCOUNTER
----- Message from Lee Lambert MD sent at 3/11/2022  8:20 AM CST -----  Please tell pt:  Echo is wnl limits

## 2022-04-01 ENCOUNTER — OFFICE VISIT (OUTPATIENT)
Dept: CARDIOLOGY | Facility: CLINIC | Age: 52
End: 2022-04-01
Payer: COMMERCIAL

## 2022-04-01 VITALS
DIASTOLIC BLOOD PRESSURE: 84 MMHG | SYSTOLIC BLOOD PRESSURE: 112 MMHG | HEIGHT: 64 IN | OXYGEN SATURATION: 96 % | RESPIRATION RATE: 16 BRPM | WEIGHT: 127.63 LBS | HEART RATE: 57 BPM | BODY MASS INDEX: 21.79 KG/M2

## 2022-04-01 DIAGNOSIS — R07.9 CHEST PAIN, MODERATE CORONARY ARTERY RISK: Primary | ICD-10-CM

## 2022-04-01 DIAGNOSIS — K21.00 GASTROESOPHAGEAL REFLUX DISEASE WITH ESOPHAGITIS WITHOUT HEMORRHAGE: ICD-10-CM

## 2022-04-01 DIAGNOSIS — E11.9 CONTROLLED TYPE 2 DIABETES MELLITUS WITHOUT COMPLICATION, WITHOUT LONG-TERM CURRENT USE OF INSULIN: ICD-10-CM

## 2022-04-01 PROCEDURE — 1160F RVW MEDS BY RX/DR IN RCRD: CPT | Mod: CPTII,S$GLB,, | Performed by: INTERNAL MEDICINE

## 2022-04-01 PROCEDURE — 3051F HG A1C>EQUAL 7.0%<8.0%: CPT | Mod: CPTII,S$GLB,, | Performed by: INTERNAL MEDICINE

## 2022-04-01 PROCEDURE — 99999 PR PBB SHADOW E&M-EST. PATIENT-LVL IV: ICD-10-PCS | Mod: PBBFAC,,, | Performed by: INTERNAL MEDICINE

## 2022-04-01 PROCEDURE — 3079F DIAST BP 80-89 MM HG: CPT | Mod: CPTII,S$GLB,, | Performed by: INTERNAL MEDICINE

## 2022-04-01 PROCEDURE — 3051F PR MOST RECENT HEMOGLOBIN A1C LEVEL 7.0 - < 8.0%: ICD-10-PCS | Mod: CPTII,S$GLB,, | Performed by: INTERNAL MEDICINE

## 2022-04-01 PROCEDURE — 3079F PR MOST RECENT DIASTOLIC BLOOD PRESSURE 80-89 MM HG: ICD-10-PCS | Mod: CPTII,S$GLB,, | Performed by: INTERNAL MEDICINE

## 2022-04-01 PROCEDURE — 3072F PR LOW RISK FOR RETINOPATHY: ICD-10-PCS | Mod: CPTII,S$GLB,, | Performed by: INTERNAL MEDICINE

## 2022-04-01 PROCEDURE — 3074F PR MOST RECENT SYSTOLIC BLOOD PRESSURE < 130 MM HG: ICD-10-PCS | Mod: CPTII,S$GLB,, | Performed by: INTERNAL MEDICINE

## 2022-04-01 PROCEDURE — 3074F SYST BP LT 130 MM HG: CPT | Mod: CPTII,S$GLB,, | Performed by: INTERNAL MEDICINE

## 2022-04-01 PROCEDURE — 1159F MED LIST DOCD IN RCRD: CPT | Mod: CPTII,S$GLB,, | Performed by: INTERNAL MEDICINE

## 2022-04-01 PROCEDURE — 99214 PR OFFICE/OUTPT VISIT, EST, LEVL IV, 30-39 MIN: ICD-10-PCS | Mod: S$GLB,,, | Performed by: INTERNAL MEDICINE

## 2022-04-01 PROCEDURE — 3008F BODY MASS INDEX DOCD: CPT | Mod: CPTII,S$GLB,, | Performed by: INTERNAL MEDICINE

## 2022-04-01 PROCEDURE — 3008F PR BODY MASS INDEX (BMI) DOCUMENTED: ICD-10-PCS | Mod: CPTII,S$GLB,, | Performed by: INTERNAL MEDICINE

## 2022-04-01 PROCEDURE — 99214 OFFICE O/P EST MOD 30 MIN: CPT | Mod: S$GLB,,, | Performed by: INTERNAL MEDICINE

## 2022-04-01 PROCEDURE — 99999 PR PBB SHADOW E&M-EST. PATIENT-LVL IV: CPT | Mod: PBBFAC,,, | Performed by: INTERNAL MEDICINE

## 2022-04-01 PROCEDURE — 3072F LOW RISK FOR RETINOPATHY: CPT | Mod: CPTII,S$GLB,, | Performed by: INTERNAL MEDICINE

## 2022-04-01 PROCEDURE — 1159F PR MEDICATION LIST DOCUMENTED IN MEDICAL RECORD: ICD-10-PCS | Mod: CPTII,S$GLB,, | Performed by: INTERNAL MEDICINE

## 2022-04-01 PROCEDURE — 1160F PR REVIEW ALL MEDS BY PRESCRIBER/CLIN PHARMACIST DOCUMENTED: ICD-10-PCS | Mod: CPTII,S$GLB,, | Performed by: INTERNAL MEDICINE

## 2022-04-01 RX ORDER — PANTOPRAZOLE SODIUM 20 MG/1
20 TABLET, DELAYED RELEASE ORAL DAILY
Qty: 30 TABLET | Refills: 11 | Status: SHIPPED | OUTPATIENT
Start: 2022-04-01 | End: 2023-04-10

## 2022-04-01 NOTE — PROGRESS NOTES
Subjective:   Patient ID:  Chan Dwyer is a 51 y.o. female who presents for follow-up of No chief complaint on file.  echo and stress test normal. Pt still with CP at rest or exertion.    Chest Pain   This is a new problem. The current episode started 1 to 4 weeks ago. The problem occurs intermittently. The problem has been waxing and waning. The pain is present in the lateral region. The pain is mild. The quality of the pain is described as dull. The pain does not radiate. Pertinent negatives include no dizziness, palpitations or shortness of breath. The pain is aggravated by exertion. She has tried rest for the symptoms. The treatment provided mild relief.   Her past medical history is significant for diabetes.   Pertinent negatives for past medical history include no muscle weakness.       Review of Systems   Constitutional: Negative. Negative for weight gain.   HENT: Negative.    Eyes: Negative.    Cardiovascular: Negative.  Negative for chest pain, leg swelling and palpitations.   Respiratory: Negative.  Negative for shortness of breath.    Endocrine: Negative.    Hematologic/Lymphatic: Negative.    Skin: Negative.    Musculoskeletal: Negative for muscle weakness.   Gastrointestinal: Negative.    Genitourinary: Negative.    Neurological: Negative.  Negative for dizziness.   Psychiatric/Behavioral: Negative.    Allergic/Immunologic: Negative.      Family History   Problem Relation Age of Onset    Hypertension Mother     Diabetes Mother     Heart attack Mother     Diabetes Father     Hypertension Father     Stomach cancer Sister     No Known Problems Son     No Known Problems Son     Cancer Neg Hx     Stroke Neg Hx     Colon cancer Neg Hx     Ovarian cancer Neg Hx      Past Medical History:   Diagnosis Date    Abnormal Pap smear     Abnormal Pap smear of cervix     Arthritis, low back     Diabetes 09/2014     BS 162am 09/16/2014    DM (diabetes mellitus) 09/2014     am  "01/26/2016    DM (diabetes mellitus) 09/2014    BS didn't check 01/31/2017    GERD (gastroesophageal reflux disease)     Gestational diabetes     Hepatitis C antibody positive in blood 02/11/2022    RNA NEGATIVE    History of abnormal Pap smear     Surgical menopause     Vitamin D deficiency      Social History     Socioeconomic History    Marital status: Single    Number of children: 2   Occupational History    Occupation:      Employer: [employer]     Employer: [employer]   Tobacco Use    Smoking status: Never Smoker    Smokeless tobacco: Never Used   Substance and Sexual Activity    Alcohol use: Yes     Alcohol/week: 0.0 standard drinks     Comment: rare    Drug use: No    Sexual activity: Yes     Partners: Male     Birth control/protection: Surgical   Social History Narrative    She wears seatbelt.     Current Outpatient Medications on File Prior to Visit   Medication Sig Dispense Refill    acetaminophen (TYLENOL) 500 MG tablet Take 1,000 mg by mouth every 6 (six) hours as needed for Pain.      alcohol swabs (EASY COMFORT ALCOHOL PAD) PadM Apply 1 each topically 4 (four) times daily. 100 each 11    aspirin (ECOTRIN) 81 MG EC tablet Take 1 tablet (81 mg total) by mouth once daily. 30 tablet 11    azelastine (ASTELIN) 137 mcg (0.1 %) nasal spray 1 spray (137 mcg total) by Nasal route 2 (two) times daily. 30 mL 1    blood sugar diagnostic Strp To check BG 4 times daily, to use with insurance preferred meter 120 each 11    insulin (LANTUS SOLOSTAR U-100 INSULIN) glargine 100 units/mL (3mL) SubQ pen Inject 22 Units into the skin every evening. 9 mL 8    lancets Misc 1 each by Misc.(Non-Drug; Combo Route) route 4 (four) times daily. 100 each 11    pen needle, diabetic (BD ULTRA-FINE KIYA PEN NEEDLE) 32 gauge x 5/32" Ndle 1 each by Misc.(Non-Drug; Combo Route) route 4 (four) times daily with meals and nightly. 100 each 3    pioglitazone (ACTOS) 15 MG tablet Take 1 " tablet (15 mg total) by mouth once daily. 30 tablet 11    SITagliptin (JANUVIA) 100 MG Tab Take 1 tablet (100 mg total) by mouth once daily. 30 tablet 11    vitamin D 1000 units Tab Take 1,000 Units by mouth once daily.       No current facility-administered medications on file prior to visit.     Review of patient's allergies indicates:   Allergen Reactions    Hydrocodone Hives and Rash       Objective:     Physical Exam  Vitals and nursing note reviewed.   Constitutional:       Appearance: She is well-developed.   HENT:      Head: Normocephalic and atraumatic.   Eyes:      Conjunctiva/sclera: Conjunctivae normal.      Pupils: Pupils are equal, round, and reactive to light.   Cardiovascular:      Rate and Rhythm: Normal rate and regular rhythm.      Pulses: Intact distal pulses.      Heart sounds: Normal heart sounds.   Pulmonary:      Effort: Pulmonary effort is normal.      Breath sounds: Normal breath sounds.   Abdominal:      General: Bowel sounds are normal.      Palpations: Abdomen is soft.   Musculoskeletal:         General: Normal range of motion.      Cervical back: Normal range of motion and neck supple.   Skin:     General: Skin is warm and dry.   Neurological:      Mental Status: She is alert and oriented to person, place, and time.         Assessment:     1. Chest pain, moderate coronary artery risk    2. Controlled type 2 diabetes mellitus without complication, without long-term current use of insulin    3. Gastroesophageal reflux disease with esophagitis without hemorrhage        Plan:     Chest pain, moderate coronary artery risk    Controlled type 2 diabetes mellitus without complication, without long-term current use of insulin    Gastroesophageal reflux disease with esophagitis without hemorrhage      Asa- primary prevention  ADD PPI

## 2022-04-27 ENCOUNTER — PATIENT OUTREACH (OUTPATIENT)
Dept: ADMINISTRATIVE | Facility: OTHER | Age: 52
End: 2022-04-27
Payer: COMMERCIAL

## 2022-04-27 ENCOUNTER — OFFICE VISIT (OUTPATIENT)
Dept: DIABETES | Facility: CLINIC | Age: 52
End: 2022-04-27
Payer: COMMERCIAL

## 2022-04-27 VITALS
BODY MASS INDEX: 21.91 KG/M2 | WEIGHT: 127.63 LBS | SYSTOLIC BLOOD PRESSURE: 139 MMHG | HEART RATE: 63 BPM | DIASTOLIC BLOOD PRESSURE: 99 MMHG

## 2022-04-27 DIAGNOSIS — E55.9 VITAMIN D DEFICIENCY: ICD-10-CM

## 2022-04-27 DIAGNOSIS — E11.65 UNCONTROLLED TYPE 2 DIABETES MELLITUS WITH HYPERGLYCEMIA, WITHOUT LONG-TERM CURRENT USE OF INSULIN: Primary | ICD-10-CM

## 2022-04-27 LAB — GLUCOSE SERPL-MCNC: 180 MG/DL (ref 70–110)

## 2022-04-27 PROCEDURE — 99999 PR PBB SHADOW E&M-EST. PATIENT-LVL III: ICD-10-PCS | Mod: PBBFAC,,, | Performed by: PHYSICIAN ASSISTANT

## 2022-04-27 PROCEDURE — 3051F HG A1C>EQUAL 7.0%<8.0%: CPT | Mod: CPTII,S$GLB,, | Performed by: PHYSICIAN ASSISTANT

## 2022-04-27 PROCEDURE — 99999 PR PBB SHADOW E&M-EST. PATIENT-LVL III: CPT | Mod: PBBFAC,,, | Performed by: PHYSICIAN ASSISTANT

## 2022-04-27 PROCEDURE — 3080F DIAST BP >= 90 MM HG: CPT | Mod: CPTII,S$GLB,, | Performed by: PHYSICIAN ASSISTANT

## 2022-04-27 PROCEDURE — 3072F PR LOW RISK FOR RETINOPATHY: ICD-10-PCS | Mod: CPTII,S$GLB,, | Performed by: PHYSICIAN ASSISTANT

## 2022-04-27 PROCEDURE — 99214 PR OFFICE/OUTPT VISIT, EST, LEVL IV, 30-39 MIN: ICD-10-PCS | Mod: S$GLB,,, | Performed by: PHYSICIAN ASSISTANT

## 2022-04-27 PROCEDURE — 3080F PR MOST RECENT DIASTOLIC BLOOD PRESSURE >= 90 MM HG: ICD-10-PCS | Mod: CPTII,S$GLB,, | Performed by: PHYSICIAN ASSISTANT

## 2022-04-27 PROCEDURE — 82962 POCT GLUCOSE, HAND-HELD DEVICE: ICD-10-PCS | Mod: S$GLB,,, | Performed by: PHYSICIAN ASSISTANT

## 2022-04-27 PROCEDURE — 99214 OFFICE O/P EST MOD 30 MIN: CPT | Mod: S$GLB,,, | Performed by: PHYSICIAN ASSISTANT

## 2022-04-27 PROCEDURE — 3008F BODY MASS INDEX DOCD: CPT | Mod: CPTII,S$GLB,, | Performed by: PHYSICIAN ASSISTANT

## 2022-04-27 PROCEDURE — 3072F LOW RISK FOR RETINOPATHY: CPT | Mod: CPTII,S$GLB,, | Performed by: PHYSICIAN ASSISTANT

## 2022-04-27 PROCEDURE — 82962 GLUCOSE BLOOD TEST: CPT | Mod: S$GLB,,, | Performed by: PHYSICIAN ASSISTANT

## 2022-04-27 PROCEDURE — 3061F PR NEG MICROALBUMINURIA RESULT DOCUMENTED/REVIEW: ICD-10-PCS | Mod: CPTII,S$GLB,, | Performed by: PHYSICIAN ASSISTANT

## 2022-04-27 PROCEDURE — 3008F PR BODY MASS INDEX (BMI) DOCUMENTED: ICD-10-PCS | Mod: CPTII,S$GLB,, | Performed by: PHYSICIAN ASSISTANT

## 2022-04-27 PROCEDURE — 3066F PR DOCUMENTATION OF TREATMENT FOR NEPHROPATHY: ICD-10-PCS | Mod: CPTII,S$GLB,, | Performed by: PHYSICIAN ASSISTANT

## 2022-04-27 PROCEDURE — 3075F SYST BP GE 130 - 139MM HG: CPT | Mod: CPTII,S$GLB,, | Performed by: PHYSICIAN ASSISTANT

## 2022-04-27 PROCEDURE — 3075F PR MOST RECENT SYSTOLIC BLOOD PRESS GE 130-139MM HG: ICD-10-PCS | Mod: CPTII,S$GLB,, | Performed by: PHYSICIAN ASSISTANT

## 2022-04-27 PROCEDURE — 3061F NEG MICROALBUMINURIA REV: CPT | Mod: CPTII,S$GLB,, | Performed by: PHYSICIAN ASSISTANT

## 2022-04-27 PROCEDURE — 3051F PR MOST RECENT HEMOGLOBIN A1C LEVEL 7.0 - < 8.0%: ICD-10-PCS | Mod: CPTII,S$GLB,, | Performed by: PHYSICIAN ASSISTANT

## 2022-04-27 PROCEDURE — 3066F NEPHROPATHY DOC TX: CPT | Mod: CPTII,S$GLB,, | Performed by: PHYSICIAN ASSISTANT

## 2022-04-27 NOTE — PROGRESS NOTES
PCP: Tristian Bearden MD    Subjective:     Chief Complaint: Diabetes - Established Patient    HISTORY OF PRESENT ILLNESS: 51 y.o.   female presenting for diabetes management visit.   The patient's last visit with me was on 2/24/2022.  Patient has had Type II diabetes since 5 years or more.  Pertinent to decision making is the following comorbidities: Vitamin D Deficiency and Gestational DM in Pregnancy 2003  Patient has the following Diabetes complications: without complications  She  has attended diabetes education in the past.     Patient's most recent A1c of 7.2% was completed 2 months ago.   Patient states since Her last A1c Her blood glucose levels have been high  after meals.   Patient monitors blood glucose 2 times per day with meter : Fasting and Before Bed.   Patient blood glucose monitoring device will not be uploaded into [employer] today secondary to patient forgetting device.   Per patient recall, fasting blood sugars are ranging from 60 - 65 and after meals up to 180.  Patient endorses the following diabetes related symptoms: None.   Patient is due today for the following diabetes-related health maintenance standards: Statin therapy per ADA guidelines and Urine Microalbumin/creatinine ratio.   She denies recent hospital admissions or emergency room visits.   She voices having hypoglycemia fasting.   Patient's concerns today include glycemic control.   Patient medication regimen is as below.     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       Lab Results   Component Value Date    CPEPTIDE 1.58 04/28/2021     Lab Results   Component Value Date    GLUTAMICACID 0.00 09/28/2020           //  Weight: 57.9 kg (127 lb 10.3 oz), Body mass index is 21.91 kg/m².  Her blood sugar in clinic today is:    Lab Results   Component Value Date    POCGLU 180 (A) 04/27/2022       Review of Systems   Constitutional: Negative for activity change, appetite change, chills and fever.   HENT: Negative for dental problem, mouth sores, nosebleeds, sore throat and trouble swallowing.    Eyes: Negative for pain and discharge.   Respiratory: Negative for shortness of breath, wheezing and stridor.    Cardiovascular: Negative for chest pain, palpitations and leg swelling.   Gastrointestinal: Negative for abdominal pain, diarrhea, nausea and vomiting.   Endocrine: Negative for polydipsia, polyphagia and polyuria.   Genitourinary: Negative for dysuria, frequency and urgency.   Musculoskeletal: Negative for joint swelling and myalgias.   Skin: Negative for rash and wound.   Neurological: Negative for dizziness, syncope, weakness and headaches.   Psychiatric/Behavioral: Negative for behavioral problems and dysphoric mood.         Diabetes Management Status  Statin: Not taking  ACE/ARB: Not taking    Screening or Prevention Patient's value Goal Complete/Controlled?   HgA1C Testing and Control   Lab Results   Component Value Date    HGBA1C 7.2 (H) 02/01/2022      Annually/Less than 8% No   Lipid profile : 01/27/2022 Annually Yes   LDL control  Lab Results   Component Value Date    LDLCALC 95.6 01/27/2022    Annually/Less than 100 mg/dl  Yes   Nephropathy screening Lab Results   Component Value Date    MICALBCREAT 8.3 04/28/2021     Lab Results   Component Value Date    PROTEINUA Negative 01/26/2021    Annually No   Blood pressure BP Readings from Last 1 Encounters:   04/01/22 112/84    Less than 140/90 Yes   Dilated retinal exam : 07/23/2021 Annually Yes    Foot exam   : 11/30/2021 Annually No     Social History     Socioeconomic History    Marital status: Single    Number of children: 2   Occupational History    Occupation:       Employer: [employer]     Employer: [employer]   Tobacco Use    Smoking status: Never Smoker    Smokeless tobacco: Never Used   Substance and Sexual Activity    Alcohol use: Yes     Alcohol/week: 0.0 standard drinks     Comment: rare    Drug use: No    Sexual activity: Yes     Partners: Male     Birth control/protection: Surgical   Social History Narrative    She wears seatbelt.     Past Medical History:   Diagnosis Date    Abnormal Pap smear     Abnormal Pap smear of cervix     Arthritis, low back     Diabetes 09/2014     BS 162am 09/16/2014    DM (diabetes mellitus) 09/2014     am 01/26/2016    DM (diabetes mellitus) 09/2014    BS didn't check 01/31/2017    GERD (gastroesophageal reflux disease)     Gestational diabetes     Hepatitis C antibody positive in blood 02/11/2022    RNA NEGATIVE    History of abnormal Pap smear     Surgical menopause     Vitamin D deficiency        Objective:        Physical Exam  Constitutional:       General: She is not in acute distress.     Appearance: She is well-developed. She is not diaphoretic.   HENT:      Head: Normocephalic and atraumatic.      Right Ear: External ear normal.      Left Ear: External ear normal.      Nose: Nose normal.   Eyes:      General:         Right eye: No discharge.         Left eye: No discharge.      Pupils: Pupils are equal, round, and reactive to light.   Cardiovascular:      Rate and Rhythm: Normal rate and regular rhythm.      Heart sounds: Normal heart sounds.   Pulmonary:      Effort: Pulmonary effort is normal.      Breath sounds: Normal breath sounds.   Abdominal:      Palpations: Abdomen is soft.   Musculoskeletal:         General: Normal range of motion.      Cervical back: Normal range of motion and neck supple.   Skin:     General: Skin is warm and dry.      Capillary Refill: Capillary refill takes less than 2 seconds.   Neurological:      Mental Status: She is alert and oriented to  person, place, and time. Mental status is at baseline.      Motor: No abnormal muscle tone.      Coordination: Coordination normal.   Psychiatric:         Mood and Affect: Mood normal.         Behavior: Behavior normal.         Thought Content: Thought content normal.         Judgment: Judgment normal.             Assessment / Plan:     Uncontrolled type 2 diabetes mellitus with hyperglycemia, without long-term current use of insulin  -     POCT Glucose, Hand-Held Device  -     ANTI-ISLET CELL ANTIBODY; Future; Expected date: 04/27/2022  -     INSULIN ANTIBODY; Future; Expected date: 04/27/2022  -     C-Peptide; Future; Expected date: 04/27/2022  -     Glucose, Fasting; Future; Expected date: 04/27/2022  -     Microalbumin/Creatinine Ratio, Urine; Future; Expected date: 04/27/2022    Vitamin D deficiency      Additional Plan Details:    - POCT Glucose  - Encouraged continuation of lifestyle changes including regular exercise and limiting carbohydrates to 30-45 grams per meal threes times daily and 15 grams per snack with a limit of two daily.   - Encouraged continued monitoring of blood glucose with maintenance of 4 times daily at Fasting, Before Lunch, Before Dinner and Before Bed. Dexcom sample.   - Current DM Medication Regimen: Change Lantus 20 units daily. Continue Actos 15 mg daily. Continue Januvia 100 mg daily. Will update following Dexcom sample.   - Fasting labs scheduled  - Health Maintenance standards addressed today: Statin therapy - - defer to PCP, Urine Microalbumin / Creatinine Ratio scheduled and COVID - 19 Vaccine - booster to be scheduled  - Nursing Visit: Patient is below goal range for nursing visit for age group and will not need nursing visit at this time .   - Follow up in 8 weeks and call in 10 days.       Blakeney McKnight, PA-C [facility]     A total of 30 minutes was spent in face to face time, of which over 50 % was spent in counseling patient on disease process,  complications, treatment, and side effects of medications.

## 2022-04-27 NOTE — LETTER
April 27, 2022      [facility] [address]  [address] [facility]  [place] [address]  Phone: [phone]  Fax: [phone]       Patient: Chan Dwyer   YOB: 1970  Date of Visit: 04/27/2022    To Whom It May Concern:    Antonio Dwyer  was at [facility] on 04/27/2022. The patient may return to work/school on 04/28/2022 with no restrictions. If you have any questions or concerns, or if I can be of further assistance, please do not hesitate to contact me.    Sincerely,    Magdiel Santana MA

## 2022-04-28 ENCOUNTER — LAB VISIT (OUTPATIENT)
Dept: LAB | Facility: HOSPITAL | Age: 52
End: 2022-04-28
Attending: PHYSICIAN ASSISTANT
Payer: COMMERCIAL

## 2022-04-28 ENCOUNTER — OFFICE VISIT (OUTPATIENT)
Dept: INTERNAL MEDICINE | Facility: CLINIC | Age: 52
End: 2022-04-28
Payer: COMMERCIAL

## 2022-04-28 VITALS
SYSTOLIC BLOOD PRESSURE: 120 MMHG | BODY MASS INDEX: 22.06 KG/M2 | OXYGEN SATURATION: 98 % | DIASTOLIC BLOOD PRESSURE: 78 MMHG | TEMPERATURE: 96 F | HEIGHT: 64 IN | HEART RATE: 72 BPM | WEIGHT: 129.19 LBS

## 2022-04-28 DIAGNOSIS — H10.10 ALLERGIC CONJUNCTIVITIS, UNSPECIFIED LATERALITY: Primary | ICD-10-CM

## 2022-04-28 DIAGNOSIS — E11.65 UNCONTROLLED TYPE 2 DIABETES MELLITUS WITH HYPERGLYCEMIA, WITHOUT LONG-TERM CURRENT USE OF INSULIN: ICD-10-CM

## 2022-04-28 PROCEDURE — 3072F LOW RISK FOR RETINOPATHY: CPT | Mod: CPTII,S$GLB,, | Performed by: FAMILY MEDICINE

## 2022-04-28 PROCEDURE — 3008F PR BODY MASS INDEX (BMI) DOCUMENTED: ICD-10-PCS | Mod: CPTII,S$GLB,, | Performed by: FAMILY MEDICINE

## 2022-04-28 PROCEDURE — 82947 ASSAY GLUCOSE BLOOD QUANT: CPT | Performed by: PHYSICIAN ASSISTANT

## 2022-04-28 PROCEDURE — 3074F SYST BP LT 130 MM HG: CPT | Mod: CPTII,S$GLB,, | Performed by: FAMILY MEDICINE

## 2022-04-28 PROCEDURE — 99213 OFFICE O/P EST LOW 20 MIN: CPT | Mod: S$GLB,,, | Performed by: FAMILY MEDICINE

## 2022-04-28 PROCEDURE — 3074F PR MOST RECENT SYSTOLIC BLOOD PRESSURE < 130 MM HG: ICD-10-PCS | Mod: CPTII,S$GLB,, | Performed by: FAMILY MEDICINE

## 2022-04-28 PROCEDURE — 3051F HG A1C>EQUAL 7.0%<8.0%: CPT | Mod: CPTII,S$GLB,, | Performed by: FAMILY MEDICINE

## 2022-04-28 PROCEDURE — 99999 PR PBB SHADOW E&M-EST. PATIENT-LVL IV: CPT | Mod: PBBFAC,,, | Performed by: FAMILY MEDICINE

## 2022-04-28 PROCEDURE — 99999 PR PBB SHADOW E&M-EST. PATIENT-LVL IV: ICD-10-PCS | Mod: PBBFAC,,, | Performed by: FAMILY MEDICINE

## 2022-04-28 PROCEDURE — 1159F MED LIST DOCD IN RCRD: CPT | Mod: CPTII,S$GLB,, | Performed by: FAMILY MEDICINE

## 2022-04-28 PROCEDURE — 84681 ASSAY OF C-PEPTIDE: CPT | Performed by: PHYSICIAN ASSISTANT

## 2022-04-28 PROCEDURE — 86341 ISLET CELL ANTIBODY: CPT | Performed by: PHYSICIAN ASSISTANT

## 2022-04-28 PROCEDURE — 3051F PR MOST RECENT HEMOGLOBIN A1C LEVEL 7.0 - < 8.0%: ICD-10-PCS | Mod: CPTII,S$GLB,, | Performed by: FAMILY MEDICINE

## 2022-04-28 PROCEDURE — 3078F PR MOST RECENT DIASTOLIC BLOOD PRESSURE < 80 MM HG: ICD-10-PCS | Mod: CPTII,S$GLB,, | Performed by: FAMILY MEDICINE

## 2022-04-28 PROCEDURE — 1159F PR MEDICATION LIST DOCUMENTED IN MEDICAL RECORD: ICD-10-PCS | Mod: CPTII,S$GLB,, | Performed by: FAMILY MEDICINE

## 2022-04-28 PROCEDURE — 3008F BODY MASS INDEX DOCD: CPT | Mod: CPTII,S$GLB,, | Performed by: FAMILY MEDICINE

## 2022-04-28 PROCEDURE — 3078F DIAST BP <80 MM HG: CPT | Mod: CPTII,S$GLB,, | Performed by: FAMILY MEDICINE

## 2022-04-28 PROCEDURE — 3072F PR LOW RISK FOR RETINOPATHY: ICD-10-PCS | Mod: CPTII,S$GLB,, | Performed by: FAMILY MEDICINE

## 2022-04-28 PROCEDURE — 86337 INSULIN ANTIBODIES: CPT | Performed by: PHYSICIAN ASSISTANT

## 2022-04-28 PROCEDURE — 36415 COLL VENOUS BLD VENIPUNCTURE: CPT | Mod: PO | Performed by: PHYSICIAN ASSISTANT

## 2022-04-28 PROCEDURE — 99213 PR OFFICE/OUTPT VISIT, EST, LEVL III, 20-29 MIN: ICD-10-PCS | Mod: S$GLB,,, | Performed by: FAMILY MEDICINE

## 2022-04-28 RX ORDER — OLOPATADINE HYDROCHLORIDE 2 MG/ML
1 SOLUTION/ DROPS OPHTHALMIC DAILY
Qty: 5 ML | Refills: 6 | Status: SHIPPED | OUTPATIENT
Start: 2022-04-28 | End: 2022-12-29 | Stop reason: ALTCHOICE

## 2022-04-28 NOTE — PROGRESS NOTES
Subjective:      Patient ID: Chan Dwyer is a 51 y.o. female.    Chief Complaint: Eye Problem      Patient reports swelling of eyelids for about a week now, right worse than left, no pain, visual changes or drainage.  No itching.  She does have seasonal allergies, currently using Astelin intermittently.    Review of Systems   Constitutional: Negative for activity change and appetite change.   HENT: Positive for facial swelling and postnasal drip.    Eyes: Negative for discharge, redness, itching and visual disturbance.     Past Medical History:   Diagnosis Date    Abnormal Pap smear     Abnormal Pap smear of cervix     Arthritis, low back     Diabetes 09/2014     BS 162am 09/16/2014    DM (diabetes mellitus) 09/2014     am 01/26/2016    DM (diabetes mellitus) 09/2014    BS didn't check 01/31/2017    GERD (gastroesophageal reflux disease)     Gestational diabetes     Hepatitis C antibody positive in blood 02/11/2022    RNA NEGATIVE    History of abnormal Pap smear     Surgical menopause     Vitamin D deficiency           Past Surgical History:   Procedure Laterality Date    COLONOSCOPY N/A 6/22/2016    Procedure: COLONOSCOPY;  Surgeon: Vishal Mary III, MD;  Location: [facility];  Service: Endoscopy;  Laterality: N/A;    Cryotherapy of the cervix  1999    DILATION AND CURETTAGE OF UTERUS      HYSTERECTOMY      [facility]  2/2012    TUBAL LIGATION       Family History   Problem Relation Age of Onset    Hypertension Mother     Diabetes Mother     Heart attack Mother     Diabetes Father     Hypertension Father     Stomach cancer Sister     No Known Problems Son     No Known Problems Son     Cancer Neg Hx     Stroke Neg Hx     Colon cancer Neg Hx     Ovarian cancer Neg Hx      Social History     Socioeconomic History    Marital status: Single    Number of children: 2   Occupational History    Occupation:      Employer: [employer]     Employer: [employer]  "[employer]   Tobacco Use    Smoking status: Never Smoker    Smokeless tobacco: Never Used   Substance and Sexual Activity    Alcohol use: Yes     Alcohol/week: 0.0 standard drinks     Comment: rare    Drug use: No    Sexual activity: Yes     Partners: Male     Birth control/protection: Surgical   Social History Narrative    She wears seatbelt.     Review of patient's allergies indicates:   Allergen Reactions    Hydrocodone Hives and Rash       Objective:       /78   Pulse 72   Temp 96 °F (35.6 °C) (Temporal)   Ht 5' 4" (1.626 m)   Wt 58.6 kg (129 lb 3 oz)   SpO2 98%   BMI 22.18 kg/m²   Physical Exam  Vitals reviewed.   Constitutional:       General: She is not in acute distress.     Appearance: She is well-developed. She is not diaphoretic.   HENT:      Head: Normocephalic.      Right Ear: Hearing, ear canal and external ear normal. Tympanic membrane is bulging.      Left Ear: Hearing, ear canal and external ear normal. Tympanic membrane is bulging.      Nose: Mucosal edema present.      Right Sinus: No maxillary sinus tenderness or frontal sinus tenderness.      Left Sinus: No maxillary sinus tenderness or frontal sinus tenderness.      Mouth/Throat:      Pharynx: Uvula midline.   Eyes:      General: Allergic shiner present.      Extraocular Movements: Extraocular movements intact.      Conjunctiva/sclera: Conjunctivae normal.      Pupils: Pupils are equal, round, and reactive to light.      Comments: Mild swelling of bilateral upper and lower eyelids   Neck:      Thyroid: No thyromegaly.      Trachea: No tracheal deviation.   Skin:     General: Skin is warm and dry.   Psychiatric:         Mood and Affect: Mood normal.         Behavior: Behavior normal.       Assessment:     1. Allergic conjunctivitis, unspecified laterality      Plan:   Allergic conjunctivitis, unspecified laterality    Other orders  -     olopatadine (PATADAY) 0.2 % Drop; Place 1 drop into both eyes once daily.  Dispense: 5 mL; " "Refill: 6    Continue Astelin b.i.d. as well  Medication List with Changes/Refills   New Medications    OLOPATADINE (PATADAY) 0.2 % DROP    Place 1 drop into both eyes once daily.   Current Medications    ACETAMINOPHEN (TYLENOL) 500 MG TABLET    Take 1,000 mg by mouth every 6 (six) hours as needed for Pain.    ALCOHOL SWABS (EASY COMFORT ALCOHOL PAD) PADM    Apply 1 each topically 4 (four) times daily.    ASPIRIN (ECOTRIN) 81 MG EC TABLET    Take 1 tablet (81 mg total) by mouth once daily.    AZELASTINE (ASTELIN) 137 MCG (0.1 %) NASAL SPRAY    1 spray (137 mcg total) by Nasal route 2 (two) times daily.    BLOOD SUGAR DIAGNOSTIC STRP    To check BG 4 times daily, to use with insurance preferred meter    INSULIN (LANTUS SOLOSTAR U-100 INSULIN) GLARGINE 100 UNITS/ML (3ML) SUBQ PEN    Inject 22 Units into the skin every evening.    LANCETS MISC    1 each by Misc.(Non-Drug; Combo Route) route 4 (four) times daily.    PANTOPRAZOLE (PROTONIX) 20 MG TABLET    Take 1 tablet (20 mg total) by mouth once daily.    PEN NEEDLE, DIABETIC (BD ULTRA-FINE KIYA PEN NEEDLE) 32 GAUGE X 5/32" NDLE    1 each by Misc.(Non-Drug; Combo Route) route 4 (four) times daily with meals and nightly.    PIOGLITAZONE (ACTOS) 15 MG TABLET    Take 1 tablet (15 mg total) by mouth once daily.    SITAGLIPTIN (JANUVIA) 100 MG TAB    Take 1 tablet (100 mg total) by mouth once daily.    VITAMIN D 1000 UNITS TAB    Take 1,000 Units by mouth once daily.       "

## 2022-04-29 LAB
C PEPTIDE SERPL-MCNC: 0.72 NG/ML (ref 0.78–5.19)
GLUCOSE SERPL-MCNC: 103 MG/DL (ref 70–110)

## 2022-05-03 LAB
INSULIN AB SER-SCNC: 0 NMOL/L (ref 0–0.02)
PANC ISLET CELL IGG SER-ACNC: NORMAL

## 2022-05-06 ENCOUNTER — OFFICE VISIT (OUTPATIENT)
Dept: DIABETES | Facility: CLINIC | Age: 52
End: 2022-05-06
Payer: COMMERCIAL

## 2022-05-06 DIAGNOSIS — E10.65 TYPE 1 DIABETES MELLITUS WITH HYPERGLYCEMIA: Primary | ICD-10-CM

## 2022-05-06 DIAGNOSIS — E55.9 VITAMIN D DEFICIENCY: ICD-10-CM

## 2022-05-06 PROCEDURE — 3066F PR DOCUMENTATION OF TREATMENT FOR NEPHROPATHY: ICD-10-PCS | Mod: CPTII,95,, | Performed by: PHYSICIAN ASSISTANT

## 2022-05-06 PROCEDURE — 99214 OFFICE O/P EST MOD 30 MIN: CPT | Mod: 95,,, | Performed by: PHYSICIAN ASSISTANT

## 2022-05-06 PROCEDURE — 99214 PR OFFICE/OUTPT VISIT, EST, LEVL IV, 30-39 MIN: ICD-10-PCS | Mod: 95,,, | Performed by: PHYSICIAN ASSISTANT

## 2022-05-06 PROCEDURE — 3061F NEG MICROALBUMINURIA REV: CPT | Mod: CPTII,95,, | Performed by: PHYSICIAN ASSISTANT

## 2022-05-06 PROCEDURE — 3061F PR NEG MICROALBUMINURIA RESULT DOCUMENTED/REVIEW: ICD-10-PCS | Mod: CPTII,95,, | Performed by: PHYSICIAN ASSISTANT

## 2022-05-06 PROCEDURE — 3051F HG A1C>EQUAL 7.0%<8.0%: CPT | Mod: CPTII,95,, | Performed by: PHYSICIAN ASSISTANT

## 2022-05-06 PROCEDURE — 3072F LOW RISK FOR RETINOPATHY: CPT | Mod: CPTII,95,, | Performed by: PHYSICIAN ASSISTANT

## 2022-05-06 PROCEDURE — 3066F NEPHROPATHY DOC TX: CPT | Mod: CPTII,95,, | Performed by: PHYSICIAN ASSISTANT

## 2022-05-06 PROCEDURE — 3072F PR LOW RISK FOR RETINOPATHY: ICD-10-PCS | Mod: CPTII,95,, | Performed by: PHYSICIAN ASSISTANT

## 2022-05-06 PROCEDURE — 3051F PR MOST RECENT HEMOGLOBIN A1C LEVEL 7.0 - < 8.0%: ICD-10-PCS | Mod: CPTII,95,, | Performed by: PHYSICIAN ASSISTANT

## 2022-05-06 RX ORDER — INSULIN LISPRO-AABC 100 [IU]/ML
INJECTION, SOLUTION SUBCUTANEOUS
Qty: 2 PEN | Refills: 11 | Status: SHIPPED | OUTPATIENT
Start: 2022-05-06 | End: 2022-06-16 | Stop reason: SDUPTHER

## 2022-05-06 RX ORDER — INSULIN GLARGINE 100 [IU]/ML
16 INJECTION, SOLUTION SUBCUTANEOUS DAILY
Qty: 6 ML | Refills: 11 | Status: SHIPPED | OUTPATIENT
Start: 2022-05-06 | End: 2022-06-16 | Stop reason: SDUPTHER

## 2022-05-06 NOTE — PROGRESS NOTES
PCP: Tristian Bearden MD    Subjective:     Chief Complaint: Diabetes - Established Patient    Established Patient - Audio Only Telehealth Visit     The patient location is: Home  The chief complaint leading to consultation is: Diabetes follow up  Visit type: Virtual visit with audio only (telephone)  Total Time Spent with Patient: 14 minutes     The reason for the audio only service rather than synchronous audio and video virtual visit was related to technical difficulties or patient preference/necessity.     Each patient to whom I provide medical services by telemedicine is:  (1) informed of the relationship between the physician and patient and the respective role of any other health care provider with respect to management of the patient; and (2) notified that they may decline to receive medical services by telemedicine and may withdraw from such care at any time. Patient verbally consented to receive this service via voice-only telephone call.    This service was not originating from a related E/M service provided within the previous 7 days nor will  to an E/M service or procedure within the next 24 hours or my soonest available appointment.  Prevailing standard of care was able to be met in this audio-only visit.       HISTORY OF PRESENT ILLNESS: 51 y.o.   female presenting for diabetes management visit.   The patient's last visit with me was on 4/27/2022.  Patient has had Type II diabetes since 5 years or more.  Pertinent to decision making is the following comorbidities: Vitamin D Deficiency and Gestational DM in Pregnancy 2003  Patient has the following Diabetes complications: without complications  She  has attended diabetes education in the past.     Patient's most recent A1c of 7.2% was completed 2 months ago.   Patient states since Her last A1c Her blood glucose levels have been high  after meals.   Patient monitors blood glucose 2 times per day with meter : Fasting and Before  Bed.   Patient blood glucose monitoring device will not be uploaded into [employer] [facility] today secondary to patient forgetting device.   Interpretation of CGM includes an average blood glucose of 206 mg/dL with a standard deviation of 71. GMI of N/A%. Patient's time in range of 38%, time above range of 61%, and time below range of <1%.   Patients records show near hypoglycemia and some postprandial hyperglycemia.   Patient endorses the following diabetes related symptoms: None.   Patient is due today for the following diabetes-related health maintenance standards: COVID-19 Vaccine .   She denies recent hospital admissions or emergency room visits.   She voices having hypoglycemia as above.   Patient's concerns today include glycemic control.   Patient medication regimen is as below.     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       Lab Results   Component Value Date    CPEPTIDE 0.72 (L) 04/28/2022     Lab Results   Component Value Date    GLUTAMICACID 0.00 09/28/2020          //   , There is no height or weight on file to calculate BMI.  Her blood sugar in clinic today is:    Lab Results   Component Value Date    POCGLU 180 (A) 04/27/2022       Review of Systems   Constitutional: Negative for activity change, appetite change, chills and fever.   HENT: Negative for dental problem, mouth sores, nosebleeds, sore throat and trouble swallowing.    Eyes: Negative for pain and discharge.   Respiratory: Negative for shortness of breath, wheezing and stridor.    Cardiovascular: Negative for chest pain, palpitations and leg swelling.   Gastrointestinal: Negative for abdominal pain, diarrhea, nausea and vomiting.   Endocrine:  Negative for polydipsia, polyphagia and polyuria.   Genitourinary: Negative for dysuria, frequency and urgency.   Musculoskeletal: Negative for joint swelling and myalgias.   Skin: Negative for rash and wound.   Neurological: Negative for dizziness, syncope, weakness and headaches.   Psychiatric/Behavioral: Negative for behavioral problems and dysphoric mood.         Diabetes Management Status  Statin: Not taking  ACE/ARB: Not taking    Screening or Prevention Patient's value Goal Complete/Controlled?   HgA1C Testing and Control   Lab Results   Component Value Date    HGBA1C 7.2 (H) 02/01/2022      Annually/Less than 8% No   Lipid profile : 01/27/2022 Annually Yes   LDL control  Lab Results   Component Value Date    LDLCALC 95.6 01/27/2022    Annually/Less than 100 mg/dl  Yes   Nephropathy screening Lab Results   Component Value Date    MICALBCREAT 8.5 04/28/2022     Lab Results   Component Value Date    PROTEINUA Negative 01/26/2021    Annually No   Blood pressure BP Readings from Last 1 Encounters:   04/28/22 120/78    Less than 140/90 Yes   Dilated retinal exam : 07/23/2021 Annually Yes    Foot exam   : 11/30/2021 Annually No     Social History     Socioeconomic History    Marital status: Single    Number of children: 2   Occupational History    Occupation:      Employer: [employer]     Employer: [employer]   Tobacco Use    Smoking status: Never Smoker    Smokeless tobacco: Never Used   Substance and Sexual Activity    Alcohol use: Yes     Alcohol/week: 0.0 standard drinks     Comment: rare    Drug use: No    Sexual activity: Yes     Partners: Male     Birth control/protection: Surgical   Social History Narrative    She wears seatbelt.     Past Medical History:   Diagnosis Date    Abnormal Pap smear     Abnormal Pap smear of cervix     Arthritis, low back     Diabetes 09/2014     BS 162am 09/16/2014    DM (diabetes mellitus) 09/2014     am 01/26/2016    DM  (diabetes mellitus) 09/2014    BS didn't check 01/31/2017    GERD (gastroesophageal reflux disease)     Gestational diabetes     Hepatitis C antibody positive in blood 02/11/2022    RNA NEGATIVE    History of abnormal Pap smear     Surgical menopause     Vitamin D deficiency        Objective:        Physical Exam  Neurological:      Mental Status: She is alert and oriented to person, place, and time. Mental status is at baseline.   Psychiatric:         Mood and Affect: Mood normal.         Behavior: Behavior normal.         Thought Content: Thought content normal.         Judgment: Judgment normal.             Assessment / Plan:     Type 1 diabetes mellitus with hyperglycemia  -     insulin lispro-aabc (LYUMJEV KWIKPEN U-100 INSULIN) 100 unit/mL pen; Titrate up to 20 units daily.  Dispense: 2 pen; Refill: 11  -     insulin (BASAGLAR KWIKPEN U-100 INSULIN) glargine 100 units/mL (3mL) SubQ pen; Inject 16 Units into the skin once daily.  Dispense: 6 mL; Refill: 11    Vitamin D deficiency      Additional Plan Details:    - POCT Glucose  - Encouraged continuation of lifestyle changes including regular exercise and limiting carbohydrates to 30-45 grams per meal threes times daily and 15 grams per snack with a limit of two daily.   - Encouraged continued monitoring of blood glucose with maintenance of 4 times daily at Fasting, Before Lunch, Before Dinner and Before Bed.   - Current DM Medication Regimen: Change Lantus to Basaglar 16 units daily. Stop Metformin  mg at night. Stop Actos 15 mg daily. Continue Januvia 100 mg daily. Start Lyumjev 3 units with meals and correction dosing every 3 hours as needed - as below.   - Health Maintenance standards addressed today: Statin therapy - - defer to PCP and COVID - 19 Vaccine [facility]   - Nursing Visit: Patient is below goal range for nursing visit for age group and will not need nursing visit at this time .   - Follow up in 6 weeks.        Blakeney McKnight, PA-C [facility]

## 2022-05-06 NOTE — PATIENT INSTRUCTIONS
CURRENT DM MEDICATIONS:   Basaglar 16 units daily - finish Lantus 16 units daily supply first   STOP Actos 15 mg   Januvia 100 mg daily  Lyumjev 3 units with meals  Lyumjev correction dosing every 3 hours as needed - as below    Lyumjev Correction Dosing every 3 hours as needed OUTSIDE OF EATING  If  - 250, may take 2 units of Lyumjev  If  - 300, may take 3 units of Lyumjev  If  - 350, may take 4 units of Lyumjev  If  - 400, may take 5 units of Lyumjev  If +, may take 6 units of Lyumjev      [facility]  Call [phone]; ask for coupon for Basaglar and Lyumjev

## 2022-05-17 ENCOUNTER — PATIENT MESSAGE (OUTPATIENT)
Dept: DIABETES | Facility: CLINIC | Age: 52
End: 2022-05-17
Payer: COMMERCIAL

## 2022-06-01 ENCOUNTER — PATIENT MESSAGE (OUTPATIENT)
Dept: DIABETES | Facility: CLINIC | Age: 52
End: 2022-06-01
Payer: COMMERCIAL

## 2022-06-01 PROBLEM — E11.65 UNCONTROLLED TYPE 2 DIABETES MELLITUS WITH HYPERGLYCEMIA, WITHOUT LONG-TERM CURRENT USE OF INSULIN: Status: RESOLVED | Noted: 2017-09-14 | Resolved: 2022-06-01

## 2022-06-01 PROBLEM — E10.65 TYPE 1 DIABETES MELLITUS WITH HYPERGLYCEMIA: Status: ACTIVE | Noted: 2022-06-01

## 2022-06-03 ENCOUNTER — OFFICE VISIT (OUTPATIENT)
Dept: CARDIOLOGY | Facility: CLINIC | Age: 52
End: 2022-06-03
Payer: COMMERCIAL

## 2022-06-03 VITALS
HEART RATE: 66 BPM | WEIGHT: 128.5 LBS | HEIGHT: 64 IN | SYSTOLIC BLOOD PRESSURE: 112 MMHG | BODY MASS INDEX: 21.94 KG/M2 | DIASTOLIC BLOOD PRESSURE: 56 MMHG | RESPIRATION RATE: 16 BRPM | OXYGEN SATURATION: 98 %

## 2022-06-03 DIAGNOSIS — R07.9 CHEST PAIN, MODERATE CORONARY ARTERY RISK: Primary | ICD-10-CM

## 2022-06-03 DIAGNOSIS — E10.65 TYPE 1 DIABETES MELLITUS WITH HYPERGLYCEMIA: ICD-10-CM

## 2022-06-03 PROCEDURE — 99999 PR PBB SHADOW E&M-EST. PATIENT-LVL IV: CPT | Mod: PBBFAC,,, | Performed by: INTERNAL MEDICINE

## 2022-06-03 PROCEDURE — 99999 PR PBB SHADOW E&M-EST. PATIENT-LVL IV: ICD-10-PCS | Mod: PBBFAC,,, | Performed by: INTERNAL MEDICINE

## 2022-06-03 PROCEDURE — 1160F PR REVIEW ALL MEDS BY PRESCRIBER/CLIN PHARMACIST DOCUMENTED: ICD-10-PCS | Mod: CPTII,S$GLB,, | Performed by: INTERNAL MEDICINE

## 2022-06-03 PROCEDURE — 99214 PR OFFICE/OUTPT VISIT, EST, LEVL IV, 30-39 MIN: ICD-10-PCS | Mod: S$GLB,,, | Performed by: INTERNAL MEDICINE

## 2022-06-03 PROCEDURE — 99214 OFFICE O/P EST MOD 30 MIN: CPT | Mod: S$GLB,,, | Performed by: INTERNAL MEDICINE

## 2022-06-03 PROCEDURE — 3051F PR MOST RECENT HEMOGLOBIN A1C LEVEL 7.0 - < 8.0%: ICD-10-PCS | Mod: CPTII,S$GLB,, | Performed by: INTERNAL MEDICINE

## 2022-06-03 PROCEDURE — 3072F LOW RISK FOR RETINOPATHY: CPT | Mod: CPTII,S$GLB,, | Performed by: INTERNAL MEDICINE

## 2022-06-03 PROCEDURE — 3066F PR DOCUMENTATION OF TREATMENT FOR NEPHROPATHY: ICD-10-PCS | Mod: CPTII,S$GLB,, | Performed by: INTERNAL MEDICINE

## 2022-06-03 PROCEDURE — 3066F NEPHROPATHY DOC TX: CPT | Mod: CPTII,S$GLB,, | Performed by: INTERNAL MEDICINE

## 2022-06-03 PROCEDURE — 3074F PR MOST RECENT SYSTOLIC BLOOD PRESSURE < 130 MM HG: ICD-10-PCS | Mod: CPTII,S$GLB,, | Performed by: INTERNAL MEDICINE

## 2022-06-03 PROCEDURE — 3008F PR BODY MASS INDEX (BMI) DOCUMENTED: ICD-10-PCS | Mod: CPTII,S$GLB,, | Performed by: INTERNAL MEDICINE

## 2022-06-03 PROCEDURE — 3072F PR LOW RISK FOR RETINOPATHY: ICD-10-PCS | Mod: CPTII,S$GLB,, | Performed by: INTERNAL MEDICINE

## 2022-06-03 PROCEDURE — 3061F PR NEG MICROALBUMINURIA RESULT DOCUMENTED/REVIEW: ICD-10-PCS | Mod: CPTII,S$GLB,, | Performed by: INTERNAL MEDICINE

## 2022-06-03 PROCEDURE — 3074F SYST BP LT 130 MM HG: CPT | Mod: CPTII,S$GLB,, | Performed by: INTERNAL MEDICINE

## 2022-06-03 PROCEDURE — 1159F MED LIST DOCD IN RCRD: CPT | Mod: CPTII,S$GLB,, | Performed by: INTERNAL MEDICINE

## 2022-06-03 PROCEDURE — 1160F RVW MEDS BY RX/DR IN RCRD: CPT | Mod: CPTII,S$GLB,, | Performed by: INTERNAL MEDICINE

## 2022-06-03 PROCEDURE — 3078F PR MOST RECENT DIASTOLIC BLOOD PRESSURE < 80 MM HG: ICD-10-PCS | Mod: CPTII,S$GLB,, | Performed by: INTERNAL MEDICINE

## 2022-06-03 PROCEDURE — 1159F PR MEDICATION LIST DOCUMENTED IN MEDICAL RECORD: ICD-10-PCS | Mod: CPTII,S$GLB,, | Performed by: INTERNAL MEDICINE

## 2022-06-03 PROCEDURE — 3008F BODY MASS INDEX DOCD: CPT | Mod: CPTII,S$GLB,, | Performed by: INTERNAL MEDICINE

## 2022-06-03 PROCEDURE — 3061F NEG MICROALBUMINURIA REV: CPT | Mod: CPTII,S$GLB,, | Performed by: INTERNAL MEDICINE

## 2022-06-03 PROCEDURE — 3051F HG A1C>EQUAL 7.0%<8.0%: CPT | Mod: CPTII,S$GLB,, | Performed by: INTERNAL MEDICINE

## 2022-06-03 PROCEDURE — 3078F DIAST BP <80 MM HG: CPT | Mod: CPTII,S$GLB,, | Performed by: INTERNAL MEDICINE

## 2022-06-03 RX ORDER — ISOSORBIDE MONONITRATE 30 MG/1
30 TABLET, EXTENDED RELEASE ORAL DAILY
Qty: 30 TABLET | Refills: 11 | Status: SHIPPED | OUTPATIENT
Start: 2022-06-03 | End: 2023-04-10

## 2022-06-03 NOTE — PROGRESS NOTES
Subjective:   Patient ID:  Chan Dwyer is a 51 y.o. female who presents for follow-up of No chief complaint on file.  echo and stress test is nml. Pt still with exertional CP    CRF- DM, age/female, family hx of CAD    Chest Pain   This is a new problem. The current episode started 1 to 4 weeks ago. The problem occurs intermittently. The problem has been waxing and waning. The pain is present in the lateral region. The pain is mild. The quality of the pain is described as dull. The pain does not radiate. Pertinent negatives include no dizziness, palpitations or shortness of breath. The pain is aggravated by exertion. She has tried rest for the symptoms. The treatment provided mild relief.   Her past medical history is significant for diabetes.   Pertinent negatives for past medical history include no muscle weakness.       Review of Systems   Constitutional: Negative. Negative for weight gain.   HENT: Negative.    Eyes: Negative.    Cardiovascular: Negative.  Negative for chest pain, leg swelling and palpitations.   Respiratory: Negative.  Negative for shortness of breath.    Endocrine: Negative.    Hematologic/Lymphatic: Negative.    Skin: Negative.    Musculoskeletal: Negative for muscle weakness.   Gastrointestinal: Negative.    Genitourinary: Negative.    Neurological: Negative.  Negative for dizziness.   Psychiatric/Behavioral: Negative.    Allergic/Immunologic: Negative.      Family History   Problem Relation Age of Onset    Hypertension Mother     Diabetes Mother     Heart attack Mother     Diabetes Father     Hypertension Father     Stomach cancer Sister     No Known Problems Son     No Known Problems Son     Cancer Neg Hx     Stroke Neg Hx     Colon cancer Neg Hx     Ovarian cancer Neg Hx      Past Medical History:   Diagnosis Date    Abnormal Pap smear     Abnormal Pap smear of cervix     Arthritis, low back     Diabetes 09/2014     BS 162am 09/16/2014    DM (diabetes  mellitus) 09/2014     am 01/26/2016    DM (diabetes mellitus) 09/2014    BS didn't check 01/31/2017    GERD (gastroesophageal reflux disease)     Gestational diabetes     Hepatitis C antibody positive in blood 02/11/2022    RNA NEGATIVE    History of abnormal Pap smear     Surgical menopause     Vitamin D deficiency      Social History     Socioeconomic History    Marital status: Single    Number of children: 2   Occupational History    Occupation:      Employer: [employer]     Employer: [employer]   Tobacco Use    Smoking status: Never Smoker    Smokeless tobacco: Never Used   Substance and Sexual Activity    Alcohol use: Yes     Alcohol/week: 0.0 standard drinks     Comment: rare    Drug use: No    Sexual activity: Yes     Partners: Male     Birth control/protection: Surgical   Social History Narrative    She wears seatbelt.     Current Outpatient Medications on File Prior to Visit   Medication Sig Dispense Refill    acetaminophen (TYLENOL) 500 MG tablet Take 1,000 mg by mouth every 6 (six) hours as needed for Pain.      alcohol swabs (EASY COMFORT ALCOHOL PAD) PadM Apply 1 each topically 4 (four) times daily. 100 each 11    aspirin (ECOTRIN) 81 MG EC tablet Take 1 tablet (81 mg total) by mouth once daily. 30 tablet 11    azelastine (ASTELIN) 137 mcg (0.1 %) nasal spray 1 spray (137 mcg total) by Nasal route 2 (two) times daily. 30 mL 1    blood sugar diagnostic Strp To check BG 4 times daily, to use with insurance preferred meter 120 each 11    insulin (BASAGLAR KWIKPEN U-100 INSULIN) glargine 100 units/mL (3mL) SubQ pen Inject 16 Units into the skin once daily. 6 mL 11    insulin lispro-aabc (LYUMJEV KWIKPEN U-100 INSULIN) 100 unit/mL pen Titrate up to 20 units daily. 2 pen 11    lancets Misc 1 each by Misc.(Non-Drug; Combo Route) route 4 (four) times daily. 100 each 11    olopatadine (PATADAY) 0.2 % Drop Place 1 drop into both eyes once daily. 5 mL 6  "   pantoprazole (PROTONIX) 20 MG tablet Take 1 tablet (20 mg total) by mouth once daily. 30 tablet 11    pen needle, diabetic (BD ULTRA-FINE KIYA PEN NEEDLE) 32 gauge x 5/32" Ndle 1 each by Misc.(Non-Drug; Combo Route) route 4 (four) times daily with meals and nightly. 100 each 3    SITagliptin (JANUVIA) 100 MG Tab Take 1 tablet (100 mg total) by mouth once daily. 30 tablet 11    vitamin D 1000 units Tab Take 1,000 Units by mouth once daily.      pioglitazone (ACTOS) 15 MG tablet Take 1 tablet (15 mg total) by mouth once daily. 30 tablet 11     No current facility-administered medications on file prior to visit.     Review of patient's allergies indicates:   Allergen Reactions    Hydrocodone Hives and Rash       Objective:     Physical Exam  Vitals and nursing note reviewed.   Constitutional:       Appearance: She is well-developed.   HENT:      Head: Normocephalic and atraumatic.   Eyes:      Conjunctiva/sclera: Conjunctivae normal.      Pupils: Pupils are equal, round, and reactive to light.   Cardiovascular:      Rate and Rhythm: Normal rate and regular rhythm.      Pulses: Intact distal pulses.      Heart sounds: Normal heart sounds.   Pulmonary:      Effort: Pulmonary effort is normal.      Breath sounds: Normal breath sounds.   Abdominal:      General: Bowel sounds are normal.      Palpations: Abdomen is soft.   Musculoskeletal:         General: Normal range of motion.      Cervical back: Normal range of motion and neck supple.   Skin:     General: Skin is warm and dry.   Neurological:      Mental Status: She is alert and oriented to person, place, and time.         Assessment:     1. Chest pain, moderate coronary artery risk    2. Type 1 diabetes mellitus with hyperglycemia        Plan:     Chest pain, moderate coronary artery risk    Type 1 diabetes mellitus with hyperglycemia      Continue asa- primary prevention  Start imdur  "

## 2022-06-13 ENCOUNTER — PATIENT MESSAGE (OUTPATIENT)
Dept: DIABETES | Facility: CLINIC | Age: 52
End: 2022-06-13
Payer: COMMERCIAL

## 2022-06-16 ENCOUNTER — TELEPHONE (OUTPATIENT)
Dept: DIABETES | Facility: CLINIC | Age: 52
End: 2022-06-16
Payer: COMMERCIAL

## 2022-06-16 ENCOUNTER — CLINICAL SUPPORT (OUTPATIENT)
Dept: DIABETES | Facility: CLINIC | Age: 52
End: 2022-06-16
Payer: COMMERCIAL

## 2022-06-16 VITALS — WEIGHT: 129 LBS | BODY MASS INDEX: 22.02 KG/M2 | HEIGHT: 64 IN

## 2022-06-16 DIAGNOSIS — E10.65 TYPE 1 DIABETES MELLITUS WITH HYPERGLYCEMIA: ICD-10-CM

## 2022-06-16 DIAGNOSIS — E10.65 TYPE 1 DIABETES MELLITUS WITH HYPERGLYCEMIA: Primary | ICD-10-CM

## 2022-06-16 PROCEDURE — G0108 PR DIAB MANAGE TRN  PER INDIV: ICD-10-PCS | Mod: S$GLB,,, | Performed by: DIETITIAN, REGISTERED

## 2022-06-16 PROCEDURE — G0108 DIAB MANAGE TRN  PER INDIV: HCPCS | Mod: S$GLB,,, | Performed by: DIETITIAN, REGISTERED

## 2022-06-16 PROCEDURE — 99999 PR PBB SHADOW E&M-EST. PATIENT-LVL III: ICD-10-PCS | Mod: PBBFAC,,, | Performed by: DIETITIAN, REGISTERED

## 2022-06-16 PROCEDURE — 99999 PR PBB SHADOW E&M-EST. PATIENT-LVL III: CPT | Mod: PBBFAC,,, | Performed by: DIETITIAN, REGISTERED

## 2022-06-16 RX ORDER — INSULIN GLARGINE 100 [IU]/ML
16 INJECTION, SOLUTION SUBCUTANEOUS DAILY
Qty: 6 ML | Refills: 11 | Status: SHIPPED | OUTPATIENT
Start: 2022-06-16 | End: 2022-06-29 | Stop reason: SDUPTHER

## 2022-06-16 RX ORDER — INSULIN LISPRO-AABC 100 [IU]/ML
INJECTION, SOLUTION SUBCUTANEOUS
Qty: 5 PEN | Refills: 11 | Status: SHIPPED | OUTPATIENT
Start: 2022-06-16 | End: 2022-06-29 | Stop reason: SDUPTHER

## 2022-06-16 NOTE — TELEPHONE ENCOUNTER
Will Rx basaglar and lyumjev to [facility] for pt to use high deductible coupon to bring price down to $35.     Please let pt know.     Odilia Shaffer PA-C  Diabetes Management

## 2022-06-16 NOTE — LETTER
June 16, 2022    Chan Dwyer  [address] [address] [address]             [facility]  Diabetes  [address] [address]  Phone: [phone]  Fax: [phone]   June 16, 2022     Patient: Chan Dwyer   YOB: 1970   Date of Visit: 6/16/2022       To Whom it May Concern:    Chan Dwyer was seen in my clinic on 6/16/2022.  Please excuse her from any work missed.    If you have any questions or concerns, please don't hesitate to call.    Sincerely,         Jonah Roman RD

## 2022-06-16 NOTE — PATIENT INSTRUCTIONS
Have pharmacy run the coupon for insulin against the uncovered price.     2. Call [facility] about status of your own Dexcom supplies       [phone]    3. Call [employer] directly for the overlay patches. There should not be    any cost for these. [phone]

## 2022-06-16 NOTE — PROGRESS NOTES
"Diabetes Care Specialist Progress Note  Author: Jonah Roman, FARHAT  Date: 6/16/2022    Program Intake  Reason for Diabetes Program Visit:: Intervention  Type of Intervention:: Individual  Individual: Education  Education: Self-Management Skill Review, Nutrition and Meal Planning  Current diabetes risk level:: low    Lab Results   Component Value Date    HGBA1C 7.2 (H) 02/01/2022     CURRENT DM MEDICATIONS:    Januvia, 100 mg   Lantus, 22 units    Lyumjev ordered but never started      Clinical    Weight: 58.5 kg (128 lb 15.5 oz)   Height: 5' 4" (162.6 cm)   Body mass index is 22.14 kg/m².  Wt gain of 3 lbs since last visit on 2/1/2022    Clarity report in media:  Reporting period: May 30 - June 12  Device used:  Phone  Sensor usage: 71%  Days with CGM data: 10/14  -----------------------------  Glucose Details  Average glucose: 180 mg/dL  Standard deviation: 46 mg/dL  -----------------------------  52% Time in range   42% High  6% Very high  0% Low  0% Very low  -----------------------------    Obeservations/patterns:    Postprandial hyperglycemia since she has not started on meal-time insulin      Problem Review  Reviewed Problem List with Patient: yes  Active comorbidities affecting diabetes self-care.: no  Reviewed health maintenance: yes    Clinical Assessment  Current Diabetes Treatment: Oral Medication, Insulin, Diet  Have you ever experienced hypoglycemia (low blood sugar)?: no  Have you ever experienced hyperglycemia (high blood sugar)?: yes  In the last month, how often have you experienced high blood sugar?: more than once a day  Have you ever been hospitalized because your blood sugar was high?: no    Medication Information  How do you obtain your medications?: Patient drives  How many days a week do you miss your medications?: Never  Do you sometimes have difficulty refilling your medications?: Yes (see comment) (pt has had delay in picking up insulin due to cost. Clarified how the coupon works so " she can  soon for $35.)  Medication adherence impacting ability to self-manage diabetes?: Yes    Labs  Do you have regular lab work to monitor your medications?: Yes  Type of Regular Lab Work: A1c, Cholesterol, Microalbumin, CBC, BMP  Where do you get your labs drawn?: [facility]  Lab Compliance Barriers: No    Nutritional Status  Diet: Diabetic diet  Meal Plan 24 Hour Recall: Breakfast, Lunch, Dinner, Snack  Meal Plan 24 Hour Recall - Breakfast: pancake with very little syrup, sausage; water  Meal Plan 24 Hour Recall - Lunch: Whojewelser Jr, few FF; water  Meal Plan 24 Hour Recall - Dinner: red beans and rice, 1 fried chicken wing; water  Meal Plan 24 Hour Recall - Snack: none  Change in appetite?: No  Recent Changes in Weight: No Recent Weight Change  Current nutritional status an area of need that is impacting patient's ability to self-manage diabetes?: No       Additional Social History    Support  Does anyone support you with your diabetes care?: yes  Who supports you?: spouse, self  Who takes you to your medical appointments?: self  Does the current support meet the patient's needs?: Yes  Is Support an area impacting ability to self-manage diabetes?: No    Access to Mass Media & Technology  Does the patient have access to any of the following devices or technologies?: Smart phone, Home computer, Internet Access  Media or technology needs impacting ability to self-manage diabetes?: No    Cognitive/Behavioral Health  Alert and Oriented: Yes  Difficulty Thinking: No  Requires Prompting: No  Requires assistance for routine expression?: No  Cognitive or behavioral barriers impacting ability to self-manage diabetes?: No    Communication  Language preference: English  Hearing Problems: No  Vision Problems: Yes  Vision problem type:: Decreased Vision  Vision Assistance: Glasses  Communication needs impacting ability to self-manage diabetes?: No    Health Literacy  Preferred Learning Method: Face to Face  How often do  you need to have someone help you read instructions, pamphlets, or written material from your doctor or pharmacy?: Never  Health literacy needs impacting ability to self-manage diabetes?: No      Diabetes Self-Management Skills Assessment    Diabetes Disease Process/Treatment Options  Patient/caregiver able to state what happens when someone has diabetes.: yes  Patient/caregiver knows what type of diabetes they have.: yes  Diabetes Type : Type I  Patient/caregiver able to identify at least three signs and symptoms of diabetes.: yes  Identified signs and symptoms:: fatigue, frequent urination, increased thirst  Diabetes Disease Process/Treatment Options: Skills Assessment Completed: Yes  Assessment indicates:: Knowledge deficit (asked questions about labs that showed type 1 DM)  Area of need?: Yes    Nutrition/Healthy Eating  Method of carbohydrate measurement:: eyeballing/guessing, carb counting/reading labels  Patient can identify foods that impact blood sugar.: yes  Patient-identified foods:: soda, sweets, starches (bread, pasta, rice, cereal), starchy vegetables (corn, peas, beans)  Nutrition/Healthy Eating Skills Assessment Completed:: Yes  Assessment indicates:: Other (comment) (needs review and details for carb counting)  Area of need?: Yes    Physical Activity/Exercise  Physical Activity/Exercise Skills Assessment Completed: : No  Deffered due to:: Time  Area of need?:  (deferred)    Medications  Patient is able to describe current diabetes management routine.: yes  Diabetes management routine:: diet, oral medications, insulin  Patient is able to identify current diabetes medications, dosages, and appropriate timing of medications.: no  Patient understands the purpose of the medications taken for diabetes.: no  Patient reports problems or concerns with current medication regimen.: no  Medication Skills Assessment Completed:: Yes  Assessment indicates:: Instruction Needed  Area of need?: Yes    Home Blood  Glucose Monitoring  Patient states that blood sugar is checked at home daily.: yes  Monitoring Method:: home glucometer, personal continuous glucose monitor  How often do you check your blood sugar?: 3 times a day  When do you check your blood sugar?: Before breakfast, Before lunch, Before dinner  Blood glucose logs:: no  Blood glucose logs reviewed today?: no  Personal CGM type:: waiting on personal Dexcom supplies. She has been using samples from clinic.  Patient is able to use personal CGM appropriately.: no  CGM Report reviewed?: yes  Home Blood Glucose Monitoring Skills Assessment Completed: : Yes  Assessment indicates:: Instruction Needed  Area of need?: Yes    Acute Complications  Acute Complications Skills Assessment Completed: : No  Deffered due to:: Time  Area of need?:  (deferred)    Chronic Complications  Chronic Complications Skills Assessment Completed: : No  Deferred due to:: Time  Area of need?:  (deferred)    Psychosocial/Coping  Patient can identify ways of coping with chronic disease.: yes  Patient-stated ways of coping with chronic disease:: support from loved ones  Psychosocial/Coping Skills Assessment Completed: : Yes  Assessment indicates:: Adequate understanding  Area of need?: No    Assessment Summary and Plan    Based on today's diabetes care assessment, the following areas of need were identified:      Social 6/16/2022   Support No   Access to Mass Media/Tech No   Cognitive/Behavioral Health No   Culture/Confucianist -   Communication No   Health Literacy No        Clinical 6/16/2022   Medication Adherence Yes   Lab Compliance No   Nutritional Status No        Diabetes Self-Management Skills 6/16/2022   Diabetes Disease Process/Treatment Options Yes   Nutrition/Healthy Eating Yes   Physical Activity/Exercise Deferred    Medication Yes   Home Blood Glucose Monitoring Yes   Acute Complications Deferred    Chronic Complications Deferred    Psychosocial/Coping No          Today's interventions  were provided through individual discussion, instruction, and written materials were provided.      Patient verbalized understanding of instruction and written materials.  Pt was able to return back demonstration of instructions today. Patient understood key points, needs reinforcement and further instruction.     Diabetes Self-Management Care Plan:    Today's Diabetes Self-Management Care Plan was developed with Chan's input. Chan has agreed to work toward the following goal(s) to improve his/her overall diabetes control.      Care Plan: Diabetes Management   Updates made since 5/17/2022 12:00 AM      Problem: Healthy Eating       Goal: Keep carb total for meals at 30-45 grams, silvino while on set dose of Lyumjev    Start Date: 6/16/2022   Expected End Date: 9/16/2022   Priority: Medium   Barriers: No Barriers Identified      Task: Reviewed the sources and role of Carbohydrate, Protein, and Fat and how each nutrient impacts blood sugar. Completed 6/16/2022      Task: Reviewed carb counting as it relates to using I:C for dosing insulin Completed 6/16/2022   Note:    Pt to download NimbusBase Randy arlen      Task: Review the importance of balancing carbohydrates with each meal using portion control techniques to count servings of carbohydrate and label reading to identify serving size and amount of total carbs per serving. Completed 6/16/2022      Problem: Blood Glucose Self-Monitoring       Goal: Patient agrees to check blood sugars with Dexcom CGM    Start Date: 6/16/2022   Expected End Date: 12/16/2022   Priority: Low   Note:    Assisted pt with inserting and starting a new sample sensor today. She was able to start with little guidance.   Advised pt to call DMS to check on her personal Dexcom supplies.   Gave number for Dexcom to call for overlay patches.      Task: Dexcom training Completed 6/16/2022   Note:    Patient arrived with a sample sensor, but no transmitter. She stated that she has used Dexcom  before, but wanted help as she has had errors in the past.   ·  Overview:  5min glucose reading updates, trending arrows, BG graph screens, battery life indicator, Blue Tooth Symbol. No calibration needed.   ·  Menus: trend Graph, start sensor, enter BG, events, Alerts, Settings, Shutdown, Stop Sensor.   ·  Screens and prompts:    * 2 hr warm-up after starting sensor    * Low battery Notification     · The  transmitter ID programmed in .  ·  Settings were already in arlen.       Reviewed sensor site selection. Site selected and prepped using aseptic technique Inserted to abdomen. Transmitter placed in pod and secured.  · Practiced sensor pod/transmitter removal from site, and removal of transmitter from sensor pod.  · Patient able to demonstrate without difficulty.    · Review  problem solving aspects of sensor transmission/ variables that can disrupt RF transmission.  range 20 ft from transmitter.  · Pt instructed on lag time of interstitial fluid from CBG and was advised to tx hypoglycemia and dose insulin only when a glucose reading and trend arrow is present.   · Dexcom technical support contact number given and examples of when to contact them discussed. Pt will download software at home for Dexcom download.      Problem: Medications       Goal: Patient Agrees to take Diabetes Medication(s) as prescribed.    Start Date: 6/16/2022   Expected End Date: 12/16/2022   Priority: High   Barriers: No Barriers Identified   Note:    Pt had coupons for both Basaglar and Lyumjev - $35 each.   Pt said that one rang up for $35 but the other was $400.   She did not get either because she thought she needed to start both at the same time.   Contacted Odilia Shaffer's nurse who gave instructions on how to get both for $35.   Pharmacy will need to run the coupon against the uncovered price.  Advised pt to call pharmacy today.   She can use the rest of her Lantus and then switch to  Valentineaglar.   She is to start Lyumjev, 3 units right before meals as soon as she can.      Task: Reviewed with patient all current diabetes medications and provided basic review of the purpose, dosage, frequency, side effects, and storage of both oral and injectable diabetes medications. Completed 6/16/2022      Task: Reviewed possible resources for acquiring cost prohibitive medication. Completed 6/16/2022          Follow Up Plan     Follow up in about 4 weeks (around 7/14/2022) for review of food record with carb count and follow up on insulin to carb dosing.    Today's care plan and follow up schedule was discussed with patient.  Chan verbalized understanding of the care plan, goals, and agrees to follow up plan.        The patient was encouraged to communicate with his/her health care provider/physician and care team regarding his/her condition(s) and treatment.  I provided the patient with my contact information today and encouraged to contact me via phone or [facility] Portal as needed.     Length of Visit   Total Time: 90 Minutes

## 2022-06-29 ENCOUNTER — OFFICE VISIT (OUTPATIENT)
Dept: DIABETES | Facility: CLINIC | Age: 52
End: 2022-06-29
Payer: COMMERCIAL

## 2022-06-29 ENCOUNTER — IMMUNIZATION (OUTPATIENT)
Dept: PHARMACY | Facility: CLINIC | Age: 52
End: 2022-06-29
Payer: COMMERCIAL

## 2022-06-29 VITALS
HEART RATE: 53 BPM | BODY MASS INDEX: 22.1 KG/M2 | DIASTOLIC BLOOD PRESSURE: 69 MMHG | WEIGHT: 128.75 LBS | SYSTOLIC BLOOD PRESSURE: 129 MMHG

## 2022-06-29 DIAGNOSIS — Z23 NEED FOR VACCINATION: Primary | ICD-10-CM

## 2022-06-29 DIAGNOSIS — E55.9 VITAMIN D DEFICIENCY: ICD-10-CM

## 2022-06-29 DIAGNOSIS — E10.65 TYPE 1 DIABETES MELLITUS WITH HYPERGLYCEMIA: Primary | ICD-10-CM

## 2022-06-29 LAB — GLUCOSE SERPL-MCNC: 141 MG/DL (ref 70–110)

## 2022-06-29 PROCEDURE — 3061F NEG MICROALBUMINURIA REV: CPT | Mod: CPTII,S$GLB,, | Performed by: PHYSICIAN ASSISTANT

## 2022-06-29 PROCEDURE — 1159F PR MEDICATION LIST DOCUMENTED IN MEDICAL RECORD: ICD-10-PCS | Mod: CPTII,S$GLB,, | Performed by: PHYSICIAN ASSISTANT

## 2022-06-29 PROCEDURE — 99214 PR OFFICE/OUTPT VISIT, EST, LEVL IV, 30-39 MIN: ICD-10-PCS | Mod: S$GLB,,, | Performed by: PHYSICIAN ASSISTANT

## 2022-06-29 PROCEDURE — 95251 CONT GLUC MNTR ANALYSIS I&R: CPT | Mod: S$GLB,,, | Performed by: PHYSICIAN ASSISTANT

## 2022-06-29 PROCEDURE — 3008F BODY MASS INDEX DOCD: CPT | Mod: CPTII,S$GLB,, | Performed by: PHYSICIAN ASSISTANT

## 2022-06-29 PROCEDURE — 3072F LOW RISK FOR RETINOPATHY: CPT | Mod: CPTII,S$GLB,, | Performed by: PHYSICIAN ASSISTANT

## 2022-06-29 PROCEDURE — 3066F PR DOCUMENTATION OF TREATMENT FOR NEPHROPATHY: ICD-10-PCS | Mod: CPTII,S$GLB,, | Performed by: PHYSICIAN ASSISTANT

## 2022-06-29 PROCEDURE — 99999 PR PBB SHADOW E&M-EST. PATIENT-LVL III: ICD-10-PCS | Mod: PBBFAC,,, | Performed by: PHYSICIAN ASSISTANT

## 2022-06-29 PROCEDURE — 3061F PR NEG MICROALBUMINURIA RESULT DOCUMENTED/REVIEW: ICD-10-PCS | Mod: CPTII,S$GLB,, | Performed by: PHYSICIAN ASSISTANT

## 2022-06-29 PROCEDURE — 3078F PR MOST RECENT DIASTOLIC BLOOD PRESSURE < 80 MM HG: ICD-10-PCS | Mod: CPTII,S$GLB,, | Performed by: PHYSICIAN ASSISTANT

## 2022-06-29 PROCEDURE — 3072F PR LOW RISK FOR RETINOPATHY: ICD-10-PCS | Mod: CPTII,S$GLB,, | Performed by: PHYSICIAN ASSISTANT

## 2022-06-29 PROCEDURE — 95251 PR GLUCOSE MONITOR, 72 HOUR, PHYS INTERP: ICD-10-PCS | Mod: S$GLB,,, | Performed by: PHYSICIAN ASSISTANT

## 2022-06-29 PROCEDURE — 3051F PR MOST RECENT HEMOGLOBIN A1C LEVEL 7.0 - < 8.0%: ICD-10-PCS | Mod: CPTII,S$GLB,, | Performed by: PHYSICIAN ASSISTANT

## 2022-06-29 PROCEDURE — 99214 OFFICE O/P EST MOD 30 MIN: CPT | Mod: S$GLB,,, | Performed by: PHYSICIAN ASSISTANT

## 2022-06-29 PROCEDURE — 3008F PR BODY MASS INDEX (BMI) DOCUMENTED: ICD-10-PCS | Mod: CPTII,S$GLB,, | Performed by: PHYSICIAN ASSISTANT

## 2022-06-29 PROCEDURE — 99999 PR PBB SHADOW E&M-EST. PATIENT-LVL III: CPT | Mod: PBBFAC,,, | Performed by: PHYSICIAN ASSISTANT

## 2022-06-29 PROCEDURE — 3078F DIAST BP <80 MM HG: CPT | Mod: CPTII,S$GLB,, | Performed by: PHYSICIAN ASSISTANT

## 2022-06-29 PROCEDURE — 3066F NEPHROPATHY DOC TX: CPT | Mod: CPTII,S$GLB,, | Performed by: PHYSICIAN ASSISTANT

## 2022-06-29 PROCEDURE — 1159F MED LIST DOCD IN RCRD: CPT | Mod: CPTII,S$GLB,, | Performed by: PHYSICIAN ASSISTANT

## 2022-06-29 PROCEDURE — 3074F PR MOST RECENT SYSTOLIC BLOOD PRESSURE < 130 MM HG: ICD-10-PCS | Mod: CPTII,S$GLB,, | Performed by: PHYSICIAN ASSISTANT

## 2022-06-29 PROCEDURE — 3074F SYST BP LT 130 MM HG: CPT | Mod: CPTII,S$GLB,, | Performed by: PHYSICIAN ASSISTANT

## 2022-06-29 PROCEDURE — 3051F HG A1C>EQUAL 7.0%<8.0%: CPT | Mod: CPTII,S$GLB,, | Performed by: PHYSICIAN ASSISTANT

## 2022-06-29 RX ORDER — INSULIN GLARGINE 100 [IU]/ML
16 INJECTION, SOLUTION SUBCUTANEOUS DAILY
Qty: 6 ML | Refills: 11 | Status: SHIPPED | OUTPATIENT
Start: 2022-06-29 | End: 2022-09-23 | Stop reason: SDUPTHER

## 2022-06-29 RX ORDER — INSULIN LISPRO-AABC 100 [IU]/ML
INJECTION, SOLUTION SUBCUTANEOUS
Qty: 5 PEN | Refills: 11 | Status: SHIPPED | OUTPATIENT
Start: 2022-06-29 | End: 2023-08-09

## 2022-06-29 NOTE — LETTER
June 29, 2022    Chan Dwyer  [address] [address] [address]             [facility] [address]  Diabetes  [address] [address] [place] [place] [address]  Phone: [phone]  Fax: [phone]   June 29, 2022     Patient: Chan Dwyer   YOB: 1970   Date of Visit: 6/29/2022       To Whom it May Concern:    Chan Dwyer was seen in my clinic on 6/29/2022. She may return to work on 06/30/2022.    Please excuse her from any classes or work missed.    If you have any questions or concerns, please don't hesitate to call.    Sincerely,         Odilia Shaffer PA-C

## 2022-06-29 NOTE — PROGRESS NOTES
PCP: Tristian Bearden MD    Subjective:     Chief Complaint: Diabetes - Established Patient    HISTORY OF PRESENT ILLNESS: 51 y.o.   female presenting for diabetes management visit.   The patient's last visit with me was on 5/6/2022.  Patient has had Type II diabetes since 5 years or more.  Pertinent to decision making is the following comorbidities: Vitamin D Deficiency and Gestational DM in Pregnancy 2003  Patient has the following Diabetes complications: without complications  She  has attended diabetes education in the past.     Patient's most recent A1c of 7.2% was completed 4 months ago.   Patient states since Her last A1c Her blood glucose levels have been high  after meals.   Patient monitors blood glucose 4 times per day and Continuously with personal CGM Dexcom.   Patient blood glucose monitoring device will be uploaded into Media Section today secondary to patient forgetting device.         Patients records show near hypoglycemia and some postprandial hyperglycemia.   Patient endorses the following diabetes related symptoms: None.   Patient is due today for the following diabetes-related health maintenance standards: Eye Exam.   She denies recent hospital admissions or emergency room visits.   She voices having hypoglycemia as above.   Patient's concerns today include glycemic control.   Patient medication regimen is as below.     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       Lab Results   Component Value Date    CPEPTIDE 0.72 (L) 04/28/2022     Lab Results   Component Value Date    GLUTAMICACID 0.00 09/28/2020          //  Weight: 58.4 kg (128 lb 12 oz),  Body mass index is 22.1 kg/m².  Her blood sugar in clinic today is:    Lab Results   Component Value Date    POCGLU 141 (A) 06/29/2022       Review of Systems   Constitutional: Negative for activity change, appetite change, chills and fever.   HENT: Negative for dental problem, mouth sores, nosebleeds, sore throat and trouble swallowing.    Eyes: Negative for pain and discharge.   Respiratory: Negative for shortness of breath, wheezing and stridor.    Cardiovascular: Negative for chest pain, palpitations and leg swelling.   Gastrointestinal: Negative for abdominal pain, diarrhea, nausea and vomiting.   Endocrine: Negative for polydipsia, polyphagia and polyuria.   Genitourinary: Negative for dysuria, frequency and urgency.   Musculoskeletal: Negative for joint swelling and myalgias.   Skin: Negative for rash and wound.   Neurological: Negative for dizziness, syncope, weakness and headaches.   Psychiatric/Behavioral: Negative for behavioral problems and dysphoric mood.         Diabetes Management Status  Statin: Not taking  ACE/ARB: Not taking    Screening or Prevention Patient's value Goal Complete/Controlled?   HgA1C Testing and Control   Lab Results   Component Value Date    HGBA1C 7.2 (H) 02/01/2022      Annually/Less than 8% No   Lipid profile : 01/27/2022 Annually Yes   LDL control  Lab Results   Component Value Date    LDLCALC 95.6 01/27/2022    Annually/Less than 100 mg/dl  Yes   Nephropathy screening Lab Results   Component Value Date    MICALBCREAT 8.5 04/28/2022     Lab Results   Component Value Date    PROTEINUA Negative 01/26/2021    Annually No   Blood pressure BP Readings from Last 1 Encounters:   06/03/22 (!) 112/56    Less than 140/90 Yes   Dilated retinal exam : 07/23/2021 Annually Yes    Foot exam   : 11/30/2021 Annually No     Social History     Socioeconomic History    Marital status: Single    Number of children: 2   Occupational History    Occupation:      Employer: [employer]     Employer: [employer]   Tobacco Use    Smoking status: Never Smoker    Smokeless tobacco: Never Used   Substance and Sexual Activity    Alcohol use: Yes     Alcohol/week: 0.0 standard drinks     Comment: rare    Drug use: No    Sexual activity: Yes     Partners: Male     Birth control/protection: Surgical   Social History Narrative    She wears seatbelt.     Past Medical History:   Diagnosis Date    Abnormal Pap smear     Abnormal Pap smear of cervix     Arthritis, low back     Diabetes 09/2014     BS 162am 09/16/2014    DM (diabetes mellitus) 09/2014     am 01/26/2016    DM (diabetes mellitus) 09/2014    BS didn't check 01/31/2017    GERD (gastroesophageal reflux disease)     Gestational diabetes     Hepatitis C antibody positive in blood 02/11/2022    RNA NEGATIVE    History of abnormal Pap smear     Surgical menopause     Vitamin D deficiency        Objective:        Physical Exam  Constitutional:       General: She is not in acute distress.     Appearance: She is well-developed. She is not diaphoretic.   HENT:      Head: Normocephalic and atraumatic.      Right Ear: External ear normal.      Left Ear: External ear normal.      Nose: Nose normal.   Eyes:      General:         Right eye: No discharge.         Left eye: No discharge.      Pupils: Pupils are equal, round, and reactive to light.   Cardiovascular:      Rate and Rhythm: Normal rate and regular rhythm.      Heart sounds: Normal heart sounds.   Pulmonary:      Effort: Pulmonary effort is normal.      Breath sounds: Normal breath sounds.   Abdominal:      Palpations: Abdomen is soft.   Musculoskeletal:         General: Normal range of motion.      Cervical back: Normal range of motion and neck supple.   Skin:     General: Skin is warm and dry.      Capillary Refill: Capillary refill takes less than 2 seconds.   Neurological:      Mental Status: She is alert and oriented to person, place, and time. Mental  status is at baseline.      Motor: No abnormal muscle tone.      Coordination: Coordination normal.   Psychiatric:         Mood and Affect: Mood normal.         Behavior: Behavior normal.         Thought Content: Thought content normal.         Judgment: Judgment normal.             Assessment / Plan:     Type 1 diabetes mellitus with hyperglycemia  -     POCT Glucose, Hand-Held Device  -     insulin lispro-aabc (LYUMJEV KWIKPEN U-100 INSULIN) 100 unit/mL pen; Titrate up to 20 units daily.  Dispense: 5 pen; Refill: 11  -     insulin (BASAGLAR KWIKPEN U-100 INSULIN) glargine 100 units/mL SubQ pen; Inject 16 Units into the skin once daily.  Dispense: 6 mL; Refill: 11    Vitamin D deficiency      Additional Plan Details:    - POCT Glucose  - Encouraged continuation of lifestyle changes including regular exercise and limiting carbohydrates to 30-45 grams per meal threes times daily and 15 grams per snack with a limit of two daily.   - Encouraged continued monitoring of blood glucose with maintenance of 4 times daily at Fasting, Before Lunch, Before Dinner and Before Bed. Dexcom CMN to DMS.   - Current DM Medication Regimen: Change Lantus to Basaglar 18 units daily. Stop Metformin  mg at night. Stop Actos 15 mg daily. Continue Januvia 100 mg daily. Start Lyumjev 3 units with meals and correction dosing every 3 hours as needed - as below.   - Health Maintenance standards addressed today: Statin therapy - - defer to PCP and COVID - 19 Vaccine - patient will schedule outside of [facility]   - Nursing Visit: Patient is below goal range for nursing visit for age group and will not need nursing visit at this time .   - Follow up in 8 weeks and call in 2 weeks.       Blakeney McKnight, PA-C [facility]    A total of 30 minutes was spent in face to face time, of which over 50 % was spent in counseling patient on disease process, complications, treatment, and side effects of  medications.

## 2022-06-29 NOTE — PATIENT INSTRUCTIONS
CURRENT DM MEDICATIONS:   Lantus 18 units daily  Januvia 100 mg daily  Lyumjev 3 units with meals (right before eat) and correction dosing every 3 hours     Lyumjev Correction Dosing every 3 hours as needed OUTSIDE OF EATING  If  - 250, may take 2 units of Lyumjev  If  - 300, may take 3 units of Lyumjev  If  - 350, may take 4 units of Lyumjev  If  - 400, may take 5 units of Lyumjev  If +, may take 6 units of Lyumjev

## 2022-07-05 NOTE — PROGRESS NOTES
PCP: Tristian Bearden MD    Subjective:     Chief Complaint: Diabetes - Established Patient    Established Patient - Audio Only Telehealth Visit     The patient location is: Home  The chief complaint leading to consultation is: Diabetes follow up  Visit type: Virtual visit with audio only (telephone)  Total Time Spent with Patient: 8 minutes     The reason for the audio only service rather than synchronous audio and video virtual visit was related to technical difficulties or patient preference/necessity.     Each patient to whom I provide medical services by telemedicine is:  (1) informed of the relationship between the physician and patient and the respective role of any other health care provider with respect to management of the patient; and (2) notified that they may decline to receive medical services by telemedicine and may withdraw from such care at any time. Patient verbally consented to receive this service via voice-only telephone call.    This service was not originating from a related E/M service provided within the previous 7 days nor will  to an E/M service or procedure within the next 24 hours or my soonest available appointment.  Prevailing standard of care was able to be met in this audio-only visit.       HISTORY OF PRESENT ILLNESS: 51 y.o.   female presenting for diabetes management visit.   The patient's last visit with me was on 6/29/2022.  Patient has had Type II diabetes since 5 years or more.  Pertinent to decision making is the following comorbidities: Vitamin D Deficiency and Gestational DM in Pregnancy 2003  Patient has the following Diabetes complications: without complications  She  has attended diabetes education in the past.     Patient's most recent A1c of 7.2% was completed 4 months ago.   Patient states since Her last A1c Her blood glucose levels have been high  after meals.   Patient monitors blood glucose 4 times per day and Continuously with personal  CGM Dexcom.   Patient blood glucose monitoring device will be uploaded into Media Section today secondary to patient forgetting device.         Patients records show baseline euglycemia with some near hypoglycemia overnight and some postprandial hyperglycemia. Patient has been using correction dosing instead of meal time dose.  Patient endorses the following diabetes related symptoms: None.   Patient is due today for the following diabetes-related health maintenance standards: Eye Exam. Scheduled.   She denies recent hospital admissions or emergency room visits.   She voices having hypoglycemia as above.   Patient's concerns today include glycemic control.   Patient medication regimen is as below.     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       Lab Results   Component Value Date    CPEPTIDE 0.72 (L) 04/28/2022     Lab Results   Component Value Date    GLUTAMICACID 0.00 09/28/2020          //   , There is no height or weight on file to calculate BMI.  Her blood sugar in clinic today is:    Lab Results   Component Value Date    POCGLU 141 (A) 06/29/2022       Review of Systems   Constitutional: Negative for activity change, appetite change, chills and fever.   HENT: Negative for dental problem, mouth sores, nosebleeds, sore throat and trouble swallowing.     Eyes: Negative for pain and discharge.   Respiratory: Negative for shortness of breath, wheezing and stridor.    Cardiovascular: Negative for chest pain, palpitations and leg swelling.   Gastrointestinal: Negative for abdominal pain, diarrhea, nausea and vomiting.   Endocrine: Negative for polydipsia, polyphagia and polyuria.   Genitourinary: Negative for dysuria, frequency and urgency.   Musculoskeletal: Negative for joint swelling and myalgias.   Skin: Negative for rash and wound.   Neurological: Negative for dizziness, syncope, weakness and headaches.   Psychiatric/Behavioral: Negative for behavioral problems and dysphoric mood.         Diabetes Management Status  Statin: Not taking  ACE/ARB: Not taking    Screening or Prevention Patient's value Goal Complete/Controlled?   HgA1C Testing and Control   Lab Results   Component Value Date    HGBA1C 7.2 (H) 02/01/2022      Annually/Less than 8% No   Lipid profile : 01/27/2022 Annually Yes   LDL control  Lab Results   Component Value Date    LDLCALC 95.6 01/27/2022    Annually/Less than 100 mg/dl  Yes   Nephropathy screening Lab Results   Component Value Date    MICALBCREAT 8.5 04/28/2022     Lab Results   Component Value Date    PROTEINUA Negative 01/26/2021    Annually No   Blood pressure BP Readings from Last 1 Encounters:   06/29/22 129/69    Less than 140/90 Yes   Dilated retinal exam : 07/23/2021 Annually Yes    Foot exam   : 11/30/2021 Annually No     Social History     Socioeconomic History    Marital status: Single    Number of children: 2   Occupational History    Occupation:      Employer: [employer]     Employer: [employer]   Tobacco Use    Smoking status: Never Smoker    Smokeless tobacco: Never Used   Substance and Sexual Activity    Alcohol use: Yes     Alcohol/week: 0.0 standard drinks     Comment: rare    Drug use: No    Sexual activity: Yes     Partners: Male     Birth control/protection: Surgical   Social  History Narrative    She wears seatbelt.     Past Medical History:   Diagnosis Date    Abnormal Pap smear     Abnormal Pap smear of cervix     Arthritis, low back     Diabetes 09/2014     BS 162am 09/16/2014    DM (diabetes mellitus) 09/2014     am 01/26/2016    DM (diabetes mellitus) 09/2014    BS didn't check 01/31/2017    GERD (gastroesophageal reflux disease)     Gestational diabetes     Hepatitis C antibody positive in blood 02/11/2022    RNA NEGATIVE    History of abnormal Pap smear     Surgical menopause     Vitamin D deficiency        Objective:        Physical Exam  Neurological:      Mental Status: She is alert and oriented to person, place, and time. Mental status is at baseline.   Psychiatric:         Mood and Affect: Mood normal.         Behavior: Behavior normal.         Thought Content: Thought content normal.         Judgment: Judgment normal.             Assessment / Plan:     Type 1 diabetes mellitus with hyperglycemia    Vitamin D deficiency      Additional Plan Details:    - POCT Glucose  - Encouraged continuation of lifestyle changes including regular exercise and limiting carbohydrates to 30-45 grams per meal threes times daily and 15 grams per snack with a limit of two daily.   - Encouraged continued monitoring of blood glucose with maintenance of 4 times daily at Fasting, Before Lunch, Before Dinner and Before Bed. Dexcom CMN to DMS.   - Current DM Medication Regimen: Change Lantus to Basaglar 16 units daily. Continue Januvia 100 mg daily. Continue Lyumjev 3 units with meals and correction dosing every 3 hours as needed - as below.   - Health Maintenance standards addressed today: Eye Exam - will be completed within [facility] system and scheduled today  - Nursing Visit: Patient is below goal range for nursing visit for age group and will not need nursing visit at this time .   - Follow up in 8 weeks and call in 2 days.      Lyumjev Correction Dosing every 3 hours as needed  OUTSIDE OF EATING  If  - 250, may take 2 units of Lyumjev  If  - 300, may take 3 units of Lyumjev  If  - 350, may take 4 units of Lyumjev  If  - 400, may take 5 units of Lyumjev    If +, may take 6 units of Lyumjev    Odilia Shaffer PA-C  [facility]

## 2022-07-06 ENCOUNTER — OFFICE VISIT (OUTPATIENT)
Dept: DIABETES | Facility: CLINIC | Age: 52
End: 2022-07-06
Payer: COMMERCIAL

## 2022-07-06 DIAGNOSIS — E55.9 VITAMIN D DEFICIENCY: ICD-10-CM

## 2022-07-06 DIAGNOSIS — E10.65 TYPE 1 DIABETES MELLITUS WITH HYPERGLYCEMIA: Primary | ICD-10-CM

## 2022-07-06 PROCEDURE — 99212 PR OFFICE/OUTPT VISIT, EST, LEVL II, 10-19 MIN: ICD-10-PCS | Mod: 95,,, | Performed by: PHYSICIAN ASSISTANT

## 2022-07-06 PROCEDURE — 3066F NEPHROPATHY DOC TX: CPT | Mod: CPTII,95,, | Performed by: PHYSICIAN ASSISTANT

## 2022-07-06 PROCEDURE — 99212 OFFICE O/P EST SF 10 MIN: CPT | Mod: 95,,, | Performed by: PHYSICIAN ASSISTANT

## 2022-07-06 PROCEDURE — 3061F NEG MICROALBUMINURIA REV: CPT | Mod: CPTII,95,, | Performed by: PHYSICIAN ASSISTANT

## 2022-07-06 PROCEDURE — 3066F PR DOCUMENTATION OF TREATMENT FOR NEPHROPATHY: ICD-10-PCS | Mod: CPTII,95,, | Performed by: PHYSICIAN ASSISTANT

## 2022-07-06 PROCEDURE — 3072F PR LOW RISK FOR RETINOPATHY: ICD-10-PCS | Mod: CPTII,95,, | Performed by: PHYSICIAN ASSISTANT

## 2022-07-06 PROCEDURE — 3051F HG A1C>EQUAL 7.0%<8.0%: CPT | Mod: CPTII,95,, | Performed by: PHYSICIAN ASSISTANT

## 2022-07-06 PROCEDURE — 3051F PR MOST RECENT HEMOGLOBIN A1C LEVEL 7.0 - < 8.0%: ICD-10-PCS | Mod: CPTII,95,, | Performed by: PHYSICIAN ASSISTANT

## 2022-07-06 PROCEDURE — 3072F LOW RISK FOR RETINOPATHY: CPT | Mod: CPTII,95,, | Performed by: PHYSICIAN ASSISTANT

## 2022-07-06 PROCEDURE — 3061F PR NEG MICROALBUMINURIA RESULT DOCUMENTED/REVIEW: ICD-10-PCS | Mod: CPTII,95,, | Performed by: PHYSICIAN ASSISTANT

## 2022-07-08 ENCOUNTER — PATIENT MESSAGE (OUTPATIENT)
Dept: DIABETES | Facility: CLINIC | Age: 52
End: 2022-07-08

## 2022-07-18 ENCOUNTER — CLINICAL SUPPORT (OUTPATIENT)
Dept: DIABETES | Facility: CLINIC | Age: 52
End: 2022-07-18
Payer: COMMERCIAL

## 2022-07-18 ENCOUNTER — OFFICE VISIT (OUTPATIENT)
Dept: OPHTHALMOLOGY | Facility: CLINIC | Age: 52
End: 2022-07-18
Payer: COMMERCIAL

## 2022-07-18 VITALS — BODY MASS INDEX: 22.1 KG/M2 | HEIGHT: 64 IN

## 2022-07-18 DIAGNOSIS — E10.65 TYPE 1 DIABETES MELLITUS WITH HYPERGLYCEMIA: Primary | ICD-10-CM

## 2022-07-18 DIAGNOSIS — T78.40XA ALLERGY, INITIAL ENCOUNTER: ICD-10-CM

## 2022-07-18 DIAGNOSIS — E11.9 TYPE 2 DIABETES MELLITUS WITHOUT RETINOPATHY: Primary | ICD-10-CM

## 2022-07-18 PROCEDURE — 99999 PR PBB SHADOW E&M-EST. PATIENT-LVL III: ICD-10-PCS | Mod: PBBFAC,,, | Performed by: DIETITIAN, REGISTERED

## 2022-07-18 PROCEDURE — 1160F RVW MEDS BY RX/DR IN RCRD: CPT | Mod: CPTII,S$GLB,, | Performed by: OPHTHALMOLOGY

## 2022-07-18 PROCEDURE — 1159F PR MEDICATION LIST DOCUMENTED IN MEDICAL RECORD: ICD-10-PCS | Mod: CPTII,S$GLB,, | Performed by: OPHTHALMOLOGY

## 2022-07-18 PROCEDURE — 3051F HG A1C>EQUAL 7.0%<8.0%: CPT | Mod: CPTII,S$GLB,, | Performed by: OPHTHALMOLOGY

## 2022-07-18 PROCEDURE — 3066F NEPHROPATHY DOC TX: CPT | Mod: CPTII,S$GLB,, | Performed by: OPHTHALMOLOGY

## 2022-07-18 PROCEDURE — G0108 PR DIAB MANAGE TRN  PER INDIV: ICD-10-PCS | Mod: S$GLB,,, | Performed by: DIETITIAN, REGISTERED

## 2022-07-18 PROCEDURE — 3051F PR MOST RECENT HEMOGLOBIN A1C LEVEL 7.0 - < 8.0%: ICD-10-PCS | Mod: CPTII,S$GLB,, | Performed by: OPHTHALMOLOGY

## 2022-07-18 PROCEDURE — 99999 PR PBB SHADOW E&M-EST. PATIENT-LVL III: ICD-10-PCS | Mod: PBBFAC,,, | Performed by: OPHTHALMOLOGY

## 2022-07-18 PROCEDURE — 99999 PR PBB SHADOW E&M-EST. PATIENT-LVL III: CPT | Mod: PBBFAC,,, | Performed by: DIETITIAN, REGISTERED

## 2022-07-18 PROCEDURE — 99999 PR PBB SHADOW E&M-EST. PATIENT-LVL III: CPT | Mod: PBBFAC,,, | Performed by: OPHTHALMOLOGY

## 2022-07-18 PROCEDURE — 3066F PR DOCUMENTATION OF TREATMENT FOR NEPHROPATHY: ICD-10-PCS | Mod: CPTII,S$GLB,, | Performed by: OPHTHALMOLOGY

## 2022-07-18 PROCEDURE — 3061F NEG MICROALBUMINURIA REV: CPT | Mod: CPTII,S$GLB,, | Performed by: OPHTHALMOLOGY

## 2022-07-18 PROCEDURE — 92014 COMPRE OPH EXAM EST PT 1/>: CPT | Mod: S$GLB,,, | Performed by: OPHTHALMOLOGY

## 2022-07-18 PROCEDURE — 92014 PR EYE EXAM, EST PATIENT,COMPREHESV: ICD-10-PCS | Mod: S$GLB,,, | Performed by: OPHTHALMOLOGY

## 2022-07-18 PROCEDURE — 1159F MED LIST DOCD IN RCRD: CPT | Mod: CPTII,S$GLB,, | Performed by: OPHTHALMOLOGY

## 2022-07-18 PROCEDURE — 3061F PR NEG MICROALBUMINURIA RESULT DOCUMENTED/REVIEW: ICD-10-PCS | Mod: CPTII,S$GLB,, | Performed by: OPHTHALMOLOGY

## 2022-07-18 PROCEDURE — 1160F PR REVIEW ALL MEDS BY PRESCRIBER/CLIN PHARMACIST DOCUMENTED: ICD-10-PCS | Mod: CPTII,S$GLB,, | Performed by: OPHTHALMOLOGY

## 2022-07-18 PROCEDURE — 2023F PR DILATED RETINAL EXAM W/O EVID OF RETINOPATHY: ICD-10-PCS | Mod: CPTII,S$GLB,, | Performed by: OPHTHALMOLOGY

## 2022-07-18 PROCEDURE — 2023F DILAT RTA XM W/O RTNOPTHY: CPT | Mod: CPTII,S$GLB,, | Performed by: OPHTHALMOLOGY

## 2022-07-18 PROCEDURE — G0108 DIAB MANAGE TRN  PER INDIV: HCPCS | Mod: S$GLB,,, | Performed by: DIETITIAN, REGISTERED

## 2022-07-18 NOTE — PROGRESS NOTES
"Diabetes Care Specialist Progress Note  Author: Jonah Roman, FARHAT  Date: 7/18/2022    Program Intake  Reason for Diabetes Program Visit:: Intervention  Type of Intervention:: Individual  Individual: Education    Lab Results   Component Value Date    HGBA1C 7.2 (H) 02/01/2022     CURRENT DM MEDICATIONS:   · Januvia, 100 mg  · Lantus, 16 units   · Lyumjev 3 units AC       Clinical    Weight: (P) 57.5 kg (126 lb 12.2 oz)   Height: 5' 4" (162.6 cm)   Body mass index is 21.76 kg/m² (pended).  Wt loss of 2 lbs since last visit on 6/16/2022    Nutritional Status  Meal Plan 24 Hour Recall - Breakfast: banana; water  Meal Plan 24 Hour Recall - Lunch: turkey sandwich, bag of chips; water  Meal Plan 24 Hour Recall - Dinner: broccoli, pc of roast, water  Meal Plan 24 Hour Recall - Snack: bag of chips before bed     Clarity report in media:  Reporting period: July 5 - July 18  Device used:  phone  Sensor usage: 93%  Days with CGM data: 13/14  -----------------------------  Glucose Details  Average glucose: 167 mg/dL  Standard deviation: 50 mg/dL  -----------------------------  65% Time in range   27% High  7% Very high  <1% Low  0% Very low  -----------------------------    Obeservations/patterns:    Data comparison from prior to starting Lyumjev vs after start of Lyumjev, avg BG dropped from 208 to 167 and time in range increased from 33% to 65%   Data indicates slightly high basal insulin and some meals are not covered well by rapid acting insulin          Today's interventions were provided through individual discussion, instruction, and written materials were provided.      Patient verbalized understanding of instruction and written materials.  Pt was able to return back demonstration of instructions today. Patient understood key points, needs reinforcement and further instruction.     Diabetes Self-Management Care Plan:    Today's Diabetes Self-Management Care Plan was developed with Chan's input. Chan has " agreed to work toward the following goal(s) to improve his/her overall diabetes control.      Care Plan: Diabetes Management   Updates made since 6/18/2022 12:00 AM      Problem: Healthy Eating       Goal: Keep carb total for meals at 30-45 grams, silvino while on set dose of Lyumjev    Start Date: 6/16/2022   Expected End Date: 9/16/2022   This Visit's Progress: On track   Priority: Medium   Barriers: No Barriers Identified   Note:    Since Odilia changed to meal size dosing of Lyumjev, reviewed how to determine small meal vs regular meal.   Small meal = 20-35 grams of carb  Regular meal = 45 grams of carb or more    Pt has not brought back her smart chart with carb count.   She will try to send via My Chart for review.   Reviewed carb counting today and advised to download [employer] and [employer] apps.      Problem: Blood Glucose Self-Monitoring       Goal: Patient agrees to check blood sugars with Dexcom CGM    Start Date: 6/16/2022   Expected End Date: 12/16/2022   This Visit's Progress: On track   Priority: Low      Problem: Medications       Goal: Patient Agrees to take Diabetes Medication(s) as prescribed.    Start Date: 6/16/2022   Expected End Date: 12/16/2022   This Visit's Progress: On track   Priority: High   Barriers: No Barriers Identified   Note:    Pt has started taking Lyumjev, set dose of 3 units with each meal.   Big improvement in overall BG.   Discussed Clarity report with Odilia Shaffer  Lantus was lowered from 16 to 14 units  Added meal size dosing for Lyumjev  3 units for small meal and 5 units for regular meal.      Task: Discussed guidelines for preventing, detecting and treating hypoglycemia and hyperglycemia and reviewed the importance of meal and medication timing with diabetes mediations for prevention of hypoglycemia and maximum drug benefit. Completed 7/18/2022          Follow Up Plan     Follow up in about 2 months (around 9/18/2022).   Will evaluate goals, insulin dosing and carb  counting - possibly train on I:C for dosing Lyumjev.   Pt will try to send food diary with carb count.     Today's care plan and follow up schedule was discussed with patient.  Renaldosa verbalized understanding of the care plan, goals, and agrees to follow up plan.        The patient was encouraged to communicate with his/her health care provider/physician and care team regarding his/her condition(s) and treatment.  I provided the patient with my contact information today and encouraged to contact me via phone or [facility] as needed.     Length of Visit   Total Time: 50 Minutes

## 2022-07-18 NOTE — PATIENT INSTRUCTIONS
Instructions from Odilia Shaffer:    Decrease dose of Lantus to 14 units each night    Lyumjev:  Small meal, take 3 units  Regular meal, take 5 units

## 2022-07-18 NOTE — PROGRESS NOTES
SUBJECTIVE  Chan Dwyer is 51 y.o. female  Uncorrected distance visual acuity was 20/30 in the right eye and 20/20 in the left eye.   Chief Complaint   Patient presents with    Annual Exam     Pt reports for annual exam. Denies any pain or irritation, but noticing swollen eyelids OU. Va stable. Taking Pataday and using lid wipes. Defers Mrx. Using reading glasses.          HPI     Annual Exam      Additional comments: Pt reports for annual exam. Denies any pain or   irritation, but noticing swollen eyelids OU. Va stable. Taking Pataday and   using lid wipes. Defers Mrx. Using reading glasses.              Comments     1. DM x 2010   2. REFRACTIVE ERROR  3. DM without retinopathy    Pataday BID OU          Last edited by Rafa Ellis on 7/18/2022  8:35 AM. (History)         Assessment /Plan :  1. Type 2 diabetes mellitus without retinopathy No diabetic retinopathy at this time. Reviewed diabetic eye precautions including avoiding tobacco use,  Good glucose control, and importance of regular follow up.      2. Allergy, initial encounter - recommend over the counter allergy medication as directed and ice packs     RTC in 1 year or prn any changes           both eyes, with macular edema, stable: Continue to keep appointments with dr. Mundo Pendleton .

## 2022-08-05 ENCOUNTER — PATIENT MESSAGE (OUTPATIENT)
Dept: DIABETES | Facility: CLINIC | Age: 52
End: 2022-08-05
Payer: COMMERCIAL

## 2022-08-17 ENCOUNTER — OFFICE VISIT (OUTPATIENT)
Dept: CARDIOLOGY | Facility: CLINIC | Age: 52
End: 2022-08-17
Payer: COMMERCIAL

## 2022-08-17 VITALS
HEART RATE: 68 BPM | BODY MASS INDEX: 21.76 KG/M2 | SYSTOLIC BLOOD PRESSURE: 112 MMHG | DIASTOLIC BLOOD PRESSURE: 72 MMHG | WEIGHT: 127.44 LBS | HEIGHT: 64 IN

## 2022-08-17 DIAGNOSIS — R07.9 CHEST PAIN, MODERATE CORONARY ARTERY RISK: Primary | ICD-10-CM

## 2022-08-17 DIAGNOSIS — E10.65 TYPE 1 DIABETES MELLITUS WITH HYPERGLYCEMIA: ICD-10-CM

## 2022-08-17 PROCEDURE — 3074F PR MOST RECENT SYSTOLIC BLOOD PRESSURE < 130 MM HG: ICD-10-PCS | Mod: CPTII,S$GLB,, | Performed by: INTERNAL MEDICINE

## 2022-08-17 PROCEDURE — 3066F PR DOCUMENTATION OF TREATMENT FOR NEPHROPATHY: ICD-10-PCS | Mod: CPTII,S$GLB,, | Performed by: INTERNAL MEDICINE

## 2022-08-17 PROCEDURE — 3078F PR MOST RECENT DIASTOLIC BLOOD PRESSURE < 80 MM HG: ICD-10-PCS | Mod: CPTII,S$GLB,, | Performed by: INTERNAL MEDICINE

## 2022-08-17 PROCEDURE — 99214 PR OFFICE/OUTPT VISIT, EST, LEVL IV, 30-39 MIN: ICD-10-PCS | Mod: S$GLB,,, | Performed by: INTERNAL MEDICINE

## 2022-08-17 PROCEDURE — 3008F PR BODY MASS INDEX (BMI) DOCUMENTED: ICD-10-PCS | Mod: CPTII,S$GLB,, | Performed by: INTERNAL MEDICINE

## 2022-08-17 PROCEDURE — 3061F NEG MICROALBUMINURIA REV: CPT | Mod: CPTII,S$GLB,, | Performed by: INTERNAL MEDICINE

## 2022-08-17 PROCEDURE — 3008F BODY MASS INDEX DOCD: CPT | Mod: CPTII,S$GLB,, | Performed by: INTERNAL MEDICINE

## 2022-08-17 PROCEDURE — 3044F PR MOST RECENT HEMOGLOBIN A1C LEVEL <7.0%: ICD-10-PCS | Mod: CPTII,S$GLB,, | Performed by: INTERNAL MEDICINE

## 2022-08-17 PROCEDURE — 3044F HG A1C LEVEL LT 7.0%: CPT | Mod: CPTII,S$GLB,, | Performed by: INTERNAL MEDICINE

## 2022-08-17 PROCEDURE — 99999 PR PBB SHADOW E&M-EST. PATIENT-LVL IV: CPT | Mod: PBBFAC,,, | Performed by: INTERNAL MEDICINE

## 2022-08-17 PROCEDURE — 3074F SYST BP LT 130 MM HG: CPT | Mod: CPTII,S$GLB,, | Performed by: INTERNAL MEDICINE

## 2022-08-17 PROCEDURE — 99999 PR PBB SHADOW E&M-EST. PATIENT-LVL IV: ICD-10-PCS | Mod: PBBFAC,,, | Performed by: INTERNAL MEDICINE

## 2022-08-17 PROCEDURE — 3066F NEPHROPATHY DOC TX: CPT | Mod: CPTII,S$GLB,, | Performed by: INTERNAL MEDICINE

## 2022-08-17 PROCEDURE — 3072F PR LOW RISK FOR RETINOPATHY: ICD-10-PCS | Mod: CPTII,S$GLB,, | Performed by: INTERNAL MEDICINE

## 2022-08-17 PROCEDURE — 99214 OFFICE O/P EST MOD 30 MIN: CPT | Mod: S$GLB,,, | Performed by: INTERNAL MEDICINE

## 2022-08-17 PROCEDURE — 3072F LOW RISK FOR RETINOPATHY: CPT | Mod: CPTII,S$GLB,, | Performed by: INTERNAL MEDICINE

## 2022-08-17 PROCEDURE — 3078F DIAST BP <80 MM HG: CPT | Mod: CPTII,S$GLB,, | Performed by: INTERNAL MEDICINE

## 2022-08-17 PROCEDURE — 3061F PR NEG MICROALBUMINURIA RESULT DOCUMENTED/REVIEW: ICD-10-PCS | Mod: CPTII,S$GLB,, | Performed by: INTERNAL MEDICINE

## 2022-08-17 NOTE — PROGRESS NOTES
Subjective:   Patient ID:  Chan Dwyer is a 51 y.o. female who presents for follow-up of Chest Pain (1 mo f/u)  echo-nml lv function, NMT/ stress test nml  Pt with rare CP.  Pt walks every day with rare  CP.  Lipids are at goal.    Chest Pain   This is a new problem. The current episode started 1 to 4 weeks ago. The problem occurs intermittently. The problem has been gradually improving. The pain is present in the lateral region. The pain is mild. The quality of the pain is described as dull. The pain does not radiate. Pertinent negatives include no dizziness, palpitations or shortness of breath. The pain is aggravated by exertion. She has tried rest for the symptoms. The treatment provided mild relief.   Her past medical history is significant for diabetes.   Pertinent negatives for past medical history include no muscle weakness.       Review of Systems   Constitutional: Negative. Negative for weight gain.   HENT: Negative.    Eyes: Negative.    Cardiovascular: Negative.  Negative for chest pain, leg swelling and palpitations.   Respiratory: Negative.  Negative for shortness of breath.    Endocrine: Negative.    Hematologic/Lymphatic: Negative.    Skin: Negative.    Musculoskeletal: Negative for muscle weakness.   Gastrointestinal: Negative.    Genitourinary: Negative.    Neurological: Negative.  Negative for dizziness.   Psychiatric/Behavioral: Negative.    Allergic/Immunologic: Negative.      Family History   Problem Relation Age of Onset    Hypertension Mother     Diabetes Mother     Heart attack Mother     Diabetes Father     Hypertension Father     Stomach cancer Sister     No Known Problems Son     No Known Problems Son     Cancer Neg Hx     Stroke Neg Hx     Colon cancer Neg Hx     Ovarian cancer Neg Hx      Past Medical History:   Diagnosis Date    Abnormal Pap smear     Abnormal Pap smear of cervix     Arthritis, low back     Diabetes 09/2014     BS 162am 09/16/2014    DM  (diabetes mellitus) 09/2014     am 01/26/2016    DM (diabetes mellitus) 09/2014    BS didn't check 01/31/2017    GERD (gastroesophageal reflux disease)     Gestational diabetes     Hepatitis C antibody positive in blood 02/11/2022    RNA NEGATIVE    History of abnormal Pap smear     Surgical menopause     Vitamin D deficiency      Social History     Socioeconomic History    Marital status: Single    Number of children: 2   Occupational History    Occupation:      Employer: [employer]     Employer: [employer]   Tobacco Use    Smoking status: Never Smoker    Smokeless tobacco: Never Used   Substance and Sexual Activity    Alcohol use: Yes     Alcohol/week: 0.0 standard drinks     Comment: rare    Drug use: No    Sexual activity: Yes     Partners: Male     Birth control/protection: Surgical   Social History Narrative    She wears seatbelt.     Current Outpatient Medications on File Prior to Visit   Medication Sig Dispense Refill    acetaminophen (TYLENOL) 500 MG tablet Take 1,000 mg by mouth every 6 (six) hours as needed for Pain.      aspirin (ECOTRIN) 81 MG EC tablet Take 1 tablet (81 mg total) by mouth once daily. 30 tablet 11    azelastine (ASTELIN) 137 mcg (0.1 %) nasal spray 1 spray (137 mcg total) by Nasal route 2 (two) times daily. 30 mL 1    insulin (BASAGLAR KWIKPEN U-100 INSULIN) glargine 100 units/mL SubQ pen Inject 16 Units into the skin once daily. 6 mL 11    insulin lispro-aabc (LYUMJEV KWIKPEN U-100 INSULIN) 100 unit/mL pen Titrate up to 20 units daily. 5 pen 11    isosorbide mononitrate (IMDUR) 30 MG 24 hr tablet Take 1 tablet (30 mg total) by mouth once daily. 30 tablet 11    olopatadine (PATADAY) 0.2 % Drop Place 1 drop into both eyes once daily. 5 mL 6    pantoprazole (PROTONIX) 20 MG tablet Take 1 tablet (20 mg total) by mouth once daily. 30 tablet 11    SITagliptin (JANUVIA) 100 MG Tab Take 1 tablet (100 mg total) by mouth once daily. 30  tablet 11    vitamin D 1000 units Tab Take 1,000 Units by mouth once daily.      alcohol swabs (EASY COMFORT ALCOHOL PAD) PadM Apply 1 each topically 4 (four) times daily. 100 each 11    blood sugar diagnostic Strp To check BG 4 times daily, to use with insurance preferred meter 120 each 11    COVID-19 vac, yahir,Pfizer,,PF, (PFIZER COVID-19 YAHIR VACCN,PF,) 30 mcg/0.3 mL injection Inject into the muscle. 0.3 mL 0    lancets Misc 1 each by Misc.(Non-Drug; Combo Route) route 4 (four) times daily. 100 each 11     No current facility-administered medications on file prior to visit.     Review of patient's allergies indicates:   Allergen Reactions    Hydrocodone Hives and Rash       Objective:     Physical Exam  Vitals and nursing note reviewed.   Constitutional:       Appearance: She is well-developed.   HENT:      Head: Normocephalic and atraumatic.   Eyes:      Conjunctiva/sclera: Conjunctivae normal.      Pupils: Pupils are equal, round, and reactive to light.   Cardiovascular:      Rate and Rhythm: Normal rate and regular rhythm.      Pulses: Intact distal pulses.      Heart sounds: Normal heart sounds.   Pulmonary:      Effort: Pulmonary effort is normal.      Breath sounds: Normal breath sounds.   Abdominal:      General: Bowel sounds are normal.      Palpations: Abdomen is soft.   Musculoskeletal:         General: Normal range of motion.      Cervical back: Normal range of motion and neck supple.   Skin:     General: Skin is warm and dry.   Neurological:      Mental Status: She is alert and oriented to person, place, and time.         Assessment:     1. Chest pain, moderate coronary artery risk    2. Type 1 diabetes mellitus with hyperglycemia        Plan:     Chest pain, moderate coronary artery risk    Type 1 diabetes mellitus with hyperglycemia      Continue nitrates and asa- primary prevention ? spasm

## 2022-08-19 ENCOUNTER — LAB VISIT (OUTPATIENT)
Dept: LAB | Facility: HOSPITAL | Age: 52
End: 2022-08-19
Attending: FAMILY MEDICINE
Payer: COMMERCIAL

## 2022-08-19 ENCOUNTER — OFFICE VISIT (OUTPATIENT)
Dept: INTERNAL MEDICINE | Facility: CLINIC | Age: 52
End: 2022-08-19
Payer: COMMERCIAL

## 2022-08-19 VITALS
TEMPERATURE: 98 F | WEIGHT: 130.75 LBS | HEART RATE: 70 BPM | HEIGHT: 64 IN | BODY MASS INDEX: 22.32 KG/M2 | OXYGEN SATURATION: 97 % | SYSTOLIC BLOOD PRESSURE: 134 MMHG | DIASTOLIC BLOOD PRESSURE: 60 MMHG

## 2022-08-19 DIAGNOSIS — H93.13 TINNITUS OF BOTH EARS: ICD-10-CM

## 2022-08-19 DIAGNOSIS — E11.65 TYPE 2 DIABETES MELLITUS WITH HYPERGLYCEMIA, WITH LONG-TERM CURRENT USE OF INSULIN: ICD-10-CM

## 2022-08-19 DIAGNOSIS — Z79.4 TYPE 2 DIABETES MELLITUS WITH HYPERGLYCEMIA, WITH LONG-TERM CURRENT USE OF INSULIN: ICD-10-CM

## 2022-08-19 DIAGNOSIS — Z79.4 TYPE 2 DIABETES MELLITUS WITH HYPERGLYCEMIA, WITH LONG-TERM CURRENT USE OF INSULIN: Primary | ICD-10-CM

## 2022-08-19 DIAGNOSIS — E11.65 TYPE 2 DIABETES MELLITUS WITH HYPERGLYCEMIA, WITH LONG-TERM CURRENT USE OF INSULIN: Primary | ICD-10-CM

## 2022-08-19 LAB
ALBUMIN SERPL BCP-MCNC: 3.6 G/DL (ref 3.5–5.2)
ALP SERPL-CCNC: 82 U/L (ref 55–135)
ALT SERPL W/O P-5'-P-CCNC: 9 U/L (ref 10–44)
ANION GAP SERPL CALC-SCNC: 10 MMOL/L (ref 8–16)
AST SERPL-CCNC: 14 U/L (ref 10–40)
BASOPHILS # BLD AUTO: 0.03 K/UL (ref 0–0.2)
BASOPHILS NFR BLD: 0.4 % (ref 0–1.9)
BILIRUB SERPL-MCNC: 0.5 MG/DL (ref 0.1–1)
BUN SERPL-MCNC: 9 MG/DL (ref 6–20)
CALCIUM SERPL-MCNC: 9.6 MG/DL (ref 8.7–10.5)
CHLORIDE SERPL-SCNC: 107 MMOL/L (ref 95–110)
CHOLEST SERPL-MCNC: 162 MG/DL (ref 120–199)
CHOLEST/HDLC SERPL: 3.1 {RATIO} (ref 2–5)
CO2 SERPL-SCNC: 25 MMOL/L (ref 23–29)
CREAT SERPL-MCNC: 0.7 MG/DL (ref 0.5–1.4)
DIFFERENTIAL METHOD: ABNORMAL
EOSINOPHIL # BLD AUTO: 0.1 K/UL (ref 0–0.5)
EOSINOPHIL NFR BLD: 1.6 % (ref 0–8)
ERYTHROCYTE [DISTWIDTH] IN BLOOD BY AUTOMATED COUNT: 11.5 % (ref 11.5–14.5)
EST. GFR  (NO RACE VARIABLE): >60 ML/MIN/1.73 M^2
ESTIMATED AVG GLUCOSE: 151 MG/DL (ref 68–131)
GLUCOSE SERPL-MCNC: 158 MG/DL (ref 70–110)
HBA1C MFR BLD: 6.9 % (ref 4–5.6)
HCT VFR BLD AUTO: 39.8 % (ref 37–48.5)
HDLC SERPL-MCNC: 52 MG/DL (ref 40–75)
HDLC SERPL: 32.1 % (ref 20–50)
HGB BLD-MCNC: 12.4 G/DL (ref 12–16)
IMM GRANULOCYTES # BLD AUTO: 0.01 K/UL (ref 0–0.04)
IMM GRANULOCYTES NFR BLD AUTO: 0.1 % (ref 0–0.5)
LDLC SERPL CALC-MCNC: 99.2 MG/DL (ref 63–159)
LYMPHOCYTES # BLD AUTO: 2.4 K/UL (ref 1–4.8)
LYMPHOCYTES NFR BLD: 36.3 % (ref 18–48)
MCH RBC QN AUTO: 28.9 PG (ref 27–31)
MCHC RBC AUTO-ENTMCNC: 31.2 G/DL (ref 32–36)
MCV RBC AUTO: 93 FL (ref 82–98)
MONOCYTES # BLD AUTO: 0.4 K/UL (ref 0.3–1)
MONOCYTES NFR BLD: 5.4 % (ref 4–15)
NEUTROPHILS # BLD AUTO: 3.8 K/UL (ref 1.8–7.7)
NEUTROPHILS NFR BLD: 56.2 % (ref 38–73)
NONHDLC SERPL-MCNC: 110 MG/DL
NRBC BLD-RTO: 0 /100 WBC
PLATELET # BLD AUTO: 242 K/UL (ref 150–450)
PMV BLD AUTO: 11.2 FL (ref 9.2–12.9)
POTASSIUM SERPL-SCNC: 4.1 MMOL/L (ref 3.5–5.1)
PROT SERPL-MCNC: 6.8 G/DL (ref 6–8.4)
RBC # BLD AUTO: 4.29 M/UL (ref 4–5.4)
SODIUM SERPL-SCNC: 142 MMOL/L (ref 136–145)
TRIGL SERPL-MCNC: 54 MG/DL (ref 30–150)
TSH SERPL DL<=0.005 MIU/L-ACNC: 1.15 UIU/ML (ref 0.4–4)
WBC # BLD AUTO: 6.69 K/UL (ref 3.9–12.7)

## 2022-08-19 PROCEDURE — 83036 HEMOGLOBIN GLYCOSYLATED A1C: CPT | Performed by: FAMILY MEDICINE

## 2022-08-19 PROCEDURE — 3066F PR DOCUMENTATION OF TREATMENT FOR NEPHROPATHY: ICD-10-PCS | Mod: CPTII,S$GLB,, | Performed by: FAMILY MEDICINE

## 2022-08-19 PROCEDURE — 3078F DIAST BP <80 MM HG: CPT | Mod: CPTII,S$GLB,, | Performed by: FAMILY MEDICINE

## 2022-08-19 PROCEDURE — 85025 COMPLETE CBC W/AUTO DIFF WBC: CPT | Performed by: FAMILY MEDICINE

## 2022-08-19 PROCEDURE — 3044F PR MOST RECENT HEMOGLOBIN A1C LEVEL <7.0%: ICD-10-PCS | Mod: CPTII,S$GLB,, | Performed by: FAMILY MEDICINE

## 2022-08-19 PROCEDURE — 99214 PR OFFICE/OUTPT VISIT, EST, LEVL IV, 30-39 MIN: ICD-10-PCS | Mod: S$GLB,,, | Performed by: FAMILY MEDICINE

## 2022-08-19 PROCEDURE — 3075F SYST BP GE 130 - 139MM HG: CPT | Mod: CPTII,S$GLB,, | Performed by: FAMILY MEDICINE

## 2022-08-19 PROCEDURE — 3008F PR BODY MASS INDEX (BMI) DOCUMENTED: ICD-10-PCS | Mod: CPTII,S$GLB,, | Performed by: FAMILY MEDICINE

## 2022-08-19 PROCEDURE — 3072F LOW RISK FOR RETINOPATHY: CPT | Mod: CPTII,S$GLB,, | Performed by: FAMILY MEDICINE

## 2022-08-19 PROCEDURE — 1159F PR MEDICATION LIST DOCUMENTED IN MEDICAL RECORD: ICD-10-PCS | Mod: CPTII,S$GLB,, | Performed by: FAMILY MEDICINE

## 2022-08-19 PROCEDURE — 1159F MED LIST DOCD IN RCRD: CPT | Mod: CPTII,S$GLB,, | Performed by: FAMILY MEDICINE

## 2022-08-19 PROCEDURE — 99999 PR PBB SHADOW E&M-EST. PATIENT-LVL V: CPT | Mod: PBBFAC,,, | Performed by: FAMILY MEDICINE

## 2022-08-19 PROCEDURE — 80053 COMPREHEN METABOLIC PANEL: CPT | Performed by: FAMILY MEDICINE

## 2022-08-19 PROCEDURE — 3075F PR MOST RECENT SYSTOLIC BLOOD PRESS GE 130-139MM HG: ICD-10-PCS | Mod: CPTII,S$GLB,, | Performed by: FAMILY MEDICINE

## 2022-08-19 PROCEDURE — 3078F PR MOST RECENT DIASTOLIC BLOOD PRESSURE < 80 MM HG: ICD-10-PCS | Mod: CPTII,S$GLB,, | Performed by: FAMILY MEDICINE

## 2022-08-19 PROCEDURE — 3061F NEG MICROALBUMINURIA REV: CPT | Mod: CPTII,S$GLB,, | Performed by: FAMILY MEDICINE

## 2022-08-19 PROCEDURE — 99999 PR PBB SHADOW E&M-EST. PATIENT-LVL V: ICD-10-PCS | Mod: PBBFAC,,, | Performed by: FAMILY MEDICINE

## 2022-08-19 PROCEDURE — 3061F PR NEG MICROALBUMINURIA RESULT DOCUMENTED/REVIEW: ICD-10-PCS | Mod: CPTII,S$GLB,, | Performed by: FAMILY MEDICINE

## 2022-08-19 PROCEDURE — 99214 OFFICE O/P EST MOD 30 MIN: CPT | Mod: S$GLB,,, | Performed by: FAMILY MEDICINE

## 2022-08-19 PROCEDURE — 80061 LIPID PANEL: CPT | Performed by: FAMILY MEDICINE

## 2022-08-19 PROCEDURE — 36415 COLL VENOUS BLD VENIPUNCTURE: CPT | Mod: PO | Performed by: FAMILY MEDICINE

## 2022-08-19 PROCEDURE — 3008F BODY MASS INDEX DOCD: CPT | Mod: CPTII,S$GLB,, | Performed by: FAMILY MEDICINE

## 2022-08-19 PROCEDURE — 3044F HG A1C LEVEL LT 7.0%: CPT | Mod: CPTII,S$GLB,, | Performed by: FAMILY MEDICINE

## 2022-08-19 PROCEDURE — 3072F PR LOW RISK FOR RETINOPATHY: ICD-10-PCS | Mod: CPTII,S$GLB,, | Performed by: FAMILY MEDICINE

## 2022-08-19 PROCEDURE — 84443 ASSAY THYROID STIM HORMONE: CPT | Performed by: FAMILY MEDICINE

## 2022-08-19 PROCEDURE — 3066F NEPHROPATHY DOC TX: CPT | Mod: CPTII,S$GLB,, | Performed by: FAMILY MEDICINE

## 2022-08-19 RX ORDER — AZELASTINE 1 MG/ML
1 SPRAY, METERED NASAL 2 TIMES DAILY
Qty: 30 ML | Refills: 1 | Status: SHIPPED | OUTPATIENT
Start: 2022-08-19 | End: 2022-12-29

## 2022-08-19 NOTE — LETTER
August 19, 2022      [facility]  [address] [address]  Phone: [phone]  Fax: [phone]       Patient: Chan Dwyer   YOB: 1970  Date of Visit: 08/19/2022    To Whom It May Concern:    Antonio Dwyer  was at [facility] on 08/19/2022. The patient may return to work on 08/19/2022 with no restrictions. If you have any questions or concerns, or if I can be of further assistance, please do not hesitate to contact me.    Sincerely,    Lara Chowdary MA

## 2022-08-21 NOTE — PROGRESS NOTES
Subjective:      Patient ID: Chan Dwyer is a 51 y.o. female.    Chief Complaint: Follow-up and Tinnitus      The patient is here today for routine follow up. Labs drawn earlier discussed today with the patient.  Diabetes being followed by diabetes management.   Reports bilateral tinnitus  - started yesterday.    Review of Systems   Constitutional: Negative for activity change and appetite change.   HENT: Positive for hearing loss. Negative for ear discharge and ear pain.    Respiratory: Negative for shortness of breath.    Cardiovascular: Negative for leg swelling.     Past Medical History:   Diagnosis Date    Abnormal Pap smear     Abnormal Pap smear of cervix     Arthritis, low back     Diabetes 09/2014     BS 162am 09/16/2014    DM (diabetes mellitus) 09/2014     am 01/26/2016    DM (diabetes mellitus) 09/2014    BS didn't check 01/31/2017    GERD (gastroesophageal reflux disease)     Gestational diabetes     Hepatitis C antibody positive in blood 02/11/2022    RNA NEGATIVE    History of abnormal Pap smear     Surgical menopause     Vitamin D deficiency           Past Surgical History:   Procedure Laterality Date    COLONOSCOPY N/A 6/22/2016    Procedure: COLONOSCOPY;  Surgeon: Vishal Mary III, MD;  Location: [facility];  Service: Endoscopy;  Laterality: N/A;    Cryotherapy of the cervix  1999    DILATION AND CURETTAGE OF UTERUS      HYSTERECTOMY      [facility]  2/2012    TUBAL LIGATION       Family History   Problem Relation Age of Onset    Hypertension Mother     Diabetes Mother     Heart attack Mother     Diabetes Father     Hypertension Father     Stomach cancer Sister     No Known Problems Son     No Known Problems Son     Cancer Neg Hx     Stroke Neg Hx     Colon cancer Neg Hx     Ovarian cancer Neg Hx      Social History     Socioeconomic History    Marital status: Single    Number of children: 2   Occupational History    Occupation:       "Employer: [employer]      Employer: [employer]   Tobacco Use    Smoking status: Never Smoker    Smokeless tobacco: Never Used   Substance and Sexual Activity    Alcohol use: Yes     Alcohol/week: 0.0 standard drinks     Comment: rare    Drug use: No    Sexual activity: Yes     Partners: Male     Birth control/protection: Surgical   Social History Narrative    She wears seatbelt.     Review of patient's allergies indicates:   Allergen Reactions    Hydrocodone Hives and Rash       Objective:       /60 (BP Location: Left arm, Patient Position: Sitting, BP Method: Large (Automatic))   Pulse 70   Temp 98.3 °F (36.8 °C) (Tympanic)   Ht 5' 4" (1.626 m)   Wt 59.3 kg (130 lb 11.7 oz)   SpO2 97%   BMI 22.44 kg/m²   Physical Exam  Vitals and nursing note reviewed.   Constitutional:       General: She is not in acute distress.     Appearance: She is well-developed. She is not diaphoretic.   HENT:      Head: Normocephalic.      Right Ear: Hearing, ear canal and external ear normal. Tympanic membrane is bulging.      Left Ear: Hearing, ear canal and external ear normal. Tympanic membrane is bulging.      Nose: Mucosal edema present.      Right Sinus: No maxillary sinus tenderness or frontal sinus tenderness.      Left Sinus: No maxillary sinus tenderness or frontal sinus tenderness.      Mouth/Throat:      Pharynx: Uvula midline.   Eyes:      Conjunctiva/sclera: Conjunctivae normal.      Pupils: Pupils are equal, round, and reactive to light.   Neck:      Thyroid: No thyromegaly.      Trachea: No tracheal deviation.   Cardiovascular:      Rate and Rhythm: Normal rate and regular rhythm.      Heart sounds: Normal heart sounds.   Pulmonary:      Effort: Pulmonary effort is normal. No respiratory distress.      Breath sounds: Normal breath sounds.   Abdominal:      General: Bowel sounds are normal.      Palpations: Abdomen is soft.   Musculoskeletal:      Cervical back: Normal range of motion " and neck supple.   Lymphadenopathy:      Cervical: No cervical adenopathy.   Skin:     General: Skin is warm and dry.   Psychiatric:         Behavior: Behavior normal.       Assessment:     1. Type 2 diabetes mellitus with hyperglycemia, with long-term current use of insulin    2. Tinnitus of both ears      Plan:   Type 2 diabetes mellitus with hyperglycemia, with long-term current use of insulin  -     Comprehensive Metabolic Panel; Future; Expected date: 08/19/2022  -     Hemoglobin A1C; Future; Expected date: 08/19/2022  -     CBC Auto Differential; Future; Expected date: 08/19/2022  -     Lipid Panel; Future; Expected date: 08/19/2022  -     TSH; Future; Expected date: 08/19/2022    Tinnitus of both ears  -     Ambulatory referral/consult to Audiology; Future; Expected date: 08/26/2022  -     Ambulatory referral/consult to ENT; Future; Expected date: 08/26/2022    Other orders  -     azelastine (ASTELIN) 137 mcg (0.1 %) nasal spray; 1 spray (137 mcg total) by Nasal route 2 (two) times daily.  Dispense: 30 mL; Refill: 1    Above labs in 6 months prior to visit with me.    Medication List with Changes/Refills   Current Medications    ACETAMINOPHEN (TYLENOL) 500 MG TABLET    Take 1,000 mg by mouth every 6 (six) hours as needed for Pain.    ALCOHOL SWABS (EASY COMFORT ALCOHOL PAD) PADM    Apply 1 each topically 4 (four) times daily.    ASPIRIN (ECOTRIN) 81 MG EC TABLET    Take 1 tablet (81 mg total) by mouth once daily.    BLOOD SUGAR DIAGNOSTIC STRP    To check BG 4 times daily, to use with insurance preferred meter    INSULIN (BASAGLAR KWIKPEN U-100 INSULIN) GLARGINE 100 UNITS/ML SUBQ PEN    Inject 16 Units into the skin once daily.    INSULIN LISPRO-AABC (LYUMJEV KWIKPEN U-100 INSULIN) 100 UNIT/ML PEN    Titrate up to 20 units daily.    ISOSORBIDE MONONITRATE (IMDUR) 30 MG 24 HR TABLET    Take 1 tablet (30 mg total) by mouth once daily.    LANCETS MISC    1 each by Misc.(Non-Drug; Combo Route) route 4 (four)  times daily.    OLOPATADINE (PATADAY) 0.2 % DROP    Place 1 drop into both eyes once daily.    PANTOPRAZOLE (PROTONIX) 20 MG TABLET    Take 1 tablet (20 mg total) by mouth once daily.    SITAGLIPTIN (JANUVIA) 100 MG TAB    Take 1 tablet (100 mg total) by mouth once daily.    VITAMIN D 1000 UNITS TAB    Take 1,000 Units by mouth once daily.   Changed and/or Refilled Medications    Modified Medication Previous Medication    AZELASTINE (ASTELIN) 137 MCG (0.1 %) NASAL SPRAY azelastine (ASTELIN) 137 mcg (0.1 %) nasal spray       1 spray (137 mcg total) by Nasal route 2 (two) times daily.    1 spray (137 mcg total) by Nasal route 2 (two) times daily.   Discontinued Medications    COVID-19 VAC, YAHIR,PFIZER,,PF, (PFIZER COVID-19 YAHIR VACCN,PF,) 30 MCG/0.3 ML INJECTION    Inject into the muscle.

## 2022-08-22 ENCOUNTER — OFFICE VISIT (OUTPATIENT)
Dept: URGENT CARE | Facility: CLINIC | Age: 52
End: 2022-08-22
Payer: COMMERCIAL

## 2022-08-22 ENCOUNTER — CLINICAL SUPPORT (OUTPATIENT)
Dept: AUDIOLOGY | Facility: CLINIC | Age: 52
End: 2022-08-22
Payer: COMMERCIAL

## 2022-08-22 ENCOUNTER — HOSPITAL ENCOUNTER (OUTPATIENT)
Dept: RADIOLOGY | Facility: CLINIC | Age: 52
Discharge: HOME | End: 2022-08-22
Attending: PHYSICIAN ASSISTANT
Payer: COMMERCIAL

## 2022-08-22 VITALS
BODY MASS INDEX: 22.2 KG/M2 | RESPIRATION RATE: 14 BRPM | DIASTOLIC BLOOD PRESSURE: 67 MMHG | SYSTOLIC BLOOD PRESSURE: 147 MMHG | OXYGEN SATURATION: 97 % | HEIGHT: 64 IN | TEMPERATURE: 98 F | WEIGHT: 130 LBS | HEART RATE: 67 BPM

## 2022-08-22 DIAGNOSIS — H93.13 TINNITUS OF BOTH EARS: ICD-10-CM

## 2022-08-22 DIAGNOSIS — J06.9 VIRAL URI WITH COUGH: Primary | ICD-10-CM

## 2022-08-22 DIAGNOSIS — R05.9 COUGH: ICD-10-CM

## 2022-08-22 LAB
CTP QC/QA: YES
CTP QC/QA: YES
POC MOLECULAR INFLUENZA A AGN: NEGATIVE
POC MOLECULAR INFLUENZA B AGN: NEGATIVE
SARS-COV-2 RDRP RESP QL NAA+PROBE: NEGATIVE

## 2022-08-22 PROCEDURE — 3044F PR MOST RECENT HEMOGLOBIN A1C LEVEL <7.0%: ICD-10-PCS | Mod: CPTII,S$GLB,, | Performed by: PHYSICIAN ASSISTANT

## 2022-08-22 PROCEDURE — 3061F PR NEG MICROALBUMINURIA RESULT DOCUMENTED/REVIEW: ICD-10-PCS | Mod: CPTII,S$GLB,, | Performed by: PHYSICIAN ASSISTANT

## 2022-08-22 PROCEDURE — 3072F PR LOW RISK FOR RETINOPATHY: ICD-10-PCS | Mod: CPTII,S$GLB,, | Performed by: PHYSICIAN ASSISTANT

## 2022-08-22 PROCEDURE — 3072F LOW RISK FOR RETINOPATHY: CPT | Mod: CPTII,S$GLB,, | Performed by: PHYSICIAN ASSISTANT

## 2022-08-22 PROCEDURE — 3061F NEG MICROALBUMINURIA REV: CPT | Mod: CPTII,S$GLB,, | Performed by: PHYSICIAN ASSISTANT

## 2022-08-22 PROCEDURE — 87502 INFLUENZA DNA AMP PROBE: CPT | Mod: QW,S$GLB,, | Performed by: PHYSICIAN ASSISTANT

## 2022-08-22 PROCEDURE — 3044F HG A1C LEVEL LT 7.0%: CPT | Mod: CPTII,S$GLB,, | Performed by: PHYSICIAN ASSISTANT

## 2022-08-22 PROCEDURE — 92567 PR TYMPA2METRY: ICD-10-PCS | Mod: S$GLB,,, | Performed by: AUDIOLOGIST-HEARING AID FITTER

## 2022-08-22 PROCEDURE — 3078F DIAST BP <80 MM HG: CPT | Mod: CPTII,S$GLB,, | Performed by: PHYSICIAN ASSISTANT

## 2022-08-22 PROCEDURE — 3077F PR MOST RECENT SYSTOLIC BLOOD PRESSURE >= 140 MM HG: ICD-10-PCS | Mod: CPTII,S$GLB,, | Performed by: PHYSICIAN ASSISTANT

## 2022-08-22 PROCEDURE — U0002: ICD-10-PCS | Mod: QW,S$GLB,, | Performed by: PHYSICIAN ASSISTANT

## 2022-08-22 PROCEDURE — 92557 PR COMPREHENSIVE HEARING TEST: ICD-10-PCS | Mod: S$GLB,,, | Performed by: AUDIOLOGIST-HEARING AID FITTER

## 2022-08-22 PROCEDURE — 3066F NEPHROPATHY DOC TX: CPT | Mod: CPTII,S$GLB,, | Performed by: PHYSICIAN ASSISTANT

## 2022-08-22 PROCEDURE — 99999 PR PBB SHADOW E&M-EST. PATIENT-LVL II: CPT | Mod: PBBFAC,,, | Performed by: AUDIOLOGIST-HEARING AID FITTER

## 2022-08-22 PROCEDURE — 3066F PR DOCUMENTATION OF TREATMENT FOR NEPHROPATHY: ICD-10-PCS | Mod: CPTII,S$GLB,, | Performed by: PHYSICIAN ASSISTANT

## 2022-08-22 PROCEDURE — 99214 PR OFFICE/OUTPT VISIT, EST, LEVL IV, 30-39 MIN: ICD-10-PCS | Mod: S$GLB,,, | Performed by: PHYSICIAN ASSISTANT

## 2022-08-22 PROCEDURE — 3008F PR BODY MASS INDEX (BMI) DOCUMENTED: ICD-10-PCS | Mod: CPTII,S$GLB,, | Performed by: PHYSICIAN ASSISTANT

## 2022-08-22 PROCEDURE — 87502 POCT INFLUENZA A/B MOLECULAR: ICD-10-PCS | Mod: QW,S$GLB,, | Performed by: PHYSICIAN ASSISTANT

## 2022-08-22 PROCEDURE — 1159F PR MEDICATION LIST DOCUMENTED IN MEDICAL RECORD: ICD-10-PCS | Mod: CPTII,S$GLB,, | Performed by: PHYSICIAN ASSISTANT

## 2022-08-22 PROCEDURE — 71046 XR CHEST PA AND LATERAL: ICD-10-PCS | Mod: S$GLB,,, | Performed by: RADIOLOGY

## 2022-08-22 PROCEDURE — 71046 X-RAY EXAM CHEST 2 VIEWS: CPT | Mod: S$GLB,,, | Performed by: RADIOLOGY

## 2022-08-22 PROCEDURE — 1160F PR REVIEW ALL MEDS BY PRESCRIBER/CLIN PHARMACIST DOCUMENTED: ICD-10-PCS | Mod: CPTII,S$GLB,, | Performed by: PHYSICIAN ASSISTANT

## 2022-08-22 PROCEDURE — 92567 TYMPANOMETRY: CPT | Mod: S$GLB,,, | Performed by: AUDIOLOGIST-HEARING AID FITTER

## 2022-08-22 PROCEDURE — 3008F BODY MASS INDEX DOCD: CPT | Mod: CPTII,S$GLB,, | Performed by: PHYSICIAN ASSISTANT

## 2022-08-22 PROCEDURE — 99214 OFFICE O/P EST MOD 30 MIN: CPT | Mod: S$GLB,,, | Performed by: PHYSICIAN ASSISTANT

## 2022-08-22 PROCEDURE — 1159F MED LIST DOCD IN RCRD: CPT | Mod: CPTII,S$GLB,, | Performed by: PHYSICIAN ASSISTANT

## 2022-08-22 PROCEDURE — 1160F RVW MEDS BY RX/DR IN RCRD: CPT | Mod: CPTII,S$GLB,, | Performed by: PHYSICIAN ASSISTANT

## 2022-08-22 PROCEDURE — 3077F SYST BP >= 140 MM HG: CPT | Mod: CPTII,S$GLB,, | Performed by: PHYSICIAN ASSISTANT

## 2022-08-22 PROCEDURE — 99999 PR PBB SHADOW E&M-EST. PATIENT-LVL II: ICD-10-PCS | Mod: PBBFAC,,, | Performed by: AUDIOLOGIST-HEARING AID FITTER

## 2022-08-22 PROCEDURE — 3078F PR MOST RECENT DIASTOLIC BLOOD PRESSURE < 80 MM HG: ICD-10-PCS | Mod: CPTII,S$GLB,, | Performed by: PHYSICIAN ASSISTANT

## 2022-08-22 PROCEDURE — U0002 COVID-19 LAB TEST NON-CDC: HCPCS | Mod: QW,S$GLB,, | Performed by: PHYSICIAN ASSISTANT

## 2022-08-22 PROCEDURE — 92557 COMPREHENSIVE HEARING TEST: CPT | Mod: S$GLB,,, | Performed by: AUDIOLOGIST-HEARING AID FITTER

## 2022-08-22 RX ORDER — PROMETHAZINE HYDROCHLORIDE AND DEXTROMETHORPHAN HYDROBROMIDE 6.25; 15 MG/5ML; MG/5ML
5 SYRUP ORAL NIGHTLY PRN
Qty: 118 ML | Refills: 0 | Status: SHIPPED | OUTPATIENT
Start: 2022-08-22 | End: 2022-11-08

## 2022-08-22 RX ORDER — BENZONATATE 100 MG/1
100 CAPSULE ORAL 3 TIMES DAILY PRN
Qty: 30 CAPSULE | Refills: 0 | Status: SHIPPED | OUTPATIENT
Start: 2022-08-22 | End: 2022-09-01

## 2022-08-22 NOTE — PROGRESS NOTES
"Subjective:       Patient ID: Chan Dwyer is a 51 y.o. female.    Vitals:  height is 5' 4" (1.626 m) and weight is 59 kg (130 lb). Her tympanic temperature is 97.6 °F (36.4 °C). Her blood pressure is 147/67 (abnormal) and her pulse is 67. Her respiration is 14 and oxygen saturation is 97%.     Chief Complaint: Sinus Problem    Chan Dwyer is a 51 year old female who presents with a dry cough, congestion, sore throat, headache.  Symptoms started yesterday.  No known sick contacts.  States she has had her covid vaccine.  She has taken a few OTC meds with mild relief.  Desires to be tested for covid today.    Sinus Problem  This is a new problem. The current episode started yesterday. The problem has been gradually worsening since onset. Associated symptoms include congestion, coughing, headaches and a sore throat. Pertinent negatives include no chills, diaphoresis, ear pain, hoarse voice, neck pain, shortness of breath, sinus pressure, sneezing or swollen glands. Treatments tried: tea, honey/lemon, tylenol, cough drops. The treatment provided mild relief.       Constitution: Negative for chills and sweating.   HENT: Positive for congestion and sore throat. Negative for ear pain and sinus pressure.    Neck: Negative for neck pain.   Respiratory: Positive for cough. Negative for shortness of breath.    Allergic/Immunologic: Negative for sneezing.   Neurological: Positive for headaches.       Objective:      Physical Exam   Constitutional: She is oriented to person, place, and time. She appears well-developed.   HENT:   Head: Normocephalic and atraumatic.   Ears:   Right Ear: Tympanic membrane, external ear and ear canal normal.   Left Ear: Tympanic membrane, external ear and ear canal normal.   Mouth/Throat: Oropharynx is clear and moist.   Eyes: EOM are normal. Pupils are equal, round, and reactive to light.   Neck: Neck supple.   Cardiovascular: Normal rate and regular rhythm. "   Pulmonary/Chest: Effort normal. She has decreased breath sounds in the left middle field and the left lower field.   Abdominal: Bowel sounds are normal. Soft.   Musculoskeletal: Normal range of motion.         General: Normal range of motion.   Neurological: She is alert and oriented to person, place, and time.   Skin: Skin is warm and dry.   Vitals reviewed.        Assessment:       1. Viral URI with cough    2. Cough          Plan:         Viral URI with cough    Cough  -     POCT COVID-19 Rapid Screening  -     XR CHEST PA AND LATERAL; Future; Expected date: 08/22/2022  -     POCT Influenza A/B Molecular  -     benzonatate (TESSALON) 100 MG capsule; Take 1 capsule (100 mg total) by mouth 3 (three) times daily as needed for Cough (Do not take more than 6 capsules in a 24 hour period.).  Dispense: 30 capsule; Refill: 0  -     promethazine-dextromethorphan (PROMETHAZINE-DM) 6.25-15 mg/5 mL Syrp; Take 5 mLs by mouth nightly as needed (cough).  Dispense: 118 mL; Refill: 0      Results for orders placed or performed in visit on 08/22/22   POCT COVID-19 Rapid Screening   Result Value Ref Range    POC Rapid COVID Negative Negative     Acceptable Yes    POCT Influenza A/B Molecular   Result Value Ref Range    POC Molecular Influenza A Ag Negative Negative, Not Reported    POC Molecular Influenza B Ag Negative Negative, Not Reported     Acceptable Yes      Wet read on xray shows no acute findings.  Will update patient with any other findings per radiologist.    Increase fluids.  Get plenty of rest.   Normal saline nasal wash to irrigate sinuses and for congestion/runny nose.  Cool mist humidifier/vaporizer.  Practice good handwashing.  Mucinex for cough and chest congestion.  Tylenol or Ibuprofen for fever, headache and body aches.  Warm salt water gargles for throat comfort.  Chloraseptic spray or lozenges for throat comfort.  See PCP or go to ER if symptoms worsen or fail to improve with  treatment.

## 2022-08-22 NOTE — PROGRESS NOTES
Referring provider: Dr. Suleiman Dwyer was seen 08/22/2022 for an audiological evaluation.  Patient complains of tinnitus in the bilateral ear with onset three days ago. Associated symptoms include stopped up feeling in both ears. No hearing loss. She describes high-pitched, non-pulsatile ringing. Patient saw  this morning for viral URI with cough.    Audiogram results reveal normal hearing 250-8000 Hz for the right ear, and normal hearing  250-8000 Hz with a mild sensorineural notch at 4000 Hz for the left ear.   Speech Reception Thresholds were 15 dBHL for the right ear and 15 dBHL for the left ear.   Word recognition scores were excellent for the right ear and excellent for the left ear.   Tympanograms were Type A for the right ear and Type A for the left ear.    Patient was counseled on the above findings.    Recommendations:  1. ENT appointment previously scheduled for tomorrow.

## 2022-08-23 ENCOUNTER — OFFICE VISIT (OUTPATIENT)
Dept: OTOLARYNGOLOGY | Facility: CLINIC | Age: 52
End: 2022-08-23
Payer: COMMERCIAL

## 2022-08-23 VITALS — WEIGHT: 127.63 LBS | HEIGHT: 64 IN | BODY MASS INDEX: 21.79 KG/M2

## 2022-08-23 DIAGNOSIS — H69.93 DYSFUNCTION OF BOTH EUSTACHIAN TUBES: ICD-10-CM

## 2022-08-23 DIAGNOSIS — J30.89 NON-SEASONAL ALLERGIC RHINITIS, UNSPECIFIED TRIGGER: Primary | ICD-10-CM

## 2022-08-23 DIAGNOSIS — H93.13 TINNITUS OF BOTH EARS: ICD-10-CM

## 2022-08-23 PROCEDURE — 3008F PR BODY MASS INDEX (BMI) DOCUMENTED: ICD-10-PCS | Mod: CPTII,S$GLB,, | Performed by: OTOLARYNGOLOGY

## 2022-08-23 PROCEDURE — 3072F LOW RISK FOR RETINOPATHY: CPT | Mod: CPTII,S$GLB,, | Performed by: OTOLARYNGOLOGY

## 2022-08-23 PROCEDURE — 99204 PR OFFICE/OUTPT VISIT, NEW, LEVL IV, 45-59 MIN: ICD-10-PCS | Mod: S$GLB,,, | Performed by: OTOLARYNGOLOGY

## 2022-08-23 PROCEDURE — 1159F PR MEDICATION LIST DOCUMENTED IN MEDICAL RECORD: ICD-10-PCS | Mod: CPTII,S$GLB,, | Performed by: OTOLARYNGOLOGY

## 2022-08-23 PROCEDURE — 3072F PR LOW RISK FOR RETINOPATHY: ICD-10-PCS | Mod: CPTII,S$GLB,, | Performed by: OTOLARYNGOLOGY

## 2022-08-23 PROCEDURE — 99204 OFFICE O/P NEW MOD 45 MIN: CPT | Mod: S$GLB,,, | Performed by: OTOLARYNGOLOGY

## 2022-08-23 PROCEDURE — 3066F NEPHROPATHY DOC TX: CPT | Mod: CPTII,S$GLB,, | Performed by: OTOLARYNGOLOGY

## 2022-08-23 PROCEDURE — 99999 PR PBB SHADOW E&M-EST. PATIENT-LVL III: ICD-10-PCS | Mod: PBBFAC,,, | Performed by: OTOLARYNGOLOGY

## 2022-08-23 PROCEDURE — 3061F NEG MICROALBUMINURIA REV: CPT | Mod: CPTII,S$GLB,, | Performed by: OTOLARYNGOLOGY

## 2022-08-23 PROCEDURE — 1159F MED LIST DOCD IN RCRD: CPT | Mod: CPTII,S$GLB,, | Performed by: OTOLARYNGOLOGY

## 2022-08-23 PROCEDURE — 3061F PR NEG MICROALBUMINURIA RESULT DOCUMENTED/REVIEW: ICD-10-PCS | Mod: CPTII,S$GLB,, | Performed by: OTOLARYNGOLOGY

## 2022-08-23 PROCEDURE — 3066F PR DOCUMENTATION OF TREATMENT FOR NEPHROPATHY: ICD-10-PCS | Mod: CPTII,S$GLB,, | Performed by: OTOLARYNGOLOGY

## 2022-08-23 PROCEDURE — 3008F BODY MASS INDEX DOCD: CPT | Mod: CPTII,S$GLB,, | Performed by: OTOLARYNGOLOGY

## 2022-08-23 PROCEDURE — 99999 PR PBB SHADOW E&M-EST. PATIENT-LVL III: CPT | Mod: PBBFAC,,, | Performed by: OTOLARYNGOLOGY

## 2022-08-23 PROCEDURE — 3044F HG A1C LEVEL LT 7.0%: CPT | Mod: CPTII,S$GLB,, | Performed by: OTOLARYNGOLOGY

## 2022-08-23 PROCEDURE — 3044F PR MOST RECENT HEMOGLOBIN A1C LEVEL <7.0%: ICD-10-PCS | Mod: CPTII,S$GLB,, | Performed by: OTOLARYNGOLOGY

## 2022-08-23 NOTE — LETTER
August 23, 2022      [facility]  [address] [place] [id number]  Phone: [phone]  Fax: [phone]       Patient: Chan Dwyer   YOB: 1970  Date of Visit: 08/23/2022    To Whom It May Concern:    Antonio Dwyer  was at [facility] on 08/23/2022. The patient may return to work on 08/24/22. If you have any questions or concerns, or if I can be of further assistance, please do not hesitate to contact me.    Sincerely,      NETTIE Santos

## 2022-08-23 NOTE — PROGRESS NOTES
"Referring Provider:    Tristian Bearden Md  [address] [place] [address]  Subjective:   Patient: Chan Dwyer [id number], :1970   Visit date:2022 11:19 AM    Chief Complaint:  Tinnitus (Bilateral review audio from 22 )    HPI:    Prior notes reviewed by myself.  Clinical documentation obtained by nursing staff reviewed.     51-year-old female presents for evaluation of intermittent nonpulsatile tinnitus and clogged feeling of her ears.  These symptoms fluctuate and currently she is not having either of them.  She does admit to some nasal allergy symptoms including congestion, rhinorrhea, postnasal drip, sneezing.  She was just recently started on fluticasone nasal spray by her primary care physician.  No prior history of otologic surgery, trauma, loud noise exposure, otorrhea, infections.  She did have an audiogram this morning      Objective:     Physical Exam:  Vitals:  Ht 5' 4" (1.626 m)   Wt 57.9 kg (127 lb 10.3 oz)   BMI 21.91 kg/m²   General appearance:  Well developed, well nourished    Ears:  Otoscopy of external auditory canals and tympanic membranes was significant for mild retraction, clinical speech reception thresholds grossly intact, no mass/lesion of auricle.    Nose:  No masses/lesions of external nose, congested nasal mucosa, septum, and turbinates were within normal limits. Clear rhinorrhea    Mouth:  No mass/lesion of lips, teeth, gums, hard/soft palate, tongue, tonsils, or oropharynx.    Neck & Lymphatics:  No cervical lymphadenopathy, no neck mass/crepitus/ asymmetry, trachea is midline, no thyroid enlargement/tenderness/mass.        [x]  Data Reviewed:    Lab Results   Component Value Date    WBC 6.69 2022    HGB 12.4 2022    HCT 39.8 2022    MCV 93 2022    EOSINOPHIL 1.6 2022         [x]  Independent interpretation of test: WNL  Media Information                File Link    Annotation on 2022 11:04 AM by [place] MO" Alicia Ley, PETE-A: AUDIOGRAM        Key Information    Document ID File Type Document Type Description   [id number] Annotation Annotation AUDIOGRAM       Import Information    Attached At Date Time User Dept   Encounter Level 8/22/2022 11:04 AM Alicia Majano, PETE-A On Audiology       Encounter    Clinical Support on 8/22/22 with Alicia Majano CCC-A                     Assessment & Plan:   Non-seasonal allergic rhinitis, unspecified trigger    Tinnitus of both ears  -     Ambulatory referral/consult to ENT    Dysfunction of both eustachian tubes        We reviewed her audiogram in detail which was essentially normal.  She is currently asymptomatic.  I suspect that some if not all of her symptoms come from her allergic rhinitis.  I encouraged her to continue with her recently prescribed nasal steroid spray and we also discussed autoinsufflation techniques.  OTC antihistamines may help with her symptoms as well.  She can follow-up with ENT as needed.

## 2022-08-25 ENCOUNTER — TELEPHONE (OUTPATIENT)
Dept: URGENT CARE | Facility: CLINIC | Age: 52
End: 2022-08-25
Payer: COMMERCIAL

## 2022-09-13 ENCOUNTER — TELEPHONE (OUTPATIENT)
Dept: ORTHOPEDICS | Facility: CLINIC | Age: 52
End: 2022-09-13
Payer: COMMERCIAL

## 2022-09-13 NOTE — TELEPHONE ENCOUNTER
Pt added to wait list. Pt VU.      ----- Message from Fern Casey sent at 9/13/2022  2:26 PM CDT -----  Contact: Patient  Patient called to consult with nurse or staff regarding her appointment for 9/20. She would like to come in sooner if possible and would like to be worked in. She would like a call back and can be reached at [phone]. Thanks/MR

## 2022-09-20 ENCOUNTER — OFFICE VISIT (OUTPATIENT)
Dept: ORTHOPEDICS | Facility: CLINIC | Age: 52
End: 2022-09-20
Payer: COMMERCIAL

## 2022-09-20 VITALS — BODY MASS INDEX: 21.79 KG/M2 | WEIGHT: 127.63 LBS | HEIGHT: 64 IN

## 2022-09-20 DIAGNOSIS — M65.30 TRIGGER FINGER OF LEFT HAND, UNSPECIFIED FINGER: Primary | ICD-10-CM

## 2022-09-20 DIAGNOSIS — Z01.818 PREOP TESTING: Primary | ICD-10-CM

## 2022-09-20 PROCEDURE — 1159F PR MEDICATION LIST DOCUMENTED IN MEDICAL RECORD: ICD-10-PCS | Mod: CPTII,S$GLB,, | Performed by: ORTHOPAEDIC SURGERY

## 2022-09-20 PROCEDURE — 3008F BODY MASS INDEX DOCD: CPT | Mod: CPTII,S$GLB,, | Performed by: ORTHOPAEDIC SURGERY

## 2022-09-20 PROCEDURE — 1160F PR REVIEW ALL MEDS BY PRESCRIBER/CLIN PHARMACIST DOCUMENTED: ICD-10-PCS | Mod: CPTII,S$GLB,, | Performed by: ORTHOPAEDIC SURGERY

## 2022-09-20 PROCEDURE — 20550 TENDON SHEATH: ICD-10-PCS | Mod: LT,S$GLB,, | Performed by: ORTHOPAEDIC SURGERY

## 2022-09-20 PROCEDURE — 3072F LOW RISK FOR RETINOPATHY: CPT | Mod: CPTII,S$GLB,, | Performed by: ORTHOPAEDIC SURGERY

## 2022-09-20 PROCEDURE — 3061F NEG MICROALBUMINURIA REV: CPT | Mod: CPTII,S$GLB,, | Performed by: ORTHOPAEDIC SURGERY

## 2022-09-20 PROCEDURE — 20550 NJX 1 TENDON SHEATH/LIGAMENT: CPT | Mod: LT,S$GLB,, | Performed by: ORTHOPAEDIC SURGERY

## 2022-09-20 PROCEDURE — 3072F PR LOW RISK FOR RETINOPATHY: ICD-10-PCS | Mod: CPTII,S$GLB,, | Performed by: ORTHOPAEDIC SURGERY

## 2022-09-20 PROCEDURE — 3061F PR NEG MICROALBUMINURIA RESULT DOCUMENTED/REVIEW: ICD-10-PCS | Mod: CPTII,S$GLB,, | Performed by: ORTHOPAEDIC SURGERY

## 2022-09-20 PROCEDURE — 99213 PR OFFICE/OUTPT VISIT, EST, LEVL III, 20-29 MIN: ICD-10-PCS | Mod: 25,S$GLB,, | Performed by: ORTHOPAEDIC SURGERY

## 2022-09-20 PROCEDURE — 3044F HG A1C LEVEL LT 7.0%: CPT | Mod: CPTII,S$GLB,, | Performed by: ORTHOPAEDIC SURGERY

## 2022-09-20 PROCEDURE — 3008F PR BODY MASS INDEX (BMI) DOCUMENTED: ICD-10-PCS | Mod: CPTII,S$GLB,, | Performed by: ORTHOPAEDIC SURGERY

## 2022-09-20 PROCEDURE — 99999 PR PBB SHADOW E&M-EST. PATIENT-LVL III: ICD-10-PCS | Mod: PBBFAC,,, | Performed by: ORTHOPAEDIC SURGERY

## 2022-09-20 PROCEDURE — 3044F PR MOST RECENT HEMOGLOBIN A1C LEVEL <7.0%: ICD-10-PCS | Mod: CPTII,S$GLB,, | Performed by: ORTHOPAEDIC SURGERY

## 2022-09-20 PROCEDURE — 3066F NEPHROPATHY DOC TX: CPT | Mod: CPTII,S$GLB,, | Performed by: ORTHOPAEDIC SURGERY

## 2022-09-20 PROCEDURE — 1159F MED LIST DOCD IN RCRD: CPT | Mod: CPTII,S$GLB,, | Performed by: ORTHOPAEDIC SURGERY

## 2022-09-20 PROCEDURE — 1160F RVW MEDS BY RX/DR IN RCRD: CPT | Mod: CPTII,S$GLB,, | Performed by: ORTHOPAEDIC SURGERY

## 2022-09-20 PROCEDURE — 3066F PR DOCUMENTATION OF TREATMENT FOR NEPHROPATHY: ICD-10-PCS | Mod: CPTII,S$GLB,, | Performed by: ORTHOPAEDIC SURGERY

## 2022-09-20 PROCEDURE — 99999 PR PBB SHADOW E&M-EST. PATIENT-LVL III: CPT | Mod: PBBFAC,,, | Performed by: ORTHOPAEDIC SURGERY

## 2022-09-20 PROCEDURE — 99213 OFFICE O/P EST LOW 20 MIN: CPT | Mod: 25,S$GLB,, | Performed by: ORTHOPAEDIC SURGERY

## 2022-09-20 RX ORDER — TRIAMCINOLONE ACETONIDE 40 MG/ML
40 INJECTION, SUSPENSION INTRA-ARTICULAR; INTRAMUSCULAR
Status: DISCONTINUED | OUTPATIENT
Start: 2022-09-20 | End: 2022-09-20 | Stop reason: HOSPADM

## 2022-09-20 RX ADMIN — TRIAMCINOLONE ACETONIDE 40 MG: 40 INJECTION, SUSPENSION INTRA-ARTICULAR; INTRAMUSCULAR at 02:09

## 2022-09-20 NOTE — H&P (VIEW-ONLY)
Subjective:     Patient ID: Chan Dwyer is a 51 y.o. female.    Chief Complaint: Pain of the Left Hand      HPI:  The patient is a 51-year-old female with left ring trigger finger.  She was last injected 02/16/2022 with good results until recently.  She request injection today but also wishes to schedule a left ring trigger finger release.    Past Medical History:   Diagnosis Date    Abnormal Pap smear     Abnormal Pap smear of cervix     Arthritis, low back     Diabetes 09/2014     BS 162am 09/16/2014    DM (diabetes mellitus) 09/2014     am 01/26/2016    DM (diabetes mellitus) 09/2014    BS didn't check 01/31/2017    GERD (gastroesophageal reflux disease)     Gestational diabetes     Hepatitis C antibody positive in blood 02/11/2022    RNA NEGATIVE    History of abnormal Pap smear     Surgical menopause     Vitamin D deficiency      Past Surgical History:   Procedure Laterality Date    COLONOSCOPY N/A 6/22/2016    Procedure: COLONOSCOPY;  Surgeon: Vishal Mary III, MD;  Location: [facility];  Service: Endoscopy;  Laterality: N/A;    Cryotherapy of the cervix  1999    DILATION AND CURETTAGE OF UTERUS      HYSTERECTOMY      [facility]  2/2012    TUBAL LIGATION       Family History   Problem Relation Age of Onset    Hypertension Mother     Diabetes Mother     Heart attack Mother     Diabetes Father     Hypertension Father     Stomach cancer Sister     No Known Problems Son     No Known Problems Son     Cancer Neg Hx     Stroke Neg Hx     Colon cancer Neg Hx     Ovarian cancer Neg Hx      Social History     Socioeconomic History    Marital status: Single    Number of children: 2   Occupational History    Occupation:      Employer: [employer]      Employer: [employer]   Tobacco Use    Smoking status: Never    Smokeless tobacco: Never   Substance and Sexual Activity    Alcohol use: Yes     Alcohol/week: 0.0 standard drinks     Comment: rare    Drug use: No    Sexual activity:  Yes     Partners: Male     Birth control/protection: Surgical   Social History Narrative    She wears seatbelt.     Medication List with Changes/Refills   Current Medications    ACETAMINOPHEN (TYLENOL) 500 MG TABLET    Take 1,000 mg by mouth every 6 (six) hours as needed for Pain.    ALCOHOL SWABS (EASY COMFORT ALCOHOL PAD) PADM    Apply 1 each topically 4 (four) times daily.    ASPIRIN (ECOTRIN) 81 MG EC TABLET    Take 1 tablet (81 mg total) by mouth once daily.    AZELASTINE (ASTELIN) 137 MCG (0.1 %) NASAL SPRAY    1 spray (137 mcg total) by Nasal route 2 (two) times daily.    BLOOD SUGAR DIAGNOSTIC STRP    To check BG 4 times daily, to use with insurance preferred meter    INSULIN (BASAGLAR KWIKPEN U-100 INSULIN) GLARGINE 100 UNITS/ML SUBQ PEN    Inject 16 Units into the skin once daily.    INSULIN LISPRO-AABC (LYUMJEV KWIKPEN U-100 INSULIN) 100 UNIT/ML PEN    Titrate up to 20 units daily.    ISOSORBIDE MONONITRATE (IMDUR) 30 MG 24 HR TABLET    Take 1 tablet (30 mg total) by mouth once daily.    LANCETS MISC    1 each by Misc.(Non-Drug; Combo Route) route 4 (four) times daily.    OLOPATADINE (PATADAY) 0.2 % DROP    Place 1 drop into both eyes once daily.    PANTOPRAZOLE (PROTONIX) 20 MG TABLET    Take 1 tablet (20 mg total) by mouth once daily.    PROMETHAZINE-DEXTROMETHORPHAN (PROMETHAZINE-DM) 6.25-15 MG/5 ML SYRP    Take 5 mLs by mouth nightly as needed (cough).    SITAGLIPTIN (JANUVIA) 100 MG TAB    Take 1 tablet (100 mg total) by mouth once daily.    VITAMIN D 1000 UNITS TAB    Take 1,000 Units by mouth once daily.     Review of patient's allergies indicates:   Allergen Reactions    Hydrocodone Hives and Rash     Review of Systems   Constitutional: Negative for malaise/fatigue.   HENT:  Negative for hearing loss.    Eyes:  Negative for double vision and visual disturbance.   Cardiovascular:  Positive for chest pain.   Respiratory:  Negative for shortness of breath.    Endocrine: Negative for cold intolerance.    Hematologic/Lymphatic: Does not bruise/bleed easily.   Skin:  Negative for poor wound healing and suspicious lesions.   Musculoskeletal:  Positive for arthritis, joint pain and joint swelling. Negative for gout.   Gastrointestinal:  Positive for heartburn. Negative for nausea and vomiting.   Genitourinary:  Negative for dysuria.   Neurological:  Positive for numbness, paresthesias and sensory change.   Psychiatric/Behavioral:  Negative for depression, memory loss and substance abuse. The patient is not nervous/anxious.    Allergic/Immunologic: Negative for persistent infections.     Objective:   Body mass index is 21.91 kg/m².  There were no vitals filed for this visit.             General    Constitutional: She is oriented to person, place, and time. She appears well-developed and well-nourished. No distress.   HENT:   Head: Normocephalic.   Mouth/Throat: Oropharynx is clear and moist.   Eyes: EOM are normal.   Cardiovascular:  Normal rate.            Pulmonary/Chest: Effort normal.   Abdominal: Soft.   Neurological: She is alert and oriented to person, place, and time. No cranial nerve deficit.   Psychiatric: She has a normal mood and affect.         Left Hand/Wrist Exam     Inspection   Scars: Wrist - absent Hand -  absent  Effusion: Wrist - absent Hand -  absent    Pain   Hand - The patient exhibits pain of the ring MCP.    Other     Sensory Exam  Median Distribution: normal  Ulnar Distribution: normal  Radial Distribution: normal    Comments:  The patient has active triggering left ring finger with a palpable nodule that moves with tendon excursion.          Vascular Exam       Capillary Refill  Left Hand: normal capillary refill        radiographs were not obtained today  Assessment:     Encounter Diagnosis   Name Primary?    Trigger finger of left hand, unspecified finger Yes    Ring finger    Plan:     The patient was injected left ring trigger finger with 0.5 cc of Kenalog and 0.5 cc of 2% plain  lidocaine.  This is done under sterile technique.  She wishes to schedule a left ring trigger finger release.  She was counseled regarding the surgery.  Risk complications and alternatives were discussed including the risk of infection, anesthetic risk, injury to nerves and vessels, loss of motion, and possible need for additional surgeries were discussed.  She seems to understand and agree that surgery.  All questions were answered.                Disclaimer: This note was prepared using a voice recognition system and is likely to have sound alike errors within the text.

## 2022-09-20 NOTE — PROCEDURES
Tendon Sheath    Date/Time: 9/20/2022 2:00 PM  Performed by: Adarsh Chavez MD  Authorized by: Adarsh Chavez MD     Consent Done?:  Yes (Verbal)  Indications:  Pain  Timeout: prior to procedure the correct patient, procedure, and site was verified    Prep: patient was prepped and draped in usual sterile fashion      Local anesthesia used?: Yes    Local anesthetic:  Lidocaine 2% without epinephrine  Anesthetic total (ml):  0.5    Location:  Ring finger  Site:  L ring MCP  Ultrasonic guidance for needle placement?: No    Needle size:  25 G  Approach:  Volar  Medications:  40 mg triamcinolone acetonide 40 mg/mL

## 2022-09-20 NOTE — PROGRESS NOTES
Subjective:     Patient ID: Chan Dwyer is a 51 y.o. female.    Chief Complaint: Pain of the Left Hand      HPI:  The patient is a 51-year-old female with left ring trigger finger.  She was last injected 02/16/2022 with good results until recently.  She request injection today but also wishes to schedule a left ring trigger finger release.    Past Medical History:   Diagnosis Date    Abnormal Pap smear     Abnormal Pap smear of cervix     Arthritis, low back     Diabetes 09/2014     BS 162am 09/16/2014    DM (diabetes mellitus) 09/2014     am 01/26/2016    DM (diabetes mellitus) 09/2014    BS didn't check 01/31/2017    GERD (gastroesophageal reflux disease)     Gestational diabetes     Hepatitis C antibody positive in blood 02/11/2022    RNA NEGATIVE    History of abnormal Pap smear     Surgical menopause     Vitamin D deficiency      Past Surgical History:   Procedure Laterality Date    COLONOSCOPY N/A 6/22/2016    Procedure: COLONOSCOPY;  Surgeon: Vishal Mary III, MD;  Location: [facility];  Service: Endoscopy;  Laterality: N/A;    Cryotherapy of the cervix  1999    DILATION AND CURETTAGE OF UTERUS      HYSTERECTOMY      [facility]  2/2012    TUBAL LIGATION       Family History   Problem Relation Age of Onset    Hypertension Mother     Diabetes Mother     Heart attack Mother     Diabetes Father     Hypertension Father     Stomach cancer Sister     No Known Problems Son     No Known Problems Son     Cancer Neg Hx     Stroke Neg Hx     Colon cancer Neg Hx     Ovarian cancer Neg Hx      Social History     Socioeconomic History    Marital status: Single    Number of children: 2   Occupational History    Occupation:      Employer: [employer]      Employer: [employer]   Tobacco Use    Smoking status: Never    Smokeless tobacco: Never   Substance and Sexual Activity    Alcohol use: Yes     Alcohol/week: 0.0 standard drinks     Comment: rare    Drug use: No    Sexual activity:  Yes     Partners: Male     Birth control/protection: Surgical   Social History Narrative    She wears seatbelt.     Medication List with Changes/Refills   Current Medications    ACETAMINOPHEN (TYLENOL) 500 MG TABLET    Take 1,000 mg by mouth every 6 (six) hours as needed for Pain.    ALCOHOL SWABS (EASY COMFORT ALCOHOL PAD) PADM    Apply 1 each topically 4 (four) times daily.    ASPIRIN (ECOTRIN) 81 MG EC TABLET    Take 1 tablet (81 mg total) by mouth once daily.    AZELASTINE (ASTELIN) 137 MCG (0.1 %) NASAL SPRAY    1 spray (137 mcg total) by Nasal route 2 (two) times daily.    BLOOD SUGAR DIAGNOSTIC STRP    To check BG 4 times daily, to use with insurance preferred meter    INSULIN (BASAGLAR KWIKPEN U-100 INSULIN) GLARGINE 100 UNITS/ML SUBQ PEN    Inject 16 Units into the skin once daily.    INSULIN LISPRO-AABC (LYUMJEV KWIKPEN U-100 INSULIN) 100 UNIT/ML PEN    Titrate up to 20 units daily.    ISOSORBIDE MONONITRATE (IMDUR) 30 MG 24 HR TABLET    Take 1 tablet (30 mg total) by mouth once daily.    LANCETS MISC    1 each by Misc.(Non-Drug; Combo Route) route 4 (four) times daily.    OLOPATADINE (PATADAY) 0.2 % DROP    Place 1 drop into both eyes once daily.    PANTOPRAZOLE (PROTONIX) 20 MG TABLET    Take 1 tablet (20 mg total) by mouth once daily.    PROMETHAZINE-DEXTROMETHORPHAN (PROMETHAZINE-DM) 6.25-15 MG/5 ML SYRP    Take 5 mLs by mouth nightly as needed (cough).    SITAGLIPTIN (JANUVIA) 100 MG TAB    Take 1 tablet (100 mg total) by mouth once daily.    VITAMIN D 1000 UNITS TAB    Take 1,000 Units by mouth once daily.     Review of patient's allergies indicates:   Allergen Reactions    Hydrocodone Hives and Rash     Review of Systems   Constitutional: Negative for malaise/fatigue.   HENT:  Negative for hearing loss.    Eyes:  Negative for double vision and visual disturbance.   Cardiovascular:  Positive for chest pain.   Respiratory:  Negative for shortness of breath.    Endocrine: Negative for cold intolerance.    Hematologic/Lymphatic: Does not bruise/bleed easily.   Skin:  Negative for poor wound healing and suspicious lesions.   Musculoskeletal:  Positive for arthritis, joint pain and joint swelling. Negative for gout.   Gastrointestinal:  Positive for heartburn. Negative for nausea and vomiting.   Genitourinary:  Negative for dysuria.   Neurological:  Positive for numbness, paresthesias and sensory change.   Psychiatric/Behavioral:  Negative for depression, memory loss and substance abuse. The patient is not nervous/anxious.    Allergic/Immunologic: Negative for persistent infections.     Objective:   Body mass index is 21.91 kg/m².  There were no vitals filed for this visit.             General    Constitutional: She is oriented to person, place, and time. She appears well-developed and well-nourished. No distress.   HENT:   Head: Normocephalic.   Mouth/Throat: Oropharynx is clear and moist.   Eyes: EOM are normal.   Cardiovascular:  Normal rate.            Pulmonary/Chest: Effort normal.   Abdominal: Soft.   Neurological: She is alert and oriented to person, place, and time. No cranial nerve deficit.   Psychiatric: She has a normal mood and affect.         Left Hand/Wrist Exam     Inspection   Scars: Wrist - absent Hand -  absent  Effusion: Wrist - absent Hand -  absent    Pain   Hand - The patient exhibits pain of the ring MCP.    Other     Sensory Exam  Median Distribution: normal  Ulnar Distribution: normal  Radial Distribution: normal    Comments:  The patient has active triggering left ring finger with a palpable nodule that moves with tendon excursion.          Vascular Exam       Capillary Refill  Left Hand: normal capillary refill        radiographs were not obtained today  Assessment:     Encounter Diagnosis   Name Primary?    Trigger finger of left hand, unspecified finger Yes    Ring finger    Plan:     The patient was injected left ring trigger finger with 0.5 cc of Kenalog and 0.5 cc of 2% plain  lidocaine.  This is done under sterile technique.  She wishes to schedule a left ring trigger finger release.  She was counseled regarding the surgery.  Risk complications and alternatives were discussed including the risk of infection, anesthetic risk, injury to nerves and vessels, loss of motion, and possible need for additional surgeries were discussed.  She seems to understand and agree that surgery.  All questions were answered.                Disclaimer: This note was prepared using a voice recognition system and is likely to have sound alike errors within the text.

## 2022-09-21 ENCOUNTER — TELEPHONE (OUTPATIENT)
Dept: ORTHOPEDICS | Facility: CLINIC | Age: 52
End: 2022-09-21
Payer: COMMERCIAL

## 2022-09-21 NOTE — TELEPHONE ENCOUNTER
Card clearance    ----- Message from Kiersten Rodas MA sent at 9/21/2022  9:59 AM CDT -----  Regarding: FW: Cardiac clearance  Please see clearance below. Let me know if anything else is needed. Thanks, Kiersten  ----- Message -----  From: Lee Lambert MD  Sent: 9/20/2022   5:04 PM CDT  To: Ngoc Brannon LPN  Subject: RE: Cards clearance                              Pt cleared for surgery at moderate CV risk  ----- Message -----  From: Ngoc Brannon LPN  Sent: 9/20/2022   3:46 PM CDT  To: Lee Lambert MD  Subject: FW: Cards clearance                              Pt needing clearance for left trigger finger release. Please advise.   ----- Message -----  From: Carli Manriquez LPN  Sent: 9/20/2022   3:42 PM CDT  To: Fausto Rodrigez  Subject: Cards clearance                                  Patient was last seen in your office on 08/17/2022. She is scheduled to have a left trigger finger release on 10/10/22. Patient requires cardiac clearance. Can she be cleared by her last OV or will she require a new appointment? Please schedule patient appointment if needed. Thank you.

## 2022-09-23 ENCOUNTER — OFFICE VISIT (OUTPATIENT)
Dept: DIABETES | Facility: CLINIC | Age: 52
End: 2022-09-23
Payer: COMMERCIAL

## 2022-09-23 ENCOUNTER — PATIENT MESSAGE (OUTPATIENT)
Dept: DIABETES | Facility: CLINIC | Age: 52
End: 2022-09-23

## 2022-09-23 DIAGNOSIS — E55.9 VITAMIN D DEFICIENCY: ICD-10-CM

## 2022-09-23 DIAGNOSIS — E10.65 TYPE 1 DIABETES MELLITUS WITH HYPERGLYCEMIA: Primary | ICD-10-CM

## 2022-09-23 PROCEDURE — 99214 PR OFFICE/OUTPT VISIT, EST, LEVL IV, 30-39 MIN: ICD-10-PCS | Mod: 95,,, | Performed by: PHYSICIAN ASSISTANT

## 2022-09-23 PROCEDURE — 3044F PR MOST RECENT HEMOGLOBIN A1C LEVEL <7.0%: ICD-10-PCS | Mod: CPTII,95,, | Performed by: PHYSICIAN ASSISTANT

## 2022-09-23 PROCEDURE — 3061F NEG MICROALBUMINURIA REV: CPT | Mod: CPTII,95,, | Performed by: PHYSICIAN ASSISTANT

## 2022-09-23 PROCEDURE — 3044F HG A1C LEVEL LT 7.0%: CPT | Mod: CPTII,95,, | Performed by: PHYSICIAN ASSISTANT

## 2022-09-23 PROCEDURE — 3072F LOW RISK FOR RETINOPATHY: CPT | Mod: CPTII,95,, | Performed by: PHYSICIAN ASSISTANT

## 2022-09-23 PROCEDURE — 99214 OFFICE O/P EST MOD 30 MIN: CPT | Mod: 95,,, | Performed by: PHYSICIAN ASSISTANT

## 2022-09-23 PROCEDURE — 3066F PR DOCUMENTATION OF TREATMENT FOR NEPHROPATHY: ICD-10-PCS | Mod: CPTII,95,, | Performed by: PHYSICIAN ASSISTANT

## 2022-09-23 PROCEDURE — 3066F NEPHROPATHY DOC TX: CPT | Mod: CPTII,95,, | Performed by: PHYSICIAN ASSISTANT

## 2022-09-23 PROCEDURE — 3061F PR NEG MICROALBUMINURIA RESULT DOCUMENTED/REVIEW: ICD-10-PCS | Mod: CPTII,95,, | Performed by: PHYSICIAN ASSISTANT

## 2022-09-23 PROCEDURE — 3072F PR LOW RISK FOR RETINOPATHY: ICD-10-PCS | Mod: CPTII,95,, | Performed by: PHYSICIAN ASSISTANT

## 2022-09-23 RX ORDER — INSULIN GLARGINE 100 [IU]/ML
16 INJECTION, SOLUTION SUBCUTANEOUS DAILY
Qty: 6 ML | Refills: 11 | Status: SHIPPED | OUTPATIENT
Start: 2022-09-23 | End: 2022-12-29 | Stop reason: ALTCHOICE

## 2022-09-23 NOTE — PATIENT INSTRUCTIONS
CURRENT DM MEDICATIONS:   Lantus/Basaglar 16 units daily  Januvia 100 mg daily  Lyumjev 6 units before meals and correction dosing every 3 hours as needed - as below    Lyumjev Correction Dosing every 3 hours as needed OUTSIDE OF EATING  If  - 250, may take 2 units of Lyumjev  If  - 300, may take 3 units of Lyumjev  If  - 350, may take 4 units of Lyumjev  If  - 400, may take 5 units of Lyumjev  If +, may take 6 units of Lyumjev      Lynn C[employer]:   [email or web address]

## 2022-09-23 NOTE — PROGRESS NOTES
PCP: Tristian Bearden MD    Subjective:     Chief Complaint: Diabetes - Established Patient    TELEMEDICINE VISIT:     The patient location is: Home  The chief complaint leading to consultation is: Diabetes Follow up  Visit type: Virtual visit with synchronous audio and video  Total time spent with patient: 20  Each patient to whom he or she provides medical services by telemedicine is:  (1) informed of the relationship between the physician and patient and the respective role of any other health care provider with respect to management of the patient; and (2) notified that he or she may decline to receive medical services by telemedicine and may withdraw from such care at any time.    Notes: N/A        HISTORY OF PRESENT ILLNESS: 51 y.o.   female presenting for diabetes management visit.   The patient's last visit with me was on 7/6/2022.  Patient has had Type II diabetes since 5 years or more.  Pertinent to decision making is the following comorbidities: Vitamin D Deficiency and Gestational DM in Pregnancy 2003  Patient has the following Diabetes complications: without complications  She  has attended diabetes education in the past.     Patient's most recent A1c of 6.9% was completed 1 months ago.   Patient states since Her last A1c Her blood glucose levels have been high  after meals.   Patient monitors blood glucose 4 times per day and Continuously with personal CGM Dexcom.   Patient blood glucose monitoring device will be uploaded into [employer] today.        Patients records show baseline near hypoglycemia at times and postprandial hyperglycemia related to dosing after meals. Hyperglycemia throughout the day recently which may be related to pen staying out of fridge more than one month.   Patient endorses the following diabetes related symptoms: None.   Patient is due today for the following diabetes-related health maintenance standards: Influenza Vaccine and COVID-19 Vaccine .   She  denies recent hospital admissions or emergency room visits.   She voices having hypoglycemia as above.   Patient's concerns today include glycemic control.   Patient medication regimen is as below.     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       Lab Results   Component Value Date    CPEPTIDE 0.72 (L) 04/28/2022     Lab Results   Component Value Date    GLUTAMICACID 0.00 09/28/2020          //   , There is no height or weight on file to calculate BMI.  Her blood sugar in clinic today is:    Lab Results   Component Value Date    POCGLU 141 (A) 06/29/2022       Review of Systems   Constitutional:  Negative for activity change, appetite change, chills and fever.   HENT:  Negative for dental problem, mouth sores, nosebleeds, sore throat and trouble swallowing.    Eyes:  Negative for pain and discharge.   Respiratory:  Negative for shortness of breath, wheezing and stridor.    Cardiovascular:  Negative for chest pain, palpitations and leg swelling.   Gastrointestinal:  Negative for abdominal pain, diarrhea, nausea and vomiting.   Endocrine: Negative for polydipsia, polyphagia and polyuria.   Genitourinary:  Negative for dysuria, frequency and urgency.   Musculoskeletal:  Negative for joint swelling and myalgias.   Skin:  Negative for rash and wound.   Neurological:  Negative  for dizziness, syncope, weakness and headaches.   Psychiatric/Behavioral:  Negative for behavioral problems and dysphoric mood.        Diabetes Management Status  Statin: Not taking  ACE/ARB: Not taking    Screening or Prevention Patient's value Goal Complete/Controlled?   HgA1C Testing and Control   Lab Results   Component Value Date    HGBA1C 6.9 (H) 08/19/2022      Annually/Less than 8% No   Lipid profile : 08/19/2022 Annually Yes   LDL control  Lab Results   Component Value Date    LDLCALC 99.2 08/19/2022    Annually/Less than 100 mg/dl  Yes   Nephropathy screening Lab Results   Component Value Date    MICALBCREAT 8.5 04/28/2022     Lab Results   Component Value Date    PROTEINUA Negative 01/26/2021    Annually No   Blood pressure BP Readings from Last 1 Encounters:   08/22/22 (!) 147/67    Less than 140/90 Yes   Dilated retinal exam : 07/18/2022 Annually Yes    Foot exam   : 11/30/2021 Annually No     Social History     Socioeconomic History    Marital status: Single    Number of children: 2   Occupational History    Occupation:      Employer: [employer]     Employer: [employer]   Tobacco Use    Smoking status: Never    Smokeless tobacco: Never   Substance and Sexual Activity    Alcohol use: Yes     Alcohol/week: 0.0 standard drinks     Comment: rare    Drug use: No    Sexual activity: Yes     Partners: Male     Birth control/protection: Surgical   Social History Narrative    She wears seatbelt.     Past Medical History:   Diagnosis Date    Abnormal Pap smear     Abnormal Pap smear of cervix     Arthritis, low back     Diabetes 09/2014     BS 162am 09/16/2014    DM (diabetes mellitus) 09/2014     am 01/26/2016    DM (diabetes mellitus) 09/2014    BS didn't check 01/31/2017    GERD (gastroesophageal reflux disease)     Gestational diabetes     Hepatitis C antibody positive in blood 02/11/2022    RNA NEGATIVE    History of abnormal Pap smear     Surgical menopause     Vitamin D  deficiency        Objective:        Physical Exam  Constitutional:       General: She is not in acute distress.     Appearance: She is well-developed. She is not diaphoretic.   HENT:      Head: Normocephalic and atraumatic.      Right Ear: External ear normal.      Left Ear: External ear normal.      Nose: Nose normal.   Eyes:      General:         Right eye: No discharge.         Left eye: No discharge.   Pulmonary:      Effort: Pulmonary effort is normal. No respiratory distress.      Breath sounds: No stridor.   Musculoskeletal:         General: Normal range of motion.      Cervical back: Normal range of motion.   Skin:     Coloration: Skin is not pale.   Neurological:      Mental Status: She is alert and oriented to person, place, and time. Mental status is at baseline.      Motor: No abnormal muscle tone.      Coordination: Coordination normal.   Psychiatric:         Mood and Affect: Mood normal.         Behavior: Behavior normal.         Thought Content: Thought content normal.         Judgment: Judgment normal.           Assessment / Plan:     Type 1 diabetes mellitus with hyperglycemia  -     insulin (BASAGLAR KWIKPEN U-100 INSULIN) glargine 100 units/mL SubQ pen; Inject 16 Units into the skin once daily.  Dispense: 6 mL; Refill: 11    Vitamin D deficiency    Additional Plan Details:    - POCT Glucose  - Encouraged continuation of lifestyle changes including regular exercise and limiting carbohydrates to 30-45 grams per meal threes times daily and 15 grams per snack with a limit of two daily.   - Encouraged continued monitoring of blood glucose with maintenance of 4 times daily and Continuously with personal CGM Dexcom.    - Current DM Medication Regimen: Change Lantus to Basaglar 16 units daily - change to new pen. Continue Januvia 100 mg daily. Change Lyumjev 6 units before  meals and correction dosing every 3 hours as needed - as below.   - Health Maintenance standards addressed today: Flu Shot - patient  would like to complete outside of [facility] and COVID - 19 Vaccine - [facility]   - Nursing Visit: Patient is below goal range for nursing visit for age group and will not need nursing visit at this time .   - Follow up in 6 weeks and call in 1 week.      Lyumjev Correction Dosing every 3 hours as needed OUTSIDE OF EATING  If  - 250, may take 2 units of Lyumjev  If  - 300, may take 3 units of Lyumjev  If  - 350, may take 4 units of Lyumjev  If  - 400, may take 5 units of Lyumjev    If +, may take 6 units of Lyumjev    Blakeney McKnight, PA-C [facility]

## 2022-10-03 ENCOUNTER — HOSPITAL ENCOUNTER (OUTPATIENT)
Dept: CARDIOLOGY | Facility: HOSPITAL | Age: 52
Discharge: HOME | End: 2022-10-03
Attending: ORTHOPAEDIC SURGERY
Payer: COMMERCIAL

## 2022-10-03 DIAGNOSIS — Z01.818 PREOP TESTING: ICD-10-CM

## 2022-10-03 PROCEDURE — 93010 ELECTROCARDIOGRAM REPORT: CPT | Mod: ,,, | Performed by: INTERNAL MEDICINE

## 2022-10-03 PROCEDURE — 93005 ELECTROCARDIOGRAM TRACING: CPT

## 2022-10-03 PROCEDURE — 93010 EKG 12-LEAD: ICD-10-PCS | Mod: ,,, | Performed by: INTERNAL MEDICINE

## 2022-10-04 NOTE — DISCHARGE INSTRUCTIONS
To confirm, your doctor has instructed you that surgery is scheduled for 10/10/22.       Pre admit office will call the afternoon prior to surgery between 1PM and 3PM with arrival time.    Surgery will be at [facility],  The address is [address] [place]  [id number].      IMPORTANT INSTRUCTIONS!    Do not eat or drink after 12 midnight, including water.   Do not smoke or use chewing tobacco after 12 midnight  OK to brush teeth, but no gum, candy, or mints!      Take only these medicines with a small swallow of water-morning of surgery.     Imdur, Protonix         ____ Stop Aspirin, Ibuprofen, Motrin and Aleve at least 5-7 days before surgery, unless otherwise instructed by your doctor, or the nurse.   You MAY use Tylenol/acetaminophen until day of surgery.      ____  If you take diabetic medication, do NOT take morning of surgery unless instructed by Doctor. Metformin must be stopped 24 hrs prior to surgery time.       ____ Stop taking any Fish Oil supplements or Vitamins at least 5 days prior to surgery, unless instructed otherwise by your Doctor.       Bathing Instructions: The night before surgery and the morning prior to coming to the hospital:    - Shower & rinse your body as usual with anti-bacterial Soap (Dial or Lever 2000)   -Hibiclens (if indicated) use AFTER anti-bacterial soap; 1 packet PM/1 packet in AM on surgical site only   -Do not use hibiclens on your head, face, or genitals.    -Do not wash with anti-bacterial soap after you use the hibiclens.    -Do not shave surgical site 5-7 days prior to surgery.    -Pubic hair 7 days prior to surgery (gyn pt's).      Pediatric patients do not need to use anti-bacterial soap or Hibiclens.             After Bathing:   __ No powder, lotions, creams, or body spray to skin     __No deodorant for any breast procedure, PORT, or upper arm surgery     __ No makeup, mascara, nail polish or artificial nails        **SURGERY WILL BE CANCELLED IF  ARTIFICIAL/NAIL POLISH IS PRESENT!!!**    __ Please remove all piercings and leave all jewelry at home.    **SURGERY WILL BE CANCELLED IF PIERCINGS ARE PRESENT!!!**      __ Dentures, Hearing Aids and Contact Lens need to be removed prior to the start of surgery.      __ Wear clean, loose-fitting clothing. Allow for dressings/bandages/surgical equipment     __ You must have transportation, and they MUST stay the entire time.       [facility] Visitor/Ride Policy:   Only 1 adult allowed (over the age of 18) to accompany you into Pre-op/Recovery Surgery Dept and must stay through the entire length of admission.     Must have a ride home from a responsible adult that you know and trust.      Pediatric Patients are allowed 2 adult visitors.     Medical Transport, Uber or Lyft can only be used if patient has a responsible adult to accompany them during ride home.         Post-Op Instructions: You will receive surgery post-op instructions by your Discharge Nurse prior to going home.     Surgical Site Infection:   Prevention of surgical site infections:   -Keep incisions clean and dry.   -Do not soak/submerge incisions in water until completely healed.   -Do not apply lotions, powders, creams, or deodorants to site.   -Always make sure hands are cleaned with antibacterial soap/ alcohol-based   prior to touching the surgical site.       Signs and symptoms:               -Redness and pain around the area where you had surgery               -Drainage of cloudy fluid from your surgical wound               -Fever over 100.4 or chills     >>>Call Surgeon office/on-call Surgeon if you experience any of these signs & symptoms post-surgery.        *Please Call [facility] Pre-[facility] Department with surgery instruction questions at [phone].     *Insurance Questions, please call [phone] or [phone]

## 2022-10-04 NOTE — PROGRESS NOTES
PCP: Tristian Bearden MD    Subjective:     Chief Complaint: Diabetes - Established Patient    Established Patient - Audio Only Telehealth Visit     The patient location is: Home  The chief complaint leading to consultation is: Diabetes follow up  Visit type: Virtual visit with audio only (telephone)  Total Time Spent with Patient: 12 minutes     The reason for the audio only service rather than synchronous audio and video virtual visit was related to technical difficulties or patient preference/necessity.     Each patient to whom I provide medical services by telemedicine is:  (1) informed of the relationship between the physician and patient and the respective role of any other health care provider with respect to management of the patient; and (2) notified that they may decline to receive medical services by telemedicine and may withdraw from such care at any time. Patient verbally consented to receive this service via voice-only telephone call.    This service was not originating from a related E/M service provided within the previous 7 days nor will  to an E/M service or procedure within the next 24 hours or my soonest available appointment.  Prevailing standard of care was able to be met in this audio-only visit.       HISTORY OF PRESENT ILLNESS: 51 y.o.   female presenting for diabetes management visit.   The patient's last visit with me was on 9/23/2022.  Patient has had Type II diabetes since 5 years or more.  Pertinent to decision making is the following comorbidities: Vitamin D Deficiency and Gestational DM in Pregnancy 2003  Patient has the following Diabetes complications: without complications  She  has attended diabetes education in the past.     Patient's most recent A1c of 6.9% was completed 2 months ago.   Patient states since Her last A1c Her blood glucose levels have been high  after meals.   Patient monitors blood glucose 4 times per day and Continuously with personal  CGM Dexcom.   Patient blood glucose monitoring device will be uploaded into [employer] today.        Patients records show baseline near hyperglycemia and postprandial hyperglycemia. Bfast hyperglycemia related to late dosing yesterday but dosing before dinner with hyperglycemia and has hyperglycemia after ice cream at 4p.   Patient endorses the following diabetes related symptoms: None.   Patient is due today for the following diabetes-related health maintenance standards: Foot Exam , Influenza Vaccine, and COVID-19 Vaccine .   She denies recent hospital admissions or emergency room visits.   She voices having hypoglycemia as above.   Patient's concerns today include glycemic control.   Patient medication regimen is as below.     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       Lab Results   Component Value Date    CPEPTIDE 0.72 (L) 04/28/2022     Lab Results   Component Value Date    GLUTAMICACID 0.00 09/28/2020          //   , There is no height or weight on file to calculate BMI.  Her blood sugar in clinic today is:    Lab Results   Component Value Date    POCGLU 141 (A) 06/29/2022       Review of Systems   Constitutional:  Negative for activity change, appetite change, chills and fever.   HENT:  Negative for dental problem, mouth sores, nosebleeds, sore throat and  trouble swallowing.    Eyes:  Negative for pain and discharge.   Respiratory:  Negative for shortness of breath, wheezing and stridor.    Cardiovascular:  Negative for chest pain, palpitations and leg swelling.   Gastrointestinal:  Negative for abdominal pain, diarrhea, nausea and vomiting.   Endocrine: Negative for polydipsia, polyphagia and polyuria.   Genitourinary:  Negative for dysuria, frequency and urgency.   Musculoskeletal:  Negative for joint swelling and myalgias.   Skin:  Negative for rash and wound.   Neurological:  Negative for dizziness, syncope, weakness and headaches.   Psychiatric/Behavioral:  Negative for behavioral problems and dysphoric mood.        Diabetes Management Status  Statin: Not taking  ACE/ARB: Not taking    Screening or Prevention Patient's value Goal Complete/Controlled?   HgA1C Testing and Control   Lab Results   Component Value Date    HGBA1C 6.9 (H) 08/19/2022      Annually/Less than 8% No   Lipid profile : 08/19/2022 Annually Yes   LDL control  Lab Results   Component Value Date    LDLCALC 99.2 08/19/2022    Annually/Less than 100 mg/dl  Yes   Nephropathy screening Lab Results   Component Value Date    MICALBCREAT 8.5 04/28/2022     Lab Results   Component Value Date    PROTEINUA Negative 01/26/2021    Annually No   Blood pressure BP Readings from Last 1 Encounters:   08/22/22 (!) 147/67    Less than 140/90 Yes   Dilated retinal exam : 07/18/2022 Annually Yes    Foot exam   : 11/30/2021 Annually No     Social History     Socioeconomic History    Marital status: Single    Number of children: 2   Occupational History    Occupation:      Employer: [employer]     Employer: [employer]   Tobacco Use    Smoking status: Never    Smokeless tobacco: Never   Substance and Sexual Activity    Alcohol use: Yes     Alcohol/week: 0.0 standard drinks     Comment: rare    Drug use: No    Sexual activity: Yes     Partners: Male     Birth control/protection: Surgical    Social History Narrative    She wears seatbelt.     Past Medical History:   Diagnosis Date    Abnormal Pap smear     Abnormal Pap smear of cervix     Arthritis, low back     Diabetes 09/2014     BS 162am 09/16/2014    DM (diabetes mellitus) 09/2014     am 01/26/2016    DM (diabetes mellitus) 09/2014    BS didn't check 01/31/2017    GERD (gastroesophageal reflux disease)     Gestational diabetes     Hepatitis C antibody positive in blood 02/11/2022    RNA NEGATIVE    History of abnormal Pap smear     Surgical menopause     Vitamin D deficiency        Objective:        Physical Exam  Neurological:      Mental Status: She is alert and oriented to person, place, and time. Mental status is at baseline.   Psychiatric:         Mood and Affect: Mood normal.         Behavior: Behavior normal.         Thought Content: Thought content normal.         Judgment: Judgment normal.           Assessment / Plan:     Type 1 diabetes mellitus with hyperglycemia    Vitamin D deficiency    Additional Plan Details:    - POCT Glucose  - Encouraged continuation of lifestyle changes including regular exercise and limiting carbohydrates to 30-45 grams per meal threes times daily and 15 grams per snack with a limit of two daily.   - Encouraged continued monitoring of blood glucose with maintenance of 4 times daily and Continuously with personal CGM Dexcom.    - Current DM Medication Regimen: Change Lantus to 14 units daily. Continue Januvia 100 mg daily. Change Lyumjev 8 units before meals, 3 units before snack, and correction dosing every 3 hours as needed - as below.   - Health Maintenance standards addressed today: Foot Exam - deferred by patient today because Telemedicine or Telephone visit, Flu Shot - patient would like to complete outside of [facility], and COVID - 19 Vaccine - patient will schedule outside of [facility]   - Nursing Visit: Patient is below goal range for nursing visit for age group and will not need nursing visit at  this time .   - Follow up in 4 weeks.     Meal Size:   8 units before meals   3 units before snacks    Lyumjev Correction Dosing every 3 hours as needed OUTSIDE OF EATING  If  - 250, may take 2 units of Lyumjev  If  - 300, may take 3 units of Lyumjev  If  - 350, may take 4 units of Lyumjev  If  - 400, may take 5 units of Lyumjev  If +, may take 6 units of Lyumjev    Odilia Shaffer PA-C  [facility]

## 2022-10-05 ENCOUNTER — PATIENT MESSAGE (OUTPATIENT)
Dept: DIABETES | Facility: CLINIC | Age: 52
End: 2022-10-05

## 2022-10-05 ENCOUNTER — HOSPITAL ENCOUNTER (OUTPATIENT)
Dept: PREADMISSION TESTING | Facility: HOSPITAL | Age: 52
Discharge: HOME | End: 2022-10-05
Attending: ORTHOPAEDIC SURGERY
Payer: COMMERCIAL

## 2022-10-05 ENCOUNTER — OFFICE VISIT (OUTPATIENT)
Dept: DIABETES | Facility: CLINIC | Age: 52
End: 2022-10-05
Payer: COMMERCIAL

## 2022-10-05 VITALS
HEART RATE: 58 BPM | SYSTOLIC BLOOD PRESSURE: 144 MMHG | TEMPERATURE: 98 F | RESPIRATION RATE: 15 BRPM | DIASTOLIC BLOOD PRESSURE: 66 MMHG | OXYGEN SATURATION: 99 %

## 2022-10-05 DIAGNOSIS — E11.65 TYPE 2 DIABETES MELLITUS WITH HYPERGLYCEMIA, WITH LONG-TERM CURRENT USE OF INSULIN: ICD-10-CM

## 2022-10-05 DIAGNOSIS — R07.9 CHEST PAIN, MODERATE CORONARY ARTERY RISK: ICD-10-CM

## 2022-10-05 DIAGNOSIS — Z79.4 TYPE 2 DIABETES MELLITUS WITH HYPERGLYCEMIA, WITH LONG-TERM CURRENT USE OF INSULIN: ICD-10-CM

## 2022-10-05 DIAGNOSIS — M65.342 TRIGGER RING FINGER OF LEFT HAND: ICD-10-CM

## 2022-10-05 DIAGNOSIS — E55.9 VITAMIN D DEFICIENCY: ICD-10-CM

## 2022-10-05 DIAGNOSIS — E10.65 TYPE 1 DIABETES MELLITUS WITH HYPERGLYCEMIA: Primary | ICD-10-CM

## 2022-10-05 PROCEDURE — 3061F PR NEG MICROALBUMINURIA RESULT DOCUMENTED/REVIEW: ICD-10-PCS | Mod: CPTII,,, | Performed by: PHYSICIAN ASSISTANT

## 2022-10-05 PROCEDURE — 3044F HG A1C LEVEL LT 7.0%: CPT | Mod: CPTII,,, | Performed by: PHYSICIAN ASSISTANT

## 2022-10-05 PROCEDURE — 3044F PR MOST RECENT HEMOGLOBIN A1C LEVEL <7.0%: ICD-10-PCS | Mod: CPTII,,, | Performed by: PHYSICIAN ASSISTANT

## 2022-10-05 PROCEDURE — 99213 PR OFFICE/OUTPT VISIT, EST, LEVL III, 20-29 MIN: ICD-10-PCS | Mod: 95,,, | Performed by: PHYSICIAN ASSISTANT

## 2022-10-05 PROCEDURE — 99213 OFFICE O/P EST LOW 20 MIN: CPT | Mod: 95,,, | Performed by: PHYSICIAN ASSISTANT

## 2022-10-05 PROCEDURE — 3061F NEG MICROALBUMINURIA REV: CPT | Mod: CPTII,,, | Performed by: PHYSICIAN ASSISTANT

## 2022-10-05 PROCEDURE — 3072F LOW RISK FOR RETINOPATHY: CPT | Mod: CPTII,,, | Performed by: PHYSICIAN ASSISTANT

## 2022-10-05 PROCEDURE — 3066F NEPHROPATHY DOC TX: CPT | Mod: CPTII,,, | Performed by: PHYSICIAN ASSISTANT

## 2022-10-05 PROCEDURE — 3072F PR LOW RISK FOR RETINOPATHY: ICD-10-PCS | Mod: CPTII,,, | Performed by: PHYSICIAN ASSISTANT

## 2022-10-05 PROCEDURE — 3066F PR DOCUMENTATION OF TREATMENT FOR NEPHROPATHY: ICD-10-PCS | Mod: CPTII,,, | Performed by: PHYSICIAN ASSISTANT

## 2022-10-05 NOTE — Clinical Note
Hi, she wants to change her appt with you to virtual and requesting pre pump linda Shaffer PA-C [facility]

## 2022-10-05 NOTE — PATIENT INSTRUCTIONS
CURRENT DM MEDICATIONS:   Lantus 14 units daily  Januvia 100 mg daily  Lyumjev meal size and correction dosing every 3 hours as needed - as below    Meal Size:   8 units before meals   3 units before snacks    Lyumjev Correction Dosing every 3 hours as needed OUTSIDE OF EATING  If  - 250, may take 2 units of Lyumjev  If  - 300, may take 3 units of Lyumjev  If  - 350, may take 4 units of Lyumjev  If  - 400, may take 5 units of Lyumjev  If +, may take 6 units of Lyumjev

## 2022-10-05 NOTE — H&P
Preoperative History and Physical  [facility]                                                                   Chief Complaint: Preoperative evaluation     History of Present Illness:      Chan Dwyer is a 51 y.o. female with a PMHx of IDDM, GERD, and Trigger finger who presents to the office today for a preoperative consultation at the request of Dr. Chavez who plans on performing a Left ring finger TF release on October 10.     Functional Status:      The patient is able to climb a flight of stairs. The patient is able to ambulate without difficulty. The patient's functional status is affected by the surgical problem. The patient's functional status is not affected by shortness of breath, chest pain, dyspnea on exertion and fatigue.      MET score greater than 4    Past Medical History:      Past Medical History:   Diagnosis Date    Abnormal Pap smear     Abnormal Pap smear of cervix     Arthritis, low back     Diabetes 09/2014     BS 162am 09/16/2014    DM (diabetes mellitus) 09/2014     am 01/26/2016    DM (diabetes mellitus) 09/2014    BS didn't check 01/31/2017    GERD (gastroesophageal reflux disease)     Gestational diabetes     Hepatitis C antibody positive in blood 02/11/2022    RNA NEGATIVE    History of abnormal Pap smear     Surgical menopause     Vitamin D deficiency         Past Surgical History:      Past Surgical History:   Procedure Laterality Date    COLONOSCOPY N/A 6/22/2016    Procedure: COLONOSCOPY;  Surgeon: Vishal Mary III, MD;  Location: [facility];  Service: Endoscopy;  Laterality: N/A;    Cryotherapy of the cervix  1999    DILATION AND CURETTAGE OF UTERUS      HYSTERECTOMY      [facility]  2/2012    TUBAL LIGATION          Social History:      Social History     Socioeconomic History    Marital status: Single    Number of children: 2   Occupational History    Occupation:      Employer: [employer]      Employer: [employer]   Tobacco Use    Smoking status: Never    Smokeless tobacco: Never   Substance and Sexual Activity    Alcohol use: Yes     Comment: Rare    Drug use: No    Sexual activity: Yes     Partners: Male     Birth control/protection: Surgical   Social History Narrative    She wears seatbelt.        Family History:      Family History   Problem Relation Age of Onset    Hypertension Mother     Diabetes Mother     Heart attack Mother     Diabetes Father     Hypertension Father     Stomach cancer Sister     No Known Problems Sister     No Known Problems Sister     No Known Problems Sister     No Known Problems Sister     No Known Problems Brother     No Known Problems Brother     No Known Problems Brother     No Known Problems Brother     No Known Problems Son     No Known Problems Son     Cancer Neg Hx     Stroke Neg Hx     Colon cancer Neg Hx     Ovarian cancer Neg Hx        Allergies:      Review of patient's allergies indicates:   Allergen Reactions    Hydrocodone Hives and Rash       Medications:      Current Outpatient Medications   Medication Sig    acetaminophen (TYLENOL) 500 MG tablet Take 1,000 mg by mouth every 6 (six) hours as needed for Pain.    aspirin (ECOTRIN) 81 MG EC tablet Take 1 tablet (81 mg total) by mouth once daily.    insulin (BASAGLAR KWIKPEN U-100 INSULIN) glargine 100 units/mL SubQ pen Inject 16 Units into the skin once daily. (Patient taking differently: Inject 16 Units into the skin every evening.)    insulin lispro-aabc (LYUMJEV KWIKPEN U-100 INSULIN) 100 unit/mL pen Titrate up to 20 units daily.    isosorbide mononitrate (IMDUR) 30 MG 24 hr tablet Take 1 tablet (30 mg total) by mouth once daily.    pantoprazole (PROTONIX) 20 MG tablet Take 1 tablet (20 mg total) by mouth once daily.    SITagliptin (JANUVIA) 100 MG Tab Take 1 tablet (100 mg total) by mouth once daily.    vitamin D 1000 units Tab Take 1,000 Units by mouth once daily.    alcohol swabs (EASY COMFORT  "ALCOHOL PAD) PadM Apply 1 each topically 4 (four) times daily.    azelastine (ASTELIN) 137 mcg (0.1 %) nasal spray 1 spray (137 mcg total) by Nasal route 2 (two) times daily.    blood sugar diagnostic Strp To check BG 4 times daily, to use with insurance preferred meter    lancets Misc 1 each by Misc.(Non-Drug; Combo Route) route 4 (four) times daily.    olopatadine (PATADAY) 0.2 % Drop Place 1 drop into both eyes once daily.    promethazine-dextromethorphan (PROMETHAZINE-DM) 6.25-15 mg/5 mL Syrp Take 5 mLs by mouth nightly as needed (cough). (Patient not taking: Reported on 9/20/2022)     No current facility-administered medications for this encounter.       Vitals:      Vitals:    10/05/22 0810   BP: (!) 144/66   Pulse: (!) 58   Resp: 15   Temp: 98.1 °F (36.7 °C)       Review of Systems:        Constitutional: Negative for fever, chills, weight loss, malaise/fatigue and diaphoresis.   HENT: Negative for hearing loss, ear pain, nosebleeds, congestion, sore throat, neck pain, tinnitus and ear discharge.    Eyes: Negative for blurred vision, double vision, photophobia, pain, discharge and redness.   Respiratory: Negative for cough, hemoptysis, sputum production, shortness of breath, wheezing and stridor.    Cardiovascular: Negative for chest pain, palpitations, orthopnea, claudication, leg swelling and PND.   Gastrointestinal: Negative for heartburn, nausea, vomiting, abdominal pain, diarrhea, constipation, blood in stool and melena.   Genitourinary: Negative for dysuria, urgency, frequency, hematuria and flank pain.   Musculoskeletal: Negative for myalgias, back pain, joint pain and falls. Positive for left hand/ring finger pain, rates 10/10 at worst, associated with loss of  strength and a "pins and needles" sensation.  Skin: Negative for itching and rash.   Neurological: Negative for dizziness, tingling, tremors, sensory change, speech change, focal weakness, seizures, loss of consciousness, weakness and " headaches.   Endo/Heme/Allergies: Negative for environmental allergies and polydipsia. Does not bruise/bleed easily.   Psychiatric/Behavioral: Negative for depression, suicidal ideas, hallucinations, memory loss and substance abuse. The patient is not nervous/anxious and does not have insomnia.    All 14 systems reviewed and negative except as noted above.    Physical Exam:      Constitutional: Appears well-developed, well-nourished and in no acute distress.  Patient is oriented to person, place, and time.   Head: Normocephalic and atraumatic. Mucous membranes moist.  Neck: Neck supple no mass.   Cardiovascular: Bradycardia.  Regular rhythm.  S1 S2 appreciated by ascultation.  Pulmonary/Chest: Effort normal and clear to auscultation bilaterally. No respiratory distress.   Abdomen: Soft. Non-tender and non-distended. Bowel sounds are normal.   Neurological: Patient is alert and oriented to person, place and time. Moves all extremities.  Skin: Warm and dry. No lesions.  Extremities: No clubbing, cyanosis or edema.    Laboratory data:      Reviewed and noted in plan where applicable. Please see chart for full laboratory data.    No results for input(s): CPK, CPKMB, TROPONINI, MB in the last 24 hours. No results for input(s): POCTGLUCOSE in the last 24 hours.     No results found for: INR, PROTIME    Lab Results   Component Value Date    WBC 8.13 10/03/2022    HGB 13.0 10/03/2022    HCT 43.0 10/03/2022    MCV 96 10/03/2022     10/03/2022       No results for input(s): GLU, NA, K, CL, CO2, BUN, CREATININE, CALCIUM, MG in the last 24 hours.    Predictors of intubation difficulty:       Morbid obesity? no   Anatomically abnormal facies? no   Prominent incisors? no   Receding mandible? no   Short, thick neck? no   Neck range of motion: normal   Dentition:  Partial upper dentures, missing tooth to left lower.  Based on the Modified Mallampati, patient is a mallampati score: I (soft palate, uvula, fauces, and tonsillar  pillars visible)    Cardiographics:      ECG: Sinus bradycardia   T wave abnormality, consider anterolateral ischemia   Abnormal ECG   When compared with ECG of 09-MAR-2022 13:17,   Previous ECG has undetermined rhythm, needs review   Confirmed by FLORIAN DIANE MD ([id number]) on 10/3/2022 12:07:06 PM     Echocardiogram in February showed: Concentric hypertrophy and normal systolic function.  The estimated ejection fraction is 55%.  Normal left ventricular diastolic function.  Normal right ventricular size with normal right ventricular systolic function.  Normal central venous pressure (3 mmHg).    Nuclear Stress test in February showed;  Normal myocardial perfusion scan. There is no evidence of myocardial ischemia or infarction.    The gated perfusion images showed an ejection fraction of 61% at rest. The gated perfusion images showed an ejection fraction of 56% post stress.    The EKG portion of this study is negative for ischemia.    The patient reported no chest pain during the stress test.    The exercise capacity was normal.    Imaging:      Chest x-ray:  Not indicated.      Assessment and Plan:      Trigger ring finger of left hand  - Patient presents today at the request of Dr. Chavez who plans on performing a Left ring finger trigger finger release on 10/10.    Known risk factors for perioperative complications: Diabetes mellitus    Difficulty with intubation is not anticipated.    Cardiac Risk Estimation: Based on the Revised Cardiac Risk index, patient is a Class 2 risk with a 6.0% risk of a major cardiac event in a low risk procedure.    1.) Preoperative workup as follows: ECG, hemoglobin, hematocrit, electrolytes, creatinine, glucose, liver function studies.  2.) Change in medication regimen before surgery: discontinue NSAIDs 5 days before surgery. Continue Januvia with instructions to hold the morning of surgery and resume postoperatively. Continue Basaglar with instructions to only take 1/2 the usual  nighttime dose of insulin with no insulin the morning of surgery. Continue Lispro with instructions to only take 1/2 the usual nighttime dose of insulin with no insulin the morning of surgery.  3.) Prophylaxis for cardiac events with perioperative beta-blockers: not indicated.  4.) Invasive hemodynamic monitoring perioperatively: not indicated.  5.) Deep vein thrombosis prophylaxis postoperatively: intermittent pneumatic compression boots and regimen to be chosen by surgical team.  6.) Surveillance for postoperative MI with ECG immediately postoperatively and on postoperati ve days 1 and 2 AND troponin levels 24 hours postoperatively and on day 4 or hospital discharge (whichever comes first): not indicated.  7.) Current medications which may produce withdrawal symptoms if withheld perioperatively: None.  8.) Other measures: None.    Type 2 diabetes mellitus with hyperglycemia, with long-term current use of insulin  - A1c 6.9 on 8/19.  - Continue Januvia with instructions to hold the morning of surgery and resume postoperatively.  - Continue Basaglar with instructions to only take 1/2 the usual nighttime dose of insulin with no insulin the morning of surgery.  - Continue Lispro with instructions to only take 1/2 the usual nighttime dose of insulin with no insulin the morning of surgery.  - ADA diet.     HIstory of Chest pain, moderate coronary artery risk  - Followed outpatient by Dr. Lambert.  - Currently denies CP reporting improvement in CP since starting Imdur.  - Continue Imdur and ASA with instructions to hold ASA the morning of surgery and resume postoperatively.  - Nuclear Stress test in February showed;  Normal myocardial perfusion scan. There is no evidence of myocardial ischemia or infarction.    The gated perfusion images showed an ejection fraction of 61% at rest. The gated perfusion images showed an ejection fraction of 56% post stress.    The EKG portion of this study is negative for ischemia.    The  patient reported no chest pain during the stress test.    The exercise capacity was normal.        Electronically signed by Bee Devries DNP, ACNP on 10/5/2022 at 8:14 AM.

## 2022-10-05 NOTE — ASSESSMENT & PLAN NOTE
- A1c 6.9 on 8/19.  - Continue Januvia with instructions to hold the morning of surgery and resume postoperatively.  - Continue Basaglar with instructions to only take 1/2 the usual nighttime dose of insulin with no insulin the morning of surgery.  - Continue Lispro with instructions to only take 1/2 the usual nighttime dose of insulin with no insulin the morning of surgery.  - ADA diet.   
- Followed outpatient by Dr. Lambert.  - Currently denies CP reporting improvement in CP since starting Imdur.  - Continue Imdur and ASA with instructions to hold ASA the morning of surgery and resume postoperatively.  - Nuclear Stress test in February showed;  Normal myocardial perfusion scan. There is no evidence of myocardial ischemia or infarction.    The gated perfusion images showed an ejection fraction of 61% at rest. The gated perfusion images showed an ejection fraction of 56% post stress.    The EKG portion of this study is negative for ischemia.    The patient reported no chest pain during the stress test.    The exercise capacity was normal.  
- Patient presents today at the request of Dr. Chavez who plans on performing a Left ring finger trigger finger release on 10/10.    Known risk factors for perioperative complications: Diabetes mellitus    Difficulty with intubation is not anticipated.    Cardiac Risk Estimation: Based on the Revised Cardiac Risk index, patient is a Class 2 risk with a 6.0% risk of a major cardiac event in a low risk procedure.    1.) Preoperative workup as follows: ECG, hemoglobin, hematocrit, electrolytes, creatinine, glucose, liver function studies.  2.) Change in medication regimen before surgery: discontinue NSAIDs 5 days before surgery. Continue Januvia with instructions to hold the morning of surgery and resume postoperatively. Continue Basaglar with instructions to only take 1/2 the usual nighttime dose of insulin with no insulin the morning of surgery. Continue Lispro with instructions to only take 1/2 the usual nighttime dose of insulin with no insulin the morning of surgery.  3.) Prophylaxis for cardiac events with perioperative beta-blockers: not indicated.  4.) Invasive hemodynamic monitoring perioperatively: not indicated.  5.) Deep vein thrombosis prophylaxis postoperatively: intermittent pneumatic compression boots and regimen to be chosen by surgical team.  6.) Surveillance for postoperative MI with ECG immediately postoperatively and on postoperati ve days 1 and 2 AND troponin levels 24 hours postoperatively and on day 4 or hospital discharge (whichever comes first): not indicated.  7.) Current medications which may produce withdrawal symptoms if withheld perioperatively: None.  8.) Other measures: None.  
The patient is a 42y Female complaining of back pain general.

## 2022-10-06 ENCOUNTER — ANESTHESIA EVENT (OUTPATIENT)
Dept: SURGERY | Facility: HOSPITAL | Age: 52
End: 2022-10-06
Payer: COMMERCIAL

## 2022-10-07 ENCOUNTER — TELEPHONE (OUTPATIENT)
Dept: DIABETES | Facility: CLINIC | Age: 52
End: 2022-10-07
Payer: COMMERCIAL

## 2022-10-07 NOTE — ANESTHESIA PREPROCEDURE EVALUATION
10/07/2022  Chan Dwyer is a 51 y.o., female.  Past Medical History:   Diagnosis Date    Abnormal Pap smear     Abnormal Pap smear of cervix     Arthritis, low back     Diabetes 09/2014     BS 162am 09/16/2014    DM (diabetes mellitus) 09/2014     am 01/26/2016    DM (diabetes mellitus) 09/2014    BS didn't check 01/31/2017    GERD (gastroesophageal reflux disease)     Gestational diabetes     Hepatitis C antibody positive in blood 02/11/2022    RNA NEGATIVE    History of abnormal Pap smear     Surgical menopause     Vitamin D deficiency        Past Surgical History:   Procedure Laterality Date    COLONOSCOPY N/A 6/22/2016    Procedure: COLONOSCOPY;  Surgeon: Vishal Mary III, MD;  Location: [facility];  Service: Endoscopy;  Laterality: N/A;    Cryotherapy of the cervix  1999    DILATION AND CURETTAGE OF UTERUS      HYSTERECTOMY      [facility]  2/2012    TUBAL LIGATION           Pre-op Assessment    I have reviewed the Patient Summary Reports.     I have reviewed the Nursing Notes. I have reviewed the NPO Status.   I have reviewed the Medications.     Review of Systems  Anesthesia Hx:  No problems with previous Anesthesia  History of prior surgery of interest to airway management or planning: Previous anesthesia: General Denies Family Hx of Anesthesia complications.   Denies Personal Hx of Anesthesia complications.   Social:  Non-Smoker    Hematology/Oncology:  Hematology Normal        Cardiovascular:  Cardiovascular Normal  Followed by cards for CP.  NEG perfusion scan and NL echo.   Pulmonary:  Pulmonary Normal    Renal/:  Renal/ Normal     Hepatic/GI:   GERD Hepatitis, C    Musculoskeletal:   Arthritis     Neurological:   Headaches    Endocrine:   Diabetes, type 2    Psych:  Psychiatric Normal           Physical Exam  General: Alert and  Oriented    Airway:  Mallampati: II   Mouth Opening: Normal  TM Distance: Normal  Tongue: Normal  Neck ROM: Normal ROM    Dental:  Intact, Partial Dentures    Chest/Lungs:  Clear to auscultation, Normal Respiratory Rate    Heart:  Rate: Normal  Rhythm: Regular Rhythm        Anesthesia Plan  Type of Anesthesia, risks & benefits discussed:    Anesthesia Type: MAC  Intra-op Monitoring Plan: Standard ASA Monitors  Post Op Pain Control Plan: multimodal analgesia and IV/PO Opioids PRN  Induction:  IV  Informed Consent: Informed consent signed with the Patient and all parties understand the risks and agree with anesthesia plan.  All questions answered.   ASA Score: 3  Day of Surgery Review of History & Physical: H&P Update referred to the surgeon/provider.    Ready For Surgery From Anesthesia Perspective.     .

## 2022-10-07 NOTE — TELEPHONE ENCOUNTER
Changed appt to virtual   ----- Message from Odilia Shaffer PA-C sent at 10/5/2022 10:05 AM CDT -----  Hi, she wants to change her appt with you to virtual and requesting pre pump eval     Odilia Shaffer PA-C  Diabetes Management

## 2022-10-10 ENCOUNTER — HOSPITAL ENCOUNTER (OUTPATIENT)
Facility: HOSPITAL | Age: 52
Discharge: HOME | End: 2022-10-10
Attending: ORTHOPAEDIC SURGERY | Admitting: ORTHOPAEDIC SURGERY
Payer: COMMERCIAL

## 2022-10-10 ENCOUNTER — ANESTHESIA (OUTPATIENT)
Dept: SURGERY | Facility: HOSPITAL | Age: 52
End: 2022-10-10
Payer: COMMERCIAL

## 2022-10-10 VITALS
DIASTOLIC BLOOD PRESSURE: 60 MMHG | HEIGHT: 64 IN | RESPIRATION RATE: 13 BRPM | HEART RATE: 66 BPM | BODY MASS INDEX: 22.3 KG/M2 | OXYGEN SATURATION: 97 % | SYSTOLIC BLOOD PRESSURE: 126 MMHG | WEIGHT: 130.63 LBS | TEMPERATURE: 98 F

## 2022-10-10 DIAGNOSIS — M65.30 TRIGGER FINGER OF LEFT HAND, UNSPECIFIED FINGER: Primary | ICD-10-CM

## 2022-10-10 LAB — POCT GLUCOSE: 210 MG/DL (ref 70–110)

## 2022-10-10 PROCEDURE — 63600175 PHARM REV CODE 636 W HCPCS: Performed by: ANESTHESIOLOGY

## 2022-10-10 PROCEDURE — 26055 PR INCISE FINGER TENDON SHEATH: ICD-10-PCS | Mod: F3,,, | Performed by: ORTHOPAEDIC SURGERY

## 2022-10-10 PROCEDURE — 37000008 HC ANESTHESIA 1ST 15 MINUTES: Performed by: ORTHOPAEDIC SURGERY

## 2022-10-10 PROCEDURE — 63600175 PHARM REV CODE 636 W HCPCS

## 2022-10-10 PROCEDURE — 36000706: Performed by: ORTHOPAEDIC SURGERY

## 2022-10-10 PROCEDURE — 71000015 HC POSTOP RECOV 1ST HR: Performed by: ORTHOPAEDIC SURGERY

## 2022-10-10 PROCEDURE — 82962 GLUCOSE BLOOD TEST: CPT | Performed by: ORTHOPAEDIC SURGERY

## 2022-10-10 PROCEDURE — D9220A PRA ANESTHESIA: Mod: CRNA,,, | Performed by: NURSE ANESTHETIST, CERTIFIED REGISTERED

## 2022-10-10 PROCEDURE — 71000033 HC RECOVERY, INTIAL HOUR: Performed by: ORTHOPAEDIC SURGERY

## 2022-10-10 PROCEDURE — 36000707: Performed by: ORTHOPAEDIC SURGERY

## 2022-10-10 PROCEDURE — 25000003 PHARM REV CODE 250

## 2022-10-10 PROCEDURE — 37000009 HC ANESTHESIA EA ADD 15 MINS: Performed by: ORTHOPAEDIC SURGERY

## 2022-10-10 PROCEDURE — 26055 INCISE FINGER TENDON SHEATH: CPT | Mod: F3,,, | Performed by: ORTHOPAEDIC SURGERY

## 2022-10-10 PROCEDURE — D9220A PRA ANESTHESIA: Mod: ANES,,, | Performed by: ANESTHESIOLOGY

## 2022-10-10 PROCEDURE — 63600175 PHARM REV CODE 636 W HCPCS: Performed by: NURSE ANESTHETIST, CERTIFIED REGISTERED

## 2022-10-10 PROCEDURE — D9220A PRA ANESTHESIA: ICD-10-PCS | Mod: ANES,,, | Performed by: ANESTHESIOLOGY

## 2022-10-10 PROCEDURE — D9220A PRA ANESTHESIA: ICD-10-PCS | Mod: CRNA,,, | Performed by: NURSE ANESTHETIST, CERTIFIED REGISTERED

## 2022-10-10 PROCEDURE — 25000003 PHARM REV CODE 250: Performed by: ORTHOPAEDIC SURGERY

## 2022-10-10 RX ORDER — CHLORHEXIDINE GLUCONATE ORAL RINSE 1.2 MG/ML
10 SOLUTION DENTAL 2 TIMES DAILY
Status: DISCONTINUED | OUTPATIENT
Start: 2022-10-10 | End: 2022-10-10 | Stop reason: HOSPADM

## 2022-10-10 RX ORDER — ONDANSETRON 2 MG/ML
4 INJECTION INTRAMUSCULAR; INTRAVENOUS ONCE AS NEEDED
Status: DISCONTINUED | OUTPATIENT
Start: 2022-10-10 | End: 2022-10-10 | Stop reason: HOSPADM

## 2022-10-10 RX ORDER — PROPOFOL 10 MG/ML
INJECTION, EMULSION INTRAVENOUS
Status: DISCONTINUED | OUTPATIENT
Start: 2022-10-10 | End: 2022-10-10

## 2022-10-10 RX ORDER — LIDOCAINE HYDROCHLORIDE 20 MG/ML
INJECTION, SOLUTION INFILTRATION; PERINEURAL
Status: DISCONTINUED
Start: 2022-10-10 | End: 2022-10-10 | Stop reason: HOSPADM

## 2022-10-10 RX ORDER — DIPHENHYDRAMINE HYDROCHLORIDE 50 MG/ML
25 INJECTION INTRAMUSCULAR; INTRAVENOUS EVERY 6 HOURS PRN
Status: DISCONTINUED | OUTPATIENT
Start: 2022-10-10 | End: 2022-10-10 | Stop reason: HOSPADM

## 2022-10-10 RX ORDER — SODIUM CHLORIDE 0.9 % (FLUSH) 0.9 %
10 SYRINGE (ML) INJECTION
Status: DISCONTINUED | OUTPATIENT
Start: 2022-10-10 | End: 2022-10-10 | Stop reason: HOSPADM

## 2022-10-10 RX ORDER — DEXMEDETOMIDINE HYDROCHLORIDE 100 UG/ML
INJECTION, SOLUTION INTRAVENOUS
Status: DISCONTINUED | OUTPATIENT
Start: 2022-10-10 | End: 2022-10-10

## 2022-10-10 RX ORDER — LIDOCAINE HYDROCHLORIDE 20 MG/ML
INJECTION, SOLUTION EPIDURAL; INFILTRATION; INTRACAUDAL; PERINEURAL
Status: DISCONTINUED | OUTPATIENT
Start: 2022-10-10 | End: 2022-10-10 | Stop reason: HOSPADM

## 2022-10-10 RX ORDER — CHLORHEXIDINE GLUCONATE ORAL RINSE 1.2 MG/ML
10 SOLUTION DENTAL
Status: DISPENSED | OUTPATIENT
Start: 2022-10-10

## 2022-10-10 RX ORDER — CEFAZOLIN SODIUM 2 G/50ML
2 SOLUTION INTRAVENOUS
Status: COMPLETED | OUTPATIENT
Start: 2022-10-10 | End: 2022-10-10

## 2022-10-10 RX ORDER — MEPERIDINE HYDROCHLORIDE 25 MG/ML
12.5 INJECTION INTRAMUSCULAR; INTRAVENOUS; SUBCUTANEOUS ONCE
Status: DISCONTINUED | OUTPATIENT
Start: 2022-10-10 | End: 2022-10-10 | Stop reason: HOSPADM

## 2022-10-10 RX ORDER — ONDANSETRON 2 MG/ML
INJECTION INTRAMUSCULAR; INTRAVENOUS
Status: DISCONTINUED | OUTPATIENT
Start: 2022-10-10 | End: 2022-10-10

## 2022-10-10 RX ORDER — FENTANYL CITRATE 50 UG/ML
25 INJECTION, SOLUTION INTRAMUSCULAR; INTRAVENOUS EVERY 5 MIN PRN
Status: DISCONTINUED | OUTPATIENT
Start: 2022-10-10 | End: 2022-10-10 | Stop reason: HOSPADM

## 2022-10-10 RX ORDER — FENTANYL CITRATE 50 UG/ML
INJECTION, SOLUTION INTRAMUSCULAR; INTRAVENOUS
Status: DISCONTINUED | OUTPATIENT
Start: 2022-10-10 | End: 2022-10-10

## 2022-10-10 RX ORDER — LIDOCAINE HCL/PF 100 MG/5ML
SYRINGE (ML) INTRAVENOUS
Status: DISCONTINUED | OUTPATIENT
Start: 2022-10-10 | End: 2022-10-10

## 2022-10-10 RX ORDER — ALBUTEROL SULFATE 0.83 MG/ML
2.5 SOLUTION RESPIRATORY (INHALATION) EVERY 4 HOURS PRN
Status: DISCONTINUED | OUTPATIENT
Start: 2022-10-10 | End: 2022-10-10 | Stop reason: HOSPADM

## 2022-10-10 RX ORDER — IBUPROFEN 600 MG/1
600 TABLET ORAL EVERY 6 HOURS PRN
Qty: 30 TABLET | Refills: 0 | Status: SHIPPED | OUTPATIENT
Start: 2022-10-10 | End: 2023-08-09 | Stop reason: SDUPTHER

## 2022-10-10 RX ORDER — MIDAZOLAM HYDROCHLORIDE 1 MG/ML
INJECTION INTRAMUSCULAR; INTRAVENOUS
Status: DISCONTINUED | OUTPATIENT
Start: 2022-10-10 | End: 2022-10-10

## 2022-10-10 RX ORDER — SODIUM CHLORIDE, SODIUM LACTATE, POTASSIUM CHLORIDE, CALCIUM CHLORIDE 600; 310; 30; 20 MG/100ML; MG/100ML; MG/100ML; MG/100ML
INJECTION, SOLUTION INTRAVENOUS CONTINUOUS
Status: DISCONTINUED | OUTPATIENT
Start: 2022-10-10 | End: 2022-10-10 | Stop reason: HOSPADM

## 2022-10-10 RX ORDER — IBUPROFEN 200 MG
600 TABLET ORAL EVERY 6 HOURS PRN
Status: DISCONTINUED | OUTPATIENT
Start: 2022-10-10 | End: 2022-10-10 | Stop reason: HOSPADM

## 2022-10-10 RX ADMIN — FENTANYL CITRATE 25 MCG: 50 INJECTION, SOLUTION INTRAMUSCULAR; INTRAVENOUS at 07:10

## 2022-10-10 RX ADMIN — SODIUM CHLORIDE, SODIUM LACTATE, POTASSIUM CHLORIDE, AND CALCIUM CHLORIDE: 600; 310; 30; 20 INJECTION, SOLUTION INTRAVENOUS at 06:10

## 2022-10-10 RX ADMIN — CHLORHEXIDINE GLUCONATE 10 ML: 1.2 RINSE ORAL at 06:10

## 2022-10-10 RX ADMIN — ONDANSETRON 4 MG: 2 INJECTION, SOLUTION INTRAMUSCULAR; INTRAVENOUS at 07:10

## 2022-10-10 RX ADMIN — CEFAZOLIN SODIUM 2 G: 2 SOLUTION INTRAVENOUS at 07:10

## 2022-10-10 RX ADMIN — MIDAZOLAM HYDROCHLORIDE 2 MG: 1 INJECTION INTRAMUSCULAR; INTRAVENOUS at 06:10

## 2022-10-10 RX ADMIN — Medication 50 MG: at 06:10

## 2022-10-10 RX ADMIN — PROPOFOL 100 MG: 10 INJECTION, EMULSION INTRAVENOUS at 06:10

## 2022-10-10 RX ADMIN — DEXMEDETOMIDINE HYDROCHLORIDE 4 MCG: 100 INJECTION, SOLUTION INTRAVENOUS at 07:10

## 2022-10-10 RX ADMIN — FENTANYL CITRATE 25 MCG: 50 INJECTION, SOLUTION INTRAMUSCULAR; INTRAVENOUS at 06:10

## 2022-10-10 NOTE — ANESTHESIA POSTPROCEDURE EVALUATION
Anesthesia Post Evaluation    Patient: Chan Dwyer    Procedure(s) Performed: Procedure(s) (LRB):  RELEASE, TRIGGER FINGER (Left)    Final Anesthesia Type: MAC      Patient location during evaluation: PACU  Patient participation: Yes- Able to Participate  Level of consciousness: awake and alert and oriented  Post-procedure vital signs: reviewed and stable  Pain management: adequate  Airway patency: patent    PONV status at discharge: No PONV  Anesthetic complications: no      Cardiovascular status: blood pressure returned to baseline, stable and hemodynamically stable  Respiratory status: unassisted  Hydration status: euvolemic  Follow-up not needed.          Vitals Value Taken Time   /60 10/10/22 0815   Temp 36.6 °C (97.8 °F) 10/10/22 0815   Pulse 66 10/10/22 0815   Resp 13 10/10/22 0815   SpO2 97 % 10/10/22 0815         Event Time   Out of Recovery 08:00:00         Pain/Jerry Score: Jerry Score: 10 (10/10/2022  8:15 AM)

## 2022-10-10 NOTE — DISCHARGE SUMMARY
[facility]  Discharge Note  Short Stay    Procedure(s) (LRB):  RELEASE, TRIGGER FINGER (Left) ring finger      OUTCOME: Patient tolerated treatment/procedure well without complication and is now ready for discharge.    DISPOSITION: Home or Self Care    FINAL DIAGNOSIS:  Left ring trigger finger release    FOLLOWUP: In clinic    DISCHARGE INSTRUCTIONS:    Discharge Procedure Orders   Diet general     Call MD for:  temperature >100.4     Call MD for:  persistent nausea and vomiting     Call MD for:  severe uncontrolled pain     Call MD for:  difficulty breathing, headache or visual disturbances     Call MD for:  redness, tenderness, or signs of infection (pain, swelling, redness, odor or green/yellow discharge around incision site)     Call MD for:  hives     Call MD for:  persistent dizziness or light-headedness     Call MD for:  extreme fatigue        TIME SPENT ON DISCHARGE:  20 minutes

## 2022-10-10 NOTE — TRANSFER OF CARE
"Anesthesia Transfer of Care Note    Patient: Chan Dwyer    Procedure(s) Performed: Procedure(s) (LRB):  RELEASE, TRIGGER FINGER (Left)    Patient location: PACU    Anesthesia Type: general    Transport from OR: Transported from OR on room air with adequate spontaneous ventilation    Post pain: adequate analgesia    Post assessment: no apparent anesthetic complications and tolerated procedure well    Post vital signs: stable    Level of consciousness: awake    Nausea/Vomiting: no nausea/vomiting    Complications: none    Transfer of care protocol was followed      Last vitals:   Visit Vitals  BP (!) 112/58 (BP Location: Right arm, Patient Position: Lying)   Pulse 66   Temp 36.6 °C (97.9 °F) (Temporal)   Resp 13   Ht 5' 4" (1.626 m)   Wt 59.2 kg (130 lb 10 oz)   SpO2 (!) 92%   Breastfeeding No   BMI 22.42 kg/m²     "

## 2022-10-10 NOTE — OP NOTE
[facility]  Orthopedic Surgery  Operative Note    SUMMARY     Date of Procedure: 10/10/2022   Assistant: None    Procedure: Procedure(s) (LRB):  RELEASE, TRIGGER FINGER (Left)  ring trigger finger     Surgeon(s) and Role:     * Adarsh Chavez MD - Primary    Assisting Surgeon: None    Pre-Operative Diagnosis: Trigger finger of left hand, ring finger    Post-Operative Diagnosis:  Left ring trigger finger    Anesthesia:  Local with sedation    Technical Procedures Used:  Left ring trigger finger release    Description of the Findings of the Procedure:  The patient taken the operative room where 10 cc of 2% plain lidocaine were used for local anesthetic about the base of the left ring trigger finger.  The patient received 2 g of Ancef intravenously preoperatively.  Satisfactory anesthesia had been achieved the the arm was prepped and draped in the usual sterile fashion.  After exsanguination of the extremity a proximal tourniquet was inflated to 250 mm of mercury.  At this time and oblique incision was made over the A1 pulley.  The incision extended using blunt sharp dissection using 3.5 loupe magnification.  The radial and ulnar neurovascular bundles were identified and carefully retracted.  The A1 pulley was sharply incised under direct vision.  The patient was asked to flex and extend the digit and no additional triggering was noted.  Skin was closed using horizontal mattress 4-0 nylon suture. Xeroform gauze 4 by 4s and 2 ABD pads were over wrapped with 3 in gauze dressing.  The patient tolerated the procedure well and was transferred to the recovery room in satisfactory condition.      Complications: No    Estimated Blood Loss (EBL): 5cc                   Condition: Good    Disposition: PACU - hemodynamically stable.    Attestation: I was present and scrubbed for the entire procedure.

## 2022-10-10 NOTE — INTERVAL H&P NOTE
The patient has been examined and the H&P has been reviewed:    I concur with the findings and no changes have occurred since H&P was written.    Surgery risks, benefits and alternative options discussed and understood by patient/family.          There are no hospital problems to display for this patient.     Where Do You Want The Question To Include Opioid Counseling Located?: Case Summary Tab

## 2022-10-10 NOTE — DISCHARGE INSTRUCTIONS
Nozin Instructions  Goal: the goal of Nozin is to reduce the risk of post-procedural infections by bacteria in the nasal cavity. Think of it as hand  for your nose.    How to use:    1. Shake Nozin bottle well    2. Take a cotton swab and apply 4 drops to one tip    3. Insert cotton tip into one nostril, being sure not to go deeper into nose than tip of the swab.    4. Swab nostril 6 times counterclockwise and 6 times clockwise. Make sure to swab the inside front pocket of the nostril.    5. Take swab out and apply 2 drops to the same cotton tip. Repeat steps 3 and 4 in the other nostril.        Do steps 1-5 twice a day for 7 days.        After Hand Surgery  After surgery, the better you take care of yourself--especially your hand--the sooner it will heal. Follow your surgeons instructions. Try not to bump your hand, and dont move or lift anything while youre still wearing bandages, a splint, or a cast.  Care for your hand    Keep your hand elevated above heart level as much as possible for the first several days after surgery. This helps reduce swelling and pain.  To help prevent infection and speed healing, take care not to get your cast or bandages wet.  Keep dressing clean, dry, & intact until your follow up appointment.   Relieve pain as directed  Your surgeon may prescribe pain medicine or suggest you take an anti-inflammatory medicine. You might also be instructed to apply ice (or another cold source) to your hand. If you use ice cubes, put them in a plastic bag and rest it on top of your bandages. Leave the cold source on your hand for as long as its comfortable. Do this several times a day for the first few days after surgery. It may take several minutes before you can feel the cold through the cast or bandages.  Follow up with your surgeon  During a follow-up visit after surgery, your surgeon will check your progress. The stitches, bandages, splint, or cast may be removed. A new cast or splint  may be placed. If your hand has healed enough, your surgeon may prescribe exercises.  Do prescribed hand exercises  Your surgeon may recommend that you do exercises. These may be done under the guidance of a physical or occupational therapist. The exercises strengthen your hand, help you regain flexibility, and restore proper function. Do the exercises as advised.  Call your surgeon if you have...  A fever higher than 100.4°F (38.0°C) taken by mouth  Side effects from your medicine, such as prolonged nausea  A wet or loose dressing, or a dressing that is too tight  Excessive bleeding  Increased, ongoing pain or numbness  Signs of infection (such as drainage, warmth, or redness) at the incision site   Date Last Reviewed: 11/11/2015  © 3166-0512 [employer]. [address] [place] [address], [place] [address]. All rights reserved. This information is not intended as a substitute for professional medical care. Always follow your healthcare professional's instructions.

## 2022-10-11 ENCOUNTER — TELEPHONE (OUTPATIENT)
Dept: DIABETES | Facility: CLINIC | Age: 52
End: 2022-10-11

## 2022-10-11 ENCOUNTER — CLINICAL SUPPORT (OUTPATIENT)
Dept: DIABETES | Facility: CLINIC | Age: 52
End: 2022-10-11
Payer: COMMERCIAL

## 2022-10-11 DIAGNOSIS — E10.65 TYPE 1 DIABETES MELLITUS WITH HYPERGLYCEMIA: Primary | ICD-10-CM

## 2022-10-11 PROCEDURE — G0108 DIAB MANAGE TRN  PER INDIV: HCPCS | Mod: 95,,, | Performed by: DIETITIAN, REGISTERED

## 2022-10-11 PROCEDURE — G0108 PR DIAB MANAGE TRN  PER INDIV: ICD-10-PCS | Mod: 95,,, | Performed by: DIETITIAN, REGISTERED

## 2022-10-11 NOTE — TELEPHONE ENCOUNTER
Spoke to patient and advised to increase basaglar to 16 units per Odilia Shaffer. She will take this increased dose starting tonight.     ----- Message from Odilia Shaffer PA-C sent at 10/11/2022 11:19 AM CDT -----  Recommend she increase to 16 units basaglar - thank you     ----- Message -----  From: Jonah Roman RD  Sent: 10/11/2022   9:55 AM CDT  To: Odilia Shaffer PA-C    Can you look at her Clarity report? Her TIR is still only 33%. She did increase her Lyumjev dose with meals to 8 units, but it looks like she may need to go back to 16 units for Basaglar.     I will f/u with her in 3 weeks. I've been waiting on her bringing back her food record with carb counts. She will bring this to next visit. We will go into more detail on IC and CF and how the pump would calculate this for her.   We reviewed pumps and she will check with insurance on coverage and cost.

## 2022-10-11 NOTE — PROGRESS NOTES
Diabetes Care Specialist Progress Note  Author: Jonah Roman RD  Date: 10/11/2022    Program Intake  Reason for Diabetes Program Visit:: Intervention  Type of Intervention:: Individual  Individual: Education  Education: Self-Management Skill Review    Lab Results   Component Value Date    HGBA1C 6.9 (H) 08/19/2022     CURRENT DM MEDICATIONS:   Januvia, 100 mg  Basaglar, 14 units   Lyumjev (pt usually takes right after eating)  8 units before meals   3 units before snacks     Lyumjev Correction Dosing every 3 hours as needed OUTSIDE OF EATING  If  - 250, may take 2 units of Lyumjev  If  - 300, may take 3 units of Lyumjev  If  - 350, may take 4 units of Lyumjev  If  - 400, may take 5 units of Lyumjev  If +, may take 6 units of Lyumjev    Clinical    Nutritional Status  Meal Plan 24 Hour Recall - Breakfast: none  Meal Plan 24 Hour Recall - Lunch: spaghetti with meatsauce, roll, broccoli; water  Meal Plan 24 Hour Recall - Dinner: same      SMBG: Dexcom CGM              Today's interventions were provided through individual discussion, instruction, and written materials were provided.      Patient verbalized understanding of instruction and written materials.  Pt was able to return back demonstration of instructions today. Patient understood key points, needs reinforcement and further instruction.     Diabetes Self-Management Care Plan:    Today's Diabetes Self-Management Care Plan was developed with Chan's input. Chan has agreed to work toward the following goal(s) to improve his/her overall diabetes control.      Care Plan: Diabetes Management   Updates made since 9/11/2022 12:00 AM        Problem: Healthy Eating         Goal: Keep carb total for meals at 30-45 grams, silvino while on set dose of Lyumjev    Start Date: 6/16/2022   Expected End Date: 9/16/2022   This Visit's Progress: On track   Recent Progress: On track   Priority: Medium   Barriers: No Barriers Identified    Note:    Pt stated that her smart chart with carb count is at work.   She is home now since she had surgery on her hand yesterday.   She will start another food record with carb count - 5 days total     Reviewed carb counting today and advised to download [employer] and [employer] apps.        Problem: Blood Glucose Self-Monitoring         Goal: Patient agrees to check blood sugars with Dexcom CGM Completed 10/11/2022   Start Date: 6/16/2022   Expected End Date: 12/16/2022   This Visit's Progress: Met   Recent Progress: On track   Priority: Low        Problem: Medications         Goal: Patient Agrees to take Diabetes Medication(s) as prescribed.    Start Date: 6/16/2022   Expected End Date: 12/16/2022   This Visit's Progress: On track   Recent Progress: On track   Priority: High   Barriers: No Barriers Identified   Note:    Pt is taking 14 units of Basaglar daily and has increased Lyumjev to 8 units with meals and 3 units with snacks.   Will cc Odilia Shaffer with Clarity report   Time in range low at 33% despite increase in mealtime insulin           Follow Up Plan     Follow up in 3 weeks (on 11/1/2022) for review of food record with carb counting. .    Today's care plan and follow up schedule was discussed with patient.  Kellemimi verbalized understanding of the care plan, goals, and agrees to follow up plan.        The patient was encouraged to communicate with his/her health care provider/physician and care team regarding his/her condition(s) and treatment.  I provided the patient with my contact information today and encouraged to contact me via phone or [facility] Patient Portal as needed.     Length of Visit   Total Time: 30 Minutes

## 2022-10-11 NOTE — Clinical Note
Can you look at her Clarity report? Her TIR is still only 33%. She did increase her Lyumjev dose with meals to 8 units, but it looks like she may need to go back to 16 units for Basaglar.   I will f/u with her in 3 weeks. I've been waiting on her bringing back her food record with carb counts. She will bring this to next visit. We will go into more detail on IC and CF and how the pump would calculate this for her.  We reviewed pumps and she will check with insurance on coverage and cost.

## 2022-10-11 NOTE — PATIENT INSTRUCTIONS
**When taking the meal size dose of Lyumjev (8 units), make sure you're including 30-45 grams of carb in your meal.     Insulin pumps:    Tandem T:slim Control IQ   This pump is connected to tubing. An arlen on the phone can be used to bolus, which is done before meals.   Supplies are obtained through [employer] (durable medical equipment company), which is [employer]    Omnipod 5  This pump has no tubing. You would bolus with the controller or an arlen on your phone if you have a compatible phone.   Supplies come through pharmacy benefits.

## 2022-10-14 ENCOUNTER — OFFICE VISIT (OUTPATIENT)
Dept: ORTHOPEDICS | Facility: CLINIC | Age: 52
End: 2022-10-14
Payer: COMMERCIAL

## 2022-10-14 VITALS — HEIGHT: 64 IN | BODY MASS INDEX: 22.28 KG/M2 | WEIGHT: 130.5 LBS

## 2022-10-14 DIAGNOSIS — Z98.890 S/P TRIGGER FINGER RELEASE: Primary | ICD-10-CM

## 2022-10-14 PROCEDURE — 3066F PR DOCUMENTATION OF TREATMENT FOR NEPHROPATHY: ICD-10-PCS | Mod: CPTII,S$GLB,,

## 2022-10-14 PROCEDURE — 99999 PR PBB SHADOW E&M-EST. PATIENT-LVL III: ICD-10-PCS | Mod: PBBFAC,,,

## 2022-10-14 PROCEDURE — 3072F PR LOW RISK FOR RETINOPATHY: ICD-10-PCS | Mod: CPTII,S$GLB,,

## 2022-10-14 PROCEDURE — 1159F PR MEDICATION LIST DOCUMENTED IN MEDICAL RECORD: ICD-10-PCS | Mod: CPTII,S$GLB,,

## 2022-10-14 PROCEDURE — 3066F NEPHROPATHY DOC TX: CPT | Mod: CPTII,S$GLB,,

## 2022-10-14 PROCEDURE — 3044F PR MOST RECENT HEMOGLOBIN A1C LEVEL <7.0%: ICD-10-PCS | Mod: CPTII,S$GLB,,

## 2022-10-14 PROCEDURE — 3072F LOW RISK FOR RETINOPATHY: CPT | Mod: CPTII,S$GLB,,

## 2022-10-14 PROCEDURE — 3044F HG A1C LEVEL LT 7.0%: CPT | Mod: CPTII,S$GLB,,

## 2022-10-14 PROCEDURE — 3061F PR NEG MICROALBUMINURIA RESULT DOCUMENTED/REVIEW: ICD-10-PCS | Mod: CPTII,S$GLB,,

## 2022-10-14 PROCEDURE — 99024 PR POST-OP FOLLOW-UP VISIT: ICD-10-PCS | Mod: S$GLB,,,

## 2022-10-14 PROCEDURE — 99024 POSTOP FOLLOW-UP VISIT: CPT | Mod: S$GLB,,,

## 2022-10-14 PROCEDURE — 99999 PR PBB SHADOW E&M-EST. PATIENT-LVL III: CPT | Mod: PBBFAC,,,

## 2022-10-14 PROCEDURE — 3061F NEG MICROALBUMINURIA REV: CPT | Mod: CPTII,S$GLB,,

## 2022-10-14 PROCEDURE — 1159F MED LIST DOCD IN RCRD: CPT | Mod: CPTII,S$GLB,,

## 2022-10-14 NOTE — PROGRESS NOTES
SUBJECTIVE:      Patient ID: Chan Dwyer is a 51 y.o. female.    HPI: Ms. Dwyer is here today for post-operative visit #1.  She is 4 days status post RELEASE, TRIGGER FINGER (Left) ring trigger finger by Dr. Chavez on 10/10/22. She reports that she is doing okay since surgery. Pain is 0/10, she is taking ibuprofen for pain. Admits to some tingling in the hand. She works as a  and is apprehensive to return to work, has paperwork with her today and would like 6 weeks off for recovery.  She has been compliant with postop instructions and keeping the extremity dry. She denies fever, chills, and sweats since the time of the surgery.     Past Medical History:   Diagnosis Date    Abnormal Pap smear     Abnormal Pap smear of cervix     Arthritis, low back     Diabetes 09/2014     BS 162am 09/16/2014    DM (diabetes mellitus) 09/2014     am 01/26/2016    DM (diabetes mellitus) 09/2014    BS didn't check 01/31/2017    GERD (gastroesophageal reflux disease)     Gestational diabetes     Hepatitis C antibody positive in blood 02/11/2022    RNA NEGATIVE    History of abnormal Pap smear     Surgical menopause     Vitamin D deficiency      Past Surgical History:   Procedure Laterality Date    COLONOSCOPY N/A 6/22/2016    Procedure: COLONOSCOPY;  Surgeon: Vishal Mary III, MD;  Location: [facility];  Service: Endoscopy;  Laterality: N/A;    Cryotherapy of the cervix  1999    DILATION AND CURETTAGE OF UTERUS      HYSTERECTOMY      [facility]  2/2012    TRIGGER FINGER RELEASE Left 10/10/2022    Procedure: RELEASE, TRIGGER FINGER;  Surgeon: Adarsh Chavez MD;  Location: [facility];  Service: Orthopedics;  Laterality: Left;  left ring trigger finger release    TUBAL LIGATION       Family History   Problem Relation Age of Onset    Hypertension Mother     Diabetes Mother     Heart attack Mother     Diabetes Father     Hypertension Father     Stomach cancer Sister     No Known Problems Sister     No Known  Problems Sister     No Known Problems Sister     No Known Problems Sister     No Known Problems Brother     No Known Problems Brother     No Known Problems Brother     No Known Problems Brother     No Known Problems Son     No Known Problems Son     Cancer Neg Hx     Stroke Neg Hx     Colon cancer Neg Hx     Ovarian cancer Neg Hx      Social History     Socioeconomic History    Marital status: Single    Number of children: 2   Occupational History    Occupation:      Employer: [employer]     Employer: [employer]   Tobacco Use    Smoking status: Never    Smokeless tobacco: Never   Substance and Sexual Activity    Alcohol use: Yes     Comment: Rare    Drug use: No    Sexual activity: Yes     Partners: Male     Birth control/protection: Surgical   Social History Narrative    She wears seatbelt.     Medication List with Changes/Refills   Current Medications    ACETAMINOPHEN (TYLENOL) 500 MG TABLET    Take 1,000 mg by mouth every 6 (six) hours as needed for Pain.    ALCOHOL SWABS (EASY COMFORT ALCOHOL PAD) PADM    Apply 1 each topically 4 (four) times daily.    ASPIRIN (ECOTRIN) 81 MG EC TABLET    Take 1 tablet (81 mg total) by mouth once daily.    AZELASTINE (ASTELIN) 137 MCG (0.1 %) NASAL SPRAY    1 spray (137 mcg total) by Nasal route 2 (two) times daily.    BLOOD SUGAR DIAGNOSTIC STRP    To check BG 4 times daily, to use with insurance preferred meter    IBUPROFEN (ADVIL,MOTRIN) 600 MG TABLET    Take 1 tablet (600 mg total) by mouth every 6 (six) hours as needed for Pain.    INSULIN (BASAGLAR KWIKPEN U-100 INSULIN) GLARGINE 100 UNITS/ML SUBQ PEN    Inject 16 Units into the skin once daily.    INSULIN LISPRO-AABC (LYUMJEV KWIKPEN U-100 INSULIN) 100 UNIT/ML PEN    Titrate up to 20 units daily.    ISOSORBIDE MONONITRATE (IMDUR) 30 MG 24 HR TABLET    Take 1 tablet (30 mg total) by mouth once daily.    LANCETS MISC    1 each by Misc.(Non-Drug; Combo Route) route 4 (four) times daily.     "OLOPATADINE (PATADAY) 0.2 % DROP    Place 1 drop into both eyes once daily.    PANTOPRAZOLE (PROTONIX) 20 MG TABLET    Take 1 tablet (20 mg total) by mouth once daily.    PROMETHAZINE-DEXTROMETHORPHAN (PROMETHAZINE-DM) 6.25-15 MG/5 ML SYRP    Take 5 mLs by mouth nightly as needed (cough).    SITAGLIPTIN (JANUVIA) 100 MG TAB    Take 1 tablet (100 mg total) by mouth once daily.    VITAMIN D 1000 UNITS TAB    Take 1,000 Units by mouth once daily.     Review of patient's allergies indicates:   Allergen Reactions    Hydrocodone Hives and Rash       OBJECTIVE:     Physical exam:    Vitals:    10/14/22 1052   Weight: 59.2 kg (130 lb 8.2 oz)   Height: 5' 4" (1.626 m)   PainSc: 0-No pain   PainLoc: Hand     Vital signs are stable, patient is afebrile.  Patient is well dressed and well groomed, no acute distress.  Alert and oriented to person, place, and time.    Left UE:  Post op dressing taken down.   Incision is clean, dry, and intact.   There is no erythema or exudate. There is no sign of any infection.   Incision mildly TTP  Mild ecchymosis to hand  Mild edema to hand  She is NVI.   Sutures in place.   2+ pulses noted.  Cap refill <2 seconds  Motor intact to hand    ASSESSMENT         Encounter Diagnosis   Name Primary?    S/P trigger finger release Yes            4 days status post RELEASE, TRIGGER FINGER (Left) ring trigger finger    PLAN:           Chan was seen today for post-op evaluation.    Diagnoses and all orders for this visit:    S/P trigger finger release      - PO instruction reviewed and provided to patient  - The incision was cleaned with hydrogen peroxide and normal saline. A sterile Band-Aid was applied.   - Patient may clean the incision daily as above.   - Patient may use brace as needed for symptomatic relief.    - Patient should notify the office of any signs or symptoms of infection including fevers, erythema, purulent drainage, increasing pain.    - Follow up for suture removal     POST " OPERATIVE INSTRUCTIONS - Visit #1    BANDAGES/DRESSING/BATHING:  We have removed the dressing, cleaned your incision, and put on a band-aid at your first post op visit today. You may clean the incision daily as we did today. Keep the incision clean and dry. When showering, you may cover the incision with a plastic bag/umbrella bag.   Do not use Neosporin or other topical ointments or creams on the incision. If you are concerned about any redness or drainage of the incisions, please call the office.    SWELLING  Some swelling of your arm, hand and fingers is normal. The swelling can be decreased by  elevating your arm on a few pillows when lying down. Swelling can be further controlled by cold therapy over the surgical site. Flexion/extension of the fingers (opening and closing your hands) will also help to relieve swelling and prevent stiffness.    ACTIVITY  You may use the hand and wrist as tolerated for light activity. You are encouraged to bend the wrist, elbow and fingers as soon as the initial surgical dressing is removed. Avoid lifting, pushing, or pulling any object greater than 5-10 pounds for the first 10-14 days.     BRACE  You may use a brace as needed for additional relief/support.    MEDICATIONS  Take pain medicines exactly as directed.  If the doctor gave you a prescription medicine for pain, take it as prescribed.  If you are not taking a prescription pain medicine, take an over-the-counter medicine such as acetaminophen (Tylenol), ibuprofen (Advil, Motrin), or naproxen (Aleve) as long as you do not have an allergy or medical condition that prevents you from taking them.  Do not take two or more pain medicines at the same time unless the doctor told you to. Many pain medicines have acetaminophen, which is Tylenol. Too much acetaminophen (Tylenol) can be harmful.    FOLLOW -UP  You should be scheduled for a post-op appointment within the 10-14 days following surgery, at which time we will review your  surgery, remove sutures and evaluate incisions, review your post-operative program and answer any of your questions.     Anusha George PA-C   [facility]

## 2022-10-20 ENCOUNTER — TELEPHONE (OUTPATIENT)
Dept: ORTHOPEDICS | Facility: CLINIC | Age: 52
End: 2022-10-20
Payer: COMMERCIAL

## 2022-10-20 NOTE — TELEPHONE ENCOUNTER
Called pt regarding paperwork for work. Told her we could not locate said papers and advised her to bring in another copy during her next appointment and we can get it done then. PT verbalized understanding.

## 2022-10-21 ENCOUNTER — OFFICE VISIT (OUTPATIENT)
Dept: ORTHOPEDICS | Facility: CLINIC | Age: 52
End: 2022-10-21
Payer: COMMERCIAL

## 2022-10-21 VITALS — WEIGHT: 130.5 LBS | HEIGHT: 64 IN | BODY MASS INDEX: 22.28 KG/M2

## 2022-10-21 DIAGNOSIS — Z98.890 S/P TRIGGER FINGER RELEASE: Primary | ICD-10-CM

## 2022-10-21 PROCEDURE — 99024 POSTOP FOLLOW-UP VISIT: CPT | Mod: S$GLB,,,

## 2022-10-21 PROCEDURE — 3072F LOW RISK FOR RETINOPATHY: CPT | Mod: CPTII,S$GLB,,

## 2022-10-21 PROCEDURE — 3061F PR NEG MICROALBUMINURIA RESULT DOCUMENTED/REVIEW: ICD-10-PCS | Mod: CPTII,S$GLB,,

## 2022-10-21 PROCEDURE — 99999 PR PBB SHADOW E&M-EST. PATIENT-LVL IV: ICD-10-PCS | Mod: PBBFAC,,,

## 2022-10-21 PROCEDURE — 3066F PR DOCUMENTATION OF TREATMENT FOR NEPHROPATHY: ICD-10-PCS | Mod: CPTII,S$GLB,,

## 2022-10-21 PROCEDURE — 3066F NEPHROPATHY DOC TX: CPT | Mod: CPTII,S$GLB,,

## 2022-10-21 PROCEDURE — 3044F HG A1C LEVEL LT 7.0%: CPT | Mod: CPTII,S$GLB,,

## 2022-10-21 PROCEDURE — 3061F NEG MICROALBUMINURIA REV: CPT | Mod: CPTII,S$GLB,,

## 2022-10-21 PROCEDURE — 99999 PR PBB SHADOW E&M-EST. PATIENT-LVL IV: CPT | Mod: PBBFAC,,,

## 2022-10-21 PROCEDURE — 99024 PR POST-OP FOLLOW-UP VISIT: ICD-10-PCS | Mod: S$GLB,,,

## 2022-10-21 PROCEDURE — 3072F PR LOW RISK FOR RETINOPATHY: ICD-10-PCS | Mod: CPTII,S$GLB,,

## 2022-10-21 PROCEDURE — 3044F PR MOST RECENT HEMOGLOBIN A1C LEVEL <7.0%: ICD-10-PCS | Mod: CPTII,S$GLB,,

## 2022-10-21 PROCEDURE — 1159F MED LIST DOCD IN RCRD: CPT | Mod: CPTII,S$GLB,,

## 2022-10-21 PROCEDURE — 1159F PR MEDICATION LIST DOCUMENTED IN MEDICAL RECORD: ICD-10-PCS | Mod: CPTII,S$GLB,,

## 2022-10-21 NOTE — PROGRESS NOTES
"SUBJECTIVE:      Patient ID: Chan Dwyer is a 51 y.o. female.    HPI: Ms. Dwyer is here today for post-operative visit #2.  She is 11 days status post left ring trigger finger release by Dr. Chavez on 10/10/22.  Pain is 10/10, and she is taking ibuprofen for pain.  Reports a "tight feeling" in the finger.  She requests PT at the [facility].  She has been compliant with postop instructions and keeping the extremity dry. She denies fever, chills, and sweats since the time of the surgery.     Interval history 10/14/2022: Ms. Dwyer is here today for post-operative visit #1.  She is 4 days status post RELEASE, TRIGGER FINGER (Left) ring trigger finger by Dr. Chavez on 10/10/22. She reports that she is doing okay since surgery. Pain is 0/10, she is taking ibuprofen for pain. Admits to some tingling in the hand. She works as a  and is apprehensive to return to work, has paperwork with her today and would like 6 weeks off for recovery.  She has been compliant with postop instructions and keeping the extremity dry. She denies fever, chills, and sweats since the time of the surgery.     Past Medical History:   Diagnosis Date    Abnormal Pap smear     Abnormal Pap smear of cervix     Arthritis, low back     Diabetes 09/2014     BS 162am 09/16/2014    DM (diabetes mellitus) 09/2014     am 01/26/2016    DM (diabetes mellitus) 09/2014    BS didn't check 01/31/2017    GERD (gastroesophageal reflux disease)     Gestational diabetes     Hepatitis C antibody positive in blood 02/11/2022    RNA NEGATIVE    History of abnormal Pap smear     Surgical menopause     Vitamin D deficiency      Past Surgical History:   Procedure Laterality Date    COLONOSCOPY N/A 6/22/2016    Procedure: COLONOSCOPY;  Surgeon: Vishal Mary III, MD;  Location: [facility];  Service: Endoscopy;  Laterality: N/A;    Cryotherapy of the cervix  1999    DILATION AND CURETTAGE OF UTERUS      HYSTERECTOMY      [facility]  2/2012    TRIGGER " FINGER RELEASE Left 10/10/2022    Procedure: RELEASE, TRIGGER FINGER;  Surgeon: Adarsh Cahvez MD;  Location: [facility] OR;  Service: Orthopedics;  Laterality: Left;  left ring trigger finger release    TUBAL LIGATION       Family History   Problem Relation Age of Onset    Hypertension Mother     Diabetes Mother     Heart attack Mother     Diabetes Father     Hypertension Father     Stomach cancer Sister     No Known Problems Sister     No Known Problems Sister     No Known Problems Sister     No Known Problems Sister     No Known Problems Brother     No Known Problems Brother     No Known Problems Brother     No Known Problems Brother     No Known Problems Son     No Known Problems Son     Cancer Neg Hx     Stroke Neg Hx     Colon cancer Neg Hx     Ovarian cancer Neg Hx      Social History     Socioeconomic History    Marital status: Single    Number of children: 2   Occupational History    Occupation:      Employer: [employer]      Employer: [employer]   Tobacco Use    Smoking status: Never    Smokeless tobacco: Never   Substance and Sexual Activity    Alcohol use: Yes     Comment: Rare    Drug use: No    Sexual activity: Yes     Partners: Male     Birth control/protection: Surgical   Social History Narrative    She wears seatbelt.     Medication List with Changes/Refills   Current Medications    ACETAMINOPHEN (TYLENOL) 500 MG TABLET    Take 1,000 mg by mouth every 6 (six) hours as needed for Pain.    ALCOHOL SWABS (EASY COMFORT ALCOHOL PAD) PADM    Apply 1 each topically 4 (four) times daily.    ASPIRIN (ECOTRIN) 81 MG EC TABLET    Take 1 tablet (81 mg total) by mouth once daily.    AZELASTINE (ASTELIN) 137 MCG (0.1 %) NASAL SPRAY    1 spray (137 mcg total) by Nasal route 2 (two) times daily.    BLOOD SUGAR DIAGNOSTIC STRP    To check BG 4 times daily, to use with insurance preferred meter    IBUPROFEN (ADVIL,MOTRIN) 600 MG TABLET    Take 1 tablet (600 mg total) by mouth every 6  "(six) hours as needed for Pain.    INSULIN (BASAGLAR KWIKPEN U-100 INSULIN) GLARGINE 100 UNITS/ML SUBQ PEN    Inject 16 Units into the skin once daily.    INSULIN LISPRO-AABC (LYUMJEV KWIKPEN U-100 INSULIN) 100 UNIT/ML PEN    Titrate up to 20 units daily.    ISOSORBIDE MONONITRATE (IMDUR) 30 MG 24 HR TABLET    Take 1 tablet (30 mg total) by mouth once daily.    LANCETS MISC    1 each by Misc.(Non-Drug; Combo Route) route 4 (four) times daily.    OLOPATADINE (PATADAY) 0.2 % DROP    Place 1 drop into both eyes once daily.    PANTOPRAZOLE (PROTONIX) 20 MG TABLET    Take 1 tablet (20 mg total) by mouth once daily.    PROMETHAZINE-DEXTROMETHORPHAN (PROMETHAZINE-DM) 6.25-15 MG/5 ML SYRP    Take 5 mLs by mouth nightly as needed (cough).    SITAGLIPTIN (JANUVIA) 100 MG TAB    Take 1 tablet (100 mg total) by mouth once daily.    VITAMIN D 1000 UNITS TAB    Take 1,000 Units by mouth once daily.     Review of patient's allergies indicates:   Allergen Reactions    Hydrocodone Hives and Rash       OBJECTIVE:     Physical exam:    Vitals:    10/21/22 1009   Weight: 59.2 kg (130 lb 8.2 oz)   Height: 5' 4" (1.626 m)   PainSc: 10-Worst pain ever   PainLoc: Finger     Vital signs are stable, patient is afebrile.  Patient is well dressed and well groomed, no acute distress.  Alert and oriented to person, place, and time.    Left UE:  Incision is clean, dry, and intact.   There is no erythema or exudate. There is no sign of any infection.   She is NVI.   Sutures in place.   2+ pulses noted.  Cap refill <2 seconds  Motor intact to hand    ASSESSMENT         Encounter Diagnosis   Name Primary?    S/P trigger finger release Yes            11 days status post left ring trigger finger release    PLAN:           Chan was seen today for post-op evaluation.    Diagnoses and all orders for this visit:    S/P trigger finger release  -     Ambulatory referral/consult to Physical/Occupational Therapy; Future      - PO instruction reviewed and " provided to patient  - The incision was cleaned with hydrogen peroxide. Sutures removed with no difficulty. Steri-Strips applied.   - Patient may use splint as needed for symptomatic relief.    - OT referral placed per patient request  - paperwork completed and provided to patient today  - Patient should notify the office of any signs or symptoms of infection including fevers, erythema, purulent drainage, increasing pain.    - Follow up PRN     Anusha George PA-C   [facility]

## 2022-10-31 ENCOUNTER — OFFICE VISIT (OUTPATIENT)
Dept: OBSTETRICS AND GYNECOLOGY | Facility: CLINIC | Age: 52
End: 2022-10-31
Payer: COMMERCIAL

## 2022-10-31 VITALS
DIASTOLIC BLOOD PRESSURE: 66 MMHG | WEIGHT: 128.31 LBS | HEIGHT: 64 IN | SYSTOLIC BLOOD PRESSURE: 124 MMHG | BODY MASS INDEX: 21.91 KG/M2

## 2022-10-31 DIAGNOSIS — Z12.31 ENCOUNTER FOR SCREENING MAMMOGRAM FOR MALIGNANT NEOPLASM OF BREAST: ICD-10-CM

## 2022-10-31 DIAGNOSIS — Z01.419 WELL WOMAN EXAM WITH ROUTINE GYNECOLOGICAL EXAM: Primary | ICD-10-CM

## 2022-10-31 PROCEDURE — 1160F PR REVIEW ALL MEDS BY PRESCRIBER/CLIN PHARMACIST DOCUMENTED: ICD-10-PCS | Mod: CPTII,S$GLB,, | Performed by: OBSTETRICS & GYNECOLOGY

## 2022-10-31 PROCEDURE — 99999 PR PBB SHADOW E&M-EST. PATIENT-LVL III: ICD-10-PCS | Mod: PBBFAC,,, | Performed by: OBSTETRICS & GYNECOLOGY

## 2022-10-31 PROCEDURE — 99396 PR PREVENTIVE VISIT,EST,40-64: ICD-10-PCS | Mod: S$GLB,,, | Performed by: OBSTETRICS & GYNECOLOGY

## 2022-10-31 PROCEDURE — 3066F PR DOCUMENTATION OF TREATMENT FOR NEPHROPATHY: ICD-10-PCS | Mod: CPTII,S$GLB,, | Performed by: OBSTETRICS & GYNECOLOGY

## 2022-10-31 PROCEDURE — 3061F PR NEG MICROALBUMINURIA RESULT DOCUMENTED/REVIEW: ICD-10-PCS | Mod: CPTII,S$GLB,, | Performed by: OBSTETRICS & GYNECOLOGY

## 2022-10-31 PROCEDURE — 3078F DIAST BP <80 MM HG: CPT | Mod: CPTII,S$GLB,, | Performed by: OBSTETRICS & GYNECOLOGY

## 2022-10-31 PROCEDURE — 99396 PREV VISIT EST AGE 40-64: CPT | Mod: S$GLB,,, | Performed by: OBSTETRICS & GYNECOLOGY

## 2022-10-31 PROCEDURE — 3074F SYST BP LT 130 MM HG: CPT | Mod: CPTII,S$GLB,, | Performed by: OBSTETRICS & GYNECOLOGY

## 2022-10-31 PROCEDURE — 3078F PR MOST RECENT DIASTOLIC BLOOD PRESSURE < 80 MM HG: ICD-10-PCS | Mod: CPTII,S$GLB,, | Performed by: OBSTETRICS & GYNECOLOGY

## 2022-10-31 PROCEDURE — 1159F PR MEDICATION LIST DOCUMENTED IN MEDICAL RECORD: ICD-10-PCS | Mod: CPTII,S$GLB,, | Performed by: OBSTETRICS & GYNECOLOGY

## 2022-10-31 PROCEDURE — 3074F PR MOST RECENT SYSTOLIC BLOOD PRESSURE < 130 MM HG: ICD-10-PCS | Mod: CPTII,S$GLB,, | Performed by: OBSTETRICS & GYNECOLOGY

## 2022-10-31 PROCEDURE — 99999 PR PBB SHADOW E&M-EST. PATIENT-LVL III: CPT | Mod: PBBFAC,,, | Performed by: OBSTETRICS & GYNECOLOGY

## 2022-10-31 PROCEDURE — 3044F PR MOST RECENT HEMOGLOBIN A1C LEVEL <7.0%: ICD-10-PCS | Mod: CPTII,S$GLB,, | Performed by: OBSTETRICS & GYNECOLOGY

## 2022-10-31 PROCEDURE — 3061F NEG MICROALBUMINURIA REV: CPT | Mod: CPTII,S$GLB,, | Performed by: OBSTETRICS & GYNECOLOGY

## 2022-10-31 PROCEDURE — 3044F HG A1C LEVEL LT 7.0%: CPT | Mod: CPTII,S$GLB,, | Performed by: OBSTETRICS & GYNECOLOGY

## 2022-10-31 PROCEDURE — 3072F LOW RISK FOR RETINOPATHY: CPT | Mod: CPTII,S$GLB,, | Performed by: OBSTETRICS & GYNECOLOGY

## 2022-10-31 PROCEDURE — 3072F PR LOW RISK FOR RETINOPATHY: ICD-10-PCS | Mod: CPTII,S$GLB,, | Performed by: OBSTETRICS & GYNECOLOGY

## 2022-10-31 PROCEDURE — 1160F RVW MEDS BY RX/DR IN RCRD: CPT | Mod: CPTII,S$GLB,, | Performed by: OBSTETRICS & GYNECOLOGY

## 2022-10-31 PROCEDURE — 3066F NEPHROPATHY DOC TX: CPT | Mod: CPTII,S$GLB,, | Performed by: OBSTETRICS & GYNECOLOGY

## 2022-10-31 PROCEDURE — 1159F MED LIST DOCD IN RCRD: CPT | Mod: CPTII,S$GLB,, | Performed by: OBSTETRICS & GYNECOLOGY

## 2022-10-31 NOTE — PROGRESS NOTES
Subjective:       Patient ID: Chan Dwyer is a 51 y.o. female.    Chief Complaint:  Well Woman      History of Present Illness  HPI  Presents for well-woman exam.  No gyn complaints.  Had RALH for benign indications.  Still has both ovaries.  Pt is sexually active, MM with a male partner.  Denies any pain or VB.  Had cervical cryo in .  All pap smears normal since.  No longer needs pap smears  MMG: 3/11/22: benign  Colonoscopy: 2016: repeat due in     GYN & OB History  No LMP recorded. Patient has had a hysterectomy.   Date of Last Pap: No result found    OB History    Para Term  AB Living   2 2 2     2   SAB IAB Ectopic Multiple Live Births                  # Outcome Date GA Lbr Rajesh/2nd Weight Sex Delivery Anes PTL Lv   2 Term      Vag-Spont      1 Term      Vag-Spont          Review of Systems  Review of Systems   Constitutional:  Negative for fatigue, fever and unexpected weight change.   Gastrointestinal:  Negative for abdominal pain, bloating, blood in stool, constipation, diarrhea, nausea and vomiting.   Endocrine: Negative for hot flashes.   Genitourinary:  Negative for decreased libido, dyspareunia, dysuria, flank pain, frequency, genital sores, hematuria, pelvic pain, urgency, vaginal bleeding, vaginal discharge, vaginal pain, urinary incontinence, postcoital bleeding, postmenopausal bleeding and vaginal odor.   Integumentary:  Negative for rash, hair changes, breast mass, nipple discharge and breast skin changes.   Psychiatric/Behavioral:  Negative for depression. The patient is not nervous/anxious.    Breast: Negative for mass, mastodynia, nipple discharge and skin changes        Objective:    Physical Exam:   Constitutional: She is oriented to person, place, and time. She appears well-developed and well-nourished. No distress.      Neck: No thyromegaly present.     Pulmonary/Chest: Right breast exhibits no inverted nipple, no mass, no nipple discharge, no skin  change, no tenderness, no bleeding and no swelling. Left breast exhibits no inverted nipple, no mass, no nipple discharge, no skin change, no tenderness, no bleeding and no swelling. Breasts are symmetrical.        Abdominal: Soft. She exhibits no distension and no mass. There is no abdominal tenderness. There is no rebound and no guarding. Hernia confirmed negative in the right inguinal area and confirmed negative in the left inguinal area.     Genitourinary:    Vagina normal.      Pelvic exam was performed with patient supine.   There is no rash, tenderness, lesion or injury on the right labia. There is no rash, tenderness, lesion or injury on the left labia. Right adnexum displays no mass, no tenderness and no fullness. Left adnexum displays no mass, no tenderness and no fullness. No erythema,  no vaginal discharge, tenderness, bleeding, rectocele, cystocele or unspecified prolapse of vaginal walls in the vagina.    No foreign body in the vagina.      No signs of injury in the vagina.   Cervix is absent.Uterus is absent.               Neurological: She is alert and oriented to person, place, and time.     Psychiatric: She has a normal mood and affect.        Assessment:        1. Well woman exam with routine gynecological exam    2. Encounter for screening mammogram for malignant neoplasm of breast                Plan:      Chan was seen today for well woman.    Diagnoses and all orders for this visit:    Well woman exam with routine gynecological exam    Encounter for screening mammogram for malignant neoplasm of breast  -     Mammo Digital Screening Bilat w/ [facility]; Future   Reviewed updated recommendations for pap smears (no pap smear needed) in patients with a hysterectomy for benign indications.   Patient needs a pelvic exam every year.  Reviewed recommendations for annual CBE and annual MMG.      RTC  1 year or sooner prn.

## 2022-11-08 ENCOUNTER — OFFICE VISIT (OUTPATIENT)
Dept: DIABETES | Facility: CLINIC | Age: 52
End: 2022-11-08
Payer: COMMERCIAL

## 2022-11-08 ENCOUNTER — OFFICE VISIT (OUTPATIENT)
Dept: ORTHOPEDICS | Facility: CLINIC | Age: 52
End: 2022-11-08
Payer: COMMERCIAL

## 2022-11-08 VITALS — HEIGHT: 64 IN | BODY MASS INDEX: 21.91 KG/M2 | WEIGHT: 128.31 LBS

## 2022-11-08 DIAGNOSIS — E10.65 TYPE 1 DIABETES MELLITUS WITH HYPERGLYCEMIA: Primary | ICD-10-CM

## 2022-11-08 DIAGNOSIS — E11.65 UNCONTROLLED TYPE 2 DIABETES MELLITUS WITH HYPERGLYCEMIA, WITHOUT LONG-TERM CURRENT USE OF INSULIN: ICD-10-CM

## 2022-11-08 DIAGNOSIS — E55.9 VITAMIN D DEFICIENCY: ICD-10-CM

## 2022-11-08 DIAGNOSIS — M65.30 TRIGGER FINGER, ACQUIRED: Primary | ICD-10-CM

## 2022-11-08 PROCEDURE — 3066F PR DOCUMENTATION OF TREATMENT FOR NEPHROPATHY: ICD-10-PCS | Mod: CPTII,S$GLB,, | Performed by: ORTHOPAEDIC SURGERY

## 2022-11-08 PROCEDURE — 1160F RVW MEDS BY RX/DR IN RCRD: CPT | Mod: CPTII,S$GLB,, | Performed by: ORTHOPAEDIC SURGERY

## 2022-11-08 PROCEDURE — 3066F PR DOCUMENTATION OF TREATMENT FOR NEPHROPATHY: ICD-10-PCS | Mod: CPTII,95,, | Performed by: PHYSICIAN ASSISTANT

## 2022-11-08 PROCEDURE — 3044F PR MOST RECENT HEMOGLOBIN A1C LEVEL <7.0%: ICD-10-PCS | Mod: CPTII,95,, | Performed by: PHYSICIAN ASSISTANT

## 2022-11-08 PROCEDURE — 1159F MED LIST DOCD IN RCRD: CPT | Mod: CPTII,S$GLB,, | Performed by: ORTHOPAEDIC SURGERY

## 2022-11-08 PROCEDURE — 3044F HG A1C LEVEL LT 7.0%: CPT | Mod: CPTII,95,, | Performed by: PHYSICIAN ASSISTANT

## 2022-11-08 PROCEDURE — 3044F HG A1C LEVEL LT 7.0%: CPT | Mod: CPTII,S$GLB,, | Performed by: ORTHOPAEDIC SURGERY

## 2022-11-08 PROCEDURE — 99024 PR POST-OP FOLLOW-UP VISIT: ICD-10-PCS | Mod: S$GLB,,, | Performed by: ORTHOPAEDIC SURGERY

## 2022-11-08 PROCEDURE — 99999 PR PBB SHADOW E&M-EST. PATIENT-LVL III: CPT | Mod: PBBFAC,,, | Performed by: ORTHOPAEDIC SURGERY

## 2022-11-08 PROCEDURE — 99214 OFFICE O/P EST MOD 30 MIN: CPT | Mod: 95,,, | Performed by: PHYSICIAN ASSISTANT

## 2022-11-08 PROCEDURE — 3061F NEG MICROALBUMINURIA REV: CPT | Mod: CPTII,95,, | Performed by: PHYSICIAN ASSISTANT

## 2022-11-08 PROCEDURE — 3061F PR NEG MICROALBUMINURIA RESULT DOCUMENTED/REVIEW: ICD-10-PCS | Mod: CPTII,S$GLB,, | Performed by: ORTHOPAEDIC SURGERY

## 2022-11-08 PROCEDURE — 3066F NEPHROPATHY DOC TX: CPT | Mod: CPTII,95,, | Performed by: PHYSICIAN ASSISTANT

## 2022-11-08 PROCEDURE — 99024 POSTOP FOLLOW-UP VISIT: CPT | Mod: S$GLB,,, | Performed by: ORTHOPAEDIC SURGERY

## 2022-11-08 PROCEDURE — 3008F PR BODY MASS INDEX (BMI) DOCUMENTED: ICD-10-PCS | Mod: CPTII,S$GLB,, | Performed by: ORTHOPAEDIC SURGERY

## 2022-11-08 PROCEDURE — 3044F PR MOST RECENT HEMOGLOBIN A1C LEVEL <7.0%: ICD-10-PCS | Mod: CPTII,S$GLB,, | Performed by: ORTHOPAEDIC SURGERY

## 2022-11-08 PROCEDURE — 3072F PR LOW RISK FOR RETINOPATHY: ICD-10-PCS | Mod: CPTII,95,, | Performed by: PHYSICIAN ASSISTANT

## 2022-11-08 PROCEDURE — 1160F PR REVIEW ALL MEDS BY PRESCRIBER/CLIN PHARMACIST DOCUMENTED: ICD-10-PCS | Mod: CPTII,S$GLB,, | Performed by: ORTHOPAEDIC SURGERY

## 2022-11-08 PROCEDURE — 3072F LOW RISK FOR RETINOPATHY: CPT | Mod: CPTII,S$GLB,, | Performed by: ORTHOPAEDIC SURGERY

## 2022-11-08 PROCEDURE — 3061F NEG MICROALBUMINURIA REV: CPT | Mod: CPTII,S$GLB,, | Performed by: ORTHOPAEDIC SURGERY

## 2022-11-08 PROCEDURE — 1159F PR MEDICATION LIST DOCUMENTED IN MEDICAL RECORD: ICD-10-PCS | Mod: CPTII,S$GLB,, | Performed by: ORTHOPAEDIC SURGERY

## 2022-11-08 PROCEDURE — 3008F BODY MASS INDEX DOCD: CPT | Mod: CPTII,S$GLB,, | Performed by: ORTHOPAEDIC SURGERY

## 2022-11-08 PROCEDURE — 3061F PR NEG MICROALBUMINURIA RESULT DOCUMENTED/REVIEW: ICD-10-PCS | Mod: CPTII,95,, | Performed by: PHYSICIAN ASSISTANT

## 2022-11-08 PROCEDURE — 3072F PR LOW RISK FOR RETINOPATHY: ICD-10-PCS | Mod: CPTII,S$GLB,, | Performed by: ORTHOPAEDIC SURGERY

## 2022-11-08 PROCEDURE — 3066F NEPHROPATHY DOC TX: CPT | Mod: CPTII,S$GLB,, | Performed by: ORTHOPAEDIC SURGERY

## 2022-11-08 PROCEDURE — 99214 PR OFFICE/OUTPT VISIT, EST, LEVL IV, 30-39 MIN: ICD-10-PCS | Mod: 95,,, | Performed by: PHYSICIAN ASSISTANT

## 2022-11-08 PROCEDURE — 3072F LOW RISK FOR RETINOPATHY: CPT | Mod: CPTII,95,, | Performed by: PHYSICIAN ASSISTANT

## 2022-11-08 PROCEDURE — 99999 PR PBB SHADOW E&M-EST. PATIENT-LVL III: ICD-10-PCS | Mod: PBBFAC,,, | Performed by: ORTHOPAEDIC SURGERY

## 2022-11-08 RX ORDER — INSULIN GLARGINE 300 [IU]/ML
16 INJECTION, SOLUTION SUBCUTANEOUS DAILY
Qty: 2 PEN | Refills: 6 | Status: SHIPPED | OUTPATIENT
Start: 2022-11-08 | End: 2022-12-29

## 2022-11-08 NOTE — PROGRESS NOTES
PCP: Tristian Bearden MD    Subjective:     Chief Complaint: Diabetes - Established Patient    TELEMEDICINE VISIT:     The patient location is: Home  The chief complaint leading to consultation is: Diabetes Follow up  Visit type: Virtual visit with synchronous audio and video  Total time spent with patient: 25  Each patient to whom he or she provides medical services by telemedicine is:  (1) informed of the relationship between the physician and patient and the respective role of any other health care provider with respect to management of the patient; and (2) notified that he or she may decline to receive medical services by telemedicine and may withdraw from such care at any time.    Notes: N/A        HISTORY OF PRESENT ILLNESS: 51 y.o.   female presenting for diabetes management visit.   The patient's last visit with me was on 10/5/2022.   Patient has had Type II diabetes since 5 years or more.  Pertinent to decision making is the following comorbidities: Vitamin D Deficiency and Gestational DM in Pregnancy 2003  Patient has the following Diabetes complications: without complications  She  has attended diabetes education in the past.     Patient's most recent A1c of 6.9% was completed 3 months ago.   Patient states since Her last A1c Her blood glucose levels have been high  after meals.   Patient monitors blood glucose 4 times per day and Continuously with personal CGM Dexcom. Of note, patient having issues getting transmitter.   Patient blood glucose monitoring device will be uploaded into [employer] today.        Patients records show baseline euglycemia with postprandial hyperglycemia throughout the day secondary to meal dose and secondary to pt snacking without taking snack dose.  Patient endorses the following diabetes related symptoms: None.   Patient is due today for the following diabetes-related health maintenance standards: Foot Exam , Influenza Vaccine, and COVID-19 Vaccine .    She denies recent hospital admissions or emergency room visits.   She denies having hypoglycemia as above.   Patient's concerns today include glycemic control. Of note, patient is having to buy 90 day supply of insulin and would like to change to covered insulin.   Patient medication regimen is as below.     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       Lab Results   Component Value Date    CPEPTIDE 0.72 (L) 04/28/2022     Lab Results   Component Value Date    GLUTAMICACID 0.00 09/28/2020          //   , There is no height or weight on file to calculate BMI.  Her blood sugar in clinic today is:    Lab Results   Component Value Date    POCGLU 141 (A) 06/29/2022       Review of Systems   Constitutional:  Negative for activity change, appetite change, chills and fever.   HENT:  Negative for dental problem, mouth sores, nosebleeds, sore throat and trouble swallowing.    Eyes:  Negative for pain and discharge.   Respiratory:  Negative for shortness of breath, wheezing and stridor.    Cardiovascular:  Negative for chest pain, palpitations and leg swelling.   Gastrointestinal:  Negative for abdominal pain, diarrhea, nausea and vomiting.   Endocrine: Negative for polydipsia, polyphagia and polyuria.   Genitourinary:  Negative for dysuria,  frequency and urgency.   Musculoskeletal:  Negative for joint swelling and myalgias.   Skin:  Negative for rash and wound.   Neurological:  Negative for dizziness, syncope, weakness and headaches.   Psychiatric/Behavioral:  Negative for behavioral problems and dysphoric mood.        Diabetes Management Status  Statin: Not taking  ACE/ARB: Not taking    Screening or Prevention Patient's value Goal Complete/Controlled?   HgA1C Testing and Control   Lab Results   Component Value Date    HGBA1C 6.9 (H) 08/19/2022      Annually/Less than 8% No   Lipid profile : 08/19/2022 Annually Yes   LDL control  Lab Results   Component Value Date    LDLCALC 99.2 08/19/2022    Annually/Less than 100 mg/dl  Yes   Nephropathy screening Lab Results   Component Value Date    MICALBCREAT 8.5 04/28/2022     Lab Results   Component Value Date    PROTEINUA Negative 01/26/2021    Annually No   Blood pressure BP Readings from Last 1 Encounters:   10/31/22 124/66    Less than 140/90 Yes   Dilated retinal exam : 07/18/2022 Annually Yes    Foot exam   : 11/30/2021 Annually No     Social History     Socioeconomic History    Marital status: Single    Number of children: 2   Occupational History    Occupation:      Employer: [employer]     Employer: [employer]   Tobacco Use    Smoking status: Never    Smokeless tobacco: Never   Substance and Sexual Activity    Alcohol use: Yes     Comment: Rare    Drug use: No    Sexual activity: Yes     Partners: Male     Birth control/protection: Surgical   Social History Narrative    She wears seatbelt.     Past Medical History:   Diagnosis Date    Abnormal Pap smear     Abnormal Pap smear of cervix     Arthritis, low back     Diabetes 09/2014     BS 162am 09/16/2014    DM (diabetes mellitus) 09/2014     am 01/26/2016    DM (diabetes mellitus) 09/2014    BS didn't check 01/31/2017    GERD (gastroesophageal reflux disease)     Gestational diabetes     Hepatitis C antibody  positive in blood 02/11/2022    RNA NEGATIVE    History of abnormal Pap smear     Surgical menopause     Vitamin D deficiency        Objective:        Physical Exam  Constitutional:       General: She is not in acute distress.     Appearance: She is well-developed. She is not diaphoretic.   HENT:      Head: Normocephalic and atraumatic.      Right Ear: External ear normal.      Left Ear: External ear normal.      Nose: Nose normal.   Eyes:      General:         Right eye: No discharge.         Left eye: No discharge.   Pulmonary:      Effort: Pulmonary effort is normal. No respiratory distress.      Breath sounds: No stridor.   Musculoskeletal:         General: Normal range of motion.      Cervical back: Normal range of motion.   Skin:     Coloration: Skin is not pale.   Neurological:      Mental Status: She is alert and oriented to person, place, and time. Mental status is at baseline.      Motor: No abnormal muscle tone.      Coordination: Coordination normal.   Psychiatric:         Mood and Affect: Mood normal.         Behavior: Behavior normal.         Thought Content: Thought content normal.         Judgment: Judgment normal.           Assessment / Plan:     Type 1 diabetes mellitus with hyperglycemia  -     insulin glargine, TOUJEO, (TOUJEO) 300 unit/mL (1.5 mL) InPn pen; Inject 16 Units into the skin once daily.  Dispense: 2 pen; Refill: 6  -     SITagliptin phosphate (JANUVIA) 100 MG Tab; Take 1 tablet (100 mg total) by mouth once daily.  Dispense: 30 tablet; Refill: 11    Vitamin D deficiency    Uncontrolled type 2 diabetes mellitus with hyperglycemia, without long-term current use of insulin    Additional Plan Details:    - POCT Glucose  - Encouraged continuation of lifestyle changes including regular exercise and limiting carbohydrates to 30-45 grams per meal threes times daily and 15 grams per snack with a limit of two daily.   - Encouraged continued monitoring of blood glucose with maintenance of 4  times daily and Continuously with personal CGM Dexcom.    - Current DM Medication Regimen: Change Basaglar to Toujeo 16 units daily. Continue Januvia 100 mg daily. Change Lyumjev 10 units before meals, 3 units before snack, and correction dosing every 3 hours as needed - as below.   - Health Maintenance standards addressed today: Foot Exam - deferred by patient today because Telemedicine or Telephone visit, Flu Shot - patient would like to complete outside of [facility], and COVID - 19 Vaccine - patient will schedule outside of [facility]   - Nursing Visit: Patient is below goal range for nursing visit for age group and will not need nursing visit at this time .   - Follow up in 4 weeks.    Meal Size:   10 units before meals   3 units before snacks    Lyumjev Correction Dosing every 3 hours as needed OUTSIDE OF EATING  If  - 250, may take 2 units of Lyumjev  If  - 300, may take 3 units of Lyumjev  If  - 350, may take 4 units of Lyumjev  If  - 400, may take 5 units of Lyumjev  If +, may take 6 units of Lyumjev    Odilia Shaffer PA-C  [facility]

## 2022-11-08 NOTE — PROGRESS NOTES
Subjective:     Patient ID: Chan Dwyer is a 51 y.o. female.    Chief Complaint: Post-op Evaluation and Pain of the Left Hand      HPI:  The patient is a 51-year-old female status post left ring trigger finger release date of surgery was 10/10/2022.  She still has tenderness about the scar.  She works as a  and needs more time off work.    Past Medical History:   Diagnosis Date    Abnormal Pap smear     Abnormal Pap smear of cervix     Arthritis, low back     Diabetes 09/2014     BS 162am 09/16/2014    DM (diabetes mellitus) 09/2014     am 01/26/2016    DM (diabetes mellitus) 09/2014    BS didn't check 01/31/2017    GERD (gastroesophageal reflux disease)     Gestational diabetes     Hepatitis C antibody positive in blood 02/11/2022    RNA NEGATIVE    History of abnormal Pap smear     Surgical menopause     Vitamin D deficiency      Past Surgical History:   Procedure Laterality Date    COLONOSCOPY N/A 6/22/2016    Procedure: COLONOSCOPY;  Surgeon: Vishal Mary III, MD;  Location: [facility];  Service: Endoscopy;  Laterality: N/A;    Cryotherapy of the cervix  1999    DILATION AND CURETTAGE OF UTERUS      HYSTERECTOMY      [facility]  2/2012    TRIGGER FINGER RELEASE Left 10/10/2022    Procedure: RELEASE, TRIGGER FINGER;  Surgeon: Adarsh Chavez MD;  Location: [facility];  Service: Orthopedics;  Laterality: Left;  left ring trigger finger release    TUBAL LIGATION       Family History   Problem Relation Age of Onset    Diabetes Father     Hypertension Father     Hypertension Mother     Diabetes Mother     Heart attack Mother     No Known Problems Brother     No Known Problems Brother     No Known Problems Brother     No Known Problems Brother     Stomach cancer Sister     No Known Problems Sister     No Known Problems Sister     No Known Problems Sister     No Known Problems Sister     No Known Problems Son     No Known Problems Son     Cancer Neg Hx     Stroke Neg Hx     Colon cancer Neg  Hx     Ovarian cancer Neg Hx     Breast cancer Neg Hx      Social History     Socioeconomic History    Marital status: Single    Number of children: 2   Occupational History    Occupation:      Employer: [employer]     Employer: [employer]   Tobacco Use    Smoking status: Never    Smokeless tobacco: Never   Substance and Sexual Activity    Alcohol use: Yes     Comment: Rare    Drug use: No    Sexual activity: Yes     Partners: Male     Birth control/protection: Surgical   Social History Narrative    She wears seatbelt.     Medication List with Changes/Refills   Current Medications    ACETAMINOPHEN (TYLENOL) 500 MG TABLET    Take 1,000 mg by mouth every 6 (six) hours as needed for Pain.    ALCOHOL SWABS (EASY COMFORT ALCOHOL PAD) PADM    Apply 1 each topically 4 (four) times daily.    ASPIRIN (ECOTRIN) 81 MG EC TABLET    Take 1 tablet (81 mg total) by mouth once daily.    AZELASTINE (ASTELIN) 137 MCG (0.1 %) NASAL SPRAY    1 spray (137 mcg total) by Nasal route 2 (two) times daily.    BLOOD SUGAR DIAGNOSTIC STRP    To check BG 4 times daily, to use with insurance preferred meter    IBUPROFEN (ADVIL,MOTRIN) 600 MG TABLET    Take 1 tablet (600 mg total) by mouth every 6 (six) hours as needed for Pain.    INSULIN (BASAGLAR KWIKPEN U-100 INSULIN) GLARGINE 100 UNITS/ML SUBQ PEN    Inject 16 Units into the skin once daily.    INSULIN LISPRO-AABC (LYUMJEV KWIKPEN U-100 INSULIN) 100 UNIT/ML PEN    Titrate up to 20 units daily.    ISOSORBIDE MONONITRATE (IMDUR) 30 MG 24 HR TABLET    Take 1 tablet (30 mg total) by mouth once daily.    LANCETS MISC    1 each by Misc.(Non-Drug; Combo Route) route 4 (four) times daily.    OLOPATADINE (PATADAY) 0.2 % DROP    Place 1 drop into both eyes once daily.    PANTOPRAZOLE (PROTONIX) 20 MG TABLET    Take 1 tablet (20 mg total) by mouth once daily.    SITAGLIPTIN (JANUVIA) 100 MG TAB    Take 1 tablet (100 mg total) by mouth once daily.    VITAMIN D 1000 UNITS  TAB    Take 1,000 Units by mouth once daily.   Discontinued Medications    PROMETHAZINE-DEXTROMETHORPHAN (PROMETHAZINE-DM) 6.25-15 MG/5 ML SYRP    Take 5 mLs by mouth nightly as needed (cough).     Review of patient's allergies indicates:   Allergen Reactions    Hydrocodone Hives and Rash     Review of Systems   Constitutional: Negative for malaise/fatigue.   HENT:  Negative for hearing loss.    Eyes:  Negative for double vision and visual disturbance.   Cardiovascular:  Negative for chest pain.   Respiratory:  Negative for shortness of breath.    Endocrine: Negative for cold intolerance.   Hematologic/Lymphatic: Does not bruise/bleed easily.   Skin:  Negative for poor wound healing and suspicious lesions.   Musculoskeletal:  Positive for arthritis, joint pain and joint swelling. Negative for gout.   Gastrointestinal:  Positive for heartburn. Negative for nausea and vomiting.   Genitourinary:  Negative for dysuria.   Neurological:  Positive for headaches, numbness, paresthesias and sensory change.   Psychiatric/Behavioral:  Negative for depression, memory loss and substance abuse. The patient is not nervous/anxious.    Allergic/Immunologic: Negative for persistent infections.     Objective:   Body mass index is 22.02 kg/m².  There were no vitals filed for this visit.             General    Constitutional: She is oriented to person, place, and time. She appears well-developed and well-nourished. No distress.   HENT:   Head: Normocephalic.   Eyes: EOM are normal.   Pulmonary/Chest: Effort normal.   Neurological: She is oriented to person, place, and time.   Psychiatric: She has a normal mood and affect.         Left Hand/Wrist Exam     Inspection   Scars: Wrist - absent Hand -  present  Effusion: Wrist - absent Hand -  absent    Other     Sensory Exam  Median Distribution: normal  Ulnar Distribution: normal  Radial Distribution: normal    Comments:  The patient has well-healed surgical scar volar left ring finger A1  pulley area.  There is no sign of infection.  There is no triggering.  There are no motor or sensory deficits.          Vascular Exam       Capillary Refill  Left Hand: normal capillary refill        Relevant imaging results reviewed and interpreted by me, discussed with the patient and / or family today radiographs were not obtained today  Assessment:     Encounter Diagnosis   Name Primary?    Trigger finger, acquired Yes    Left ring finger    Plan:     The patient seems to be doing well.  She will return in 5 weeks for re-evaluation and should be able to return to work about that time                Disclaimer: This note was prepared using a voice recognition system and is likely to have sound alike errors within the text.

## 2022-11-08 NOTE — PATIENT INSTRUCTIONS
CURRENT DM MEDICATIONS:   Toujeo 16 units daily  Januvia 100 mg daily  Lyumjev 10 units before meals, 3 units before snack, and correction dosing every 3 hours as needed - as below    Lyumjev Correction Dosing every 3 hours as needed OUTSIDE OF EATING  If  - 250, may take 2 units of Lyumjev  If  - 300, may take 3 units of Lyumjev  If  - 350, may take 4 units of Lyumjev  If  - 400, may take 5 units of Lyumjev  If +, may take 6 units of Lyumjev

## 2022-11-08 NOTE — Clinical Note
"NV on Thurs -  transmitter 12/1 1:30 audio - "Clarity review" Primary Care Flu shot visit on Thurs at 9a   sonya Mckenzie requesting f/u. I am seeing her 12/1 again  "

## 2022-11-09 ENCOUNTER — DOCUMENTATION ONLY (OUTPATIENT)
Dept: REHABILITATION | Facility: HOSPITAL | Age: 52
End: 2022-11-09
Payer: COMMERCIAL

## 2022-11-09 NOTE — PROGRESS NOTES
[facility]  Occupational therapy  Discharge Note    [id number]  Therapy Diagnosis:           Encounter Diagnoses   Name Primary?    Decreased range of motion      Self-care deficit        Physician: Meme Enciso NP     Visit Date: 2/4/2022        Physician: Meme Enciso NP     Physician Orders: Evaluation and treatment   Medical Diagnosis: M65.341 (ICD-10-CM) - Trigger ring finger of right hand(left)  Surgical Procedure and Date: NA,   Evaluation Date: 1/24/2022    Date of Last visit: 2/4/2022  Total Visits Received: 3    ASSESSMENT      Unable to reassess as patient has not returned for therapy.    Discharge reason: Patient has not attended therapy since 2/4/2022    Discharge FOTO Score: NA    Goals: GOALS:     Short Term Goals:  2 weeks     Pain: Pt will demonstrate improved pain by reports of less than or equal to 1/10 worst pain on the verbal rating scale in order to progress toward maximal functional ability and improve QOL.     Mobility: Patient will improve AROM to 50% of stated goals, listed in objective measures above, in order to progress towards independence with functional activities.      Strength: Patient will improve strength to 50% of stated goals, listed in objective measures above, in order to progress towards independence with functional activities.      Self Care: Patient will demonstrate improved self care skills listed in objective measure above,in order to progress towards independence with functional activities.      HEP: Patient will demonstrate independence with HEP in order to progress toward functional independence.        Long Term Goals:  4 weeks     Pain: Pt will demonstrate improved pain by reports of less than or equal to 0/10 worst pain on the verbal rating scale in order to progress toward maximal functional ability and improve QOL.       Function: Patient will demonstrate improved function as  indicated by a functional limitation score of less than or equal to 43 out of 100 on FOTO.     Mobility: Patient will improve AROM to stated goals, listed in objective measures above, in order to return to maximal functional potential and improve quality of life.     Strength: Patient will improve strength to stated goals, listed in objective measures above, in order to improve functional independence and quality of life.     Self Care: Patient will demonstrate increased self care skills to an independent level or modified independent level with adaptive equipment as needed.     Patient will return to normal ADL's, IADL's, community involvement, recreational activities, and work-related activities with less than or equal to 0/10 pain and maximal function.             PLAN   This patient is discharged from Occupational Therapy      Nisa Hayes OTR

## 2022-11-10 ENCOUNTER — TELEPHONE (OUTPATIENT)
Dept: DIABETES | Facility: CLINIC | Age: 52
End: 2022-11-10
Payer: COMMERCIAL

## 2022-11-10 ENCOUNTER — PATIENT MESSAGE (OUTPATIENT)
Dept: ORTHOPEDICS | Facility: CLINIC | Age: 52
End: 2022-11-10
Payer: COMMERCIAL

## 2022-11-10 ENCOUNTER — TELEPHONE (OUTPATIENT)
Dept: ORTHOPEDICS | Facility: CLINIC | Age: 52
End: 2022-11-10
Payer: COMMERCIAL

## 2022-11-10 ENCOUNTER — IMMUNIZATION (OUTPATIENT)
Dept: INTERNAL MEDICINE | Facility: CLINIC | Age: 52
End: 2022-11-10
Payer: COMMERCIAL

## 2022-11-10 PROCEDURE — 90686 FLU VACCINE (QUAD) GREATER THAN OR EQUAL TO 3YO PRESERVATIVE FREE IM: ICD-10-PCS | Mod: S$GLB,,, | Performed by: FAMILY MEDICINE

## 2022-11-10 PROCEDURE — 90471 FLU VACCINE (QUAD) GREATER THAN OR EQUAL TO 3YO PRESERVATIVE FREE IM: ICD-10-PCS | Mod: S$GLB,,, | Performed by: FAMILY MEDICINE

## 2022-11-10 PROCEDURE — 90686 IIV4 VACC NO PRSV 0.5 ML IM: CPT | Mod: S$GLB,,, | Performed by: FAMILY MEDICINE

## 2022-11-10 PROCEDURE — 90471 IMMUNIZATION ADMIN: CPT | Mod: S$GLB,,, | Performed by: FAMILY MEDICINE

## 2022-11-10 NOTE — TELEPHONE ENCOUNTER
Attempted to return the patients phone call I also called Dr. Chavez an he stated that we can extend her leave 6 weeks No answer LVM       ----- Message from Ele Pablo sent at 11/10/2022  7:29 AM CST -----  Please call pt @ [phone] regarding an excuse for work, pt need extension to return work, pt left message yesterday, not one call her back, pt need ASAP to give to job.

## 2022-11-14 ENCOUNTER — CLINICAL SUPPORT (OUTPATIENT)
Dept: REHABILITATION | Facility: HOSPITAL | Age: 52
End: 2022-11-14
Payer: COMMERCIAL

## 2022-11-14 DIAGNOSIS — Z98.890 S/P TRIGGER FINGER RELEASE: ICD-10-CM

## 2022-11-14 DIAGNOSIS — R52 PAIN: ICD-10-CM

## 2022-11-14 DIAGNOSIS — R29.898 DECREASED GRIP STRENGTH OF LEFT HAND: ICD-10-CM

## 2022-11-14 DIAGNOSIS — M25.60 DECREASED RANGE OF MOTION: Primary | ICD-10-CM

## 2022-11-14 DIAGNOSIS — Z78.9 SELF-CARE DEFICIT: ICD-10-CM

## 2022-11-14 PROCEDURE — 97165 OT EVAL LOW COMPLEX 30 MIN: CPT | Performed by: OCCUPATIONAL THERAPIST

## 2022-11-14 NOTE — PLAN OF CARE
[facility]   Occupational Therapy Initial Evaluation     Date: 11/14/2022  Name: Chan Dwyer  Clinic Number: [id number]    Therapy Diagnosis:   Encounter Diagnoses   Name Primary?    S/P trigger finger release     Decreased range of motion Yes    Self-care deficit     Pain     Decreased  strength of left hand      Physician: Anusha George PA-C    Physician Orders: Occupational Therapy to Evaluate and Treat  Medical Diagnosis: Z98.890 (ICD-10-CM) - S/P trigger finger release  Evaluation Date: 11/14/2022  Insurance Authorization Period Expiration: 10/21/2022 - 10/21/2023  Plan of Care Certification Period: 11/14/2022 to 12/14/2022  Progress Note Due: 12/14/2022     Visit # / Visits authorized: 1/1  FOTO: 1/3    Surgical Procedure: trigger finger release  Date of Surgery: 10/10/2022  Date of Return to MD: 12/27/2022    Precautions:  Standard    Time In: 1045  Time Out: 1130  Total Appointment Time (timed & untimed codes): 45 minutes    Subjective     Date of Onset: 10/10/2022  Mechanism of Injury/ History of Current Condition: Patient is a right handed 52 year old female status post trigger finger release on 10/10/2022. She is complaining of pain when trying to open her hand.    Involved Side: left  Dominant Side: Right    Imaging: X-ray: FINDINGS:  There is no radiographic evidence of acute osseous, articular, or soft tissue abnormality.  Mild-to-moderate scattered degenerative findings are seen throughout the DIP joints, most severe at the 5th digit.  No erosive changes demonstrated.    Previous Therapy: Yes    Patient's Goals for Therapy: Patient reported goals are to decrease overall pain levels in order to return to prior functional level.INDEPENDENT LEVEL    Falls: None    Pain:  Functional Pain Scale Rating 0-10:   10/10 on average  5/10 at best  10/10 at worst  Location: palm  Description: Aching  Aggravating Factors: Flexing  Easing Factors: rest    Occupation:    Working Presently: Employed  Duties: lifting and sweeping and mopping    Functional Limitations/Social History:    Prior Level of Function: Independent and pain free with all ADL, IADL, community mobility and functional activities. independent  Current Level of Function: Independent with all ADL, IADL, community mobility and functional activities with reports of increased pain and need for increased time and frequent breaks.  Unable to use her left hand for functional tasks.  Limitation of Functional Status as follows:   ADLs/IADLs:     - Feeding: Severe difficulty    - Bathing: Severe difficulty    - Dressing: Severe difficulty  - Grooming: Severe difficulty    - Driving: severe difficulty  - Leisure: Shoping    Home/Living Environment : lives with their family  Home Access: NA  DME: NA      Past Medical History/Physical Systems Review:   Medical History:   Past Medical History:   Diagnosis Date    Abnormal Pap smear     Abnormal Pap smear of cervix     Arthritis, low back     Diabetes 09/2014     BS 162am 09/16/2014    DM (diabetes mellitus) 09/2014     am 01/26/2016    DM (diabetes mellitus) 09/2014    BS didn't check 01/31/2017    GERD (gastroesophageal reflux disease)     Gestational diabetes     Hepatitis C antibody positive in blood 02/11/2022    RNA NEGATIVE    History of abnormal Pap smear     Surgical menopause     Vitamin D deficiency        Surgical History:    has a past surgical history that includes Tubal ligation; Cryotherapy of the cervix (1999); RALH (2/2012); Dilation and curettage of uterus; Hysterectomy; Colonoscopy (N/A, 6/22/2016); and Trigger finger release (Left, 10/10/2022).    Medications:   has a current medication list which includes the following prescription(s): acetaminophen, alcohol swabs, aspirin, azelastine, blood sugar diagnostic, ibuprofen, insulin, insulin glargine (toujeo), lyumjev kwikpen u-100 insulin, isosorbide mononitrate, lancets, olopatadine,  pantoprazole, sitagliptin phosphate, and vitamin d, and the following Facility-Administered Medications: chlorhexidine.    Allergies:   Review of patient's allergies indicates:   Allergen Reactions    Hydrocodone Hives and Rash          Objective     Observation/Inspection:  raised scar tissue    Sensation: Pt denies paresthesias. Pt denies hypersensitivity. Intact to light touch.No Hypersensitivity reported.   Scar: Moderate scar tissue at incision and significant amount of tenderness to palpation. Scar is healed and moderately thickened and raised, with moderate adherence. Scar is dense and immobile  Wound: healed  Incision: healed  Edema:    (in cm) Left Right   Proximal Wrist Crease 17 cm 17/5 cm   MCPs 17,5 17,5   Long Proximal Phalanx Not tested  Not tested          Active Range of Motion:    Joint Evaluation  AROM   PROM    AROM   PROM  Pain/Dysfunction with movement Goal    Left Left Right Right     Shoulder Flexion 0-180 WNL wnl WNL WNL     Shoulder Abduction 0-180 WNL WNL WNL WNL     Shoulder External Rotation 0-90 WNL WNL WNL WNL     Shoulder Internal Rotation 0-90 WNL WNL WNL WNL     Shoulder Extension 0-60 WNL WNL WNL WNL     Shoulder Horizontal Adduction 0-90 WNL WNL NLW WNL     Elbow Flexion 0-150 WNL WNL WNL WNL     Elbow Extension 0 WNL WNL WNL WNL     Wrist Flexion 0-80 85 NT  NT NT     Wrist Extension 0-70 55 NT NT NT     Wrist Supination 0-80 WNL WNL WNL WNL     Wrist Pronation 0-80 WNL WNL WNL WNL     Wrist Ulnar Deviation 0-30 WNL WNL WNL WNL     Wrist Radial Deviation 0-20 WNL WNL WNL WNL         Range of Motion: left Active  (Ext/Flex) Index Middle Ring Small          MCP Joint   0/76° 86   PIP Joint   0/90° 100   DIP Joint   0/49° 59   CHICAS   0/225° 255   TPM                                Manual Muscles Test:  Strength Left Right Goal         Shoulder Flexion 5/5 5/5    Shoulder Abduction 5/5 5/5    Shoulder Internal Rotation 5/5 5/5    Shoulder External Rotation 5/5 5/5    Elbow Flexion 5/5  5/5    Elbow Extension 5/5 5/5    Pronation 5/5 5/5    Supination 5/5 5/5    Radial Deviation 5/5 5/5    Ulnar Deviation 5/5 5/5    Wrist Flexion 4/5 5/5    Wrist Extension 5/5 5/5                                           and Pinch Strength: Not tested at this time 2* post-op status     and Pinch Strength (in pounds, psi's):   Left Right Goal    11/14/2022 11/14/2022     II 5 34 5-10#    Lateral 5 10 3-5 psi   Tripod 4 6 3-5 psi   Tip 2 5 3-5 psi       Special Tests: DEFERRED    Disabilities of the Arm, Shoulder and Hand (DASH) questionnaire: NT IN        CMS Impairment/Limitation/Restriction for FOTO hand Survey    Therapist reviewed FOTO scores for Chan Dwyer on 11/14/2022.   FOTO documents entered into [employer] - see Media section.    Limitation Score: 74%          Treatment     Total Treatment time (time-based codes) separate from Evaluation: 5 minutes      Chan received SCAR TISSUE MASSAGE for 5 minutes including:  -otoform issued with coban wrap    Home Exercise Program/Education:    Education provided:   Patient educated on the impairments noted above and the effects of Occupational therapy intervention to improve overall condition and Quality of Life.   Patient/Family Education: role of Occupational Therapy, goals for Occupational Therapy, scheduling/cancellations - patient verbalized understanding  Home Exercise Program - moist heat recommendations to the hand with hand out and incisional scar tissue massage and otoform at night with coban wrap to soften scar tissue      Written Home Exercises Provided:  no exercises issues secondary to pain complaints and time constraints. .  .written scar massage recommendations    Assessment     Chan Dwyer is a 52 y.o. female referred to outpatient occupational therapy and presents with a medical diagnosis of trigger finger release on the left ring finger.  Patient presents with the following therapy deficits: Decreased ROM,  Decreased  strength, Decreased pinch strength, Decreased muscle strength, Decreased functional hand use, Increased pain, and Joint Stiffness and demonstrates limitations as described in the chart below. Following medical record review, it is determined that the patient will benefit from occupational therapy services in order to maximize pain free and/or functional use of left habd. The following goals were discussed with the patient and patient's spiritual, cultural and educational needs considered. Patient is in agreement with plan of care and goals as to be addressed in the treatment plan. The patient's rehabilitation potential is Good.     Anticipated barriers to occupational therapy: hand pain  Patient has no cultural, educational or language barriers to learning provided.    Medical Necessity is demonstrated by the following  Profile and History Assessment of Occupational Performance Level of Clinical Decision Making Complexity Score   Occupational Profile:   Chan Dwyer is a 52 y.o. female who lives with their family and lives with their spouse and is currently Employed. Chan Dwyer has difficulty with  feeding, grooming, dressing, toileting, and bathing and  driving, cleaning/home management, meal preparation, yard work, and laundry  affecting her daily functional abilities. her  main goal for therapy is to increase left hand  for functional tasks and return to work.      Comorbidities:    has a past medical history of Abnormal Pap smear, Abnormal Pap smear of cervix, Arthritis, low back, Diabetes, DM (diabetes mellitus), DM (diabetes mellitus), GERD (gastroesophageal reflux disease), Gestational diabetes, Hepatitis C antibody positive in blood, History of abnormal Pap smear, Surgical menopause, and Vitamin D deficiency.    Medical and Therapy History Review:   Brief               Performance Deficits    Physical:  Joint Mobility  Muscle Power/Strength  Skin Integrity/Scar  Formation   Strength  Pinch Strength  Fine Motor Coordination  Pain    Cognitive:  No Deficits    Psychosocial:    No Deficits     Clinical Decision Making:  low    Assessment Process:  Problem-Focused Assessments    Modification/Need for Assistance:  Not Necessary    Intervention Selection:  Limited Treatment Options       low  Based on past medical history, co-morbidities, data from assessments and functional level of assistance required with task and clinical presentation directly impacting function.      The following goals were discussed with the patient and patient is in agreement with them as to be addressed in the treatment plan.     Goals:    Short Term Goals:  weeks 2 Progress   11/14/2022   Pain: Patient will demonstrate improved pain by reports of less than or equal to 6/10 worst pain on the verbal rating scale in order to progress toward maximal functional ability and improve quality of life. PC   Function: Patient will demonstrate improved function as indicated by a functional limitation score of less than or equal to 64 out of 100 on FOTO. PC   Mobility: Patient will improve AROM to 50% of stated goals, listed in objective measures above, in order to progress towards independence with functional activities.  PC   Strength: Patient will improve strength to 50% of stated goals, listed in objective measures above, in order to progress towards independence with functional activities.  PC   HEP: Patient will demonstrate independence with HEP in order to progress toward functional independence. PC   Self care: Patient will achieve minimal difficulty with self care skills. PC     Long Term Goals:  4 weeks  Progress  11/14/2022   Pain: Patient will demonstrate improved pain by reports of less than or equal to 2/10 worst pain on the verbal rating scale in order to progress toward maximal functional ability and improve quality of life.   PC   Function: Patient will demonstrate improved function as indicated  by a functional limitation score of less than or equal to 44 out of 100 on FOTO. PC   Mobility: Patient will improve AROM to stated goals, listed in objective measures above, in order to return to maximal functional potential and improve quality of life. PC   Strength: Patient will improve strength to stated goals, listed in objective measures above, in order to improve functional independence and quality of life. PC   Patient will return to normal ADL's, IADL's, community involvement, recreational activities, and work-related activities with less than or equal to 2/10 pain and maximal function.  PC   Self care skills: Patient will achieve no difficulty with her self care skills secondary to hand pain. PC     Goals Key:  PC= Progressing/Continue; PM= Partially Met;  Goal Met= Goal Met      DC= Discontinue    Plan   Plan of Care Certification: 11/14/2022 to 12/4/2022.     Outpatient Occupational Therapy 2 times weekly for 4 weeks to include the following interventions:  Therapeutic Exercise, Functional Activities, Patient Education, Home Exercise Program, ADL Training, and Manual Therapy.    Plan Next Visit: Yes    Nisa Hayes, OTR ,OTD, OTR/L      I CERTIFY THE NEED FOR THESE SERVICES FURNISHED UNDER THIS PLAN OF TREATMENT AND WHILE UNDER MY CARE

## 2022-11-15 ENCOUNTER — TELEPHONE (OUTPATIENT)
Dept: ORTHOPEDICS | Facility: CLINIC | Age: 52
End: 2022-11-15
Payer: COMMERCIAL

## 2022-11-16 ENCOUNTER — CLINICAL SUPPORT (OUTPATIENT)
Dept: REHABILITATION | Facility: HOSPITAL | Age: 52
End: 2022-11-16
Payer: COMMERCIAL

## 2022-11-16 DIAGNOSIS — M25.60 DECREASED RANGE OF MOTION: Primary | ICD-10-CM

## 2022-11-16 DIAGNOSIS — Z78.9 SELF-CARE DEFICIT: ICD-10-CM

## 2022-11-16 PROCEDURE — 97110 THERAPEUTIC EXERCISES: CPT | Performed by: OCCUPATIONAL THERAPIST

## 2022-11-16 PROCEDURE — 97035 APP MDLTY 1+ULTRASOUND EA 15: CPT | Performed by: OCCUPATIONAL THERAPIST

## 2022-11-16 NOTE — PROGRESS NOTES
OCCUPATIONAL THERAPY DAILY NOTE    Name: Chan Gorman [facility]  Clinic Number: [id number]    Therapy Diagnosis:   Encounter Diagnoses   Name Primary?    Decreased range of motion Yes    Self-care deficit      Physician: Anusha George PA-C    Visit Date: 11/16/2022    Physician Orders: Occupational Therapy to Evaluate and Treat  Medical Diagnosis: Z98.890 (ICD-10-CM) - S/P trigger finger release left  Evaluation Date: 11/14/2022  Insurance Authorization Period Expiration: 11/14/2022 - 12/31/2022  Plan of Care Certification Period: 11/14/2022 to 12/14/2022  Progress Note Due: 12/14/2022      Visit # / Visits authorized: 2/20  FOTO: 1/3     Surgical Procedure: trigger finger release  Date of Surgery: 10/10/2022  Date of Return to MD: 12/27/2022     Precautions:  Standard     Time In: 10:30 am  Time Out: 1130 am  Total Appointment Time (timed & untimed codes): 60 minutes  Total billable time: 45 minutes     SUBJECTIVE     Today, pt reports: her hand is the same and she has increased pain at night..  He/She was compliant with home exercise program.  Response to previous treatment: No change  Functional change: none    Pre-Treatment Pain: 6/10  Post-Treatment Pain: 4/10  Location: palm of hand  TREATMENT     Chan received therapeutic exercises to develop ROM and flexibility for 33 minutes including:    Exercise 11/16/2022   Hook fist 10x   Flat fist 10x   Full fist 10x   Digit extension 10x   Digit abduction 10x   Passive range of motion of the ring finger all joints of flexion and extension    Joint blocking of the finger finger all joints 10x           Chan received the following manual therapy techniques: Soft tissue Mobilization and Friction Massage were applied to the: incisional scar massage for 2 minutes, including:  STM of left palm    Chan participated in dynamic functional therapeutic activities to improve functional performance for 5  minutes, including:    Exercise 11/16/2022   Dowel curls  20x each    Towel curls 3 minutes                                 Chan received the following direct contact modalities after being cleared for contraindications:   Ultrasound 1.0 3.3 MZ % to the incision. 8 minutes    Chan received the following supervised modalities after being cleared for contradictions:     Chan received hot pack for 10 minutes to left hand to soften scar tissue.    Chan received cold pack for 5 minutes minutes to left hand after treatment.      Home Exercises Provided and Patient Education Provided     Education/Self-Care provided: (5 minutes)  Patient educated on biomechanical justification for therapeutic exercise and importance of compliance with HEP in order to improve overall impairments and QOL   Patient educated on icing at night before bed    Written Home Exercises Provided: Patient instructed to cont prior HEP.  Exercises were reviewed and Chan was able to demonstrate them prior to the end of the session.  Chan demonstrated good  understanding of the education provided.     See EMR under Patient Instructions for exercises provided 11/14/2022.    ASSESSMENT   Pt tolerated manual therapy with moderate to mild with reports  decreased pain. Pt tolerated exercise well with reports of mild  increased pain. Pt demonstrated good understanding of exercises and required minimal cueing to maintain proper form.  Scar tissue is softer and more pliable and range of motion is improved.    Chan Is progressing well towards her goals.   Pt prognosis is Good.     Pt will continue to benefit from skilled outpatient occupational therapy to address the deficits listed in the problem list box on initial evaluation, provide pt/family education and to maximize pt's level of independence in the home and community environment.     Pt's spiritual, cultural and educational needs considered and pt agreeable to plan of care and goals.     Anticipated barriers to occupational  therapy: pain and hypersensitivity    Goals:     Short Term Goals:  weeks 2 Progress   11/16/2022   Pain: Patient will demonstrate improved pain by reports of less than or equal to 6/10 worst pain on the verbal rating scale in order to progress toward maximal functional ability and improve quality of life. PC   Function: Patient will demonstrate improved function as indicated by a functional limitation score of less than or equal to 64 out of 100 on FOTO. PC   Mobility: Patient will improve AROM to 50% of stated goals, listed in objective measures above, in order to progress towards independence with functional activities.  PC   Strength: Patient will improve strength to 50% of stated goals, listed in objective measures above, in order to progress towards independence with functional activities.  PC   HEP: Patient will demonstrate independence with HEP in order to progress toward functional independence. PC   Self care: Patient will achieve minimal difficulty with self care skills. PC      Long Term Goals:  4 weeks  Progress  11/16/2022   Pain: Patient will demonstrate improved pain by reports of less than or equal to 2/10 worst pain on the verbal rating scale in order to progress toward maximal functional ability and improve quality of life.   PC   Function: Patient will demonstrate improved function as indicated by a functional limitation score of less than or equal to 44 out of 100 on FOTO. PC   Mobility: Patient will improve AROM to stated goals, listed in objective measures above, in order to return to maximal functional potential and improve quality of life. PC   Strength: Patient will improve strength to stated goals, listed in objective measures above, in order to improve functional independence and quality of life. PC   Patient will return to normal ADL's, IADL's, community involvement, recreational activities, and work-related activities with less than or equal to 2/10 pain and maximal function.  PC   Self  care skills: Patient will achieve no difficulty with her self care skills secondary to hand pain. PC      Goals Key:  PC= Progressing/Continue;       PM= Partially Met;       Goal Met= Goal Met      DC= Discontinue     PLAN   Continue Plan of Care (POC) and progress per patient tolerance.    Nisa Hayes, OTR, ADAM

## 2022-11-23 ENCOUNTER — CLINICAL SUPPORT (OUTPATIENT)
Dept: REHABILITATION | Facility: HOSPITAL | Age: 52
End: 2022-11-23
Payer: COMMERCIAL

## 2022-11-23 PROBLEM — R52 PAIN: Status: ACTIVE | Noted: 2022-11-23

## 2022-11-23 PROBLEM — R29.898 DECREASED GRIP STRENGTH OF LEFT HAND: Status: ACTIVE | Noted: 2022-11-23

## 2022-11-23 PROCEDURE — 97110 THERAPEUTIC EXERCISES: CPT | Performed by: OCCUPATIONAL THERAPIST

## 2022-11-23 NOTE — PROGRESS NOTES
OCCUPATIONAL THERAPY DAILY NOTE    [facility]  [facility] Number: [id number]    Therapy Diagnosis:   Pain  Decreased range of motion   Decreased strength    Physician: Anusha George PA-C    Visit Date: 11/23/2022    Physician Orders: Occupational Therapy to Evaluate and Treat  Medical Diagnosis: Z98.890 (ICD-10-CM) - S/P trigger finger release left  Evaluation Date: 11/14/2022  Insurance Authorization Period Expiration: 11/14/2022 - 12/31/2022  Plan of Care Certification Period: 11/14/2022 to 12/14/2022  Progress Note Due: 12/14/2022      Visit # / Visits authorized: 2/20  FOTO: 1/3     Surgical Procedure: trigger finger release  Date of Surgery: 10/10/2022  Date of Return to MD: 12/27/2022     Precautions:  Standard     Time In: 10:20 am  Time Out: 11:05 am  Total Appointment Time (timed & untimed codes): 45 minutes  Total billable time: 40 minutes     SUBJECTIVE     Today, pt reports: her hand is the same and she has increased pain at night and swelling of her hand. She reports 10/10 pain at rest and with activity  He/She was compliant with home exercise program.  Response to previous treatment: No change  Functional change: none    Pre-Treatment Pain: 10/10  Post-Treatment Pain: 10/10  Location: palm of hand over incision  TREATMENT     Chan received therapeutic exercises to develop ROM and flexibility for 30 minutes including:    Exercise 11/23/2022   Hook fist 10x   Flat fist 10x   Full fist 10x   Passive range of motion of the ring finger all joints of flexion and extension    Joint blocking of the finger finger all joints 10x   EDC exercises 10x   Abduction/adduction of all digits  Digit extension of all digits 10x each       Chan received the following manual therapy techniques: Soft tissue Mobilization and Friction Massage were applied to the: incisional scar massage for 5 minutes, including:  STM of left palm  Retrograde massmemee  Issued compression glove and size 7 ring  splint    Chan participated in dynamic functional therapeutic activities to improve functional performance for 5  minutes, including:    Exercise 11/23/2022   Dowel curls 20x each    Towel curls 3 minutes                                 Chan received the following direct contact modalities after being cleared for contraindications:   Ultrasound 1.0 3.3 MZ % to the incision. 0 minutes    Chan received the following supervised modalities after being cleared for contradictions:     Chan received hot pack for 5 minutes to left hand to soften scar tissue.    Chan received cold pack for 0 minutes minutes to left hand after treatment.      Home Exercises Provided and Patient Education Provided     Education/Self-Care provided: (5 minutes)  Patient educated on biomechanical justification for therapeutic exercise and importance of compliance with HEP in order to improve overall impairments and QOL   Patient educated on icing at night before bed  Educated patient on retrograde massage of her left hand  Educated patient on compression glove to be worn at night and during the day as needed with precautions reviewed  Educated patient on ring splint to be worn as needed during day to rest her ring finger and not at night    Written Home Exercises Provided: Patient instructed to cont prior HEP.  Exercises were reviewed and Chan was able to demonstrate them prior to the end of the session.  Chan demonstrated good  understanding of the education provided.     See EMR under Patient Instructions for exercises provided 11/14/2022.    ASSESSMENT   Pt tolerated manual therapy with moderate to mild with reports  pain. Pt tolerated exercise well with reports of mild  increased pain. Pt demonstrated good understanding of exercises and required minimal cueing to maintain proper form.  Clicking noted with ring finger range of motion which may be secondary to scar tissue adhesions. Compression glove and  ring splint decrease symptoms.    Chan Is progressing well towards her goals.   Pt prognosis is Good.     Pt will continue to benefit from skilled outpatient occupational therapy to address the deficits listed in the problem list box on initial evaluation, provide pt/family education and to maximize pt's level of independence in the home and community environment.     Pt's spiritual, cultural and educational needs considered and pt agreeable to plan of care and goals.     Anticipated barriers to occupational therapy: pain and hypersensitivity    Goals:     Short Term Goals:  weeks 2 Progress   11/23/2022   Pain: Patient will demonstrate improved pain by reports of less than or equal to 6/10 worst pain on the verbal rating scale in order to progress toward maximal functional ability and improve quality of life. PC   Function: Patient will demonstrate improved function as indicated by a functional limitation score of less than or equal to 64 out of 100 on FOTO. PC   Mobility: Patient will improve AROM to 50% of stated goals, listed in objective measures above, in order to progress towards independence with functional activities.  PC   Strength: Patient will improve strength to 50% of stated goals, listed in objective measures above, in order to progress towards independence with functional activities.  PC   HEP: Patient will demonstrate independence with HEP in order to progress toward functional independence. PC   Self care: Patient will achieve minimal difficulty with self care skills. PC      Long Term Goals:  4 weeks  Progress  11/23/2022   Pain: Patient will demonstrate improved pain by reports of less than or equal to 2/10 worst pain on the verbal rating scale in order to progress toward maximal functional ability and improve quality of life.   PC   Function: Patient will demonstrate improved function as indicated by a functional limitation score of less than or equal to 44 out of 100 on FOTO. PC    Mobility: Patient will improve AROM to stated goals, listed in objective measures above, in order to return to maximal functional potential and improve quality of life. PC   Strength: Patient will improve strength to stated goals, listed in objective measures above, in order to improve functional independence and quality of life. PC   Patient will return to normal ADL's, IADL's, community involvement, recreational activities, and work-related activities with less than or equal to 2/10 pain and maximal function.  PC   Self care skills: Patient will achieve no difficulty with her self care skills secondary to hand pain. PC      Goals Key:  PC= Progressing/Continue;       PM= Partially Met;       Goal Met= Goal Met      DC= Discontinue     PLAN   Continue Plan of Care (POC) and progress per patient tolerance.    Nisa Hayes, OTR, HILARIOR

## 2022-11-30 ENCOUNTER — CLINICAL SUPPORT (OUTPATIENT)
Dept: REHABILITATION | Facility: HOSPITAL | Age: 52
End: 2022-11-30
Payer: COMMERCIAL

## 2022-11-30 DIAGNOSIS — Z78.9 SELF-CARE DEFICIT: ICD-10-CM

## 2022-11-30 DIAGNOSIS — R29.898 DECREASED GRIP STRENGTH OF LEFT HAND: ICD-10-CM

## 2022-11-30 DIAGNOSIS — R52 PAIN: Primary | ICD-10-CM

## 2022-11-30 PROCEDURE — 97035 APP MDLTY 1+ULTRASOUND EA 15: CPT | Performed by: OCCUPATIONAL THERAPIST

## 2022-11-30 PROCEDURE — 97110 THERAPEUTIC EXERCISES: CPT | Performed by: OCCUPATIONAL THERAPIST

## 2022-11-30 NOTE — PROGRESS NOTES
OCCUPATIONAL THERAPY DAILY NOTE    Name: Chan Dwyer  Clinic Number: [id number]    Therapy Diagnosis:   Pain  Decreased range of motion   Decreased strength    Physician: Anusha George PA-C    Visit Date: 11/30/2022    Physician Orders: Occupational Therapy to Evaluate and Treat  Medical Diagnosis: Z98.890 (ICD-10-CM) - S/P trigger finger release left  Evaluation Date: 11/14/2022  Insurance Authorization Period Expiration: 11/14/2022 - 12/31/2022  Plan of Care Certification Period: 11/14/2022 to 12/14/2022  Progress Note Due: 12/14/2022      Visit # / Visits authorized: 3/20  FOTO: 1/3     Surgical Procedure: trigger finger release  Date of Surgery: 10/10/2022  Date of Return to MD: 12/27/2022     Precautions:  Standard     Time In: 10:25 am  Time Out: 11:15 am  Total Appointment Time (timed & untimed codes): 50 minutes  Total billable time: 40 minutes     SUBJECTIVE     Today, pt reports: she has been doing her exercises and she is still hurting and her finger is locking with active range of motion.    He/She was compliant with home exercise program.  Response to previous treatment: No change  Functional change: none    Pre-Treatment Pain: 10/10  Post-Treatment Pain: 10/10  Location: palm of hand over incision  TREATMENT     Chan received therapeutic exercises to develop ROM and flexibility for 27 minutes including:    Exercise 11/30/2022   Hook fist 10x   Flat fist 10x   Full fist 10x   Passive range of motion of the ring finger all joints of flexion and extension    Joint blocking of the finger finger all joints 10x   EDC exercises 10x   Abduction/adduction of all digits  Digit extension of all digits 10x each       Chan received the following manual therapy techniques: Soft tissue Mobilization and Friction Massage were applied to the: incisional scar massage for 5 minutes, including:  STM of left palm  Retrograde massHospital for [facility] Carebrissa  Issued compression glove and size 7 ring splint    Chan  participated in dynamic functional therapeutic activities to improve functional performance for 0  minutes, including:    Exercise 11/30/2022   Dowel curls 20x each    Towel curls 3 minutes                                 Chan received the following direct contact modalities after being cleared for contraindications:   Ultrasound 1.0 3.3 MZ % to the incision. 8 minutes    Chan received the following supervised modalities after being cleared for contradictions:     Chan received hot pack for 5 minutes to left hand to soften scar tissue.    Chan received cold pack for 5 minutes minutes to left hand after treatment.      Home Exercises Provided and Patient Education Provided     Education/Self-Care provided: (5 minutes)  Patient educated on biomechanical justification for therapeutic exercise and importance of compliance with HEP in order to improve overall impairments and QOL   Patient educated on icing at night before bed  Educated patient on retrograde massage of her left hand  Educated patient on ring splint to be worn as needed during day to rest her ring finger and not at night    Written Home Exercises Provided: Patient instructed to cont prior HEP.  Exercises were reviewed and Chan was able to demonstrate them prior to the end of the session.  Chan demonstrated good  understanding of the education provided.     See EMR under Patient Instructions for exercises provided 11/14/2022.    ASSESSMENT   Pt tolerated manual therapy with moderate to mild with reports  pain. Pt does not tolerate exercise well with reports of moderate  increased pain. Pt demonstrated good understanding of exercises and required minimal cueing to maintain proper form.  She is not improving with her active range of motion secondary to locking and pain and her passive range of motion is only slightly limited.    Chan Is not progressing well towards her goals.       Pt prognosis is fair.     Pt will  continue to benefit from skilled outpatient occupational therapy to address the deficits listed in the problem list box on initial evaluation, provide pt/family education and to maximize pt's level of independence in the home and community environment.     Pt's spiritual, cultural and educational needs considered and pt agreeable to plan of care and goals.     Anticipated barriers to occupational therapy: pain and hypersensitivity    Goals:     Short Term Goals:  weeks 2 Progress   11/30/2022   Pain: Patient will demonstrate improved pain by reports of less than or equal to 6/10 worst pain on the verbal rating scale in order to progress toward maximal functional ability and improve quality of life. PC   Function: Patient will demonstrate improved function as indicated by a functional limitation score of less than or equal to 64 out of 100 on FOTO. PC   Mobility: Patient will improve AROM to 50% of stated goals, listed in objective measures above, in order to progress towards independence with functional activities.  PC   Strength: Patient will improve strength to 50% of stated goals, listed in objective measures above, in order to progress towards independence with functional activities.  PC   HEP: Patient will demonstrate independence with HEP in order to progress toward functional independence. PC   Self care: Patient will achieve minimal difficulty with self care skills. PC      Long Term Goals:  4 weeks  Progress  11/30/2022   Pain: Patient will demonstrate improved pain by reports of less than or equal to 2/10 worst pain on the verbal rating scale in order to progress toward maximal functional ability and improve quality of life.   PC   Function: Patient will demonstrate improved function as indicated by a functional limitation score of less than or equal to 44 out of 100 on FOTO. PC   Mobility: Patient will improve AROM to stated goals, listed in objective measures above, in order to return to maximal  functional potential and improve quality of life. PC   Strength: Patient will improve strength to stated goals, listed in objective measures above, in order to improve functional independence and quality of life. PC   Patient will return to normal ADL's, IADL's, community involvement, recreational activities, and work-related activities with less than or equal to 2/10 pain and maximal function.  PC   Self care skills: Patient will achieve no difficulty with her self care skills secondary to hand pain. PC      Goals Key:  PC= Progressing/Continue;       PM= Partially Met;       Goal Met= Goal Met      DC= Discontinue     PLAN   Continue Plan of Care (POC) and progress per patient tolerance. Contact her physician concerning locking and hand pain.    Nisa Hayes, FREDY, ADAM

## 2022-12-01 ENCOUNTER — OFFICE VISIT (OUTPATIENT)
Dept: ORTHOPEDICS | Facility: CLINIC | Age: 52
End: 2022-12-01
Payer: COMMERCIAL

## 2022-12-01 VITALS — WEIGHT: 128 LBS | BODY MASS INDEX: 21.85 KG/M2 | HEIGHT: 64 IN

## 2022-12-01 DIAGNOSIS — Z98.890 S/P TRIGGER FINGER RELEASE: Primary | ICD-10-CM

## 2022-12-01 PROCEDURE — 1159F PR MEDICATION LIST DOCUMENTED IN MEDICAL RECORD: ICD-10-PCS | Mod: CPTII,S$GLB,,

## 2022-12-01 PROCEDURE — 20550 NJX 1 TENDON SHEATH/LIGAMENT: CPT | Mod: 79,LT,S$GLB,

## 2022-12-01 PROCEDURE — 3066F PR DOCUMENTATION OF TREATMENT FOR NEPHROPATHY: ICD-10-PCS | Mod: CPTII,S$GLB,,

## 2022-12-01 PROCEDURE — 3066F NEPHROPATHY DOC TX: CPT | Mod: CPTII,S$GLB,,

## 2022-12-01 PROCEDURE — 99999 PR PBB SHADOW E&M-EST. PATIENT-LVL III: CPT | Mod: PBBFAC,,,

## 2022-12-01 PROCEDURE — 99024 POSTOP FOLLOW-UP VISIT: CPT | Mod: S$GLB,,,

## 2022-12-01 PROCEDURE — 1159F MED LIST DOCD IN RCRD: CPT | Mod: CPTII,S$GLB,,

## 2022-12-01 PROCEDURE — 3044F PR MOST RECENT HEMOGLOBIN A1C LEVEL <7.0%: ICD-10-PCS | Mod: CPTII,S$GLB,,

## 2022-12-01 PROCEDURE — 3072F PR LOW RISK FOR RETINOPATHY: ICD-10-PCS | Mod: CPTII,S$GLB,,

## 2022-12-01 PROCEDURE — 99024 PR POST-OP FOLLOW-UP VISIT: ICD-10-PCS | Mod: S$GLB,,,

## 2022-12-01 PROCEDURE — 3044F HG A1C LEVEL LT 7.0%: CPT | Mod: CPTII,S$GLB,,

## 2022-12-01 PROCEDURE — 3072F LOW RISK FOR RETINOPATHY: CPT | Mod: CPTII,S$GLB,,

## 2022-12-01 PROCEDURE — 3008F PR BODY MASS INDEX (BMI) DOCUMENTED: ICD-10-PCS | Mod: CPTII,S$GLB,,

## 2022-12-01 PROCEDURE — 3008F BODY MASS INDEX DOCD: CPT | Mod: CPTII,S$GLB,,

## 2022-12-01 PROCEDURE — 20550 TENDON SHEATH: ICD-10-PCS | Mod: 79,LT,S$GLB,

## 2022-12-01 PROCEDURE — 3061F NEG MICROALBUMINURIA REV: CPT | Mod: CPTII,S$GLB,,

## 2022-12-01 PROCEDURE — 99999 PR PBB SHADOW E&M-EST. PATIENT-LVL III: ICD-10-PCS | Mod: PBBFAC,,,

## 2022-12-01 PROCEDURE — 3061F PR NEG MICROALBUMINURIA RESULT DOCUMENTED/REVIEW: ICD-10-PCS | Mod: CPTII,S$GLB,,

## 2022-12-01 RX ORDER — TRIAMCINOLONE ACETONIDE 40 MG/ML
40 INJECTION, SUSPENSION INTRA-ARTICULAR; INTRAMUSCULAR
Status: DISCONTINUED | OUTPATIENT
Start: 2022-12-01 | End: 2022-12-01 | Stop reason: HOSPADM

## 2022-12-01 RX ADMIN — TRIAMCINOLONE ACETONIDE 40 MG: 40 INJECTION, SUSPENSION INTRA-ARTICULAR; INTRAMUSCULAR at 08:12

## 2022-12-01 NOTE — PROCEDURES
Tendon Sheath    Date/Time: 12/1/2022 8:00 AM  Performed by: Anusha George PA-C  Authorized by: Anusha George PA-C     Consent Done?:  Yes (Verbal)  Indications:  Pain  Site marked: the procedure site was marked    Timeout: prior to procedure the correct patient, procedure, and site was verified    Prep: patient was prepped and draped in usual sterile fashion      Local anesthesia used?: Yes    Local anesthetic:  Lidocaine 2% without epinephrine  Anesthetic total (ml):  0.5    Location:  Ring finger  Site:  L ring flexor tendon sheath  Ultrasonic guidance for needle placement?: No    Needle size:  25 G  Approach:  Volar  Medications:  40 mg triamcinolone acetonide 40 mg/mL  Patient tolerance:  Patient tolerated the procedure well with no immediate complications

## 2022-12-01 NOTE — PROGRESS NOTES
"SUBJECTIVE:      Patient ID: Chan Dwyer is a 52 y.o. female.    HPI: The patient presents for follow up s/p L ring trigger finger release DOS 10/10/22. States that the finger is still locking and painful. Pain is 10/10. She has been attending therapy. States that she returns to work 1/2/23. Denies fever, chills, sweats.    Interval hx 11/8/22: The patient is a 51-year-old female status post left ring trigger finger release date of surgery was 10/10/2022.  She still has tenderness about the scar.  She works as a  and needs more time off work.    Interval hx 10/21/22: Ms. Dwyer is here today for post-operative visit #2.  She is 11 days status post left ring trigger finger release by Dr. Chavez on 10/10/22.  Pain is 10/10, and she is taking ibuprofen for pain.  Reports a "tight feeling" in the finger.  She requests PT at the [facility].  She has been compliant with postop instructions and keeping the extremity dry. She denies fever, chills, and sweats since the time of the surgery.     Interval history 10/14/2022: Ms. Dwyer is here today for post-operative visit #1.  She is 4 days status post RELEASE, TRIGGER FINGER (Left) ring trigger finger by Dr. Chavez on 10/10/22. She reports that she is doing okay since surgery. Pain is 0/10, she is taking ibuprofen for pain. Admits to some tingling in the hand. She works as a  and is apprehensive to return to work, has paperwork with her today and would like 6 weeks off for recovery.  She has been compliant with postop instructions and keeping the extremity dry. She denies fever, chills, and sweats since the time of the surgery.     Past Medical History:   Diagnosis Date    Abnormal Pap smear     Abnormal Pap smear of cervix     Arthritis, low back     Diabetes 09/2014     BS 162am 09/16/2014    DM (diabetes mellitus) 09/2014     am 01/26/2016    DM (diabetes mellitus) 09/2014    BS didn't check 01/31/2017    GERD (gastroesophageal reflux " disease)     Gestational diabetes     Hepatitis C antibody positive in blood 02/11/2022    RNA NEGATIVE    History of abnormal Pap smear     Surgical menopause     Vitamin D deficiency      Past Surgical History:   Procedure Laterality Date    COLONOSCOPY N/A 6/22/2016    Procedure: COLONOSCOPY;  Surgeon: Vishal Mary III, MD;  Location: [facility];  Service: Endoscopy;  Laterality: N/A;    Cryotherapy of the cervix  1999    DILATION AND CURETTAGE OF UTERUS      HYSTERECTOMY      RAL  2/2012    TRIGGER FINGER RELEASE Left 10/10/2022    Procedure: RELEASE, TRIGGER FINGER;  Surgeon: Adarsh Chavez MD;  Location: [facility] OR;  Service: Orthopedics;  Laterality: Left;  left ring trigger finger release    TUBAL LIGATION       Family History   Problem Relation Age of Onset    Diabetes Father     Hypertension Father     Hypertension Mother     Diabetes Mother     Heart attack Mother     No Known Problems Brother     No Known Problems Brother     No Known Problems Brother     No Known Problems Brother     Stomach cancer Sister     No Known Problems Sister     No Known Problems Sister     No Known Problems Sister     No Known Problems Sister     No Known Problems Son     No Known Problems Son     Cancer Neg Hx     Stroke Neg Hx     Colon cancer Neg Hx     Ovarian cancer Neg Hx     Breast cancer Neg Hx      Social History     Socioeconomic History    Marital status: Single    Number of children: 2   Occupational History    Occupation:      Employer: [employer]      Employer: [employer]   Tobacco Use    Smoking status: Never    Smokeless tobacco: Never   Substance and Sexual Activity    Alcohol use: Yes     Comment: Rare    Drug use: No    Sexual activity: Yes     Partners: Male     Birth control/protection: Surgical   Social History Narrative    She wears seatbelt.     Medication List with Changes/Refills   Current Medications    ACETAMINOPHEN (TYLENOL) 500 MG TABLET    Take 1,000 mg by  "mouth every 6 (six) hours as needed for Pain.    ALCOHOL SWABS (EASY COMFORT ALCOHOL PAD) PADM    Apply 1 each topically 4 (four) times daily.    ASPIRIN (ECOTRIN) 81 MG EC TABLET    Take 1 tablet (81 mg total) by mouth once daily.    AZELASTINE (ASTELIN) 137 MCG (0.1 %) NASAL SPRAY    1 spray (137 mcg total) by Nasal route 2 (two) times daily.    BLOOD SUGAR DIAGNOSTIC STRP    To check BG 4 times daily, to use with insurance preferred meter    IBUPROFEN (ADVIL,MOTRIN) 600 MG TABLET    Take 1 tablet (600 mg total) by mouth every 6 (six) hours as needed for Pain.    INSULIN (BASAGLAR KWIKPEN U-100 INSULIN) GLARGINE 100 UNITS/ML SUBQ PEN    Inject 16 Units into the skin once daily.    INSULIN GLARGINE, TOUJEO, (TOUJEO) 300 UNIT/ML (1.5 ML) INPN PEN    Inject 16 Units into the skin once daily.    INSULIN LISPRO-AABC (LYUMJEV KWIKPEN U-100 INSULIN) 100 UNIT/ML PEN    Titrate up to 20 units daily.    ISOSORBIDE MONONITRATE (IMDUR) 30 MG 24 HR TABLET    Take 1 tablet (30 mg total) by mouth once daily.    LANCETS MISC    1 each by Misc.(Non-Drug; Combo Route) route 4 (four) times daily.    OLOPATADINE (PATADAY) 0.2 % DROP    Place 1 drop into both eyes once daily.    PANTOPRAZOLE (PROTONIX) 20 MG TABLET    Take 1 tablet (20 mg total) by mouth once daily.    SITAGLIPTIN PHOSPHATE (JANUVIA) 100 MG TAB    Take 1 tablet (100 mg total) by mouth once daily.    VITAMIN D 1000 UNITS TAB    Take 1,000 Units by mouth once daily.     Review of patient's allergies indicates:   Allergen Reactions    Hydrocodone Hives and Rash       OBJECTIVE:     Physical exam:    Vitals:    12/01/22 0827   Weight: 58.1 kg (128 lb)   Height: 5' 4" (1.626 m)   PainSc: 10-Worst pain ever   PainLoc: Finger     Vital signs are stable, patient is afebrile.  Patient is well dressed and well groomed, no acute distress.  Alert and oriented to person, place, and time.    Left UE:  Incision is clean, dry, and intact. Healing well.  There is no erythema or " exudate. There is no sign of any infection.   Active triggering of the ring finger noted with a palpable nodule that moves with tendon excursion  She is NVI.   2+ pulses noted.  Cap refill <2 seconds  Motor intact to hand  No sensory deficits    ASSESSMENT         Encounter Diagnosis   Name Primary?    S/P trigger finger release Yes              7 weeks, 3 days status post left ring trigger finger release    PLAN:           Chan was seen today for pain and post-op evaluation.    Diagnoses and all orders for this visit:    S/P trigger finger release  -     Tendon Sheath      - PO instruction reviewed and provided to patient  - Discussed that scar tissue/swelling may be causing the finger to trigger  - Left ring trigger finger injection today. Patient must wait at least 3 months between injections  - Patient should notify the office of any signs or symptoms of infection including fevers, erythema, purulent drainage, increasing pain.    - Plan and treatment discussed with Dr. Cerda, agrees with plan  - Follow up with Dr. Chavez 12/27/22    PROCEDURE NOTE:  I have explained the risks, benefits, and alternatives of the procedure in detail.  The patient voices understanding and all questions have been answered, consents to injection. Pause for timeout.  After a steril prep of the skin in the normal fashion, the level of the A-1 pulley of the left ring finger is injected using a 25 gauge needle from the volar approach with a combination of 0.5 cc 2% plain Lidocaine and 0.5 cc kenalog.  The patient is cautioned and immediate relief of pain is secondary to the local anesthetic and will be temporary.  After the anesthetic wears off there may be a increase in pain that may last for a few hours or a few days and they should use ice to help alleviate this flare up of pain.       Randi Seneca, PA-C [facility]

## 2022-12-08 ENCOUNTER — PATIENT MESSAGE (OUTPATIENT)
Dept: DIABETES | Facility: CLINIC | Age: 52
End: 2022-12-08
Payer: COMMERCIAL

## 2022-12-09 ENCOUNTER — TELEPHONE (OUTPATIENT)
Dept: DIABETES | Facility: CLINIC | Age: 52
End: 2022-12-09
Payer: COMMERCIAL

## 2022-12-09 NOTE — TELEPHONE ENCOUNTER
Pt reports not knowing which med to take before meals. Informed pt it is Lyumjev per provider's notes. Pt also reports she was able to get meds at a discounted price.     ----- Message from Suly Bear sent at 12/8/2022  8:18 AM CST -----  Contact: pt  Pt is calling in regard to her med, Toujeosolo, and Lyumjey, she is confused on how to take these meds.  Please call her back and help with questions on the directions   Please call her back at [phone] thanks/mpd

## 2022-12-12 ENCOUNTER — TELEPHONE (OUTPATIENT)
Dept: ORTHOPEDICS | Facility: CLINIC | Age: 52
End: 2022-12-12
Payer: COMMERCIAL

## 2022-12-12 NOTE — TELEPHONE ENCOUNTER
Attempted to contact the patient in regards to their appointment on 12/27/2022 with Dr. Chavez. He will be out of the office an we need to rescheduled their appointment to the next available. Left VM

## 2022-12-12 NOTE — TELEPHONE ENCOUNTER
Spoke to the patient an was able to get her rescheduled for 1/4/2022. Patient verbalized understanding     ----- Message from Nohemi Leal sent at 12/12/2022  9:33 AM CST -----  Contact: [phone]  Type:  Patient Returning Call    Who Called:Chan  Who Left Message for Patient:Candelaria  Does the patient know what this is regarding?:Reschedule appointment  Would the patient rather a call back or a [facility]? Call back  Best Call Back Number:[phone]  Additional Information:

## 2022-12-29 ENCOUNTER — OFFICE VISIT (OUTPATIENT)
Dept: DIABETES | Facility: CLINIC | Age: 52
End: 2022-12-29
Payer: COMMERCIAL

## 2022-12-29 ENCOUNTER — TELEPHONE (OUTPATIENT)
Dept: DIABETES | Facility: CLINIC | Age: 52
End: 2022-12-29
Payer: COMMERCIAL

## 2022-12-29 DIAGNOSIS — Z79.4 TYPE 2 DIABETES MELLITUS WITH HYPERGLYCEMIA, WITH LONG-TERM CURRENT USE OF INSULIN: Primary | ICD-10-CM

## 2022-12-29 DIAGNOSIS — E10.65 TYPE 1 DIABETES MELLITUS WITH HYPERGLYCEMIA: ICD-10-CM

## 2022-12-29 DIAGNOSIS — E11.65 TYPE 2 DIABETES MELLITUS WITH HYPERGLYCEMIA, WITH LONG-TERM CURRENT USE OF INSULIN: Primary | ICD-10-CM

## 2022-12-29 PROCEDURE — 3044F PR MOST RECENT HEMOGLOBIN A1C LEVEL <7.0%: ICD-10-PCS | Mod: CPTII,95,, | Performed by: NURSE PRACTITIONER

## 2022-12-29 PROCEDURE — 3061F NEG MICROALBUMINURIA REV: CPT | Mod: CPTII,95,, | Performed by: NURSE PRACTITIONER

## 2022-12-29 PROCEDURE — 99214 PR OFFICE/OUTPT VISIT, EST, LEVL IV, 30-39 MIN: ICD-10-PCS | Mod: 95,,, | Performed by: NURSE PRACTITIONER

## 2022-12-29 PROCEDURE — 99214 OFFICE O/P EST MOD 30 MIN: CPT | Mod: 95,,, | Performed by: NURSE PRACTITIONER

## 2022-12-29 PROCEDURE — 3044F HG A1C LEVEL LT 7.0%: CPT | Mod: CPTII,95,, | Performed by: NURSE PRACTITIONER

## 2022-12-29 PROCEDURE — 3061F PR NEG MICROALBUMINURIA RESULT DOCUMENTED/REVIEW: ICD-10-PCS | Mod: CPTII,95,, | Performed by: NURSE PRACTITIONER

## 2022-12-29 PROCEDURE — 3066F NEPHROPATHY DOC TX: CPT | Mod: CPTII,95,, | Performed by: NURSE PRACTITIONER

## 2022-12-29 PROCEDURE — 3072F LOW RISK FOR RETINOPATHY: CPT | Mod: CPTII,95,, | Performed by: NURSE PRACTITIONER

## 2022-12-29 PROCEDURE — 3066F PR DOCUMENTATION OF TREATMENT FOR NEPHROPATHY: ICD-10-PCS | Mod: CPTII,95,, | Performed by: NURSE PRACTITIONER

## 2022-12-29 PROCEDURE — 3072F PR LOW RISK FOR RETINOPATHY: ICD-10-PCS | Mod: CPTII,95,, | Performed by: NURSE PRACTITIONER

## 2022-12-29 RX ORDER — INSULIN GLARGINE 300 [IU]/ML
20 INJECTION, SOLUTION SUBCUTANEOUS DAILY
Qty: 4 PEN | Refills: 3 | Status: SHIPPED | OUTPATIENT
Start: 2022-12-29 | End: 2023-12-18

## 2022-12-29 NOTE — TELEPHONE ENCOUNTER
Spoke w/ pt and informed her that BM is out for the week and scheduled her a virtual appt w/ KM to discuss bgs. Pt is concerned her new medication has caused her bgs to rise.

## 2022-12-29 NOTE — PATIENT INSTRUCTIONS
PATIENT INSTRUCTIONS    Increase Toujeo to 20 units at bedtime - May increase by 2 units every 3 days for goal fasting level of .   Continue Januvia 100 mg daily.   Continue Lyumjev 10 units before meals, 3 units before snack, and correction dosing every 3 hours as needed - as below.   Lyumjev Correction Dosing every 3 hours as needed OUTSIDE OF EATING  If  - 250, may take 2 units of Lyumjev  If  - 300, may take 3 units of Lyumjev  If  - 350, may take 4 units of Lyumjev  If  - 400, may take 5 units of Lyumjev  If +, may take 6 units of Lyumjev  Blood Sugar Goals:       Fastin-130.       1-2 hours after a meal: Less than 180.

## 2022-12-29 NOTE — PROGRESS NOTES
The patient location is: Home  The chief complaint leading to consultation is: Diabetes    Visit type: audiovisual    Face to Face time with patient: 10  25 minutes of total time spent on the encounter, which includes face to face time and non-face to face time preparing to see the patient (eg, review of tests), Obtaining and/or reviewing separately obtained history, Documenting clinical information in the electronic or other health record, Independently interpreting results (not separately reported) and communicating results to the patient/family/caregiver, or Care coordination (not separately reported).  Each patient to whom he or she provides medical services by telemedicine is:  (1) informed of the relationship between the physician and patient and the respective role of any other health care provider with respect to management of the patient; and (2) notified that he or she may decline to receive medical services by telemedicine and may withdraw from such care at any time.    Notes:    Chan Dwyer is a 52 y.o. female who presents for a follow up evaluation of Type 1 diabetes mellitus.   Patient of Odilia Shaffer PA-C, last visit on 11/8/22.   Patient needing visit today to discuss medication.  Recently changed from Basaglar to Toujeo, and Lyumjev, she is confused on how to take these meds and complains of hyperglycemia.    CHIEF COMPLAINT: Diabetes Consultation    PCP: Tristian Bearden MD      Diabetes Medications               insulin glargine, TOUJEO, (TOUJEO) 300 unit/mL (1.5 mL) InPn pen Inject 16 Units into the skin once daily.    insulin lispro-aabc (LYUMJEV KWIKPEN U-100 INSULIN) 100 unit/mL pen Titrate up to 20 units daily.    SITagliptin phosphate (JANUVIA) 100 MG Tab Take 1 tablet (100 mg total) by mouth once daily.        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     Current monitoring regimen: home blood tests and continue glucose monitoring    Only 3 days of data on recent download, but patient with overall hyperglycemia. 99% high, 1% in range, 0% low.     Patient compliant with glucose checks and medication administration? Yes    DIABETES MANAGEMENT STATUS    Statin: Not taking  ACE/ARB: Not taking    Screening or Prevention Patient's value Goal Complete/Controlled?   HgA1C Testing and Control   Lab Results   Component Value Date    HGBA1C 6.9 (H) 08/19/2022      Annually/Less than 8% Yes     Lipid profile : 08/19/2022 Annually Yes     LDL control Lab Results   Component Value Date    LDLCALC 99.2 08/19/2022    Annually/Less than 100 mg/dl  Yes     Nephropathy screening Lab Results   Component Value Date    LABMICR 15.0 04/28/2022     Lab Results   Component Value Date    PROTEINUA Negative 01/26/2021     Lab Results   Component Value Date    UTPCR 0.11 06/19/2020      Annually Yes     Blood pressure BP Readings from Last 1 Encounters:   10/31/22 124/66    Less than 140/90 Yes     Dilated retinal exam : 07/18/2022 Annually Yes     Foot exam   : 11/30/2021 Annually No     Patient's medications, allergies, surgical, social and family histories were reviewed and updated as appropriate.     Review of Systems   Constitutional:  Negative for weight loss.   Eyes:  Negative for blurred vision and double vision.   Cardiovascular:  Negative for chest pain.   Gastrointestinal:  Negative for nausea and vomiting.   Genitourinary:  Negative for frequency.   Musculoskeletal:  Negative for falls.   Neurological:  Positive for headaches. Negative for dizziness and weakness.   Endo/Heme/Allergies:  Negative for polydipsia.   Psychiatric/Behavioral:  Negative for depression.    All other systems reviewed and are negative.     Physical Exam  Constitutional:       Appearance: Normal  appearance.   HENT:      Head: Normocephalic and atraumatic.   Pulmonary:      Effort: No respiratory distress.   Musculoskeletal:      Cervical back: Normal range of motion.   Neurological:      Mental Status: She is alert and oriented to person, place, and time.   Psychiatric:         Mood and Affect: Mood normal.         Behavior: Behavior normal.      There were no vitals taken for this visit.  Wt Readings from Last 3 Encounters:   22 58.1 kg (128 lb)   22 58.2 kg (128 lb 4.9 oz)   10/31/22 58.2 kg (128 lb 4.9 oz)       ASSESSMENT    ICD-10-CM ICD-9-CM   1. Type 2 diabetes mellitus with hyperglycemia, with long-term current use of insulin  E11.65 250.00    Z79.4 790.29     V58.67       PLAN  Diagnoses and all orders for this visit:    Type 2 diabetes mellitus with hyperglycemia, with long-term current use of insulin        Reviewed pathophysiology of diabetes, complications related to the disease, importance of annual dilated eye exam and daily foot examination. Explained MOA, SE, dosage of medications. Written instructions given and reviewed with patient and patient verbalizes understanding.     Payor: [employer] / Plan: [employer] [facility] / Product Type: Commercial /   Patient's Preferred Lab:     PATIENT INSTRUCTIONS    Increase Toujeo to 20 units at bedtime - May increase by 2 units every 3 days for goal fasting level of .   Continue Januvia 100 mg daily.   Continue Lyumjev 10 units before meals, 3 units before snack, and correction dosing every 3 hours as needed - as below.   Lyumjev Correction Dosing every 3 hours as needed OUTSIDE OF EATING  If  - 250, may take 2 units of Lyumjev  If  - 300, may take 3 units of Lyumjev  If  - 350, may take 4 units of Lyumjev  If  - 400, may take 5 units of Lyumjev  If +, may take 6 units of Lyumjev  Blood Sugar Goals:       Fastin-130.       1-2 hours after a meal: Less than 180.     Follow up in  about 4 weeks (around 1/26/2023) for DEXCOM, PT SHARING CGM/PUMP DATA.    Portions of this note were prepared with [employer] voice recognition transcription software. Grammatical errors, including garbled syntax, mangle pronouns, and other bizarre constructions may be attributed to that software system.

## 2022-12-30 ENCOUNTER — OFFICE VISIT (OUTPATIENT)
Dept: URGENT CARE | Facility: CLINIC | Age: 52
End: 2022-12-30
Payer: COMMERCIAL

## 2022-12-30 ENCOUNTER — DOCUMENTATION ONLY (OUTPATIENT)
Dept: REHABILITATION | Facility: HOSPITAL | Age: 52
End: 2022-12-30
Payer: COMMERCIAL

## 2022-12-30 VITALS
HEIGHT: 64 IN | WEIGHT: 125 LBS | SYSTOLIC BLOOD PRESSURE: 146 MMHG | HEART RATE: 82 BPM | RESPIRATION RATE: 16 BRPM | BODY MASS INDEX: 21.34 KG/M2 | TEMPERATURE: 99 F | DIASTOLIC BLOOD PRESSURE: 63 MMHG | OXYGEN SATURATION: 97 %

## 2022-12-30 DIAGNOSIS — R05.9 COUGH, UNSPECIFIED TYPE: ICD-10-CM

## 2022-12-30 DIAGNOSIS — R52 PAIN: Primary | ICD-10-CM

## 2022-12-30 DIAGNOSIS — U07.1 COVID-19: Primary | ICD-10-CM

## 2022-12-30 DIAGNOSIS — J02.9 PHARYNGITIS, UNSPECIFIED ETIOLOGY: ICD-10-CM

## 2022-12-30 DIAGNOSIS — Z78.9 SELF-CARE DEFICIT: ICD-10-CM

## 2022-12-30 DIAGNOSIS — R29.898 DECREASED GRIP STRENGTH OF LEFT HAND: ICD-10-CM

## 2022-12-30 LAB
CTP QC/QA: YES
MOLECULAR STREP A: NEGATIVE
POC MOLECULAR INFLUENZA A AGN: NEGATIVE
POC MOLECULAR INFLUENZA B AGN: NEGATIVE
SARS-COV-2 AG RESP QL IA.RAPID: POSITIVE

## 2022-12-30 PROCEDURE — 3072F PR LOW RISK FOR RETINOPATHY: ICD-10-PCS | Mod: CPTII,S$GLB,, | Performed by: NURSE PRACTITIONER

## 2022-12-30 PROCEDURE — 3066F PR DOCUMENTATION OF TREATMENT FOR NEPHROPATHY: ICD-10-PCS | Mod: CPTII,S$GLB,, | Performed by: NURSE PRACTITIONER

## 2022-12-30 PROCEDURE — 87811 SARS CORONAVIRUS 2 ANTIGEN POCT, MANUAL READ: ICD-10-PCS | Mod: QW,S$GLB,, | Performed by: NURSE PRACTITIONER

## 2022-12-30 PROCEDURE — 87502 POCT INFLUENZA A/B MOLECULAR: ICD-10-PCS | Mod: QW,S$GLB,, | Performed by: NURSE PRACTITIONER

## 2022-12-30 PROCEDURE — 87651 POCT STREP A MOLECULAR: ICD-10-PCS | Mod: QW,S$GLB,, | Performed by: NURSE PRACTITIONER

## 2022-12-30 PROCEDURE — 99214 PR OFFICE/OUTPT VISIT, EST, LEVL IV, 30-39 MIN: ICD-10-PCS | Mod: S$GLB,,, | Performed by: NURSE PRACTITIONER

## 2022-12-30 PROCEDURE — 3061F PR NEG MICROALBUMINURIA RESULT DOCUMENTED/REVIEW: ICD-10-PCS | Mod: CPTII,S$GLB,, | Performed by: NURSE PRACTITIONER

## 2022-12-30 PROCEDURE — 3066F NEPHROPATHY DOC TX: CPT | Mod: CPTII,S$GLB,, | Performed by: NURSE PRACTITIONER

## 2022-12-30 PROCEDURE — 3077F SYST BP >= 140 MM HG: CPT | Mod: CPTII,S$GLB,, | Performed by: NURSE PRACTITIONER

## 2022-12-30 PROCEDURE — 3072F LOW RISK FOR RETINOPATHY: CPT | Mod: CPTII,S$GLB,, | Performed by: NURSE PRACTITIONER

## 2022-12-30 PROCEDURE — 1160F PR REVIEW ALL MEDS BY PRESCRIBER/CLIN PHARMACIST DOCUMENTED: ICD-10-PCS | Mod: CPTII,S$GLB,, | Performed by: NURSE PRACTITIONER

## 2022-12-30 PROCEDURE — 1160F RVW MEDS BY RX/DR IN RCRD: CPT | Mod: CPTII,S$GLB,, | Performed by: NURSE PRACTITIONER

## 2022-12-30 PROCEDURE — 3078F PR MOST RECENT DIASTOLIC BLOOD PRESSURE < 80 MM HG: ICD-10-PCS | Mod: CPTII,S$GLB,, | Performed by: NURSE PRACTITIONER

## 2022-12-30 PROCEDURE — 3078F DIAST BP <80 MM HG: CPT | Mod: CPTII,S$GLB,, | Performed by: NURSE PRACTITIONER

## 2022-12-30 PROCEDURE — 87651 STREP A DNA AMP PROBE: CPT | Mod: QW,S$GLB,, | Performed by: NURSE PRACTITIONER

## 2022-12-30 PROCEDURE — 3061F NEG MICROALBUMINURIA REV: CPT | Mod: CPTII,S$GLB,, | Performed by: NURSE PRACTITIONER

## 2022-12-30 PROCEDURE — 3008F PR BODY MASS INDEX (BMI) DOCUMENTED: ICD-10-PCS | Mod: CPTII,S$GLB,, | Performed by: NURSE PRACTITIONER

## 2022-12-30 PROCEDURE — 3008F BODY MASS INDEX DOCD: CPT | Mod: CPTII,S$GLB,, | Performed by: NURSE PRACTITIONER

## 2022-12-30 PROCEDURE — 3044F PR MOST RECENT HEMOGLOBIN A1C LEVEL <7.0%: ICD-10-PCS | Mod: CPTII,S$GLB,, | Performed by: NURSE PRACTITIONER

## 2022-12-30 PROCEDURE — 1159F PR MEDICATION LIST DOCUMENTED IN MEDICAL RECORD: ICD-10-PCS | Mod: CPTII,S$GLB,, | Performed by: NURSE PRACTITIONER

## 2022-12-30 PROCEDURE — 1159F MED LIST DOCD IN RCRD: CPT | Mod: CPTII,S$GLB,, | Performed by: NURSE PRACTITIONER

## 2022-12-30 PROCEDURE — 3077F PR MOST RECENT SYSTOLIC BLOOD PRESSURE >= 140 MM HG: ICD-10-PCS | Mod: CPTII,S$GLB,, | Performed by: NURSE PRACTITIONER

## 2022-12-30 PROCEDURE — 99214 OFFICE O/P EST MOD 30 MIN: CPT | Mod: S$GLB,,, | Performed by: NURSE PRACTITIONER

## 2022-12-30 PROCEDURE — 3044F HG A1C LEVEL LT 7.0%: CPT | Mod: CPTII,S$GLB,, | Performed by: NURSE PRACTITIONER

## 2022-12-30 PROCEDURE — 87502 INFLUENZA DNA AMP PROBE: CPT | Mod: QW,S$GLB,, | Performed by: NURSE PRACTITIONER

## 2022-12-30 PROCEDURE — 87811 SARS-COV-2 COVID19 W/OPTIC: CPT | Mod: QW,S$GLB,, | Performed by: NURSE PRACTITIONER

## 2022-12-30 RX ORDER — PROMETHAZINE HYDROCHLORIDE AND DEXTROMETHORPHAN HYDROBROMIDE 6.25; 15 MG/5ML; MG/5ML
5 SYRUP ORAL EVERY 8 HOURS PRN
Qty: 120 ML | Refills: 0 | Status: SHIPPED | OUTPATIENT
Start: 2022-12-30 | End: 2023-06-09 | Stop reason: ALTCHOICE

## 2022-12-30 NOTE — PROGRESS NOTES
"Subjective:       Patient ID: Chan Dwyer is a 52 y.o. female.    Vitals:  height is 5' 4" (1.626 m) and weight is 56.7 kg (125 lb). Her tympanic temperature is 98.6 °F (37 °C). Her blood pressure is 146/63 (abnormal) and her pulse is 82. Her respiration is 16 and oxygen saturation is 97%.     Chief Complaint: Headache    Chan Dwyer is a 52 year old female whom presents with headache, throat pain, stomach pain, chest pain, congestion. States symptoms started Wednesday. States the pain is 10/10. Patient denies any known sick contacts. Patient reports trying OTC cough and cold medications and tessalon without any relief.     Headache   This is a new problem. The current episode started in the past 7 days (2). The problem occurs constantly. The problem has been gradually worsening. The pain is located in the Frontal region. The pain does not radiate. The pain quality is not similar to prior headaches. The quality of the pain is described as dull. The pain is at a severity of 10/10. The pain is severe. Associated symptoms include insomnia and a sore throat. Pertinent negatives include no abdominal pain, abnormal behavior, anorexia, back pain, blurred vision, coughing, dizziness, drainage, ear pain, eye pain, eye redness, eye watering, facial sweating, fever, hearing loss, loss of balance, muscle aches, nausea, neck pain, numbness, phonophobia, photophobia, rhinorrhea, scalp tenderness, seizures, sinus pressure, swollen glands, tingling, tinnitus, visual change, vomiting, weakness or weight loss. Nothing aggravates the symptoms. She has tried acetaminophen for the symptoms. The treatment provided no relief. There is no history of cancer, cluster headaches, hypertension, immunosuppression, migraine headaches, migraines in the family, obesity, pseudotumor cerebri, recent head traumas, sinus disease or TMJ.     Constitution: Negative for fever.   HENT:  Positive for sore throat. Negative for ear " pain, tinnitus, hearing loss and sinus pressure.    Neck: Negative for neck pain.   Eyes:  Negative for eye pain, eye redness, photophobia and blurred vision.   Respiratory:  Negative for cough.    Gastrointestinal:  Negative for abdominal pain, nausea and vomiting.   Musculoskeletal:  Negative for back pain.   Neurological:  Positive for headaches. Negative for dizziness, loss of balance, history of migraines, numbness and seizures.   Psychiatric/Behavioral:  The patient has insomnia.      Objective:      Physical Exam   Constitutional: She is oriented to person, place, and time. She appears well-developed. She is cooperative.  Non-toxic appearance. She does not appear ill. No distress.   HENT:   Head: Normocephalic and atraumatic.   Ears:   Right Ear: Hearing, tympanic membrane, external ear and ear canal normal.   Left Ear: Hearing, tympanic membrane, external ear and ear canal normal.   Nose: Nose normal. No mucosal edema, rhinorrhea or nasal deformity. No epistaxis. Right sinus exhibits no maxillary sinus tenderness and no frontal sinus tenderness. Left sinus exhibits no maxillary sinus tenderness and no frontal sinus tenderness.   Mouth/Throat: Uvula is midline, oropharynx is clear and moist and mucous membranes are normal. No trismus in the jaw. Normal dentition. No uvula swelling. No oropharyngeal exudate, posterior oropharyngeal edema or posterior oropharyngeal erythema.   Eyes: Conjunctivae and lids are normal. No scleral icterus.   Neck: Trachea normal and phonation normal. Neck supple. No edema present. No erythema present. No neck rigidity present.   Cardiovascular: Normal rate, regular rhythm, normal heart sounds and normal pulses.   Pulmonary/Chest: Effort normal and breath sounds normal. No respiratory distress. She has no decreased breath sounds. She has no rhonchi.   Abdominal: Normal appearance.   Musculoskeletal: Normal range of motion.         General: No deformity. Normal range of motion.    Neurological: She is alert and oriented to person, place, and time. She exhibits normal muscle tone. Coordination normal.   Skin: Skin is warm, dry, intact, not diaphoretic and not pale.   Psychiatric: Her speech is normal and behavior is normal. Judgment and thought content normal.   Nursing note and vitals reviewed.      Results for orders placed or performed in visit on 12/30/22   POCT Influenza A/B MOLECULAR   Result Value Ref Range    POC Molecular Influenza A Ag Negative Negative, Not Reported    POC Molecular Influenza B Ag Negative Negative, Not Reported     Acceptable Yes    SARS Coronavirus 2 Antigen, POCT Manual Read   Result Value Ref Range    SARS Coronavirus 2 Antigen Positive (A) Negative     Acceptable Yes    POCT Strep A, Molecular   Result Value Ref Range    Molecular Strep A, POC Negative Negative     Acceptable Yes        Assessment:       1. Cough, unspecified type    2. Pharyngitis, unspecified etiology    3. COVID-19          Plan:       COVID-19 Risk Score = 1    Isolation precautions given. Advised patient to stay home and self quarantine for 5 days from onset of symptoms, or 5 days from positive test if asymptomatic. Advised must be fever free for at least 24 hours to discontinue isolation. Patient verbalizes understanding Patient verbalizes understanding    Discussed risk and benefits of Paxlovid therapy. Paxlovid offered. Patient would like to proceed with antiviral therapy  antiviral therapy. Educated patient on drug interactions and discussed discontinuation/alterations of necessary medications.       Discussed positive COVID-19 results with patient. Advised patient to begin antiviral therapy. Get plenty of rest and drink plenty of fluids. Advised him to return here or go to the Emergency Department for any concerns or worsening of condition. Advised patient to take tylenol (acetominophen) for fever, chills or body aches every 4 hours but  not to exceed 4000 mg/ day. Patient vitals stable. Patient has no questions or concerns at this time, all questions were answered before discharge. Patient given handout with discharge instructions. Patient exits exam room in no acute distress.       Cough, unspecified type  -     POCT Influenza A/B MOLECULAR  -     SARS Coronavirus 2 Antigen, POCT Manual Read    Pharyngitis, unspecified etiology  -     SARS Coronavirus 2 Antigen, POCT Manual Read  -     POCT Strep A, Molecular    COVID-19         Patient Instructions   You have tested positive for COVID-19 today.      Please note that patients who test positive for COVID-19 are required by the CDC to undergo isolation for 5 days after their symptoms first began.   - If you tested positive and do not have symptoms, you must isolate for 5 days starting on the day of the positive test.   - If you tested positive and have symptoms, you must isolate for 5 days starting on the day of the first symptoms,  not the day of the positive test.    This is the most important part, both the CDC and the [facility] emphasize that you do not test out of isolation.  In fact, we do not retest if you were positive in the last 90 days.    After 5 days, if your symptoms have improved and you have not had fever on day 5, you can return to the community on day 6- NO TESTING REQUIRED!     After your 5 days of isolation are completed, the CDC recommends strict mask use for the first 5 days that you come out of isolation.     During quarantine:   Separate yourself from other people and animals in your home.  Call ahead before visiting your doctor.  Wear a facemask.  Cover your coughs and sneezes.  Wash your hands often with soap and water; hand  can be used, too.  Avoid sharing personal household items.  Wipe down surfaces used daily.  Monitor your symptoms. Seek prompt medical attention if your illness is worsening (e.g., difficulty breathing).   Before seeking care, call your healthcare  provider.  If you have a medical emergency and need to call 911, notify the dispatch personnel that you have, or are being evaluated for COVID-19. If possible, put on a facemask before emergency medical services arrive.         CDC Testing and Quarantine Guidelines for household members, intimate partners, and caregivers in a home setting of a known-positive COVID-19 person:    ·     A 'close exposure' is defined as anyone who has had an exposure (masked or unmasked) to a known COVID -19 positive person within 6 feet of someone for a cumulative total of 15 minutes or more over a 24-hour period.    ·     Vaccinated: patients who have been boosted or completed the primary series of Pfizer or Moderna vaccine within the last 6 months or completed the primary series of J&J vaccine within the last 2 months and/or had a positive test within 90 days   - do NOT need to quarantine after contact with someone who had COVID-19 unless they have symptoms.   - should get tested 3-5 days after their exposure (if they have not had a positive test within the last 90 days), even if they don't have symptoms and wear a mask indoors in public for 10 days following exposure or until their test result is negative on day 5.  - If you develop symptoms test and quarantine.    ·     Unvaccinated, or are more than six months out from their second mRNA dose (or more than 2 months after the J&J vaccine) and not yet boosted,  and/or NOT had a positive test within 90 days and meet 'close exposure'  - you are required by CDC guidelines to quarantine for at least 5 days from time of exposure followed by 5 days of strict masking. It is recommended, but not required to test after 5 days, unless you develop symptoms, in which case you should test at that time.  - If you do decide to test at 5 days and are asymptomatic, the risk is that if you test without symptoms (on Day 5 for example) and you are positive, your 5 day isolation begins on that day, and  you turned your 5 day quarantine into 10 days.  - If your exposure does not meet the above definition, you can return to your normal daily activities to include social distancing, wearing a mask and frequent handwashing.       Close contacts should also follow these recommendations:  Make sure that you understand and can help the patient follow their provider's instructions for medication(s) and care. You should help the patient with basic needs in the home and provide support for getting groceries, prescriptions, and other personal needs.  Monitor the patient's symptoms. If the patient is getting sicker, call his or her healthcare provider and tell them that the patient has laboratory-confirmed COVID-19. If the patient has a medical emergency and you need to call 911, notify the dispatch personnel that the patient has, or is being evaluated for COVID-19.  Household members should stay in another room or be  from the patient. Household members should use a separate bedroom and bathroom, if available.  Prohibit visitors.  Household members should care for any pets in the home.  Make sure that shared spaces in the home have good air flow, such as by an air conditioner or an opened window, weather permitting.  Perform hand hygiene frequently. Wash your hands often with soap and water for at least 20 seconds or use an alcohol-based hand  (that contains > 60% alcohol) covering all surfaces of your hands and rubbing them together until they feel dry. Soap and water should be used preferentially.  Avoid touching your eyes, nose, and mouth.  The patient should wear a facemask. If the patient is not able to wear a facemask (for example, because it causes trouble breathing), caregivers should wear a mask when they are in the same room as the patient.  Wear a disposable facemask and gloves when you touch or have contact with the patient's blood, stool, or body fluids, such as saliva, sputum, nasal mucus,  vomit, urine.  Throw out disposable facemasks and gloves after using them. Do not reuse.  When removing personal protective equipment, first remove and dispose of gloves. Then, immediately clean your hands with soap and water or alcohol-based hand . Next, remove and dispose of facemask, and immediately clean your hands again with soap and water or alcohol-based hand .  You should not share dishes, drinking glasses, cups, eating utensils, towels, bedding, or other items with the patient. After the patient uses these items, you should wash them thoroughly (see below Wash laundry thoroughly).  Clean all high-touch surfaces, such as counters, tabletops, doorknobs, bathroom fixtures, toilets, phones, keyboards, tablets, and bedside tables, every day. Also, clean any surfaces that may have blood, stool, or body fluids on them.  Use a household cleaning spray or wipe, according to the label instructions. Labels contain instructions for safe and effective use of the cleaning product including precautions you should take when applying the product, such as wearing gloves and making sure you have good ventilation during use of the product.  Wash laundry thoroughly.  Immediately remove and wash clothes or bedding that have blood, stool, or body fluids on them.  Wear disposable gloves while handling soiled items and keep soiled items away from your body. Clean your hands (with soap and water or an alcohol-based hand ) immediately after removing your gloves.  Read and follow directions on labels of laundry or clothing items and detergent. In general, using a normal laundry detergent according to washing machine instructions and dry thoroughly using the warmest temperatures recommended on the clothing label.  Place all used disposable gloves, facemasks, and other contaminated items in a lined container before disposing of them with other household waste. Clean your hands (with soap and water or an  alcohol-based hand ) immediately after handling these items. Soap and water should be used preferentially if hands are visibly dirty.  Discuss any additional questions with your state or local health department or healthcare provider. Check available hours when contacting your local health department.     You must understand that you've received an Urgent Care treatment only and that you may be released before all your medical problems are known or treated. You, the patient, will arrange for follow up care as instructed. Follow up with your PCP or specialty clinic as directed within 2-5 days if not improved or as needed.  You can call [phone] to schedule an appointment with the appropriate provider.  If your condition worsens we recommend that you receive another evaluation at the emergency room immediately or contact your primary medical clinics after hours call service to discuss your concerns.  Please return here or go to the Emergency Department for any concerns or worsening of condition.       If we discussed the COVID surveillance/home monitoring program, you will also get a call from [facility] pharmacy at the [place] or [facility] location to get a pulse oximeter to monitor your blood oxygen level.  This will be followed by a COVID surveillance team daily through [employer] (available on computer or through mobile [employer]len).     You have been prescribed Paxlovid, which is an antiviral medication.     Oral antiviral treatment may help your body fight COVID-19 by stopping the SARS-CoV-2 virus (the virus that causes COVID-19) from multiplying in your body, lowering the amount of the virus within your body, or helping your immune system. By getting treatment, you could have less serious symptoms and may lower the chances of your illness getting worse and needing care in the hospital.    To be eligible for Paxlovid, you must be at least 12 years of age and weigh at least 88 pounds.    What is PAXLOVID?    PAXLOVID  is an investigational medicine used to treat mild-to-moderate COVID-19 in  adults and children [12 years of age and older weighing at least 88 pounds (40 kg)] with  positive results of direct SARS-CoV-2 viral testing, and who are at high risk for  progression to severe COVID-19, including hospitalization or death. PAXLOVID is  investigational because it is still being studied. There is limited information about the  safety and effectiveness of using PAXLOVID to treat people with mild-to-moderate  COVID-19.  The FDA has authorized the emergency use of PAXLOVID for the treatment of mild-tomoderate COVID-19 in adults and children [12 years of age and older weighing at least  88 pounds (40 kg)] with a positive test for the virus that causes COVID-19, and who are  at high risk for progression to severe COVID-19, including hospitalization or death,  under an EUA.    Tell your healthcare provider if you:  ? Have any allergies  ? Have liver or kidney disease  ? Are pregnant or plan to become pregnant  ? Are breastfeeding a child  ? Have any serious illnesses  Tell your healthcare provider about all the medicines you take, including  prescription and over-the-counter medicines, vitamins, and herbal supplements.  Some medicines may interact with PAXLOVID and may cause serious side effects.  Keep a list of your medicines to show your healthcare provider and pharmacist when  you get a new medicine.  You can ask your healthcare provider or pharmacist for a list of medicines that interact  with PAXLOVID. Do not start taking a new medicine without telling your  healthcare provider. Your healthcare provider can tell you if it is safe to take  PAXLOVID with other medicines    How do I take PAXLOVID?  ? PAXLOVID consists of 2 medicines: nirmatrelvir and ritonavir.  Take 2 pink tablets of nirmatrelvir with 1 white tablet of ritonavir by mouth  2 times each day (in the morning and in the evening) for 5 days. For each  dose, take all 3  tablets at the same time.  If you have kidney disease, talk to your healthcare provider. You  may need a different dose.  Swallow the tablets whole. Do not chew, break, or crush the tablets.  Take PAXLOVID with or without food.  Do not stop taking PAXLOVID without talking to your healthcare provider, even if  you feel better.  If you miss a dose of PAXLOVID within 8 hours of the time it is usually taken,  take it as soon as you remember. If you miss a dose by more than 8 hours, skip  the missed dose and take the next dose at your regular time. Do not take  2 doses of PAXLOVID at the same time.  If you take too much PAXLOVID, call your healthcare provider or go to the  nearest hospital emergency room right away.  If you are taking a ritonavir- or cobicistat-containing medicine to treat hepatitis C  or Human Immunodeficiency Virus (HIV), you should continue to take your  medicine as prescribed by your healthcare provider    Possible side effects of PAXLOVID are:  ? Allergic Reactions. Allergic reactions can happen in people taking  PAXLOVID, even after only 1 dose. Stop taking PAXLOVID and call your  healthcare provider right away if you get any of the following symptoms of an  allergic reaction:  hives  trouble swallowing or breathing  swelling of the mouth, lips, or face  throat tightness  hoarseness  skin rash  ? Liver Problems. Tell your healthcare provider right away if you have any of  these signs and symptoms of liver problems: loss of appetite, yellowing of your  skin and the whites of eyes (jaundice), dark-colored urine, pale colored stools  and itchy skin, stomach area (abdominal) pain.  ? Resistance to HIV Medicines: If you have untreated HIV infection, PAXLOVID  may lead to some HIV medicines not working as well in the future.  ? Other possible side effects include:  altered sense of taste  diarrhea  high blood pressure  muscle aches  These are not all the possible side effects of PAXLOVID. Not many people  have taken  PAXLOVID. Serious and unexpected side effects may happen. PAXLOVID is still being  studied, so it is possible that all of the risks are not known at this time.    Recent case reports document that some patients with normal immune response who have completed a 5-day course of Paxlovid for laboratory-confirmed infection and have recovered can experience recurrent illness 2 to 8 days later, including patients who have been vaccinated and/or boosted.    COVID-19 rebound is characterized by a recurrence of symptoms or a new positive viral test after having tested negative. People with COVID-19 rebound should follow CDC recommendations regarding isolation of infected patients regardless of treatment with an antiviral agent and/or previous isolation after the initial infection.     People with recurrence of COVID-19 symptoms or a new positive viral test after having tested negative should restart isolation and isolate again for at least 5 days. Per CDC guidance, they can end their re-isolation period after 5 full days if fever has resolved for 24 hours (without the use of feverreducing medication) and symptoms are improving. The individual should wear a mask for a total of 10 days after rebound symptoms started. Some people continue to test positive after day 10 but are considerably less likely to shed infectious virus.

## 2022-12-30 NOTE — PROGRESS NOTES
[facility]  Occupational Therapy Discharge Summary     Discharge Date: 12/30/2022   [facility]  [facility] Number: [id number]  Therapy Diagnosis:   Encounter Diagnoses   Name Primary?    Pain Yes    Decreased  strength of left hand     Self-care deficit      Physician: Anusha George PA-C     Physician Orders: Occupational Therapy to Evaluate and Treat  Medical Diagnosis: Z98.890 (ICD-10-CM) - S/P trigger finger release left  Evaluation Date: 11/14/2022    Date of Last visit: 11/30/2022  Total Visits Received: 4  Cancelled Visits: 1  No Show Visits: 0    Assessment    Assessment of Current Status: Pt tolerated manual therapy with moderate to mild with reports  pain. Pt does not tolerate exercise well with reports of moderate  increased pain. Pt demonstrated good understanding of exercises and required minimal cueing to maintain proper form.  She is not improving with her active range of motion secondary to locking and pain and her passive range of motion is only slightly limited. (Per MARIA ESTHER Hayes Note 11/30/2022)    Goals:     Short Term Goals:  weeks 2 Progress   11/30/2022   Pain: Patient will demonstrate improved pain by reports of less than or equal to 6/10 worst pain on the verbal rating scale in order to progress toward maximal functional ability and improve quality of life. PC   Function: Patient will demonstrate improved function as indicated by a functional limitation score of less than or equal to 64 out of 100 on FOTO. PC   Mobility: Patient will improve AROM to 50% of stated goals, listed in objective measures above, in order to progress towards independence with functional activities.  PC   Strength: Patient will improve strength to 50% of stated goals, listed in objective measures above, in order to progress towards independence with functional activities.  PC   HEP: Patient will demonstrate independence with HEP in order to progress toward functional independence. PC    Self care: Patient will achieve minimal difficulty with self care skills. PC      Long Term Goals:  4 weeks  Progress  11/30/2022   Pain: Patient will demonstrate improved pain by reports of less than or equal to 2/10 worst pain on the verbal rating scale in order to progress toward maximal functional ability and improve quality of life.   PC   Function: Patient will demonstrate improved function as indicated by a functional limitation score of less than or equal to 44 out of 100 on FOTO. PC   Mobility: Patient will improve AROM to stated goals, listed in objective measures above, in order to return to maximal functional potential and improve quality of life. PC   Strength: Patient will improve strength to stated goals, listed in objective measures above, in order to improve functional independence and quality of life. PC   Patient will return to normal ADL's, IADL's, community involvement, recreational activities, and work-related activities with less than or equal to 2/10 pain and maximal function.  PC   Self care skills: Patient will achieve no difficulty with her self care skills secondary to hand pain. PC      Goals Key:  PC= Progressing/Continue;       PM= Partially Met;       Goal Met= Goal Met      DC= Discontinue    Discharge reason: Patient has not attended therapy since 11/30/2022    Plan   This patient is discharged from Occupational Therapy    Yumiko Jones, OT ,OTD, OTR/L  12/30/2022

## 2022-12-30 NOTE — LETTER
December 30, 2022      [facility]  [address], [address]  [facility] [place] [address]  Phone: [phone]  Fax: [phone]       Patient: Chan Dwyer   YOB: 1970  Date of Visit: 12/30/2022    To Whom It May Concern:    Antonio Dwyer  was at [facility] on 12/30/2022. The patient may return to work/school on 01/05/22 with no restrictions. If you have any questions or concerns, or if I can be of further assistance, please do not hesitate to contact me.    Sincerely,        Master Easton NP

## 2022-12-30 NOTE — PATIENT INSTRUCTIONS
You have tested positive for COVID-19 today.      Please note that patients who test positive for COVID-19 are required by the CDC to undergo isolation for 5 days after their symptoms first began.   - If you tested positive and do not have symptoms, you must isolate for 5 days starting on the day of the positive test.   - If you tested positive and have symptoms, you must isolate for 5 days starting on the day of the first symptoms,  not the day of the positive test.    This is the most important part, both the CDC and the [facility] emphasize that you do not test out of isolation.  In fact, we do not retest if you were positive in the last 90 days.    After 5 days, if your symptoms have improved and you have not had fever on day 5, you can return to the community on day 6- NO TESTING REQUIRED!     After your 5 days of isolation are completed, the CDC recommends strict mask use for the first 5 days that you come out of isolation.     During quarantine:   Separate yourself from other people and animals in your home.  Call ahead before visiting your doctor.  Wear a facemask.  Cover your coughs and sneezes.  Wash your hands often with soap and water; hand  can be used, too.  Avoid sharing personal household items.  Wipe down surfaces used daily.  Monitor your symptoms. Seek prompt medical attention if your illness is worsening (e.g., difficulty breathing).   Before seeking care, call your healthcare provider.  If you have a medical emergency and need to call 911, notify the dispatch personnel that you have, or are being evaluated for COVID-19. If possible, put on a facemask before emergency medical services arrive.         CDC Testing and Quarantine Guidelines for household members, intimate partners, and caregivers in a home setting of a known-positive COVID-19 person:    ·     A 'close exposure' is defined as anyone who has had an exposure (masked or unmasked) to a known COVID -19 positive person within 6 feet of  someone for a cumulative total of 15 minutes or more over a 24-hour period.    ·     Vaccinated: patients who have been boosted or completed the primary series of Pfizer or Moderna vaccine within the last 6 months or completed the primary series of J&J vaccine within the last 2 months and/or had a positive test within 90 days   - do NOT need to quarantine after contact with someone who had COVID-19 unless they have symptoms.   - should get tested 3-5 days after their exposure (if they have not had a positive test within the last 90 days), even if they don't have symptoms and wear a mask indoors in public for 10 days following exposure or until their test result is negative on day 5.  - If you develop symptoms test and quarantine.    ·     Unvaccinated, or are more than six months out from their second mRNA dose (or more than 2 months after the J&J vaccine) and not yet boosted,  and/or NOT had a positive test within 90 days and meet 'close exposure'  - you are required by CDC guidelines to quarantine for at least 5 days from time of exposure followed by 5 days of strict masking. It is recommended, but not required to test after 5 days, unless you develop symptoms, in which case you should test at that time.  - If you do decide to test at 5 days and are asymptomatic, the risk is that if you test without symptoms (on Day 5 for example) and you are positive, your 5 day isolation begins on that day, and you turned your 5 day quarantine into 10 days.  - If your exposure does not meet the above definition, you can return to your normal daily activities to include social distancing, wearing a mask and frequent handwashing.       Close contacts should also follow these recommendations:  Make sure that you understand and can help the patient follow their provider's instructions for medication(s) and care. You should help the patient with basic needs in the home and provide support for getting groceries, prescriptions, and  other personal needs.  Monitor the patient's symptoms. If the patient is getting sicker, call his or her healthcare provider and tell them that the patient has laboratory-confirmed COVID-19. If the patient has a medical emergency and you need to call 911, notify the dispatch personnel that the patient has, or is being evaluated for COVID-19.  Household members should stay in another room or be  from the patient. Household members should use a separate bedroom and bathroom, if available.  Prohibit visitors.  Household members should care for any pets in the home.  Make sure that shared spaces in the home have good air flow, such as by an air conditioner or an opened window, weather permitting.  Perform hand hygiene frequently. Wash your hands often with soap and water for at least 20 seconds or use an alcohol-based hand  (that contains > 60% alcohol) covering all surfaces of your hands and rubbing them together until they feel dry. Soap and water should be used preferentially.  Avoid touching your eyes, nose, and mouth.  The patient should wear a facemask. If the patient is not able to wear a facemask (for example, because it causes trouble breathing), caregivers should wear a mask when they are in the same room as the patient.  Wear a disposable facemask and gloves when you touch or have contact with the patient's blood, stool, or body fluids, such as saliva, sputum, nasal mucus, vomit, urine.  Throw out disposable facemasks and gloves after using them. Do not reuse.  When removing personal protective equipment, first remove and dispose of gloves. Then, immediately clean your hands with soap and water or alcohol-based hand . Next, remove and dispose of facemask, and immediately clean your hands again with soap and water or alcohol-based hand .  You should not share dishes, drinking glasses, cups, eating utensils, towels, bedding, or other items with the patient. After the patient  uses these items, you should wash them thoroughly (see below Wash laundry thoroughly).  Clean all high-touch surfaces, such as counters, tabletops, doorknobs, bathroom fixtures, toilets, phones, keyboards, tablets, and bedside tables, every day. Also, clean any surfaces that may have blood, stool, or body fluids on them.  Use a household cleaning spray or wipe, according to the label instructions. Labels contain instructions for safe and effective use of the cleaning product including precautions you should take when applying the product, such as wearing gloves and making sure you have good ventilation during use of the product.  Wash laundry thoroughly.  Immediately remove and wash clothes or bedding that have blood, stool, or body fluids on them.  Wear disposable gloves while handling soiled items and keep soiled items away from your body. Clean your hands (with soap and water or an alcohol-based hand ) immediately after removing your gloves.  Read and follow directions on labels of laundry or clothing items and detergent. In general, using a normal laundry detergent according to washing machine instructions and dry thoroughly using the warmest temperatures recommended on the clothing label.  Place all used disposable gloves, facemasks, and other contaminated items in a lined container before disposing of them with other household waste. Clean your hands (with soap and water or an alcohol-based hand ) immediately after handling these items. Soap and water should be used preferentially if hands are visibly dirty.  Discuss any additional questions with your state or local health department or healthcare provider. Check available hours when contacting your local health department.     You must understand that you've received an Urgent Care treatment only and that you may be released before all your medical problems are known or treated. You, the patient, will arrange for follow up care as  instructed. Follow up with your PCP or specialty clinic as directed within 2-5 days if not improved or as needed.  You can call [phone] to schedule an appointment with the appropriate provider.  If your condition worsens we recommend that you receive another evaluation at the emergency room immediately or contact your primary medical clinics after hours call service to discuss your concerns.  Please return here or go to the Emergency Department for any concerns or worsening of condition.       If we discussed the COVID surveillance/home monitoring program, you will also get a call from [facility] at the [place] or [facility] location to get a pulse oximeter to monitor your blood oxygen level.  This will be followed by a COVID surveillance team daily through [employer] (available on computer or through mobile arlen).     You have been prescribed Paxlovid, which is an antiviral medication.     Oral antiviral treatment may help your body fight COVID-19 by stopping the SARS-CoV-2 virus (the virus that causes COVID-19) from multiplying in your body, lowering the amount of the virus within your body, or helping your immune system. By getting treatment, you could have less serious symptoms and may lower the chances of your illness getting worse and needing care in the hospital.    To be eligible for Paxlovid, you must be at least 12 years of age and weigh at least 88 pounds.    What is PAXLOVID?    PAXLOVID is an investigational medicine used to treat mild-to-moderate COVID-19 in  adults and children [12 years of age and older weighing at least 88 pounds (40 kg)] with  positive results of direct SARS-CoV-2 viral testing, and who are at high risk for  progression to severe COVID-19, including hospitalization or death. PAXLOVID is  investigational because it is still being studied. There is limited information about the  safety and effectiveness of using PAXLOVID to treat people with mild-to-moderate  COVID-19.  The FDA has  authorized the emergency use of PAXLOVID for the treatment of mild-tomoderate COVID-19 in adults and children [12 years of age and older weighing at least  88 pounds (40 kg)] with a positive test for the virus that causes COVID-19, and who are  at high risk for progression to severe COVID-19, including hospitalization or death,  under an EUA.    Tell your healthcare provider if you:  ? Have any allergies  ? Have liver or kidney disease  ? Are pregnant or plan to become pregnant  ? Are breastfeeding a child  ? Have any serious illnesses  Tell your healthcare provider about all the medicines you take, including  prescription and over-the-counter medicines, vitamins, and herbal supplements.  Some medicines may interact with PAXLOVID and may cause serious side effects.  Keep a list of your medicines to show your healthcare provider and pharmacist when  you get a new medicine.  You can ask your healthcare provider or pharmacist for a list of medicines that interact  with PAXLOVID. Do not start taking a new medicine without telling your  healthcare provider. Your healthcare provider can tell you if it is safe to take  PAXLOVID with other medicines    How do I take PAXLOVID?  ? PAXLOVID consists of 2 medicines: nirmatrelvir and ritonavir.  Take 2 pink tablets of nirmatrelvir with 1 white tablet of ritonavir by mouth  2 times each day (in the morning and in the evening) for 5 days. For each  dose, take all 3 tablets at the same time.  If you have kidney disease, talk to your healthcare provider. You  may need a different dose.  Swallow the tablets whole. Do not chew, break, or crush the tablets.  Take PAXLOVID with or without food.  Do not stop taking PAXLOVID without talking to your healthcare provider, even if  you feel better.  If you miss a dose of PAXLOVID within 8 hours of the time it is usually taken,  take it as soon as you remember. If you miss a dose by more than 8 hours, skip  the missed dose and take the next  dose at your regular time. Do not take  2 doses of PAXLOVID at the same time.  If you take too much PAXLOVID, call your healthcare provider or go to the  nearest hospital emergency room right away.  If you are taking a ritonavir- or cobicistat-containing medicine to treat hepatitis C  or Human Immunodeficiency Virus (HIV), you should continue to take your  medicine as prescribed by your healthcare provider    Possible side effects of PAXLOVID are:  ? Allergic Reactions. Allergic reactions can happen in people taking  PAXLOVID, even after only 1 dose. Stop taking PAXLOVID and call your  healthcare provider right away if you get any of the following symptoms of an  allergic reaction:  hives  trouble swallowing or breathing  swelling of the mouth, lips, or face  throat tightness  hoarseness  skin rash  ? Liver Problems. Tell your healthcare provider right away if you have any of  these signs and symptoms of liver problems: loss of appetite, yellowing of your  skin and the whites of eyes (jaundice), dark-colored urine, pale colored stools  and itchy skin, stomach area (abdominal) pain.  ? Resistance to HIV Medicines: If you have untreated HIV infection, PAXLOVID  may lead to some HIV medicines not working as well in the future.  ? Other possible side effects include:  altered sense of taste  diarrhea  high blood pressure  muscle aches  These are not all the possible side effects of PAXLOVID. Not many people have taken  PAXLOVID. Serious and unexpected side effects may happen. PAXLOVID is still being  studied, so it is possible that all of the risks are not known at this time.    Recent case reports document that some patients with normal immune response who have completed a 5-day course of Paxlovid for laboratory-confirmed infection and have recovered can experience recurrent illness 2 to 8 days later, including patients who have been vaccinated and/or boosted.    COVID-19 rebound is characterized by a recurrence of  symptoms or a new positive viral test after having tested negative. People with COVID-19 rebound should follow CDC recommendations regarding isolation of infected patients regardless of treatment with an antiviral agent and/or previous isolation after the initial infection.     People with recurrence of COVID-19 symptoms or a new positive viral test after having tested negative should restart isolation and isolate again for at least 5 days. Per CDC guidance, they can end their re-isolation period after 5 full days if fever has resolved for 24 hours (without the use of feverreducing medication) and symptoms are improving. The individual should wear a mask for a total of 10 days after rebound symptoms started. Some people continue to test positive after day 10 but are considerably less likely to shed infectious virus.

## 2022-12-30 NOTE — LETTER
December 30, 2022      [facility]  [address], [address]  [facility] [place] [address]  Phone: [phone]  Fax: [phone]       Patient: Chan Dwyer   YOB: 1970  Date of Visit: 12/30/2022    To Whom It May Concern:    Antonio Dwyer  was at [facility] on 12/30/2022. The patient may return to work/school on 01/05/23 with no restrictions. If you have any questions or concerns, or if I can be of further assistance, please do not hesitate to contact me.    Sincerely,        Master Easton NP

## 2023-01-04 ENCOUNTER — TELEPHONE (OUTPATIENT)
Dept: ORTHOPEDICS | Facility: CLINIC | Age: 53
End: 2023-01-04
Payer: COMMERCIAL

## 2023-01-04 NOTE — TELEPHONE ENCOUNTER
Spoke to the patient an let her know that Kathy Davis is out of the office until Tuesday an that I will try to get in touch with him before but if not it wont be until Tuesday. Patient verbalized understanding.        ----- Message from Raquel Way sent at 1/4/2023 10:25 AM CST -----  Contact: [phone]  Patient would like to consult with a nurse in regards to a release form. The patient stated she is needing information for her employer. Please give her a call back at [phone]. Thanks tori

## 2023-01-06 ENCOUNTER — PATIENT MESSAGE (OUTPATIENT)
Dept: ORTHOPEDICS | Facility: CLINIC | Age: 53
End: 2023-01-06
Payer: COMMERCIAL

## 2023-01-09 ENCOUNTER — TELEPHONE (OUTPATIENT)
Dept: ORTHOPEDICS | Facility: CLINIC | Age: 53
End: 2023-01-09
Payer: COMMERCIAL

## 2023-01-09 NOTE — TELEPHONE ENCOUNTER
Spoke to the patient an was able to get her scheduled for tomorrow at 11:15 AM patient verbalized understanding       ----- Message from Jn Biswas sent at 1/9/2023  8:06 AM CST -----  Contact: Pt  Type:  Sooner Apoointment Request    Caller is requesting a sooner appointment.  Caller declined first available appointment listed below.  Caller will not accept being placed on the waitlist and is requesting a message be sent to doctor.  Name of Caller:pt   When is the first available appointment? 01/10  Symptoms: retn to work   Would the patient rather a call back or a response via [facility]? phone  Best Call Back Number:   Additional Information:

## 2023-01-10 ENCOUNTER — OFFICE VISIT (OUTPATIENT)
Dept: ORTHOPEDICS | Facility: CLINIC | Age: 53
End: 2023-01-10
Payer: COMMERCIAL

## 2023-01-10 VITALS — HEIGHT: 64 IN | WEIGHT: 125 LBS | BODY MASS INDEX: 21.34 KG/M2

## 2023-01-10 DIAGNOSIS — M65.30 TRIGGER FINGER, ACQUIRED: Primary | ICD-10-CM

## 2023-01-10 PROCEDURE — 1159F MED LIST DOCD IN RCRD: CPT | Mod: CPTII,S$GLB,, | Performed by: ORTHOPAEDIC SURGERY

## 2023-01-10 PROCEDURE — 3008F BODY MASS INDEX DOCD: CPT | Mod: CPTII,S$GLB,, | Performed by: ORTHOPAEDIC SURGERY

## 2023-01-10 PROCEDURE — 1159F PR MEDICATION LIST DOCUMENTED IN MEDICAL RECORD: ICD-10-PCS | Mod: CPTII,S$GLB,, | Performed by: ORTHOPAEDIC SURGERY

## 2023-01-10 PROCEDURE — 3072F LOW RISK FOR RETINOPATHY: CPT | Mod: CPTII,S$GLB,, | Performed by: ORTHOPAEDIC SURGERY

## 2023-01-10 PROCEDURE — 1160F RVW MEDS BY RX/DR IN RCRD: CPT | Mod: CPTII,S$GLB,, | Performed by: ORTHOPAEDIC SURGERY

## 2023-01-10 PROCEDURE — 1160F PR REVIEW ALL MEDS BY PRESCRIBER/CLIN PHARMACIST DOCUMENTED: ICD-10-PCS | Mod: CPTII,S$GLB,, | Performed by: ORTHOPAEDIC SURGERY

## 2023-01-10 PROCEDURE — 99213 PR OFFICE/OUTPT VISIT, EST, LEVL III, 20-29 MIN: ICD-10-PCS | Mod: S$GLB,,, | Performed by: ORTHOPAEDIC SURGERY

## 2023-01-10 PROCEDURE — 99999 PR PBB SHADOW E&M-EST. PATIENT-LVL III: CPT | Mod: PBBFAC,,, | Performed by: ORTHOPAEDIC SURGERY

## 2023-01-10 PROCEDURE — 99999 PR PBB SHADOW E&M-EST. PATIENT-LVL III: ICD-10-PCS | Mod: PBBFAC,,, | Performed by: ORTHOPAEDIC SURGERY

## 2023-01-10 PROCEDURE — 99213 OFFICE O/P EST LOW 20 MIN: CPT | Mod: S$GLB,,, | Performed by: ORTHOPAEDIC SURGERY

## 2023-01-10 PROCEDURE — 3008F PR BODY MASS INDEX (BMI) DOCUMENTED: ICD-10-PCS | Mod: CPTII,S$GLB,, | Performed by: ORTHOPAEDIC SURGERY

## 2023-01-10 PROCEDURE — 3072F PR LOW RISK FOR RETINOPATHY: ICD-10-PCS | Mod: CPTII,S$GLB,, | Performed by: ORTHOPAEDIC SURGERY

## 2023-01-10 NOTE — PROGRESS NOTES
Subjective:     Patient ID: Chan Dwyer is a 52 y.o. female.    Chief Complaint: Pain of the Left Hand      HPI:  The patient is a 52-year-old female seen in follow-up after a left ring trigger finger release date of surgery was 10/10/2022.  She is doing well with that but needs a note to return to work.  Additionally she requests 6 weeks of light duty with no excessive use of the left hand.  Otherwise she is doing well.    Past Medical History:   Diagnosis Date    Abnormal Pap smear     Abnormal Pap smear of cervix     Arthritis, low back     Diabetes 09/2014     BS 162am 09/16/2014    DM (diabetes mellitus) 09/2014     am 01/26/2016    DM (diabetes mellitus) 09/2014    BS didn't check 01/31/2017    GERD (gastroesophageal reflux disease)     Gestational diabetes     Hepatitis C antibody positive in blood 02/11/2022    RNA NEGATIVE    History of abnormal Pap smear     Surgical menopause     Vitamin D deficiency      Past Surgical History:   Procedure Laterality Date    COLONOSCOPY N/A 6/22/2016    Procedure: COLONOSCOPY;  Surgeon: Vishal Mary III, MD;  Location: [facility];  Service: Endoscopy;  Laterality: N/A;    Cryotherapy of the cervix  1999    DILATION AND CURETTAGE OF UTERUS      HYSTERECTOMY      [facility]  2/2012    TRIGGER FINGER RELEASE Left 10/10/2022    Procedure: RELEASE, TRIGGER FINGER;  Surgeon: Adarsh Chavez MD;  Location: [facility];  Service: Orthopedics;  Laterality: Left;  left ring trigger finger release    TUBAL LIGATION       Family History   Problem Relation Age of Onset    Diabetes Father     Hypertension Father     Hypertension Mother     Diabetes Mother     Heart attack Mother     No Known Problems Brother     No Known Problems Brother     No Known Problems Brother     No Known Problems Brother     Stomach cancer Sister     No Known Problems Sister     No Known Problems Sister     No Known Problems Sister     No Known Problems Sister     No Known Problems Son      No Known Problems Son     Cancer Neg Hx     Stroke Neg Hx     Colon cancer Neg Hx     Ovarian cancer Neg Hx     Breast cancer Neg Hx      Social History     Socioeconomic History    Marital status: Single    Number of children: 2   Occupational History    Occupation:      Employer: [employer]      Employer: [employer]   Tobacco Use    Smoking status: Never    Smokeless tobacco: Never   Substance and Sexual Activity    Alcohol use: Yes     Comment: Rare    Drug use: No    Sexual activity: Yes     Partners: Male     Birth control/protection: Surgical   Social History Narrative    She wears seatbelt.     Medication List with Changes/Refills   Current Medications    ALCOHOL SWABS (EASY COMFORT ALCOHOL PAD) PADM    Apply 1 each topically 4 (four) times daily.    ASPIRIN (ECOTRIN) 81 MG EC TABLET    Take 1 tablet (81 mg total) by mouth once daily.    IBUPROFEN (ADVIL,MOTRIN) 600 MG TABLET    Take 1 tablet (600 mg total) by mouth every 6 (six) hours as needed for Pain.    INSULIN GLARGINE, TOUJEO, (TOUJEO) 300 UNIT/ML (1.5 ML) INPN PEN    Inject 20 Units into the skin once daily.    INSULIN LISPRO-AABC (LYUMJEV KWIKPEN U-100 INSULIN) 100 UNIT/ML PEN    Titrate up to 20 units daily.    ISOSORBIDE MONONITRATE (IMDUR) 30 MG 24 HR TABLET    Take 1 tablet (30 mg total) by mouth once daily.    PANTOPRAZOLE (PROTONIX) 20 MG TABLET    Take 1 tablet (20 mg total) by mouth once daily.    PROMETHAZINE-DEXTROMETHORPHAN (PROMETHAZINE-DM) 6.25-15 MG/5 ML SYRP    Take 5 mLs by mouth every 8 (eight) hours as needed (cough).    SITAGLIPTIN PHOSPHATE (JANUVIA) 100 MG TAB    Take 1 tablet (100 mg total) by mouth once daily.     Review of patient's allergies indicates:   Allergen Reactions    Hydrocodone Hives and Rash     Review of Systems   Constitutional: Negative for malaise/fatigue.   HENT:  Negative for hearing loss.    Eyes:  Negative for double vision and visual disturbance.   Cardiovascular:  Positive for  chest pain.   Respiratory:  Negative for shortness of breath.    Endocrine: Negative for cold intolerance.   Hematologic/Lymphatic: Does not bruise/bleed easily.   Skin:  Negative for poor wound healing and suspicious lesions.   Musculoskeletal:  Positive for arthritis, back pain, joint pain, joint swelling and muscle weakness. Negative for gout.   Gastrointestinal:  Positive for heartburn. Negative for nausea and vomiting.   Genitourinary:  Negative for dysuria.   Neurological:  Positive for headaches, numbness, paresthesias and sensory change.   Psychiatric/Behavioral:  Negative for depression, memory loss and substance abuse. The patient is not nervous/anxious.    Allergic/Immunologic: Negative for persistent infections.     Objective:   Body mass index is 21.46 kg/m².  There were no vitals filed for this visit.             General    Constitutional: She is oriented to person, place, and time. She appears well-developed and well-nourished. No distress.   HENT:   Head: Normocephalic.   Eyes: EOM are normal.   Pulmonary/Chest: Effort normal.   Neurological: She is oriented to person, place, and time.   Psychiatric: She has a normal mood and affect.         Left Hand/Wrist Exam     Inspection   Scars: Wrist - absent Hand -  present  Effusion: Wrist - absent Hand -  absent    Other     Sensory Exam  Median Distribution: normal  Ulnar Distribution: normal  Radial Distribution: normal    Comments:  The patient has a well-healed scar left ring finger trigger finger A1 pulley area.  There is no triggering.  There is no motor or sensory deficits.          Vascular Exam       Capillary Refill  Left Hand: normal capillary refill         radiographs were not obtained today  Assessment:     Encounter Diagnosis   Name Primary?    Trigger finger, acquired Yes    Left ring trigger finger release    Plan:       The patient seems to be doing well.  She was given a note to return to work to light duty for 6 weeks with no excessive  use left hand.  She will return on a as needed basis.              Disclaimer: This note was prepared using a voice recognition system and is likely to have sound alike errors within the text.

## 2023-01-18 ENCOUNTER — PATIENT MESSAGE (OUTPATIENT)
Dept: DIABETES | Facility: CLINIC | Age: 53
End: 2023-01-18
Payer: COMMERCIAL

## 2023-01-19 NOTE — TELEPHONE ENCOUNTER
Called patient to inquire about low blood sugars. Pt says she is dropping after taking 8 units of the lyumjev with meals. She is eating a full meal and dropping a slow as 40.     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 She thinks maybe that is contributing to her lows.     Verified she is still taking the Toujeo 20 units Q night.

## 2023-01-20 ENCOUNTER — TELEPHONE (OUTPATIENT)
Dept: DIABETES | Facility: CLINIC | Age: 53
End: 2023-01-20
Payer: COMMERCIAL

## 2023-02-06 ENCOUNTER — HOSPITAL ENCOUNTER (OUTPATIENT)
Dept: RADIOLOGY | Facility: HOSPITAL | Age: 53
Discharge: HOME | End: 2023-02-06
Attending: OBSTETRICS & GYNECOLOGY
Payer: COMMERCIAL

## 2023-02-06 DIAGNOSIS — Z12.31 ENCOUNTER FOR SCREENING MAMMOGRAM FOR MALIGNANT NEOPLASM OF BREAST: ICD-10-CM

## 2023-02-21 ENCOUNTER — OFFICE VISIT (OUTPATIENT)
Dept: URGENT CARE | Facility: CLINIC | Age: 53
End: 2023-02-21
Payer: COMMERCIAL

## 2023-02-21 ENCOUNTER — HOSPITAL ENCOUNTER (OUTPATIENT)
Dept: RADIOLOGY | Facility: CLINIC | Age: 53
Discharge: HOME | End: 2023-02-21
Payer: COMMERCIAL

## 2023-02-21 VITALS
WEIGHT: 125 LBS | RESPIRATION RATE: 17 BRPM | TEMPERATURE: 98 F | SYSTOLIC BLOOD PRESSURE: 137 MMHG | DIASTOLIC BLOOD PRESSURE: 63 MMHG | OXYGEN SATURATION: 97 % | HEIGHT: 64 IN | BODY MASS INDEX: 21.34 KG/M2 | HEART RATE: 67 BPM

## 2023-02-21 DIAGNOSIS — M54.32 SCIATICA OF LEFT SIDE: Primary | ICD-10-CM

## 2023-02-21 DIAGNOSIS — M25.552 LEFT HIP PAIN: ICD-10-CM

## 2023-02-21 PROCEDURE — 99214 OFFICE O/P EST MOD 30 MIN: CPT | Mod: 25,S$GLB,,

## 2023-02-21 PROCEDURE — 1160F RVW MEDS BY RX/DR IN RCRD: CPT | Mod: CPTII,S$GLB,,

## 2023-02-21 PROCEDURE — 3072F PR LOW RISK FOR RETINOPATHY: ICD-10-PCS | Mod: CPTII,S$GLB,,

## 2023-02-21 PROCEDURE — 99214 PR OFFICE/OUTPT VISIT, EST, LEVL IV, 30-39 MIN: ICD-10-PCS | Mod: 25,S$GLB,,

## 2023-02-21 PROCEDURE — 73502 X-RAY EXAM HIP UNI 2-3 VIEWS: CPT | Mod: LT,S$GLB,, | Performed by: RADIOLOGY

## 2023-02-21 PROCEDURE — 96372 THER/PROPH/DIAG INJ SC/IM: CPT | Mod: S$GLB,,, | Performed by: FAMILY MEDICINE

## 2023-02-21 PROCEDURE — 73502 XR HIP WITH PELVIS WHEN PERFORMED, 2 OR 3 VIEWS LEFT: ICD-10-PCS | Mod: LT,S$GLB,, | Performed by: RADIOLOGY

## 2023-02-21 PROCEDURE — 1159F MED LIST DOCD IN RCRD: CPT | Mod: CPTII,S$GLB,,

## 2023-02-21 PROCEDURE — 96372 PR INJECTION,THERAP/PROPH/DIAG2ST, IM OR SUBCUT: ICD-10-PCS | Mod: S$GLB,,, | Performed by: FAMILY MEDICINE

## 2023-02-21 PROCEDURE — 1160F PR REVIEW ALL MEDS BY PRESCRIBER/CLIN PHARMACIST DOCUMENTED: ICD-10-PCS | Mod: CPTII,S$GLB,,

## 2023-02-21 PROCEDURE — 3075F SYST BP GE 130 - 139MM HG: CPT | Mod: CPTII,S$GLB,,

## 2023-02-21 PROCEDURE — 3078F PR MOST RECENT DIASTOLIC BLOOD PRESSURE < 80 MM HG: ICD-10-PCS | Mod: CPTII,S$GLB,,

## 2023-02-21 PROCEDURE — 1159F PR MEDICATION LIST DOCUMENTED IN MEDICAL RECORD: ICD-10-PCS | Mod: CPTII,S$GLB,,

## 2023-02-21 PROCEDURE — 3008F BODY MASS INDEX DOCD: CPT | Mod: CPTII,S$GLB,,

## 2023-02-21 PROCEDURE — 3075F PR MOST RECENT SYSTOLIC BLOOD PRESS GE 130-139MM HG: ICD-10-PCS | Mod: CPTII,S$GLB,,

## 2023-02-21 PROCEDURE — 3078F DIAST BP <80 MM HG: CPT | Mod: CPTII,S$GLB,,

## 2023-02-21 PROCEDURE — 3072F LOW RISK FOR RETINOPATHY: CPT | Mod: CPTII,S$GLB,,

## 2023-02-21 PROCEDURE — 3008F PR BODY MASS INDEX (BMI) DOCUMENTED: ICD-10-PCS | Mod: CPTII,S$GLB,,

## 2023-02-21 RX ORDER — KETOROLAC TROMETHAMINE 30 MG/ML
30 INJECTION, SOLUTION INTRAMUSCULAR; INTRAVENOUS ONCE
Status: COMPLETED | OUTPATIENT
Start: 2023-02-21 | End: 2023-02-21

## 2023-02-21 RX ORDER — METHOCARBAMOL 500 MG/1
500 TABLET, FILM COATED ORAL 3 TIMES DAILY
Qty: 30 TABLET | Refills: 0 | Status: SHIPPED | OUTPATIENT
Start: 2023-02-21 | End: 2023-03-03

## 2023-02-21 RX ADMIN — KETOROLAC TROMETHAMINE 30 MG: 30 INJECTION, SOLUTION INTRAMUSCULAR; INTRAVENOUS at 10:02

## 2023-02-21 NOTE — PROGRESS NOTES
"Subjective:       Patient ID: Chan Dwyer is a 52 y.o. female.    Vitals:  height is 5' 4" (1.626 m) and weight is 56.7 kg (125 lb). Her temperature is 97.6 °F (36.4 °C). Her blood pressure is 137/63 and her pulse is 67. Her respiration is 17 and oxygen saturation is 97%.     Chief Complaint: Hip Pain    Patient presents for evaluation of left hip pain x 2 weeks. States it radiates down into her knee. Having trouble sleeping due to the pain. No known injury. Denies numbness or tingling  Would like xray today    Hip Pain   The incident occurred more than 1 week ago. The incident occurred at home. There was no injury mechanism. The pain is at a severity of 9/10. The pain is severe. The pain has been Constant since onset. Pertinent negatives include no inability to bear weight, loss of motion, loss of sensation, muscle weakness, numbness or tingling. Nothing aggravates the symptoms. She has tried acetaminophen for the symptoms.     Neurological:  Negative for numbness.     Objective:      Physical Exam   Constitutional: She is oriented to person, place, and time. She appears well-developed. She is cooperative.  Non-toxic appearance. She does not appear ill. No distress.   HENT:   Head: Normocephalic and atraumatic.   Ears:   Right Ear: Hearing and external ear normal.   Left Ear: Hearing and external ear normal.   Eyes: Lids are normal. Right eye exhibits no discharge. Left eye exhibits no discharge. No scleral icterus.   Neck: Trachea normal and phonation normal. Neck supple.   Cardiovascular: Normal rate and normal pulses.   Pulmonary/Chest: Effort normal. No respiratory distress.   Abdominal: Normal appearance.   Musculoskeletal: Normal range of motion.         General: No deformity. Normal range of motion.      Comments: Mild tenderness to palpation to lateral left hip   FROM  SLR WNL   Neurological: She is alert and oriented to person, place, and time. She exhibits normal muscle tone. Coordination " normal.   Skin: Skin is warm, dry, intact, not diaphoretic and not pale.   Psychiatric: Her speech is normal and behavior is normal. Judgment and thought content normal.   Nursing note and vitals reviewed.      X-Ray Hip 2 or 3 views Left (with Pelvis when performed)    Result Date: 2/21/2023  EXAM: XR HIP WITH PELVIS WHEN PERFORMED, 2 OR 3 VIEWS LEFT CLINICAL INDICATION:   [M25.552]-Pain in left hip. FINDINGS: Frontal view of the pelvis and 2 views of the left hip.  3 images.  No comparison is available.  No acute fracture or dislocation.  No significant degenerative changes.  The pelvis is grossly unremarkable.  Bowel gas pattern is normal.     No acute finding involving the left hip. Finalized on: 2/21/2023 11:12 AM By:  William Patel MD [facility]# [id number]      2023-02-21 11:14:[id number]    [facility]     Assessment:       1. Sciatica of left side    2. Left hip pain          Plan:         Xray reviewed. No acute findings. Discussed likely sciatica as cause of her pain. Toradol IM given in clinic. Patient given rx robaxin to alleviate her symptoms at night. Discussed follow up with PCP if no improvement    Of note, patient states she had an injury at work Wednesday which she wanted to be evaluated for. She was informed that we could not evaluate this without an employer authorization. Patient verbalized understanding and requested that in writing for her work. Note provided for patient.     Sciatica of left side  -     ketorolac injection 30 mg  -     methocarbamoL (ROBAXIN) 500 MG Tab; Take 1 tablet (500 mg total) by mouth 3 (three) times daily. for 10 days  Dispense: 30 tablet; Refill: 0    Left hip pain  -     X-Ray Hip 2 or 3 views Left (with Pelvis when performed); Future; Expected date: 02/21/2023

## 2023-02-21 NOTE — PATIENT INSTRUCTIONS
You were given Toradol in clinic today. Please do not take any NSAIDs including ibuprofen, naproxen, aleve, advil, motrin, clexa, mobic, or others for the next 24 hrs.   If you have abdominal pain, vomiting, or bloody stool please go to the ER immediately.     Robaxin Warning:   The medication you have been prescribed may cause drowsiness and impair your judgement.  Therefore, you should avoid driving, climbing, using machinery, etc., so as not to increase your risk of injury.  Do NOT drink any alcohol while on this medication(s).

## 2023-02-21 NOTE — LETTER
February 21, 2023      [facility]  [address], [address]  [place] [address]  Phone: [phone]  Fax: [phone]       Patient: Chan Dwyer   YOB: 1970  Date of Visit: 02/21/2023    To Whom It May Concern:    Antonio Dwyer  was at [facility] on 02/21/2023. Patient states she had an injury at work. We are unable to evaluate and/or treat for this without an employer authorization for occupational health.     Sincerely,          Radha Cai PA-C

## 2023-02-24 ENCOUNTER — TELEPHONE (OUTPATIENT)
Dept: URGENT CARE | Facility: CLINIC | Age: 53
End: 2023-02-24
Payer: COMMERCIAL

## 2023-02-24 NOTE — TELEPHONE ENCOUNTER
Courtesy call made.  Spoke with Patient(Self) and she states that she is still having hip pain. She states that she is going to make an appointment.

## 2023-02-27 ENCOUNTER — OFFICE VISIT (OUTPATIENT)
Dept: DIABETES | Facility: CLINIC | Age: 53
End: 2023-02-27
Payer: COMMERCIAL

## 2023-02-27 DIAGNOSIS — E10.65 TYPE 1 DIABETES MELLITUS WITH HYPERGLYCEMIA: Primary | ICD-10-CM

## 2023-02-27 DIAGNOSIS — E55.9 VITAMIN D DEFICIENCY: ICD-10-CM

## 2023-02-27 PROCEDURE — 99214 OFFICE O/P EST MOD 30 MIN: CPT | Mod: 25,95,, | Performed by: PHYSICIAN ASSISTANT

## 2023-02-27 PROCEDURE — 3072F LOW RISK FOR RETINOPATHY: CPT | Mod: CPTII,95,, | Performed by: PHYSICIAN ASSISTANT

## 2023-02-27 PROCEDURE — 99214 PR OFFICE/OUTPT VISIT, EST, LEVL IV, 30-39 MIN: ICD-10-PCS | Mod: 25,95,, | Performed by: PHYSICIAN ASSISTANT

## 2023-02-27 PROCEDURE — 3072F PR LOW RISK FOR RETINOPATHY: ICD-10-PCS | Mod: CPTII,95,, | Performed by: PHYSICIAN ASSISTANT

## 2023-02-27 NOTE — PATIENT INSTRUCTIONS
CURRENT DM MEDICATIONS:   Toujeo 22 units daily  Januvia 100 mg daily  Lyumjev meal size dosing and correction dosing every 3 hours as needed - as below    Lyumjev Meal Size:   10 units before meals   3 units before snacks    Lyumjev Correction Dosing every 3 hours as needed OUTSIDE OF EATING  If  - 250, may take 2 units of Lyumjev  If  - 300, may take 3 units of Lyumjev  If  - 350, may take 4 units of Lyumjev  If  - 400, may take 5 units of Lyumjev  If +, may take 6 units of Lyumjev

## 2023-02-27 NOTE — PROGRESS NOTES
PCP: Tristian Bearden MD    Subjective:     Chief Complaint: Diabetes - Established Patient    TELEMEDICINE VISIT:     The patient location is: Home  The chief complaint leading to consultation is: Diabetes Follow up  Visit type: Virtual visit with synchronous audio and video  Total time spent with patient: 20  Each patient to whom he or she provides medical services by telemedicine is:  (1) informed of the relationship between the physician and patient and the respective role of any other health care provider with respect to management of the patient; and (2) notified that he or she may decline to receive medical services by telemedicine and may withdraw from such care at any time.    Notes: N/A        HISTORY OF PRESENT ILLNESS: 52 y.o.   female presenting for diabetes management visit.   The patient's last visit with me was on 11/8/2022.  Patient has had Type I diabetes due to low C peptide since 5 years or more.  Pertinent to decision making is the following comorbidities: Vitamin D Deficiency and Gestational DM in Pregnancy 2003  Patient has the following Diabetes complications: without complications  She  has attended diabetes education in the past.     Patient's most recent A1c of 6.9% was completed 6 months ago.   Patient states since Her last A1c Her blood glucose levels have been high  after meals.   Patient monitors blood glucose 4 times per day and Continuously with personal CGM Dexcom.   Patient blood glucose monitoring device will be uploaded into [employer] today.        Patients records show baseline hyperglycemia with postprandial hyperglycemia throughout the day secondary to meal dose. Patient underutilizing correction dosing.   Patient endorses the following diabetes related symptoms: None.   Patient is due today for the following diabetes-related health maintenance standards: Foot Exam , A1c, COVID-19 Vaccine , and Mammogram . Mammo scheduled 3/17.   She denies recent  hospital admissions or emergency room visits.   She denies having hypoglycemia as above.   Patient's concerns today include glycemic control.   Patient medication regimen is as below.     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       Lab Results   Component Value Date    CPEPTIDE 0.72 (L) 04/28/2022     Lab Results   Component Value Date    GLUTAMICACID 0.00 09/28/2020          //   , There is no height or weight on file to calculate BMI.  Her blood sugar in clinic today is:    Lab Results   Component Value Date    POCGLU 141 (A) 06/29/2022       Review of Systems   Constitutional:  Negative for activity change, appetite change, chills and fever.   HENT:  Negative for dental problem, mouth sores, nosebleeds, sore throat and trouble swallowing.    Eyes:  Negative for pain and discharge.   Respiratory:  Negative for shortness of breath, wheezing and stridor.    Cardiovascular:  Negative for chest pain, palpitations and leg swelling.   Gastrointestinal:  Negative for abdominal pain, diarrhea, nausea and vomiting.   Endocrine: Negative for polydipsia, polyphagia and polyuria.   Genitourinary:  Negative for dysuria, frequency and urgency.   Musculoskeletal:  Negative for joint swelling and myalgias.   Skin:   Negative for rash and wound.   Neurological:  Negative for dizziness, syncope, weakness and headaches.   Psychiatric/Behavioral:  Negative for behavioral problems and dysphoric mood.        Diabetes Management Status  Statin: Not taking  ACE/ARB: Not taking    Screening or Prevention Patient's value Goal Complete/Controlled?   HgA1C Testing and Control   Lab Results   Component Value Date    HGBA1C 6.9 (H) 08/19/2022      Annually/Less than 8% No   Lipid profile : 08/19/2022 Annually Yes   LDL control  Lab Results   Component Value Date    LDLCALC 99.2 08/19/2022    Annually/Less than 100 mg/dl  Yes   Nephropathy screening Lab Results   Component Value Date    MICALBCREAT 8.5 04/28/2022     Lab Results   Component Value Date    PROTEINUA Negative 01/26/2021    Annually No   Blood pressure BP Readings from Last 1 Encounters:   02/21/23 137/63    Less than 140/90 Yes   Dilated retinal exam : 07/18/2022 Annually Yes    Foot exam   : 11/30/2021 Annually No     Social History     Socioeconomic History    Marital status: Single    Number of children: 2   Occupational History    Occupation:      Employer: [employer]     Employer: [employer]   Tobacco Use    Smoking status: Never    Smokeless tobacco: Never   Substance and Sexual Activity    Alcohol use: Yes     Comment: Rare    Drug use: No    Sexual activity: Yes     Partners: Male     Birth control/protection: Surgical   Social History Narrative    She wears seatbelt.     Past Medical History:   Diagnosis Date    Abnormal Pap smear     Abnormal Pap smear of cervix     Arthritis, low back     Diabetes 09/2014     BS 162am 09/16/2014    DM (diabetes mellitus) 09/2014     am 01/26/2016    DM (diabetes mellitus) 09/2014    BS didn't check 01/31/2017    GERD (gastroesophageal reflux disease)     Gestational diabetes     Hepatitis C antibody positive in blood 02/11/2022    RNA NEGATIVE    History of abnormal Pap smear     Surgical menopause      Vitamin D deficiency        Objective:        Physical Exam  Constitutional:       General: She is not in acute distress.     Appearance: She is well-developed. She is not diaphoretic.   HENT:      Head: Normocephalic and atraumatic.      Right Ear: External ear normal.      Left Ear: External ear normal.      Nose: Nose normal.   Eyes:      General:         Right eye: No discharge.         Left eye: No discharge.   Pulmonary:      Effort: Pulmonary effort is normal. No respiratory distress.      Breath sounds: No stridor.   Musculoskeletal:         General: Normal range of motion.      Cervical back: Normal range of motion.   Skin:     Coloration: Skin is not pale.   Neurological:      Mental Status: She is alert and oriented to person, place, and time. Mental status is at baseline.      Motor: No abnormal muscle tone.      Coordination: Coordination normal.   Psychiatric:         Mood and Affect: Mood normal.         Behavior: Behavior normal.         Thought Content: Thought content normal.         Judgment: Judgment normal.           Assessment / Plan:     Type 1 diabetes mellitus with hyperglycemia  -     Hemoglobin A1C; Standing    Vitamin D deficiency        Additional Plan Details:    - POCT Glucose  - Encouraged continuation of lifestyle changes including regular exercise and limiting carbohydrates to 30-45 grams per meal threes times daily and 15 grams per snack with a limit of two daily.   - Encouraged continued monitoring of blood glucose with maintenance of 4 times daily and Continuously with personal CGM Dexcom.  Dexcom G7 order to [facility].   - Current DM Medication Regimen: Change Toujeo 22 units daily. Continue Januvia 100 mg daily. Change Lyumjev 10 units before meals, 3 units before snack, and correction dosing every 3 hours as needed - as below.   - Health Maintenance standards addressed today: Foot Exam - deferred by patient today because Telemedicine or Telephone visit, A1c to be scheduled, COVID  - 19 Vaccine - patient will schedule outside of [facility] , and Mammogram - scheduled  - Nursing Visit: Patient is below goal range for nursing visit for age group and will not need nursing visit at this time .   - Follow up in 3 weeks.    CURRENT DM MEDICATIONS:   Toujeo 22 units daily  Januvia 100 mg daily  Lyumjev meal size dosing and correction dosing every 3 hours as needed - as below    Lyumjev Meal Size:   10 units before meals   3 units before snacks    Lyumjev Correction Dosing every 3 hours as needed OUTSIDE OF EATING  If  - 250, may take 2 units of Lyumjev  If  - 300, may take 3 units of Lyumjev  If  - 350, may take 4 units of Lyumjev  If  - 400, may take 5 units of Lyumjev  If +, may take 6 units of Lyumjev    Blakeney McKnight, PA-C [facility]

## 2023-02-27 NOTE — Clinical Note
"Dexcom G7 orders to [facility]  3/13 call 8a - "[facility]" (ok to overbook)  Sched A1c 3/17 after cards visiti "

## 2023-03-03 ENCOUNTER — TELEPHONE (OUTPATIENT)
Dept: DIABETES | Facility: CLINIC | Age: 53
End: 2023-03-03
Payer: COMMERCIAL

## 2023-03-03 NOTE — TELEPHONE ENCOUNTER
[facility] are not processing Dexcom G7 yet. I called and spoke with someone there and they are not taking orders and not sure when they will start processing them.

## 2023-03-07 NOTE — TELEPHONE ENCOUNTER
Pt informed that as of now it is not available and per Odilia she wants her to check back with us in 2-3 weeks or to remind her at her next visit

## 2023-03-13 ENCOUNTER — PATIENT OUTREACH (OUTPATIENT)
Dept: ADMINISTRATIVE | Facility: HOSPITAL | Age: 53
End: 2023-03-13
Payer: COMMERCIAL

## 2023-03-13 ENCOUNTER — PATIENT MESSAGE (OUTPATIENT)
Dept: DIABETES | Facility: CLINIC | Age: 53
End: 2023-03-13

## 2023-03-13 ENCOUNTER — OFFICE VISIT (OUTPATIENT)
Dept: DIABETES | Facility: CLINIC | Age: 53
End: 2023-03-13
Payer: COMMERCIAL

## 2023-03-13 DIAGNOSIS — E10.65 TYPE 1 DIABETES MELLITUS WITH HYPERGLYCEMIA: Primary | ICD-10-CM

## 2023-03-13 DIAGNOSIS — E55.9 VITAMIN D DEFICIENCY: ICD-10-CM

## 2023-03-13 PROCEDURE — 99214 PR OFFICE/OUTPT VISIT, EST, LEVL IV, 30-39 MIN: ICD-10-PCS | Mod: 95,,, | Performed by: PHYSICIAN ASSISTANT

## 2023-03-13 PROCEDURE — 99214 OFFICE O/P EST MOD 30 MIN: CPT | Mod: 95,,, | Performed by: PHYSICIAN ASSISTANT

## 2023-03-13 PROCEDURE — 3072F PR LOW RISK FOR RETINOPATHY: ICD-10-PCS | Mod: CPTII,95,, | Performed by: PHYSICIAN ASSISTANT

## 2023-03-13 PROCEDURE — 3072F LOW RISK FOR RETINOPATHY: CPT | Mod: CPTII,95,, | Performed by: PHYSICIAN ASSISTANT

## 2023-03-13 NOTE — PROGRESS NOTES
PCP: Tristian Bearden MD    Subjective:     Chief Complaint: Diabetes - Established Patient    TELEMEDICINE VISIT:     The patient location is: Home  The chief complaint leading to consultation is: Diabetes Follow up  Visit type: Virtual visit with synchronous audio and video  Total time spent with patient: 17  Each patient to whom he or she provides medical services by telemedicine is:  (1) informed of the relationship between the physician and patient and the respective role of any other health care provider with respect to management of the patient; and (2) notified that he or she may decline to receive medical services by telemedicine and may withdraw from such care at any time.    Notes: N/A        HISTORY OF PRESENT ILLNESS: 52 y.o.   female presenting for diabetes management visit.   The patient's last visit with me was on 2/27/2023.   Patient has had Type I diabetes due to low C peptide since 5 years or more.  Pertinent to decision making is the following comorbidities: Vitamin D Deficiency and Gestational DM in Pregnancy 2003  Patient has the following Diabetes complications: without complications  She  has attended diabetes education in the past.     Patient's most recent A1c of 6.9% was completed 7 months ago.   Patient states since Her last A1c Her blood glucose levels have been high  after meals.   Patient monitors blood glucose 4 times per day and Continuously with personal CGM Dexcom.   Patient blood glucose monitoring device will be uploaded into [employer] [facility] today.        Patients records show baseline euglycemia infrequently with postprandial hyperglycemia throughout the day leading to baseline hyperglycemia. Patient underutilizing correction dosing. One episode of hypoglycemia after meal dose but patient can not tell why or what was eaten that day.   Patient endorses the following diabetes related symptoms: None.   Patient is due today for the following diabetes-related health  maintenance standards: Foot Exam , Urine Microalbumin/creatinine ratio, A1c, COVID-19 Vaccine , and Mammogram . Mammo scheduled 3/17.   She denies recent hospital admissions or emergency room visits.   She denies having hypoglycemia as above.   Patient's concerns today include glycemic control.   Patient medication regimen is as below.     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       Lab Results   Component Value Date    CPEPTIDE 0.72 (L) 04/28/2022     Lab Results   Component Value Date    GLUTAMICACID 0.00 09/28/2020          //   , There is no height or weight on file to calculate BMI.  Her blood sugar in clinic today is:    Lab Results   Component Value Date    POCGLU 141 (A) 06/29/2022       Review of Systems   Constitutional:  Negative for activity change, appetite change, chills and fever.   HENT:  Negative for dental problem, mouth sores, nosebleeds, sore throat and trouble swallowing.    Eyes:  Negative for pain and discharge.   Respiratory:  Negative for shortness of breath, wheezing and stridor.    Cardiovascular:  Negative for chest pain, palpitations and leg swelling.   Gastrointestinal:  Negative for abdominal pain, diarrhea, nausea and vomiting.   Endocrine: Negative for  polydipsia, polyphagia and polyuria.   Genitourinary:  Negative for dysuria, frequency and urgency.   Musculoskeletal:  Negative for joint swelling and myalgias.   Skin:  Negative for rash and wound.   Neurological:  Negative for dizziness, syncope, weakness and headaches.   Psychiatric/Behavioral:  Negative for behavioral problems and dysphoric mood.        Diabetes Management Status  Statin: Not taking  ACE/ARB: Not taking    Screening or Prevention Patient's value Goal Complete/Controlled?   HgA1C Testing and Control   Lab Results   Component Value Date    HGBA1C 6.9 (H) 08/19/2022      Annually/Less than 8% No   Lipid profile : 08/19/2022 Annually Yes   LDL control  Lab Results   Component Value Date    LDLCALC 99.2 08/19/2022    Annually/Less than 100 mg/dl  Yes   Nephropathy screening Lab Results   Component Value Date    MICALBCREAT 8.5 04/28/2022     Lab Results   Component Value Date    PROTEINUA Negative 01/26/2021    Annually No   Blood pressure BP Readings from Last 1 Encounters:   02/21/23 137/63    Less than 140/90 Yes   Dilated retinal exam : 07/18/2022 Annually Yes    Foot exam   : 11/30/2021 Annually No     Social History     Socioeconomic History    Marital status: Single    Number of children: 2   Occupational History    Occupation:      Employer: [employer]     Employer: [employer]   Tobacco Use    Smoking status: Never    Smokeless tobacco: Never   Substance and Sexual Activity    Alcohol use: Yes     Comment: Rare    Drug use: No    Sexual activity: Yes     Partners: Male     Birth control/protection: Surgical   Social History Narrative    She wears seatbelt.     Past Medical History:   Diagnosis Date    Abnormal Pap smear     Abnormal Pap smear of cervix     Arthritis, low back     Diabetes 09/2014     BS 162am 09/16/2014    DM (diabetes mellitus) 09/2014     am 01/26/2016    DM (diabetes mellitus) 09/2014    BS didn't check 01/31/2017    GERD  (gastroesophageal reflux disease)     Gestational diabetes     Hepatitis C antibody positive in blood 02/11/2022    RNA NEGATIVE    History of abnormal Pap smear     Surgical menopause     Vitamin D deficiency        Objective:        Physical Exam  Constitutional:       General: She is not in acute distress.     Appearance: She is well-developed. She is not diaphoretic.   HENT:      Head: Normocephalic and atraumatic.      Right Ear: External ear normal.      Left Ear: External ear normal.      Nose: Nose normal.   Eyes:      General:         Right eye: No discharge.         Left eye: No discharge.   Pulmonary:      Effort: Pulmonary effort is normal. No respiratory distress.      Breath sounds: No stridor.   Musculoskeletal:         General: Normal range of motion.      Cervical back: Normal range of motion.   Skin:     Coloration: Skin is not pale.   Neurological:      Mental Status: She is alert and oriented to person, place, and time. Mental status is at baseline.      Motor: No abnormal muscle tone.      Coordination: Coordination normal.   Psychiatric:         Mood and Affect: Mood normal.         Behavior: Behavior normal.         Thought Content: Thought content normal.         Judgment: Judgment normal.           Assessment / Plan:     Type 1 diabetes mellitus with hyperglycemia  -     Microalbumin/Creatinine Ratio, Urine; Future; Expected date: 03/13/2023    Vitamin D deficiency        Additional Plan Details:    - POCT Glucose  - Encouraged continuation of lifestyle changes including regular exercise and limiting carbohydrates to 30-45 grams per meal threes times daily and 15 grams per snack with a limit of two daily.   - Encouraged continued monitoring of blood glucose with maintenance of 4 times daily and Continuously with personal CGM Dexcom.  Dexcom G7 pending at [facility].   - Current DM Medication Regimen: Continue Toujeo 20 units daily. Continue Januvia 100 mg daily. Change Lyumjev 14 units before  meals, 3 units before snack, and correction dosing every 3 hours as needed - as below.   - Health Maintenance standards addressed today: Foot Exam - deferred by patient today because Telemedicine or Telephone visit, Urine Microalbumin / Creatinine Ratio scheduled, A1c to be scheduled, COVID - 19 Vaccine - patient will schedule outside of [facility] , and Mammogram - scheduled  - Nursing Visit: Patient is below goal range for nursing visit for age group and will not need nursing visit at this time .   - Follow up in 4 weeks.    CURRENT DM MEDICATIONS:   Toujeo 20 units daily  Januvia 100 mg daily  Lyumjev meal size dosing and correction dosing every 3 hours as needed - as below    Lyumjev Meal Size:   14 units before meals   3 units before snacks    Lyumjev Correction Dosing every 3 hours as needed OUTSIDE OF EATING  If  - 250, may take 2 units of Lyumjev  If  - 300, may take 3 units of Lyumjev  If  - 350, may take 4 units of Lyumjev  If  - 400, may take 5 units of Lyumjev  If +, may take 6 units of Lyumjev    Blakeney McKnight, PA-C [facility]

## 2023-03-13 NOTE — PATIENT INSTRUCTIONS
CURRENT DM MEDICATIONS:   Toujeo 20 units daily  Januvia 100 mg daily  Lyumjev meal size dosing and correction dosing every 3 hours as needed - as below    Lyumjev Meal Size:   14 units before meals   3 units before snacks    Lyumjev Correction Dosing every 3 hours as needed OUTSIDE OF EATING  If  - 250, may take 2 units of Lyumjev  If  - 300, may take 3 units of Lyumjev  If  - 350, may take 4 units of Lyumjev  If  - 400, may take 5 units of Lyumjev  If +, may take 6 units of Lyumjev

## 2023-03-14 ENCOUNTER — OFFICE VISIT (OUTPATIENT)
Dept: CARDIOLOGY | Facility: CLINIC | Age: 53
End: 2023-03-14
Payer: COMMERCIAL

## 2023-03-14 ENCOUNTER — HOSPITAL ENCOUNTER (OUTPATIENT)
Dept: CARDIOLOGY | Facility: HOSPITAL | Age: 53
Discharge: HOME | End: 2023-03-14
Attending: INTERNAL MEDICINE
Payer: COMMERCIAL

## 2023-03-14 VITALS
SYSTOLIC BLOOD PRESSURE: 136 MMHG | HEIGHT: 64 IN | WEIGHT: 126.31 LBS | DIASTOLIC BLOOD PRESSURE: 74 MMHG | HEART RATE: 73 BPM | BODY MASS INDEX: 21.56 KG/M2 | OXYGEN SATURATION: 99 %

## 2023-03-14 DIAGNOSIS — Z79.4 TYPE 2 DIABETES MELLITUS WITH HYPERGLYCEMIA, WITH LONG-TERM CURRENT USE OF INSULIN: ICD-10-CM

## 2023-03-14 DIAGNOSIS — I20.89 ANGINAL EQUIVALENT: ICD-10-CM

## 2023-03-14 DIAGNOSIS — R07.9 CHEST PAIN, MODERATE CORONARY ARTERY RISK: Primary | ICD-10-CM

## 2023-03-14 DIAGNOSIS — E11.65 TYPE 2 DIABETES MELLITUS WITH HYPERGLYCEMIA, WITH LONG-TERM CURRENT USE OF INSULIN: ICD-10-CM

## 2023-03-14 DIAGNOSIS — R06.09 DOE (DYSPNEA ON EXERTION): ICD-10-CM

## 2023-03-14 DIAGNOSIS — R07.9 CHEST PAIN, UNSPECIFIED TYPE: ICD-10-CM

## 2023-03-14 PROCEDURE — 3078F PR MOST RECENT DIASTOLIC BLOOD PRESSURE < 80 MM HG: ICD-10-PCS | Mod: CPTII,S$GLB,, | Performed by: INTERNAL MEDICINE

## 2023-03-14 PROCEDURE — 3008F PR BODY MASS INDEX (BMI) DOCUMENTED: ICD-10-PCS | Mod: CPTII,S$GLB,, | Performed by: INTERNAL MEDICINE

## 2023-03-14 PROCEDURE — 99215 OFFICE O/P EST HI 40 MIN: CPT | Mod: S$GLB,,, | Performed by: INTERNAL MEDICINE

## 2023-03-14 PROCEDURE — 93005 ELECTROCARDIOGRAM TRACING: CPT

## 2023-03-14 PROCEDURE — 3072F LOW RISK FOR RETINOPATHY: CPT | Mod: CPTII,S$GLB,, | Performed by: INTERNAL MEDICINE

## 2023-03-14 PROCEDURE — 93010 EKG 12-LEAD: ICD-10-PCS | Mod: ,,, | Performed by: INTERNAL MEDICINE

## 2023-03-14 PROCEDURE — 3008F BODY MASS INDEX DOCD: CPT | Mod: CPTII,S$GLB,, | Performed by: INTERNAL MEDICINE

## 2023-03-14 PROCEDURE — 3046F PR MOST RECENT HEMOGLOBIN A1C LEVEL > 9.0%: ICD-10-PCS | Mod: CPTII,S$GLB,, | Performed by: INTERNAL MEDICINE

## 2023-03-14 PROCEDURE — 99999 PR PBB SHADOW E&M-EST. PATIENT-LVL III: ICD-10-PCS | Mod: PBBFAC,,, | Performed by: INTERNAL MEDICINE

## 2023-03-14 PROCEDURE — 3046F HEMOGLOBIN A1C LEVEL >9.0%: CPT | Mod: CPTII,S$GLB,, | Performed by: INTERNAL MEDICINE

## 2023-03-14 PROCEDURE — 93010 ELECTROCARDIOGRAM REPORT: CPT | Mod: ,,, | Performed by: INTERNAL MEDICINE

## 2023-03-14 PROCEDURE — 3078F DIAST BP <80 MM HG: CPT | Mod: CPTII,S$GLB,, | Performed by: INTERNAL MEDICINE

## 2023-03-14 PROCEDURE — 3072F PR LOW RISK FOR RETINOPATHY: ICD-10-PCS | Mod: CPTII,S$GLB,, | Performed by: INTERNAL MEDICINE

## 2023-03-14 PROCEDURE — 3061F PR NEG MICROALBUMINURIA RESULT DOCUMENTED/REVIEW: ICD-10-PCS | Mod: CPTII,S$GLB,, | Performed by: INTERNAL MEDICINE

## 2023-03-14 PROCEDURE — 3061F NEG MICROALBUMINURIA REV: CPT | Mod: CPTII,S$GLB,, | Performed by: INTERNAL MEDICINE

## 2023-03-14 PROCEDURE — 3066F PR DOCUMENTATION OF TREATMENT FOR NEPHROPATHY: ICD-10-PCS | Mod: CPTII,S$GLB,, | Performed by: INTERNAL MEDICINE

## 2023-03-14 PROCEDURE — 99215 PR OFFICE/OUTPT VISIT, EST, LEVL V, 40-54 MIN: ICD-10-PCS | Mod: S$GLB,,, | Performed by: INTERNAL MEDICINE

## 2023-03-14 PROCEDURE — 3075F PR MOST RECENT SYSTOLIC BLOOD PRESS GE 130-139MM HG: ICD-10-PCS | Mod: CPTII,S$GLB,, | Performed by: INTERNAL MEDICINE

## 2023-03-14 PROCEDURE — 3075F SYST BP GE 130 - 139MM HG: CPT | Mod: CPTII,S$GLB,, | Performed by: INTERNAL MEDICINE

## 2023-03-14 PROCEDURE — 99999 PR PBB SHADOW E&M-EST. PATIENT-LVL III: CPT | Mod: PBBFAC,,, | Performed by: INTERNAL MEDICINE

## 2023-03-14 PROCEDURE — 3066F NEPHROPATHY DOC TX: CPT | Mod: CPTII,S$GLB,, | Performed by: INTERNAL MEDICINE

## 2023-03-14 NOTE — PROGRESS NOTES
Subjective:   Patient ID:  Chan Dwyer is a 52 y.o. female who presents for follow-up of Chest Pain (When laying down on back), Palpitations, Shortness of Breath, and Numbness (Numbness in both arms )  Pt with CP radiating to R arm at rest or exertion. CP- sharp, intermittent, positional, takes aspirin with little relief.    CRF- DM,     Chest Pain   This is a new problem. The current episode started 1 to 4 weeks ago. The problem occurs intermittently. The problem has been gradually improving. The pain is present in the lateral region. The pain is mild. The quality of the pain is described as dull. The pain does not radiate. Pertinent negatives include no dizziness, palpitations or shortness of breath. The pain is aggravated by exertion. She has tried rest for the symptoms. The treatment provided mild relief.   Her past medical history is significant for diabetes.   Pertinent negatives for past medical history include no muscle weakness.     Review of Systems   Constitutional: Negative. Negative for weight gain.   HENT: Negative.     Eyes: Negative.    Cardiovascular: Negative.  Negative for chest pain, leg swelling and palpitations.   Respiratory: Negative.  Negative for shortness of breath.    Endocrine: Negative.    Hematologic/Lymphatic: Negative.    Skin: Negative.    Musculoskeletal:  Negative for muscle weakness.   Gastrointestinal: Negative.    Genitourinary: Negative.    Neurological: Negative.  Negative for dizziness.   Psychiatric/Behavioral: Negative.     Allergic/Immunologic: Negative.    All other systems reviewed and are negative.  Family History   Problem Relation Age of Onset    Diabetes Father     Hypertension Father     Hypertension Mother     Diabetes Mother     Heart attack Mother     No Known Problems Brother     No Known Problems Brother     No Known Problems Brother     No Known Problems Brother     Stomach cancer Sister     No Known Problems Sister     No Known Problems Sister      No Known Problems Sister     No Known Problems Sister     No Known Problems Son     No Known Problems Son     Cancer Neg Hx     Stroke Neg Hx     Colon cancer Neg Hx     Ovarian cancer Neg Hx     Breast cancer Neg Hx      Past Medical History:   Diagnosis Date    Abnormal Pap smear     Abnormal Pap smear of cervix     Arthritis, low back     Diabetes 09/2014     BS 162am 09/16/2014    DM (diabetes mellitus) 09/2014     am 01/26/2016    DM (diabetes mellitus) 09/2014    BS didn't check 01/31/2017    GERD (gastroesophageal reflux disease)     Gestational diabetes     Hepatitis C antibody positive in blood 02/11/2022    RNA NEGATIVE    History of abnormal Pap smear     Surgical menopause     Vitamin D deficiency      Social History     Socioeconomic History    Marital status: Single    Number of children: 2   Occupational History    Occupation:      Employer: [employer]      Employer: [employer]   Tobacco Use    Smoking status: Never    Smokeless tobacco: Never   Substance and Sexual Activity    Alcohol use: Yes     Comment: Rare    Drug use: No    Sexual activity: Yes     Partners: Male     Birth control/protection: Surgical   Social History Narrative    She wears seatbelt.     Current Outpatient Medications on File Prior to Visit   Medication Sig Dispense Refill    alcohol swabs (EASY COMFORT ALCOHOL PAD) PadM Apply 1 each topically 4 (four) times daily. 100 each 11    ibuprofen (ADVIL,MOTRIN) 600 MG tablet Take 1 tablet (600 mg total) by mouth every 6 (six) hours as needed for Pain. 30 tablet 0    insulin glargine, TOUJEO, (TOUJEO) 300 unit/mL (1.5 mL) InPn pen Inject 20 Units into the skin once daily. 4 pen 3    insulin lispro-aabc (LYUMJEV KWIKPEN U-100 INSULIN) 100 unit/mL pen Titrate up to 20 units daily. 5 pen 11    isosorbide mononitrate (IMDUR) 30 MG 24 hr tablet Take 1 tablet (30 mg total) by mouth once daily. 30 tablet 11    pantoprazole (PROTONIX) 20 MG tablet Take 1  tablet (20 mg total) by mouth once daily. 30 tablet 11    promethazine-dextromethorphan (PROMETHAZINE-DM) 6.25-15 mg/5 mL Syrp Take 5 mLs by mouth every 8 (eight) hours as needed (cough). 120 mL 0    SITagliptin phosphate (JANUVIA) 100 MG Tab Take 1 tablet (100 mg total) by mouth once daily. 30 tablet 11    aspirin (ECOTRIN) 81 MG EC tablet Take 1 tablet (81 mg total) by mouth once daily. 30 tablet 11     Current Facility-Administered Medications on File Prior to Visit   Medication Dose Route Frequency Provider Last Rate Last Admin    chlorhexidine 0.12 % solution 10 mL  10 mL Mouth/Throat On Call Procedure Anusha George PA-C   10 mL at 10/10/22 0681     Review of patient's allergies indicates:   Allergen Reactions    Hydrocodone Hives and Rash       Objective:     Physical Exam  Vitals and nursing note reviewed.   Constitutional:       Appearance: She is well-developed.   HENT:      Head: Normocephalic and atraumatic.   Eyes:      Conjunctiva/sclera: Conjunctivae normal.      Pupils: Pupils are equal, round, and reactive to light.   Cardiovascular:      Rate and Rhythm: Normal rate and regular rhythm.      Pulses: Intact distal pulses.      Heart sounds: Normal heart sounds.   Pulmonary:      Effort: Pulmonary effort is normal.      Breath sounds: Normal breath sounds.   Abdominal:      General: Bowel sounds are normal.      Palpations: Abdomen is soft.   Musculoskeletal:         General: Normal range of motion.      Cervical back: Normal range of motion and neck supple.   Skin:     General: Skin is warm and dry.   Neurological:      Mental Status: She is alert and oriented to person, place, and time.       Assessment:     1. HIstory of Chest pain, moderate coronary artery risk    2. Type 2 diabetes mellitus with hyperglycemia, with long-term current use of insulin    3. Anginal equivalent     Plan:     HIstory of Chest pain, moderate coronary artery risk    Type 2 diabetes mellitus with hyperglycemia, with  long-term current use of insulin      C, Risks and benefits explained   Continue nitrates and asa- primary prevention ? spasm

## 2023-03-17 ENCOUNTER — HOSPITAL ENCOUNTER (OUTPATIENT)
Dept: RADIOLOGY | Facility: HOSPITAL | Age: 53
Discharge: HOME | End: 2023-03-17
Attending: FAMILY MEDICINE
Payer: COMMERCIAL

## 2023-03-17 ENCOUNTER — HOSPITAL ENCOUNTER (OUTPATIENT)
Dept: RADIOLOGY | Facility: HOSPITAL | Age: 53
Discharge: HOME | End: 2023-03-17
Attending: OBSTETRICS & GYNECOLOGY
Payer: COMMERCIAL

## 2023-03-17 ENCOUNTER — OFFICE VISIT (OUTPATIENT)
Dept: INTERNAL MEDICINE | Facility: CLINIC | Age: 53
End: 2023-03-17
Payer: COMMERCIAL

## 2023-03-17 VITALS
TEMPERATURE: 98 F | SYSTOLIC BLOOD PRESSURE: 116 MMHG | HEIGHT: 64 IN | BODY MASS INDEX: 22.1 KG/M2 | DIASTOLIC BLOOD PRESSURE: 66 MMHG | WEIGHT: 129.44 LBS

## 2023-03-17 DIAGNOSIS — M79.604 BILATERAL LEG PAIN: ICD-10-CM

## 2023-03-17 DIAGNOSIS — R20.0 NUMBNESS AND TINGLING IN LEFT ARM: Primary | ICD-10-CM

## 2023-03-17 DIAGNOSIS — M79.605 BILATERAL LEG PAIN: ICD-10-CM

## 2023-03-17 DIAGNOSIS — R20.2 NUMBNESS AND TINGLING IN LEFT ARM: Primary | ICD-10-CM

## 2023-03-17 DIAGNOSIS — R20.0 NUMBNESS AND TINGLING OF RIGHT ARM: ICD-10-CM

## 2023-03-17 DIAGNOSIS — R20.2 NUMBNESS AND TINGLING OF RIGHT ARM: ICD-10-CM

## 2023-03-17 PROCEDURE — 3066F NEPHROPATHY DOC TX: CPT | Mod: CPTII,S$GLB,, | Performed by: FAMILY MEDICINE

## 2023-03-17 PROCEDURE — 72110 X-RAY EXAM L-2 SPINE 4/>VWS: CPT | Mod: 26,,, | Performed by: STUDENT IN AN ORGANIZED HEALTH CARE EDUCATION/TRAINING PROGRAM

## 2023-03-17 PROCEDURE — 1159F PR MEDICATION LIST DOCUMENTED IN MEDICAL RECORD: ICD-10-PCS | Mod: CPTII,S$GLB,, | Performed by: FAMILY MEDICINE

## 2023-03-17 PROCEDURE — 3072F LOW RISK FOR RETINOPATHY: CPT | Mod: CPTII,S$GLB,, | Performed by: FAMILY MEDICINE

## 2023-03-17 PROCEDURE — 72110 X-RAY EXAM L-2 SPINE 4/>VWS: CPT | Mod: TC,PO

## 2023-03-17 PROCEDURE — 3061F NEG MICROALBUMINURIA REV: CPT | Mod: CPTII,S$GLB,, | Performed by: FAMILY MEDICINE

## 2023-03-17 PROCEDURE — 3008F PR BODY MASS INDEX (BMI) DOCUMENTED: ICD-10-PCS | Mod: CPTII,S$GLB,, | Performed by: FAMILY MEDICINE

## 2023-03-17 PROCEDURE — 3046F HEMOGLOBIN A1C LEVEL >9.0%: CPT | Mod: CPTII,S$GLB,, | Performed by: FAMILY MEDICINE

## 2023-03-17 PROCEDURE — 3061F PR NEG MICROALBUMINURIA RESULT DOCUMENTED/REVIEW: ICD-10-PCS | Mod: CPTII,S$GLB,, | Performed by: FAMILY MEDICINE

## 2023-03-17 PROCEDURE — 99999 PR PBB SHADOW E&M-EST. PATIENT-LVL III: ICD-10-PCS | Mod: PBBFAC,,, | Performed by: FAMILY MEDICINE

## 2023-03-17 PROCEDURE — 3078F PR MOST RECENT DIASTOLIC BLOOD PRESSURE < 80 MM HG: ICD-10-PCS | Mod: CPTII,S$GLB,, | Performed by: FAMILY MEDICINE

## 2023-03-17 PROCEDURE — 3046F PR MOST RECENT HEMOGLOBIN A1C LEVEL > 9.0%: ICD-10-PCS | Mod: CPTII,S$GLB,, | Performed by: FAMILY MEDICINE

## 2023-03-17 PROCEDURE — 77063 MAMMO DIGITAL SCREENING BILAT WITH TOMO: ICD-10-PCS | Mod: 26,,, | Performed by: RADIOLOGY

## 2023-03-17 PROCEDURE — 77063 BREAST TOMOSYNTHESIS BI: CPT | Mod: 26,,, | Performed by: RADIOLOGY

## 2023-03-17 PROCEDURE — 72110 XR LUMBAR SPINE COMPLETE 5 VIEW: ICD-10-PCS | Mod: 26,,, | Performed by: STUDENT IN AN ORGANIZED HEALTH CARE EDUCATION/TRAINING PROGRAM

## 2023-03-17 PROCEDURE — 3078F DIAST BP <80 MM HG: CPT | Mod: CPTII,S$GLB,, | Performed by: FAMILY MEDICINE

## 2023-03-17 PROCEDURE — 3008F BODY MASS INDEX DOCD: CPT | Mod: CPTII,S$GLB,, | Performed by: FAMILY MEDICINE

## 2023-03-17 PROCEDURE — 99214 OFFICE O/P EST MOD 30 MIN: CPT | Mod: S$GLB,,, | Performed by: FAMILY MEDICINE

## 2023-03-17 PROCEDURE — 1159F MED LIST DOCD IN RCRD: CPT | Mod: CPTII,S$GLB,, | Performed by: FAMILY MEDICINE

## 2023-03-17 PROCEDURE — 3074F PR MOST RECENT SYSTOLIC BLOOD PRESSURE < 130 MM HG: ICD-10-PCS | Mod: CPTII,S$GLB,, | Performed by: FAMILY MEDICINE

## 2023-03-17 PROCEDURE — 99999 PR PBB SHADOW E&M-EST. PATIENT-LVL III: CPT | Mod: PBBFAC,,, | Performed by: FAMILY MEDICINE

## 2023-03-17 PROCEDURE — 99214 PR OFFICE/OUTPT VISIT, EST, LEVL IV, 30-39 MIN: ICD-10-PCS | Mod: S$GLB,,, | Performed by: FAMILY MEDICINE

## 2023-03-17 PROCEDURE — 77067 MAMMO DIGITAL SCREENING BILAT WITH TOMO: ICD-10-PCS | Mod: 26,,, | Performed by: RADIOLOGY

## 2023-03-17 PROCEDURE — 3074F SYST BP LT 130 MM HG: CPT | Mod: CPTII,S$GLB,, | Performed by: FAMILY MEDICINE

## 2023-03-17 PROCEDURE — 3072F PR LOW RISK FOR RETINOPATHY: ICD-10-PCS | Mod: CPTII,S$GLB,, | Performed by: FAMILY MEDICINE

## 2023-03-17 PROCEDURE — 77067 SCR MAMMO BI INCL CAD: CPT | Mod: TC

## 2023-03-17 PROCEDURE — 77067 SCR MAMMO BI INCL CAD: CPT | Mod: 26,,, | Performed by: RADIOLOGY

## 2023-03-17 PROCEDURE — 3066F PR DOCUMENTATION OF TREATMENT FOR NEPHROPATHY: ICD-10-PCS | Mod: CPTII,S$GLB,, | Performed by: FAMILY MEDICINE

## 2023-03-17 RX ORDER — MELOXICAM 15 MG/1
15 TABLET ORAL DAILY
Qty: 14 TABLET | Refills: 0 | Status: SHIPPED | OUTPATIENT
Start: 2023-03-17 | End: 2023-06-07 | Stop reason: ALTCHOICE

## 2023-03-17 RX ORDER — CYCLOBENZAPRINE HCL 10 MG
10 TABLET ORAL 3 TIMES DAILY PRN
Qty: 60 TABLET | Refills: 2 | Status: SHIPPED | OUTPATIENT
Start: 2023-03-17 | End: 2023-03-27

## 2023-03-19 NOTE — PROGRESS NOTES
Subjective:      Patient ID: Chan Dwyer is a 52 y.o. female.    Chief Complaint: Arm Problem (tingling) and Leg Pain      Patient reports bilateral arm numbness and tingling, worse with certain movements but almost constant.  She is also having an aching, throbbing pain in bilateral thighs and hips    Leg Pain   Associated symptoms include numbness.   Review of Systems   Musculoskeletal:  Positive for arthralgias and myalgias.   Neurological:  Positive for numbness.   Past Medical History:   Diagnosis Date    Abnormal Pap smear     Abnormal Pap smear of cervix     Arthritis, low back     Diabetes 09/2014     BS 162am 09/16/2014    DM (diabetes mellitus) 09/2014     am 01/26/2016    DM (diabetes mellitus) 09/2014    BS didn't check 01/31/2017    GERD (gastroesophageal reflux disease)     Gestational diabetes     Hepatitis C antibody positive in blood 02/11/2022    RNA NEGATIVE    History of abnormal Pap smear     Surgical menopause     Vitamin D deficiency           Past Surgical History:   Procedure Laterality Date    COLONOSCOPY N/A 6/22/2016    Procedure: COLONOSCOPY;  Surgeon: Vishal Mary III, MD;  Location: [facility];  Service: Endoscopy;  Laterality: N/A;    Cryotherapy of the cervix  1999    DILATION AND CURETTAGE OF UTERUS      HYSTERECTOMY      [facility]  2/2012    TRIGGER FINGER RELEASE Left 10/10/2022    Procedure: RELEASE, TRIGGER FINGER;  Surgeon: Adarsh Chavez MD;  Location: [facility];  Service: Orthopedics;  Laterality: Left;  left ring trigger finger release    TUBAL LIGATION       Family History   Problem Relation Age of Onset    Diabetes Father     Hypertension Father     Hypertension Mother     Diabetes Mother     Heart attack Mother     No Known Problems Brother     No Known Problems Brother     No Known Problems Brother     No Known Problems Brother     Stomach cancer Sister     No Known Problems Sister     No Known Problems Sister     No Known Problems Sister     No  "Known Problems Sister     No Known Problems Son     No Known Problems Son     Cancer Neg Hx     Stroke Neg Hx     Colon cancer Neg Hx     Ovarian cancer Neg Hx     Breast cancer Neg Hx      Social History     Socioeconomic History    Marital status: Single    Number of children: 2   Occupational History    Occupation:      Employer: [employer]     Employer: [employer]   Tobacco Use    Smoking status: Never    Smokeless tobacco: Never   Substance and Sexual Activity    Alcohol use: Yes     Comment: Rare    Drug use: No    Sexual activity: Yes     Partners: Male     Birth control/protection: Surgical   Social History Narrative    She wears seatbelt.     Review of patient's allergies indicates:   Allergen Reactions    Hydrocodone Hives and Rash       Objective:       /66 (BP Location: Left arm, Patient Position: Sitting, BP Method: Medium (Manual))   Temp 98.2 °F (36.8 °C) (Tympanic)   Ht 5' 4" (1.626 m)   Wt 58.7 kg (129 lb 6.6 oz)   BMI 22.21 kg/m²   Physical Exam  Constitutional:       General: She is not in acute distress.     Appearance: Normal appearance. She is well-developed. She is not ill-appearing or diaphoretic.   Cardiovascular:      Rate and Rhythm: Normal rate and regular rhythm.      Heart sounds: Normal heart sounds.   Pulmonary:      Effort: Pulmonary effort is normal.      Breath sounds: Normal breath sounds.   Musculoskeletal:         General: No swelling, tenderness or deformity. Normal range of motion.   Skin:     Capillary Refill: Capillary refill takes less than 2 seconds.   Neurological:      Mental Status: She is alert and oriented to person, place, and time.   Psychiatric:         Mood and Affect: Mood normal.         Behavior: Behavior normal.         Thought Content: Thought content normal.         Judgment: Judgment normal.     Assessment:     1. Numbness and tingling in left arm    2. Numbness and tingling of right arm    3. Bilateral leg pain  "     Plan:   Numbness and tingling in left arm  -     EMG W/ ULTRASOUND AND NERVE CONDUCTION TEST 2 Extremities; Future    Numbness and tingling of right arm  -     EMG W/ ULTRASOUND AND NERVE CONDUCTION TEST 2 Extremities; Future    Bilateral leg pain  -     X-Ray Lumbar Spine 5 View; Future; Expected date: 03/17/2023    Other orders  -     cyclobenzaprine (FLEXERIL) 10 MG tablet; Take 1 tablet (10 mg total) by mouth 3 (three) times daily as needed for Muscle spasms.  Dispense: 60 tablet; Refill: 2  -     meloxicam (MOBIC) 15 MG tablet; Take 1 tablet (15 mg total) by mouth once daily. Take with meal  Dispense: 14 tablet; Refill: 0      Medication List with Changes/Refills   New Medications    CYCLOBENZAPRINE (FLEXERIL) 10 MG TABLET    Take 1 tablet (10 mg total) by mouth 3 (three) times daily as needed for Muscle spasms.    MELOXICAM (MOBIC) 15 MG TABLET    Take 1 tablet (15 mg total) by mouth once daily. Take with meal   Current Medications    ALCOHOL SWABS (EASY COMFORT ALCOHOL PAD) PADM    Apply 1 each topically 4 (four) times daily.    ASPIRIN (ECOTRIN) 81 MG EC TABLET    Take 1 tablet (81 mg total) by mouth once daily.    IBUPROFEN (ADVIL,MOTRIN) 600 MG TABLET    Take 1 tablet (600 mg total) by mouth every 6 (six) hours as needed for Pain.    INSULIN GLARGINE, TOUJEO, (TOUJEO) 300 UNIT/ML (1.5 ML) INPN PEN    Inject 20 Units into the skin once daily.    INSULIN LISPRO-AABC (LYUMJEV KWIKPEN U-100 INSULIN) 100 UNIT/ML PEN    Titrate up to 20 units daily.    ISOSORBIDE MONONITRATE (IMDUR) 30 MG 24 HR TABLET    Take 1 tablet (30 mg total) by mouth once daily.    PANTOPRAZOLE (PROTONIX) 20 MG TABLET    Take 1 tablet (20 mg total) by mouth once daily.    PROMETHAZINE-DEXTROMETHORPHAN (PROMETHAZINE-DM) 6.25-15 MG/5 ML SYRP    Take 5 mLs by mouth every 8 (eight) hours as needed (cough).    SITAGLIPTIN PHOSPHATE (JANUVIA) 100 MG TAB    Take 1 tablet (100 mg total) by mouth once daily.

## 2023-03-20 DIAGNOSIS — M79.605 BILATERAL LEG PAIN: Primary | ICD-10-CM

## 2023-03-20 DIAGNOSIS — M79.604 BILATERAL LEG PAIN: Primary | ICD-10-CM

## 2023-03-24 ENCOUNTER — TELEPHONE (OUTPATIENT)
Dept: PAIN MEDICINE | Facility: CLINIC | Age: 53
End: 2023-03-24
Payer: COMMERCIAL

## 2023-03-24 ENCOUNTER — HOSPITAL ENCOUNTER (OUTPATIENT)
Facility: HOSPITAL | Age: 53
Discharge: HOME | End: 2023-03-24
Attending: INTERNAL MEDICINE | Admitting: INTERNAL MEDICINE
Payer: COMMERCIAL

## 2023-03-24 DIAGNOSIS — R07.9 CHEST PAIN, MODERATE CORONARY ARTERY RISK: ICD-10-CM

## 2023-03-24 DIAGNOSIS — E11.65 TYPE 2 DIABETES MELLITUS WITH HYPERGLYCEMIA, WITH LONG-TERM CURRENT USE OF INSULIN: ICD-10-CM

## 2023-03-24 DIAGNOSIS — Z79.4 TYPE 2 DIABETES MELLITUS WITH HYPERGLYCEMIA, WITH LONG-TERM CURRENT USE OF INSULIN: ICD-10-CM

## 2023-03-24 DIAGNOSIS — I20.89 ANGINAL EQUIVALENT: ICD-10-CM

## 2023-03-24 LAB — CATH EF QUANTITATIVE: 60 %

## 2023-03-24 PROCEDURE — 25500020 PHARM REV CODE 255: Performed by: INTERNAL MEDICINE

## 2023-03-24 PROCEDURE — 63600175 PHARM REV CODE 636 W HCPCS: Performed by: INTERNAL MEDICINE

## 2023-03-24 PROCEDURE — 93458 L HRT ARTERY/VENTRICLE ANGIO: CPT | Mod: 26,,, | Performed by: INTERNAL MEDICINE

## 2023-03-24 PROCEDURE — C1760 CLOSURE DEV, VASC: HCPCS | Performed by: INTERNAL MEDICINE

## 2023-03-24 PROCEDURE — C1894 INTRO/SHEATH, NON-LASER: HCPCS | Performed by: INTERNAL MEDICINE

## 2023-03-24 PROCEDURE — C1769 GUIDE WIRE: HCPCS | Performed by: INTERNAL MEDICINE

## 2023-03-24 PROCEDURE — 93458 PR CATH PLACE/CORON ANGIO, IMG SUPER/INTERP,W LEFT HEART VENTRICULOGRAPHY: ICD-10-PCS | Mod: 26,,, | Performed by: INTERNAL MEDICINE

## 2023-03-24 PROCEDURE — 93458 L HRT ARTERY/VENTRICLE ANGIO: CPT | Performed by: INTERNAL MEDICINE

## 2023-03-24 PROCEDURE — 25000003 PHARM REV CODE 250: Performed by: INTERNAL MEDICINE

## 2023-03-24 PROCEDURE — 99152 MOD SED SAME PHYS/QHP 5/>YRS: CPT | Mod: ,,, | Performed by: INTERNAL MEDICINE

## 2023-03-24 PROCEDURE — 99152 PR MOD CONSCIOUS SEDATION, SAME PHYS, 5+ YRS, FIRST 15 MIN: ICD-10-PCS | Mod: ,,, | Performed by: INTERNAL MEDICINE

## 2023-03-24 PROCEDURE — C1887 CATHETER, GUIDING: HCPCS | Performed by: INTERNAL MEDICINE

## 2023-03-24 PROCEDURE — 99152 MOD SED SAME PHYS/QHP 5/>YRS: CPT | Performed by: INTERNAL MEDICINE

## 2023-03-24 RX ORDER — NITROGLYCERIN 0.4 MG/1
0.4 TABLET SUBLINGUAL EVERY 5 MIN PRN
Status: DISCONTINUED | OUTPATIENT
Start: 2023-03-24 | End: 2023-03-24 | Stop reason: HOSPADM

## 2023-03-24 RX ORDER — DIPHENHYDRAMINE HCL 50 MG
50 CAPSULE ORAL ONCE
Status: COMPLETED | OUTPATIENT
Start: 2023-03-24 | End: 2023-03-24

## 2023-03-24 RX ORDER — ACETAMINOPHEN 325 MG/1
650 TABLET ORAL EVERY 4 HOURS PRN
Status: DISCONTINUED | OUTPATIENT
Start: 2023-03-24 | End: 2023-03-24 | Stop reason: HOSPADM

## 2023-03-24 RX ORDER — LIDOCAINE HYDROCHLORIDE 20 MG/ML
INJECTION, SOLUTION EPIDURAL; INFILTRATION; INTRACAUDAL; PERINEURAL
Status: DISCONTINUED | OUTPATIENT
Start: 2023-03-24 | End: 2023-03-24 | Stop reason: HOSPADM

## 2023-03-24 RX ORDER — SODIUM CHLORIDE 0.9 % (FLUSH) 0.9 %
10 SYRINGE (ML) INJECTION
Status: DISCONTINUED | OUTPATIENT
Start: 2023-03-24 | End: 2023-03-24 | Stop reason: HOSPADM

## 2023-03-24 RX ORDER — ATROPINE SULFATE 0.1 MG/ML
0.5 INJECTION INTRAVENOUS
Status: DISCONTINUED | OUTPATIENT
Start: 2023-03-24 | End: 2023-03-24 | Stop reason: HOSPADM

## 2023-03-24 RX ORDER — ONDANSETRON 8 MG/1
8 TABLET, ORALLY DISINTEGRATING ORAL EVERY 8 HOURS PRN
Status: DISCONTINUED | OUTPATIENT
Start: 2023-03-24 | End: 2023-03-24 | Stop reason: HOSPADM

## 2023-03-24 RX ORDER — FENTANYL CITRATE 50 UG/ML
INJECTION, SOLUTION INTRAMUSCULAR; INTRAVENOUS
Status: DISCONTINUED | OUTPATIENT
Start: 2023-03-24 | End: 2023-03-24 | Stop reason: HOSPADM

## 2023-03-24 RX ORDER — MIDAZOLAM HYDROCHLORIDE 1 MG/ML
INJECTION, SOLUTION INTRAMUSCULAR; INTRAVENOUS
Status: DISCONTINUED | OUTPATIENT
Start: 2023-03-24 | End: 2023-03-24 | Stop reason: HOSPADM

## 2023-03-24 RX ORDER — SODIUM CHLORIDE 9 MG/ML
INJECTION, SOLUTION INTRAVENOUS CONTINUOUS
Status: ACTIVE | OUTPATIENT
Start: 2023-03-24 | End: 2023-03-24

## 2023-03-24 RX ADMIN — DIPHENHYDRAMINE HYDROCHLORIDE 50 MG: 50 CAPSULE ORAL at 07:03

## 2023-03-24 RX ADMIN — SODIUM CHLORIDE: 9 INJECTION, SOLUTION INTRAVENOUS at 07:03

## 2023-03-24 NOTE — H&P
Progress Notes  Lee Lambert MD (Physician)   Cardiology  Subjective:   Patient ID:  Chan Dwyer is a 52 y.o. female who presents for follow-up of Chest Pain (When laying down on back), Palpitations, Shortness of Breath, and Numbness (Numbness in both arms )  Pt with CP radiating to R arm at rest or exertion. CP- sharp, intermittent, positional, takes aspirin with little relief.     CRF- DM,      Chest Pain   This is a new problem. The current episode started 1 to 4 weeks ago. The problem occurs intermittently. The problem has been gradually improving. The pain is present in the lateral region. The pain is mild. The quality of the pain is described as dull. The pain does not radiate. Pertinent negatives include no dizziness, palpitations or shortness of breath. The pain is aggravated by exertion. She has tried rest for the symptoms. The treatment provided mild relief.   Her past medical history is significant for diabetes.   Pertinent negatives for past medical history include no muscle weakness.      Review of Systems   Constitutional: Negative. Negative for weight gain.   HENT: Negative.     Eyes: Negative.    Cardiovascular: Negative.  Negative for chest pain, leg swelling and palpitations.   Respiratory: Negative.  Negative for shortness of breath.    Endocrine: Negative.    Hematologic/Lymphatic: Negative.    Skin: Negative.    Musculoskeletal:  Negative for muscle weakness.   Gastrointestinal: Negative.    Genitourinary: Negative.    Neurological: Negative.  Negative for dizziness.   Psychiatric/Behavioral: Negative.     Allergic/Immunologic: Negative.    All other systems reviewed and are negative.        Family History   Problem Relation Age of Onset    Diabetes Father      Hypertension Father      Hypertension Mother      Diabetes Mother      Heart attack Mother      No Known Problems Brother      No Known Problems Brother      No Known Problems Brother      No Known Problems Brother       Stomach cancer Sister      No Known Problems Sister      No Known Problems Sister      No Known Problems Sister      No Known Problems Sister      No Known Problems Son      No Known Problems Son      Cancer Neg Hx      Stroke Neg Hx      Colon cancer Neg Hx      Ovarian cancer Neg Hx      Breast cancer Neg Hx             Past Medical History:   Diagnosis Date    Abnormal Pap smear      Abnormal Pap smear of cervix      Arthritis, low back      Diabetes 09/2014      BS 162am 09/16/2014    DM (diabetes mellitus) 09/2014      am 01/26/2016    DM (diabetes mellitus) 09/2014     BS didn't check 01/31/2017    GERD (gastroesophageal reflux disease)      Gestational diabetes      Hepatitis C antibody positive in blood 02/11/2022     RNA NEGATIVE    History of abnormal Pap smear      Surgical menopause      Vitamin D deficiency        Social History            Socioeconomic History    Marital status: Single    Number of children: 2   Occupational History    Occupation:        Employer: [employer]       Employer: [employer]   Tobacco Use    Smoking status: Never    Smokeless tobacco: Never   Substance and Sexual Activity    Alcohol use: Yes       Comment: Rare    Drug use: No    Sexual activity: Yes       Partners: Male       Birth control/protection: Surgical   Social History Narrative     She wears seatbelt.             Current Outpatient Medications on File Prior to Visit   Medication Sig Dispense Refill    alcohol swabs (EASY COMFORT ALCOHOL PAD) PadM Apply 1 each topically 4 (four) times daily. 100 each 11    ibuprofen (ADVIL,MOTRIN) 600 MG tablet Take 1 tablet (600 mg total) by mouth every 6 (six) hours as needed for Pain. 30 tablet 0    insulin glargine, TOUJEO, (TOUJEO) 300 unit/mL (1.5 mL) InPn pen Inject 20 Units into the skin once daily. 4 pen 3    insulin lispro-aabc (ORINUMJEV KWIKPEN U-100 INSULIN) 100 unit/mL pen Titrate up to 20 units daily. 5 pen 11    isosorbide  mononitrate (IMDUR) 30 MG 24 hr tablet Take 1 tablet (30 mg total) by mouth once daily. 30 tablet 11    pantoprazole (PROTONIX) 20 MG tablet Take 1 tablet (20 mg total) by mouth once daily. 30 tablet 11    promethazine-dextromethorphan (PROMETHAZINE-DM) 6.25-15 mg/5 mL Syrp Take 5 mLs by mouth every 8 (eight) hours as needed (cough). 120 mL 0    SITagliptin phosphate (JANUVIA) 100 MG Tab Take 1 tablet (100 mg total) by mouth once daily. 30 tablet 11    aspirin (ECOTRIN) 81 MG EC tablet Take 1 tablet (81 mg total) by mouth once daily. 30 tablet 11                Current Facility-Administered Medications on File Prior to Visit   Medication Dose Route Frequency Provider Last Rate Last Admin    chlorhexidine 0.12 % solution 10 mL  10 mL Mouth/Throat On Call Procedure Anusha George PA-C   10 mL at 10/10/22 0626           Review of patient's allergies indicates:   Allergen Reactions    Hydrocodone Hives and Rash         Objective:      Physical Exam  Vitals and nursing note reviewed.   Constitutional:       Appearance: She is well-developed.   HENT:      Head: Normocephalic and atraumatic.   Eyes:      Conjunctiva/sclera: Conjunctivae normal.      Pupils: Pupils are equal, round, and reactive to light.   Cardiovascular:      Rate and Rhythm: Normal rate and regular rhythm.      Pulses: Intact distal pulses.      Heart sounds: Normal heart sounds.   Pulmonary:      Effort: Pulmonary effort is normal.      Breath sounds: Normal breath sounds.   Abdominal:      General: Bowel sounds are normal.      Palpations: Abdomen is soft.   Musculoskeletal:         General: Normal range of motion.      Cervical back: Normal range of motion and neck supple.   Skin:     General: Skin is warm and dry.   Neurological:      Mental Status: She is alert and oriented to person, place, and time.         Assessment:      1. HIstory of Chest pain, moderate coronary artery risk    2. Type 2 diabetes mellitus with hyperglycemia, with long-term  current use of insulin    3. Anginal equivalent      Plan:      HIstory of Chest pain, moderate coronary artery risk     Type 2 diabetes mellitus with hyperglycemia, with long-term current use of insulin        LHC, Risks and benefits explained   Continue nitrates and asa- primary prevention ? spasm

## 2023-03-24 NOTE — Clinical Note
The catheter was inserted over the wire into the left ventricle. Hemodynamics were performed.  The angiography was performed via power injection. The injected amount was 20 mL contrast at 12 mL/s.

## 2023-03-24 NOTE — BRIEF OP NOTE
[facility] - Cath Lab ([facility])  Cardiology Department  Operative Note    SUMMARY     Date of Procedure: 3/24/2023     Procedure: Procedure(s) (LRB):  Left heart cath (Left)     Surgeon(s) and Role:     * Lee Lambert MD - Primary    Assisting Surgeon: None    Pre-Operative Diagnosis: Anginal equivalent [I20.8]  Chest pain, moderate coronary artery risk [R07.9]  Type 2 diabetes mellitus with hyperglycemia, with long-term current use of insulin [E11.65, Z79.4]    Post-Operative Diagnosis: Post-Op Diagnosis Codes:     * Anginal equivalent [I20.8]     * Chest pain, moderate coronary artery risk [R07.9]     * Type 2 diabetes mellitus with hyperglycemia, with long-term current use of insulin [E11.65, Z79.4]    Anesthesia: RN IV Sedation    Technical Procedures Used: LHC, REST ANGINA    Description of the Findings of the Procedure: NORMAL CORONARIES, NORMAL LV FUNCTION    Significant Surgical Tasks Conducted by the Assistant(s), if Applicable: NNONE    Complications: No    Estimated Blood Loss (EBL): < 50 cc           Implants: * No implants in log *    Specimens:   Specimen (24h ago, onward)      None                    Condition: Good    Disposition: PACU - hemodynamically stable.    Attestation: I was present and scrubbed for the entire procedure.

## 2023-03-24 NOTE — DISCHARGE INSTRUCTIONS
"Post-op Heart Catheterization    1. DIET: It is advisable for you to follow a diet that limits the intake of salt, sugar, saturated fats and cholesterol.     2. FOR THE NEXT 24 HOURS:   For the next 8 hours, you should be watched by a responsible adult. This person should make sure your condition is not getting worse.   Don't drink any alcohol for the next 24 hours.  Don't drive, operate dangerous machinery, or make important business or personal decisions during the next 24 hours.  Your healthcare provider may tell you not to take any medicine by mouth for pain or sleep in the next 4 hours. These medicines may react with the medicines you were given in the hospital. This could cause a much stronger response than usual.     3. ACTIVITY:                                Day of discharge:             NO vigorous activity, lifting or straining                                                   Day after discharge:         Avoid heavy lifting (up to 10 lbs)                                                                        The day after discharge you may shower, but avoid tub baths for 5 days                                                                         Wash site gently with soap and water        NO powder or lotion to your procedure site.                 Before you shower, remove dressing, apply bandaid if desired                                                                                                                                                                            2nd day after discharge:  Resume normal activities                                                                         Exercise program as instructed    4. WOUND CARE: It is not unusual to have a small amount of bruising appear in the groin area. It is also common to have a tender "knot" develop beneath the skin at the puncture site in the groin. This is scar tissue only and is not a cause for concern or alarm. This tender " "knot may take several weeks to fully resolve. The bruise will usually spread over several days. If the lump gets bigger, call you doctor immediately.    5. DISCOMFORT: For general discomfort at the puncture site, you may take 1 or 2 Acetaminophen (Tylenol) tablets every 4 hours as needed. (Do not take more than 4000 mg a day)           6. CALL YOUR HEALTHCARE PROVIDER IF YOU START TO HAVE THE FOLLOWING SYMPTOMS:        1. Problems with the affected leg: Pain, discomfort, loss of warmth, numbness or tingling                                                                                                                            2. Problems at the groin site: Bleeding, pain that is sudden/sharp/persistent,                   swelling at site or a change in "lump" size, increased redness or drainage at                     puncture site                                                               3. High fever (101 degrees or higher)       4.  Drowsiness that doesn't get better       5. Weakness or dizziness that doesn't get better            6. Repeated vomiting    7. GO TO  THE EMERGENCY ROOM OR CALL 911 IF YOU HAVE: Chest pains or discomforts not relieved with 3 nitroglycerin doses (sublingual tablets or spray), numbness or severe pain or if your foot or leg becomes cold or discolored or uncontrolled bleeding from site (apply direct pressure above site).   "

## 2023-03-24 NOTE — Clinical Note
The catheter was inserted over the wire into the ostial  left coronary artery. Hemodynamics were performed.  Over 035J guidewire

## 2023-03-27 ENCOUNTER — OFFICE VISIT (OUTPATIENT)
Dept: PAIN MEDICINE | Facility: CLINIC | Age: 53
End: 2023-03-27
Payer: COMMERCIAL

## 2023-03-27 ENCOUNTER — PATIENT MESSAGE (OUTPATIENT)
Dept: CARDIOLOGY | Facility: CLINIC | Age: 53
End: 2023-03-27
Payer: COMMERCIAL

## 2023-03-27 VITALS
DIASTOLIC BLOOD PRESSURE: 74 MMHG | WEIGHT: 119.06 LBS | BODY MASS INDEX: 20.32 KG/M2 | HEART RATE: 96 BPM | HEIGHT: 64 IN | SYSTOLIC BLOOD PRESSURE: 132 MMHG | RESPIRATION RATE: 17 BRPM

## 2023-03-27 DIAGNOSIS — M79.604 BILATERAL LEG PAIN: ICD-10-CM

## 2023-03-27 DIAGNOSIS — M70.62 TROCHANTERIC BURSITIS OF BOTH HIPS: Primary | ICD-10-CM

## 2023-03-27 DIAGNOSIS — M70.61 TROCHANTERIC BURSITIS OF BOTH HIPS: Primary | ICD-10-CM

## 2023-03-27 DIAGNOSIS — M79.605 BILATERAL LEG PAIN: ICD-10-CM

## 2023-03-27 DIAGNOSIS — M46.1 SACROILIITIS: ICD-10-CM

## 2023-03-27 PROCEDURE — 3066F PR DOCUMENTATION OF TREATMENT FOR NEPHROPATHY: ICD-10-PCS | Mod: CPTII,S$GLB,, | Performed by: PHYSICAL MEDICINE & REHABILITATION

## 2023-03-27 PROCEDURE — 99999 PR PBB SHADOW E&M-EST. PATIENT-LVL V: CPT | Mod: PBBFAC,,, | Performed by: PHYSICAL MEDICINE & REHABILITATION

## 2023-03-27 PROCEDURE — 3075F PR MOST RECENT SYSTOLIC BLOOD PRESS GE 130-139MM HG: ICD-10-PCS | Mod: CPTII,S$GLB,, | Performed by: PHYSICAL MEDICINE & REHABILITATION

## 2023-03-27 PROCEDURE — 3008F BODY MASS INDEX DOCD: CPT | Mod: CPTII,S$GLB,, | Performed by: PHYSICAL MEDICINE & REHABILITATION

## 2023-03-27 PROCEDURE — 3072F LOW RISK FOR RETINOPATHY: CPT | Mod: CPTII,S$GLB,, | Performed by: PHYSICAL MEDICINE & REHABILITATION

## 2023-03-27 PROCEDURE — 99999 PR PBB SHADOW E&M-EST. PATIENT-LVL V: ICD-10-PCS | Mod: PBBFAC,,, | Performed by: PHYSICAL MEDICINE & REHABILITATION

## 2023-03-27 PROCEDURE — 99204 OFFICE O/P NEW MOD 45 MIN: CPT | Mod: S$GLB,,, | Performed by: PHYSICAL MEDICINE & REHABILITATION

## 2023-03-27 PROCEDURE — 3046F PR MOST RECENT HEMOGLOBIN A1C LEVEL > 9.0%: ICD-10-PCS | Mod: CPTII,S$GLB,, | Performed by: PHYSICAL MEDICINE & REHABILITATION

## 2023-03-27 PROCEDURE — 3061F PR NEG MICROALBUMINURIA RESULT DOCUMENTED/REVIEW: ICD-10-PCS | Mod: CPTII,S$GLB,, | Performed by: PHYSICAL MEDICINE & REHABILITATION

## 2023-03-27 PROCEDURE — 1159F MED LIST DOCD IN RCRD: CPT | Mod: CPTII,S$GLB,, | Performed by: PHYSICAL MEDICINE & REHABILITATION

## 2023-03-27 PROCEDURE — 3046F HEMOGLOBIN A1C LEVEL >9.0%: CPT | Mod: CPTII,S$GLB,, | Performed by: PHYSICAL MEDICINE & REHABILITATION

## 2023-03-27 PROCEDURE — 1159F PR MEDICATION LIST DOCUMENTED IN MEDICAL RECORD: ICD-10-PCS | Mod: CPTII,S$GLB,, | Performed by: PHYSICAL MEDICINE & REHABILITATION

## 2023-03-27 PROCEDURE — 3078F PR MOST RECENT DIASTOLIC BLOOD PRESSURE < 80 MM HG: ICD-10-PCS | Mod: CPTII,S$GLB,, | Performed by: PHYSICAL MEDICINE & REHABILITATION

## 2023-03-27 PROCEDURE — 3078F DIAST BP <80 MM HG: CPT | Mod: CPTII,S$GLB,, | Performed by: PHYSICAL MEDICINE & REHABILITATION

## 2023-03-27 PROCEDURE — 3061F NEG MICROALBUMINURIA REV: CPT | Mod: CPTII,S$GLB,, | Performed by: PHYSICAL MEDICINE & REHABILITATION

## 2023-03-27 PROCEDURE — 3075F SYST BP GE 130 - 139MM HG: CPT | Mod: CPTII,S$GLB,, | Performed by: PHYSICAL MEDICINE & REHABILITATION

## 2023-03-27 PROCEDURE — 3072F PR LOW RISK FOR RETINOPATHY: ICD-10-PCS | Mod: CPTII,S$GLB,, | Performed by: PHYSICAL MEDICINE & REHABILITATION

## 2023-03-27 PROCEDURE — 99204 PR OFFICE/OUTPT VISIT, NEW, LEVL IV, 45-59 MIN: ICD-10-PCS | Mod: S$GLB,,, | Performed by: PHYSICAL MEDICINE & REHABILITATION

## 2023-03-27 PROCEDURE — 3008F PR BODY MASS INDEX (BMI) DOCUMENTED: ICD-10-PCS | Mod: CPTII,S$GLB,, | Performed by: PHYSICAL MEDICINE & REHABILITATION

## 2023-03-27 PROCEDURE — 3066F NEPHROPATHY DOC TX: CPT | Mod: CPTII,S$GLB,, | Performed by: PHYSICAL MEDICINE & REHABILITATION

## 2023-03-27 NOTE — PROGRESS NOTES
New Patient Chronic Pain Note (Initial Visit)    Referring Physician: Tristian Bearden MD    PCP: Tristian Bearden MD    Chief Complaint:   Chief Complaint   Patient presents with    Hip Pain        SUBJECTIVE:    Chan Dwyer is a 52 y.o. female, right-hand dominant, who presents to the clinic for the evaluation of back and leg pain.  She was referred by her primary care team for further evaluation management of this pain.  She has past medical history of headaches, carpal tunnel syndrome, dm 2, GERD, hepatitis-C, polyarthritis, multiple other medical comorbidities as listed in her chart.  The pain started a few weeks ago  and symptoms have been unchanged.The pain is located in the lower back area and radiates to the bilateral hips, mostly on the left.  The pain is described as  aching, tightness  and is rated as 8/10. The pain is rated with a score of  6/10 on the BEST day and a score of 9/10 on the WORST day.  Symptoms interfere with daily activity. The pain is exacerbated by movement, walking, direct pressure to the left hip.  The pain is mitigated by Tylenol.  She works as a .    Patient denies night fever/night sweats, urinary incontinence, bowel incontinence, significant weight loss, significant motor weakness, and loss of sensations.    Pain Disability Index Review:   No flowsheet data found.    Non-Pharmacologic Treatments:  Physical Therapy/Home Exercise: no  Ice/Heat:no  TENS: no  Acupuncture: no  Massage: no  Chiropractic: no    Other: no      Pain Medications:  - Opioids:  None  - Adjuvant Medications:  Flexeril, NSAIDs  - Anti-Coagulants: Aspirin     report:  Reviewed and consistent with medication use as prescribed.    Pain Procedures:   Denies      Imaging:   X-ray lumbar spine 03/17/2023:   Bones: Intact.  Alignment: Satisfactory.  Spondylosis: Satisfactory disc heights.  Minimal anterior osteophytosis.  Questionable mild lower lumbar facet degenerative  changes.  Miscellaneous: Partially visualized left abdominal device.  Possible mild constipation.    X-ray left hip 02/21/2023:  Frontal view of the pelvis and 2 views of the left hip.  3 images.  No comparison is available.  No acute fracture or dislocation.  No significant degenerative changes.  The pelvis is grossly unremarkable.  Bowel gas pattern is normal.       Past Medical History:   Diagnosis Date    Abnormal Pap smear     Abnormal Pap smear of cervix     Arthritis, low back     Diabetes 09/2014     BS 162am 09/16/2014    DM (diabetes mellitus) 09/2014     am 01/26/2016    DM (diabetes mellitus) 09/2014    BS didn't check 01/31/2017    GERD (gastroesophageal reflux disease)     Gestational diabetes     Hepatitis C antibody positive in blood 02/11/2022    RNA NEGATIVE    History of abnormal Pap smear     Surgical menopause     Vitamin D deficiency      Past Surgical History:   Procedure Laterality Date    COLONOSCOPY N/A 6/22/2016    Procedure: COLONOSCOPY;  Surgeon: Vishal Mary III, MD;  Location: [facility];  Service: Endoscopy;  Laterality: N/A;    Cryotherapy of the cervix  1999    DILATION AND CURETTAGE OF UTERUS      HYSTERECTOMY      [facility]  2/2012    TRIGGER FINGER RELEASE Left 10/10/2022    Procedure: RELEASE, TRIGGER FINGER;  Surgeon: Adarsh Chavez MD;  Location: [facility];  Service: Orthopedics;  Laterality: Left;  left ring trigger finger release    TUBAL LIGATION       Social History     Socioeconomic History    Marital status: Single    Number of children: 2   Occupational History    Occupation:      Employer: [employer]      Employer: [employer]   Tobacco Use    Smoking status: Never    Smokeless tobacco: Never   Substance and Sexual Activity    Alcohol use: Yes     Comment: Rare    Drug use: No    Sexual activity: Yes     Partners: Male     Birth control/protection: Surgical   Social History Narrative    She wears seatbelt.     Family History    Problem Relation Age of Onset    Diabetes Father     Hypertension Father     Hypertension Mother     Diabetes Mother     Heart attack Mother     No Known Problems Brother     No Known Problems Brother     No Known Problems Brother     No Known Problems Brother     Stomach cancer Sister     No Known Problems Sister     No Known Problems Sister     No Known Problems Sister     No Known Problems Sister     No Known Problems Son     No Known Problems Son     Cancer Neg Hx     Stroke Neg Hx     Colon cancer Neg Hx     Ovarian cancer Neg Hx     Breast cancer Neg Hx        Review of patient's allergies indicates:   Allergen Reactions    Hydrocodone Hives and Rash       Current Outpatient Medications   Medication Sig    alcohol swabs (EASY COMFORT ALCOHOL PAD) PadM Apply 1 each topically 4 (four) times daily.    ibuprofen (ADVIL,MOTRIN) 600 MG tablet Take 1 tablet (600 mg total) by mouth every 6 (six) hours as needed for Pain.    insulin glargine, TOUJEO, (TOUJEO) 300 unit/mL (1.5 mL) InPn pen Inject 20 Units into the skin once daily.    insulin lispro-aabc (LYUMJEV KWIKPEN U-100 INSULIN) 100 unit/mL pen Titrate up to 20 units daily.    isosorbide mononitrate (IMDUR) 30 MG 24 hr tablet Take 1 tablet (30 mg total) by mouth once daily.    pantoprazole (PROTONIX) 20 MG tablet Take 1 tablet (20 mg total) by mouth once daily.    promethazine-dextromethorphan (PROMETHAZINE-DM) 6.25-15 mg/5 mL Syrp Take 5 mLs by mouth every 8 (eight) hours as needed (cough).    SITagliptin phosphate (JANUVIA) 100 MG Tab Take 1 tablet (100 mg total) by mouth once daily.    aspirin (ECOTRIN) 81 MG EC tablet Take 1 tablet (81 mg total) by mouth once daily.    cyclobenzaprine (FLEXERIL) 10 MG tablet Take 1 tablet (10 mg total) by mouth 3 (three) times daily as needed for Muscle spasms. (Patient not taking: Reported on 3/27/2023)    meloxicam (MOBIC) 15 MG tablet Take 1 tablet (15 mg total) by mouth once daily. Take with meal (Patient not taking:  "Reported on 3/27/2023)     No current facility-administered medications for this visit.     Facility-Administered Medications Ordered in Other Visits   Medication    chlorhexidine 0.12 % solution 10 mL       Review of Systems     GENERAL:  No weight loss, malaise or fevers.  HEENT:   No recent changes in vision or hearing  NECK:  Negative for lumps, no difficulty with swallowing.  RESPIRATORY:  Negative for cough, wheezing or shortness of breath, patient denies any recent URI.  CARDIOVASCULAR:  Negative for chest pain, leg swelling or palpitations.  GI:  Negative for abdominal discomfort, blood in stools or black stools or change in bowel habits.  MUSCULOSKELETAL:  See HPI.  SKIN:  Negative for lesions, rash, and itching.  PSYCH:  No mood disorder or recent psychosocial stressors.  Patients sleep is not disturbed secondary to pain.  HEMATOLOGY/LYMPHOLOGY:  Negative for prolonged bleeding, bruising easily or swollen nodes.  Patient is currently taking anti-coagulants - ASA  NEURO:   No history of headaches, syncope, paralysis, seizures or tremors.  All other reviewed and negative other than HPI.    OBJECTIVE:    /74   Pulse 96   Resp 17   Ht 5' 4" (1.626 m)   Wt 54 kg (119 lb 0.8 oz)   BMI 20.43 kg/m²         Physical Exam    GENERAL: Well appearing, in no acute distress, alert and oriented x3.  PSYCH:  Mood and affect appropriate.  SKIN: Skin color, texture, turgor normal, no rashes or lesions.  HEAD/FACE:  Normocephalic, atraumatic. Cranial nerves grossly intact.   CV: RRR with palpation of the radial artery.  PULM: No evidence of respiratory difficulty, symmetric chest rise.  GI:  Soft and non-tender.  BACK: Straight leg raising in the sitting and supine positions is negative to radicular pain.  Mild pain to palpation over the facet joints of the lumbar spine and lumbar paraspinal.  Limited range of motion secondary to pain reproduction.  EXTREMITIES: No deformities, edema, or skin discoloration. Good " capillary refill.  MUSCULOSKELETAL:  Hip and knee provocative maneuvers are negative.  There is mild-to-moderate pain with palpation over the sacroiliac joints bilaterally.  FABERs test is equivocal.  FADIRs test is negative.  Tenderness palpation over the left trochanteric bursa and minimally on the right.  Bilateral upper and lower extremity strength is normal and symmetric.  No atrophy or tone abnormalities are noted.  NEURO: Bilateral upper and lower extremity coordination and muscle stretch reflexes are physiologic and symmetric.  Plantar response are downgoing. No clonus.  No loss of sensation is noted.  GAIT: normal.      LABS:  Lab Results   Component Value Date    WBC 6.15 03/14/2023    HGB 12.5 03/14/2023    HCT 42.1 03/14/2023    MCV 96 03/14/2023     03/14/2023       CMP  Sodium   Date Value Ref Range Status   03/14/2023 142 136 - 145 mmol/L Final     Potassium   Date Value Ref Range Status   03/14/2023 4.7 3.5 - 5.1 mmol/L Final     Chloride   Date Value Ref Range Status   03/14/2023 104 95 - 110 mmol/L Final     CO2   Date Value Ref Range Status   03/14/2023 28 23 - 29 mmol/L Final     Glucose   Date Value Ref Range Status   03/14/2023 236 (H) 70 - 110 mg/dL Final     BUN   Date Value Ref Range Status   03/14/2023 10 6 - 20 mg/dL Final     Creatinine   Date Value Ref Range Status   03/14/2023 0.7 0.5 - 1.4 mg/dL Final     Calcium   Date Value Ref Range Status   03/14/2023 9.8 8.7 - 10.5 mg/dL Final     Total Protein   Date Value Ref Range Status   10/03/2022 6.6 6.0 - 8.4 g/dL Final     Albumin   Date Value Ref Range Status   10/03/2022 3.6 3.5 - 5.2 g/dL Final     Total Bilirubin   Date Value Ref Range Status   10/03/2022 0.5 0.1 - 1.0 mg/dL Final     Comment:     For infants and newborns, interpretation of results should be based  on gestational age, weight and in agreement with clinical  observations.    Premature Infant recommended reference ranges:  Up to 24 hours.............<8.0 mg/dL  Up  to 48 hours............<12.0 mg/dL  3-5 days..................<15.0 mg/dL  6-29 days.................<15.0 mg/dL       Alkaline Phosphatase   Date Value Ref Range Status   10/03/2022 116 55 - 135 U/L Final     AST   Date Value Ref Range Status   10/03/2022 15 10 - 40 U/L Final     ALT   Date Value Ref Range Status   10/03/2022 14 10 - 44 U/L Final     Anion Gap   Date Value Ref Range Status   03/14/2023 10 8 - 16 mmol/L Final     eGFR if    Date Value Ref Range Status   02/11/2022 >60 >60 mL/min/1.73 m^2 Final     eGFR if non    Date Value Ref Range Status   02/11/2022 >60 >60 mL/min/1.73 m^2 Final     Comment:     Calculation used to obtain the estimated glomerular filtration  rate (eGFR) is the CKD-EPI equation.          Lab Results   Component Value Date    HGBA1C 9.6 (H) 03/14/2023             ASSESSMENT: 52 y.o. year old female with lower back and hip pain, consistent with     1. Trochanteric bursitis of both hips  Ambulatory referral/consult to Physical/Occupational Therapy      2. Bilateral leg pain  Ambulatory referral/consult to Pain Clinic      3. Sacroiliitis              PLAN:   - Interventions:  None at this time    - Anticoagulation use: Patient is taking ASA as 1° prevention    - Medications: I have stressed the importance of physical activity and a home exercise plan to help with pain and improve health. and Patient can continue with medications for now since they are providing benefits, using them appropriately, and without side effects.     Agree with short course of Mobic and Flexeril p.r.n., advised patient to pick these medications up        - Therapy: Referral to Physical therapy for Lumbar stabilization, core strengthening, and a home exercise program.    - Psychological:  Discussed coping mechanisms to help address chronic pain issues    - Labs:  Reviewed    - Imaging: Reviewed available imaging with patient and answered any questions they had regarding  study.    - Consults/Referrals:  Physical therapy    - Records:  Reviewed/Obtain old records from outside physicians and imaging    - Follow up visit: return to clinic as needed    - Counseled patient regarding the importance of activity modification and physical therapy    - This condition does not require this patient to take time off of work, and the primary goal of our Pain Management services is to improve the patient's functional capacity.    - Patient Questions: Answered all of the patient's questions regarding diagnosis, therapy, and treatment        The above plan and management options were discussed at length with patient. Patient is in agreement with the above and verbalized understanding.    I discussed the goals of interventional chronic pain management with the patient on today's visit.  I explained the utility of injections for diagnostic and therapeutic purposes.  We discussed a multimodal approach to pain including treating the patient's given worst pain at any given time.  We will use a systematic approach to addressing pain.  We will also adopt a multimodal approach that includes injections, adjuvant medications, physical therapy, at times psychiatry.  There may be a limited role for opioid use intermittently in the treatment of pain, more particularly for acute pain although no one approach can be used as a sole treatment modality.    I emphasized the importance of regular exercise, core strengthening and stretching, diet and weight loss as a cornerstone of long-term pain management.      Demetrio Grant MD  Interventional Pain Management  [facility]    Disclaimer:  This note was prepared using voice recognition system and is likely to have sound alike errors that may have been overlooked even after proof reading.  Please call me with any questions

## 2023-03-28 VITALS
WEIGHT: 126 LBS | RESPIRATION RATE: 18 BRPM | TEMPERATURE: 98 F | OXYGEN SATURATION: 97 % | SYSTOLIC BLOOD PRESSURE: 135 MMHG | BODY MASS INDEX: 21.51 KG/M2 | DIASTOLIC BLOOD PRESSURE: 60 MMHG | HEIGHT: 64 IN | HEART RATE: 60 BPM

## 2023-03-30 NOTE — DISCHARGE SUMMARY
[facility] (Hospital)  Discharge Summary      Admit Date: 3/24/2023    Discharge Date and Time: 3/24/2023 10:40 AM    Attending Physician: No att. providers found     Reason for Admission: LHC, anginal equivalent    Procedures Performed: Procedure(s) (LRB):  Left heart cath (Left)    Hospital Course (synopsis of major diagnoses, care, treatment, and services provided during the course of the hospital stay): Date of Procedure: 3/24/2023      Procedure: Procedure(s) (LRB):  Left heart cath (Left)      Surgeon(s) and Role:     * Lee Lambert MD - Primary     Assisting Surgeon: None     Pre-Operative Diagnosis: Anginal equivalent [I20.8]  Chest pain, moderate coronary artery risk [R07.9]  Type 2 diabetes mellitus with hyperglycemia, with long-term current use of insulin [E11.65, Z79.4]     Post-Operative Diagnosis: Post-Op Diagnosis Codes:     * Anginal equivalent [I20.8]     * Chest pain, moderate coronary artery risk [R07.9]     * Type 2 diabetes mellitus with hyperglycemia, with long-term current use of insulin [E11.65, Z79.4]     Anesthesia: RN IV Sedation     Technical Procedures Used: LHC, REST ANGINA     Description of the Findings of the Procedure: NORMAL CORONARIES, NORMAL LV FUNCTION     Significant Surgical Tasks Conducted by the Assistant(s), if Applicable: NNONE     Complications: No     Estimated Blood Loss (EBL): < 50 cc     Goals of Care Treatment Preferences:  Code Status: Full Code      Consults: none    Significant Diagnostic Studies: Labs: All labs within the past 24 hours have been reviewed    Final Diagnoses:    Principal Problem: <principal problem not specified>   Secondary Diagnoses: There are no hospital problems to display for this patient.     Discharged Condition: good    Disposition: Home or Self Care    Follow Up/Patient Instructions:   1-2 weeks Cv clinic  Medications:  Reconciled Home Medications:      Medication List        Start taking these medications      pantoprazole  20 MG tablet  Commonly known as: PROTONIX  Take 1 tablet (20 mg total) by mouth once daily.     promethazine-dextromethorphan 6.25-15 mg/5 mL Syrp  Commonly known as: PROMETHAZINE-DM  Take 5 mLs by mouth every 8 (eight) hours as needed (cough).            Continue taking these medications      aspirin 81 MG EC tablet  Commonly known as: ECOTRIN  Take 1 tablet (81 mg total) by mouth once daily.     insulin glargine (TOUJEO) 300 unit/mL (1.5 mL) Inpn pen  Commonly known as: TOUJEO  Inject 20 Units into the skin once daily.     isosorbide mononitrate 30 MG 24 hr tablet  Commonly known as: IMDUR  Take 1 tablet (30 mg total) by mouth once daily.     LYUMJEV KWIKPEN U-100 INSULIN 100 unit/mL pen  Generic drug: insulin lispro-aabc  Titrate up to 20 units daily.     meloxicam 15 MG tablet  Commonly known as: MOBIC  Take 1 tablet (15 mg total) by mouth once daily. Take with meal     SITagliptin phosphate 100 MG Tab  Commonly known as: JANUVIA  Take 1 tablet (100 mg total) by mouth once daily.            Ask your doctor about these medications      alcohol swabs Padm  Commonly known as: EASY COMFORT ALCOHOL PAD  Apply 1 each topically 4 (four) times daily.     cyclobenzaprine 10 MG tablet  Commonly known as: FLEXERIL  Take 1 tablet (10 mg total) by mouth 3 (three) times daily as needed for Muscle spasms.  Ask about: Should I take this medication?     ibuprofen 600 MG tablet  Commonly known as: ADVIL,MOTRIN  Take 1 tablet (600 mg total) by mouth every 6 (six) hours as needed for Pain.            No discharge procedures on file.

## 2023-04-04 ENCOUNTER — CLINICAL SUPPORT (OUTPATIENT)
Dept: REHABILITATION | Facility: HOSPITAL | Age: 53
End: 2023-04-04
Attending: PHYSICAL MEDICINE & REHABILITATION
Payer: COMMERCIAL

## 2023-04-04 DIAGNOSIS — M70.62 TROCHANTERIC BURSITIS OF BOTH HIPS: ICD-10-CM

## 2023-04-04 DIAGNOSIS — M70.61 TROCHANTERIC BURSITIS OF BOTH HIPS: ICD-10-CM

## 2023-04-04 PROCEDURE — 97014 ELECTRIC STIMULATION THERAPY: CPT | Mod: PN

## 2023-04-04 PROCEDURE — 97530 THERAPEUTIC ACTIVITIES: CPT | Mod: PN

## 2023-04-04 PROCEDURE — 97110 THERAPEUTIC EXERCISES: CPT | Mod: PN

## 2023-04-04 PROCEDURE — 97010 HOT OR COLD PACKS THERAPY: CPT | Mod: PN

## 2023-04-04 PROCEDURE — 97161 PT EVAL LOW COMPLEX 20 MIN: CPT | Mod: PN

## 2023-04-04 NOTE — PLAN OF CARE
[facility]  Physical Therapy Initial Evaluation    Name: Chan Dwyer  Clinic Number: [id number]    Therapy Diagnosis:   Encounter Diagnosis   Name Primary?    Trochanteric bursitis of both hips      Physician: Demetrio Grant MD    Physician Orders: PT Eval and Treat   Medical Diagnosis from Referral: Trochanteric bursitis of both hips [M70.61, M70.62]  Evaluation Date: 4/4/2023  Authorization Period Expiration: 3/26/23  Plan of Care Expiration: 6/2/23  Visit # / Visits authorized: 1/ 1  FOTO: 1/3    Time In: 12:30 pm  Time Out: 1:15 pm  Total Billable Time: 45 minutes     Precautions: Standard, Diabetes    Subjective   Date of onset: 3 weeks ago  History of current condition - Chan reports: left hip pain that began about 3 years. Pt also had heart cath done thru R femoral artery so groin is sore there but not lateral hip. Pt reports left hip pain came out out of nowhere. Pt cannot lie on left side. Sitting is also uncomfortable. Pain is keeping her up at night too; pt reports getting up every hour due to pain.      Medical History:   Past Medical History:   Diagnosis Date    Abnormal Pap smear     Abnormal Pap smear of cervix     Arthritis, low back     Diabetes 09/2014     BS 162am 09/16/2014    DM (diabetes mellitus) 09/2014     am 01/26/2016    DM (diabetes mellitus) 09/2014    BS didn't check 01/31/2017    GERD (gastroesophageal reflux disease)     Gestational diabetes     Hepatitis C antibody positive in blood 02/11/2022    RNA NEGATIVE    History of abnormal Pap smear     Surgical menopause     Vitamin D deficiency        Surgical History:   Chan Dwyer  has a past surgical history that includes Tubal ligation; Cryotherapy of the cervix (1999); RALH (2/2012); Dilation and curettage of uterus; Hysterectomy; Colonoscopy (N/A, 6/22/2016); Trigger finger release (Left, 10/10/2022); and Left heart catheterization (Left, 3/24/2023).    Medications:    Chan has a current medication list which includes the following prescription(s): alcohol swabs, aspirin, ibuprofen, insulin glargine (toujeo), lyumjev kwikpen u-100 insulin, isosorbide mononitrate, meloxicam, pantoprazole, promethazine-dextromethorphan, and sitagliptin phosphate, and the following Facility-Administered Medications: chlorhexidine.    Allergies:   Review of patient's allergies indicates:   Allergen Reactions    Hydrocodone Hives and Rash        Imaging, see chart review    Prior Therapy: yes  Social History: Pt lives with their spouse  Occupation:  at [employer]  Prior Level of Function: Ind  Current Level of Function: Ind    Pain:   Current 8/10, worst 10/10, best 8/10   Location: left hip   Description: Aching  Aggravating Factors: Sitting and lying on L side, stairs  Easing Factors: nothing    Pts goals: to relieve pain from left hip to go back to normal life    Objective     Posture: seated wt shifted to R hip  Palpation: L glutes and piriformis TTP    Range of Motion/Strength:     Thoracolumbar AROM Pain/Dysfunction with Movement   Flexion 50% Pain L hip   Extension 75% Pain L hip   Right side bending 50%    Left side bending 75%        Hip Right Left Pain/Dysfunction with Movement   PROM      flexion NT 90 pain   Internal rotation NT WNL    External rotation NT WNL        L/E MMT Right Left Pain/Dysfunction with Movement   Hip Flexion 4-/5 4-/5    Hip Abduction 4+/5 3+/5    Hip Adduction 4+/5 4/5    Knee Flexion 5/5 4+/5    Knee Extension 5/5 4+/5    Ankle DF 5/5 5/5      Single Leg Stance: R normal, L wobbly     Functional squat: quad dominant, wt shift to R, pain L hip      CMS Impairment/Limitation/Restriction for FOTO Hip Survey    Therapist reviewed FOTO scores for Chan Dwyer on 4/4/2023.   FOTO documents entered into [employer] - see Media section.    Limitation Score: 64%  Category: Mobility         TREATMENT   Treatment Time In: 12:49  Treatment Time  Out: 1:15  Total Treatment time separate from Evaluation: 26 minutes    Chan received therapeutic exercises to develop strength, endurance, and flexibility for 8 minutes including:    Seated piriformis stretch  Clamshells     Chan received the following therapeutic activity for 8 minutes, including:    HEP review  Sleeping on R side w/ pillow b/t knees      Chan received cold pack plus TENS for 10 minutes to left hip.      Home Exercises Provided and Patient Education Provided     Education provided:   - HEP    Written Home Exercises Provided: yes.  Exercises were reviewed and Chan was able to demonstrate them prior to the end of the session.  Chna demonstrated good  understanding of the education provided.     See EMR under Patient Instructions for exercises provided 4/4/2023.      Assessment   Chan is a 52 y.o. female referred to outpatient Physical Therapy with a medical diagnosis of trochanteric bursitis of both hips. Pt presents with decreased lumbar and hip ROM, impaired strength of B hips and difficulty with prolonged sitting and getting comfortable to sleep.     Pt prognosis is Good.   Pt will benefit from skilled outpatient Physical Therapy to address the deficits stated above and in the chart below, provide pt/family education, and to maximize pt's level of independence.     Plan of care discussed with patient: Yes  Pt's spiritual, cultural and educational needs considered and patient is agreeable to the plan of care and goals as stated below:     Anticipated Barriers for therapy: none    Medical Necessity is demonstrated by the following  History  Co-morbidities and personal factors that may impact the plan of care Co-morbidities:   See med hx    Personal Factors:   no deficits     low   Examination  Body Structures and Functions, activity limitations and participation restrictions that may impact the plan of care Body Regions:   lower extremities    Body Systems:    gross  symmetry  ROM  strength  gross coordinated movement  balance    Participation Restrictions:   none    Activity limitations:     Mobility  walking  stairs    Self care  no deficits    Domestic Life  cooking  doing house work (cleaning house, washing dishes, laundry)    Interactions/Relationships  no deficits    Life Areas  employment    Community and Social Life  no deficits         high   Clinical Presentation stable and uncomplicated low   Decision Making/ Complexity Score: low       Goals:  Short Term Goals: 4 weeks   Pt will be independent with HEP.    Long Term Goals: 8 weeks   Pt will improve lumbar flexion to 100% without increased pain.  Pt will improve BLE MMT to 4+/5 or greater.  Pt demo improvements in FOTO score to 34% limited or less.  Pt will be able to sleep 5-6 hours without interruption due to pain.    Plan   Plan of care Certification: 4/4/2023 to 6/2/23.    Outpatient Physical Therapy 2 times weekly for 8 weeks to include the following interventions: Electrical Stimulation TENS, Manual Therapy, Moist Heat/ Ice, Neuromuscular Re-ed, Patient Education, Therapeutic Activities, and Therapeutic Exercise, ASTYM, Kinesiotaping PRN, Functional Dry Needling    Ashely Taveras, PT, DPT

## 2023-04-10 ENCOUNTER — OFFICE VISIT (OUTPATIENT)
Dept: CARDIOLOGY | Facility: CLINIC | Age: 53
End: 2023-04-10
Payer: COMMERCIAL

## 2023-04-10 VITALS
BODY MASS INDEX: 21.3 KG/M2 | RESPIRATION RATE: 16 BRPM | OXYGEN SATURATION: 100 % | WEIGHT: 124.75 LBS | HEART RATE: 73 BPM | HEIGHT: 64 IN | SYSTOLIC BLOOD PRESSURE: 126 MMHG | DIASTOLIC BLOOD PRESSURE: 80 MMHG

## 2023-04-10 DIAGNOSIS — K21.00 GASTROESOPHAGEAL REFLUX DISEASE WITH ESOPHAGITIS WITHOUT HEMORRHAGE: ICD-10-CM

## 2023-04-10 DIAGNOSIS — R07.9 CHEST PAIN, MODERATE CORONARY ARTERY RISK: Primary | ICD-10-CM

## 2023-04-10 PROCEDURE — 1159F MED LIST DOCD IN RCRD: CPT | Mod: CPTII,S$GLB,, | Performed by: INTERNAL MEDICINE

## 2023-04-10 PROCEDURE — 3046F HEMOGLOBIN A1C LEVEL >9.0%: CPT | Mod: CPTII,S$GLB,, | Performed by: INTERNAL MEDICINE

## 2023-04-10 PROCEDURE — 3066F NEPHROPATHY DOC TX: CPT | Mod: CPTII,S$GLB,, | Performed by: INTERNAL MEDICINE

## 2023-04-10 PROCEDURE — 99999 PR PBB SHADOW E&M-EST. PATIENT-LVL IV: CPT | Mod: PBBFAC,,, | Performed by: INTERNAL MEDICINE

## 2023-04-10 PROCEDURE — 3074F SYST BP LT 130 MM HG: CPT | Mod: CPTII,S$GLB,, | Performed by: INTERNAL MEDICINE

## 2023-04-10 PROCEDURE — 1159F PR MEDICATION LIST DOCUMENTED IN MEDICAL RECORD: ICD-10-PCS | Mod: CPTII,S$GLB,, | Performed by: INTERNAL MEDICINE

## 2023-04-10 PROCEDURE — 1160F RVW MEDS BY RX/DR IN RCRD: CPT | Mod: CPTII,S$GLB,, | Performed by: INTERNAL MEDICINE

## 2023-04-10 PROCEDURE — 3079F PR MOST RECENT DIASTOLIC BLOOD PRESSURE 80-89 MM HG: ICD-10-PCS | Mod: CPTII,S$GLB,, | Performed by: INTERNAL MEDICINE

## 2023-04-10 PROCEDURE — 3079F DIAST BP 80-89 MM HG: CPT | Mod: CPTII,S$GLB,, | Performed by: INTERNAL MEDICINE

## 2023-04-10 PROCEDURE — 99999 PR PBB SHADOW E&M-EST. PATIENT-LVL IV: ICD-10-PCS | Mod: PBBFAC,,, | Performed by: INTERNAL MEDICINE

## 2023-04-10 PROCEDURE — 99214 OFFICE O/P EST MOD 30 MIN: CPT | Mod: S$GLB,,, | Performed by: INTERNAL MEDICINE

## 2023-04-10 PROCEDURE — 3046F PR MOST RECENT HEMOGLOBIN A1C LEVEL > 9.0%: ICD-10-PCS | Mod: CPTII,S$GLB,, | Performed by: INTERNAL MEDICINE

## 2023-04-10 PROCEDURE — 3061F NEG MICROALBUMINURIA REV: CPT | Mod: CPTII,S$GLB,, | Performed by: INTERNAL MEDICINE

## 2023-04-10 PROCEDURE — 3072F LOW RISK FOR RETINOPATHY: CPT | Mod: CPTII,S$GLB,, | Performed by: INTERNAL MEDICINE

## 2023-04-10 PROCEDURE — 3008F BODY MASS INDEX DOCD: CPT | Mod: CPTII,S$GLB,, | Performed by: INTERNAL MEDICINE

## 2023-04-10 PROCEDURE — 3061F PR NEG MICROALBUMINURIA RESULT DOCUMENTED/REVIEW: ICD-10-PCS | Mod: CPTII,S$GLB,, | Performed by: INTERNAL MEDICINE

## 2023-04-10 PROCEDURE — 3066F PR DOCUMENTATION OF TREATMENT FOR NEPHROPATHY: ICD-10-PCS | Mod: CPTII,S$GLB,, | Performed by: INTERNAL MEDICINE

## 2023-04-10 PROCEDURE — 1160F PR REVIEW ALL MEDS BY PRESCRIBER/CLIN PHARMACIST DOCUMENTED: ICD-10-PCS | Mod: CPTII,S$GLB,, | Performed by: INTERNAL MEDICINE

## 2023-04-10 PROCEDURE — 3074F PR MOST RECENT SYSTOLIC BLOOD PRESSURE < 130 MM HG: ICD-10-PCS | Mod: CPTII,S$GLB,, | Performed by: INTERNAL MEDICINE

## 2023-04-10 PROCEDURE — 3008F PR BODY MASS INDEX (BMI) DOCUMENTED: ICD-10-PCS | Mod: CPTII,S$GLB,, | Performed by: INTERNAL MEDICINE

## 2023-04-10 PROCEDURE — 99214 PR OFFICE/OUTPT VISIT, EST, LEVL IV, 30-39 MIN: ICD-10-PCS | Mod: S$GLB,,, | Performed by: INTERNAL MEDICINE

## 2023-04-10 PROCEDURE — 3072F PR LOW RISK FOR RETINOPATHY: ICD-10-PCS | Mod: CPTII,S$GLB,, | Performed by: INTERNAL MEDICINE

## 2023-04-10 RX ORDER — PANTOPRAZOLE SODIUM 40 MG/1
40 TABLET, DELAYED RELEASE ORAL DAILY
Qty: 30 TABLET | Refills: 11 | Status: SHIPPED | OUTPATIENT
Start: 2023-04-10 | End: 2023-10-06 | Stop reason: SDUPTHER

## 2023-04-10 NOTE — PROGRESS NOTES
Subjective:   Patient ID:  Chan Dwyer is a 52 y.o. female who presents for follow-up of Follow-up  S/p cath- normal  Pt with occasional CP, certain foods bother pt  Chest Pain   This is a chronic problem. The current episode started 1 to 4 weeks ago. The problem occurs intermittently. The problem has been gradually improving. The pain is present in the lateral region. The pain is mild. The quality of the pain is described as dull. The pain does not radiate. Pertinent negatives include no dizziness, palpitations or shortness of breath. The pain is aggravated by exertion. She has tried rest for the symptoms. The treatment provided mild relief.   Her past medical history is significant for diabetes.   Pertinent negatives for past medical history include no muscle weakness.     Review of Systems   Constitutional: Negative. Negative for weight gain.   HENT: Negative.     Eyes: Negative.    Cardiovascular: Negative.  Negative for chest pain, leg swelling and palpitations.   Respiratory: Negative.  Negative for shortness of breath.    Endocrine: Negative.    Hematologic/Lymphatic: Negative.    Skin: Negative.    Musculoskeletal:  Negative for muscle weakness.   Gastrointestinal: Negative.    Genitourinary: Negative.    Neurological: Negative.  Negative for dizziness.   Psychiatric/Behavioral: Negative.     Allergic/Immunologic: Negative.    All other systems reviewed and are negative.  Family History   Problem Relation Age of Onset    Diabetes Father     Hypertension Father     Hypertension Mother     Diabetes Mother     Heart attack Mother     No Known Problems Brother     No Known Problems Brother     No Known Problems Brother     No Known Problems Brother     Stomach cancer Sister     No Known Problems Sister     No Known Problems Sister     No Known Problems Sister     No Known Problems Sister     No Known Problems Son     No Known Problems Son     Cancer Neg Hx     Stroke Neg Hx     Colon cancer Neg Hx      Ovarian cancer Neg Hx     Breast cancer Neg Hx      Past Medical History:   Diagnosis Date    Abnormal Pap smear     Abnormal Pap smear of cervix     Arthritis, low back     Diabetes 09/2014     BS 162am 09/16/2014    DM (diabetes mellitus) 09/2014     am 01/26/2016    DM (diabetes mellitus) 09/2014    BS didn't check 01/31/2017    GERD (gastroesophageal reflux disease)     Gestational diabetes     Hepatitis C antibody positive in blood 02/11/2022    RNA NEGATIVE    History of abnormal Pap smear     Surgical menopause     Vitamin D deficiency      Social History     Socioeconomic History    Marital status: Single    Number of children: 2   Occupational History    Occupation:      Employer: [employer]      Employer: [employer]   Tobacco Use    Smoking status: Never    Smokeless tobacco: Never   Substance and Sexual Activity    Alcohol use: Yes     Comment: Rare    Drug use: No    Sexual activity: Yes     Partners: Male     Birth control/protection: Surgical   Social History Narrative    She wears seatbelt.     Current Outpatient Medications on File Prior to Visit   Medication Sig Dispense Refill    alcohol swabs (EASY COMFORT ALCOHOL PAD) PadM Apply 1 each topically 4 (four) times daily. 100 each 11    ibuprofen (ADVIL,MOTRIN) 600 MG tablet Take 1 tablet (600 mg total) by mouth every 6 (six) hours as needed for Pain. 30 tablet 0    insulin glargine, TOUJEO, (TOUJEO) 300 unit/mL (1.5 mL) InPn pen Inject 20 Units into the skin once daily. 4 pen 3    insulin lispro-aabc (LYUMJEV KWIKPEN U-100 INSULIN) 100 unit/mL pen Titrate up to 20 units daily. 5 pen 11    isosorbide mononitrate (IMDUR) 30 MG 24 hr tablet Take 1 tablet (30 mg total) by mouth once daily. 30 tablet 11    meloxicam (MOBIC) 15 MG tablet Take 1 tablet (15 mg total) by mouth once daily. Take with meal 14 tablet 0    promethazine-dextromethorphan (PROMETHAZINE-DM) 6.25-15 mg/5 mL Syrp Take 5 mLs by mouth every 8 (eight)  hours as needed (cough). 120 mL 0    SITagliptin phosphate (JANUVIA) 100 MG Tab Take 1 tablet (100 mg total) by mouth once daily. 30 tablet 11    aspirin (ECOTRIN) 81 MG EC tablet Take 1 tablet (81 mg total) by mouth once daily. 30 tablet 11    pantoprazole (PROTONIX) 20 MG tablet Take 1 tablet (20 mg total) by mouth once daily. 30 tablet 11     Current Facility-Administered Medications on File Prior to Visit   Medication Dose Route Frequency Provider Last Rate Last Admin    chlorhexidine 0.12 % solution 10 mL  10 mL Mouth/Throat On Call Procedure Anusha George PA-C   10 mL at 10/10/22 0628     Review of patient's allergies indicates:   Allergen Reactions    Hydrocodone Hives and Rash       Objective:     Physical Exam  Vitals and nursing note reviewed.   Constitutional:       Appearance: She is well-developed.   HENT:      Head: Normocephalic and atraumatic.   Eyes:      Conjunctiva/sclera: Conjunctivae normal.      Pupils: Pupils are equal, round, and reactive to light.   Cardiovascular:      Rate and Rhythm: Normal rate and regular rhythm.      Pulses: Intact distal pulses.      Heart sounds: Normal heart sounds.   Pulmonary:      Effort: Pulmonary effort is normal.      Breath sounds: Normal breath sounds.   Abdominal:      General: Bowel sounds are normal.      Palpations: Abdomen is soft.   Musculoskeletal:         General: Normal range of motion.      Cervical back: Normal range of motion and neck supple.   Skin:     General: Skin is warm and dry.   Neurological:      Mental Status: She is alert and oriented to person, place, and time.       Assessment:     1. HIstory of Chest pain, moderate coronary artery risk    2. Gastroesophageal reflux disease with esophagitis without hemorrhage        Plan:     HIstory of Chest pain, moderate coronary artery risk    Gastroesophageal reflux disease with esophagitis without hemorrhage      Restart PPI  Stop nitrates

## 2023-04-13 ENCOUNTER — CLINICAL SUPPORT (OUTPATIENT)
Dept: REHABILITATION | Facility: HOSPITAL | Age: 53
End: 2023-04-13
Payer: COMMERCIAL

## 2023-04-13 DIAGNOSIS — R29.898 IMPAIRED STRENGTH OF HIP MUSCLES: ICD-10-CM

## 2023-04-13 PROCEDURE — 97140 MANUAL THERAPY 1/> REGIONS: CPT | Mod: PN

## 2023-04-13 PROCEDURE — 97110 THERAPEUTIC EXERCISES: CPT | Mod: PN

## 2023-04-13 PROCEDURE — 97530 THERAPEUTIC ACTIVITIES: CPT | Mod: PN

## 2023-04-13 NOTE — PROGRESS NOTES
"[facility]   Physical Therapy Treatment Note     Name: Chan Gorman [facility]  Clinic Number: [id number]    Therapy Diagnosis:   Encounter Diagnosis   Name Primary?    Impaired strength of hip muscles      Physician: Demetrio Grant MD    Visit Date: 4/13/2023    Physician Orders: PT Eval and Treat   Medical Diagnosis from Referral: Trochanteric bursitis of both hips [M70.61, M70.62]  Evaluation Date: 4/4/2023  Authorization Period Expiration: 3/26/23  Plan of Care Expiration: 6/2/23  Visit # / Visits authorized: 2/24  FOTO: 1/3     Time In: 11:25 am  Time Out: 12:05 pm  Total Billable Time: 40 minutes      Precautions: Standard, Diabetes    SUBJECTIVE     Pt reports: hip is still hurting; maybe a little better.  She was compliant with home exercise program.  Response to previous treatment: no adverse effects  Functional change: none yet    Pain: 8/10  Location: left buttocks      OBJECTIVE     Objective Measures updated at progress report unless specified.     Treatment     Chan received the treatments listed below:      Chan received therapeutic exercises to develop strength, endurance, and flexibility for 23 minutes including:     Nustep 5'  Seated piriformis stretch 30" 3x  LTR 2'  Bridge 3x10  Clamshells 2x10  Reverse clams 2x10  SL hip abduction 2x10    Manual therapy techniques: for 8 minutes, including:    IMT to left glute med with stim      Chan received the following therapeutic activity for 9 minutes, including:     HEP review (addition of bridges and STS)  IMT education   Sit to stand         Chan received cold pack plus TENS for 00 minutes to left hip.      Patient Education and Home Exercises     Home Exercises Provided and Patient Education Provided     Education provided:   - HEP     Written Home Exercises Provided: yes.  Exercises were reviewed and Chan was able to demonstrate them prior to the end of the session.  Chan demonstrated good  " understanding of the education provided.      See EMR under Patient Instructions for exercises provided 4/4/2023.    ASSESSMENT     Pt tolerated first follow up visit well. Min difficulty and cueing required for correct form on most exercises today. Pt had difficulty with sit to stand; has to use hands on knees to help push up to stand. Tolerated dry needling well; soreness reported after.     Chan Is progressing well towards her goals.   Pt prognosis is Good.     Pt will continue to benefit from skilled outpatient physical therapy to address the deficits listed in the problem list box on initial evaluation, provide pt/family education and to maximize pt's level of independence in the home and community environment.     Pt's spiritual, cultural and educational needs considered and pt agreeable to plan of care and goals.     Anticipated barriers to physical therapy: none    Goals:     Short Term Goals: 4 weeks   Pt will be independent with HEP.     Long Term Goals: 8 weeks   Pt will improve lumbar flexion to 100% without increased pain.  Pt will improve BLE MMT to 4+/5 or greater.  Pt demo improvements in FOTO score to 34% limited or less.  Pt will be able to sleep 5-6 hours without interruption due to pain.    PLAN     Continue with current POC.    Ashely Taveras, PT

## 2023-04-19 ENCOUNTER — CLINICAL SUPPORT (OUTPATIENT)
Dept: REHABILITATION | Facility: HOSPITAL | Age: 53
End: 2023-04-19
Payer: COMMERCIAL

## 2023-04-19 DIAGNOSIS — R29.898 IMPAIRED STRENGTH OF HIP MUSCLES: Primary | ICD-10-CM

## 2023-04-19 PROCEDURE — 97140 MANUAL THERAPY 1/> REGIONS: CPT | Mod: PN

## 2023-04-19 PROCEDURE — 97110 THERAPEUTIC EXERCISES: CPT | Mod: PN

## 2023-04-19 PROCEDURE — 97014 ELECTRIC STIMULATION THERAPY: CPT | Mod: PN

## 2023-04-19 PROCEDURE — 97530 THERAPEUTIC ACTIVITIES: CPT | Mod: PN

## 2023-04-19 NOTE — PROGRESS NOTES
"[facility]   Physical Therapy Treatment Note     Name: Chan Gorman [facility]  Clinic Number: [id number]    Therapy Diagnosis:   Encounter Diagnosis   Name Primary?    Impaired strength of hip muscles Yes       Physician: Demetrio Grant MD    Visit Date: 4/19/2023    Physician Orders: PT Eval and Treat   Medical Diagnosis from Referral: Trochanteric bursitis of both hips [M70.61, M70.62]  Evaluation Date: 4/4/2023  Authorization Period Expiration: 3/26/23  Plan of Care Expiration: 6/2/23  Visit # / Visits authorized: 3/24  FOTO: 1/3     Time In: 3:35 pm  Time Out: 4:27 pm  Total Billable Time: 42 minutes      Precautions: Standard, Diabetes    SUBJECTIVE     Pt reports: hip is getting better; feels like the dry needling last time helped.   She was compliant with home exercise program.  Response to previous treatment: no adverse effects  Functional change: none yet    Pain: 6/10  Location: left buttocks      OBJECTIVE     Objective Measures updated at progress report unless specified.     Treatment     Chan received the treatments listed below:      Chan received therapeutic exercises to develop strength, endurance, and flexibility for 24 minutes including:     Nustep 5'  Seated piriformis stretch 30" 3x  LTR 2'  Bridge 3x10  Clamshells 2x10  Reverse clams 2x10  SL hip abduction 2x10  SLR 2x10 (L)    Manual therapy techniques: for 10 minutes, including:    Left hip LAD     Chan received the following therapeutic activity for 8 minutes, including:     Sit to stand   Shuttle squats 4 cords 3 mins     Chan received cold pack plus TENS for 10 minutes to left hip.      Patient Education and Home Exercises     Home Exercises Provided and Patient Education Provided     Education provided:   - HEP     Written Home Exercises Provided: yes.  Exercises were reviewed and Chan was able to demonstrate them prior to the end of the session.  Chan demonstrated good  " understanding of the education provided.      See EMR under Patient Instructions for exercises provided 4/4/2023.    ASSESSMENT     Pt tolerated first follow up visit well. Added SLR and shuttle squats; pt still with difficulty standing from low surface without use of hands, so raised up the table for pt to be able to perform with only legs.     Chan Is progressing well towards her goals.   Pt prognosis is Good.     Pt will continue to benefit from skilled outpatient physical therapy to address the deficits listed in the problem list box on initial evaluation, provide pt/family education and to maximize pt's level of independence in the home and community environment.     Pt's spiritual, cultural and educational needs considered and pt agreeable to plan of care and goals.     Anticipated barriers to physical therapy: none    Goals:     Short Term Goals: 4 weeks   Pt will be independent with HEP.     Long Term Goals: 8 weeks   Pt will improve lumbar flexion to 100% without increased pain.  Pt will improve BLE MMT to 4+/5 or greater.  Pt demo improvements in FOTO score to 34% limited or less.  Pt will be able to sleep 5-6 hours without interruption due to pain.    PLAN     Continue with current POC.    Ashely Taveras, PT

## 2023-04-27 ENCOUNTER — CLINICAL SUPPORT (OUTPATIENT)
Dept: REHABILITATION | Facility: HOSPITAL | Age: 53
End: 2023-04-27
Payer: COMMERCIAL

## 2023-04-27 DIAGNOSIS — R29.898 IMPAIRED STRENGTH OF HIP MUSCLES: Primary | ICD-10-CM

## 2023-04-27 PROCEDURE — 97110 THERAPEUTIC EXERCISES: CPT | Mod: PN

## 2023-04-27 PROCEDURE — 97014 ELECTRIC STIMULATION THERAPY: CPT | Mod: PN

## 2023-04-27 PROCEDURE — 97530 THERAPEUTIC ACTIVITIES: CPT | Mod: PN

## 2023-04-27 PROCEDURE — 97112 NEUROMUSCULAR REEDUCATION: CPT | Mod: PN

## 2023-04-27 NOTE — PROGRESS NOTES
"[facility]   Physical Therapy Treatment Note     Name: Chan Gorman [facility]  Clinic Number: [id number]    Therapy Diagnosis:   Encounter Diagnosis   Name Primary?    Impaired strength of hip muscles Yes       Physician: Demetrio Grant MD    Visit Date: 4/27/2023    Physician Orders: PT Eval and Treat   Medical Diagnosis from Referral: Trochanteric bursitis of both hips [M70.61, M70.62]  Evaluation Date: 4/4/2023  Authorization Period Expiration: 3/26/23  Plan of Care Expiration: 6/2/23  Visit # / Visits authorized: 4/24  FOTO: 1/3     Time In: 3:30 pm  Time Out: 4:20 pm  Total Billable Time: 40 minutes      Precautions: Standard, Diabetes    SUBJECTIVE     Pt reports: hip is getting better; only hurts now at night and when first getting up in the am.   She was compliant with home exercise program.  Response to previous treatment: no adverse effects  Functional change: none yet    Pain: 4/10  Location: left buttocks      OBJECTIVE     Objective Measures updated at progress report unless specified.     Treatment     Chan received the treatments listed below:      Chan received therapeutic exercises to develop strength, endurance, and flexibility for 23 minutes including:     Nustep 5'  Seated piriformis stretch 30" 3x  Bridge 3x10  SL hip abduction 2x10  SLR 2x10 (L)    Neuromuscular re-education for 8 minutes, including:    Clamshells 2x10  Reverse clams 2x10     Chan received the following therapeutic activity for 9 minutes, including:     Sit to stand w/ 5# KB 2x5  Shuttle squats 4 cords 3 mins       Chan received cold pack plus TENS for 10 minutes to left hip.      Patient Education and Home Exercises     Home Exercises Provided and Patient Education Provided     Education provided:   - HEP     Written Home Exercises Provided: yes.  Exercises were reviewed and Chan was able to demonstrate them prior to the end of the session.  Chan demonstrated good  " understanding of the education provided.      See EMR under Patient Instructions for exercises provided 4/4/2023.    ASSESSMENT     Pt tolerated follow up visit well. Added resistance to sit to stands and pt had difficulty with this so broken up into sets of 5. Pt with poor motor control on reverse clams so cueing required.     Chan Is progressing well towards her goals.   Pt prognosis is Good.     Pt will continue to benefit from skilled outpatient physical therapy to address the deficits listed in the problem list box on initial evaluation, provide pt/family education and to maximize pt's level of independence in the home and community environment.     Pt's spiritual, cultural and educational needs considered and pt agreeable to plan of care and goals.     Anticipated barriers to physical therapy: none    Goals:     Short Term Goals: 4 weeks   Pt will be independent with HEP.     Long Term Goals: 8 weeks   Pt will improve lumbar flexion to 100% without increased pain.  Pt will improve BLE MMT to 4+/5 or greater.  Pt demo improvements in FOTO score to 34% limited or less.  Pt will be able to sleep 5-6 hours without interruption due to pain.    PLAN     Continue with current POC.    Ashely Taveras, PT

## 2023-05-04 ENCOUNTER — CLINICAL SUPPORT (OUTPATIENT)
Dept: REHABILITATION | Facility: HOSPITAL | Age: 53
End: 2023-05-04
Payer: COMMERCIAL

## 2023-05-04 DIAGNOSIS — R29.898 IMPAIRED STRENGTH OF HIP MUSCLES: Primary | ICD-10-CM

## 2023-05-04 PROCEDURE — 97110 THERAPEUTIC EXERCISES: CPT | Mod: PN

## 2023-05-04 PROCEDURE — 97530 THERAPEUTIC ACTIVITIES: CPT | Mod: PN

## 2023-05-04 PROCEDURE — 97014 ELECTRIC STIMULATION THERAPY: CPT | Mod: PN

## 2023-05-04 PROCEDURE — 97112 NEUROMUSCULAR REEDUCATION: CPT | Mod: PN

## 2023-05-04 NOTE — PROGRESS NOTES
"[facility]   Physical Therapy Treatment Note     Name: Chan Gorman [facility]  Clinic Number: [id number]    Therapy Diagnosis:   Encounter Diagnosis   Name Primary?    Impaired strength of hip muscles Yes       Physician: Demetrio Grant MD    Visit Date: 5/4/2023    Physician Orders: PT Eval and Treat   Medical Diagnosis from Referral: Trochanteric bursitis of both hips [M70.61, M70.62]  Evaluation Date: 4/4/2023  Authorization Period Expiration: 3/26/23  Plan of Care Expiration: 6/2/23  Visit # / Visits authorized: 5/24  FOTO: 2/3     Time In: 3:40 pm  Time Out: 4:22 pm  Total Billable Time: 32 minutes      Precautions: Standard, Diabetes    SUBJECTIVE     Pt reports: doing better but still having some pain; will get back in to see MD.  She was compliant with home exercise program.  Response to previous treatment: no adverse effects  Functional change: none yet    Pain: 4/10  Location: left buttocks      OBJECTIVE     Objective Measures updated at progress report unless specified.     Treatment     Chan received the treatments listed below:      Chan received therapeutic exercises to develop strength, endurance, and flexibility for 12 minutes including:     Nustep 5'  Seated piriformis stretch 30" 3x  Bridge 3x10 RTB at knees  SL hip abduction 2x10  SLR 2x10 (L)    Neuromuscular re-education for 10 minutes, including:    Clamshells 2x10 (ball at ankles)  Reverse clams 2x10 (ball at knees)     Chan received the following therapeutic activity for 10 minutes, including:     Sit to stand w/ 5# KB 2x5  Shuttle squats 4 cords 3 mins       Chan received cold pack plus TENS for 10 minutes to left hip.      Patient Education and Home Exercises     Home Exercises Provided and Patient Education Provided     Education provided:   - HEP     Written Home Exercises Provided: yes.  Exercises were reviewed and Chan was able to demonstrate them prior to the end of the session. "  Chan demonstrated good  understanding of the education provided.      See EMR under Patient Instructions for exercises provided 4/4/2023.    ASSESSMENT     Pt tolerated follow up visit well. Progressions noted above in bold. Pt did not report increase in pain throughout duration of session.     Chan Is progressing well towards her goals.   Pt prognosis is Good.     Pt will continue to benefit from skilled outpatient physical therapy to address the deficits listed in the problem list box on initial evaluation, provide pt/family education and to maximize pt's level of independence in the home and community environment.     Pt's spiritual, cultural and educational needs considered and pt agreeable to plan of care and goals.     Anticipated barriers to physical therapy: none    Goals:     Short Term Goals: 4 weeks   Pt will be independent with HEP.     Long Term Goals: 8 weeks   Pt will improve lumbar flexion to 100% without increased pain.  Pt will improve BLE MMT to 4+/5 or greater.  Pt demo improvements in FOTO score to 34% limited or less.  Pt will be able to sleep 5-6 hours without interruption due to pain.    PLAN     Continue with current POC.    Ashely Taveras, PT

## 2023-05-08 ENCOUNTER — TELEPHONE (OUTPATIENT)
Dept: DIABETES | Facility: CLINIC | Age: 53
End: 2023-05-08
Payer: COMMERCIAL

## 2023-05-08 NOTE — TELEPHONE ENCOUNTER
Faxed dexcom order and chart note to [facility]. Form scanned to [employer]

## 2023-06-02 NOTE — PROGRESS NOTES
Patient ID: Chan Dwyer is a 49 y.o. female.    Chief Complaint: Breast Cancer Screening    HPI: Patient presents for her annual mammogram and exam.    History of simple and complex cysts that have been monitored since 2/2014. History of right breast complex cysts increasing in size 12/14 so pt was sent for core biopsy at [facility]- biopsy was cancelled since they could not find a complex cyst on exam to biopsy- noted all simple cysts at the time of the biopsy. Pt was scheduled for 6 mon f/u of the right breast in July 2015 and canceled the appts and again in August - canceled again- May 2016 imaging revealed la complex cysts and right breast simple cysts- la mammo and ultrasound rec for 6 mons- November 2016- simple cyst in right breast stable and complex cysts in left stable- 6 mon f/u left breast ultrasound only was recommended and stable 6/6/17.    Pt had her la mammo today- results pending     Review of Systems   Constitutional: Negative.  Negative for appetite change and unexpected weight change.   HENT: Negative.    Eyes: Negative.  Negative for visual disturbance.   Respiratory: Negative.  Negative for cough and shortness of breath.    Cardiovascular: Negative.  Negative for chest pain.   Gastrointestinal: Negative.  Negative for abdominal pain and diarrhea.   Endocrine: Negative.    Genitourinary: Negative.  Negative for frequency.   Musculoskeletal: Negative.  Negative for back pain.   Skin: Negative.  Negative for rash.   Allergic/Immunologic: Negative.    Neurological: Negative.  Negative for headaches.   Hematological: Negative.  Negative for adenopathy.   Psychiatric/Behavioral: Negative.  The patient is not nervous/anxious.      Breast: No breast pain or nipple discharge. No palpable mass.  No change in her family history.      Current Outpatient Medications   Medication Sig Dispense Refill    benzonatate (TESSALON) 100 MG capsule Take 1-2 capsules (100-200 mg total) by mouth 3 (three)  times daily as needed for Cough. 60 capsule 0    blood sugar diagnostic Strp 1 strip by Misc.(Non-Drug; Combo Route) route 2 (two) times daily. 100 each 5    blood-glucose meter (FREESTYLE SYSTEM KIT) kit Use as instructed 1 each 0    ibuprofen (ADVIL,MOTRIN) 800 MG tablet Take 1 tablet (800 mg total) by mouth every meal as needed for Pain. 90 tablet 1    lancets Misc 1 lancet by Misc.(Non-Drug; Combo Route) route 3 (three) times daily. 100 each 5    levocetirizine (XYZAL) 5 MG tablet Take 1 tablet (5 mg total) by mouth once daily. 90 tablet 0    metFORMIN (GLUCOPHAGE) 500 MG tablet TAKE one tablet BY MOUTH in the morning and ONE TABLET BY MOUTH in the evening with meals (Patient taking differently: Take 500 mg by mouth every evening. TAKE one tablet BY MOUTH in the morning and ONE TABLET BY MOUTH in the evening with meals) 180 tablet 1    promethazine-dextromethorphan (PROMETHAZINE-DM) 6.25-15 mg/5 mL Syrp Take 5 mLs by mouth nightly as needed. 35 mL 0    vitamin D 1000 units Tab Take 1,000 Units by mouth once daily.       No current facility-administered medications for this visit.        Allergies   Allergen Reactions    Hydrocodone Hives and Rash       Past Medical History:   Diagnosis Date    Abnormal Pap smear     Abnormal Pap smear of cervix     Arthritis, low back     Diabetes 9/2014     BS 162am 09/16/2014    DM (diabetes mellitus) 09/2014     am 01/26/2016    DM (diabetes mellitus) 09/2014    BS didn't check 01/31/2017    GERD (gastroesophageal reflux disease)     Gestational diabetes     History of abnormal Pap smear     Surgical menopause     Vitamin D deficiency        Past Surgical History:   Procedure Laterality Date    COLONOSCOPY N/A 6/22/2016    Procedure: COLONOSCOPY;  Surgeon: Vishal Mary III, MD;  Location: [facility];  Service: Endoscopy;  Laterality: N/A;    Cryotherapy of the cervix  1999    DILATION AND CURETTAGE OF UTERUS      HYSTERECTOMY      [facility]   2/2012    TUBAL LIGATION         Family History   Problem Relation Age of Onset    Hypertension Mother     Diabetes Mother     Heart attack Mother     Diabetes Father     Hypertension Father     No Known Problems Son     No Known Problems Son     Cancer Neg Hx     Stroke Neg Hx     Breast cancer Neg Hx     Colon cancer Neg Hx     Ovarian cancer Neg Hx        Social History     Socioeconomic History    Marital status: Single     Spouse name: Not on file    Number of children: 2    Years of education: Not on file    Highest education level: Not on file   Occupational History    Occupation:      Employer: [employer]      Employer: [employer]   Social Needs    Financial resource strain: Not hard at all    Food insecurity:     Worry: Never true     Inability: Never true    Transportation needs:     Medical: No     Non-medical: No   Tobacco Use    Smoking status: Never Smoker    Smokeless tobacco: Never Used   Substance and Sexual Activity    Alcohol use: No     Alcohol/week: 0.0 standard drinks    Drug use: No    Sexual activity: Yes     Partners: Male     Birth control/protection: Surgical   Lifestyle    Physical activity:     Days per week: 3 days     Minutes per session: 30 min    Stress: Not at all   Relationships    Social connections:     Talks on phone: More than three times a week     Gets together: Once a week     Attends [employer] service: More than 4 times per year     Active member of club or organization: No     Attends meetings of clubs or organizations: Never     Relationship status:    Other Topics Concern    Not on file   Social History Narrative    She wears seatbelt.       Vitals:    01/28/20 1147   BP: (!) 106/59   Pulse: 84   Temp: 98.2 °F (36.8 °C)       Physical Exam   Constitutional: She is oriented to person, place, and time. She appears well-developed and well-nourished.   HENT:   Head: Normocephalic and atraumatic.   Eyes:  Pupils are equal, round, and reactive to light. Conjunctivae are normal. No scleral icterus.   Neck: Normal range of motion. Neck supple. No thyromegaly present.   Cardiovascular: Normal rate and regular rhythm.   Pulmonary/Chest: Effort normal and breath sounds normal. Right breast exhibits mass. Right breast exhibits no inverted nipple, no nipple discharge, no skin change and no tenderness. Left breast exhibits no inverted nipple, no mass, no nipple discharge, no skin change and no tenderness. Breasts are symmetrical.   Abdominal: Soft. Bowel sounds are normal.   Musculoskeletal: Normal range of motion.   Lymphadenopathy:        Head (right side): No submental, no submandibular, no tonsillar, no preauricular, no posterior auricular and no occipital adenopathy present.        Head (left side): No submental, no submandibular, no tonsillar, no preauricular, no posterior auricular and no occipital adenopathy present.     She has no cervical adenopathy.        Right cervical: No superficial cervical and no posterior cervical adenopathy present.       Left cervical: No superficial cervical and no posterior cervical adenopathy present.     She has no axillary adenopathy.        Right: No supraclavicular adenopathy present.        Left: No supraclavicular adenopathy present.   Neurological: She is alert and oriented to person, place, and time.   Skin: Skin is warm and dry.   Psychiatric: She has a normal mood and affect. Her behavior is normal. Judgment and thought content normal.     Menarche at 14 y/o    First preg at 15 y/o  LMP: 43 y/o  No oral hormones- uses vaginal hormones     FH: sister stomach cancer -65 y/o    IMAGING: today- pending     Assessment & Plan:  1. History of an abnormal mammogram bilateral breasts - right- simple cyst, left: 2 complex cysts -  Bilateral mammo today   2. Negative clinical exam   3. Awaiting mammo results- recommend if mammo wnl pt f/u with mammo and exam with pcp.   4. BSE  monthly- call for any changes.      Patient Specific Counseling (Will Not Stick From Patient To Patient): Arms and hands Detail Level: Zone

## 2023-06-07 ENCOUNTER — OFFICE VISIT (OUTPATIENT)
Dept: ORTHOPEDICS | Facility: CLINIC | Age: 53
End: 2023-06-07
Payer: COMMERCIAL

## 2023-06-07 ENCOUNTER — OFFICE VISIT (OUTPATIENT)
Dept: PAIN MEDICINE | Facility: CLINIC | Age: 53
End: 2023-06-07
Payer: COMMERCIAL

## 2023-06-07 VITALS
BODY MASS INDEX: 21.11 KG/M2 | HEIGHT: 64 IN | DIASTOLIC BLOOD PRESSURE: 64 MMHG | SYSTOLIC BLOOD PRESSURE: 143 MMHG | WEIGHT: 123.69 LBS | HEART RATE: 57 BPM

## 2023-06-07 VITALS — WEIGHT: 124 LBS | HEIGHT: 64 IN | BODY MASS INDEX: 21.17 KG/M2

## 2023-06-07 DIAGNOSIS — M46.1 SACROILIITIS: ICD-10-CM

## 2023-06-07 DIAGNOSIS — M65.30 TRIGGER FINGER, ACQUIRED: Primary | ICD-10-CM

## 2023-06-07 DIAGNOSIS — M70.61 TROCHANTERIC BURSITIS OF BOTH HIPS: Primary | ICD-10-CM

## 2023-06-07 DIAGNOSIS — M70.62 TROCHANTERIC BURSITIS OF BOTH HIPS: Primary | ICD-10-CM

## 2023-06-07 PROCEDURE — 3008F PR BODY MASS INDEX (BMI) DOCUMENTED: ICD-10-PCS | Mod: CPTII,S$GLB,, | Performed by: PHYSICAL MEDICINE & REHABILITATION

## 2023-06-07 PROCEDURE — 3061F NEG MICROALBUMINURIA REV: CPT | Mod: CPTII,S$GLB,, | Performed by: ORTHOPAEDIC SURGERY

## 2023-06-07 PROCEDURE — 3046F PR MOST RECENT HEMOGLOBIN A1C LEVEL > 9.0%: ICD-10-PCS | Mod: CPTII,S$GLB,, | Performed by: PHYSICAL MEDICINE & REHABILITATION

## 2023-06-07 PROCEDURE — 3066F NEPHROPATHY DOC TX: CPT | Mod: CPTII,S$GLB,, | Performed by: PHYSICAL MEDICINE & REHABILITATION

## 2023-06-07 PROCEDURE — 99213 OFFICE O/P EST LOW 20 MIN: CPT | Mod: S$GLB,,, | Performed by: ORTHOPAEDIC SURGERY

## 2023-06-07 PROCEDURE — 1159F PR MEDICATION LIST DOCUMENTED IN MEDICAL RECORD: ICD-10-PCS | Mod: CPTII,S$GLB,, | Performed by: PHYSICAL MEDICINE & REHABILITATION

## 2023-06-07 PROCEDURE — 99999 PR PBB SHADOW E&M-EST. PATIENT-LVL III: ICD-10-PCS | Mod: PBBFAC,,, | Performed by: PHYSICAL MEDICINE & REHABILITATION

## 2023-06-07 PROCEDURE — 99999 PR PBB SHADOW E&M-EST. PATIENT-LVL III: CPT | Mod: PBBFAC,,, | Performed by: PHYSICAL MEDICINE & REHABILITATION

## 2023-06-07 PROCEDURE — 3008F BODY MASS INDEX DOCD: CPT | Mod: CPTII,S$GLB,, | Performed by: ORTHOPAEDIC SURGERY

## 2023-06-07 PROCEDURE — 1159F MED LIST DOCD IN RCRD: CPT | Mod: CPTII,S$GLB,, | Performed by: PHYSICAL MEDICINE & REHABILITATION

## 2023-06-07 PROCEDURE — 3077F SYST BP >= 140 MM HG: CPT | Mod: CPTII,S$GLB,, | Performed by: PHYSICAL MEDICINE & REHABILITATION

## 2023-06-07 PROCEDURE — 3046F PR MOST RECENT HEMOGLOBIN A1C LEVEL > 9.0%: ICD-10-PCS | Mod: CPTII,S$GLB,, | Performed by: ORTHOPAEDIC SURGERY

## 2023-06-07 PROCEDURE — 3072F PR LOW RISK FOR RETINOPATHY: ICD-10-PCS | Mod: CPTII,S$GLB,, | Performed by: ORTHOPAEDIC SURGERY

## 2023-06-07 PROCEDURE — 3072F LOW RISK FOR RETINOPATHY: CPT | Mod: CPTII,S$GLB,, | Performed by: ORTHOPAEDIC SURGERY

## 2023-06-07 PROCEDURE — 3066F PR DOCUMENTATION OF TREATMENT FOR NEPHROPATHY: ICD-10-PCS | Mod: CPTII,S$GLB,, | Performed by: PHYSICAL MEDICINE & REHABILITATION

## 2023-06-07 PROCEDURE — 3061F PR NEG MICROALBUMINURIA RESULT DOCUMENTED/REVIEW: ICD-10-PCS | Mod: CPTII,S$GLB,, | Performed by: ORTHOPAEDIC SURGERY

## 2023-06-07 PROCEDURE — 3077F PR MOST RECENT SYSTOLIC BLOOD PRESSURE >= 140 MM HG: ICD-10-PCS | Mod: CPTII,S$GLB,, | Performed by: PHYSICAL MEDICINE & REHABILITATION

## 2023-06-07 PROCEDURE — 3078F PR MOST RECENT DIASTOLIC BLOOD PRESSURE < 80 MM HG: ICD-10-PCS | Mod: CPTII,S$GLB,, | Performed by: PHYSICAL MEDICINE & REHABILITATION

## 2023-06-07 PROCEDURE — 1160F PR REVIEW ALL MEDS BY PRESCRIBER/CLIN PHARMACIST DOCUMENTED: ICD-10-PCS | Mod: CPTII,S$GLB,, | Performed by: ORTHOPAEDIC SURGERY

## 2023-06-07 PROCEDURE — 1160F RVW MEDS BY RX/DR IN RCRD: CPT | Mod: CPTII,S$GLB,, | Performed by: ORTHOPAEDIC SURGERY

## 2023-06-07 PROCEDURE — 3046F HEMOGLOBIN A1C LEVEL >9.0%: CPT | Mod: CPTII,S$GLB,, | Performed by: ORTHOPAEDIC SURGERY

## 2023-06-07 PROCEDURE — 3008F BODY MASS INDEX DOCD: CPT | Mod: CPTII,S$GLB,, | Performed by: PHYSICAL MEDICINE & REHABILITATION

## 2023-06-07 PROCEDURE — 1159F MED LIST DOCD IN RCRD: CPT | Mod: CPTII,S$GLB,, | Performed by: ORTHOPAEDIC SURGERY

## 2023-06-07 PROCEDURE — 3072F PR LOW RISK FOR RETINOPATHY: ICD-10-PCS | Mod: CPTII,S$GLB,, | Performed by: PHYSICAL MEDICINE & REHABILITATION

## 2023-06-07 PROCEDURE — 3078F DIAST BP <80 MM HG: CPT | Mod: CPTII,S$GLB,, | Performed by: PHYSICAL MEDICINE & REHABILITATION

## 2023-06-07 PROCEDURE — 99214 PR OFFICE/OUTPT VISIT, EST, LEVL IV, 30-39 MIN: ICD-10-PCS | Mod: S$GLB,,, | Performed by: PHYSICAL MEDICINE & REHABILITATION

## 2023-06-07 PROCEDURE — 3066F PR DOCUMENTATION OF TREATMENT FOR NEPHROPATHY: ICD-10-PCS | Mod: CPTII,S$GLB,, | Performed by: ORTHOPAEDIC SURGERY

## 2023-06-07 PROCEDURE — 3061F PR NEG MICROALBUMINURIA RESULT DOCUMENTED/REVIEW: ICD-10-PCS | Mod: CPTII,S$GLB,, | Performed by: PHYSICAL MEDICINE & REHABILITATION

## 2023-06-07 PROCEDURE — 99213 PR OFFICE/OUTPT VISIT, EST, LEVL III, 20-29 MIN: ICD-10-PCS | Mod: S$GLB,,, | Performed by: ORTHOPAEDIC SURGERY

## 2023-06-07 PROCEDURE — 99214 OFFICE O/P EST MOD 30 MIN: CPT | Mod: S$GLB,,, | Performed by: PHYSICAL MEDICINE & REHABILITATION

## 2023-06-07 PROCEDURE — 1159F PR MEDICATION LIST DOCUMENTED IN MEDICAL RECORD: ICD-10-PCS | Mod: CPTII,S$GLB,, | Performed by: ORTHOPAEDIC SURGERY

## 2023-06-07 PROCEDURE — 3072F LOW RISK FOR RETINOPATHY: CPT | Mod: CPTII,S$GLB,, | Performed by: PHYSICAL MEDICINE & REHABILITATION

## 2023-06-07 PROCEDURE — 99999 PR PBB SHADOW E&M-EST. PATIENT-LVL III: CPT | Mod: PBBFAC,,, | Performed by: ORTHOPAEDIC SURGERY

## 2023-06-07 PROCEDURE — 99999 PR PBB SHADOW E&M-EST. PATIENT-LVL III: ICD-10-PCS | Mod: PBBFAC,,, | Performed by: ORTHOPAEDIC SURGERY

## 2023-06-07 PROCEDURE — 3061F NEG MICROALBUMINURIA REV: CPT | Mod: CPTII,S$GLB,, | Performed by: PHYSICAL MEDICINE & REHABILITATION

## 2023-06-07 PROCEDURE — 3008F PR BODY MASS INDEX (BMI) DOCUMENTED: ICD-10-PCS | Mod: CPTII,S$GLB,, | Performed by: ORTHOPAEDIC SURGERY

## 2023-06-07 PROCEDURE — 3046F HEMOGLOBIN A1C LEVEL >9.0%: CPT | Mod: CPTII,S$GLB,, | Performed by: PHYSICAL MEDICINE & REHABILITATION

## 2023-06-07 PROCEDURE — 3066F NEPHROPATHY DOC TX: CPT | Mod: CPTII,S$GLB,, | Performed by: ORTHOPAEDIC SURGERY

## 2023-06-07 RX ORDER — DICLOFENAC SODIUM 50 MG/1
50 TABLET, DELAYED RELEASE ORAL 2 TIMES DAILY
Qty: 60 TABLET | Refills: 2 | Status: SHIPPED | OUTPATIENT
Start: 2023-06-07 | End: 2023-12-19

## 2023-06-07 NOTE — PROGRESS NOTES
Established Patient Chronic Pain Note (Follow up visit)    Chief Complaint:   Chief Complaint   Patient presents with    Hip Pain       SUBJECTIVE:    Chan Dwyer is a 52 y.o. female who presents to the clinic for a follow-up appointment for back and hip pain.  She was referred to physical therapy previously but only completed 5 visits and was lost to follow-up.  Since the last visit, Chan Dwyer states the pain has been persistant. Current pain intensity is 8/10.    Patient denies night fever/night sweats, urinary incontinence, bowel incontinence, significant weight loss, significant motor weakness, and loss of sensations.    Pain Disability Index Review:   No flowsheet data found.     Initial HPI 03/27/2023:  Chan Dwyer is a 52 y.o. female, right-hand dominant, who presents to the clinic for the evaluation of back and leg pain.  She was referred by her primary care team for further evaluation management of this pain.  She has past medical history of headaches, carpal tunnel syndrome, dm 2, GERD, hepatitis-C, polyarthritis, multiple other medical comorbidities as listed in her chart.  The pain started a few weeks ago  and symptoms have been unchanged.The pain is located in the lower back area and radiates to the bilateral hips, mostly on the left.  The pain is described as  aching, tightness  and is rated as 8/10. The pain is rated with a score of  6/10 on the BEST day and a score of 9/10 on the WORST day.  Symptoms interfere with daily activity. The pain is exacerbated by movement, walking, direct pressure to the left hip.  The pain is mitigated by Tylenol.  She works as a .          Non-Pharmacologic Treatments:  Physical Therapy/Home Exercise: no  Ice/Heat:no  TENS: no  Acupuncture: no  Massage: no  Chiropractic: no    Other: no        Pain Medications:  - Opioids:  None  - Adjuvant Medications:  Flexeril, NSAIDs  - Anti-Coagulants: Aspirin      report:   Reviewed and consistent with medication use as prescribed.     Pain Procedures:   Denies        Imaging:   X-ray lumbar spine 03/17/2023:   Bones: Intact.  Alignment: Satisfactory.  Spondylosis: Satisfactory disc heights.  Minimal anterior osteophytosis.  Questionable mild lower lumbar facet degenerative changes.  Miscellaneous: Partially visualized left abdominal device.  Possible mild constipation.    X-ray left hip 02/21/2023:  Frontal view of the pelvis and 2 views of the left hip.  3 images.  No comparison is available.  No acute fracture or dislocation.  No significant degenerative changes.  The pelvis is grossly unremarkable.  Bowel gas pattern is normal.    PMHx,PSHx, Social history, and Family history:  I have reviewed the patient's medical, surgical, social, and family history in detail and updated the computerized patient record.    Review of patient's allergies indicates:   Allergen Reactions    Hydrocodone Hives and Rash       Current Outpatient Medications   Medication Sig    alcohol swabs (EASY COMFORT ALCOHOL PAD) PadM Apply 1 each topically 4 (four) times daily.    aspirin (ECOTRIN) 81 MG EC tablet Take 1 tablet (81 mg total) by mouth once daily.    ibuprofen (ADVIL,MOTRIN) 600 MG tablet Take 1 tablet (600 mg total) by mouth every 6 (six) hours as needed for Pain.    insulin glargine, TOUJEO, (TOUJEO) 300 unit/mL (1.5 mL) InPn pen Inject 20 Units into the skin once daily.    insulin lispro-aabc (LYUMJEV KWIKPEN U-100 INSULIN) 100 unit/mL pen Titrate up to 20 units daily.    pantoprazole (PROTONIX) 40 MG tablet Take 1 tablet (40 mg total) by mouth once daily.    diclofenac (VOLTAREN) 50 MG EC tablet Take 1 tablet (50 mg total) by mouth 2 (two) times daily.    promethazine-dextromethorphan (PROMETHAZINE-DM) 6.25-15 mg/5 mL Syrp Take 5 mLs by mouth every 8 (eight) hours as needed (cough). (Patient not taking: Reported on 6/7/2023)    SITagliptin phosphate (JANUVIA) 100 MG Tab Take 1 tablet (100 mg  "total) by mouth once daily. (Patient not taking: Reported on 6/7/2023)     No current facility-administered medications for this visit.     Facility-Administered Medications Ordered in Other Visits   Medication    chlorhexidine 0.12 % solution 10 mL         REVIEW OF SYSTEMS:    GENERAL:  No weight loss, malaise or fevers.  HEENT:   No recent changes in vision or hearing  NECK:  Negative for lumps, no difficulty with swallowing.  RESPIRATORY:  Negative for cough, wheezing or shortness of breath, patient denies any recent URI.  CARDIOVASCULAR:  Negative for chest pain, leg swelling or palpitations.  GI:  Negative for abdominal discomfort, blood in stools or black stools or change in bowel habits.  MUSCULOSKELETAL:  See HPI.  SKIN:  Negative for lesions, rash, and itching.  PSYCH:  No mood disorder or recent psychosocial stressors.  Patients sleep is not disturbed secondary to pain.  HEMATOLOGY/LYMPHOLOGY:  Negative for prolonged bleeding, bruising easily or swollen nodes.  Patient is currently taking anti-coagulants - ASA  NEURO:   No history of headaches, syncope, paralysis, seizures or tremors.  All other reviewed and negative other than HPI.       OBJECTIVE:    BP (!) 143/64   Pulse (!) 57   Ht 5' 4" (1.626 m)   Wt 56.1 kg (123 lb 10.9 oz)   BMI 21.23 kg/m²     PHYSICAL EXAMINATION:    GENERAL: Well appearing, in no acute distress, alert and oriented x3.  PSYCH:  Mood and affect appropriate.  SKIN: Skin color, texture, turgor normal, no rashes or lesions.  HEAD/FACE:  Normocephalic, atraumatic. Cranial nerves grossly intact.   CV: RRR with palpation of the radial artery.  PULM: No evidence of respiratory difficulty, symmetric chest rise.  GI:  Soft and non-tender.  BACK: Straight leg raising in the sitting and supine positions is negative to radicular pain.  Mild pain to palpation over the facet joints of the lumbar spine and lumbar paraspinal.  Limited range of motion secondary to pain " reproduction.  EXTREMITIES: No deformities, edema, or skin discoloration. Good capillary refill.  MUSCULOSKELETAL:  Hip and knee provocative maneuvers are negative.  There is mild-to-moderate pain with palpation over the sacroiliac joints bilaterally.  FABERs test is equivocal.  FADIRs test is negative.  Tenderness palpation over the left trochanteric bursa and minimally on the right.  Bilateral upper and lower extremity strength is normal and symmetric.  No atrophy or tone abnormalities are noted.  NEURO: Bilateral upper and lower extremity coordination and muscle stretch reflexes are physiologic and symmetric.  Plantar response are downgoing. No clonus.  No loss of sensation is noted.  GAIT: normal.      LABS:  Lab Results   Component Value Date    WBC 6.15 03/14/2023    HGB 12.5 03/14/2023    HCT 42.1 03/14/2023    MCV 96 03/14/2023     03/14/2023       CMP  Sodium   Date Value Ref Range Status   03/14/2023 142 136 - 145 mmol/L Final     Potassium   Date Value Ref Range Status   03/14/2023 4.7 3.5 - 5.1 mmol/L Final     Chloride   Date Value Ref Range Status   03/14/2023 104 95 - 110 mmol/L Final     CO2   Date Value Ref Range Status   03/14/2023 28 23 - 29 mmol/L Final     Glucose   Date Value Ref Range Status   03/14/2023 236 (H) 70 - 110 mg/dL Final     BUN   Date Value Ref Range Status   03/14/2023 10 6 - 20 mg/dL Final     Creatinine   Date Value Ref Range Status   03/14/2023 0.7 0.5 - 1.4 mg/dL Final     Calcium   Date Value Ref Range Status   03/14/2023 9.8 8.7 - 10.5 mg/dL Final     Total Protein   Date Value Ref Range Status   10/03/2022 6.6 6.0 - 8.4 g/dL Final     Albumin   Date Value Ref Range Status   10/03/2022 3.6 3.5 - 5.2 g/dL Final     Total Bilirubin   Date Value Ref Range Status   10/03/2022 0.5 0.1 - 1.0 mg/dL Final     Comment:     For infants and newborns, interpretation of results should be based  on gestational age, weight and in agreement with clinical  observations.    Premature  Infant recommended reference ranges:  Up to 24 hours.............<8.0 mg/dL  Up to 48 hours............<12.0 mg/dL  3-5 days..................<15.0 mg/dL  6-29 days.................<15.0 mg/dL       Alkaline Phosphatase   Date Value Ref Range Status   10/03/2022 116 55 - 135 U/L Final     AST   Date Value Ref Range Status   10/03/2022 15 10 - 40 U/L Final     ALT   Date Value Ref Range Status   10/03/2022 14 10 - 44 U/L Final     Anion Gap   Date Value Ref Range Status   03/14/2023 10 8 - 16 mmol/L Final     eGFR if    Date Value Ref Range Status   02/11/2022 >60 >60 mL/min/1.73 m^2 Final     eGFR if non    Date Value Ref Range Status   02/11/2022 >60 >60 mL/min/1.73 m^2 Final     Comment:     Calculation used to obtain the estimated glomerular filtration  rate (eGFR) is the CKD-EPI equation.          Lab Results   Component Value Date    HGBA1C 9.6 (H) 03/14/2023             ASSESSMENT: 52 y.o. year old female with back and hip pain, consistent with     1. Trochanteric bursitis of both hips        2. Sacroiliitis              PLAN:   - Interventions:  None at this time     - Anticoagulation use: Patient is taking ASA as 1° prevention     - Medications: I have stressed the importance of physical activity and a home exercise plan to help with pain and improve health. and Patient can continue with medications for now since they are providing benefits, using them appropriately, and without side effects.     Change anti-inflammatory for Mobic to diclofenac 50 mg b.i.d. p.r.n.          - Therapy:  Will message physical therapy team to reschedule her sessions     - Psychological:  Discussed coping mechanisms to help address chronic pain issues     - Labs:  Reviewed     - Imaging: Reviewed available imaging with patient and answered any questions they had regarding study.     - Consults/Referrals:  Physical therapy     - Records:  Reviewed/Obtain old records from outside physicians and  imaging     - Follow up visit: return to clinic as needed     - Counseled patient regarding the importance of activity modification and physical therapy     - This condition does not require this patient to take time off of work, and the primary goal of our Pain Management services is to improve the patient's functional capacity.     - Patient Questions: Answered all of the patient's questions regarding diagnosis, therapy, and treatment    The above plan and management options were discussed at length with patient. Patient is in agreement with the above and verbalized understanding.      Demetrio Grant MD  Interventional Pain Management  [facility]    Disclaimer:  This note was prepared using voice recognition system and is likely to have sound alike errors that may have been overlooked even after proof reading.  Please call me with any questions

## 2023-06-27 ENCOUNTER — CLINICAL SUPPORT (OUTPATIENT)
Dept: REHABILITATION | Facility: HOSPITAL | Age: 53
End: 2023-06-27
Attending: PHYSICAL MEDICINE & REHABILITATION
Payer: COMMERCIAL

## 2023-06-27 DIAGNOSIS — M70.61 TROCHANTERIC BURSITIS OF BOTH HIPS: ICD-10-CM

## 2023-06-27 DIAGNOSIS — R29.898 IMPAIRED STRENGTH OF HIP MUSCLES: Primary | ICD-10-CM

## 2023-06-27 DIAGNOSIS — M46.1 SACROILIITIS: ICD-10-CM

## 2023-06-27 DIAGNOSIS — M70.62 TROCHANTERIC BURSITIS OF BOTH HIPS: ICD-10-CM

## 2023-06-27 PROCEDURE — 97530 THERAPEUTIC ACTIVITIES: CPT | Mod: PN

## 2023-06-27 PROCEDURE — 97112 NEUROMUSCULAR REEDUCATION: CPT | Mod: PN

## 2023-06-27 PROCEDURE — 97110 THERAPEUTIC EXERCISES: CPT | Mod: PN

## 2023-06-27 NOTE — PROGRESS NOTES
"[facility]   Physical Therapy Treatment Note/ Plan of Care Update    Name: Chan Gorman [facility]  Clinic Number: [id number]    Therapy Diagnosis:   Encounter Diagnoses   Name Primary?    Trochanteric bursitis of both hips     Sacroiliitis     Impaired strength of hip muscles Yes       Physician: Demetrio Grant MD    Visit Date: 6/27/2023    Physician Orders: PT Eval and Treat   Medical Diagnosis from Referral: Trochanteric bursitis of both hips [M70.61, M70.62]  Evaluation Date: 4/4/2023  Authorization Period Expiration: 3/26/23  Plan of Care Expiration: 6/2/23, 8/11/23  Visit # / Visits authorized: 6/24  FOTO: 2/3     Time In: 8:30 am  Time Out: 9:12 am  Total Billable Time: 42 minutes      Precautions: Standard, Diabetes    SUBJECTIVE     Pt reports: still having B hip pain in the month break from PT. Is better than it initially was but was aggravated by a long car ride. Sitting is main trigger now. Can sit for about 10 mins before pain sets in. Sleep is much better.   She was NOT compliant with home exercise program.  Response to previous treatment: no adverse effects  Functional change: none yet    Pain: 4/10  Location: B buttocks      OBJECTIVE     Objective Measures updated at progress report unless specified.        Range of Motion/Strength:      Thoracolumbar AROM Pain/Dysfunction with Movement   Flexion 50% Pain B hips   Extension 100% No pain   Right side bending 75%     Left side bending 75%              L/E MMT Right Left Pain/Dysfunction with Movement   Hip Flexion 4-/5 4-/5     Hip Abduction 4/5 3+/5     Hip Adduction 4/5 4/5     Knee Flexion 4+/5 4+/5     Knee Extension 4+/5 4+/5     Ankle DF 5/5 5/5         Treatment     Chan received the treatments listed below:      Chan received therapeutic exercises to develop strength, endurance, and flexibility for 11 minutes including:     Bike 5'  Seated piriformis stretch 30" 3x  SLR 2x10 B    Neuromuscular " re-education for 23 minutes, including:    Clamshells 2x10 (ball at ankles)  Reverse clams 2x10 (ball at knees)  Bridge 3x10 RTB at knees  SL hip abduction 2x10 B  Prone hip extension 2x10 B     Chan received the following therapeutic activity for 8 minutes, including:     Shuttle squats 4 cords 3 mins  Re-assessment        Chan received cold pack plus TENS for 00 minutes to left hip.      Patient Education and Home Exercises     Home Exercises Provided and Patient Education Provided     Education provided:   - HEP     Written Home Exercises Provided: yes.  Exercises were reviewed and Chan was able to demonstrate them prior to the end of the session.  hCan demonstrated good  understanding of the education provided.      See EMR under Patient Instructions for exercises provided 4/4/2023.    ASSESSMENT     Pt tolerated follow up visit well. Pt re-assessed for plan of care update. No significant changes made in the month she has not been in PT. Pt is sleeping better but now her main trigger of pain is prolonged sitting.     Chan Is progressing well towards her goals.   Pt prognosis is Good.     Pt will continue to benefit from skilled outpatient physical therapy to address the deficits listed in the problem list box on initial evaluation, provide pt/family education and to maximize pt's level of independence in the home and community environment.     Pt's spiritual, cultural and educational needs considered and pt agreeable to plan of care and goals.     Anticipated barriers to physical therapy: none    Goals:     Short Term Goals: 4 weeks   Pt will be independent with HEP. (Progressing)     Long Term Goals: 8 weeks   Pt will improve lumbar flexion to 100% without increased pain.  (Progressing)  Pt will improve BLE MMT to 4+/5 or greater.  (Progressing)  Pt demo improvements in FOTO score to 34% limited or less.  (Progressing)  Pt will be able to sleep 5-6 hours without interruption due to  pain. (MET, D/C)    *NEW GOAL*  Pt will tolerate sitting for 30 mins or greater without increased hip pain.    PLAN     Extend POC for 1x/6 weeks.     Ashely Taveras, PT

## 2023-07-07 ENCOUNTER — OFFICE VISIT (OUTPATIENT)
Dept: ORTHOPEDICS | Facility: CLINIC | Age: 53
End: 2023-07-07
Payer: COMMERCIAL

## 2023-07-07 VITALS — HEIGHT: 64 IN | WEIGHT: 123 LBS | BODY MASS INDEX: 21 KG/M2

## 2023-07-07 DIAGNOSIS — R20.2 NUMBNESS AND TINGLING: Primary | ICD-10-CM

## 2023-07-07 DIAGNOSIS — G56.03 BILATERAL CARPAL TUNNEL SYNDROME: ICD-10-CM

## 2023-07-07 DIAGNOSIS — R20.0 NUMBNESS AND TINGLING: Primary | ICD-10-CM

## 2023-07-07 DIAGNOSIS — M65.30 TRIGGER FINGER, ACQUIRED: Primary | ICD-10-CM

## 2023-07-07 PROCEDURE — 99999 PR PBB SHADOW E&M-EST. PATIENT-LVL III: CPT | Mod: PBBFAC,,, | Performed by: ORTHOPAEDIC SURGERY

## 2023-07-07 PROCEDURE — 3061F PR NEG MICROALBUMINURIA RESULT DOCUMENTED/REVIEW: ICD-10-PCS | Mod: CPTII,S$GLB,, | Performed by: ORTHOPAEDIC SURGERY

## 2023-07-07 PROCEDURE — 3072F LOW RISK FOR RETINOPATHY: CPT | Mod: CPTII,S$GLB,, | Performed by: ORTHOPAEDIC SURGERY

## 2023-07-07 PROCEDURE — 3046F HEMOGLOBIN A1C LEVEL >9.0%: CPT | Mod: CPTII,S$GLB,, | Performed by: ORTHOPAEDIC SURGERY

## 2023-07-07 PROCEDURE — 1160F PR REVIEW ALL MEDS BY PRESCRIBER/CLIN PHARMACIST DOCUMENTED: ICD-10-PCS | Mod: CPTII,S$GLB,, | Performed by: ORTHOPAEDIC SURGERY

## 2023-07-07 PROCEDURE — 3066F PR DOCUMENTATION OF TREATMENT FOR NEPHROPATHY: ICD-10-PCS | Mod: CPTII,S$GLB,, | Performed by: ORTHOPAEDIC SURGERY

## 2023-07-07 PROCEDURE — 3008F PR BODY MASS INDEX (BMI) DOCUMENTED: ICD-10-PCS | Mod: CPTII,S$GLB,, | Performed by: ORTHOPAEDIC SURGERY

## 2023-07-07 PROCEDURE — 3046F PR MOST RECENT HEMOGLOBIN A1C LEVEL > 9.0%: ICD-10-PCS | Mod: CPTII,S$GLB,, | Performed by: ORTHOPAEDIC SURGERY

## 2023-07-07 PROCEDURE — 99999 PR PBB SHADOW E&M-EST. PATIENT-LVL III: ICD-10-PCS | Mod: PBBFAC,,, | Performed by: ORTHOPAEDIC SURGERY

## 2023-07-07 PROCEDURE — 1160F RVW MEDS BY RX/DR IN RCRD: CPT | Mod: CPTII,S$GLB,, | Performed by: ORTHOPAEDIC SURGERY

## 2023-07-07 PROCEDURE — 3066F NEPHROPATHY DOC TX: CPT | Mod: CPTII,S$GLB,, | Performed by: ORTHOPAEDIC SURGERY

## 2023-07-07 PROCEDURE — 3072F PR LOW RISK FOR RETINOPATHY: ICD-10-PCS | Mod: CPTII,S$GLB,, | Performed by: ORTHOPAEDIC SURGERY

## 2023-07-07 PROCEDURE — 99213 OFFICE O/P EST LOW 20 MIN: CPT | Mod: 25,S$GLB,, | Performed by: ORTHOPAEDIC SURGERY

## 2023-07-07 PROCEDURE — 3061F NEG MICROALBUMINURIA REV: CPT | Mod: CPTII,S$GLB,, | Performed by: ORTHOPAEDIC SURGERY

## 2023-07-07 PROCEDURE — 1159F PR MEDICATION LIST DOCUMENTED IN MEDICAL RECORD: ICD-10-PCS | Mod: CPTII,S$GLB,, | Performed by: ORTHOPAEDIC SURGERY

## 2023-07-07 PROCEDURE — 20550 TENDON SHEATH: ICD-10-PCS | Mod: LT,S$GLB,, | Performed by: ORTHOPAEDIC SURGERY

## 2023-07-07 PROCEDURE — 99213 PR OFFICE/OUTPT VISIT, EST, LEVL III, 20-29 MIN: ICD-10-PCS | Mod: 25,S$GLB,, | Performed by: ORTHOPAEDIC SURGERY

## 2023-07-07 PROCEDURE — 1159F MED LIST DOCD IN RCRD: CPT | Mod: CPTII,S$GLB,, | Performed by: ORTHOPAEDIC SURGERY

## 2023-07-07 PROCEDURE — 3008F BODY MASS INDEX DOCD: CPT | Mod: CPTII,S$GLB,, | Performed by: ORTHOPAEDIC SURGERY

## 2023-07-07 PROCEDURE — 20550 NJX 1 TENDON SHEATH/LIGAMENT: CPT | Mod: LT,S$GLB,, | Performed by: ORTHOPAEDIC SURGERY

## 2023-07-07 RX ORDER — TRIAMCINOLONE ACETONIDE 40 MG/ML
40 INJECTION, SUSPENSION INTRA-ARTICULAR; INTRAMUSCULAR
Status: DISCONTINUED | OUTPATIENT
Start: 2023-07-07 | End: 2023-07-07 | Stop reason: HOSPADM

## 2023-07-07 RX ADMIN — TRIAMCINOLONE ACETONIDE 40 MG: 40 INJECTION, SUSPENSION INTRA-ARTICULAR; INTRAMUSCULAR at 01:07

## 2023-07-07 NOTE — PROCEDURES
Tendon Sheath    Date/Time: 7/7/2023 1:30 PM  Performed by: Adarsh Chavez MD  Authorized by: Adarsh Chavez MD     Consent Done?:  Yes (Verbal)  Indications:  Pain  Timeout: prior to procedure the correct patient, procedure, and site was verified    Prep: patient was prepped and draped in usual sterile fashion      Local anesthesia used?: Yes    Local anesthetic:  Lidocaine 2% without epinephrine  Anesthetic total (ml):  0.5    Location:  Ring finger  Site:  L ring flexor tendon sheath  Needle size:  25 G  Medications:  40 mg triamcinolone acetonide 40 mg/mL

## 2023-07-07 NOTE — PROGRESS NOTES
Subjective:     Patient ID: Chan Dwyer is a 52 y.o. female.    Chief Complaint: Pain of the Left Hand      HPI:  The patient is a 52-year-old female who had nerve studies in 2018 that did confirm bilateral carpal tunnel syndrome.  She subsequently had a left ring trigger finger release 10/10/2022.  She wanted to delay injection of that trigger finger until after her cruise to [place] and reports for that issue.  She was told she did have carpal tunnel documented by nerve conduction studies in 2018.  She wishes to have that repeated.  Additionally she wishes to have injection left ring trigger finger scar    Past Medical History:   Diagnosis Date    Abnormal Pap smear     Abnormal Pap smear of cervix     Arthritis, low back     Diabetes 09/2014     BS 162am 09/16/2014    DM (diabetes mellitus) 09/2014     am 01/26/2016    DM (diabetes mellitus) 09/2014    BS didn't check 01/31/2017    GERD (gastroesophageal reflux disease)     Gestational diabetes     Hepatitis C antibody positive in blood 02/11/2022    RNA NEGATIVE    History of abnormal Pap smear     Surgical menopause     Vitamin D deficiency      Past Surgical History:   Procedure Laterality Date    COLONOSCOPY N/A 6/22/2016    Procedure: COLONOSCOPY;  Surgeon: Vishal Mary III, MD;  Location: [facility] ENDO;  Service: Endoscopy;  Laterality: N/A;    Cryotherapy of the cervix  1999    DILATION AND CURETTAGE OF UTERUS      HYSTERECTOMY      LEFT HEART CATHETERIZATION Left 3/24/2023    Procedure: Left heart cath;  Surgeon: Lee Lambert MD;  Location: [facility] CATH LAB;  Service: Cardiology;  Laterality: Left;    [facility]  2/2012    TRIGGER FINGER RELEASE Left 10/10/2022    Procedure: RELEASE, TRIGGER FINGER;  Surgeon: Adarsh Chavez MD;  Location: [facility] OR;  Service: Orthopedics;  Laterality: Left;  left ring trigger finger release    TUBAL LIGATION       Family History   Problem Relation Age of Onset    Diabetes Father     Hypertension  Father     Hypertension Mother     Diabetes Mother     Heart attack Mother     No Known Problems Brother     No Known Problems Brother     No Known Problems Brother     No Known Problems Brother     Stomach cancer Sister     No Known Problems Sister     No Known Problems Sister     No Known Problems Sister     No Known Problems Sister     No Known Problems Son     No Known Problems Son     Cancer Neg Hx     Stroke Neg Hx     Colon cancer Neg Hx     Ovarian cancer Neg Hx     Breast cancer Neg Hx      Social History     Socioeconomic History    Marital status: Single    Number of children: 2   Occupational History    Occupation:      Employer: [employer]     Employer: [employer]   Tobacco Use    Smoking status: Never    Smokeless tobacco: Never   Substance and Sexual Activity    Alcohol use: Yes     Comment: Rare    Drug use: No    Sexual activity: Yes     Partners: Male     Birth control/protection: Surgical   Social History Narrative    She wears seatbelt.     Medication List with Changes/Refills   Current Medications    ALCOHOL SWABS (EASY COMFORT ALCOHOL PAD) PADM    Apply 1 each topically 4 (four) times daily.    ASPIRIN (ECOTRIN) 81 MG EC TABLET    Take 1 tablet (81 mg total) by mouth once daily.    DICLOFENAC (VOLTAREN) 50 MG EC TABLET    Take 1 tablet (50 mg total) by mouth 2 (two) times daily.    IBUPROFEN (ADVIL,MOTRIN) 600 MG TABLET    Take 1 tablet (600 mg total) by mouth every 6 (six) hours as needed for Pain.    INSULIN GLARGINE, TOUJEO, (TOUJEO) 300 UNIT/ML (1.5 ML) INPN PEN    Inject 20 Units into the skin once daily.    INSULIN LISPRO-AABC (LYUMJEV KWIKPEN U-100 INSULIN) 100 UNIT/ML PEN    Titrate up to 20 units daily.    PANTOPRAZOLE (PROTONIX) 40 MG TABLET    Take 1 tablet (40 mg total) by mouth once daily.     Review of patient's allergies indicates:   Allergen Reactions    Hydrocodone Hives and Rash     Review of Systems   Constitutional: Negative for malaise/fatigue.    HENT:  Negative for hearing loss.    Eyes:  Negative for double vision and visual disturbance.   Cardiovascular:  Positive for chest pain.   Respiratory:  Negative for shortness of breath.    Endocrine: Negative for cold intolerance.   Hematologic/Lymphatic: Does not bruise/bleed easily.   Skin:  Negative for poor wound healing and suspicious lesions.   Musculoskeletal:  Negative for gout, joint pain and joint swelling.   Gastrointestinal:  Positive for heartburn. Negative for nausea and vomiting.   Genitourinary:  Negative for dysuria.   Neurological:  Positive for focal weakness, numbness, paresthesias and sensory change.   Psychiatric/Behavioral:  Negative for depression, memory loss and substance abuse. The patient is not nervous/anxious.    Allergic/Immunologic: Negative for persistent infections.     Objective:   Body mass index is 21.11 kg/m².  There were no vitals filed for this visit.             General    Constitutional: She is oriented to person, place, and time. She appears well-developed and well-nourished. No distress.   HENT:   Head: Normocephalic.   Eyes: EOM are normal.   Pulmonary/Chest: Effort normal.   Neurological: She is oriented to person, place, and time.   Psychiatric: She has a normal mood and affect.             Right Hand/Wrist Exam     Inspection   Scars: Wrist - absent Hand -  absent  Effusion: Wrist - absent Hand -  absent    Other     Neuorologic Exam    Median Distribution: normal  Ulnar Distribution: normal  Radial Distribution: normal      Left Hand/Wrist Exam     Inspection   Scars: Wrist - absent Hand -  present  Effusion: Wrist - absent Hand -  absent    Pain   Hand - The patient exhibits pain of the ring MCP.    Other     Sensory Exam  Median Distribution: normal  Ulnar Distribution: normal  Radial Distribution: normal    Comments:  The patient has a well-healed left ring trigger finger scar.  She wishes to have that injected.          Vascular Exam       Capillary  Refill  Right Hand: normal capillary refill  Left Hand: normal capillary refill         radiographs were not obtained today  Assessment:     Encounter Diagnoses   Name Primary?    Trigger finger, acquired Yes    Bilateral carpal tunnel syndrome         Plan:     The patient had the left ring trigger finger scar injected today with 0.5 cc of Kenalog and 0.5 cc of 2% plain lidocaine.  She wishes to have repeat nerve conduction studies for documentation                Disclaimer: This note was prepared using a voice recognition system and is likely to have sound alike errors within the text.

## 2023-07-11 ENCOUNTER — CLINICAL SUPPORT (OUTPATIENT)
Dept: REHABILITATION | Facility: HOSPITAL | Age: 53
End: 2023-07-11
Payer: COMMERCIAL

## 2023-07-11 DIAGNOSIS — R29.898 IMPAIRED STRENGTH OF HIP MUSCLES: Primary | ICD-10-CM

## 2023-07-11 PROCEDURE — 97110 THERAPEUTIC EXERCISES: CPT | Mod: PN,CQ

## 2023-07-11 PROCEDURE — 97530 THERAPEUTIC ACTIVITIES: CPT | Mod: PN,CQ

## 2023-07-11 PROCEDURE — 97112 NEUROMUSCULAR REEDUCATION: CPT | Mod: PN,CQ

## 2023-07-11 NOTE — PROGRESS NOTES
"[facility]   Physical Therapy Treatment Note    Name: Chan Gorman [facility]  Clinic Number: [id number]    Therapy Diagnosis:   Encounter Diagnosis   Name Primary?    Impaired strength of hip muscles Yes       Physician: Demetrio Grant MD    Visit Date: 7/11/2023    Physician Orders: PT Eval and Treat   Medical Diagnosis from Referral: Trochanteric bursitis of both hips [M70.61, M70.62]  Evaluation Date: 4/4/2023  Authorization Period Expiration: 3/26/23  Plan of Care Expiration: 6/2/23, 8/11/23  Visit # / Visits authorized: 5/24  FOTO: 2/3     Time In: 3:40 pm  Time Out: 4:33 pm  Total Billable Time: 53 minutes      Precautions: Standard, Diabetes    SUBJECTIVE     Pt reports: a little hip pain today. Patient reports when sitting for greater than 20 minutes she begins to have pain in both hips requiring her to stand up and walk.   She was NOT compliant with home exercise program.  Response to previous treatment: no adverse effects  Functional change: none yet    Pain: 3/10  Location: B buttocks      OBJECTIVE     Objective Measures updated at progress report unless specified.       Treatment     Chan received the treatments listed below:      Chan received therapeutic exercises to develop strength, endurance, and flexibility for 15 minutes including:     Bike 5'  Seated piriformis stretch 30" 3x  SLR 2x10 B  Standing Lateral Hip Shift / standing Iliotibial Band stretch 5 x10" B  Hip Cars; x15 B    Neuromuscular re-education for 23 minutes, including:    Clamshells 2x10 (ball at ankles)  Reverse clams 2x10 (ball at knees)  Prone hip extension 2x10 B  Standing Hip 3 way; x10 each, B  Tandem Stance; 2 x15" each leg in front         Chan received the following therapeutic activity for 15 minutes, including:     Shuttle squats 4 cords 3 mins combo iso abduction  Step Ups at stairs with eccentric step downs posteriorly; x15 each  Sit to stands at 18" box with proper " mechanics; 2 x10  Sled pushed 50' one lap       Chan received cold pack plus TENS for 00 minutes to left hip.      Patient Education and Home Exercises     Home Exercises Provided and Patient Education Provided     Education provided:      Written Home Exercises Provided: yes.  Exercises were reviewed and Chan was able to demonstrate them prior to the end of the session.  Chan demonstrated good  understanding of the education provided.      See EMR under Patient Instructions for exercises provided 4/4/2023.    ASSESSMENT     Treatment was progressed today in attempt to improve hip mobility, strength, and stability. Cueing provided throughout treatment for proper pacing, posture, and inclusion of rest breaks with prescribed exercises. Patient reports feeling workout in hip most present with standing hip 3 way. Patient reported fatigue by the end of treatment session with no exacerbations into hips.     Chan Is progressing well towards her goals.   Pt prognosis is Good.     Pt will continue to benefit from skilled outpatient physical therapy to address the deficits listed in the problem list box on initial evaluation, provide pt/family education and to maximize pt's level of independence in the home and community environment.     Pt's spiritual, cultural and educational needs considered and pt agreeable to plan of care and goals.     Anticipated barriers to physical therapy: none    Goals:     Short Term Goals: 4 weeks   Pt will be independent with HEP. (Progressing)     Long Term Goals: 8 weeks   Pt will improve lumbar flexion to 100% without increased pain.  (Progressing)  Pt will improve BLE MMT to 4+/5 or greater.  (Progressing)  Pt demo improvements in FOTO score to 34% limited or less.  (Progressing)  Pt will be able to sleep 5-6 hours without interruption due to pain. (MET, D/C)    *NEW GOAL*  Pt will tolerate sitting for 30 mins or greater without increased hip pain.    PLAN     Extend POC  for 1x/6 weeks.     Randolph Blake, PTA

## 2023-07-18 ENCOUNTER — TELEPHONE (OUTPATIENT)
Dept: DIABETES | Facility: CLINIC | Age: 53
End: 2023-07-18
Payer: COMMERCIAL

## 2023-07-18 NOTE — TELEPHONE ENCOUNTER
----- Message from Alexander Enciso sent at 7/18/2023 12:37 PM CDT -----  Contact: Pt [phone]  Pt called in regards to getting an arlen scheduled for a f/u visit please advise .

## 2023-07-19 ENCOUNTER — CLINICAL SUPPORT (OUTPATIENT)
Dept: REHABILITATION | Facility: HOSPITAL | Age: 53
End: 2023-07-19
Payer: COMMERCIAL

## 2023-07-19 DIAGNOSIS — R29.898 IMPAIRED STRENGTH OF HIP MUSCLES: Primary | ICD-10-CM

## 2023-07-19 PROCEDURE — 97110 THERAPEUTIC EXERCISES: CPT | Mod: PN

## 2023-07-19 PROCEDURE — 97112 NEUROMUSCULAR REEDUCATION: CPT | Mod: PN

## 2023-07-19 PROCEDURE — 97140 MANUAL THERAPY 1/> REGIONS: CPT | Mod: PN

## 2023-07-19 PROCEDURE — 97530 THERAPEUTIC ACTIVITIES: CPT | Mod: PN

## 2023-07-19 NOTE — PROGRESS NOTES
"[facility]   Physical Therapy Treatment Note    Name: Chan Gorman [facility]  Clinic Number: [id number]    Therapy Diagnosis:   Encounter Diagnosis   Name Primary?    Impaired strength of hip muscles Yes       Physician: Demetrio Grant MD    Visit Date: 7/19/2023    Physician Orders: PT Eval and Treat   Medical Diagnosis from Referral: Trochanteric bursitis of both hips [M70.61, M70.62]  Evaluation Date: 4/4/2023  Authorization Period Expiration: 3/26/23  Plan of Care Expiration: 6/2/23, 8/11/23  Visit # / Visits authorized: 5/24  FOTO: 2/3     Time In: 3:35 pm  Time Out: 4:17 pm  Total Billable Time: 42 minutes      Precautions: Standard, Diabetes    SUBJECTIVE     Pt reports: continue to note pain after prolong sitting.    She was NOT compliant with home exercise program.  Response to previous treatment: no adverse effects  Functional change: none yet    Pain: 3/10  Location: B buttocks      OBJECTIVE     Objective Measures updated at progress report unless specified.       Treatment     Chan received the treatments listed below:      Chan received therapeutic exercises to develop strength, endurance, and flexibility for 10 minutes including:  - Bike 5'  - Seated piriformis stretch 30" 3x  - SLR 2x10 B  - standing Iliotibial Band stretch 2x30" B    Neuromuscular re-education for 15 minutes, including:  - semi sidelying on pillow - Clamshells YTB 2x10 B  - Bridge YTB at knee 2x10   - Standing Hip 3 way; 2x10 each, B  +hip hikes 2x10 B   - Tandem Stance; 2 x15" each leg in front    Chan received the following therapeutic activity for 8 minutes, including:  - Shuttle squats 4 cords 3 mins combo iso abduction  - Step Ups at stairs with eccentric step downs posteriorly; x15 each  - Sit to stands from low table YTB at knee; 2 x10  - Sled pushed 50' one lap    manual therapy techniques: for 10 minutes, including:     - STM and foam roll to bilateral lateral thigh and vastus " lateralis     Chan received cold pack plus TENS for 00 minutes to left hip.    Patient Education and Home Exercises     Home Exercises Provided and Patient Education Provided     Education provided:      Written Home Exercises Provided: yes.  Exercises were reviewed and Chan was able to demonstrate them prior to the end of the session.  Chan demonstrated good  understanding of the education provided.      See EMR under Patient Instructions for exercises provided 7/19/2023.    ASSESSMENT     Pt with severe tenderness to palpation to bilateral vastus lateralis; performed manual to address myofascia trigger points. Exercises added to facilitate hip external rotation during dynamic movements. Pt with soreness noted to bilateral hip at end of session. Updated HEP provided today.     Chan Is progressing well towards her goals.   Pt prognosis is Good.     Pt will continue to benefit from skilled outpatient physical therapy to address the deficits listed in the problem list box on initial evaluation, provide pt/family education and to maximize pt's level of independence in the home and community environment.     Pt's spiritual, cultural and educational needs considered and pt agreeable to plan of care and goals.     Anticipated barriers to physical therapy: none    Goals:     Short Term Goals: 4 weeks   Pt will be independent with HEP. (Progressing)     Long Term Goals: 8 weeks   Pt will improve lumbar flexion to 100% without increased pain.  (Progressing)  Pt will improve BLE MMT to 4+/5 or greater.  (Progressing)  Pt demo improvements in FOTO score to 34% limited or less.  (Progressing)  Pt will be able to sleep 5-6 hours without interruption due to pain. (MET, D/C)    *NEW GOAL*  Pt will tolerate sitting for 30 mins or greater without increased hip pain.    PLAN     Extend POC for 1x/6 weeks.     Kinza Jennings, PT

## 2023-07-25 ENCOUNTER — CLINICAL SUPPORT (OUTPATIENT)
Dept: REHABILITATION | Facility: HOSPITAL | Age: 53
End: 2023-07-25
Payer: COMMERCIAL

## 2023-07-25 DIAGNOSIS — R29.898 IMPAIRED STRENGTH OF HIP MUSCLES: Primary | ICD-10-CM

## 2023-07-25 PROCEDURE — 97530 THERAPEUTIC ACTIVITIES: CPT | Mod: PN

## 2023-07-25 PROCEDURE — 97140 MANUAL THERAPY 1/> REGIONS: CPT | Mod: PN

## 2023-07-25 PROCEDURE — 97110 THERAPEUTIC EXERCISES: CPT | Mod: PN

## 2023-07-25 PROCEDURE — 97112 NEUROMUSCULAR REEDUCATION: CPT | Mod: PN

## 2023-07-25 NOTE — PROGRESS NOTES
"[facility]   Physical Therapy Treatment Note    Name: Chan Gorman [facility]  Clinic Number: [id number]    Therapy Diagnosis:   Encounter Diagnosis   Name Primary?    Impaired strength of hip muscles Yes     Physician: Demetrio Grant MD    Visit Date: 7/25/2023    Physician Orders: PT Eval and Treat   Medical Diagnosis from Referral: Trochanteric bursitis of both hips [M70.61, M70.62]  Evaluation Date: 4/4/2023  Authorization Period Expiration: 3/26/23  Plan of Care Expiration: 6/2/23, 8/11/23  Visit # / Visits authorized: 7/24  FOTO: 2/3     Time In: 3:30 pm  Time Out: 4:13 pm  Total Billable Time: 43 minutes      Precautions: Standard, Diabetes    SUBJECTIVE     Pt reports: she has been doing well. States she hasn't really noticed much pain following last visit. Also states she has not been sitting long enough to noticed pain.     She was compliant with home exercise program.  Response to previous treatment: no adverse effects  Functional change: none yet    Pain: 3/10  Location: B buttocks      OBJECTIVE     Objective Measures updated at progress report unless specified.       Treatment     Chan received the treatments listed below:      Chan received therapeutic exercises to develop strength, endurance, and flexibility for (10 minutes) including:  - Bike 5'  - Seated piriformis stretch 30" 3x  - SLR 2x10 B  - standing Iliotibial Band stretch 2x30" B  + prone quad str 2x30" B    Neuromuscular re-education for (15 minutes), including:  - semi sidelying on pillow - Clamshells YTB 2x10 B  - Bridge YTB at knee 2x10   - Standing Hip 3 way; 2x10 each, B  - hip hikes on blue OD 2x10 B   - SLS 2x30" each leg in front    Chan received the following therapeutic activity for (10 minutes), including:  - Shuttle squats 4 cords 3 mins combo iso abduction  - Step Ups at stairs with eccentric step downs posteriorly; x15 each  - Sit to stands from low table YTB at knee; 2 x10  - Sled " pushed 50' one lap    manual therapy techniques: for (8 minutes), including:     - STM and foam roll to bilateral lateral thigh and vastus lateralis     Chan received cold pack plus TENS for 00 minutes to left hip.    Patient Education and Home Exercises     Home Exercises Provided and Patient Education Provided     Education provided:      Written Home Exercises Provided: yes.  Exercises were reviewed and Chan was able to demonstrate them prior to the end of the session.  Chan demonstrated good  understanding of the education provided.      See EMR under Patient Instructions for exercises provided 7/19/2023.    ASSESSMENT     Pt with good tolerance to progression of exercises. Continue to note trigger point to bilateral quads. Added quad stretch to lengthen anterior chain.     Chan Is progressing well towards her goals.   Pt prognosis is Good.     Pt will continue to benefit from skilled outpatient physical therapy to address the deficits listed in the problem list box on initial evaluation, provide pt/family education and to maximize pt's level of independence in the home and community environment.     Pt's spiritual, cultural and educational needs considered and pt agreeable to plan of care and goals.     Anticipated barriers to physical therapy: none    Goals:     Short Term Goals: 4 weeks   Pt will be independent with HEP. (Progressing)     Long Term Goals: 8 weeks   Pt will improve lumbar flexion to 100% without increased pain.  (Progressing)  Pt will improve BLE MMT to 4+/5 or greater.  (Progressing)  Pt demo improvements in FOTO score to 34% limited or less.  (Progressing)  Pt will be able to sleep 5-6 hours without interruption due to pain. (MET, D/C)    *NEW GOAL*  Pt will tolerate sitting for 30 mins or greater without increased hip pain.    PLAN     Extend POC for 1x/6 weeks.     Kinza Jennings, PT

## 2023-08-02 ENCOUNTER — CLINICAL SUPPORT (OUTPATIENT)
Dept: REHABILITATION | Facility: HOSPITAL | Age: 53
End: 2023-08-02
Payer: COMMERCIAL

## 2023-08-02 ENCOUNTER — OFFICE VISIT (OUTPATIENT)
Dept: PAIN MEDICINE | Facility: CLINIC | Age: 53
End: 2023-08-02
Payer: COMMERCIAL

## 2023-08-02 VITALS
WEIGHT: 123.88 LBS | SYSTOLIC BLOOD PRESSURE: 99 MMHG | DIASTOLIC BLOOD PRESSURE: 64 MMHG | BODY MASS INDEX: 21.15 KG/M2 | HEART RATE: 66 BPM | HEIGHT: 64 IN

## 2023-08-02 DIAGNOSIS — M46.1 SACROILIITIS: ICD-10-CM

## 2023-08-02 DIAGNOSIS — M70.62 TROCHANTERIC BURSITIS OF BOTH HIPS: Primary | ICD-10-CM

## 2023-08-02 DIAGNOSIS — M70.61 TROCHANTERIC BURSITIS OF BOTH HIPS: Primary | ICD-10-CM

## 2023-08-02 DIAGNOSIS — R29.898 IMPAIRED STRENGTH OF HIP MUSCLES: Primary | ICD-10-CM

## 2023-08-02 PROCEDURE — 3046F HEMOGLOBIN A1C LEVEL >9.0%: CPT | Mod: CPTII,S$GLB,, | Performed by: PHYSICAL MEDICINE & REHABILITATION

## 2023-08-02 PROCEDURE — 3074F PR MOST RECENT SYSTOLIC BLOOD PRESSURE < 130 MM HG: ICD-10-PCS | Mod: CPTII,S$GLB,, | Performed by: PHYSICAL MEDICINE & REHABILITATION

## 2023-08-02 PROCEDURE — 1160F RVW MEDS BY RX/DR IN RCRD: CPT | Mod: CPTII,S$GLB,, | Performed by: PHYSICAL MEDICINE & REHABILITATION

## 2023-08-02 PROCEDURE — 99999 PR PBB SHADOW E&M-EST. PATIENT-LVL III: ICD-10-PCS | Mod: PBBFAC,,, | Performed by: PHYSICAL MEDICINE & REHABILITATION

## 2023-08-02 PROCEDURE — 97112 NEUROMUSCULAR REEDUCATION: CPT | Mod: PN

## 2023-08-02 PROCEDURE — 3078F DIAST BP <80 MM HG: CPT | Mod: CPTII,S$GLB,, | Performed by: PHYSICAL MEDICINE & REHABILITATION

## 2023-08-02 PROCEDURE — 1160F PR REVIEW ALL MEDS BY PRESCRIBER/CLIN PHARMACIST DOCUMENTED: ICD-10-PCS | Mod: CPTII,S$GLB,, | Performed by: PHYSICAL MEDICINE & REHABILITATION

## 2023-08-02 PROCEDURE — 3008F PR BODY MASS INDEX (BMI) DOCUMENTED: ICD-10-PCS | Mod: CPTII,S$GLB,, | Performed by: PHYSICAL MEDICINE & REHABILITATION

## 2023-08-02 PROCEDURE — 97530 THERAPEUTIC ACTIVITIES: CPT | Mod: PN

## 2023-08-02 PROCEDURE — 3074F SYST BP LT 130 MM HG: CPT | Mod: CPTII,S$GLB,, | Performed by: PHYSICAL MEDICINE & REHABILITATION

## 2023-08-02 PROCEDURE — 99212 OFFICE O/P EST SF 10 MIN: CPT | Mod: S$GLB,,, | Performed by: PHYSICAL MEDICINE & REHABILITATION

## 2023-08-02 PROCEDURE — 3072F LOW RISK FOR RETINOPATHY: CPT | Mod: CPTII,S$GLB,, | Performed by: PHYSICAL MEDICINE & REHABILITATION

## 2023-08-02 PROCEDURE — 3066F NEPHROPATHY DOC TX: CPT | Mod: CPTII,S$GLB,, | Performed by: PHYSICAL MEDICINE & REHABILITATION

## 2023-08-02 PROCEDURE — 3078F PR MOST RECENT DIASTOLIC BLOOD PRESSURE < 80 MM HG: ICD-10-PCS | Mod: CPTII,S$GLB,, | Performed by: PHYSICAL MEDICINE & REHABILITATION

## 2023-08-02 PROCEDURE — 97110 THERAPEUTIC EXERCISES: CPT | Mod: PN

## 2023-08-02 PROCEDURE — 99999 PR PBB SHADOW E&M-EST. PATIENT-LVL III: CPT | Mod: PBBFAC,,, | Performed by: PHYSICAL MEDICINE & REHABILITATION

## 2023-08-02 PROCEDURE — 3072F PR LOW RISK FOR RETINOPATHY: ICD-10-PCS | Mod: CPTII,S$GLB,, | Performed by: PHYSICAL MEDICINE & REHABILITATION

## 2023-08-02 PROCEDURE — 1159F PR MEDICATION LIST DOCUMENTED IN MEDICAL RECORD: ICD-10-PCS | Mod: CPTII,S$GLB,, | Performed by: PHYSICAL MEDICINE & REHABILITATION

## 2023-08-02 PROCEDURE — 1159F MED LIST DOCD IN RCRD: CPT | Mod: CPTII,S$GLB,, | Performed by: PHYSICAL MEDICINE & REHABILITATION

## 2023-08-02 PROCEDURE — 3046F PR MOST RECENT HEMOGLOBIN A1C LEVEL > 9.0%: ICD-10-PCS | Mod: CPTII,S$GLB,, | Performed by: PHYSICAL MEDICINE & REHABILITATION

## 2023-08-02 PROCEDURE — 99212 PR OFFICE/OUTPT VISIT, EST, LEVL II, 10-19 MIN: ICD-10-PCS | Mod: S$GLB,,, | Performed by: PHYSICAL MEDICINE & REHABILITATION

## 2023-08-02 PROCEDURE — 3008F BODY MASS INDEX DOCD: CPT | Mod: CPTII,S$GLB,, | Performed by: PHYSICAL MEDICINE & REHABILITATION

## 2023-08-02 PROCEDURE — 3066F PR DOCUMENTATION OF TREATMENT FOR NEPHROPATHY: ICD-10-PCS | Mod: CPTII,S$GLB,, | Performed by: PHYSICAL MEDICINE & REHABILITATION

## 2023-08-02 PROCEDURE — 3061F NEG MICROALBUMINURIA REV: CPT | Mod: CPTII,S$GLB,, | Performed by: PHYSICAL MEDICINE & REHABILITATION

## 2023-08-02 PROCEDURE — 3061F PR NEG MICROALBUMINURIA RESULT DOCUMENTED/REVIEW: ICD-10-PCS | Mod: CPTII,S$GLB,, | Performed by: PHYSICAL MEDICINE & REHABILITATION

## 2023-08-02 NOTE — PROGRESS NOTES
Established Patient Chronic Pain Note (Follow up visit)    Chief Complaint:   Chief Complaint   Patient presents with    Hip Pain     BILATERAL       SUBJECTIVE:    Chan Dwyer is a 52 y.o. female who presents to the clinic for a follow-up appointment for back and hip pain.    Since the last visit, Chan Dwyer states the pain has been much improved after PT and home exercises. Current pain intensity is 0/10.    Patient denies night fever/night sweats, urinary incontinence, bowel incontinence, significant weight loss, significant motor weakness, and loss of sensations.    Pain Disability Index Review:   Last 3 PDI Scores 8/2/2023   Pain Disability Index (PDI) 0       Interval HPI 06/07/2023:  Chan Dwyer is a 52 y.o. female who presents to the clinic for a follow-up appointment for back and hip pain.  She was referred to physical therapy previously but only completed 5 visits and was lost to follow-up.  Since the last visit, Chan Dwyer states the pain has been persistant. Current pain intensity is 8/10.     Initial HPI 03/27/2023:  Chan Dwyer is a 52 y.o. female, right-hand dominant, who presents to the clinic for the evaluation of back and leg pain.  She was referred by her primary care team for further evaluation management of this pain.  She has past medical history of headaches, carpal tunnel syndrome, dm 2, GERD, hepatitis-C, polyarthritis, multiple other medical comorbidities as listed in her chart.  The pain started a few weeks ago  and symptoms have been unchanged.The pain is located in the lower back area and radiates to the bilateral hips, mostly on the left.  The pain is described as  aching, tightness  and is rated as 8/10. The pain is rated with a score of  6/10 on the BEST day and a score of 9/10 on the WORST day.  Symptoms interfere with daily activity. The pain is exacerbated by movement, walking, direct pressure to the left hip.  The  pain is mitigated by Tylenol.  She works as a .          Non-Pharmacologic Treatments:  Physical Therapy/Home Exercise: no  Ice/Heat:no  TENS: no  Acupuncture: no  Massage: no  Chiropractic: no    Other: no        Pain Medications:  - Opioids:  None  - Adjuvant Medications:  Flexeril, NSAIDs  - Anti-Coagulants: Aspirin      report:  Reviewed and consistent with medication use as prescribed.     Pain Procedures:   Denies        Imaging:   X-ray lumbar spine 03/17/2023:   Bones: Intact.  Alignment: Satisfactory.  Spondylosis: Satisfactory disc heights.  Minimal anterior osteophytosis.  Questionable mild lower lumbar facet degenerative changes.  Miscellaneous: Partially visualized left abdominal device.  Possible mild constipation.    X-ray left hip 02/21/2023:  Frontal view of the pelvis and 2 views of the left hip.  3 images.  No comparison is available.  No acute fracture or dislocation.  No significant degenerative changes.  The pelvis is grossly unremarkable.  Bowel gas pattern is normal.    PMHx,PSHx, Social history, and Family history:  I have reviewed the patient's medical, surgical, social, and family history in detail and updated the computerized patient record.    Review of patient's allergies indicates:   Allergen Reactions    Hydrocodone Hives and Rash       Current Outpatient Medications   Medication Sig    aspirin (ECOTRIN) 81 MG EC tablet Take 1 tablet (81 mg total) by mouth once daily.    diclofenac (VOLTAREN) 50 MG EC tablet Take 1 tablet (50 mg total) by mouth 2 (two) times daily.    insulin glargine, TOUJEO, (TOUJEO) 300 unit/mL (1.5 mL) InPn pen Inject 20 Units into the skin once daily.    pantoprazole (PROTONIX) 40 MG tablet Take 1 tablet (40 mg total) by mouth once daily.    ibuprofen (ADVIL,MOTRIN) 600 MG tablet Take 1 tablet (600 mg total) by mouth every 6 (six) hours as needed for Pain.    insulin lispro-aabc (LYUMJEV KWIKPEN U-100 INSULIN) 100 unit/mL pen INJECT  TITRATE UP TO 20  "UNITS INTO THE SKIN ONCE DAILY     No current facility-administered medications for this visit.     Facility-Administered Medications Ordered in Other Visits   Medication    chlorhexidine 0.12 % solution 10 mL         REVIEW OF SYSTEMS:    GENERAL:  No weight loss, malaise or fevers.  HEENT:   No recent changes in vision or hearing  NECK:  Negative for lumps, no difficulty with swallowing.  RESPIRATORY:  Negative for cough, wheezing or shortness of breath, patient denies any recent URI.  CARDIOVASCULAR:  Negative for chest pain, leg swelling or palpitations.  GI:  Negative for abdominal discomfort, blood in stools or black stools or change in bowel habits.  MUSCULOSKELETAL:  See HPI.  SKIN:  Negative for lesions, rash, and itching.  PSYCH:  No mood disorder or recent psychosocial stressors.  Patients sleep is not disturbed secondary to pain.  HEMATOLOGY/LYMPHOLOGY:  Negative for prolonged bleeding, bruising easily or swollen nodes.  Patient is currently taking anti-coagulants - ASA  NEURO:   No history of headaches, syncope, paralysis, seizures or tremors.  All other reviewed and negative other than HPI.       OBJECTIVE:    BP 99/64 (BP Location: Left arm, Patient Position: Sitting)   Pulse 66   Ht 5' 4" (1.626 m)   Wt 56.2 kg (123 lb 14.4 oz)   BMI 21.27 kg/m²     PHYSICAL EXAMINATION:    GENERAL: Well appearing, in no acute distress, alert and oriented x3.  PSYCH:  Mood and affect appropriate.  SKIN: Skin color, texture, turgor normal, no rashes or lesions.  HEAD/FACE:  Normocephalic, atraumatic. Cranial nerves grossly intact.   CV: RRR with palpation of the radial artery.  PULM: No evidence of respiratory difficulty, symmetric chest rise.  GI:  Soft and non-tender.  BACK: Straight leg raising in the sitting and supine positions is negative to radicular pain.  Mild pain to palpation over the facet joints of the lumbar spine and lumbar paraspinal.  Limited range of motion secondary to pain " reproduction.  EXTREMITIES: No deformities, edema, or skin discoloration. Good capillary refill.  MUSCULOSKELETAL:  Hip and knee provocative maneuvers are negative.  There is mild-to-moderate pain with palpation over the sacroiliac joints bilaterally.  FABERs test is equivocal.  FADIRs test is negative.  Tenderness palpation over the left trochanteric bursa and minimally on the right.  Bilateral upper and lower extremity strength is normal and symmetric.  No atrophy or tone abnormalities are noted.  NEURO: Bilateral upper and lower extremity coordination and muscle stretch reflexes are physiologic and symmetric.  Plantar response are downgoing. No clonus.  No loss of sensation is noted.  GAIT: normal.      LABS:  Lab Results   Component Value Date    WBC 6.15 03/14/2023    HGB 12.5 03/14/2023    HCT 42.1 03/14/2023    MCV 96 03/14/2023     03/14/2023       CMP  Sodium   Date Value Ref Range Status   08/03/2023 140 136 - 145 mmol/L Final     Potassium   Date Value Ref Range Status   08/03/2023 4.6 3.5 - 5.1 mmol/L Final     Chloride   Date Value Ref Range Status   08/03/2023 103 95 - 110 mmol/L Final     CO2   Date Value Ref Range Status   08/03/2023 26 23 - 29 mmol/L Final     Glucose   Date Value Ref Range Status   08/03/2023 185 (H) 70 - 110 mg/dL Final     BUN   Date Value Ref Range Status   08/03/2023 13 6 - 20 mg/dL Final     Creatinine   Date Value Ref Range Status   08/03/2023 0.7 0.5 - 1.4 mg/dL Final     Calcium   Date Value Ref Range Status   08/03/2023 10.1 8.7 - 10.5 mg/dL Final     Total Protein   Date Value Ref Range Status   08/03/2023 7.2 6.0 - 8.4 g/dL Final     Albumin   Date Value Ref Range Status   08/03/2023 3.7 3.5 - 5.2 g/dL Final     Total Bilirubin   Date Value Ref Range Status   08/03/2023 0.4 0.1 - 1.0 mg/dL Final     Comment:     For infants and newborns, interpretation of results should be based  on gestational age, weight and in agreement with clinical  observations.    Premature  Infant recommended reference ranges:  Up to 24 hours.............<8.0 mg/dL  Up to 48 hours............<12.0 mg/dL  3-5 days..................<15.0 mg/dL  6-29 days.................<15.0 mg/dL       Alkaline Phosphatase   Date Value Ref Range Status   08/03/2023 110 55 - 135 U/L Final     AST   Date Value Ref Range Status   08/03/2023 17 10 - 40 U/L Final     ALT   Date Value Ref Range Status   08/03/2023 14 10 - 44 U/L Final     Anion Gap   Date Value Ref Range Status   08/03/2023 11 8 - 16 mmol/L Final     eGFR if    Date Value Ref Range Status   02/11/2022 >60 >60 mL/min/1.73 m^2 Final     eGFR if non    Date Value Ref Range Status   02/11/2022 >60 >60 mL/min/1.73 m^2 Final     Comment:     Calculation used to obtain the estimated glomerular filtration  rate (eGFR) is the CKD-EPI equation.          Lab Results   Component Value Date    HGBA1C 9.2 (H) 08/03/2023             ASSESSMENT: 52 y.o. year old female with back and hip pain, consistent with     1. Trochanteric bursitis of both hips        2. Sacroiliitis                PLAN:   - Interventions:  None at this time     - Anticoagulation use: Patient is taking ASA as 1° prevention     - Medications: I have stressed the importance of physical activity and a home exercise plan to help with pain and improve health. and Patient can continue with medications for now since they are providing benefits, using them appropriately, and without side effects.     Okay to continue diclofenac 50 mg b.i.d. p.r.n.          - Therapy:  Continue with home exercises and activities as tolerated     - Psychological:  Discussed coping mechanisms to help address chronic pain issues     - Labs:  Reviewed     - Imaging: Reviewed available imaging with patient and answered any questions they had regarding study.     - Consults/Referrals:  None at this time  - Records:  Reviewed/Obtain old records from outside physicians and imaging     - Follow up visit:  return to clinic as needed     - Counseled patient regarding the importance of activity modification and physical therapy     - This condition does not require this patient to take time off of work, and the primary goal of our Pain Management services is to improve the patient's functional capacity.     - Patient Questions: Answered all of the patient's questions regarding diagnosis, therapy, and treatment    The above plan and management options were discussed at length with patient. Patient is in agreement with the above and verbalized understanding.      Demetrio Grant MD  Interventional Pain Management  [facility]    Disclaimer:  This note was prepared using voice recognition system and is likely to have sound alike errors that may have been overlooked even after proof reading.  Please call me with any questions

## 2023-08-02 NOTE — PROGRESS NOTES
"[facility]   Physical Therapy Treatment Note    Name: Chan Gorman [facility]  Clinic Number: [id number]    Therapy Diagnosis:   Encounter Diagnosis   Name Primary?    Impaired strength of hip muscles Yes       Physician: Demetrio Grant MD    Visit Date: 8/2/2023    Physician Orders: PT Eval and Treat   Medical Diagnosis from Referral: Trochanteric bursitis of both hips [M70.61, M70.62]  Evaluation Date: 4/4/2023  Authorization Period Expiration: 3/26/23  Plan of Care Expiration: 6/2/23, 8/11/23  Visit # / Visits authorized: 8/24  FOTO: 2/3     Time In: 11:35 am (pt 20 minutes late)  Time Out: 12:09 pm  Total Billable Time: 34 minutes      Precautions: Standard, Diabetes    SUBJECTIVE     Pt reports: she continues to note pain to hip. States she has a follow up with her medical doctor following P.T. today.     She was compliant with home exercise program.  Response to previous treatment: no adverse effects  Functional change: none yet    Pain: 3/10  Location: B buttocks      OBJECTIVE     Objective Measures updated at progress report unless specified.       Treatment     Chan received the treatments listed below:      Chan received therapeutic exercises to develop strength, endurance, and flexibility for (8 minutes) including:  - Bike 5'  - Seated piriformis stretch 30" 3x  - SLR 2x10 B  - standing Iliotibial Band stretch 2x30" B  - prone quad str 2x30" B    Neuromuscular re-education for (16 minutes), including:  - Clamshells RTB 2x10 B  - Bridge RTB at knee 2x10   - Standing Hip 3 way; 2x10 each, B  - hip hikes on blue OD 2x10 B   - SLS 2x30" each leg in front    Chan received the following therapeutic activity for (10 minutes), including:  - Shuttle squats 4 cords 3 mins combo iso abduction  - Step Ups at stairs with eccentric step downs posteriorly; 2x10 each  - Sit to stands from low table 2 x10  - Sled pushed 50' one lap    manual therapy techniques: for (00 " minutes), including:     - STM and foam roll to bilateral lateral thigh and vastus lateralis     Chan received cold pack plus TENS for 00 minutes to left hip.    Patient Education and Home Exercises     Home Exercises Provided and Patient Education Provided     Education provided:      Written Home Exercises Provided: yes.  Exercises were reviewed and Chan was able to demonstrate them prior to the end of the session.  Chan demonstrated good  understanding of the education provided.      See EMR under Patient Instructions for exercises provided 7/19/2023.    ASSESSMENT     Pt unable to complete full therapy session secondary to having a doctor following today's visit. Pt demonstrates good tolerance to all exercises and perform exercises with minimal to no breaks in between. Pt demonstrated no increase of pain following today's exercises. Strengthen as tolerated next visit.     Chan Is progressing well towards her goals.   Pt prognosis is Good.     Pt will continue to benefit from skilled outpatient physical therapy to address the deficits listed in the problem list box on initial evaluation, provide pt/family education and to maximize pt's level of independence in the home and community environment.     Pt's spiritual, cultural and educational needs considered and pt agreeable to plan of care and goals.     Anticipated barriers to physical therapy: none    Goals:     Short Term Goals: 4 weeks   Pt will be independent with HEP. (Progressing)     Long Term Goals: 8 weeks   Pt will improve lumbar flexion to 100% without increased pain.  (Progressing)  Pt will improve BLE MMT to 4+/5 or greater.  (Progressing)  Pt demo improvements in FOTO score to 34% limited or less.  (Progressing)  Pt will be able to sleep 5-6 hours without interruption due to pain. (MET, D/C)    *NEW GOAL*  Pt will tolerate sitting for 30 mins or greater without increased hip pain.    PLAN     Extend POC for 1x/6 weeks.      Kinza Jennings, PT

## 2023-08-02 NOTE — LETTER
August 2, 2023      [facility]  [address] [facility] [address]  Phone: [phone]  Fax: [phone]       Patient: Chan Dwyer   YOB: 1970  Date of Visit: 08/02/2023    To Whom It May Concern:    Antonio Dwyer  was at [facility] on 08/02/2023. The patient may return to work on 8/3/23 with no restrictions. If you have any questions or concerns, or if I can be of further assistance, please do not hesitate to contact me.    Sincerely,    Demetrio Grant MD

## 2023-08-03 ENCOUNTER — OFFICE VISIT (OUTPATIENT)
Dept: INTERNAL MEDICINE | Facility: CLINIC | Age: 53
End: 2023-08-03
Payer: COMMERCIAL

## 2023-08-03 ENCOUNTER — LAB VISIT (OUTPATIENT)
Dept: LAB | Facility: HOSPITAL | Age: 53
End: 2023-08-03
Attending: FAMILY MEDICINE
Payer: COMMERCIAL

## 2023-08-03 VITALS
BODY MASS INDEX: 21.27 KG/M2 | SYSTOLIC BLOOD PRESSURE: 100 MMHG | WEIGHT: 124.56 LBS | DIASTOLIC BLOOD PRESSURE: 66 MMHG | HEIGHT: 64 IN | TEMPERATURE: 98 F | HEART RATE: 56 BPM | RESPIRATION RATE: 20 BRPM

## 2023-08-03 DIAGNOSIS — I95.9 HYPOTENSION, UNSPECIFIED HYPOTENSION TYPE: ICD-10-CM

## 2023-08-03 DIAGNOSIS — E11.65 UNCONTROLLED TYPE 2 DIABETES MELLITUS WITH HYPERGLYCEMIA, WITHOUT LONG-TERM CURRENT USE OF INSULIN: Primary | ICD-10-CM

## 2023-08-03 DIAGNOSIS — E11.65 UNCONTROLLED TYPE 2 DIABETES MELLITUS WITH HYPERGLYCEMIA, WITHOUT LONG-TERM CURRENT USE OF INSULIN: ICD-10-CM

## 2023-08-03 LAB
ALBUMIN SERPL BCP-MCNC: 3.7 G/DL (ref 3.5–5.2)
ALP SERPL-CCNC: 110 U/L (ref 55–135)
ALT SERPL W/O P-5'-P-CCNC: 14 U/L (ref 10–44)
ANION GAP SERPL CALC-SCNC: 11 MMOL/L (ref 8–16)
AST SERPL-CCNC: 17 U/L (ref 10–40)
BILIRUB SERPL-MCNC: 0.4 MG/DL (ref 0.1–1)
BUN SERPL-MCNC: 13 MG/DL (ref 6–20)
CALCIUM SERPL-MCNC: 10.1 MG/DL (ref 8.7–10.5)
CHLORIDE SERPL-SCNC: 103 MMOL/L (ref 95–110)
CO2 SERPL-SCNC: 26 MMOL/L (ref 23–29)
CREAT SERPL-MCNC: 0.7 MG/DL (ref 0.5–1.4)
EST. GFR  (NO RACE VARIABLE): >60 ML/MIN/1.73 M^2
ESTIMATED AVG GLUCOSE: 217 MG/DL (ref 68–131)
GLUCOSE SERPL-MCNC: 185 MG/DL (ref 70–110)
HBA1C MFR BLD: 9.2 % (ref 4–5.6)
POTASSIUM SERPL-SCNC: 4.6 MMOL/L (ref 3.5–5.1)
PROT SERPL-MCNC: 7.2 G/DL (ref 6–8.4)
SODIUM SERPL-SCNC: 140 MMOL/L (ref 136–145)

## 2023-08-03 PROCEDURE — 3008F PR BODY MASS INDEX (BMI) DOCUMENTED: ICD-10-PCS | Mod: CPTII,S$GLB,, | Performed by: FAMILY MEDICINE

## 2023-08-03 PROCEDURE — 3066F NEPHROPATHY DOC TX: CPT | Mod: CPTII,S$GLB,, | Performed by: FAMILY MEDICINE

## 2023-08-03 PROCEDURE — 3046F PR MOST RECENT HEMOGLOBIN A1C LEVEL > 9.0%: ICD-10-PCS | Mod: CPTII,S$GLB,, | Performed by: FAMILY MEDICINE

## 2023-08-03 PROCEDURE — 3066F PR DOCUMENTATION OF TREATMENT FOR NEPHROPATHY: ICD-10-PCS | Mod: CPTII,S$GLB,, | Performed by: FAMILY MEDICINE

## 2023-08-03 PROCEDURE — 3078F PR MOST RECENT DIASTOLIC BLOOD PRESSURE < 80 MM HG: ICD-10-PCS | Mod: CPTII,S$GLB,, | Performed by: FAMILY MEDICINE

## 2023-08-03 PROCEDURE — 99213 PR OFFICE/OUTPT VISIT, EST, LEVL III, 20-29 MIN: ICD-10-PCS | Mod: S$GLB,,, | Performed by: FAMILY MEDICINE

## 2023-08-03 PROCEDURE — 3008F BODY MASS INDEX DOCD: CPT | Mod: CPTII,S$GLB,, | Performed by: FAMILY MEDICINE

## 2023-08-03 PROCEDURE — 3074F SYST BP LT 130 MM HG: CPT | Mod: CPTII,S$GLB,, | Performed by: FAMILY MEDICINE

## 2023-08-03 PROCEDURE — 99999 PR PBB SHADOW E&M-EST. PATIENT-LVL IV: CPT | Mod: PBBFAC,,, | Performed by: FAMILY MEDICINE

## 2023-08-03 PROCEDURE — 80053 COMPREHEN METABOLIC PANEL: CPT | Performed by: FAMILY MEDICINE

## 2023-08-03 PROCEDURE — 1159F MED LIST DOCD IN RCRD: CPT | Mod: CPTII,S$GLB,, | Performed by: FAMILY MEDICINE

## 2023-08-03 PROCEDURE — 1159F PR MEDICATION LIST DOCUMENTED IN MEDICAL RECORD: ICD-10-PCS | Mod: CPTII,S$GLB,, | Performed by: FAMILY MEDICINE

## 2023-08-03 PROCEDURE — 3072F LOW RISK FOR RETINOPATHY: CPT | Mod: CPTII,S$GLB,, | Performed by: FAMILY MEDICINE

## 2023-08-03 PROCEDURE — 99213 OFFICE O/P EST LOW 20 MIN: CPT | Mod: S$GLB,,, | Performed by: FAMILY MEDICINE

## 2023-08-03 PROCEDURE — 3061F PR NEG MICROALBUMINURIA RESULT DOCUMENTED/REVIEW: ICD-10-PCS | Mod: CPTII,S$GLB,, | Performed by: FAMILY MEDICINE

## 2023-08-03 PROCEDURE — 3074F PR MOST RECENT SYSTOLIC BLOOD PRESSURE < 130 MM HG: ICD-10-PCS | Mod: CPTII,S$GLB,, | Performed by: FAMILY MEDICINE

## 2023-08-03 PROCEDURE — 3072F PR LOW RISK FOR RETINOPATHY: ICD-10-PCS | Mod: CPTII,S$GLB,, | Performed by: FAMILY MEDICINE

## 2023-08-03 PROCEDURE — 3078F DIAST BP <80 MM HG: CPT | Mod: CPTII,S$GLB,, | Performed by: FAMILY MEDICINE

## 2023-08-03 PROCEDURE — 36415 COLL VENOUS BLD VENIPUNCTURE: CPT | Mod: PO | Performed by: FAMILY MEDICINE

## 2023-08-03 PROCEDURE — 83036 HEMOGLOBIN GLYCOSYLATED A1C: CPT | Performed by: FAMILY MEDICINE

## 2023-08-03 PROCEDURE — 3046F HEMOGLOBIN A1C LEVEL >9.0%: CPT | Mod: CPTII,S$GLB,, | Performed by: FAMILY MEDICINE

## 2023-08-03 PROCEDURE — 99999 PR PBB SHADOW E&M-EST. PATIENT-LVL IV: ICD-10-PCS | Mod: PBBFAC,,, | Performed by: FAMILY MEDICINE

## 2023-08-03 PROCEDURE — 3061F NEG MICROALBUMINURIA REV: CPT | Mod: CPTII,S$GLB,, | Performed by: FAMILY MEDICINE

## 2023-08-04 ENCOUNTER — PATIENT MESSAGE (OUTPATIENT)
Dept: INTERNAL MEDICINE | Facility: CLINIC | Age: 53
End: 2023-08-04
Payer: COMMERCIAL

## 2023-08-04 ENCOUNTER — TELEPHONE (OUTPATIENT)
Dept: PHYSICAL MEDICINE AND REHAB | Facility: CLINIC | Age: 53
End: 2023-08-04
Payer: COMMERCIAL

## 2023-08-04 NOTE — TELEPHONE ENCOUNTER
----- Message from Alycia Cheney sent at 8/4/2023 10:46 AM CDT -----  Contact: Chan Anand called in to gisell ko her 8/8 appt for possibly 8/7. Please call her back at [phone].    Thanks  TS

## 2023-08-05 NOTE — PROGRESS NOTES
Subjective:      Patient ID: Chan Dwyer is a 52 y.o. female.    Chief Complaint: hypotention      Patient reports blood pressure being low recently-had appointment yesterday with systolic at 99.  She does report some fatigue but no lightheadedness or dizziness when standing.  Glucose has been elevated recently      Review of Systems   Constitutional:  Positive for fatigue. Negative for activity change and appetite change.   Musculoskeletal:  Positive for arthralgias and back pain.     Past Medical History:   Diagnosis Date    Abnormal Pap smear     Abnormal Pap smear of cervix     Arthritis, low back     Diabetes 09/2014     BS 162am 09/16/2014    DM (diabetes mellitus) 09/2014     am 01/26/2016    DM (diabetes mellitus) 09/2014    BS didn't check 01/31/2017    GERD (gastroesophageal reflux disease)     Gestational diabetes     Hepatitis C antibody positive in blood 02/11/2022    RNA NEGATIVE    History of abnormal Pap smear     Surgical menopause     Vitamin D deficiency           Past Surgical History:   Procedure Laterality Date    COLONOSCOPY N/A 6/22/2016    Procedure: COLONOSCOPY;  Surgeon: Vishal Mary III, MD;  Location: [facility] ENDO;  Service: Endoscopy;  Laterality: N/A;    Cryotherapy of the cervix  1999    DILATION AND CURETTAGE OF UTERUS      HYSTERECTOMY      LEFT HEART CATHETERIZATION Left 3/24/2023    Procedure: Left heart cath;  Surgeon: Lee Lambert MD;  Location: [facility] CATH LAB;  Service: Cardiology;  Laterality: Left;    [facility]  2/2012    TRIGGER FINGER RELEASE Left 10/10/2022    Procedure: RELEASE, TRIGGER FINGER;  Surgeon: Adarsh Chavez MD;  Location: [facility] OR;  Service: Orthopedics;  Laterality: Left;  left ring trigger finger release    TUBAL LIGATION       Family History   Problem Relation Age of Onset    Diabetes Father     Hypertension Father     Hypertension Mother     Diabetes Mother     Heart attack Mother     No Known Problems Brother     No Known  Problems Brother     No Known Problems Brother     No Known Problems Brother     Stomach cancer Sister     No Known Problems Sister     No Known Problems Sister     No Known Problems Sister     No Known Problems Sister     No Known Problems Son     No Known Problems Son     Cancer Neg Hx     Stroke Neg Hx     Colon cancer Neg Hx     Ovarian cancer Neg Hx     Breast cancer Neg Hx      Social History     Socioeconomic History    Marital status: Single    Number of children: 2   Occupational History    Occupation:      Employer: [employer]     Employer: [employer]   Tobacco Use    Smoking status: Never    Smokeless tobacco: Never   Substance and Sexual Activity    Alcohol use: Yes     Comment: Rare    Drug use: No    Sexual activity: Yes     Partners: Male     Birth control/protection: Surgical   Social History Narrative    She wears seatbelt.     Social Determinants of Health     Financial Resource Strain: Low Risk  (9/26/2018)    Overall Financial Resource Strain (CARDIA)     Difficulty of Paying Living Expenses: Not hard at all   Food Insecurity: No Food Insecurity (9/26/2018)    Hunger Vital Sign     Worried About Running Out of Food in the Last Year: Never true     Ran Out of Food in the Last Year: Never true   Transportation Needs: No Transportation Needs (9/26/2018)    PRAPARE - Transportation     Lack of Transportation (Medical): No     Lack of Transportation (Non-Medical): No   Physical Activity: Insufficiently Active (10/2/2019)    Exercise Vital Sign     Days of Exercise per Week: 3 days     Minutes of Exercise per Session: 30 min   Stress: No Stress Concern Present (9/26/2018)    [employer] [place] [employer] - Occupational Stress Questionnaire     Feeling of Stress : Not at all   Social Connections: Moderately Isolated (9/26/2018)    Social Connection and Isolation Panel [NHANES]     Frequency of Communication with Friends and Family: More than three times a week     " Frequency of Social Gatherings with Friends and Family: Once a week     Attends [employer] Services: More than 4 times per year     Active Member of Clubs or Organizations: No     Attends Club or Organization Meetings: Never     Marital Status:      Review of patient's allergies indicates:   Allergen Reactions    Hydrocodone Hives and Rash       Objective:       /66 (BP Location: Right arm, Patient Position: Sitting, BP Method: Medium (Manual))   Pulse (!) 56   Temp 97.6 °F (36.4 °C) (Tympanic)   Resp 20   Ht 5' 3.5" (1.613 m)   Wt 56.5 kg (124 lb 9 oz)   BMI 21.72 kg/m²   Physical Exam  Constitutional:       General: She is not in acute distress.     Appearance: Normal appearance. She is well-developed. She is not ill-appearing or diaphoretic.   Cardiovascular:      Rate and Rhythm: Normal rate and regular rhythm.      Heart sounds: Normal heart sounds.   Pulmonary:      Effort: Pulmonary effort is normal.      Breath sounds: Normal breath sounds.   Musculoskeletal:      Right lower leg: No edema.      Left lower leg: No edema.   Neurological:      Mental Status: She is alert and oriented to person, place, and time.   Psychiatric:         Mood and Affect: Mood normal.         Behavior: Behavior normal.         Thought Content: Thought content normal.         Judgment: Judgment normal.       Assessment:     1. Uncontrolled type 2 diabetes mellitus with hyperglycemia, without long-term current use of insulin    2. Hypotension, unspecified hypotension type      Plan:   Uncontrolled type 2 diabetes mellitus with hyperglycemia, without long-term current use of insulin  -     Comprehensive Metabolic Panel; Future; Expected date: 08/03/2023  -     Hemoglobin A1C; Future; Expected date: 08/03/2023    Hypotension, unspecified hypotension type    Continue all current medications  Medication List with Changes/Refills   Current Medications    ALCOHOL SWABS (EASY COMFORT ALCOHOL PAD) PADM    Apply 1 each " topically 4 (four) times daily.    ASPIRIN (ECOTRIN) 81 MG EC TABLET    Take 1 tablet (81 mg total) by mouth once daily.    DICLOFENAC (VOLTAREN) 50 MG EC TABLET    Take 1 tablet (50 mg total) by mouth 2 (two) times daily.    IBUPROFEN (ADVIL,MOTRIN) 600 MG TABLET    Take 1 tablet (600 mg total) by mouth every 6 (six) hours as needed for Pain.    INSULIN GLARGINE, TOUJEO, (TOUJEO) 300 UNIT/ML (1.5 ML) INPN PEN    Inject 20 Units into the skin once daily.    INSULIN LISPRO-AABC (LYUMJEV KWIKPEN U-100 INSULIN) 100 UNIT/ML PEN    Titrate up to 20 units daily.    PANTOPRAZOLE (PROTONIX) 40 MG TABLET    Take 1 tablet (40 mg total) by mouth once daily.

## 2023-08-09 DIAGNOSIS — E10.65 TYPE 1 DIABETES MELLITUS WITH HYPERGLYCEMIA: ICD-10-CM

## 2023-08-09 RX ORDER — INSULIN LISPRO-AABC 100 [IU]/ML
INJECTION, SOLUTION SUBCUTANEOUS
Qty: 5 PEN | Refills: 1 | Status: SHIPPED | OUTPATIENT
Start: 2023-08-09 | End: 2023-10-01 | Stop reason: ALTCHOICE

## 2023-08-09 RX ORDER — IBUPROFEN 600 MG/1
600 TABLET ORAL EVERY 6 HOURS PRN
Qty: 30 TABLET | Refills: 0 | Status: SHIPPED | OUTPATIENT
Start: 2023-08-09

## 2023-08-15 ENCOUNTER — OFFICE VISIT (OUTPATIENT)
Dept: URGENT CARE | Facility: CLINIC | Age: 53
End: 2023-08-15
Payer: COMMERCIAL

## 2023-08-15 VITALS
TEMPERATURE: 98 F | RESPIRATION RATE: 18 BRPM | WEIGHT: 123.13 LBS | DIASTOLIC BLOOD PRESSURE: 61 MMHG | BODY MASS INDEX: 21.02 KG/M2 | SYSTOLIC BLOOD PRESSURE: 128 MMHG | OXYGEN SATURATION: 98 % | HEART RATE: 56 BPM | HEIGHT: 64 IN

## 2023-08-15 DIAGNOSIS — H65.02 NON-RECURRENT ACUTE SEROUS OTITIS MEDIA OF LEFT EAR: Primary | ICD-10-CM

## 2023-08-15 DIAGNOSIS — H61.23 IMPACTED CERUMEN OF BOTH EARS: ICD-10-CM

## 2023-08-15 PROCEDURE — 99213 OFFICE O/P EST LOW 20 MIN: CPT | Mod: S$GLB,,, | Performed by: NURSE PRACTITIONER

## 2023-08-15 PROCEDURE — 99213 PR OFFICE/OUTPT VISIT, EST, LEVL III, 20-29 MIN: ICD-10-PCS | Mod: S$GLB,,, | Performed by: NURSE PRACTITIONER

## 2023-08-15 RX ORDER — LEVOCETIRIZINE DIHYDROCHLORIDE 5 MG/1
5 TABLET, FILM COATED ORAL NIGHTLY
Qty: 30 TABLET | Refills: 0 | Status: SHIPPED | OUTPATIENT
Start: 2023-08-15 | End: 2023-12-19

## 2023-08-15 RX ORDER — FLUTICASONE PROPIONATE 50 MCG
1 SPRAY, SUSPENSION (ML) NASAL DAILY
Qty: 16 ML | Refills: 0 | Status: SHIPPED | OUTPATIENT
Start: 2023-08-15 | End: 2024-01-30 | Stop reason: SDUPTHER

## 2023-08-15 NOTE — PATIENT INSTRUCTIONS
Please arrange follow up with your primary medical clinic as soon as possible. You must understand that you've received an Urgent Care treatment only and that you may be released before all of your medical problems are known or treated. You, the patient, will arrange for follow up as instructed. If your symptoms worsen or fail to improve you should go to the Emergency Room.

## 2023-08-15 NOTE — PROGRESS NOTES
"Subjective:      Patient ID: Chan Dwyer is a 52 y.o. female.    Vitals:  height is 5' 4" (1.626 m) and weight is 55.8 kg (123 lb 2 oz). Her tympanic temperature is 98 °F (36.7 °C). Her blood pressure is 128/61 and her pulse is 56 (abnormal). Her respiration is 18 and oxygen saturation is 98%.     Chief Complaint: Kimber Dwyer  is a 52 year old female whom presents today with ringing and pain in bilateral ears for 3-4 days.Patient states that she hasn't used anything for her symptoms and her pain is 8/10 today. Patient denies any discharge or trauma to the ears.     Ringing in Ears:   Chronicity:  New  Onset:  In the past 7 days  Progression since onset:  Gradually worsening  Frequency:  Constantly  Pain scale:  8/10  Severity:  Severe  Duration:  Off/on all day   Associated symptoms: Ear pain, tinnitus and otalgia.  No dizziness, no vertigo, no ear congestion, no fever, no headaches, no buzzing, no rhinorrhea, no aural fullness, no fluctuance, no otorrhea, no facial weakness, no visual disturbances and not masked by noise.  Aggravated by:  Nothing  Treatments tried:  Nothing  Improvement on treatment:  No reliefNo dizziness.      Constitution: Negative for fever.   HENT:  Positive for ear pain and tinnitus.    Neurological:  Negative for dizziness, history of vertigo and headaches.      Objective:     Physical Exam   Constitutional: She is oriented to person, place, and time. She appears well-developed. She is cooperative.   HENT:   Head: Normocephalic and atraumatic.   Ears:   Right Ear: Hearing, tympanic membrane, external ear and ear canal normal. impacted cerumen  Left Ear: Hearing, tympanic membrane, external ear and ear canal normal. impacted cerumen     Comments: Bubbles noted to tympanic membrane of left ear post ear irrigation. Patient complained of dizziness while flushing right ear so irrigation was not complete. Unable to visualize entire tympanic membrane.   Nose: " Nose normal. No mucosal edema or nasal deformity. No epistaxis. Right sinus exhibits no maxillary sinus tenderness and no frontal sinus tenderness. Left sinus exhibits no maxillary sinus tenderness and no frontal sinus tenderness.   Mouth/Throat: Uvula is midline, oropharynx is clear and moist and mucous membranes are normal. No trismus in the jaw. Normal dentition. No uvula swelling.   Eyes: Conjunctivae and lids are normal.   Neck: Trachea normal and phonation normal. Neck supple.   Cardiovascular: Normal rate, regular rhythm, normal heart sounds and normal pulses.   Pulmonary/Chest: Effort normal and breath sounds normal.   Abdominal: Normal appearance and bowel sounds are normal. Soft.   Musculoskeletal: Normal range of motion.         General: Normal range of motion.   Neurological: She is alert and oriented to person, place, and time. She exhibits normal muscle tone.   Skin: Skin is warm, dry and intact.   Psychiatric: Her speech is normal and behavior is normal. Judgment and thought content normal.   Nursing note and vitals reviewed.      Assessment:     1. Non-recurrent acute serous otitis media of left ear    2. Impacted cerumen of both ears        Plan:       Non-recurrent acute serous otitis media of left ear  -     fluticasone propionate (FLONASE) 50 mcg/actuation nasal spray; 1 spray (50 mcg total) by Each Nostril route once daily.  Dispense: 16 mL; Refill: 0  -     levocetirizine (XYZAL) 5 MG tablet; Take 1 tablet (5 mg total) by mouth every evening.  Dispense: 30 tablet; Refill: 0    Impacted cerumen of both ears  -     Ear wax removal  -     carbamide peroxide (DEBROX) 6.5 % otic solution; Place 5 drops into the right ear 2 (two) times daily. for 5 days  Dispense: 15 mL; Refill: 0          Medical Decision Making:   Urgent Care Management:  Previous encounters were independently reviewed. Discussed with patient  all pertinent information and results. Discussed patient diagnosis and plan of treatment.  Additional plan of care as outlined above. Patient  was given all follow up and return instructions. All questions and concerns were addressed at this time. Patient  expresses understanding of information and instructions, and is comfortable with plan.    Patient was instructed to follow up with his Primary Care Provider if no improvement in symptoms in 5-7 DAYS:  or go to ED immediately for any worsening or change in current symptoms.           Patient Instructions      Please arrange follow up with your primary medical clinic as soon as possible. You must understand that you've received an Urgent Care treatment only and that you may be released before all of your medical problems are known or treated. You, the patient, will arrange for follow up as instructed. If your symptoms worsen or fail to improve you should go to the Emergency Room.

## 2023-08-15 NOTE — LETTER
August 15, 2023      [facility]  [address], [address]  [facility] [address]  Phone: [phone]  Fax: [phone]       Patient: Chan Dwyer   YOB: 1970  Date of Visit: 08/15/2023    To Whom It May Concern:    Antonio Dwyer  was at [facility] on 08/15/2023. The patient may return to work/school on 08/15/23 with no restrictions. If you have any questions or concerns, or if I can be of further assistance, please do not hesitate to contact me.    Sincerely,        Master Easton NP

## 2023-08-18 ENCOUNTER — TELEPHONE (OUTPATIENT)
Dept: URGENT CARE | Facility: CLINIC | Age: 53
End: 2023-08-18
Payer: COMMERCIAL

## 2023-08-21 ENCOUNTER — TELEPHONE (OUTPATIENT)
Dept: DIABETES | Facility: CLINIC | Age: 53
End: 2023-08-21
Payer: COMMERCIAL

## 2023-08-21 NOTE — TELEPHONE ENCOUNTER
Called to notify pt that her upcoming appt needed to be rescheduled due to it being in person at 12:30 on Wed. Aug. 23. Notified her that we are currently trying to move the schedule around so we can still see her in-person, however we may have to move the appt back to virtual if we are unable to do so. Pt acknowledged and understood. She stated that it was fine to transition back to virtual if needed, and to notify her if we do so.

## 2023-08-23 ENCOUNTER — OFFICE VISIT (OUTPATIENT)
Dept: PHYSICAL MEDICINE AND REHAB | Facility: CLINIC | Age: 53
End: 2023-08-23
Payer: COMMERCIAL

## 2023-08-23 ENCOUNTER — OFFICE VISIT (OUTPATIENT)
Dept: DIABETES | Facility: CLINIC | Age: 53
End: 2023-08-23
Payer: COMMERCIAL

## 2023-08-23 VITALS
WEIGHT: 123 LBS | DIASTOLIC BLOOD PRESSURE: 76 MMHG | BODY MASS INDEX: 21 KG/M2 | HEIGHT: 64 IN | SYSTOLIC BLOOD PRESSURE: 149 MMHG | RESPIRATION RATE: 13 BRPM | HEART RATE: 59 BPM

## 2023-08-23 DIAGNOSIS — E10.65 TYPE 1 DIABETES MELLITUS WITH HYPERGLYCEMIA: Primary | ICD-10-CM

## 2023-08-23 DIAGNOSIS — E55.9 VITAMIN D DEFICIENCY: ICD-10-CM

## 2023-08-23 DIAGNOSIS — G56.03 BILATERAL CARPAL TUNNEL SYNDROME: ICD-10-CM

## 2023-08-23 PROCEDURE — 99499 UNLISTED E&M SERVICE: CPT | Mod: S$GLB,,, | Performed by: PHYSICAL MEDICINE & REHABILITATION

## 2023-08-23 PROCEDURE — 3061F NEG MICROALBUMINURIA REV: CPT | Mod: CPTII,95,, | Performed by: PHYSICIAN ASSISTANT

## 2023-08-23 PROCEDURE — 3072F LOW RISK FOR RETINOPATHY: CPT | Mod: CPTII,95,, | Performed by: PHYSICIAN ASSISTANT

## 2023-08-23 PROCEDURE — 3066F PR DOCUMENTATION OF TREATMENT FOR NEPHROPATHY: ICD-10-PCS | Mod: CPTII,95,, | Performed by: PHYSICIAN ASSISTANT

## 2023-08-23 PROCEDURE — 95911 NRV CNDJ TEST 9-10 STUDIES: CPT | Mod: S$GLB,,, | Performed by: PHYSICAL MEDICINE & REHABILITATION

## 2023-08-23 PROCEDURE — 99999 PR PBB SHADOW E&M-EST. PATIENT-LVL III: CPT | Mod: PBBFAC,,, | Performed by: PHYSICAL MEDICINE & REHABILITATION

## 2023-08-23 PROCEDURE — 3046F HEMOGLOBIN A1C LEVEL >9.0%: CPT | Mod: CPTII,95,, | Performed by: PHYSICIAN ASSISTANT

## 2023-08-23 PROCEDURE — 3072F PR LOW RISK FOR RETINOPATHY: ICD-10-PCS | Mod: CPTII,95,, | Performed by: PHYSICIAN ASSISTANT

## 2023-08-23 PROCEDURE — 99214 PR OFFICE/OUTPT VISIT, EST, LEVL IV, 30-39 MIN: ICD-10-PCS | Mod: 95,,, | Performed by: PHYSICIAN ASSISTANT

## 2023-08-23 PROCEDURE — 95251 CONT GLUC MNTR ANALYSIS I&R: CPT | Mod: NDTC,,, | Performed by: PHYSICIAN ASSISTANT

## 2023-08-23 PROCEDURE — 3066F NEPHROPATHY DOC TX: CPT | Mod: CPTII,95,, | Performed by: PHYSICIAN ASSISTANT

## 2023-08-23 PROCEDURE — 3061F PR NEG MICROALBUMINURIA RESULT DOCUMENTED/REVIEW: ICD-10-PCS | Mod: CPTII,95,, | Performed by: PHYSICIAN ASSISTANT

## 2023-08-23 PROCEDURE — 99499 NO LOS: ICD-10-PCS | Mod: S$GLB,,, | Performed by: PHYSICAL MEDICINE & REHABILITATION

## 2023-08-23 PROCEDURE — 99214 OFFICE O/P EST MOD 30 MIN: CPT | Mod: 95,,, | Performed by: PHYSICIAN ASSISTANT

## 2023-08-23 PROCEDURE — 95911 PR NERVE CONDUCTION STUDY; 9-10 STUDIES: ICD-10-PCS | Mod: S$GLB,,, | Performed by: PHYSICAL MEDICINE & REHABILITATION

## 2023-08-23 PROCEDURE — 3046F PR MOST RECENT HEMOGLOBIN A1C LEVEL > 9.0%: ICD-10-PCS | Mod: CPTII,95,, | Performed by: PHYSICIAN ASSISTANT

## 2023-08-23 PROCEDURE — 95251 PR GLUCOSE MONITOR, 72 HOUR, PHYS INTERP: ICD-10-PCS | Mod: NDTC,,, | Performed by: PHYSICIAN ASSISTANT

## 2023-08-23 PROCEDURE — 99999 PR PBB SHADOW E&M-EST. PATIENT-LVL III: ICD-10-PCS | Mod: PBBFAC,,, | Performed by: PHYSICAL MEDICINE & REHABILITATION

## 2023-08-23 NOTE — PROGRESS NOTES
PCP: Tristian Bearden MD    Subjective:     Chief Complaint: Diabetes - Established Patient    TELEMEDICINE VISIT:     The patient location is: Home  The chief complaint leading to consultation is: Diabetes Follow up  Visit type: Virtual visit with synchronous audio and video  Total time spent with patient: 20  Each patient to whom he or she provides medical services by telemedicine is:  (1) informed of the relationship between the physician and patient and the respective role of any other health care provider with respect to management of the patient; and (2) notified that he or she may decline to receive medical services by telemedicine and may withdraw from such care at any time.    Notes: N/A    HISTORY OF PRESENT ILLNESS: 52 y.o.   female presenting for diabetes management visit.   The patient's last visit with me was on 3/13/2023.  Patient has had Type I diabetes due to low C peptide since 5 years or more.  Pertinent to decision making is the following comorbidities: Vitamin D Deficiency and Gestational DM in Pregnancy 2003  Patient has the following Diabetes complications: without complications  She  has attended diabetes education in the past.     Patient's most recent A1c of 9.2% was completed 3 weeks ago.   Patient states since Her last A1c Her blood glucose levels have been high  after meals.   Patient monitors blood glucose 4 times per day and Continuously with personal CGM Dexcom.   Patient blood glucose monitoring device will be uploaded into [employer] [facility] today.        Patients records show baseline hyperglycemia throughout the day. Patient states compliant with meds.  Patient endorses the following diabetes related symptoms: None.   Patient is due today for the following diabetes-related health maintenance standards: Foot Exam , Eye Exam, Lipid panel, and COVID-19 Vaccine .   She denies recent hospital admissions or emergency room visits.   She denies having hypoglycemia as  above.   Patient's concerns today include glycemic control.   Patient medication regimen is as below.     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       Lab Results   Component Value Date    CPEPTIDE 0.72 (L) 04/28/2022     Lab Results   Component Value Date    GLUTAMICACID 0.00 09/28/2020          //   , There is no height or weight on file to calculate BMI.  Her blood sugar in clinic today is:    Lab Results   Component Value Date    POCGLU 141 (A) 06/29/2022       Review of Systems   Constitutional:  Negative for activity change, appetite change, chills and fever.   HENT:  Negative for dental problem, mouth sores, nosebleeds, sore throat and trouble swallowing.    Eyes:  Negative for pain and discharge.   Respiratory:  Negative for shortness of breath, wheezing and stridor.    Cardiovascular:  Negative for chest pain, palpitations and leg swelling.   Gastrointestinal:  Negative for abdominal pain, diarrhea, nausea and vomiting.   Endocrine: Negative for polydipsia, polyphagia and polyuria.   Genitourinary:  Negative for dysuria, frequency and urgency.   Musculoskeletal:  Negative for joint swelling and myalgias.   Skin:  Negative for rash and wound.   Neurological:  Negative for dizziness,  syncope, weakness and headaches.   Psychiatric/Behavioral:  Negative for behavioral problems and dysphoric mood.          Diabetes Management Status  Statin: Not taking  ACE/ARB: Not taking    Screening or Prevention Patient's value Goal Complete/Controlled?   HgA1C Testing and Control   Lab Results   Component Value Date    HGBA1C 9.2 (H) 08/03/2023      Annually/Less than 8% No   Lipid profile : 08/19/2022 Annually Yes   LDL control  Lab Results   Component Value Date    LDLCALC 99.2 08/19/2022    Annually/Less than 100 mg/dl  Yes   Nephropathy screening Lab Results   Component Value Date    MICALBCREAT 6.7 03/14/2023     Lab Results   Component Value Date    PROTEINUA Negative 01/26/2021    Annually No   Blood pressure BP Readings from Last 1 Encounters:   08/23/23 (!) 149/76    Less than 140/90 Yes   Dilated retinal exam : 07/18/2022 Annually Yes    Foot exam   : 11/30/2021 Annually No     Social History     Socioeconomic History    Marital status: Single    Number of children: 2   Occupational History    Occupation:      Employer: [employer]     Employer: [employer]   Tobacco Use    Smoking status: Never    Smokeless tobacco: Never   Substance and Sexual Activity    Alcohol use: Yes     Comment: Rare    Drug use: No    Sexual activity: Yes     Partners: Male     Birth control/protection: Surgical   Social History Narrative    She wears seatbelt.     Social Determinants of Health     Financial Resource Strain: Low Risk  (9/26/2018)    Overall Financial Resource Strain (CARDIA)     Difficulty of Paying Living Expenses: Not hard at all   Food Insecurity: No Food Insecurity (9/26/2018)    Hunger Vital Sign     Worried About Running Out of Food in the Last Year: Never true     Ran Out of Food in the Last Year: Never true   Transportation Needs: No Transportation Needs (9/26/2018)    PRAPARE - Transportation     Lack of Transportation (Medical): No     Lack of Transportation  (Non-Medical): No   Physical Activity: Insufficiently Active (10/2/2019)    Exercise Vital Sign     Days of Exercise per Week: 3 days     Minutes of Exercise per Session: 30 min   Stress: No Stress Concern Present (9/26/2018)    [employer] - Occupational Stress Questionnaire     Feeling of Stress : Not at all   Social Connections: Moderately Isolated (9/26/2018)    Social Connection and Isolation Panel [NHANES]     Frequency of Communication with Friends and Family: More than three times a week     Frequency of Social Gatherings with Friends and Family: Once a week     Attends [employer] Services: More than 4 times per year     Active Member of Clubs or Organizations: No     Attends Club or Organization Meetings: Never     Marital Status:      Past Medical History:   Diagnosis Date    Abnormal Pap smear     Abnormal Pap smear of cervix     Arthritis, low back     Diabetes 09/2014     BS 162am 09/16/2014    DM (diabetes mellitus) 09/2014     am 01/26/2016    DM (diabetes mellitus) 09/2014    BS didn't check 01/31/2017    GERD (gastroesophageal reflux disease)     Gestational diabetes     Hepatitis C antibody positive in blood 02/11/2022    RNA NEGATIVE    History of abnormal Pap smear     Surgical menopause     Vitamin D deficiency        Objective:        Physical Exam  Constitutional:       General: She is not in acute distress.     Appearance: She is well-developed. She is not diaphoretic.   HENT:      Head: Normocephalic and atraumatic.      Right Ear: External ear normal.      Left Ear: External ear normal.      Nose: Nose normal.   Eyes:      General:         Right eye: No discharge.         Left eye: No discharge.   Pulmonary:      Effort: Pulmonary effort is normal. No respiratory distress.      Breath sounds: No stridor.   Musculoskeletal:         General: Normal range of motion.      Cervical back: Normal range of motion.   Skin:     Coloration: Skin is not pale.    Neurological:      Mental Status: She is alert and oriented to person, place, and time. Mental status is at baseline.      Motor: No abnormal muscle tone.      Coordination: Coordination normal.   Psychiatric:         Mood and Affect: Mood normal.         Behavior: Behavior normal.         Thought Content: Thought content normal.         Judgment: Judgment normal.           Assessment / Plan:     Type 1 diabetes mellitus with hyperglycemia    Vitamin D deficiency        Additional Plan Details:    - POCT Glucose  - Encouraged continuation of lifestyle changes including regular exercise and limiting carbohydrates to 30-45 grams per meal threes times daily and 15 grams per snack with a limit of two daily.   - Encouraged continued monitoring of blood glucose with maintenance of 4 times daily and Continuously with personal CGM Dexcom.  Dexcom G7 pending at [facility].   - Current DM Medication Regimen: Change Toujeo 24 units daily. Continue Januvia 100 mg daily. Change Lyumjev 13 units before meals, 3 units before snack, and correction dosing every 3 hours as needed - as below.   - Follow up with CDE; requesting check in   - Health Maintenance standards addressed today: Foot Exam - deferred by patient today because Telemedicine or Telephone visit, Urine Microalbumin / Creatinine Ratio scheduled, A1c to be scheduled, COVID - 19 Vaccine - patient will schedule outside of [facility] , and Mammogram - scheduled  - Nursing Visit: Patient is below goal range for nursing visit for age group and will not need nursing visit at this time .   - Follow up in 4 weeks.    URRENT DM MEDICATIONS:   Toujeo 24 units daily  Januvia 100 mg daily  Lyumjev meal size dosing and correction dosing every 3 hours as needed - as below    Lyumjev Meal Size:   13 units before meals   3 units before snacks    Lyumjev Correction Dosing every 3 hours as needed OUTSIDE OF EATING  If  - 250, may take 2 units of Lyumjev  If  - 300, may take 3 units of  Lyumjev  If  - 350, may take 4 units of Lyumjev  If  - 400, may take 5 units of Lyumjev  If +, may take 6 units of Lyumjev    Blakeney McKnight, PA-C [facility]

## 2023-08-23 NOTE — PROGRESS NOTES
[facility]   [address]  [place] [address]  Phone: [phone]     Full Name: Chan Dwyer YOB: 1970  Patient ID: [id number]      Visit Date: 8/23/2023 11:04  Age: 52 Years 9 Months Old  Examining Physician: Divine Hayward M.D.  Referring Physician:   Reason for Referral: ue pain     Chief Complaint   Patient presents with    Hand Pain       HPI: This is a 52 y.o.  female being seen in clinic today for re-evaluation of hand achy pain and numbness/tingling into her fingers-worse on the left.  She has a history of CTS dx in 2018 and left trigger finger release history. Resting her hands, shaking them or changing position provide relief.  She is a  and uses her hands repetitively daily.    History obtained from patient    Past family, medical, social, and surgical history reviewed in chart    Review of Systems:     General- denies lethargy, weight change, fever, chills  Head/neck- denies swallowing difficulties  ENT- denies hearing changes  Cardiovascular-denies chest pain  Pulmonary- denies shortness of breath  GI- denies constipation or bowel incontinence  - denies bladder incontinence  Skin- denies wounds or rashes  Musculoskeletal- denies weakness, +pain  Neurologic- +numbness and tingling  Psychiatric- denies depressive or psychotic features, denies anxiety  Lymphatic-denies swelling  Endocrine- denies hypoglycemic symptoms/+DM history  All other pertinent systems negative     Physical Examination:  General: Well developed, well nourished female, NAD  HEENT:NCAT EOMI bilaterally   Pulmonary:Normal respirations    Spinal Examination: CERVICAL  Active ROM is within normal limits.  Inspection: No deformity of spinal alignment.    Spinal Examination: LUMBAR or THORACIC  Active ROM is within normal limits.  Inspection: No deformity of spinal alignment.      Musculoskeletal Tests:  Phalen:   Elbow compression (ulnar): neg  Tinels at wrist:  neg    Bilateral Upper and Lower Extremities:  Pulses are 2+ at radial bilaterally.  Shoulder/Elbow/Wrist/Hand ROM wnl  Hip/Knee/Ankle ROM   Bilateral Extremities show normal capillary refill.  No signs of cyanosis, rubor, edema, skin changes, or dysvascular changes of appendages.  Nails appear intact.    Neurological Exam:  Cranial Nerves:  II-XII grossly intact    Manual Muscle Testing: (Motor 5=normal)  5/5 strength bilateral upper extremities except 4+5 at right apb    No focal atrophy is noted of either upper extremity.    Bilateral Reflexes:  Mina's response is absent bilaterally.    Sensation: tested to light touch  - intact in arms   Gait: Narrow base and good arm swing.      Entire procedure explained to patient prior to proceeding.  Verbal consent obtained        SNC      Nerve / Sites Rec. Site Onset Lat Peak Lat Amp Segments Distance Velocity     ms ms µV  mm m/s   L Median - Digit II (Antidromic)      Wrist Dig II 3.1 4.0 13.1 Wrist - Dig  46   R Median - Digit II (Antidromic)      Wrist Dig II 3.1 4.0 12.2 Wrist - Dig  46   L Ulnar - Digit V (Antidromic)      Wrist Dig V 2.8 3.5 13.9 Wrist - Dig V 140 51   R Ulnar - Digit V (Antidromic)      Wrist Dig V 2.7 3.5 19.4 Wrist - Dig V 140 52   L Radial - Anatomical snuff box (Forearm)      Forearm Wrist 1.8 2.4 14.5 Forearm - Wrist 100 56   R Radial - Anatomical snuff box (Forearm)      Forearm Wrist 1.4 2.2 23.9 Forearm - Wrist 100 74       MNC      Nerve / Sites Muscle Latency Amplitude Duration Rel Amp Segments Distance Lat Diff Velocity     ms mV ms %  mm ms m/s   L Median - APB      Wrist APB 3.3 8.9 5.7 100 Wrist - APB 80        Elbow APB 7.4 8.7 5.7 97.7 Elbow - Wrist 190 4.1 47   R Median - APB      Wrist APB 3.5 13.2 5.2 100 Wrist - APB 80        Elbow APB 7.8 10.4 5.2 79 Elbow - Wrist 200 4.3 46   L Ulnar - ADM      Wrist ADM 2.8 9.2 5.7 100 Wrist -         B. Elbow ADM 6.6 9.9 6.1 107 B. Elbow - Wrist 210 3.8 55      A.  Elbow ADM 8.5 8.0 6.3 80.9 A. Elbow - B. Elbow 100 2.0 51   R Ulnar - ADM      Wrist ADM 3.2 10.4 5.4 100 Wrist -         B. Elbow ADM 7.1 10.5 5.7 101 B. Elbow - Wrist 200 3.9 51      A. Elbow ADM 9.0 10.2 6.0 97.4 A. Elbow - B. Elbow 100 1.9 53                                               INTERPRETATION  -Bilateral median motor nerve conduction study showed normal latency, amplitude, and dec conduction velocity  -Bilateral median sensory nerve conduction study showed prolonged peak latency and normal amplitude  -Bilateral ulnar motor nerve conduction study showed normal latency, amplitude, and conduction velocity  -Bilateral ulnar sensory nerve conduction study showed normal peak latency and amplitude  -Bilateral radial sensory nerve conduction study showed normal peak latency and amplitude      IMPRESSION  1. ABNORMAL study  2. There is electrodiagnostic evidence of a MILD-MODERATE demyelinating median neuropathy (Carpal tunnel syndrome) across BILATERAL wrists    PLAN  1. Follow up with referring provider: Dr. Adarsh Jonas*  2. Handouts on CTS provided.   3. This study is good for one year. If symptoms worsen or do not improve, please re-consult.    Divine Hayward M.D.  Physical Medicine and Rehab

## 2023-08-23 NOTE — PATIENT INSTRUCTIONS
CURRENT DM MEDICATIONS:   Toujeo 24 units daily  Januvia 100 mg daily  Lyumjev meal size dosing and correction dosing every 3 hours as needed - as below    Lyumjev Meal Size:   13 units before meals   3 units before snacks    Lyumjev Correction Dosing every 3 hours as needed OUTSIDE OF EATING  If  - 250, may take 2 units of Lyumjev  If  - 300, may take 3 units of Lyumjev  If  - 350, may take 4 units of Lyumjev  If  - 400, may take 5 units of Lyumjev  If +, may take 6 units of Lyumjev    Dexcom G7 Switch Instructions:   - download Dexcom G7 Leigh from the Leigh Store on your phone.   - Log in to Dexcom G7 Leigh with the same log in from G6 / Clarity   - Follow video instructions for sensor placement   - Once sensor is warming up, delete G6 leigh - leaving only G7 and Clarity  - Message Diabetes clinic on [employer] or call once set up to ensure still sharing with clinic

## 2023-08-28 ENCOUNTER — PATIENT MESSAGE (OUTPATIENT)
Dept: DIABETES | Facility: CLINIC | Age: 53
End: 2023-08-28
Payer: COMMERCIAL

## 2023-08-28 ENCOUNTER — TELEPHONE (OUTPATIENT)
Dept: DIABETES | Facility: CLINIC | Age: 53
End: 2023-08-28
Payer: COMMERCIAL

## 2023-08-29 ENCOUNTER — OFFICE VISIT (OUTPATIENT)
Dept: ORTHOPEDICS | Facility: CLINIC | Age: 53
End: 2023-08-29
Payer: COMMERCIAL

## 2023-08-29 VITALS — WEIGHT: 123 LBS | HEIGHT: 64 IN | BODY MASS INDEX: 21 KG/M2

## 2023-08-29 DIAGNOSIS — G56.03 BILATERAL CARPAL TUNNEL SYNDROME: Primary | ICD-10-CM

## 2023-08-29 PROCEDURE — 99213 OFFICE O/P EST LOW 20 MIN: CPT | Mod: S$GLB,,, | Performed by: ORTHOPAEDIC SURGERY

## 2023-08-29 PROCEDURE — 3046F PR MOST RECENT HEMOGLOBIN A1C LEVEL > 9.0%: ICD-10-PCS | Mod: CPTII,S$GLB,, | Performed by: ORTHOPAEDIC SURGERY

## 2023-08-29 PROCEDURE — 3072F LOW RISK FOR RETINOPATHY: CPT | Mod: CPTII,S$GLB,, | Performed by: ORTHOPAEDIC SURGERY

## 2023-08-29 PROCEDURE — 3061F NEG MICROALBUMINURIA REV: CPT | Mod: CPTII,S$GLB,, | Performed by: ORTHOPAEDIC SURGERY

## 2023-08-29 PROCEDURE — 3008F PR BODY MASS INDEX (BMI) DOCUMENTED: ICD-10-PCS | Mod: CPTII,S$GLB,, | Performed by: ORTHOPAEDIC SURGERY

## 2023-08-29 PROCEDURE — 1159F PR MEDICATION LIST DOCUMENTED IN MEDICAL RECORD: ICD-10-PCS | Mod: CPTII,S$GLB,, | Performed by: ORTHOPAEDIC SURGERY

## 2023-08-29 PROCEDURE — 3072F PR LOW RISK FOR RETINOPATHY: ICD-10-PCS | Mod: CPTII,S$GLB,, | Performed by: ORTHOPAEDIC SURGERY

## 2023-08-29 PROCEDURE — 99213 PR OFFICE/OUTPT VISIT, EST, LEVL III, 20-29 MIN: ICD-10-PCS | Mod: S$GLB,,, | Performed by: ORTHOPAEDIC SURGERY

## 2023-08-29 PROCEDURE — 99999 PR PBB SHADOW E&M-EST. PATIENT-LVL III: CPT | Mod: PBBFAC,,, | Performed by: ORTHOPAEDIC SURGERY

## 2023-08-29 PROCEDURE — 99999 PR PBB SHADOW E&M-EST. PATIENT-LVL III: ICD-10-PCS | Mod: PBBFAC,,, | Performed by: ORTHOPAEDIC SURGERY

## 2023-08-29 PROCEDURE — 1160F RVW MEDS BY RX/DR IN RCRD: CPT | Mod: CPTII,S$GLB,, | Performed by: ORTHOPAEDIC SURGERY

## 2023-08-29 PROCEDURE — 3046F HEMOGLOBIN A1C LEVEL >9.0%: CPT | Mod: CPTII,S$GLB,, | Performed by: ORTHOPAEDIC SURGERY

## 2023-08-29 PROCEDURE — 3008F BODY MASS INDEX DOCD: CPT | Mod: CPTII,S$GLB,, | Performed by: ORTHOPAEDIC SURGERY

## 2023-08-29 PROCEDURE — 1159F MED LIST DOCD IN RCRD: CPT | Mod: CPTII,S$GLB,, | Performed by: ORTHOPAEDIC SURGERY

## 2023-08-29 PROCEDURE — 3061F PR NEG MICROALBUMINURIA RESULT DOCUMENTED/REVIEW: ICD-10-PCS | Mod: CPTII,S$GLB,, | Performed by: ORTHOPAEDIC SURGERY

## 2023-08-29 PROCEDURE — 3066F PR DOCUMENTATION OF TREATMENT FOR NEPHROPATHY: ICD-10-PCS | Mod: CPTII,S$GLB,, | Performed by: ORTHOPAEDIC SURGERY

## 2023-08-29 PROCEDURE — 3066F NEPHROPATHY DOC TX: CPT | Mod: CPTII,S$GLB,, | Performed by: ORTHOPAEDIC SURGERY

## 2023-08-29 PROCEDURE — 1160F PR REVIEW ALL MEDS BY PRESCRIBER/CLIN PHARMACIST DOCUMENTED: ICD-10-PCS | Mod: CPTII,S$GLB,, | Performed by: ORTHOPAEDIC SURGERY

## 2023-08-29 NOTE — PROGRESS NOTES
Subjective:     Patient ID: Chan Dwyer is a 52 y.o. female.    Chief Complaint: Pain of the Right Hand and Pain of the Left Hand      HPI:  The patient is a 52-year-old female with bilateral carpal tunnel syndrome.  She is not tried splinting or injection.  She wishes to only tried the splints today.  Nerve conduction studies did confirm bilateral carpal tunnel syndrome    Past Medical History:   Diagnosis Date    Abnormal Pap smear     Abnormal Pap smear of cervix     Arthritis, low back     Diabetes 09/2014     BS 162am 09/16/2014    DM (diabetes mellitus) 09/2014     am 01/26/2016    DM (diabetes mellitus) 09/2014    BS didn't check 01/31/2017    GERD (gastroesophageal reflux disease)     Gestational diabetes     Hepatitis C antibody positive in blood 02/11/2022    RNA NEGATIVE    History of abnormal Pap smear     Surgical menopause     Vitamin D deficiency      Past Surgical History:   Procedure Laterality Date    COLONOSCOPY N/A 6/22/2016    Procedure: COLONOSCOPY;  Surgeon: Vishal Mary III, MD;  Location: [facility] ENDO;  Service: Endoscopy;  Laterality: N/A;    Cryotherapy of the cervix  1999    DILATION AND CURETTAGE OF UTERUS      HYSTERECTOMY      LEFT HEART CATHETERIZATION Left 3/24/2023    Procedure: Left heart cath;  Surgeon: Lee Lambert MD;  Location: [facility] CATH LAB;  Service: Cardiology;  Laterality: Left;    [facility]  2/2012    TRIGGER FINGER RELEASE Left 10/10/2022    Procedure: RELEASE, TRIGGER FINGER;  Surgeon: Adarsh Chavez MD;  Location: [facility] OR;  Service: Orthopedics;  Laterality: Left;  left ring trigger finger release    TUBAL LIGATION       Family History   Problem Relation Age of Onset    Diabetes Father     Hypertension Father     Hypertension Mother     Diabetes Mother     Heart attack Mother     No Known Problems Brother     No Known Problems Brother     No Known Problems Brother     No Known Problems Brother     Stomach cancer Sister     No Known Problems  Sister     No Known Problems Sister     No Known Problems Sister     No Known Problems Sister     No Known Problems Son     No Known Problems Son     Cancer Neg Hx     Stroke Neg Hx     Colon cancer Neg Hx     Ovarian cancer Neg Hx     Breast cancer Neg Hx      Social History     Socioeconomic History    Marital status: Single    Number of children: 2   Occupational History    Occupation:      Employer: [email or web address]     Employer: [email or web address]   Tobacco Use    Smoking status: Never    Smokeless tobacco: Never   Substance and Sexual Activity    Alcohol use: Yes     Comment: Rare    Drug use: No    Sexual activity: Yes     Partners: Male     Birth control/protection: Surgical   Social History Narrative    She wears seatbelt.     Social Determinants of Health     Financial Resource Strain: Low Risk  (9/26/2018)    Overall Financial Resource Strain (CARDIA)     Difficulty of Paying Living Expenses: Not hard at all   Food Insecurity: No Food Insecurity (9/26/2018)    Hunger Vital Sign     Worried About Running Out of Food in the Last Year: Never true     Ran Out of Food in the Last Year: Never true   Transportation Needs: No Transportation Needs (9/26/2018)    PRAPARE - Transportation     Lack of Transportation (Medical): No     Lack of Transportation (Non-Medical): No   Physical Activity: Insufficiently Active (10/2/2019)    Exercise Vital Sign     Days of Exercise per Week: 3 days     Minutes of Exercise per Session: 30 min   Stress: No Stress Concern Present (9/26/2018)    [employer] - Occupational Stress Questionnaire     Feeling of Stress : Not at all   Social Connections: Moderately Isolated (9/26/2018)    Social Connection and Isolation Panel [NHANES]     Frequency of Communication with Friends and Family: More than three times a week     Frequency of Social Gatherings with Friends and Family: Once a week     Attends [employer] Services: More than 4 times per  year     Active Member of Clubs or Organizations: No     Attends Club or Organization Meetings: Never     Marital Status:      Medication List with Changes/Refills   Current Medications    ASPIRIN (ECOTRIN) 81 MG EC TABLET    Take 1 tablet (81 mg total) by mouth once daily.    DICLOFENAC (VOLTAREN) 50 MG EC TABLET    Take 1 tablet (50 mg total) by mouth 2 (two) times daily.    FLUTICASONE PROPIONATE (FLONASE) 50 MCG/ACTUATION NASAL SPRAY    1 spray (50 mcg total) by Each Nostril route once daily.    IBUPROFEN (ADVIL,MOTRIN) 600 MG TABLET    Take 1 tablet (600 mg total) by mouth every 6 (six) hours as needed for Pain.    INSULIN GLARGINE, TOUJEO, (TOUJEO) 300 UNIT/ML (1.5 ML) INPN PEN    Inject 20 Units into the skin once daily.    INSULIN LISPRO-AABC (LYUMJEV KWIKPEN U-100 INSULIN) 100 UNIT/ML PEN    INJECT  TITRATE UP TO 20 UNITS INTO THE SKIN ONCE DAILY    LEVOCETIRIZINE (XYZAL) 5 MG TABLET    Take 1 tablet (5 mg total) by mouth every evening.    PANTOPRAZOLE (PROTONIX) 40 MG TABLET    Take 1 tablet (40 mg total) by mouth once daily.     Review of patient's allergies indicates:   Allergen Reactions    Hydrocodone Hives and Rash     Review of Systems   Constitutional: Negative for malaise/fatigue.   HENT:  Negative for hearing loss.    Eyes:  Negative for double vision and visual disturbance.   Cardiovascular:  Positive for chest pain.   Respiratory:  Negative for shortness of breath.    Endocrine: Negative for cold intolerance.   Hematologic/Lymphatic: Does not bruise/bleed easily.   Skin:  Negative for poor wound healing and suspicious lesions.   Musculoskeletal:  Positive for arthritis, joint pain and joint swelling. Negative for gout.   Gastrointestinal:  Positive for heartburn. Negative for nausea and vomiting.   Genitourinary:  Negative for dysuria.   Neurological:  Positive for focal weakness, headaches, numbness, paresthesias and sensory change.   Psychiatric/Behavioral:  Negative for depression,  memory loss and substance abuse. The patient is not nervous/anxious.    Allergic/Immunologic: Negative for persistent infections.       Objective:   Body mass index is 21.11 kg/m².  There were no vitals filed for this visit.             General    Constitutional: She is oriented to person, place, and time. She appears well-developed and well-nourished. No distress.   HENT:   Head: Normocephalic.   Eyes: EOM are normal.   Pulmonary/Chest: Effort normal.   Neurological: She is oriented to person, place, and time.   Psychiatric: She has a normal mood and affect.             Right Hand/Wrist Exam     Inspection   Scars: Wrist - absent Hand -  absent  Effusion: Wrist - absent Hand -  absent    Pain   Wrist - The patient exhibits pain of the flexor/pronator group.    Tests   Phalens sign: positive  Tinel's sign (median nerve): positive  Carpal Tunnel Compression Test: positive    Atrophy   Thenar:  negative  Intrinsic:  negative    Other     Neuorologic Exam    Median Distribution: abnormal  Ulnar Distribution: normal  Radial Distribution: normal    Comments:  The patient has a positive Tinel and positive Phalen sign.  There is no thenar atrophy noted.      Left Hand/Wrist Exam     Inspection   Scars: Wrist - absent Hand -  absent  Effusion: Wrist - absent Hand -  absent    Pain   Wrist - The patient exhibits pain of the flexor/pronator group.    Tests   Phalens sign: positive  Tinel's sign (median nerve): positive  Carpal Tunnel Compression Test: positive    Atrophy  Thenar:  Negative  Intrinsic: negative    Other     Sensory Exam  Median Distribution: abnormal  Ulnar Distribution: normal  Radial Distribution: normal    Comments:  The patient had a positive Tinel and positive Phalen sign.  There is no thenar atrophy noted.          Vascular Exam       Capillary Refill  Right Hand: normal capillary refill  Left Hand: normal capillary refill           radiographs were not obtained today  Assessment:     Encounter  Diagnosis   Name Primary?    Bilateral carpal tunnel syndrome Yes        Plan:       The patient declines injection or surgery.  She was given bilateral carpal tunnel wrist braces and will return if she becomes more symptomatic              Disclaimer: This note was prepared using a voice recognition system and is likely to have sound alike errors within the text.

## 2023-09-05 ENCOUNTER — CLINICAL SUPPORT (OUTPATIENT)
Dept: DIABETES | Facility: CLINIC | Age: 53
End: 2023-09-05
Payer: COMMERCIAL

## 2023-09-05 VITALS — BODY MASS INDEX: 21.08 KG/M2 | WEIGHT: 122.81 LBS

## 2023-09-05 DIAGNOSIS — E10.65 TYPE 1 DIABETES MELLITUS WITH HYPERGLYCEMIA: ICD-10-CM

## 2023-09-05 PROCEDURE — 99999 PR PBB SHADOW E&M-EST. PATIENT-LVL III: CPT | Mod: PBBFAC,,, | Performed by: DIETITIAN, REGISTERED

## 2023-09-05 PROCEDURE — 99999 PR PBB SHADOW E&M-EST. PATIENT-LVL III: ICD-10-PCS | Mod: PBBFAC,,, | Performed by: DIETITIAN, REGISTERED

## 2023-09-05 PROCEDURE — G0108 DIAB MANAGE TRN  PER INDIV: HCPCS | Mod: S$GLB,,, | Performed by: DIETITIAN, REGISTERED

## 2023-09-05 PROCEDURE — G0108 PR DIAB MANAGE TRN  PER INDIV: ICD-10-PCS | Mod: S$GLB,,, | Performed by: DIETITIAN, REGISTERED

## 2023-09-05 NOTE — Clinical Note
Sorry for duplicate message. But wanted to see what you think about an insulin delivery option for her? Cequr or OmniPod5?

## 2023-09-05 NOTE — Clinical Note
"Demetrius Hartley, I met w/ Ms Reymundo today. She is requesting Rx "insurance preferred meter/supplies." Also, she's been out of Januvia 100mg 1tab daily. Is this something you want her to continue? If so, she is requesting Rx.. Thank you!! Marcella"

## 2023-09-05 NOTE — PROGRESS NOTES
Diabetes Care Specialist Progress Note  Author: Marcella Duke RD, CDE  Date: 9/5/2023    Program Intake  Reason for Diabetes Program Visit:: Initial Diabetes Assessment (DM referral 8/27/23 Odilia Shaffer, PABRIAN)  Type of Intervention:: Individual  Education: Self-Management Skill Review  Device Training: Personal CGM (Dexcom G7)  Current diabetes risk level:: moderate  In the last 12 months, have you::  (pt denies ER/hosp visits)    Lab Results   Component Value Date    HGBA1C 9.2 (H) 08/03/2023       Clinical    Patient Health Rating  Compared to other people your age, how would you rate your health?: Fair    Problem Review  Active comorbidities affecting diabetes self-care.: yes (Gestational DM in Pregnancy 2003, HepC, GERD, Vit D defn)    Clinical Assessment  Current Diabetes Treatment: Insulin, Oral Medication, Diet, Exercise (Toujeo 24 units daily. Lyumjev ac 13units meal, 3units snack + s/s. Januvia 100mg 1tab daily (pt reports being out and needs Rx refill from provider).)  Have you ever experienced hypoglycemia (low blood sugar)?: yes  In the last month, how often have you experienced low blood sugar?:  (occs)  Are you able to tell when your blood sugar is low?: Yes  What symptoms do you experience?: Dizzy/Light-headed  Have you ever been hospitalized because your blood sugar was too low?: no  How do you treat hypoglycemia (low blood sugar)?: 1/2 can soda/fruit juice  Have you ever experienced hyperglycemia (high blood sugar)?: yes  In the last month, how often have you experienced high blood sugar?: more than once a day  Are you able to tell when your blood sugar is high?: Yes  What are your symptoms?: fatigue, blurry vision  Have you ever been hospitalized because your blood sugar was high?: no    Medication Information  How many days a week do you miss your medications?: 3 or more  Do you sometimes have difficulty refilling your medications?: Yes (see comment)  Medication adherence impacting  ability to self-manage diabetes?: Yes    Labs  Do you have regular lab work to monitor your medications?: Yes  Type of Regular Lab Work: A1c, Cholesterol, Microalbumin, BMP    Nutritional Status  Diet:  (Pt reports irregular meal patterns and carb intake. Higher sat fat, sodium from snacks and restaurant meals. Inadequate non-starchy vegetables.)  Meal Plan 24 Hour Recall - Breakfast: skip OR boiled egg - water  Meal Plan 24 Hour Recall - Lunch: crawfish bisque 2/3c w/ rice 1/3c - water  Meal Plan 24 Hour Recall - Dinner: chix and dumplings 1c, rice 1/2c  Meal Plan 24 Hour Recall - Snack: occs snacks-chip, candy; urban: water, sf flv, diet tea  Recent Changes in Weight: Weight Loss  Was weight loss intentional or unintentional?: Unintentional (~6lbs since 12/22)  Current nutritional status an area of need that is impacting patient's ability to self-manage diabetes?: No    Additional Social History    Support  Does anyone support you with your diabetes care?: no  Is Support an area impacting ability to self-manage diabetes?: No    Access to Mass Media & Technology  Does the patient have access to any of the following devices or technologies?: Smart phone, Home computer, Internet Access  Media or technology needs impacting ability to self-manage diabetes?: No    Cognitive/Behavioral Health  Alert and Oriented: Yes  Difficulty Thinking: No  Requires Prompting: No  Requires assistance for routine expression?: No  Cognitive or behavioral barriers impacting ability to self-manage diabetes?: No    Culture/Yazidi  Culture or Pentecostalism beliefs that may impact ability to access healthcare: No    Communication  Language preference: English  Hearing Problems: No  Vision Problems: Yes  Vision problem type:: Decreased Vision  Vision Assistance: Glasses  Communication needs impacting ability to self-manage diabetes?: No    Health Literacy  Preferred Learning Method: Face to Face, Demonstration  How often do you need to have  someone help you read instructions, pamphlets, or written material from your doctor or pharmacy?: Never  Health literacy needs impacting ability to self-manage diabetes?: No      Diabetes Self-Management Skills Assessment    Diabetes Disease Process/Treatment Options  Patient/caregiver able to state what happens when someone has diabetes.: yes  Patient/caregiver knows what type of diabetes they have.: yes  Diabetes Type : Type I  Diabetes Disease Process/Treatment Options: Skills Assessment Completed: Yes  Assessment indicates:: Adequate understanding  Area of need?: No    Nutrition/Healthy Eating  Challenges to healthy eating::  (none recent)  Method of carbohydrate measurement:: eyeballing/guessing  Patient can identify foods that impact blood sugar.: yes  Patient-identified foods:: starches (bread, pasta, rice, cereal) (Pt needs refresher training on carb gram ctg and meal planning.)  Nutrition/Healthy Eating Skills Assessment Completed:: Yes  Assessment indicates:: Instruction Needed, Knowledge deficit  Area of need?: Yes    Physical Activity/Exercise  Patient's daily activity level:: constantly moving  Patient can identify forms of physical activity.: yes  Stated forms of physical activity:: manual activity at work  Patient can identify reasons why exercise/physical activity is important in diabetes management.: yes  Identified reasons:: strengthens heart, muscles, and bones, helps insulin work better  Physical Activity/Exercise Skills Assessment Completed: : Yes  Assessment indicates:: Adequate understanding  Area of need?: No    Medications  Patient is able to describe current diabetes management routine.: yes  Diabetes management routine:: diet, insulin, exercise, oral medications  Patient is able to identify current diabetes medications, dosages, and appropriate timing of medications.: no (Pt isn't using correction bolus since she doesn't have glucometer or CGM. Pt doesn't have Januvia & requesting Rx  refill.)  Patient understands the purpose of the medications taken for diabetes.: no  Patient reports problems or concerns with current medication regimen.: yes  Medication regimen problems/concerns:: does not feel like regimen is working, unsure of doses  Medication Skills Assessment Completed:: Yes  Assessment indicates:: Knowledge deficit, Instruction Needed  Area of need?: Yes    Home Blood Glucose Monitoring  Patient states that blood sugar is checked at home daily.: no  When you check what is your typical blood sugar range? : Pt reports needing backup glucometer. Pt in clinic today for Dexcom G7 training.  Patient is able to use personal CGM appropriately.: no  Home Blood Glucose Monitoring Skills Assessment Completed: : Yes  Assessment indicates:: Knowledge deficit, Instruction Needed  Area of need?: Yes    Acute Complications  Patient is able to identify types of acute complications: Yes  Patient Identified:: Hypoglycemia, Hyperglycemia  Patient is able to state the basic meaning of hypoglycemia?: Yes  Able to state the blood sugar range for hypoglycemia?: yes  Patient stated range:: <70  Patient can identify general symptoms of hypoglycemia: yes  Patient identified:: dizziness  Able to state proper treatment of hypoglycemia?: yes  Patient identified:: 1/2 can soda/fruit juice  Patient is able to state the basic meaning of hyperglycemia?: Yes  Able to state the blood sugar range for hyperglycemia?: yes  Patient stated range:: 300s  Patient able to state proper treatment of hyperglycemia?: yes  Patient identified:: monitor blood sugar, take medication as recommended, increase water intake  Acute Complications Skills Assessment Completed: : Yes  Assessment indicates:: Adequate understanding  Area of need?: No    Chronic Complications  Patient can identify major chronic complications of diabetes.: yes  Stated chronic complications:: kidney disease, retinopathy, heart disease/heart attack  Patient can identify  ways to prevent or delay diabetes complications.: yes  Stated ways to prevent complications:: healthy eating and regular activity, controlling blood sugar, having regular diabetic eye exams  Patient is aware that having diabetes increases risk of heart disease?: Yes  Patient is taking statin?: No  Chronic Complications Skills Assessment Completed: : Yes  Assessment indicates:: Adequate understanding  Area of need?: No    Psychosocial/Coping  Patient can identify ways of coping with chronic disease.: yes  Patient-stated ways of coping with chronic disease:: support from loved ones  Psychosocial/Coping Skills Assessment Completed: : Yes  Assessment indicates:: Adequate understanding  Area of need?: No    Assessment Summary and Plan  Based on today's diabetes care assessment, the following areas of need were identified:          9/5/2023    12:01 AM   Social   Support No   Access to Mass Media/Tech No   Cognitive/Behavioral Health No   Culture/Spiritism No   Communication No   Health Literacy No            9/5/2023    12:01 AM   Clinical   Medication Adherence Yes -see care plan   Nutritional Status No            9/5/2023    12:01 AM   Diabetes Self-Management Skills   Diabetes Disease Process/Treatment Options No   Nutrition/Healthy Eating Yes -see care plan   Physical Activity/Exercise No   Medication Yes -see care plan   Home Blood Glucose Monitoring Yes -see care plan   Acute Complications No   Chronic Complications No   Psychosocial/Coping No        Today's interventions were provided through individual discussion, instruction, and written materials were provided.    Patient verbalized understanding of instruction and written materials.  Pt was able to return back demonstration of instructions today. Patient understood key points, needs reinforcement and further instruction.     Diabetes Self-Management Care Plan:  Today's Diabetes Self-Management Care Plan was developed with Chan's input. Chan has agreed  "to work toward the following goal(s) to improve his/her overall diabetes control.      Care Plan: Diabetes Management   Updates made since 8/6/2023 12:00 AM        Problem: Blood Glucose Self-Monitoring         Goal: Patient agrees to use Dexcom G7 and backup meter as directed.    Start Date: 9/5/2023   Expected End Date: 8/27/2024   Priority: High   Barriers: Lack of Supplies   Note:    Message to provider Karthikeyan for Rx "insurance preferred meter/supplies."     DEXCOM G7 CGM TRAINING  Dexcom Moka7 mobile apps downloaded to phone. Education was provided using "Quick Start Guide" and demo device per Dexcom protocol. [facility] data share set-up.    Patient will use Dexcom G7 mobile to receive continuous glucose data.        Overview:  5 minute glucose reading updates, trending arrows, BG graph screens, reports screen,  connectivity and functions.   Menus: Trend graph, start sensor, enter BG, events, alerts, settings, replace sensor, stop sensor, and shutdown  Dexcom G7 mobile arlen/ Settings:                          * Urgent Low: 55 mg/dL                          * Urgent Low Soon: On                          * Low Alert: 70 mg/dL; Snooze 15min                          * High Alert: 250 mg/dL                           * Signal Loss: on                          * Brief Sensor Issue: on     Reviewed additional setting options.  Patient paired sensor using 4-digit code listed on sensor cap..    Reviewed where to find sensor insertion time and expiration date.   Reviewed appropriate calibration techniques.  Reviewed sensor site selection. Patient selected and prepped site using aseptic technique, inserted sensor, applied overlay tape and started session.   Reviewed sensor removal from site. Discussed times to remove sensor per  guidelines include MRI or diatherapy.   Patient able to demonstrate without difficulty. Encouraged to review manual and/or training videos prior to starting another " sensor.   Reviewed problem solving aspects of sensor transmission/variables that can disrupt RF transmission.  range 20 feet, but the first 3 hrs keep within 3 feet of transmitter.  Patient instructed on lag time of interstitial fluid from CBG and was advised to treat hypoglycemia and dose insulin based on SMBG values.  Dexcom technical support contact number given and examples of when to contact them discussed.            Task: Reviewed the importance of self-monitoring blood glucose and keeping logs. Completed 9/5/2023        Task: Reviewed appropriate timing and frequency to SMBG, education on parameters on when to notify provider and advised patient to bring logs to all appts with PCP/Endocrinologist/Diabetes Care Specialist. Completed 9/5/2023        Problem: Medications         Goal: Patient Agrees to take Diabetes Medication(s) as prescribed.    Start Date: 9/5/2023   Expected End Date: 8/27/2024   Priority: Medium   Barriers: Other (comments)   Note:    Message to provider Karthikeyan to inform pt doesn't have Januvia 100mg 1tab daily.     Instructed pt on bolus dosing to include both set meal dose and correction dose. Pt verbalized understanding.     Also, pt may seems interested in insulin delivery device options to improve bolus consistency.         Task: Reviewed with patient all current diabetes medications and provided basic review of the purpose, dosage, frequency, side effects, and storage of both oral and injectable diabetes medications. Completed 9/5/2023        Task: Discussed guidelines for preventing, detecting and treating hypoglycemia and hyperglycemia and reviewed the importance of meal and medication timing with diabetes mediations for prevention of hypoglycemia and maximum drug benefit. Completed 9/5/2023        Problem: Healthy Eating         Goal: Eat 3 meals daily - manage carb 45 grams/meal, 5-15grams/snack    Start Date: 9/5/2023   Expected End Date: 8/27/2024   Priority: Low    Barriers: No Barriers Identified        Task: Reviewed the sources and role of Carbohydrate, Protein, and Fat and how each nutrient impacts blood sugar. Completed 9/5/2023        Task: Provided visual examples using dry measuring cups, food models, and other familiar objects such as computer mouse, deck or cards, tennis ball etc. to help with visualization of portions. Completed 9/5/2023        Task: Explained how to count carbohydrates using the food label and the use of dry measuring cups for accurate carb counting. Completed 9/5/2023        Task: Review the importance of balancing carbohydrates with each meal using portion control techniques to count servings of carbohydrate and label reading to identify serving size and amount of total carbs per serving. Completed 9/5/2023          Follow Up Plan   Follow up in about 6 weeks (around 10/17/2023).  -review Dexcom reports  -eval goals    Today's care plan and follow up schedule was discussed with patient.  Chan verbalized understanding of the care plan, goals, and agrees to follow up plan.        The patient was encouraged to communicate with his/her health care provider/physician and care team regarding his/her condition(s) and treatment.  I provided the patient with my contact information today and encouraged to contact me via phone or [facility] Patient Portal as needed.     Length of Visit   Total Time: 90 Minutes

## 2023-09-06 ENCOUNTER — PATIENT MESSAGE (OUTPATIENT)
Dept: DIABETES | Facility: CLINIC | Age: 53
End: 2023-09-06
Payer: COMMERCIAL

## 2023-09-06 ENCOUNTER — TELEPHONE (OUTPATIENT)
Dept: DIABETES | Facility: CLINIC | Age: 53
End: 2023-09-06
Payer: COMMERCIAL

## 2023-09-06 DIAGNOSIS — E10.65 TYPE 1 DIABETES MELLITUS WITH HYPERGLYCEMIA: Primary | ICD-10-CM

## 2023-09-06 RX ORDER — LANCETS
EACH MISCELLANEOUS
Qty: 100 EACH | Refills: 11 | Status: SHIPPED | OUTPATIENT
Start: 2023-09-06

## 2023-09-06 RX ORDER — INSULIN PUMP SYRINGE, 3 ML
EACH MISCELLANEOUS
Qty: 1 EACH | Refills: 0 | Status: SHIPPED | OUTPATIENT
Start: 2023-09-06 | End: 2024-09-05

## 2023-09-06 NOTE — TELEPHONE ENCOUNTER
Januvia Rx.  Insurance preferred Meter and supplies Rx today.   Agree to consideration for OP / CeQur - will depend on willingness / carb counting skills.     Odilia Shaffer PA-C  Diabetes Management

## 2023-09-06 NOTE — TELEPHONE ENCOUNTER
Called patient to let her know that the RX for meter / supplies and Januvia was sent to the pharmacy     Patient voiced understanding

## 2023-09-18 ENCOUNTER — TELEPHONE (OUTPATIENT)
Dept: DIABETES | Facility: CLINIC | Age: 53
End: 2023-09-18
Payer: COMMERCIAL

## 2023-09-18 NOTE — TELEPHONE ENCOUNTER
----- Message from Tex Mccall sent at 9/18/2023 11:58 AM CDT -----  Contact: Chan  Patient is calling to speak with the nurse needing appt on 9/27 changed to virtual. Please give patient a call .[phone]

## 2023-09-25 ENCOUNTER — TELEPHONE (OUTPATIENT)
Dept: DIABETES | Facility: CLINIC | Age: 53
End: 2023-09-25
Payer: COMMERCIAL

## 2023-09-25 NOTE — TELEPHONE ENCOUNTER
Pt is sharing Dexcom data with us, however last upload was 9/20. Called pt in regards to data upload, pt stated that she noticed she has on a faulty sensor and is going to switch it out for a new one. Notified pt we would double-check tomorrow that data is sharing up-to-date data, and would call her regarding sensor change. Pt understood and acknowledged.

## 2023-09-27 ENCOUNTER — OFFICE VISIT (OUTPATIENT)
Dept: DIABETES | Facility: CLINIC | Age: 53
End: 2023-09-27
Payer: COMMERCIAL

## 2023-09-27 DIAGNOSIS — E10.65 TYPE 1 DIABETES MELLITUS WITH HYPERGLYCEMIA: Primary | ICD-10-CM

## 2023-09-27 DIAGNOSIS — E55.9 VITAMIN D DEFICIENCY: ICD-10-CM

## 2023-09-27 PROCEDURE — 3072F PR LOW RISK FOR RETINOPATHY: ICD-10-PCS | Mod: CPTII,95,, | Performed by: PHYSICIAN ASSISTANT

## 2023-09-27 PROCEDURE — 3061F PR NEG MICROALBUMINURIA RESULT DOCUMENTED/REVIEW: ICD-10-PCS | Mod: CPTII,95,, | Performed by: PHYSICIAN ASSISTANT

## 2023-09-27 PROCEDURE — 3066F PR DOCUMENTATION OF TREATMENT FOR NEPHROPATHY: ICD-10-PCS | Mod: CPTII,95,, | Performed by: PHYSICIAN ASSISTANT

## 2023-09-27 PROCEDURE — 3072F LOW RISK FOR RETINOPATHY: CPT | Mod: CPTII,95,, | Performed by: PHYSICIAN ASSISTANT

## 2023-09-27 PROCEDURE — 3061F NEG MICROALBUMINURIA REV: CPT | Mod: CPTII,95,, | Performed by: PHYSICIAN ASSISTANT

## 2023-09-27 PROCEDURE — 3046F HEMOGLOBIN A1C LEVEL >9.0%: CPT | Mod: CPTII,95,, | Performed by: PHYSICIAN ASSISTANT

## 2023-09-27 PROCEDURE — 3046F PR MOST RECENT HEMOGLOBIN A1C LEVEL > 9.0%: ICD-10-PCS | Mod: CPTII,95,, | Performed by: PHYSICIAN ASSISTANT

## 2023-09-27 PROCEDURE — 3066F NEPHROPATHY DOC TX: CPT | Mod: CPTII,95,, | Performed by: PHYSICIAN ASSISTANT

## 2023-09-27 PROCEDURE — 99214 PR OFFICE/OUTPT VISIT, EST, LEVL IV, 30-39 MIN: ICD-10-PCS | Mod: 95,,, | Performed by: PHYSICIAN ASSISTANT

## 2023-09-27 PROCEDURE — 99214 OFFICE O/P EST MOD 30 MIN: CPT | Mod: 95,,, | Performed by: PHYSICIAN ASSISTANT

## 2023-09-27 NOTE — PROGRESS NOTES
PCP: Tristian Bearden MD    Subjective:     Chief Complaint: Diabetes - Established Patient    TELEMEDICINE VISIT:     The patient location is: Home  The chief complaint leading to consultation is: Diabetes Follow up  Visit type: Virtual visit with synchronous audio and video  Total time spent with patient: 30  Each patient to whom he or she provides medical services by telemedicine is:  (1) informed of the relationship between the physician and patient and the respective role of any other health care provider with respect to management of the patient; and (2) notified that he or she may decline to receive medical services by telemedicine and may withdraw from such care at any time.    Notes: N/A    HISTORY OF PRESENT ILLNESS: 52 y.o.   female presenting for diabetes management visit.   The patient's last visit with me was on 8/23/2023.  Patient has had Type I diabetes due to low C peptide since 5 years or more.  Pertinent to decision making is the following comorbidities: Vitamin D Deficiency and Gestational DM in Pregnancy 2003  Patient has the following Diabetes complications: without complications  She  has attended diabetes education in the past.     Patient's most recent A1c of 9.2% was completed 1 months ago.   Patient states since Her last A1c Her blood glucose levels have been high  after meals.   Patient monitors blood glucose 4 times per day and Continuously with personal CGM Dexcom.   Patient blood glucose monitoring device will be uploaded into [employer] today.        Patient's data is limited since less than 72 hours so limited. Unclear what time she ate dinner yesterday although I suspect she may have had snack at 530 which caused spike and ate gumbo for dinner with 20 unit meal dose at 7 which led to low after. Then overtreated.   Patient endorses the following diabetes related symptoms: None.   Patient is due today for the following diabetes-related health maintenance  standards: Foot Exam , Eye Exam, Lipid panel, Influenza Vaccine, and COVID-19 Vaccine .   She denies recent hospital admissions or emergency room visits.   She denies having hypoglycemia as above.   Patient's concerns today include glycemic control.   Patient medication regimen is as below.     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       Lab Results   Component Value Date    CPEPTIDE 0.72 (L) 04/28/2022     Lab Results   Component Value Date    GLUTAMICACID 0.00 09/28/2020          //   , There is no height or weight on file to calculate BMI.  Her blood sugar in clinic today is:    Lab Results   Component Value Date    POCGLU 141 (A) 06/29/2022       Review of Systems   Constitutional:  Negative for activity change, appetite change, chills and fever.   HENT:  Negative for dental problem, mouth sores, nosebleeds, sore throat and trouble swallowing.    Eyes:  Negative for pain and discharge.   Respiratory:  Negative for shortness of breath, wheezing and stridor.    Cardiovascular:  Negative for chest pain, palpitations and leg swelling.   Gastrointestinal:  Negative for abdominal pain, diarrhea, nausea and vomiting.   Endocrine: Negative for polydipsia, polyphagia and polyuria.   Genitourinary:   Negative for dysuria, frequency and urgency.   Musculoskeletal:  Negative for joint swelling and myalgias.   Skin:  Negative for rash and wound.   Neurological:  Negative for dizziness, syncope, weakness and headaches.   Psychiatric/Behavioral:  Negative for behavioral problems and dysphoric mood.          Diabetes Management Status  Statin: Not taking  ACE/ARB: Not taking    Screening or Prevention Patient's value Goal Complete/Controlled?   HgA1C Testing and Control   Lab Results   Component Value Date    HGBA1C 9.2 (H) 08/03/2023      Annually/Less than 8% No   Lipid profile : 08/19/2022 Annually Yes   LDL control  Lab Results   Component Value Date    LDLCALC 99.2 08/19/2022    Annually/Less than 100 mg/dl  Yes   Nephropathy screening Lab Results   Component Value Date    MICALBCREAT 6.7 03/14/2023     Lab Results   Component Value Date    PROTEINUA Negative 01/26/2021    Annually No   Blood pressure BP Readings from Last 1 Encounters:   08/23/23 (!) 149/76    Less than 140/90 Yes   Dilated retinal exam : 07/18/2022 Annually Yes    Foot exam   : 11/30/2021 Annually No     Social History     Socioeconomic History    Marital status: Single    Number of children: 2   Occupational History    Occupation:      Employer: [employer]     Employer: [employer]   Tobacco Use    Smoking status: Never    Smokeless tobacco: Never   Substance and Sexual Activity    Alcohol use: Yes     Comment: Rare    Drug use: No    Sexual activity: Yes     Partners: Male     Birth control/protection: Surgical   Social History Narrative    She wears seatbelt.     Social Determinants of Health     Financial Resource Strain: Low Risk  (9/26/2018)    Overall Financial Resource Strain (CARDIA)     Difficulty of Paying Living Expenses: Not hard at all   Food Insecurity: No Food Insecurity (9/26/2018)    Hunger Vital Sign     Worried About Running Out of Food in the Last Year: Never true     Ran Out of Food in the Last  Year: Never true   Transportation Needs: No Transportation Needs (9/26/2018)    PRAPARE - Transportation     Lack of Transportation (Medical): No     Lack of Transportation (Non-Medical): No   Physical Activity: Insufficiently Active (10/2/2019)    Exercise Vital Sign     Days of Exercise per Week: 3 days     Minutes of Exercise per Session: 30 min   Stress: No Stress Concern Present (9/26/2018)    [place] [employer] - Occupational Stress Questionnaire     Feeling of Stress : Not at all   Social Connections: Moderately Isolated (9/26/2018)    Social Connection and Isolation Panel [NHANES]     Frequency of Communication with Friends and Family: More than three times a week     Frequency of Social Gatherings with Friends and Family: Once a week     Attends Mormonism Services: More than 4 times per year     Active Member of Clubs or Organizations: No     Attends Club or Organization Meetings: Never     Marital Status:      Past Medical History:   Diagnosis Date    Abnormal Pap smear     Abnormal Pap smear of cervix     Arthritis, low back     Diabetes 09/2014     BS 162am 09/16/2014    DM (diabetes mellitus) 09/2014     am 01/26/2016    DM (diabetes mellitus) 09/2014    BS didn't check 01/31/2017    GERD (gastroesophageal reflux disease)     Gestational diabetes     Hepatitis C antibody positive in blood 02/11/2022    RNA NEGATIVE    History of abnormal Pap smear     Surgical menopause     Vitamin D deficiency        Objective:        Physical Exam  Constitutional:       General: She is not in acute distress.     Appearance: She is well-developed. She is not diaphoretic.   HENT:      Head: Normocephalic and atraumatic.      Right Ear: External ear normal.      Left Ear: External ear normal.      Nose: Nose normal.   Eyes:      General:         Right eye: No discharge.         Left eye: No discharge.   Pulmonary:      Effort: Pulmonary effort is normal. No respiratory distress.       Breath sounds: No stridor.   Musculoskeletal:         General: Normal range of motion.      Cervical back: Normal range of motion.   Skin:     Coloration: Skin is not pale.   Neurological:      Mental Status: She is alert and oriented to person, place, and time. Mental status is at baseline.      Motor: No abnormal muscle tone.      Coordination: Coordination normal.   Psychiatric:         Mood and Affect: Mood normal.         Behavior: Behavior normal.         Thought Content: Thought content normal.         Judgment: Judgment normal.             Assessment / Plan:     Type 1 diabetes mellitus with hyperglycemia    Vitamin D deficiency        Additional Plan Details:    - POCT Glucose  - Encouraged continuation of lifestyle changes including regular exercise and limiting carbohydrates to 30-45 grams per meal threes times daily and 15 grams per snack with a limit of two daily.   - Encouraged continued monitoring of blood glucose with maintenance of 4 times daily and Continuously with personal CGM Dexcom.    - Current DM Medication Regimen: Continue Toujeo 20 units daily. Continue Januvia 100 mg daily. Change Lyumjev 13 units before meals, 3 units before snack, and correction dosing every 3 hours as needed - as below. Encouraged to log doses since trouble remembering meal and dose time.   - Health Maintenance standards addressed today: Foot Exam - deferred by patient today because Telemedicine or Telephone visit, Eye Exam - will be completed outside of [facility] and patient will schedule, Lipid panel to be scheduled today, Flu Shot - patient would like to complete outside of [facility], and COVID - 19 Vaccine - patient will schedule outside of [facility]   - Nursing Visit: Patient is below goal range for nursing visit for age group and will not need nursing visit at this time .   - Follow up in 4 weeks.    CURRENT DM MEDICATIONS:   Toujeo 20 units daily  Januvia 100 mg daily  Lyumjev meal size dosing and correction dosing  every 3 hours as needed - as below    Lyumjev Meal Size:   13 units before meals   3 units before snacks    Lyumjev Correction Dosing every 3 hours as needed OUTSIDE OF EATING  If  - 250, may take 2 units of Lyumjev  If  - 300, may take 3 units of Lyumjev  If  - 350, may take 4 units of Lyumjev  If  - 400, may take 5 units of Lyumjev  If +, may take 6 units of Lyumjev    Blakeney McKnight, PA-C [facility]

## 2023-09-27 NOTE — PATIENT INSTRUCTIONS
CURRENT DM MEDICATIONS:   Toujeo 20 units daily  Januvia 100 mg daily  Lyumjev meal size dosing and correction dosing every 3 hours as needed - as below    Lyumjev Meal Size:   13 units before meals   3 units before snacks    Lyumjev Correction Dosing every 3 hours as needed OUTSIDE OF EATING  If  - 250, may take 2 units of Lyumjev  If  - 300, may take 3 units of Lyumjev  If  - 350, may take 4 units of Lyumjev  If  - 400, may take 5 units of Lyumjev  If +, may take 6 units of Lyumjev

## 2023-09-28 ENCOUNTER — PATIENT MESSAGE (OUTPATIENT)
Dept: DIABETES | Facility: CLINIC | Age: 53
End: 2023-09-28
Payer: COMMERCIAL

## 2023-09-28 DIAGNOSIS — E10.65 TYPE 1 DIABETES MELLITUS WITH HYPERGLYCEMIA: Primary | ICD-10-CM

## 2023-10-01 RX ORDER — INSULIN ASPART INJECTION 100 [IU]/ML
INJECTION, SOLUTION SUBCUTANEOUS
Qty: 5 PEN | Refills: 11 | Status: SHIPPED | OUTPATIENT
Start: 2023-10-01

## 2023-10-01 NOTE — TELEPHONE ENCOUNTER
Fiasp Rx sent to pharmacy.   Please let her know the dosing will be the same as Lyumjev and she can also dose this one right before eating and as needed per correction scale.   Can send her Showpad message with new scale if she would like    CURRENT DM MEDICATIONS:   Toujeo 20 units daily  Januvia 100 mg daily  Fiasp meal size dosing and correction dosing every 3 hours as needed - as below     Fiasp Meal Size:   13 units before meals   3 units before snacks     Fiasp Correction Dosing every 3 hours as needed OUTSIDE OF EATING  If  - 250, may take 2 units of Fiasp  If  - 300, may take 3 units of Fiasp  If  - 350, may take 4 units of Fiasp  If  - 400, may take 5 units of Fiasp  If +, may take 6 units of Fiasp    Odilia Shaffer PA-C  Diabetes Management

## 2023-10-06 ENCOUNTER — OFFICE VISIT (OUTPATIENT)
Dept: CARDIOLOGY | Facility: CLINIC | Age: 53
End: 2023-10-06
Payer: COMMERCIAL

## 2023-10-06 VITALS
HEIGHT: 64 IN | OXYGEN SATURATION: 97 % | BODY MASS INDEX: 20.7 KG/M2 | HEART RATE: 55 BPM | DIASTOLIC BLOOD PRESSURE: 80 MMHG | SYSTOLIC BLOOD PRESSURE: 136 MMHG | WEIGHT: 121.25 LBS

## 2023-10-06 DIAGNOSIS — E10.65 TYPE 1 DIABETES MELLITUS WITH HYPERGLYCEMIA: ICD-10-CM

## 2023-10-06 DIAGNOSIS — R07.9 CHEST PAIN, MODERATE CORONARY ARTERY RISK: Primary | ICD-10-CM

## 2023-10-06 DIAGNOSIS — K21.00 GASTROESOPHAGEAL REFLUX DISEASE WITH ESOPHAGITIS WITHOUT HEMORRHAGE: ICD-10-CM

## 2023-10-06 PROCEDURE — 99999 PR PBB SHADOW E&M-EST. PATIENT-LVL IV: CPT | Mod: PBBFAC,,, | Performed by: INTERNAL MEDICINE

## 2023-10-06 PROCEDURE — 3075F SYST BP GE 130 - 139MM HG: CPT | Mod: CPTII,S$GLB,, | Performed by: INTERNAL MEDICINE

## 2023-10-06 PROCEDURE — 99214 OFFICE O/P EST MOD 30 MIN: CPT | Mod: S$GLB,,, | Performed by: INTERNAL MEDICINE

## 2023-10-06 PROCEDURE — 99214 PR OFFICE/OUTPT VISIT, EST, LEVL IV, 30-39 MIN: ICD-10-PCS | Mod: S$GLB,,, | Performed by: INTERNAL MEDICINE

## 2023-10-06 PROCEDURE — 3075F PR MOST RECENT SYSTOLIC BLOOD PRESS GE 130-139MM HG: ICD-10-PCS | Mod: CPTII,S$GLB,, | Performed by: INTERNAL MEDICINE

## 2023-10-06 PROCEDURE — 1160F PR REVIEW ALL MEDS BY PRESCRIBER/CLIN PHARMACIST DOCUMENTED: ICD-10-PCS | Mod: CPTII,S$GLB,, | Performed by: INTERNAL MEDICINE

## 2023-10-06 PROCEDURE — 1159F PR MEDICATION LIST DOCUMENTED IN MEDICAL RECORD: ICD-10-PCS | Mod: CPTII,S$GLB,, | Performed by: INTERNAL MEDICINE

## 2023-10-06 PROCEDURE — 3072F PR LOW RISK FOR RETINOPATHY: ICD-10-PCS | Mod: CPTII,S$GLB,, | Performed by: INTERNAL MEDICINE

## 2023-10-06 PROCEDURE — 3079F PR MOST RECENT DIASTOLIC BLOOD PRESSURE 80-89 MM HG: ICD-10-PCS | Mod: CPTII,S$GLB,, | Performed by: INTERNAL MEDICINE

## 2023-10-06 PROCEDURE — 3008F BODY MASS INDEX DOCD: CPT | Mod: CPTII,S$GLB,, | Performed by: INTERNAL MEDICINE

## 2023-10-06 PROCEDURE — 3061F PR NEG MICROALBUMINURIA RESULT DOCUMENTED/REVIEW: ICD-10-PCS | Mod: CPTII,S$GLB,, | Performed by: INTERNAL MEDICINE

## 2023-10-06 PROCEDURE — 3079F DIAST BP 80-89 MM HG: CPT | Mod: CPTII,S$GLB,, | Performed by: INTERNAL MEDICINE

## 2023-10-06 PROCEDURE — 3008F PR BODY MASS INDEX (BMI) DOCUMENTED: ICD-10-PCS | Mod: CPTII,S$GLB,, | Performed by: INTERNAL MEDICINE

## 2023-10-06 PROCEDURE — 1159F MED LIST DOCD IN RCRD: CPT | Mod: CPTII,S$GLB,, | Performed by: INTERNAL MEDICINE

## 2023-10-06 PROCEDURE — 3066F NEPHROPATHY DOC TX: CPT | Mod: CPTII,S$GLB,, | Performed by: INTERNAL MEDICINE

## 2023-10-06 PROCEDURE — 3046F HEMOGLOBIN A1C LEVEL >9.0%: CPT | Mod: CPTII,S$GLB,, | Performed by: INTERNAL MEDICINE

## 2023-10-06 PROCEDURE — 99999 PR PBB SHADOW E&M-EST. PATIENT-LVL IV: ICD-10-PCS | Mod: PBBFAC,,, | Performed by: INTERNAL MEDICINE

## 2023-10-06 PROCEDURE — 3061F NEG MICROALBUMINURIA REV: CPT | Mod: CPTII,S$GLB,, | Performed by: INTERNAL MEDICINE

## 2023-10-06 PROCEDURE — 3066F PR DOCUMENTATION OF TREATMENT FOR NEPHROPATHY: ICD-10-PCS | Mod: CPTII,S$GLB,, | Performed by: INTERNAL MEDICINE

## 2023-10-06 PROCEDURE — 3072F LOW RISK FOR RETINOPATHY: CPT | Mod: CPTII,S$GLB,, | Performed by: INTERNAL MEDICINE

## 2023-10-06 PROCEDURE — 3046F PR MOST RECENT HEMOGLOBIN A1C LEVEL > 9.0%: ICD-10-PCS | Mod: CPTII,S$GLB,, | Performed by: INTERNAL MEDICINE

## 2023-10-06 PROCEDURE — 1160F RVW MEDS BY RX/DR IN RCRD: CPT | Mod: CPTII,S$GLB,, | Performed by: INTERNAL MEDICINE

## 2023-10-06 RX ORDER — PANTOPRAZOLE SODIUM 40 MG/1
40 TABLET, DELAYED RELEASE ORAL DAILY
Qty: 30 TABLET | Refills: 11 | Status: SHIPPED | OUTPATIENT
Start: 2023-10-06 | End: 2024-10-05

## 2023-10-06 RX ORDER — AMOXICILLIN 500 MG/1
500 CAPSULE ORAL 3 TIMES DAILY
COMMUNITY
Start: 2023-10-03 | End: 2023-12-19

## 2023-10-06 NOTE — PROGRESS NOTES
Subjective:   Patient ID:  Chan Dwyer is a 52 y.o. female who presents for follow-up of No chief complaint on file.  Normal cath 3-23   Pt with CP not taking her PPI. Lipids are at goal.  Chest Pain   This is a chronic problem. The current episode started 1 to 4 weeks ago. The problem occurs intermittently. The problem has been gradually improving. The pain is present in the lateral region. The pain is mild. The quality of the pain is described as dull. The pain does not radiate. Pertinent negatives include no dizziness, palpitations or shortness of breath. The pain is aggravated by exertion. She has tried rest for the symptoms. The treatment provided mild relief.   Her past medical history is significant for diabetes.   Pertinent negatives for past medical history include no muscle weakness.       Review of Systems   Constitutional: Negative. Negative for weight gain.   HENT: Negative.     Eyes: Negative.    Cardiovascular: Negative.  Negative for chest pain, leg swelling and palpitations.   Respiratory: Negative.  Negative for shortness of breath.    Endocrine: Negative.    Hematologic/Lymphatic: Negative.    Skin: Negative.    Musculoskeletal:  Negative for muscle weakness.   Gastrointestinal: Negative.    Genitourinary: Negative.    Neurological: Negative.  Negative for dizziness.   Psychiatric/Behavioral: Negative.     Allergic/Immunologic: Negative.    All other systems reviewed and are negative.    Family History   Problem Relation Age of Onset    Diabetes Father     Hypertension Father     Hypertension Mother     Diabetes Mother     Heart attack Mother     No Known Problems Brother     No Known Problems Brother     No Known Problems Brother     No Known Problems Brother     Stomach cancer Sister     No Known Problems Sister     No Known Problems Sister     No Known Problems Sister     No Known Problems Sister     No Known Problems Son     No Known Problems Son     Cancer Neg Hx     Stroke Neg  Hx     Colon cancer Neg Hx     Ovarian cancer Neg Hx     Breast cancer Neg Hx      Past Medical History:   Diagnosis Date    Abnormal Pap smear     Abnormal Pap smear of cervix     Arthritis, low back     Diabetes 09/2014     BS 162am 09/16/2014    DM (diabetes mellitus) 09/2014     am 01/26/2016    DM (diabetes mellitus) 09/2014    BS didn't check 01/31/2017    GERD (gastroesophageal reflux disease)     Gestational diabetes     Hepatitis C antibody positive in blood 02/11/2022    RNA NEGATIVE    History of abnormal Pap smear     Surgical menopause     Vitamin D deficiency      Social History     Socioeconomic History    Marital status: Single    Number of children: 2   Occupational History    Occupation:      Employer: [employer]     Employer: [employer]   Tobacco Use    Smoking status: Never    Smokeless tobacco: Never   Substance and Sexual Activity    Alcohol use: Yes     Comment: Rare    Drug use: No    Sexual activity: Yes     Partners: Male     Birth control/protection: Surgical   Social History Narrative    She wears seatbelt.     Social Determinants of Health     Financial Resource Strain: Low Risk  (9/26/2018)    Overall Financial Resource Strain (CARDIA)     Difficulty of Paying Living Expenses: Not hard at all   Food Insecurity: No Food Insecurity (9/26/2018)    Hunger Vital Sign     Worried About Running Out of Food in the Last Year: Never true     Ran Out of Food in the Last Year: Never true   Transportation Needs: No Transportation Needs (9/26/2018)    PRAPARE - Transportation     Lack of Transportation (Medical): No     Lack of Transportation (Non-Medical): No   Physical Activity: Insufficiently Active (10/2/2019)    Exercise Vital Sign     Days of Exercise per Week: 3 days     Minutes of Exercise per Session: 30 min   Stress: No Stress Concern Present (9/26/2018)    [place] of Occupational Health - Occupational Stress Questionnaire     Feeling of Stress  : Not at all   Social Connections: Moderately Isolated (9/26/2018)    Social Connection and Isolation Panel [NHANES]     Frequency of Communication with Friends and Family: More than three times a week     Frequency of Social Gatherings with Friends and Family: Once a week     Attends Restorationist Services: More than 4 times per year     Active Member of Clubs or Organizations: No     Attends Club or Organization Meetings: Never     Marital Status:      Current Outpatient Medications on File Prior to Visit   Medication Sig Dispense Refill    amoxicillin (AMOXIL) 500 MG capsule Take 500 mg by mouth 3 (three) times daily.      aspirin (ECOTRIN) 81 MG EC tablet Take 1 tablet (81 mg total) by mouth once daily. 30 tablet 11    blood sugar diagnostic Strp To check BG 4 times daily, to use with insurance preferred meter 100 each 11    blood-glucose meter kit To check BG 4 times daily, to use with insurance preferred meter 1 each 0    diclofenac (VOLTAREN) 50 MG EC tablet Take 1 tablet (50 mg total) by mouth 2 (two) times daily. 60 tablet 2    fluticasone propionate (FLONASE) 50 mcg/actuation nasal spray 1 spray (50 mcg total) by Each Nostril route once daily. 16 mL 0    ibuprofen (ADVIL,MOTRIN) 600 MG tablet Take 1 tablet (600 mg total) by mouth every 6 (six) hours as needed for Pain. 30 tablet 0    insulin aspart, niacinamide, (FIASP FLEXTOUCH U-100 INSULIN) 100 unit/mL (3 mL) InPn Titrate up to 50 units daily as instructed. 5 pen 11    insulin glargine, TOUJEO, (TOUJEO) 300 unit/mL (1.5 mL) InPn pen Inject 20 Units into the skin once daily. 4 pen 3    lancets Misc To check BG 4 times daily, to use with insurance preferred meter 100 each 11    levocetirizine (XYZAL) 5 MG tablet Take 1 tablet (5 mg total) by mouth every evening. 30 tablet 0    pantoprazole (PROTONIX) 40 MG tablet Take 1 tablet (40 mg total) by mouth once daily. 30 tablet 11    SITagliptin phosphate (JANUVIA) 100 MG Tab Take 1 tablet (100 mg total) by  mouth once daily. 90 tablet 3     Current Facility-Administered Medications on File Prior to Visit   Medication Dose Route Frequency Provider Last Rate Last Admin    chlorhexidine 0.12 % solution 10 mL  10 mL Mouth/Throat On Call Procedure Anusha George PA-C   10 mL at 10/10/22 0626     Review of patient's allergies indicates:   Allergen Reactions    Hydrocodone Hives and Rash       Objective:     Physical Exam  Vitals and nursing note reviewed.   Constitutional:       Appearance: She is well-developed.   HENT:      Head: Normocephalic and atraumatic.   Eyes:      Conjunctiva/sclera: Conjunctivae normal.      Pupils: Pupils are equal, round, and reactive to light.   Cardiovascular:      Rate and Rhythm: Normal rate and regular rhythm.      Pulses: Intact distal pulses.      Heart sounds: Normal heart sounds.   Pulmonary:      Effort: Pulmonary effort is normal.      Breath sounds: Normal breath sounds.   Abdominal:      General: Bowel sounds are normal.      Palpations: Abdomen is soft.   Musculoskeletal:         General: Normal range of motion.      Cervical back: Normal range of motion and neck supple.   Skin:     General: Skin is warm and dry.   Neurological:      Mental Status: She is alert and oriented to person, place, and time.         Assessment:     1. HIstory of Chest pain, moderate coronary artery risk    2. Type 1 diabetes mellitus with hyperglycemia    3. Gastroesophageal reflux disease with esophagitis without hemorrhage        Plan:     HIstory of Chest pain, moderate coronary artery risk    Type 1 diabetes mellitus with hyperglycemia    Gastroesophageal reflux disease with esophagitis without hemorrhage      PPI- CP  Hold asa for now

## 2023-10-16 ENCOUNTER — CLINICAL SUPPORT (OUTPATIENT)
Dept: DIABETES | Facility: CLINIC | Age: 53
End: 2023-10-16
Payer: COMMERCIAL

## 2023-10-16 ENCOUNTER — OFFICE VISIT (OUTPATIENT)
Dept: OPHTHALMOLOGY | Facility: CLINIC | Age: 53
End: 2023-10-16
Payer: COMMERCIAL

## 2023-10-16 ENCOUNTER — TELEPHONE (OUTPATIENT)
Dept: DIABETES | Facility: CLINIC | Age: 53
End: 2023-10-16
Payer: COMMERCIAL

## 2023-10-16 VITALS — BODY MASS INDEX: 20.89 KG/M2 | WEIGHT: 121.69 LBS

## 2023-10-16 DIAGNOSIS — E10.65 TYPE 1 DIABETES MELLITUS WITH HYPERGLYCEMIA: Primary | ICD-10-CM

## 2023-10-16 DIAGNOSIS — E11.9 TYPE 2 DIABETES MELLITUS WITHOUT RETINOPATHY: Primary | ICD-10-CM

## 2023-10-16 DIAGNOSIS — H26.9 CORTICAL CATARACT OF BOTH EYES: ICD-10-CM

## 2023-10-16 PROCEDURE — 1159F PR MEDICATION LIST DOCUMENTED IN MEDICAL RECORD: ICD-10-PCS | Mod: CPTII,S$GLB,, | Performed by: OPHTHALMOLOGY

## 2023-10-16 PROCEDURE — 99999 PR PBB SHADOW E&M-EST. PATIENT-LVL III: CPT | Mod: PBBFAC,,, | Performed by: DIETITIAN, REGISTERED

## 2023-10-16 PROCEDURE — 3061F PR NEG MICROALBUMINURIA RESULT DOCUMENTED/REVIEW: ICD-10-PCS | Mod: CPTII,S$GLB,, | Performed by: OPHTHALMOLOGY

## 2023-10-16 PROCEDURE — 92014 PR EYE EXAM, EST PATIENT,COMPREHESV: ICD-10-PCS | Mod: S$GLB,,, | Performed by: OPHTHALMOLOGY

## 2023-10-16 PROCEDURE — 1159F MED LIST DOCD IN RCRD: CPT | Mod: CPTII,S$GLB,, | Performed by: OPHTHALMOLOGY

## 2023-10-16 PROCEDURE — 3046F PR MOST RECENT HEMOGLOBIN A1C LEVEL > 9.0%: ICD-10-PCS | Mod: CPTII,S$GLB,, | Performed by: OPHTHALMOLOGY

## 2023-10-16 PROCEDURE — 99999 PR PBB SHADOW E&M-EST. PATIENT-LVL III: ICD-10-PCS | Mod: PBBFAC,,, | Performed by: OPHTHALMOLOGY

## 2023-10-16 PROCEDURE — 99999 PR PBB SHADOW E&M-EST. PATIENT-LVL III: CPT | Mod: PBBFAC,,, | Performed by: OPHTHALMOLOGY

## 2023-10-16 PROCEDURE — 92014 COMPRE OPH EXAM EST PT 1/>: CPT | Mod: S$GLB,,, | Performed by: OPHTHALMOLOGY

## 2023-10-16 PROCEDURE — 1160F PR REVIEW ALL MEDS BY PRESCRIBER/CLIN PHARMACIST DOCUMENTED: ICD-10-PCS | Mod: CPTII,S$GLB,, | Performed by: OPHTHALMOLOGY

## 2023-10-16 PROCEDURE — G0108 PR DIAB MANAGE TRN  PER INDIV: ICD-10-PCS | Mod: S$GLB,,, | Performed by: DIETITIAN, REGISTERED

## 2023-10-16 PROCEDURE — 3046F HEMOGLOBIN A1C LEVEL >9.0%: CPT | Mod: CPTII,S$GLB,, | Performed by: OPHTHALMOLOGY

## 2023-10-16 PROCEDURE — 3066F NEPHROPATHY DOC TX: CPT | Mod: CPTII,S$GLB,, | Performed by: OPHTHALMOLOGY

## 2023-10-16 PROCEDURE — G0108 DIAB MANAGE TRN  PER INDIV: HCPCS | Mod: S$GLB,,, | Performed by: DIETITIAN, REGISTERED

## 2023-10-16 PROCEDURE — 2023F DILAT RTA XM W/O RTNOPTHY: CPT | Mod: CPTII,S$GLB,, | Performed by: OPHTHALMOLOGY

## 2023-10-16 PROCEDURE — 99999 PR PBB SHADOW E&M-EST. PATIENT-LVL III: ICD-10-PCS | Mod: PBBFAC,,, | Performed by: DIETITIAN, REGISTERED

## 2023-10-16 PROCEDURE — 3061F NEG MICROALBUMINURIA REV: CPT | Mod: CPTII,S$GLB,, | Performed by: OPHTHALMOLOGY

## 2023-10-16 PROCEDURE — 1160F RVW MEDS BY RX/DR IN RCRD: CPT | Mod: CPTII,S$GLB,, | Performed by: OPHTHALMOLOGY

## 2023-10-16 PROCEDURE — 2023F PR DILATED RETINAL EXAM W/O EVID OF RETINOPATHY: ICD-10-PCS | Mod: CPTII,S$GLB,, | Performed by: OPHTHALMOLOGY

## 2023-10-16 PROCEDURE — 3066F PR DOCUMENTATION OF TREATMENT FOR NEPHROPATHY: ICD-10-PCS | Mod: CPTII,S$GLB,, | Performed by: OPHTHALMOLOGY

## 2023-10-16 RX ORDER — PEN NEEDLE, DIABETIC 30 GX3/16"
1 NEEDLE, DISPOSABLE MISCELLANEOUS
Qty: 100 EACH | Refills: 11 | Status: SHIPPED | OUTPATIENT
Start: 2023-10-16 | End: 2024-10-15

## 2023-10-16 NOTE — TELEPHONE ENCOUNTER
Spoke with patient, informed her to keep her next 2 appointments, patient verbalized understanding

## 2023-10-16 NOTE — Clinical Note
Demetrius Hartley,  Pt requesting Rx insulin pen needles and also to clarify your next fu visit since she has two scheduled.   Of note, her Dex isn't pulling data correctly. When we tried to reinstall Dex clarity, her phone requested new payment card on file before we could complete download. She will work on fixing this issue. I made a reminder to check status on this next week but wanted to let you know. Also, she wasn't using bolus correction; so we discussed/instructed on this today.  Thank you, Marcella

## 2023-10-16 NOTE — LETTER
October 16, 2023    Chan Dwyer  [id number] [address] [id number]             [facility]  Ophthalmology  [address] [address] [address]  Phone: [phone]  Fax: [phone]   October 16, 2023     Patient: Chan Dwyer   YOB: 1970   Date of Visit: 10/16/2023       To Whom it May Concern:    Chan Dwyer was seen in my clinic on 10/16/2023. She may return to work on 10/17/23 .    Please excuse her from any classes or work missed.    If you have any questions or concerns, please don't hesitate to call.    Sincerely,         Ramirez Ramírez MD

## 2023-10-16 NOTE — PROGRESS NOTES
SUBJECTIVE  Chan Dwyer is 52 y.o. female  Uncorrected distance visual acuity was 20/25 +1 in the right eye and 20/20 -1 in the left eye.   Chief Complaint   Patient presents with    Diabetic Eye Exam          HPI    Pt states she's seeing well at a distance. Using +1.25-1.50 readers but   having trouble seeing clearly with them. Haven't used any drops in a long   time. Not using Pataday anymore    1. DM x 2010   2. REFRACTIVE ERROR     Last edited by Maurisio Vergara MA on 10/16/2023  8:45 AM.         Assessment /Plan :  1. Type 2 diabetes mellitus without retinopathy No diabetic retinopathy at this time. Reviewed diabetic eye precautions including avoiding tobacco use,  Good glucose control, and importance of regular follow up.      2. Cortical cataract of both eyes - monitor for now     RTC in 1 year or prn any changes

## 2023-10-16 NOTE — PROGRESS NOTES
Diabetes Care Specialist Progress Note  Author: Marcella Duke RD, CDE  Date: 10/16/2023    Program Intake  Reason for Diabetes Program Visit:: Intervention (DM referral 8/27/23 Odilia Shaffer, PAYoungC)  Type of Intervention:: Individual  Education: Self-Management Skill Review  Device Training: Professional CGM (Dexcom G7 training follow-up)  Current diabetes risk level:: moderate  In the last 12 months, have you::  (pt denies ER/hosp visits)    Lab Results   Component Value Date    HGBA1C 9.2 (H) 08/03/2023       Clinical    Weight: 55.2 kg (121 lb 11.1 oz)       Body mass index is 20.89 kg/m².    Problem Review  Active comorbidities affecting diabetes self-care.:  (Gestational DM in Pregnancy 2003, HepC, GERD, Vit D defn)    Clinical Assessment  Current Diabetes Treatment:  (Toujeo 20 units daily. Fiasp meal size dosing 13 units before meals & 3 units before snacks + s/s. Januvia 100mg 1tab daily (pt reports unable to afford & requesting pharm assistance).)  Have you ever experienced hypoglycemia (low blood sugar)?: no  Have you ever experienced hyperglycemia (high blood sugar)?: no    Medication Information  How many days a week do you miss your medications?: 3 or more (Pt isn't using bolus correction and was unable to start Januvia due cost.)  Do you sometimes have difficulty refilling your medications?: No  Medication adherence impacting ability to self-manage diabetes?: Yes    Nutritional Status  Diet: Regular (Pt reports 1-2 meals/d. No use MR.)  Meal Plan 24 Hour Recall - Breakfast: none  Meal Plan 24 Hour Recall - Lunch: none  Meal Plan 24 Hour Recall - Dinner: party - 2 deviled eggs, 3 wings, 2 meatballs  Meal Plan 24 Hour Recall - Snack: occs snacks-chip, candy; urban: water, sf flv, diet tea  Change in appetite?: No (no change from usual)  Recent Changes in Weight: No Recent Weight Change  Current nutritional status an area of need that is impacting patient's ability to self-manage diabetes?:  Yes    Diabetes Self-Management Skills Assessment    Nutrition/Healthy Eating  Challenges to healthy eating::  (irregular meal patterns; recent social event)  Method of carbohydrate measurement:: eyeballing/guessing (Pt reports having meal planning tools at home and is working to improve understanding of carbs but not yet ready to start carb gram counting.)  Patient can identify foods that impact blood sugar.: yes  Patient-identified foods:: starches (bread, pasta, rice, cereal)  Nutrition/Healthy Eating Skills Assessment Completed:: Yes  Assessment indicates:: Instruction Needed  Area of need?: Yes    Medications  Patient is able to describe current diabetes management routine.: yes  Diabetes management routine:: insulin, oral medications  Patient is able to identify current diabetes medications, dosages, and appropriate timing of medications.: no  Patient understands the purpose of the medications taken for diabetes.: no  Patient reports problems or concerns with current medication regimen.: yes  Medication regimen problems/concerns:: unsure of doses, does not feel like regimen is working, financial concerns (Pt didn't understand how to use correction bolus.)  Medication Skills Assessment Completed:: Yes  Assessment indicates:: Instruction Needed, Knowledge deficit  Area of need?: Yes    Home Blood Glucose Monitoring  Patient states that blood sugar is checked at home daily.: yes  Patient is able to use personal CGM appropriately.: Pt denies issues w/ using Dexcom. However, her reports show data discrepancy and ~2days of data.  Home Blood Glucose Monitoring Skills Assessment Completed: : Yes  Assessment indicates:: Instruction Needed, Knowledge deficit  Area of need?: Yes    Assessment Summary and Plan  Based on today's diabetes care assessment, the following areas of need were identified:          9/5/2023    12:01 AM   Social   Support No   Access to Mass Media/Tech No   Cognitive/Behavioral Health No    Culture/Advent No   Communication No   Health Literacy No            10/16/2023    12:02 AM   Clinical   Medication Adherence Yes-see care plan   Nutritional Status Yes-see care plan            10/16/2023    12:02 AM   Diabetes Self-Management Skills   Nutrition/Healthy Eating Yes-see care plan   Medication Yes-see care plan   Home Blood Glucose Monitoring Yes-see care plan      Today's interventions were provided through individual discussion, instruction, and written materials were provided.    Patient verbalized understanding of instruction and written materials.  Pt was able to return back demonstration of instructions today. Patient understood key points, needs reinforcement and further instruction.     Diabetes Self-Management Care Plan:  Today's Diabetes Self-Management Care Plan was developed with Chan's input. Chan has agreed to work toward the following goal(s) to improve his/her overall diabetes control.      Care Plan: Diabetes Management   Updates made since 9/16/2023 12:00 AM        Problem: Blood Glucose Self-Monitoring         Goal: Patient agrees to use Dexcom G7 and backup meter as directed.    Start Date: 9/5/2023   Expected End Date: 8/27/2024   This Visit's Progress: On track   Priority: High   Barriers: Other (comments)   Note:    Assisted in troubleshooting Dexcom clarity. Her Dexcom G7 arlen appears to be working accurately. Recommended deleting Dexcom clarity and reinstalling to help reset date issue and pulling data correctly. When we attempted to reinstall, pt's phone requested new payment card for arlen download. However, she didn't have new card and agreed to complete download at later time. Reminder set 1 week to check status. Provider informed.        Problem: Medications         Goal: Patient Agrees to take Diabetes Medication(s) as prescribed.    Start Date: 9/5/2023   Expected End Date: 8/27/2024   This Visit's Progress: On track   Priority: Medium   Barriers: Knowledge  deficit; Financial   Note:    Per pt request, entered pharm assistance referral for Shinuvia due to cost barrier. Message to provider Karthikeyan to assist w/ Rx insulin pen needles and fu visit clarification since pt has two scheduled.     Instructed pt on bolus correction dosing every 3-4hours outside of meals to manage hyperglycemia. Educated on basal/bolus insulin action. Clarified current diagnosis as T1D as pt believed she had T2D; discussed differences and insulin MDI regimen needed.      Reviewed benefits of insulin pump therapy. However, pt declined further pre-pump work today and wants to work on consistency of current plan.        Problem: Healthy Eating         Goal: Eat 3 meals daily - manage carb 45 grams/meal, 5-15grams/snack    Start Date: 9/5/2023   Expected End Date: 8/27/2024   This Visit's Progress: No change   Priority: Low   Barriers: No Barriers Identified   Note:    Emphasis on role meal spacing, carb/pro balance on BG stability. Pt verbalized understanding and agrees to use meal planning tools previously provided.          Follow Up Plan   Follow up in about 2 months (around 12/16/2023).  -review Dexcom reports  -eval goals    Today's care plan and follow up schedule was discussed with patient.  Chan verbalized understanding of the care plan, goals, and agrees to follow up plan.        The patient was encouraged to communicate with his/her health care provider/physician and care team regarding his/her condition(s) and treatment.  I provided the patient with my contact information today and encouraged to contact me via phone or [facility] Patient Portal as needed.     Length of Visit   Total Time: 60 Minutes

## 2023-10-16 NOTE — TELEPHONE ENCOUNTER
Please let pt know:    - Pen needles Rx sent   - I discussed booking 2 appts with her last visit since I am filling up and wanted to make sure we had time to get back together booked 2 times before end of the year. Tell her to keep both appts    Odilia Shaffer PA-C  Diabetes Management

## 2023-10-23 ENCOUNTER — OFFICE VISIT (OUTPATIENT)
Dept: INTERNAL MEDICINE | Facility: CLINIC | Age: 53
End: 2023-10-23
Payer: COMMERCIAL

## 2023-10-23 ENCOUNTER — TELEPHONE (OUTPATIENT)
Dept: DIABETES | Facility: CLINIC | Age: 53
End: 2023-10-23
Payer: COMMERCIAL

## 2023-10-23 DIAGNOSIS — L23.2 ALLERGIC CONTACT DERMATITIS DUE TO COSMETICS: Primary | ICD-10-CM

## 2023-10-23 PROCEDURE — 1160F RVW MEDS BY RX/DR IN RCRD: CPT | Mod: CPTII,95,, | Performed by: FAMILY MEDICINE

## 2023-10-23 PROCEDURE — 3061F PR NEG MICROALBUMINURIA RESULT DOCUMENTED/REVIEW: ICD-10-PCS | Mod: CPTII,95,, | Performed by: FAMILY MEDICINE

## 2023-10-23 PROCEDURE — 3066F PR DOCUMENTATION OF TREATMENT FOR NEPHROPATHY: ICD-10-PCS | Mod: CPTII,95,, | Performed by: FAMILY MEDICINE

## 2023-10-23 PROCEDURE — 3046F HEMOGLOBIN A1C LEVEL >9.0%: CPT | Mod: CPTII,95,, | Performed by: FAMILY MEDICINE

## 2023-10-23 PROCEDURE — 1160F PR REVIEW ALL MEDS BY PRESCRIBER/CLIN PHARMACIST DOCUMENTED: ICD-10-PCS | Mod: CPTII,95,, | Performed by: FAMILY MEDICINE

## 2023-10-23 PROCEDURE — 1159F PR MEDICATION LIST DOCUMENTED IN MEDICAL RECORD: ICD-10-PCS | Mod: CPTII,95,, | Performed by: FAMILY MEDICINE

## 2023-10-23 PROCEDURE — 99213 PR OFFICE/OUTPT VISIT, EST, LEVL III, 20-29 MIN: ICD-10-PCS | Mod: 95,,, | Performed by: FAMILY MEDICINE

## 2023-10-23 PROCEDURE — 3046F PR MOST RECENT HEMOGLOBIN A1C LEVEL > 9.0%: ICD-10-PCS | Mod: CPTII,95,, | Performed by: FAMILY MEDICINE

## 2023-10-23 PROCEDURE — 1159F MED LIST DOCD IN RCRD: CPT | Mod: CPTII,95,, | Performed by: FAMILY MEDICINE

## 2023-10-23 PROCEDURE — 3061F NEG MICROALBUMINURIA REV: CPT | Mod: CPTII,95,, | Performed by: FAMILY MEDICINE

## 2023-10-23 PROCEDURE — 3066F NEPHROPATHY DOC TX: CPT | Mod: CPTII,95,, | Performed by: FAMILY MEDICINE

## 2023-10-23 PROCEDURE — 99213 OFFICE O/P EST LOW 20 MIN: CPT | Mod: 95,,, | Performed by: FAMILY MEDICINE

## 2023-10-23 RX ORDER — TRIAMCINOLONE ACETONIDE 1 MG/G
CREAM TOPICAL 2 TIMES DAILY
Qty: 15 G | Refills: 0 | Status: SHIPPED | OUTPATIENT
Start: 2023-10-23 | End: 2024-01-30

## 2023-10-23 NOTE — PROGRESS NOTES
TELEMEDICINE VIRTUAL VIDEO VISIT  10/23/23  9:00 AM CDT    Visit Type: Audiovisual    Patient's Location: Chan represents that they are located within the state of [place].    CHIEF COMPLAINT: Rash    She reports that she got a new makeup removing product and after application on Saturday to remove makeup around her eyes she developed inflammation and irritation of the skin around her eyes, more so on the left eye. It has not gotten worse, but it has not gotten better. She cleansed her face and rinsed well afterward. She has not applied additional products since then. She reports no eye pain or vision changes. Somewhat limited due to resolution of her webcam, but she appears to have mild erythema around her left eye, but no blepharitis and no conjunctivitis. We discussed the differential diagnosis and treatment options. It was agreed to begin a trial of therapy with topical corticosteroids, avoiding allergens and irritants. She is going to let me know if this fails to resolve the problem within the next week.        1. Allergic contact dermatitis due to cosmetics  -     triamcinolone acetonide 0.1% (KENALOG) 0.1 % cream; Apply topically 2 (two) times daily. (Do not get in eye.) for 10 days  Dispense: 15 g; Refill: 0    Unless noted herein, any chronic conditions are represented as and appear stable, and no other significant complaints or concerns were reported.    Review of Systems   Integumentary:  Positive for rash.   All other systems reviewed and are negative.      Physical Exam  Constitutional:       General: She is not in acute distress.     Appearance: Normal appearance. She is not ill-appearing.   Eyes:      Comments: Mild erythema under left eye.   Pulmonary:      Effort: Pulmonary effort is normal. No respiratory distress.   Skin:     Coloration: Skin is not jaundiced.   Neurological:      Mental Status: She is alert. Mental status is at baseline.   Psychiatric:         Mood and Affect: Mood  "normal.         Behavior: Behavior normal.         Thought Content: Thought content normal.         Follow up if symptoms worsen or fail to improve.     Documentation entered by me for this encounter may have been done in part using speech-recognition technology. Although I have made an effort to ensure accuracy, "sound like" errors may exist and should be interpreted in context.   TOTAL TIME evaluating and managing this patient for this encounter was greater than or equal to 14 minutes. This time was exclusive of any separately billable procedures for this patient and exclusive of time spent treating any other patient.    Documentation entered by me for this encounter may have been done in part using speech-recognition technology. Although I have made an effort to ensure accuracy, "sound like" errors may exist and should be interpreted in context.    Each patient to whom medical services are provided by telemedicine is: (1) informed of the relationship between the physician and patient and the respective role of any other health care provider with respect to management of the patient; and (2) notified that he or she may decline to receive medical services by telemedicine and may withdraw from such care at any time.  "

## 2023-10-23 NOTE — TELEPHONE ENCOUNTER
----- Message from Marcella Duke RD, CDE sent at 10/16/2023 10:34 AM CDT -----  Regarding: Check status Dex Clarity share_was pt able to reinstall arlen

## 2023-10-27 ENCOUNTER — IMMUNIZATION (OUTPATIENT)
Dept: INTERNAL MEDICINE | Facility: CLINIC | Age: 53
End: 2023-10-27
Payer: COMMERCIAL

## 2023-10-27 PROCEDURE — 90471 FLU VACCINE (QUAD) GREATER THAN OR EQUAL TO 3YO PRESERVATIVE FREE IM: ICD-10-PCS | Mod: S$GLB,,, | Performed by: FAMILY MEDICINE

## 2023-10-27 PROCEDURE — 90686 FLU VACCINE (QUAD) GREATER THAN OR EQUAL TO 3YO PRESERVATIVE FREE IM: ICD-10-PCS | Mod: S$GLB,,, | Performed by: FAMILY MEDICINE

## 2023-10-27 PROCEDURE — 90686 IIV4 VACC NO PRSV 0.5 ML IM: CPT | Mod: S$GLB,,, | Performed by: FAMILY MEDICINE

## 2023-10-27 PROCEDURE — 90471 IMMUNIZATION ADMIN: CPT | Mod: S$GLB,,, | Performed by: FAMILY MEDICINE

## 2023-11-01 ENCOUNTER — OFFICE VISIT (OUTPATIENT)
Dept: DIABETES | Facility: CLINIC | Age: 53
End: 2023-11-01
Payer: COMMERCIAL

## 2023-11-01 DIAGNOSIS — E55.9 VITAMIN D DEFICIENCY: ICD-10-CM

## 2023-11-01 DIAGNOSIS — E10.65 TYPE 1 DIABETES MELLITUS WITH HYPERGLYCEMIA: Primary | ICD-10-CM

## 2023-11-01 DIAGNOSIS — R00.2 PALPITATIONS: ICD-10-CM

## 2023-11-01 PROCEDURE — 95251 PR GLUCOSE MONITOR, 72 HOUR, PHYS INTERP: ICD-10-PCS | Mod: NDTC,S$GLB,, | Performed by: PHYSICIAN ASSISTANT

## 2023-11-01 PROCEDURE — 3066F PR DOCUMENTATION OF TREATMENT FOR NEPHROPATHY: ICD-10-PCS | Mod: CPTII,95,, | Performed by: PHYSICIAN ASSISTANT

## 2023-11-01 PROCEDURE — 3046F HEMOGLOBIN A1C LEVEL >9.0%: CPT | Mod: CPTII,95,, | Performed by: PHYSICIAN ASSISTANT

## 2023-11-01 PROCEDURE — 3061F PR NEG MICROALBUMINURIA RESULT DOCUMENTED/REVIEW: ICD-10-PCS | Mod: CPTII,95,, | Performed by: PHYSICIAN ASSISTANT

## 2023-11-01 PROCEDURE — 3061F NEG MICROALBUMINURIA REV: CPT | Mod: CPTII,95,, | Performed by: PHYSICIAN ASSISTANT

## 2023-11-01 PROCEDURE — 3046F PR MOST RECENT HEMOGLOBIN A1C LEVEL > 9.0%: ICD-10-PCS | Mod: CPTII,95,, | Performed by: PHYSICIAN ASSISTANT

## 2023-11-01 PROCEDURE — 95251 CONT GLUC MNTR ANALYSIS I&R: CPT | Mod: NDTC,S$GLB,, | Performed by: PHYSICIAN ASSISTANT

## 2023-11-01 PROCEDURE — 3066F NEPHROPATHY DOC TX: CPT | Mod: CPTII,95,, | Performed by: PHYSICIAN ASSISTANT

## 2023-11-01 PROCEDURE — 99214 PR OFFICE/OUTPT VISIT, EST, LEVL IV, 30-39 MIN: ICD-10-PCS | Mod: 95,,, | Performed by: PHYSICIAN ASSISTANT

## 2023-11-01 PROCEDURE — 99214 OFFICE O/P EST MOD 30 MIN: CPT | Mod: 95,,, | Performed by: PHYSICIAN ASSISTANT

## 2023-11-01 NOTE — PROGRESS NOTES
PCP: Tristian Bearden MD    Subjective:     Chief Complaint: Diabetes - Established Patient    TELEMEDICINE VISIT:     The patient location is: Home  The chief complaint leading to consultation is: Diabetes Follow up  Visit type: Virtual visit with synchronous audio and video  Total time spent with patient: 20  Each patient to whom he or she provides medical services by telemedicine is:  (1) informed of the relationship between the physician and patient and the respective role of any other health care provider with respect to management of the patient; and (2) notified that he or she may decline to receive medical services by telemedicine and may withdraw from such care at any time.    Notes: N/A    HISTORY OF PRESENT ILLNESS: 52 y.o.   female presenting for diabetes management visit.   The patient's last visit with me was on 9/27/2023.  Patient has had Type I diabetes due to low C peptide since 5 years or more.  Pertinent to decision making is the following comorbidities: Vitamin D Deficiency and Gestational DM in Pregnancy 2003  Patient has the following Diabetes complications: without complications  She  has attended diabetes education in the past.     Patient's most recent A1c of 9.2% was completed 1 months ago.   Patient states since Her last A1c Her blood glucose levels have been high  after meals.   Patient monitors blood glucose 4 times per day and Continuously with personal CGM Dexcom.   Patient blood glucose monitoring device will be uploaded into [employer] today.        Patient report shows some dropping of BG from basal alone - may indicate basal coverage is too strong. And undercorrection of meal time dosing and hyperglycemia after snacks since not dosing at all.  Patient endorses the following diabetes related symptoms: None.   Patient is due today for the following diabetes-related health maintenance standards: Foot Exam , Lipid panel, A1c, and COVID-19 Vaccine .   She denies  recent hospital admissions or emergency room visits.   She denies having hypoglycemia as above.   Patient's concerns today include glycemic control.   Patient medication regimen is as below.     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       Lab Results   Component Value Date    CPEPTIDE 0.72 (L) 04/28/2022     Lab Results   Component Value Date    GLUTAMICACID 0.00 09/28/2020          //   , There is no height or weight on file to calculate BMI.  Her blood sugar in clinic today is:    Lab Results   Component Value Date    POCGLU 141 (A) 06/29/2022       Review of Systems   Constitutional:  Negative for activity change, appetite change, chills and fever.   HENT:  Negative for dental problem, mouth sores, nosebleeds, sore throat and trouble swallowing.    Eyes:  Negative for pain and discharge.   Respiratory:  Negative for shortness of breath, wheezing and stridor.    Cardiovascular:  Negative for chest pain, palpitations and leg swelling.   Gastrointestinal:  Negative for abdominal pain, diarrhea, nausea and vomiting.   Endocrine: Negative for polydipsia, polyphagia and polyuria.   Genitourinary:  Negative for dysuria, frequency and urgency.   Musculoskeletal:  Negative for joint  swelling and myalgias.   Skin:  Negative for rash and wound.   Neurological:  Negative for dizziness, syncope, weakness and headaches.   Psychiatric/Behavioral:  Negative for behavioral problems and dysphoric mood.          Diabetes Management Status  Statin: Not taking  ACE/ARB: Not taking    Screening or Prevention Patient's value Goal Complete/Controlled?   HgA1C Testing and Control   Lab Results   Component Value Date    HGBA1C 9.2 (H) 08/03/2023      Annually/Less than 8% No   Lipid profile : 08/19/2022 Annually Yes   LDL control  Lab Results   Component Value Date    LDLCALC 99.2 08/19/2022    Annually/Less than 100 mg/dl  Yes   Nephropathy screening Lab Results   Component Value Date    MICALBCREAT 6.7 03/14/2023     Lab Results   Component Value Date    PROTEINUA Negative 01/26/2021    Annually No   Blood pressure BP Readings from Last 1 Encounters:   10/06/23 136/80    Less than 140/90 Yes   Dilated retinal exam : 10/16/2023 Annually Yes    Foot exam   : 11/30/2021 Annually No     Social History     Socioeconomic History    Marital status: Single    Number of children: 2   Occupational History    Occupation:      Employer: [employer]     Employer: [email or web address]   Tobacco Use    Smoking status: Never    Smokeless tobacco: Never   Substance and Sexual Activity    Alcohol use: Yes     Comment: Rare    Drug use: No    Sexual activity: Yes     Partners: Male     Birth control/protection: Surgical   Social History Narrative    She wears seatbelt.     Social Determinants of Health     Financial Resource Strain: Low Risk  (9/26/2018)    Overall Financial Resource Strain (CARDIA)     Difficulty of Paying Living Expenses: Not hard at all   Food Insecurity: No Food Insecurity (9/26/2018)    Hunger Vital Sign     Worried About Running Out of Food in the Last Year: Never true     Ran Out of Food in the Last Year: Never true   Transportation Needs: No Transportation Needs (9/26/2018)    ARNULFO  - Transportation     Lack of Transportation (Medical): No     Lack of Transportation (Non-Medical): No   Physical Activity: Insufficiently Active (10/2/2019)    Exercise Vital Sign     Days of Exercise per Week: 3 days     Minutes of Exercise per Session: 30 min   Stress: No Stress Concern Present (9/26/2018)    [employer] - Occupational Stress Questionnaire     Feeling of Stress : Not at all   Social Connections: Moderately Isolated (9/26/2018)    Social Connection and Isolation Panel [NHANES]     Frequency of Communication with Friends and Family: More than three times a week     Frequency of Social Gatherings with Friends and Family: Once a week     Attends Baptism Services: More than 4 times per year     Active Member of Clubs or Organizations: No     Attends Club or Organization Meetings: Never     Marital Status:      Past Medical History:   Diagnosis Date    Abnormal Pap smear     Abnormal Pap smear of cervix     Arthritis, low back     Diabetes 09/2014     BS 162am 09/16/2014    DM (diabetes mellitus) 09/2014     am 01/26/2016    DM (diabetes mellitus) 09/2014    BS didn't check 01/31/2017    GERD (gastroesophageal reflux disease)     Gestational diabetes     Hepatitis C antibody positive in blood 02/11/2022    RNA NEGATIVE    History of abnormal Pap smear     Surgical menopause     Vitamin D deficiency        Objective:        Physical Exam  Constitutional:       General: She is not in acute distress.     Appearance: She is well-developed. She is not diaphoretic.   HENT:      Head: Normocephalic and atraumatic.      Right Ear: External ear normal.      Left Ear: External ear normal.      Nose: Nose normal.   Eyes:      General:         Right eye: No discharge.         Left eye: No discharge.   Pulmonary:      Effort: Pulmonary effort is normal. No respiratory distress.      Breath sounds: No stridor.   Musculoskeletal:         General: Normal range of motion.       Cervical back: Normal range of motion.   Skin:     Coloration: Skin is not pale.   Neurological:      Mental Status: She is alert and oriented to person, place, and time. Mental status is at baseline.      Motor: No abnormal muscle tone.      Coordination: Coordination normal.   Psychiatric:         Mood and Affect: Mood normal.         Behavior: Behavior normal.         Thought Content: Thought content normal.         Judgment: Judgment normal.             Assessment / Plan:     Type 1 diabetes mellitus with hyperglycemia  -     Hemoglobin A1C; Standing  -     Lipid Panel; Future; Expected date: 11/02/2023  -     TSH; Future; Expected date: 11/02/2023  -     T4, Free; Future; Expected date: 11/02/2023  -     Comprehensive Metabolic Panel; Future; Expected date: 11/02/2023  -     CBC Auto Differential; Future; Expected date: 11/02/2023  -     C-Peptide; Future; Expected date: 11/02/2023    Palpitations  -     Ambulatory referral/consult to Cardiology; Future; Expected date: 11/09/2023    Vitamin D deficiency          Additional Plan Details:    - POCT Glucose  - Encouraged continuation of lifestyle changes including regular exercise and limiting carbohydrates to 30-45 grams per meal threes times daily and 15 grams per snack with a limit of two daily.   - Encouraged continued monitoring of blood glucose with maintenance of 4 times daily and Continuously with personal CGM Dexcom.    - Current DM Medication Regimen: Continue Toujeo 15 units daily. Continue Januvia 100 mg daily. Change Lyumjev 16 units before meals, 3 units before snack, and correction dosing every 3 hours as needed - as below.   - Fasting labs sched  - Referral to Cardiology - Palpitations; will cc message to Dr Lambert staff for follow up   - Health Maintenance standards addressed today: Foot Exam - deferred by patient today because Telemedicine or Telephone visit, Lipid panel to be scheduled today, A1c to be scheduled, and COVID - 19 Vaccine -  patient will schedule outside of [facility]   - Nursing Visit: Patient is below goal range for nursing visit for age group and will not need nursing visit at this time .   - Follow up in 4 weeks.    CURRENT DM MEDICATIONS:   Toujeo 15 units daily  Januvia 100 mg daily  Lyumjev meal size dosing and correction dosing every 3 hours as needed - as below    Lyumjev Meal Size:   16 units before meals   3 units before snacks     Lyumjev Correction Dosing every 3 hours as needed OUTSIDE OF EATING  If  - 250, may take 2 units of Lyumjev  If  - 300, may take 3 units of Lyumjev  If  - 350, may take 4 units of Lyumjev  If  - 400, may take 5 units of Lyumjev  If +, may take 6 units of Lyumjev    Blakeney McKnight, PA-C [facility]

## 2023-11-01 NOTE — PATIENT INSTRUCTIONS
CURRENT DM MEDICATIONS:   Toujeo 15 units daily  Januvia 100 mg daily  Lyumjev meal size dosing and correction dosing every 3 hours as needed - as below    Lyumjev Meal Size:   16 units before meals   3 units before snacks     Lyumjev Correction Dosing every 3 hours as needed OUTSIDE OF EATING  If  - 250, may take 2 units of Lyumjev  If  - 300, may take 3 units of Lyumjev  If  - 350, may take 4 units of Lyumjev  If  - 400, may take 5 units of Lyumjev  If +, may take 6 units of Lyumjev

## 2023-11-01 NOTE — Clinical Note
DM staff: fasting labs [facility] 8a on 11/20   Dr. Lambert staff - needs f/u for palpitations none

## 2023-11-13 ENCOUNTER — PATIENT OUTREACH (OUTPATIENT)
Dept: ADMINISTRATIVE | Facility: HOSPITAL | Age: 53
End: 2023-11-13
Payer: COMMERCIAL

## 2023-11-13 NOTE — PROGRESS NOTES
DM lab report: Per chart review, patient has a lab appointment on 11/20/23 for recheck of diabetic labs.

## 2023-11-20 ENCOUNTER — LAB VISIT (OUTPATIENT)
Dept: LAB | Facility: HOSPITAL | Age: 53
End: 2023-11-20
Attending: PHYSICIAN ASSISTANT
Payer: COMMERCIAL

## 2023-11-20 DIAGNOSIS — E10.65 TYPE 1 DIABETES MELLITUS WITH HYPERGLYCEMIA: ICD-10-CM

## 2023-11-20 LAB
ALBUMIN SERPL BCP-MCNC: 3.6 G/DL (ref 3.5–5.2)
ALP SERPL-CCNC: 99 U/L (ref 55–135)
ALT SERPL W/O P-5'-P-CCNC: 10 U/L (ref 10–44)
ANION GAP SERPL CALC-SCNC: 10 MMOL/L (ref 8–16)
AST SERPL-CCNC: 13 U/L (ref 10–40)
BILIRUB SERPL-MCNC: 0.4 MG/DL (ref 0.1–1)
BUN SERPL-MCNC: 12 MG/DL (ref 6–20)
C PEPTIDE SERPL-MCNC: 1.67 NG/ML (ref 0.78–5.19)
CALCIUM SERPL-MCNC: 9.7 MG/DL (ref 8.7–10.5)
CHLORIDE SERPL-SCNC: 105 MMOL/L (ref 95–110)
CHOLEST SERPL-MCNC: 168 MG/DL (ref 120–199)
CHOLEST/HDLC SERPL: 2.9 {RATIO} (ref 2–5)
CO2 SERPL-SCNC: 25 MMOL/L (ref 23–29)
CREAT SERPL-MCNC: 0.8 MG/DL (ref 0.5–1.4)
EST. GFR  (NO RACE VARIABLE): >60 ML/MIN/1.73 M^2
ESTIMATED AVG GLUCOSE: 186 MG/DL (ref 68–131)
GLUCOSE SERPL-MCNC: 270 MG/DL (ref 70–110)
HBA1C MFR BLD: 8.1 % (ref 4–5.6)
HDLC SERPL-MCNC: 58 MG/DL (ref 40–75)
HDLC SERPL: 34.5 % (ref 20–50)
LDLC SERPL CALC-MCNC: 99.2 MG/DL (ref 63–159)
NONHDLC SERPL-MCNC: 110 MG/DL
POTASSIUM SERPL-SCNC: 4.3 MMOL/L (ref 3.5–5.1)
PROT SERPL-MCNC: 7 G/DL (ref 6–8.4)
SODIUM SERPL-SCNC: 140 MMOL/L (ref 136–145)
T4 FREE SERPL-MCNC: 0.8 NG/DL (ref 0.71–1.51)
TRIGL SERPL-MCNC: 54 MG/DL (ref 30–150)
TSH SERPL DL<=0.005 MIU/L-ACNC: 1.68 UIU/ML (ref 0.4–4)

## 2023-11-20 PROCEDURE — 80053 COMPREHEN METABOLIC PANEL: CPT | Performed by: PHYSICIAN ASSISTANT

## 2023-11-20 PROCEDURE — 83036 HEMOGLOBIN GLYCOSYLATED A1C: CPT | Performed by: PHYSICIAN ASSISTANT

## 2023-11-20 PROCEDURE — 80061 LIPID PANEL: CPT | Performed by: PHYSICIAN ASSISTANT

## 2023-11-20 PROCEDURE — 84439 ASSAY OF FREE THYROXINE: CPT | Performed by: PHYSICIAN ASSISTANT

## 2023-11-20 PROCEDURE — 84443 ASSAY THYROID STIM HORMONE: CPT | Performed by: PHYSICIAN ASSISTANT

## 2023-11-20 PROCEDURE — 84681 ASSAY OF C-PEPTIDE: CPT | Performed by: PHYSICIAN ASSISTANT

## 2023-11-20 PROCEDURE — 85025 COMPLETE CBC W/AUTO DIFF WBC: CPT | Performed by: PHYSICIAN ASSISTANT

## 2023-11-20 PROCEDURE — 36415 COLL VENOUS BLD VENIPUNCTURE: CPT | Mod: PN | Performed by: PHYSICIAN ASSISTANT

## 2023-11-21 LAB
BASOPHILS # BLD AUTO: 0.03 K/UL (ref 0–0.2)
BASOPHILS NFR BLD: 0.5 % (ref 0–1.9)
DIFFERENTIAL METHOD: ABNORMAL
EOSINOPHIL # BLD AUTO: 0.1 K/UL (ref 0–0.5)
EOSINOPHIL NFR BLD: 1.8 % (ref 0–8)
ERYTHROCYTE [DISTWIDTH] IN BLOOD BY AUTOMATED COUNT: 11.1 % (ref 11.5–14.5)
HCT VFR BLD AUTO: 44 % (ref 37–48.5)
HGB BLD-MCNC: 13.4 G/DL (ref 12–16)
IMM GRANULOCYTES # BLD AUTO: 0.02 K/UL (ref 0–0.04)
IMM GRANULOCYTES NFR BLD AUTO: 0.3 % (ref 0–0.5)
LYMPHOCYTES # BLD AUTO: 2.1 K/UL (ref 1–4.8)
LYMPHOCYTES NFR BLD: 31.7 % (ref 18–48)
MCH RBC QN AUTO: 28.5 PG (ref 27–31)
MCHC RBC AUTO-ENTMCNC: 30.5 G/DL (ref 32–36)
MCV RBC AUTO: 94 FL (ref 82–98)
MONOCYTES # BLD AUTO: 0.4 K/UL (ref 0.3–1)
MONOCYTES NFR BLD: 5.5 % (ref 4–15)
NEUTROPHILS # BLD AUTO: 4 K/UL (ref 1.8–7.7)
NEUTROPHILS NFR BLD: 60.2 % (ref 38–73)
NRBC BLD-RTO: 0 /100 WBC
PLATELET # BLD AUTO: 251 K/UL (ref 150–450)
PMV BLD AUTO: 11.3 FL (ref 9.2–12.9)
RBC # BLD AUTO: 4.7 M/UL (ref 4–5.4)
WBC # BLD AUTO: 6.56 K/UL (ref 3.9–12.7)

## 2023-11-27 ENCOUNTER — TELEPHONE (OUTPATIENT)
Dept: DIABETES | Facility: CLINIC | Age: 53
End: 2023-11-27
Payer: COMMERCIAL

## 2023-11-27 NOTE — TELEPHONE ENCOUNTER
Patient called to reschedule missed follow up appointment. Patient scheduled next available , patient also scheduled nurse visit to assist with dexcom sharing to have download prior to virtual visit. Patient voiced understanding of appointments  ----- Message from Mik Echols sent at 11/27/2023 10:01 AM CST -----  Contact: [phone]  Patient needs first available appointment due to follow up . Please call patient at [phone]. Thanks KB

## 2023-11-30 ENCOUNTER — OFFICE VISIT (OUTPATIENT)
Dept: DIABETES | Facility: CLINIC | Age: 53
End: 2023-11-30
Payer: COMMERCIAL

## 2023-11-30 ENCOUNTER — PATIENT MESSAGE (OUTPATIENT)
Dept: DIABETES | Facility: CLINIC | Age: 53
End: 2023-11-30

## 2023-11-30 DIAGNOSIS — E10.65 TYPE 1 DIABETES MELLITUS WITH HYPERGLYCEMIA: Primary | ICD-10-CM

## 2023-11-30 PROCEDURE — 1160F PR REVIEW ALL MEDS BY PRESCRIBER/CLIN PHARMACIST DOCUMENTED: ICD-10-PCS | Mod: CPTII,95,, | Performed by: NURSE PRACTITIONER

## 2023-11-30 PROCEDURE — 99214 OFFICE O/P EST MOD 30 MIN: CPT | Mod: 95,,, | Performed by: NURSE PRACTITIONER

## 2023-11-30 PROCEDURE — 3052F HG A1C>EQUAL 8.0%<EQUAL 9.0%: CPT | Mod: CPTII,95,, | Performed by: NURSE PRACTITIONER

## 2023-11-30 PROCEDURE — 3061F PR NEG MICROALBUMINURIA RESULT DOCUMENTED/REVIEW: ICD-10-PCS | Mod: CPTII,95,, | Performed by: NURSE PRACTITIONER

## 2023-11-30 PROCEDURE — 3052F PR MOST RECENT HEMOGLOBIN A1C LEVEL 8.0 - < 9.0%: ICD-10-PCS | Mod: CPTII,95,, | Performed by: NURSE PRACTITIONER

## 2023-11-30 PROCEDURE — 3061F NEG MICROALBUMINURIA REV: CPT | Mod: CPTII,95,, | Performed by: NURSE PRACTITIONER

## 2023-11-30 PROCEDURE — 3066F NEPHROPATHY DOC TX: CPT | Mod: CPTII,95,, | Performed by: NURSE PRACTITIONER

## 2023-11-30 PROCEDURE — 1160F RVW MEDS BY RX/DR IN RCRD: CPT | Mod: CPTII,95,, | Performed by: NURSE PRACTITIONER

## 2023-11-30 PROCEDURE — 1159F PR MEDICATION LIST DOCUMENTED IN MEDICAL RECORD: ICD-10-PCS | Mod: CPTII,95,, | Performed by: NURSE PRACTITIONER

## 2023-11-30 PROCEDURE — 3066F PR DOCUMENTATION OF TREATMENT FOR NEPHROPATHY: ICD-10-PCS | Mod: CPTII,95,, | Performed by: NURSE PRACTITIONER

## 2023-11-30 PROCEDURE — 99214 PR OFFICE/OUTPT VISIT, EST, LEVL IV, 30-39 MIN: ICD-10-PCS | Mod: 95,,, | Performed by: NURSE PRACTITIONER

## 2023-11-30 PROCEDURE — 1159F MED LIST DOCD IN RCRD: CPT | Mod: CPTII,95,, | Performed by: NURSE PRACTITIONER

## 2023-11-30 NOTE — PROGRESS NOTES
Subjective:         Patient ID: Chan Dwyer is a 53 y.o. female.  Patient's current PCP is Tristian Bearden MD.   Patient's current location is [place]    Chief Complaint: Diabetes Mellitus    HPI  Chan Dwyer is a 53 y.o. Black or  female presenting for established  consultation for Diabetes, Type 1  Last seen in DM by STEPHY Knowles on 11/1/23    Diagnosed with diabetes> 5 years ago  Relevant History and Conditions: Gestational DM in Pregnancy 2003  No Recent Hospitalizations related to DM    At last visit the following changes were made due to hyperglycemia   Continue Toujeo 15 units daily. Continue Januvia 100 mg daily. Change Lyumjev 16 units before meals, 3 units before snack, and correction dosing every 3 hours as needed - as below.     Patient reports compliance with regimen.     Dexcom in use  Interpretation of CGMS as follows:  Standard Deviation: 65  Average Blood Glucose: 262  Estimated A1C: na  Time in Glucose Target Range: 11%  Time Above Glucose Target Range: 89%  Time Below Glucose Target Range: 0%        Blood glucose levels are uncontrolled. Changes to regimen are recommended.     Results discussed with patient.     Patient has failed the following Diabetes medications:   Metformin - GI   Glipizide/Sulfonyurea  Jardiance/SGLT2 - yeast infections  GLP-1 - GI issues  Basaglar             //   , There is no height or weight on file to calculate BMI.      Eye exam in the past year: Yese   Foot Exam in the past year: No  ACE/ARB: No  Statin: No  Diabetes Education with Dietician: Yes      Labs reviewed and are noted below.    Her most recent A1C is:   Lab Results   Component Value Date    HGBA1C 8.1 (H) 11/20/2023       Lab Results   Component Value Date    WBC 6.56 11/20/2023    HGB 13.4 11/20/2023    HCT 44.0 11/20/2023     11/20/2023    CHOL 168 11/20/2023    TRIG 54 11/20/2023    HDL 58 11/20/2023    ALT 10 11/20/2023    AST 13 11/20/2023    NA  "140 11/20/2023    K 4.3 11/20/2023     11/20/2023    CREATININE 0.8 11/20/2023    BUN 12 11/20/2023    CO2 25 11/20/2023    TSH 1.683 11/20/2023    INR 1.0 03/14/2023    GLUF 103 04/28/2022    HGBA1C 8.1 (H) 11/20/2023     Lab Results   Component Value Date    CPEPTIDE 1.67 11/20/2023     Lab Results   Component Value Date    GLUTAMICACID 0.00 09/28/2020     No results found for: "LDL"    CURRENT MEDICATIONS:   Current Outpatient Medications   Medication Sig Dispense Refill    amoxicillin (AMOXIL) 500 MG capsule Take 500 mg by mouth 3 (three) times daily.      aspirin (ECOTRIN) 81 MG EC tablet Take 1 tablet (81 mg total) by mouth once daily. 30 tablet 11    blood sugar diagnostic Strp To check BG 4 times daily, to use with insurance preferred meter 100 each 11    blood-glucose meter kit To check BG 4 times daily, to use with insurance preferred meter 1 each 0    diclofenac (VOLTAREN) 50 MG EC tablet Take 1 tablet (50 mg total) by mouth 2 (two) times daily. 60 tablet 2    fluticasone propionate (FLONASE) 50 mcg/actuation nasal spray 1 spray (50 mcg total) by Each Nostril route once daily. 16 mL 0    ibuprofen (ADVIL,MOTRIN) 600 MG tablet Take 1 tablet (600 mg total) by mouth every 6 (six) hours as needed for Pain. 30 tablet 0    insulin aspart, niacinamide, (FIASP FLEXTOUCH U-100 INSULIN) 100 unit/mL (3 mL) InPn Titrate up to 50 units daily as instructed. 5 pen 11    insulin glargine, TOUJEO, (TOUJEO) 300 unit/mL (1.5 mL) InPn pen Inject 16 Units into the skin once daily. 4 pen 3    lancets Misc To check BG 4 times daily, to use with insurance preferred meter 100 each 11    levocetirizine (XYZAL) 5 MG tablet Take 1 tablet (5 mg total) by mouth every evening. 30 tablet 0    pantoprazole (PROTONIX) 40 MG tablet Take 1 tablet (40 mg total) by mouth once daily. 30 tablet 11    pen needle, diabetic (BD ULTRA-FINE KIYA PEN NEEDLE) 32 gauge x 5/32" Ndle 1 each by Misc.(Non-Drug; Combo Route) route 4 (four) times daily " with meals and nightly. 100 each 11    SITagliptin phosphate (JANUVIA) 100 MG Tab Take 1 tablet (100 mg total) by mouth once daily. 90 tablet 3    triamcinolone acetonide 0.1% (KENALOG) 0.1 % cream Apply topically 2 (two) times daily. (Do not get in eye.) for 10 days 15 g 0     No current facility-administered medications for this visit.     Facility-Administered Medications Ordered in Other Visits   Medication Dose Route Frequency Provider Last Rate Last Admin    chlorhexidine 0.12 % solution 10 mL  10 mL Mouth/Throat On Call Procedure Anusha George PA-C   10 mL at 10/10/22 0626       Health Maintenance   Topic Date Due    Aspirin/Antiplatelet Therapy  Never done    High Dose Statin  Never done    Shingles Vaccine (1 of 2) Never done    Foot Exam  11/30/2022    Hemoglobin A1c  02/20/2024    Mammogram  03/17/2024    TETANUS VACCINE  09/11/2024    Eye Exam  10/16/2024    Lipid Panel  11/20/2024    Colorectal Cancer Screening  06/22/2026    Hepatitis C Screening  Completed             Review of Systems   Constitutional:  Negative for activity change and appetite change.   Endocrine: Negative for polydipsia, polyphagia and polyuria.         Objective:      Physical Exam  Constitutional:       Appearance: Normal appearance.   HENT:      Head: Normocephalic.   Pulmonary:      Effort: Pulmonary effort is normal.   Neurological:      Mental Status: She is alert and oriented to person, place, and time.         Assessment:       1. Type 1 diabetes mellitus with hyperglycemia        Plan:   Type 1 diabetes mellitus with hyperglycemia      Increased Toujeo to 20 units due to hyperglycemia. ,  No other changes today, mealtime insulin ratios appear to be efficient.   Return in 1 week for more changes  Recommend insulin pump for long term control. Sent info for consideration.    - DM education reviewed. Patient encouraged to carb count and exercise per recommendations. Labs and Referrals as noted. Patient instructed to send in  log weekly for review and potential medication adjustments.         Additional Details:    1.)  Patient was instructed to monitor blood glucose 2 - 3 x daily, fasting and ac dinner or at bedtime. Discussed ADA goals for blood glucose levels and hypoglycemic protocol.  Reminded to bring BG records or meter to each visit for review.  2.) Reviewed pathophysiology of Diabetes, complications related to the disease, importance of annual dilated eye exam and daily foot examination.  3.) We discussed the ADA recommendations, which are as follows:  Hemoglobin A1c below 7.0 %. All patients with diabetes should be on statins unless contraindicated.  ACE or ARB therapy if not contraindicated.    4.) Continue medications as prescribed.  My [facility] e-mail or phone review in one week with BG records for adjustment of medication.  5.) Meal planning teaching: Reviewed carb counting, portion control, importance of spacing meals throughout the day to prevent post prandial elevations.  6.) Discussed activity with related benefits, methods, and precautions. Recommended patient start or continue some form of exercise and increase as tolerated to 30 minutes per day to aid in control of BGs.  7.) A1C, TSH, Lipid Panel, CMP with eGFR and Micro/Creatinine are utd or were ordered per ADA protocol.  8.) Return to clinic in 1 week  for follow up. The patient was explained the above plan and given opportunity to ask questions.  She understands, chooses and consents to this plan and accepts all the risks, which include but are not limited to the risks mentioned above. She understands the alternative of having no testing, interventions or treatments at this time. She left content and without further questions.     30 minutes of total time spent on the encounter, which includes face to face time and non-face to face time preparing to see the patient (eg, review of tests), Obtaining and/or reviewing separately obtained history, Documenting clinical  information in the electronic or other health record, Independently interpreting results (not separately reported) and communicating results to the patient/family/caregiver, or Care coordination (not separately reported). Each patient to whom he or she provides medical services by telemedicine is:  (1) informed of the relationship between the physician and patient and the respective role of any other health care provider with respect to management of the patient; and (2) notified that he or she may decline to receive medical services by telemedicine and may withdraw from such care at any time.        SOLOMON DahlC

## 2023-12-16 DIAGNOSIS — E10.65 TYPE 1 DIABETES MELLITUS WITH HYPERGLYCEMIA: ICD-10-CM

## 2023-12-18 RX ORDER — INSULIN GLARGINE 300 U/ML
INJECTION, SOLUTION SUBCUTANEOUS
Qty: 2 ML | Refills: 0 | Status: SHIPPED | OUTPATIENT
Start: 2023-12-18

## 2023-12-19 ENCOUNTER — OFFICE VISIT (OUTPATIENT)
Dept: INTERNAL MEDICINE | Facility: CLINIC | Age: 53
End: 2023-12-19
Payer: COMMERCIAL

## 2023-12-19 VITALS
DIASTOLIC BLOOD PRESSURE: 88 MMHG | HEIGHT: 64 IN | BODY MASS INDEX: 20.74 KG/M2 | WEIGHT: 121.5 LBS | SYSTOLIC BLOOD PRESSURE: 126 MMHG | HEART RATE: 82 BPM | OXYGEN SATURATION: 97 %

## 2023-12-19 DIAGNOSIS — R50.9 FEVER, UNSPECIFIED FEVER CAUSE: Primary | ICD-10-CM

## 2023-12-19 DIAGNOSIS — J06.9 VIRAL URI WITH COUGH: ICD-10-CM

## 2023-12-19 PROBLEM — E10.65 TYPE 1 DIABETES MELLITUS WITH HYPERGLYCEMIA: Status: RESOLVED | Noted: 2023-09-27 | Resolved: 2023-12-19

## 2023-12-19 PROBLEM — E11.9 DM (DIABETES MELLITUS): Status: ACTIVE | Noted: 2023-12-19

## 2023-12-19 PROCEDURE — 1159F PR MEDICATION LIST DOCUMENTED IN MEDICAL RECORD: ICD-10-PCS | Mod: CPTII,S$GLB,, | Performed by: INTERNAL MEDICINE

## 2023-12-19 PROCEDURE — 1160F PR REVIEW ALL MEDS BY PRESCRIBER/CLIN PHARMACIST DOCUMENTED: ICD-10-PCS | Mod: CPTII,S$GLB,, | Performed by: INTERNAL MEDICINE

## 2023-12-19 PROCEDURE — 99213 PR OFFICE/OUTPT VISIT, EST, LEVL III, 20-29 MIN: ICD-10-PCS | Mod: S$GLB,,, | Performed by: INTERNAL MEDICINE

## 2023-12-19 PROCEDURE — 99999 PR PBB SHADOW E&M-EST. PATIENT-LVL IV: CPT | Mod: PBBFAC,,, | Performed by: INTERNAL MEDICINE

## 2023-12-19 PROCEDURE — 3066F PR DOCUMENTATION OF TREATMENT FOR NEPHROPATHY: ICD-10-PCS | Mod: CPTII,S$GLB,, | Performed by: INTERNAL MEDICINE

## 2023-12-19 PROCEDURE — 87502 POCT INFLUENZA A/B MOLECULAR: ICD-10-PCS | Mod: QW,S$GLB,, | Performed by: INTERNAL MEDICINE

## 2023-12-19 PROCEDURE — 3074F PR MOST RECENT SYSTOLIC BLOOD PRESSURE < 130 MM HG: ICD-10-PCS | Mod: CPTII,S$GLB,, | Performed by: INTERNAL MEDICINE

## 2023-12-19 PROCEDURE — 1160F RVW MEDS BY RX/DR IN RCRD: CPT | Mod: CPTII,S$GLB,, | Performed by: INTERNAL MEDICINE

## 2023-12-19 PROCEDURE — 3052F PR MOST RECENT HEMOGLOBIN A1C LEVEL 8.0 - < 9.0%: ICD-10-PCS | Mod: CPTII,S$GLB,, | Performed by: INTERNAL MEDICINE

## 2023-12-19 PROCEDURE — 3008F BODY MASS INDEX DOCD: CPT | Mod: CPTII,S$GLB,, | Performed by: INTERNAL MEDICINE

## 2023-12-19 PROCEDURE — 99213 OFFICE O/P EST LOW 20 MIN: CPT | Mod: S$GLB,,, | Performed by: INTERNAL MEDICINE

## 2023-12-19 PROCEDURE — 3074F SYST BP LT 130 MM HG: CPT | Mod: CPTII,S$GLB,, | Performed by: INTERNAL MEDICINE

## 2023-12-19 PROCEDURE — 87635 SARS-COV-2 COVID-19 AMP PRB: CPT | Mod: QW,S$GLB,, | Performed by: INTERNAL MEDICINE

## 2023-12-19 PROCEDURE — 3079F DIAST BP 80-89 MM HG: CPT | Mod: CPTII,S$GLB,, | Performed by: INTERNAL MEDICINE

## 2023-12-19 PROCEDURE — 99999 PR PBB SHADOW E&M-EST. PATIENT-LVL IV: ICD-10-PCS | Mod: PBBFAC,,, | Performed by: INTERNAL MEDICINE

## 2023-12-19 PROCEDURE — 3079F PR MOST RECENT DIASTOLIC BLOOD PRESSURE 80-89 MM HG: ICD-10-PCS | Mod: CPTII,S$GLB,, | Performed by: INTERNAL MEDICINE

## 2023-12-19 PROCEDURE — 87635: ICD-10-PCS | Mod: QW,S$GLB,, | Performed by: INTERNAL MEDICINE

## 2023-12-19 PROCEDURE — 87502 INFLUENZA DNA AMP PROBE: CPT | Mod: QW,S$GLB,, | Performed by: INTERNAL MEDICINE

## 2023-12-19 PROCEDURE — 3008F PR BODY MASS INDEX (BMI) DOCUMENTED: ICD-10-PCS | Mod: CPTII,S$GLB,, | Performed by: INTERNAL MEDICINE

## 2023-12-19 PROCEDURE — 3052F HG A1C>EQUAL 8.0%<EQUAL 9.0%: CPT | Mod: CPTII,S$GLB,, | Performed by: INTERNAL MEDICINE

## 2023-12-19 PROCEDURE — 1159F MED LIST DOCD IN RCRD: CPT | Mod: CPTII,S$GLB,, | Performed by: INTERNAL MEDICINE

## 2023-12-19 PROCEDURE — 3061F NEG MICROALBUMINURIA REV: CPT | Mod: CPTII,S$GLB,, | Performed by: INTERNAL MEDICINE

## 2023-12-19 PROCEDURE — 3066F NEPHROPATHY DOC TX: CPT | Mod: CPTII,S$GLB,, | Performed by: INTERNAL MEDICINE

## 2023-12-19 PROCEDURE — 3061F PR NEG MICROALBUMINURIA RESULT DOCUMENTED/REVIEW: ICD-10-PCS | Mod: CPTII,S$GLB,, | Performed by: INTERNAL MEDICINE

## 2023-12-19 RX ORDER — BENZONATATE 200 MG/1
200 CAPSULE ORAL 3 TIMES DAILY PRN
Qty: 30 CAPSULE | Refills: 0 | Status: SHIPPED | OUTPATIENT
Start: 2023-12-19 | End: 2023-12-29

## 2023-12-19 RX ORDER — PROMETHAZINE HYDROCHLORIDE AND DEXTROMETHORPHAN HYDROBROMIDE 6.25; 15 MG/5ML; MG/5ML
5 SYRUP ORAL EVERY 4 HOURS PRN
Qty: 240 ML | Refills: 1 | Status: SHIPPED | OUTPATIENT
Start: 2023-12-19 | End: 2023-12-29

## 2023-12-19 NOTE — PROGRESS NOTES
"HPI:  Patient is a 53-year-old female comes today with 3 day history of head and chest congestion associated with scratchy throat and dry cough.  She states her temperature has been as high as 100.5.      Current meds have been verified and updated per the EMR  Exam:/88 (BP Location: Left arm)   Pulse 82   Ht 5' 4" (1.626 m)   Wt 55.1 kg (121 lb 7.6 oz)   SpO2 97%   BMI 20.85 kg/m²   TMs are normal, throat shows some mild erythema but no exudate  Nasal mucosa is hyperemic and edematous  Chest clear    Lab Results   Component Value Date    WBC 6.56 11/20/2023    HGB 13.4 11/20/2023    HCT 44.0 11/20/2023     11/20/2023    CHOL 168 11/20/2023    TRIG 54 11/20/2023    HDL 58 11/20/2023    ALT 10 11/20/2023    AST 13 11/20/2023     11/20/2023    K 4.3 11/20/2023     11/20/2023    CREATININE 0.8 11/20/2023    BUN 12 11/20/2023    CO2 25 11/20/2023    TSH 1.683 11/20/2023    INR 1.0 03/14/2023    GLUF 103 04/28/2022    HGBA1C 8.1 (H) 11/20/2023    BNP 14 02/11/2022    SEDRATE 9 11/28/2017    CRP 7.3 11/28/2017   Point of care testing for influenza A and B was negative  Point of care testing for COVID was negative    Impression:  Viral URI with cough  Patient Active Problem List   Diagnosis    GERD (gastroesophageal reflux disease)    Arthritis, lumbar spine    Vitamin D deficiency    [place] nodes    Intractable headache    Menopause syndrome    Bilateral carpal tunnel syndrome    Occipital neuralgia    Decreased range of motion    Self-care deficit    Hepatitis C antibody positive in blood    HIstory of Chest pain, moderate coronary artery risk    Trigger ring finger of left hand    Pain    Decreased  strength of left hand    Anginal equivalent    Impaired strength of hip muscles       Plan:  Orders Placed This Encounter    POCT COVID-19 Rapid Screening    POCT Influenza A/B Molecular    promethazine-dextromethorphan (PROMETHAZINE-DM) 6.25-15 mg/5 mL Syrp    benzonatate (TESSALON) 200 " MG capsule     She was given promethazine DM and Tessalon to help her cough.  She should use over-the-counter Mucinex and Allegra D as well.  She should use 2 Tylenol extra-strength 8 hours as needed for aches and pains    This note is generated with speech recognition software and is subject to transcription error and sound alike phrases that may be missed by proofreading.

## 2023-12-20 ENCOUNTER — TELEPHONE (OUTPATIENT)
Dept: DIABETES | Facility: CLINIC | Age: 53
End: 2023-12-20
Payer: COMMERCIAL

## 2024-01-23 ENCOUNTER — OFFICE VISIT (OUTPATIENT)
Dept: INTERNAL MEDICINE | Facility: CLINIC | Age: 54
End: 2024-01-23
Payer: COMMERCIAL

## 2024-01-23 VITALS
WEIGHT: 119.06 LBS | TEMPERATURE: 99 F | OXYGEN SATURATION: 97 % | BODY MASS INDEX: 20.32 KG/M2 | HEIGHT: 64 IN | SYSTOLIC BLOOD PRESSURE: 136 MMHG | DIASTOLIC BLOOD PRESSURE: 72 MMHG | HEART RATE: 86 BPM

## 2024-01-23 DIAGNOSIS — J02.9 SORE THROAT: ICD-10-CM

## 2024-01-23 DIAGNOSIS — F43.21 GRIEF REACTION: Primary | ICD-10-CM

## 2024-01-23 DIAGNOSIS — J10.1 INFLUENZA A: ICD-10-CM

## 2024-01-23 LAB
CTP QC/QA: YES
CTP QC/QA: YES
POC MOLECULAR INFLUENZA A AGN: POSITIVE
POC MOLECULAR INFLUENZA B AGN: NEGATIVE
SARS-COV-2 RDRP RESP QL NAA+PROBE: NEGATIVE

## 2024-01-23 PROCEDURE — 99214 OFFICE O/P EST MOD 30 MIN: CPT | Mod: S$GLB,,, | Performed by: FAMILY MEDICINE

## 2024-01-23 PROCEDURE — 99999 PR PBB SHADOW E&M-EST. PATIENT-LVL IV: CPT | Mod: PBBFAC,,, | Performed by: FAMILY MEDICINE

## 2024-01-23 PROCEDURE — 1159F MED LIST DOCD IN RCRD: CPT | Mod: CPTII,S$GLB,, | Performed by: FAMILY MEDICINE

## 2024-01-23 PROCEDURE — 3075F SYST BP GE 130 - 139MM HG: CPT | Mod: CPTII,S$GLB,, | Performed by: FAMILY MEDICINE

## 2024-01-23 PROCEDURE — 87502 INFLUENZA DNA AMP PROBE: CPT | Mod: QW,S$GLB,, | Performed by: FAMILY MEDICINE

## 2024-01-23 PROCEDURE — 3078F DIAST BP <80 MM HG: CPT | Mod: CPTII,S$GLB,, | Performed by: FAMILY MEDICINE

## 2024-01-23 PROCEDURE — 3072F LOW RISK FOR RETINOPATHY: CPT | Mod: CPTII,S$GLB,, | Performed by: FAMILY MEDICINE

## 2024-01-23 PROCEDURE — 87635 SARS-COV-2 COVID-19 AMP PRB: CPT | Mod: QW,S$GLB,, | Performed by: FAMILY MEDICINE

## 2024-01-23 PROCEDURE — 3008F BODY MASS INDEX DOCD: CPT | Mod: CPTII,S$GLB,, | Performed by: FAMILY MEDICINE

## 2024-01-23 RX ORDER — PROMETHAZINE HYDROCHLORIDE AND DEXTROMETHORPHAN HYDROBROMIDE 6.25; 15 MG/5ML; MG/5ML
5 SYRUP ORAL EVERY 4 HOURS PRN
Qty: 200 ML | Refills: 0 | Status: SHIPPED | OUTPATIENT
Start: 2024-01-23 | End: 2024-02-02

## 2024-01-23 RX ORDER — OSELTAMIVIR PHOSPHATE 75 MG/1
75 CAPSULE ORAL 2 TIMES DAILY
Qty: 10 CAPSULE | Refills: 0 | Status: SHIPPED | OUTPATIENT
Start: 2024-01-23 | End: 2024-01-28

## 2024-01-23 RX ORDER — DOXEPIN HYDROCHLORIDE 10 MG/1
10 CAPSULE ORAL NIGHTLY
Qty: 30 CAPSULE | Refills: 1 | Status: SHIPPED | OUTPATIENT
Start: 2024-01-23 | End: 2024-02-27

## 2024-01-23 NOTE — LETTER
January 23, 2024    Chan Dwyer  [address] [address] [place] [place] [facility] [id number]             [facility] Internal Medicine  Internal Medicine  [address] [address] [address]  Phone: [phone]  Fax: [phone]   January 23, 2024     Patient: Chan Dwyer   YOB: 1970   Date of Visit: 1/23/2024       To Whom it May Concern:    Chan Dwyer was seen in my clinic on 1/23/2024. She may return to work on 02/23/2024 .    Please excuse her from any classes or work missed.    If you have any questions or concerns, please don't hesitate to call.    Sincerely,         Tristian Bearden MD

## 2024-01-24 ENCOUNTER — TELEPHONE (OUTPATIENT)
Dept: INTERNAL MEDICINE | Facility: CLINIC | Age: 54
End: 2024-01-24
Payer: COMMERCIAL

## 2024-01-24 NOTE — TELEPHONE ENCOUNTER
----- Message from Suly Sifuentes sent at 1/24/2024 12:53 PM CST -----  Contact: pt  Pt is calling in regard to needing to drop off FMLA paperwork to the office.  Please call her to advise if this is ok.  Call at [phone] [email or web address]

## 2024-01-24 NOTE — PROGRESS NOTES
Subjective:      Patient ID: Chan Dwyer is a 53 y.o. female.    Chief Complaint: Cough, Nasal Congestion, Headache, and Sore Throat      Patient reports sore throat, coughing, runny nose for the past 2 days now.  Sudden onset.  Also reports incidentally her son was killed last week, understandably having a very difficult time.  Requesting some time off of work.  Reports she can not sleep at all    Cough  Associated symptoms include headaches and a sore throat.   Headache   Associated symptoms include coughing and a sore throat.   Sore Throat   Associated symptoms include congestion, coughing and headaches.     Review of Systems   Constitutional:  Positive for fatigue.   HENT:  Positive for congestion and sore throat.    Respiratory:  Positive for cough.    Neurological:  Positive for headaches.   Psychiatric/Behavioral:  Positive for dysphoric mood and sleep disturbance.      Past Medical History:   Diagnosis Date    Abnormal Pap smear     Abnormal Pap smear of cervix     Arthritis, low back     DM (diabetes mellitus) 09/2014    BS didn't check 01/31/2017    GERD (gastroesophageal reflux disease)     Gestational diabetes     Hepatitis C antibody positive in blood 02/11/2022    RNA NEGATIVE    History of abnormal Pap smear     Surgical menopause     Vitamin D deficiency           Past Surgical History:   Procedure Laterality Date    COLONOSCOPY N/A 6/22/2016    Procedure: COLONOSCOPY;  Surgeon: Vishal Mary III, MD;  Location: [facility] ENDO;  Service: Endoscopy;  Laterality: N/A;    Cryotherapy of the cervix  1999    DILATION AND CURETTAGE OF UTERUS      HYSTERECTOMY      LEFT HEART CATHETERIZATION Left 3/24/2023    Procedure: Left heart cath;  Surgeon: Lee Lambert MD;  Location: [facility] CATH LAB;  Service: Cardiology;  Laterality: Left;    [facility]  2/2012    TRIGGER FINGER RELEASE Left 10/10/2022    Procedure: RELEASE, TRIGGER FINGER;  Surgeon: Adarsh Chavez MD;  Location: [facility] OR;  Service:  Orthopedics;  Laterality: Left;  left ring trigger finger release    TUBAL LIGATION       Family History   Problem Relation Age of Onset    Diabetes Father     Hypertension Father     Hypertension Mother     Diabetes Mother     Heart attack Mother     No Known Problems Brother     No Known Problems Brother     No Known Problems Brother     No Known Problems Brother     Stomach cancer Sister     No Known Problems Sister     No Known Problems Sister     No Known Problems Sister     No Known Problems Sister     No Known Problems Son     No Known Problems Son     Cancer Neg Hx     Stroke Neg Hx     Colon cancer Neg Hx     Ovarian cancer Neg Hx     Breast cancer Neg Hx      Social History     Socioeconomic History    Marital status: Single    Number of children: 2   Occupational History    Occupation:      Employer: [employer]     Employer: [employer]   Tobacco Use    Smoking status: Never    Smokeless tobacco: Never   Substance and Sexual Activity    Alcohol use: Yes     Comment: Rare    Drug use: No    Sexual activity: Yes     Partners: Male     Birth control/protection: Surgical   Social History Narrative    She wears seatbelt.     Social Determinants of Health     Financial Resource Strain: Low Risk  (9/26/2018)    Overall Financial Resource Strain (CARDIA)     Difficulty of Paying Living Expenses: Not hard at all   Food Insecurity: No Food Insecurity (9/26/2018)    Hunger Vital Sign     Worried About Running Out of Food in the Last Year: Never true     Ran Out of Food in the Last Year: Never true   Transportation Needs: No Transportation Needs (9/26/2018)    PRAPARE - Transportation     Lack of Transportation (Medical): No     Lack of Transportation (Non-Medical): No   Physical Activity: Insufficiently Active (10/2/2019)    Exercise Vital Sign     Days of Exercise per Week: 3 days     Minutes of Exercise per Session: 30 min   Stress: No Stress Concern Present (9/26/2018)    [place]  "[place] of Occupational Health - Occupational Stress Questionnaire     Feeling of Stress : Not at all   Social Connections: Moderately Isolated (9/26/2018)    Social Connection and Isolation Panel [NHANES]     Frequency of Communication with Friends and Family: More than three times a week     Frequency of Social Gatherings with Friends and Family: Once a week     Attends Yazidi Services: More than 4 times per year     Active Member of Clubs or Organizations: No     Attends Club or Organization Meetings: Never     Marital Status:      Review of patient's allergies indicates:   Allergen Reactions    Hydrocodone Hives and Rash       Objective:       /72 (BP Location: Right arm, Patient Position: Sitting, BP Method: Large (Manual))   Pulse 86   Temp 98.8 °F (37.1 °C) (Tympanic)   Ht 5' 4" (1.626 m)   Wt 54 kg (119 lb 0.8 oz)   SpO2 97%   BMI 20.43 kg/m²   Physical Exam  Vitals and nursing note reviewed.   Constitutional:       General: She is not in acute distress.     Appearance: She is well-developed. She is not diaphoretic.   HENT:      Head: Normocephalic.      Right Ear: Hearing, tympanic membrane, ear canal and external ear normal.      Left Ear: Hearing, tympanic membrane, ear canal and external ear normal.      Nose: Mucosal edema present.      Right Sinus: No maxillary sinus tenderness or frontal sinus tenderness.      Left Sinus: No maxillary sinus tenderness or frontal sinus tenderness.      Mouth/Throat:      Pharynx: Uvula midline. Posterior oropharyngeal erythema present.   Eyes:      Conjunctiva/sclera: Conjunctivae normal.      Pupils: Pupils are equal, round, and reactive to light.   Cardiovascular:      Rate and Rhythm: Normal rate and regular rhythm.      Heart sounds: Normal heart sounds.   Pulmonary:      Effort: Pulmonary effort is normal. No respiratory distress.      Breath sounds: Normal breath sounds.   Abdominal:      General: Bowel sounds are normal.      " Palpations: Abdomen is soft.   Lymphadenopathy:      Cervical: No cervical adenopathy.   Skin:     General: Skin is warm and dry.   Psychiatric:         Mood and Affect: Mood is depressed. Affect is tearful.         Behavior: Behavior normal.       Assessment:     1. Grief reaction    2. Sore throat    3. Influenza A      Plan:   Grief reaction    Sore throat  -     POCT Influenza A/B Molecular  -     POCT COVID-19 Rapid Screening    Influenza A    Other orders  -     oseltamivir (TAMIFLU) 75 MG capsule; Take 1 capsule (75 mg total) by mouth 2 (two) times daily. for 5 days  Dispense: 10 capsule; Refill: 0  -     promethazine-dextromethorphan (PROMETHAZINE-DM) 6.25-15 mg/5 mL Syrp; Take 5 mLs by mouth every 4 (four) hours as needed.  Dispense: 200 mL; Refill: 0  -     doxepin (SINEQUAN) 10 MG capsule; Take 1 capsule (10 mg total) by mouth every evening.  Dispense: 30 capsule; Refill: 1    COVID test today negative, flu test positive for type a  Recommended increase fluids, Tylenol/Motrin in addition to above cough syrup and Tamiflu.  Excuse given today for 1 month- 1/23 - 2/23/24  Will have her come back in 1 week for follow-up and any [facility] papers that needs to be filled out for her employer  Medication List with Changes/Refills   New Medications    DOXEPIN (SINEQUAN) 10 MG CAPSULE    Take 1 capsule (10 mg total) by mouth every evening.    OSELTAMIVIR (TAMIFLU) 75 MG CAPSULE    Take 1 capsule (75 mg total) by mouth 2 (two) times daily. for 5 days    PROMETHAZINE-DEXTROMETHORPHAN (PROMETHAZINE-DM) 6.25-15 MG/5 ML SYRP    Take 5 mLs by mouth every 4 (four) hours as needed.   Current Medications    ASPIRIN (ECOTRIN) 81 MG EC TABLET    Take 1 tablet (81 mg total) by mouth once daily.    BLOOD SUGAR DIAGNOSTIC STRP    To check BG 4 times daily, to use with insurance preferred meter    BLOOD-GLUCOSE METER KIT    To check BG 4 times daily, to use with insurance preferred meter    FLUTICASONE PROPIONATE (FLONASE) 50  "MCG/ACTUATION NASAL SPRAY    1 spray (50 mcg total) by Each Nostril route once daily.    IBUPROFEN (ADVIL,MOTRIN) 600 MG TABLET    Take 1 tablet (600 mg total) by mouth every 6 (six) hours as needed for Pain.    INSULIN ASPART, NIACINAMIDE, (FIASP FLEXTOUCH U-100 INSULIN) 100 UNIT/ML (3 ML) INPN    Titrate up to 50 units daily as instructed.    LANCETS MISC    To check BG 4 times daily, to use with insurance preferred meter    PANTOPRAZOLE (PROTONIX) 40 MG TABLET    Take 1 tablet (40 mg total) by mouth once daily.    PEN NEEDLE, DIABETIC (BD ULTRA-FINE KIYA PEN NEEDLE) 32 GAUGE X 5/32" NDLE    1 each by Misc.(Non-Drug; Combo Route) route 4 (four) times daily with meals and nightly.    SITAGLIPTIN PHOSPHATE (JANUVIA) 100 MG TAB    Take 1 tablet (100 mg total) by mouth once daily.    TOUJEO SOLOSTAR U-300 INSULIN 300 UNIT/ML (1.5 ML) INPN PEN    INJECT 16 UNITS SUBCUTANEOUSLY ONCE DAILY    TRIAMCINOLONE ACETONIDE 0.1% (KENALOG) 0.1 % CREAM    Apply topically 2 (two) times daily. (Do not get in eye.) for 10 days       "

## 2024-01-24 NOTE — TELEPHONE ENCOUNTER
Left message on machine ok to drop FMLA paperwork.. I will check with Dr Bearden if an appointment is needed to come back to get paperwork filled out.

## 2024-01-26 ENCOUNTER — TELEPHONE (OUTPATIENT)
Dept: OPHTHALMOLOGY | Facility: CLINIC | Age: 54
End: 2024-01-26
Payer: COMMERCIAL

## 2024-01-30 ENCOUNTER — OFFICE VISIT (OUTPATIENT)
Dept: INTERNAL MEDICINE | Facility: CLINIC | Age: 54
End: 2024-01-30
Payer: COMMERCIAL

## 2024-01-30 VITALS
DIASTOLIC BLOOD PRESSURE: 72 MMHG | HEIGHT: 64 IN | TEMPERATURE: 98 F | OXYGEN SATURATION: 98 % | HEART RATE: 70 BPM | WEIGHT: 116.88 LBS | SYSTOLIC BLOOD PRESSURE: 128 MMHG | BODY MASS INDEX: 19.96 KG/M2

## 2024-01-30 DIAGNOSIS — H65.02 NON-RECURRENT ACUTE SEROUS OTITIS MEDIA OF LEFT EAR: ICD-10-CM

## 2024-01-30 DIAGNOSIS — F43.21 GRIEF REACTION: Primary | ICD-10-CM

## 2024-01-30 DIAGNOSIS — J10.1 INFLUENZA A: ICD-10-CM

## 2024-01-30 DIAGNOSIS — R19.7 DIARRHEA, UNSPECIFIED TYPE: ICD-10-CM

## 2024-01-30 PROBLEM — E11.9 DM (DIABETES MELLITUS): Status: RESOLVED | Noted: 2023-12-19 | Resolved: 2024-01-30

## 2024-01-30 PROCEDURE — 99213 OFFICE O/P EST LOW 20 MIN: CPT | Mod: S$GLB,,, | Performed by: FAMILY MEDICINE

## 2024-01-30 PROCEDURE — 3074F SYST BP LT 130 MM HG: CPT | Mod: CPTII,S$GLB,, | Performed by: FAMILY MEDICINE

## 2024-01-30 PROCEDURE — 3008F BODY MASS INDEX DOCD: CPT | Mod: CPTII,S$GLB,, | Performed by: FAMILY MEDICINE

## 2024-01-30 PROCEDURE — 3078F DIAST BP <80 MM HG: CPT | Mod: CPTII,S$GLB,, | Performed by: FAMILY MEDICINE

## 2024-01-30 PROCEDURE — 99999 PR PBB SHADOW E&M-EST. PATIENT-LVL III: CPT | Mod: PBBFAC,,, | Performed by: FAMILY MEDICINE

## 2024-01-30 PROCEDURE — 3072F LOW RISK FOR RETINOPATHY: CPT | Mod: CPTII,S$GLB,, | Performed by: FAMILY MEDICINE

## 2024-01-30 RX ORDER — DIPHENOXYLATE HYDROCHLORIDE AND ATROPINE SULFATE 2.5; .025 MG/1; MG/1
1 TABLET ORAL 4 TIMES DAILY PRN
Qty: 40 TABLET | Refills: 0 | Status: SHIPPED | OUTPATIENT
Start: 2024-01-30 | End: 2024-02-09

## 2024-01-30 RX ORDER — FLUTICASONE PROPIONATE 50 MCG
1 SPRAY, SUSPENSION (ML) NASAL DAILY
Qty: 16 ML | Refills: 0 | Status: SHIPPED | OUTPATIENT
Start: 2024-01-30

## 2024-02-01 NOTE — PROGRESS NOTES
Subjective:      Patient ID: Chan Dwyer is a 53 y.o. female.    Chief Complaint: Follow-up      Patient here for follow-up.  Reports flu symptoms improved but still very congested, fatigued.  Grieving, planning her son's  for later this week.  She does report recent diarrhea as well, unknown if this is from stress or the flu  She is needing FMLA paperwork filled out today    Follow-up  Associated symptoms include congestion, coughing and fatigue.     Review of Systems   Constitutional:  Positive for fatigue.   HENT:  Positive for congestion.    Respiratory:  Positive for cough.    Gastrointestinal:  Positive for diarrhea.   Psychiatric/Behavioral:  Positive for dysphoric mood.      Past Medical History:   Diagnosis Date    Abnormal Pap smear     Abnormal Pap smear of cervix     Arthritis, low back     DM (diabetes mellitus) 2014    BS didn't check 2017    GERD (gastroesophageal reflux disease)     Gestational diabetes     Hepatitis C antibody positive in blood 2022    RNA NEGATIVE    History of abnormal Pap smear     Surgical menopause     Vitamin D deficiency           Past Surgical History:   Procedure Laterality Date    COLONOSCOPY N/A 2016    Procedure: COLONOSCOPY;  Surgeon: Vishal Mary III, MD;  Location: [facility] ENDO;  Service: Endoscopy;  Laterality: N/A;    Cryotherapy of the cervix      DILATION AND CURETTAGE OF UTERUS      HYSTERECTOMY      LEFT HEART CATHETERIZATION Left 3/24/2023    Procedure: Left heart cath;  Surgeon: Lee Lambert MD;  Location: [facility] CATH LAB;  Service: Cardiology;  Laterality: Left;    [facility]  2012    TRIGGER FINGER RELEASE Left 10/10/2022    Procedure: RELEASE, TRIGGER FINGER;  Surgeon: Adarsh Chavez MD;  Location: [facility] OR;  Service: Orthopedics;  Laterality: Left;  left ring trigger finger release    TUBAL LIGATION       Family History   Problem Relation Age of Onset    Diabetes Father     Hypertension Father      Hypertension Mother     Diabetes Mother     Heart attack Mother     No Known Problems Brother     No Known Problems Brother     No Known Problems Brother     No Known Problems Brother     Stomach cancer Sister     No Known Problems Sister     No Known Problems Sister     No Known Problems Sister     No Known Problems Sister     No Known Problems Son     No Known Problems Son     Cancer Neg Hx     Stroke Neg Hx     Colon cancer Neg Hx     Ovarian cancer Neg Hx     Breast cancer Neg Hx      Social History     Socioeconomic History    Marital status: Single    Number of children: 2   Occupational History    Occupation:      Employer: [employer]     Employer: [employer]   Tobacco Use    Smoking status: Never    Smokeless tobacco: Never   Substance and Sexual Activity    Alcohol use: Yes     Comment: Rare    Drug use: No    Sexual activity: Yes     Partners: Male     Birth control/protection: Surgical   Social History Narrative    She wears seatbelt.     Social Determinants of Health     Financial Resource Strain: Low Risk  (9/26/2018)    Overall Financial Resource Strain (CARDIA)     Difficulty of Paying Living Expenses: Not hard at all   Food Insecurity: No Food Insecurity (9/26/2018)    Hunger Vital Sign     Worried About Running Out of Food in the Last Year: Never true     Ran Out of Food in the Last Year: Never true   Transportation Needs: No Transportation Needs (9/26/2018)    PRAPARE - Transportation     Lack of Transportation (Medical): No     Lack of Transportation (Non-Medical): No   Physical Activity: Insufficiently Active (10/2/2019)    Exercise Vital Sign     Days of Exercise per Week: 3 days     Minutes of Exercise per Session: 30 min   Stress: No Stress Concern Present (9/26/2018)    [employer] [place] [employer] - Occupational Stress Questionnaire     Feeling of Stress : Not at all   Social Connections: Moderately Isolated (9/26/2018)    Social Connection and  "Isolation Panel [NHANES]     Frequency of Communication with Friends and Family: More than three times a week     Frequency of Social Gatherings with Friends and Family: Once a week     Attends Hinduism Services: More than 4 times per year     Active Member of Clubs or Organizations: No     Attends Club or Organization Meetings: Never     Marital Status:      Review of patient's allergies indicates:   Allergen Reactions    Hydrocodone Hives and Rash       Objective:       /72 (BP Location: Right arm, Patient Position: Sitting, BP Method: Medium (Manual))   Pulse 70   Temp 97.5 °F (36.4 °C) (Tympanic)   Ht 5' 4" (1.626 m)   Wt 53 kg (116 lb 13.5 oz)   SpO2 98%   BMI 20.06 kg/m²   Physical Exam  Vitals reviewed.   Constitutional:       General: She is not in acute distress.     Appearance: Normal appearance. She is well-developed. She is not diaphoretic.   HENT:      Head: Normocephalic.      Right Ear: Hearing, tympanic membrane, ear canal and external ear normal.      Left Ear: Hearing, tympanic membrane, ear canal and external ear normal.      Nose: Mucosal edema present.      Right Sinus: No maxillary sinus tenderness or frontal sinus tenderness.      Left Sinus: No maxillary sinus tenderness or frontal sinus tenderness.      Mouth/Throat:      Pharynx: Uvula midline. Posterior oropharyngeal erythema present.   Eyes:      Conjunctiva/sclera: Conjunctivae normal.      Pupils: Pupils are equal, round, and reactive to light.   Cardiovascular:      Rate and Rhythm: Normal rate and regular rhythm.      Heart sounds: Normal heart sounds.   Pulmonary:      Effort: Pulmonary effort is normal. No respiratory distress.      Breath sounds: Normal breath sounds.   Abdominal:      General: Bowel sounds are normal.      Palpations: Abdomen is soft.   Lymphadenopathy:      Cervical: No cervical adenopathy.   Skin:     General: Skin is warm and dry.   Neurological:      Mental Status: She is alert. " "  Psychiatric:         Behavior: Behavior normal.       Assessment:     1. Grief reaction    2. Influenza A    3. Non-recurrent acute serous otitis media of left ear    4. Diarrhea, unspecified type      Plan:   Grief reaction    Influenza A    Non-recurrent acute serous otitis media of left ear  -     fluticasone propionate (FLONASE) 50 mcg/actuation nasal spray; 1 spray (50 mcg total) by Each Nostril route once daily.  Dispense: 16 mL; Refill: 0    Diarrhea, unspecified type    Other orders  -     diphenoxylate-atropine 2.5-0.025 mg (LOMOTIL) 2.5-0.025 mg per tablet; Take 1 tablet by mouth 4 (four) times daily as needed for Diarrhea.  Dispense: 40 tablet; Refill: 0    Continue all other current medications.   [place] filled [place] today-leave from January 23rd through February 23, 2024.    Medication List with Changes/Refills   New Medications    DIPHENOXYLATE-ATROPINE 2.5-0.025 MG (LOMOTIL) 2.5-0.025 MG PER TABLET    Take 1 tablet by mouth 4 (four) times daily as needed for Diarrhea.   Current Medications    ASPIRIN (ECOTRIN) 81 MG EC TABLET    Take 1 tablet (81 mg total) by mouth once daily.    BLOOD SUGAR DIAGNOSTIC STRP    To check BG 4 times daily, to use with insurance preferred meter    BLOOD-GLUCOSE METER KIT    To check BG 4 times daily, to use with insurance preferred meter    DOXEPIN (SINEQUAN) 10 MG CAPSULE    Take 1 capsule (10 mg total) by mouth every evening.    IBUPROFEN (ADVIL,MOTRIN) 600 MG TABLET    Take 1 tablet (600 mg total) by mouth every 6 (six) hours as needed for Pain.    INSULIN ASPART, NIACINAMIDE, (FIASP FLEXTOUCH U-100 INSULIN) 100 UNIT/ML (3 ML) INPN    Titrate up to 50 units daily as instructed.    LANCETS MISC    To check BG 4 times daily, to use with insurance preferred meter    PANTOPRAZOLE (PROTONIX) 40 MG TABLET    Take 1 tablet (40 mg total) by mouth once daily.    PEN NEEDLE, DIABETIC (BD ULTRA-FINE KIYA PEN NEEDLE) 32 GAUGE X 5/32" NDLE    1 each by Misc.(Non-Drug; Combo " Route) route 4 (four) times daily with meals and nightly.    PROMETHAZINE-DEXTROMETHORPHAN (PROMETHAZINE-DM) 6.25-15 MG/5 ML SYRP    Take 5 mLs by mouth every 4 (four) hours as needed.    SITAGLIPTIN PHOSPHATE (JANUVIA) 100 MG TAB    Take 1 tablet (100 mg total) by mouth once daily.    TOUJEO SOLOSTAR U-300 INSULIN 300 UNIT/ML (1.5 ML) INPN PEN    INJECT 16 UNITS SUBCUTANEOUSLY ONCE DAILY    TRIAMCINOLONE ACETONIDE 0.1% (KENALOG) 0.1 % CREAM    Apply topically 2 (two) times daily. (Do not get in eye.) for 10 days   Changed and/or Refilled Medications    Modified Medication Previous Medication    FLUTICASONE PROPIONATE (FLONASE) 50 MCG/ACTUATION NASAL SPRAY fluticasone propionate (FLONASE) 50 mcg/actuation nasal spray       1 spray (50 mcg total) by Each Nostril route once daily.    1 spray (50 mcg total) by Each Nostril route once daily.

## 2024-02-22 ENCOUNTER — OFFICE VISIT (OUTPATIENT)
Dept: INTERNAL MEDICINE | Facility: CLINIC | Age: 54
End: 2024-02-22
Payer: COMMERCIAL

## 2024-02-22 ENCOUNTER — TELEPHONE (OUTPATIENT)
Dept: INTERNAL MEDICINE | Facility: CLINIC | Age: 54
End: 2024-02-22
Payer: COMMERCIAL

## 2024-02-22 DIAGNOSIS — Z53.21 PATIENT LEFT WITHOUT BEING SEEN: Primary | ICD-10-CM

## 2024-02-22 PROCEDURE — 99499 UNLISTED E&M SERVICE: CPT | Mod: S$GLB,,, | Performed by: FAMILY MEDICINE

## 2024-02-22 NOTE — TELEPHONE ENCOUNTER
----- Message from Kiana Howard sent at 2/22/2024  9:23 AM CST -----  Contact: nick  Patient is returning a call from this morning. Please call her back at [phone].      Thanks  DD

## 2024-02-27 ENCOUNTER — OFFICE VISIT (OUTPATIENT)
Dept: INTERNAL MEDICINE | Facility: CLINIC | Age: 54
End: 2024-02-27
Payer: COMMERCIAL

## 2024-02-27 VITALS
TEMPERATURE: 98 F | SYSTOLIC BLOOD PRESSURE: 140 MMHG | HEART RATE: 72 BPM | OXYGEN SATURATION: 98 % | BODY MASS INDEX: 21.02 KG/M2 | WEIGHT: 118.63 LBS | DIASTOLIC BLOOD PRESSURE: 70 MMHG | HEIGHT: 63 IN | RESPIRATION RATE: 20 BRPM

## 2024-02-27 DIAGNOSIS — F43.21 GRIEF REACTION: Primary | ICD-10-CM

## 2024-02-27 PROCEDURE — 3008F BODY MASS INDEX DOCD: CPT | Mod: CPTII,S$GLB,, | Performed by: FAMILY MEDICINE

## 2024-02-27 PROCEDURE — 3072F LOW RISK FOR RETINOPATHY: CPT | Mod: CPTII,S$GLB,, | Performed by: FAMILY MEDICINE

## 2024-02-27 PROCEDURE — 99999 PR PBB SHADOW E&M-EST. PATIENT-LVL IV: CPT | Mod: PBBFAC,,, | Performed by: FAMILY MEDICINE

## 2024-02-27 PROCEDURE — 3077F SYST BP >= 140 MM HG: CPT | Mod: CPTII,S$GLB,, | Performed by: FAMILY MEDICINE

## 2024-02-27 PROCEDURE — 1159F MED LIST DOCD IN RCRD: CPT | Mod: CPTII,S$GLB,, | Performed by: FAMILY MEDICINE

## 2024-02-27 PROCEDURE — 99213 OFFICE O/P EST LOW 20 MIN: CPT | Mod: S$GLB,,, | Performed by: FAMILY MEDICINE

## 2024-02-27 PROCEDURE — 3078F DIAST BP <80 MM HG: CPT | Mod: CPTII,S$GLB,, | Performed by: FAMILY MEDICINE

## 2024-02-27 RX ORDER — TRAZODONE HYDROCHLORIDE 50 MG/1
50 TABLET ORAL NIGHTLY
Qty: 30 TABLET | Refills: 1 | Status: SHIPPED | OUTPATIENT
Start: 2024-02-27 | End: 2025-02-26

## 2024-02-27 NOTE — LETTER
February 27, 2024    Chan Dwyer  [address] [address] [address]             [facility]  Internal Medicine  [address] [address] [address]  Phone: [phone]  Fax: [phone]   February 27, 2024     Patient: Chan Dwyer   YOB: 1970   Date of Visit: 2/27/2024       To Whom it May Concern:    Chan Dwyer was seen in my clinic on 2/27/2024. She may return to work on 04/01/2024 .    Please excuse her from any classes or work missed.    If you have any questions or concerns, please don't hesitate to call.    Sincerely,         Tristian Bearden MD

## 2024-02-28 NOTE — PROGRESS NOTES
Subjective:      Patient ID: Chan Dwyer is a 53 y.o. female.    Chief Complaint: requesting more time off      Patient here for follow-up. still unable to sleep at night, mind will not shut off.  She is doing somewhat better but still having a very difficult time processing son's murder.  Investigation still ongoing, has not heard anything back from autopsy.  Would like to see a therapist but has been unable to get in to see someone yet.  She does not feel able to return to work yet, would like her leave extended      Review of Systems   Constitutional:  Positive for activity change and appetite change.   Psychiatric/Behavioral:  Positive for decreased concentration, dysphoric mood and sleep disturbance.      Past Medical History:   Diagnosis Date    Abnormal Pap smear     Abnormal Pap smear of cervix     Arthritis, low back     DM (diabetes mellitus) 09/2014    BS didn't check 01/31/2017    GERD (gastroesophageal reflux disease)     Gestational diabetes     Hepatitis C antibody positive in blood 02/11/2022    RNA NEGATIVE    History of abnormal Pap smear     Surgical menopause     Vitamin D deficiency           Past Surgical History:   Procedure Laterality Date    COLONOSCOPY N/A 6/22/2016    Procedure: COLONOSCOPY;  Surgeon: Vishal Mary III, MD;  Location: [facility] ENDO;  Service: Endoscopy;  Laterality: N/A;    Cryotherapy of the cervix  1999    DILATION AND CURETTAGE OF UTERUS      HYSTERECTOMY      LEFT HEART CATHETERIZATION Left 3/24/2023    Procedure: Left heart cath;  Surgeon: Lee Lambert MD;  Location: [facility] CATH LAB;  Service: Cardiology;  Laterality: Left;    [facility]  2/2012    TRIGGER FINGER RELEASE Left 10/10/2022    Procedure: RELEASE, TRIGGER FINGER;  Surgeon: Adarsh Chavez MD;  Location: [facility] OR;  Service: Orthopedics;  Laterality: Left;  left ring trigger finger release    TUBAL LIGATION       Family History   Problem Relation Age of Onset    Diabetes Father      Hypertension Father     Hypertension Mother     Diabetes Mother     Heart attack Mother     No Known Problems Brother     No Known Problems Brother     No Known Problems Brother     No Known Problems Brother     Stomach cancer Sister     No Known Problems Sister     No Known Problems Sister     No Known Problems Sister     No Known Problems Sister     No Known Problems Son     No Known Problems Son     Cancer Neg Hx     Stroke Neg Hx     Colon cancer Neg Hx     Ovarian cancer Neg Hx     Breast cancer Neg Hx      Social History     Socioeconomic History    Marital status: Single    Number of children: 2   Occupational History    Occupation:      Employer: [employer]     Employer: [employer]   Tobacco Use    Smoking status: Never    Smokeless tobacco: Never   Substance and Sexual Activity    Alcohol use: Yes     Comment: Rare    Drug use: No    Sexual activity: Yes     Partners: Male     Birth control/protection: Surgical   Social History Narrative    She wears seatbelt.     Social Determinants of Health     Financial Resource Strain: Low Risk  (9/26/2018)    Overall Financial Resource Strain (CARDIA)     Difficulty of Paying Living Expenses: Not hard at all   Food Insecurity: No Food Insecurity (9/26/2018)    Hunger Vital Sign     Worried About Running Out of Food in the Last Year: Never true     Ran Out of Food in the Last Year: Never true   Transportation Needs: No Transportation Needs (9/26/2018)    PRAPARE - Transportation     Lack of Transportation (Medical): No     Lack of Transportation (Non-Medical): No   Physical Activity: Insufficiently Active (10/2/2019)    Exercise Vital Sign     Days of Exercise per Week: 3 days     Minutes of Exercise per Session: 30 min   Stress: No Stress Concern Present (9/26/2018)    [place] [employer] - Occupational Stress Questionnaire     Feeling of Stress : Not at all   Social Connections: Moderately Isolated (9/26/2018)     "Social Connection and Isolation Panel [NHANES]     Frequency of Communication with Friends and Family: More than three times a week     Frequency of Social Gatherings with Friends and Family: Once a week     Attends Confucianism Services: More than 4 times per year     Active Member of Clubs or Organizations: No     Attends Club or Organization Meetings: Never     Marital Status:      Review of patient's allergies indicates:   Allergen Reactions    Hydrocodone Hives and Rash       Objective:       BP (!) 140/70 (BP Location: Right arm, Patient Position: Sitting, BP Method: Large (Manual))   Pulse 72   Temp 98.4 °F (36.9 °C) (Tympanic)   Resp 20   Ht 5' 3" (1.6 m)   Wt 53.8 kg (118 lb 9.7 oz)   SpO2 98%   BMI 21.01 kg/m²   Physical Exam  Constitutional:       General: She is not in acute distress.     Appearance: Normal appearance. She is well-developed. She is not ill-appearing or diaphoretic.   Cardiovascular:      Rate and Rhythm: Normal rate and regular rhythm.      Heart sounds: Normal heart sounds.   Pulmonary:      Effort: Pulmonary effort is normal.      Breath sounds: Normal breath sounds.   Neurological:      Mental Status: She is alert and oriented to person, place, and time.   Psychiatric:         Mood and Affect: Mood normal.         Behavior: Behavior normal.         Thought Content: Thought content normal.         Judgment: Judgment normal.       Assessment:     1. Grief reaction      Plan:   Grief reaction    Other orders  -     traZODone (DESYREL) 50 MG tablet; Take 1 tablet (50 mg total) by mouth every evening.  Dispense: 30 tablet; Refill: 1    Will try trazodone to help with sleep-doxepin did not help  FMLA paperwork filled out for January 23rd through March 1st  Medication List with Changes/Refills   New Medications    TRAZODONE (DESYREL) 50 MG TABLET    Take 1 tablet (50 mg total) by mouth every evening.   Current Medications    ASPIRIN (ECOTRIN) 81 MG EC TABLET    Take 1 tablet (81 " "mg total) by mouth once daily.    BLOOD SUGAR DIAGNOSTIC STRP    To check BG 4 times daily, to use with insurance preferred meter    BLOOD-GLUCOSE METER KIT    To check BG 4 times daily, to use with insurance preferred meter    FLUTICASONE PROPIONATE (FLONASE) 50 MCG/ACTUATION NASAL SPRAY    1 spray (50 mcg total) by Each Nostril route once daily.    IBUPROFEN (ADVIL,MOTRIN) 600 MG TABLET    Take 1 tablet (600 mg total) by mouth every 6 (six) hours as needed for Pain.    INSULIN ASPART, NIACINAMIDE, (FIASP FLEXTOUCH U-100 INSULIN) 100 UNIT/ML (3 ML) INPN    Titrate up to 50 units daily as instructed.    LANCETS MISC    To check BG 4 times daily, to use with insurance preferred meter    PANTOPRAZOLE (PROTONIX) 40 MG TABLET    Take 1 tablet (40 mg total) by mouth once daily.    PEN NEEDLE, DIABETIC (BD ULTRA-FINE KIYA PEN NEEDLE) 32 GAUGE X 5/32" NDLE    1 each by Misc.(Non-Drug; Combo Route) route 4 (four) times daily with meals and nightly.    SITAGLIPTIN PHOSPHATE (JANUVIA) 100 MG TAB    Take 1 tablet (100 mg total) by mouth once daily.    TOUJEO SOLOSTAR U-300 INSULIN 300 UNIT/ML (1.5 ML) INPN PEN    INJECT 16 UNITS SUBCUTANEOUSLY ONCE DAILY    TRIAMCINOLONE ACETONIDE 0.1% (KENALOG) 0.1 % CREAM    Apply topically 2 (two) times daily. (Do not get in eye.) for 10 days   Discontinued Medications    DOXEPIN (SINEQUAN) 10 MG CAPSULE    Take 1 capsule (10 mg total) by mouth every evening.       "

## 2024-03-07 ENCOUNTER — TELEPHONE (OUTPATIENT)
Dept: DIABETES | Facility: CLINIC | Age: 54
End: 2024-03-07
Payer: COMMERCIAL

## 2024-03-07 ENCOUNTER — TELEPHONE (OUTPATIENT)
Dept: INTERNAL MEDICINE | Facility: CLINIC | Age: 54
End: 2024-03-07
Payer: COMMERCIAL

## 2024-03-07 ENCOUNTER — TELEPHONE (OUTPATIENT)
Dept: OBSTETRICS AND GYNECOLOGY | Facility: CLINIC | Age: 54
End: 2024-03-07
Payer: COMMERCIAL

## 2024-03-07 DIAGNOSIS — Z12.31 ENCOUNTER FOR SCREENING MAMMOGRAM FOR MALIGNANT NEOPLASM OF BREAST: Primary | ICD-10-CM

## 2024-03-07 DIAGNOSIS — E10.65 TYPE 1 DIABETES MELLITUS WITH HYPERGLYCEMIA: Primary | ICD-10-CM

## 2024-03-07 NOTE — TELEPHONE ENCOUNTER
Spoke with pt, let know lipid panel was last done in November and not due again. Pt verbalized understanding.

## 2024-03-07 NOTE — TELEPHONE ENCOUNTER
----- Message from Aaron Romero MA sent at 3/7/2024 10:08 AM CST -----  Contact: Chan  Patient is requesting urinalysis. There is no active order.   ----- Message -----  From: Alycia Cheney  Sent: 3/7/2024   9:33 AM CST  To: Edmund Hartley Staff    Pt called to schedule her lab work and urinalysis. Please place the order for the urinalysis and call the pt back at [phone].    Thanks  TS

## 2024-03-07 NOTE — TELEPHONE ENCOUNTER
----- Message from Alycia Cheney sent at 3/7/2024  9:29 AM CST -----  Contact: Chan  Pt called to schedule her mammo. Please place the order and call her back at [phone].    Thanks  TS

## 2024-03-07 NOTE — TELEPHONE ENCOUNTER
----- Message from Alycia Cheney sent at 3/7/2024  9:34 AM CST -----  Contact: Chan  Pt called to schedule her lipid panel. Please place the order and call her back at [phone].    Thanks  TS

## 2024-03-07 NOTE — TELEPHONE ENCOUNTER
Patient informed of provider message. Patient scheduled to complete urine on 03/11, patient voiced understanding.  ----- Message from Alida Burnett NP sent at 3/7/2024 10:22 AM CST -----  It looks like pt is due for a micro urine to check for protein in the urine. It is done at least once a year. If she is having urinary complaints- (UTI) then she would need a urinalysis (but I would recommend she see her pcp in this case for evaluation). I did place an order for the micro urine (protein) - which is what she may be referring to.

## 2024-03-11 ENCOUNTER — OFFICE VISIT (OUTPATIENT)
Dept: OBSTETRICS AND GYNECOLOGY | Facility: CLINIC | Age: 54
End: 2024-03-11
Payer: COMMERCIAL

## 2024-03-11 ENCOUNTER — LAB VISIT (OUTPATIENT)
Dept: LAB | Facility: HOSPITAL | Age: 54
End: 2024-03-11
Attending: PHYSICIAN ASSISTANT
Payer: COMMERCIAL

## 2024-03-11 VITALS
HEIGHT: 63 IN | SYSTOLIC BLOOD PRESSURE: 128 MMHG | WEIGHT: 118.19 LBS | BODY MASS INDEX: 20.94 KG/M2 | DIASTOLIC BLOOD PRESSURE: 78 MMHG

## 2024-03-11 DIAGNOSIS — Z01.419 WELL WOMAN EXAM WITH ROUTINE GYNECOLOGICAL EXAM: Primary | ICD-10-CM

## 2024-03-11 DIAGNOSIS — E10.65 TYPE 1 DIABETES MELLITUS WITH HYPERGLYCEMIA: ICD-10-CM

## 2024-03-11 LAB
ESTIMATED AVG GLUCOSE: 212 MG/DL (ref 68–131)
HBA1C MFR BLD: 9 % (ref 4–5.6)

## 2024-03-11 PROCEDURE — 3072F LOW RISK FOR RETINOPATHY: CPT | Mod: CPTII,S$GLB,, | Performed by: OBSTETRICS & GYNECOLOGY

## 2024-03-11 PROCEDURE — 1159F MED LIST DOCD IN RCRD: CPT | Mod: CPTII,S$GLB,, | Performed by: OBSTETRICS & GYNECOLOGY

## 2024-03-11 PROCEDURE — 3008F BODY MASS INDEX DOCD: CPT | Mod: CPTII,S$GLB,, | Performed by: OBSTETRICS & GYNECOLOGY

## 2024-03-11 PROCEDURE — 99999 PR PBB SHADOW E&M-EST. PATIENT-LVL III: CPT | Mod: PBBFAC,,, | Performed by: OBSTETRICS & GYNECOLOGY

## 2024-03-11 PROCEDURE — 83036 HEMOGLOBIN GLYCOSYLATED A1C: CPT | Performed by: PHYSICIAN ASSISTANT

## 2024-03-11 PROCEDURE — 3074F SYST BP LT 130 MM HG: CPT | Mod: CPTII,S$GLB,, | Performed by: OBSTETRICS & GYNECOLOGY

## 2024-03-11 PROCEDURE — 3078F DIAST BP <80 MM HG: CPT | Mod: CPTII,S$GLB,, | Performed by: OBSTETRICS & GYNECOLOGY

## 2024-03-11 PROCEDURE — 36415 COLL VENOUS BLD VENIPUNCTURE: CPT | Performed by: PHYSICIAN ASSISTANT

## 2024-03-11 PROCEDURE — 99396 PREV VISIT EST AGE 40-64: CPT | Mod: S$GLB,,, | Performed by: OBSTETRICS & GYNECOLOGY

## 2024-03-11 NOTE — PROGRESS NOTES
Subjective:      Patient ID: Chan Dwyer is a 53 y.o. female.    Chief Complaint:  Gynecologic Exam      History of Present Illness  Gynecologic Exam      Presents for well-woman exam.  No gyn complaints. Had RATLH for benign indications; still has both ovaries.  Not on HRT.  Pt is MM with a male partner.  No pain or VB during intercourse.  Patient's son  unexpectedly this past January.    Had cryo of the cervix in , and pap smears all normal since; no longer needs pap smears  MMG: scheduled 23  Colonoscopy: 2016: wnl; repeat due in 10 years    GYN & OB History  No LMP recorded. Patient has had a hysterectomy.   Date of Last Pap: No result found    OB History    Para Term  AB Living   2 2 2     2   SAB IAB Ectopic Multiple Live Births                  # Outcome Date GA Lbr Rajesh/2nd Weight Sex Delivery Anes PTL Lv   2 Term      Vag-Spont      1 Term      Vag-Spont          Review of Systems  Review of Systems       Objective:     Physical Exam:   Constitutional: She is oriented to person, place, and time. She appears well-developed and well-nourished. No distress.      Neck: No thyromegaly present.     Pulmonary/Chest: Right breast exhibits no inverted nipple, no mass, no nipple discharge, no skin change, no tenderness, no bleeding and no swelling. Left breast exhibits no inverted nipple, no mass, no nipple discharge, no skin change, no tenderness, no bleeding and no swelling. Breasts are symmetrical.        Abdominal: Soft. She exhibits no distension and no mass. There is no abdominal tenderness. There is no rebound and no guarding. Hernia confirmed negative in the right inguinal area and confirmed negative in the left inguinal area.     Genitourinary:    Vagina normal.      Pelvic exam was performed with patient supine.   There is no rash, tenderness, lesion or injury on the right labia. There is no rash, tenderness, lesion or injury on the left labia. Right adnexum displays  no mass, no tenderness and no fullness. Left adnexum displays no mass, no tenderness and no fullness. No erythema, vaginal discharge, tenderness, bleeding, rectocele, cystocele or prolapse of vaginal walls in the vagina.    No foreign body in the vagina.      No signs of injury in the vagina.   Cervix is absent.Uterus is absent.               Neurological: She is alert and oriented to person, place, and time.     Psychiatric: She has a normal mood and affect.         Assessment:     1. Well woman exam with routine gynecological exam               Plan:     Chan was seen today for gynecologic exam.    Diagnoses and all orders for this visit:    Well woman exam with routine gynecological exam     Reviewed updated recommendations for pap smears (no pap smear needed) in patients with a hysterectomy for benign indications.   Patient needs a pelvic exam every year.  Reviewed recommendations for annual CBE and annual MMG.  RTC 1 year

## 2024-03-12 ENCOUNTER — TELEPHONE (OUTPATIENT)
Dept: INTERNAL MEDICINE | Facility: CLINIC | Age: 54
End: 2024-03-12
Payer: COMMERCIAL

## 2024-03-12 NOTE — TELEPHONE ENCOUNTER
----- Message from Sharyn Machado sent at 3/12/2024  3:45 PM CDT -----  Regarding: same day appt  Name of who is calling:   Chan      What is the request in detail: Pt is requesting a call back in ref to being seen today or tomorrow due to rash on side and across stomach /very itchy      Can the clinic reply by [facility]:no      What number to call back if not [facility]: [phone]

## 2024-03-13 ENCOUNTER — OFFICE VISIT (OUTPATIENT)
Dept: INTERNAL MEDICINE | Facility: CLINIC | Age: 54
End: 2024-03-13
Payer: COMMERCIAL

## 2024-03-13 VITALS
HEIGHT: 64 IN | BODY MASS INDEX: 20.32 KG/M2 | OXYGEN SATURATION: 98 % | SYSTOLIC BLOOD PRESSURE: 120 MMHG | WEIGHT: 119.06 LBS | HEART RATE: 65 BPM | DIASTOLIC BLOOD PRESSURE: 70 MMHG

## 2024-03-13 DIAGNOSIS — B02.9 HERPES ZOSTER WITHOUT COMPLICATION: Primary | ICD-10-CM

## 2024-03-13 PROCEDURE — 3066F NEPHROPATHY DOC TX: CPT | Mod: CPTII,S$GLB,, | Performed by: INTERNAL MEDICINE

## 2024-03-13 PROCEDURE — 99999 PR PBB SHADOW E&M-EST. PATIENT-LVL IV: CPT | Mod: PBBFAC,,, | Performed by: INTERNAL MEDICINE

## 2024-03-13 PROCEDURE — 3078F DIAST BP <80 MM HG: CPT | Mod: CPTII,S$GLB,, | Performed by: INTERNAL MEDICINE

## 2024-03-13 PROCEDURE — 3061F NEG MICROALBUMINURIA REV: CPT | Mod: CPTII,S$GLB,, | Performed by: INTERNAL MEDICINE

## 2024-03-13 PROCEDURE — 3052F HG A1C>EQUAL 8.0%<EQUAL 9.0%: CPT | Mod: CPTII,S$GLB,, | Performed by: INTERNAL MEDICINE

## 2024-03-13 PROCEDURE — 99213 OFFICE O/P EST LOW 20 MIN: CPT | Mod: S$GLB,,, | Performed by: INTERNAL MEDICINE

## 2024-03-13 PROCEDURE — 3074F SYST BP LT 130 MM HG: CPT | Mod: CPTII,S$GLB,, | Performed by: INTERNAL MEDICINE

## 2024-03-13 PROCEDURE — 3072F LOW RISK FOR RETINOPATHY: CPT | Mod: CPTII,S$GLB,, | Performed by: INTERNAL MEDICINE

## 2024-03-13 PROCEDURE — 1159F MED LIST DOCD IN RCRD: CPT | Mod: CPTII,S$GLB,, | Performed by: INTERNAL MEDICINE

## 2024-03-13 PROCEDURE — 3008F BODY MASS INDEX DOCD: CPT | Mod: CPTII,S$GLB,, | Performed by: INTERNAL MEDICINE

## 2024-03-13 RX ORDER — VALACYCLOVIR HYDROCHLORIDE 1 G/1
1000 TABLET, FILM COATED ORAL 3 TIMES DAILY
Qty: 21 TABLET | Refills: 0 | Status: SHIPPED | OUTPATIENT
Start: 2024-03-13 | End: 2025-03-13

## 2024-03-13 NOTE — PROGRESS NOTES
"HPI:  Patient is a 53-year-old female comes today with complaints of a itchy blistery rash started 3 days ago on her right lateral abdominal wall.    Current meds have been verified and updated per the EMR  Exam:/70 (BP Location: Left arm)   Pulse 65   Ht 5' 4" (1.626 m)   Wt 54 kg (119 lb 0.8 oz)   SpO2 98%   BMI 20.43 kg/m²   Has typical herpes zoster rash with vesicular erythematous lesions on the right lateral abdominal flank around T10 level.    Lab Results   Component Value Date    WBC 6.56 11/20/2023    HGB 13.4 11/20/2023    HCT 44.0 11/20/2023     11/20/2023    CHOL 168 11/20/2023    TRIG 54 11/20/2023    HDL 58 11/20/2023    ALT 10 11/20/2023    AST 13 11/20/2023     11/20/2023    K 4.3 11/20/2023     11/20/2023    CREATININE 0.8 11/20/2023    BUN 12 11/20/2023    CO2 25 11/20/2023    TSH 1.683 11/20/2023    INR 1.0 03/14/2023    GLUF 103 04/28/2022    HGBA1C 9.0 (H) 03/11/2024    BNP 14 02/11/2022    SEDRATE 9 11/28/2017    CRP 7.3 11/28/2017       Impression:  Herpes zoster  Patient Active Problem List   Diagnosis    GERD (gastroesophageal reflux disease)    Arthritis, lumbar spine    Vitamin D deficiency    [place] nodes    Intractable headache    Menopause syndrome    Bilateral carpal tunnel syndrome    Occipital neuralgia    Decreased range of motion    Self-care deficit    Hepatitis C antibody positive in blood    HIstory of Chest pain, moderate coronary artery risk    Trigger ring finger of left hand    Pain    Decreased  strength of left hand    Anginal equivalent    Impaired strength of hip muscles       Plan:  Orders Placed This Encounter    valACYclovir (VALTREX) 1000 MG tablet     She was given Valtrex to take 3 times a day for week.    This note is generated with speech recognition software and is subject to transcription error and sound alike phrases that may be missed by proofreading.      "

## 2024-04-08 ENCOUNTER — HOSPITAL ENCOUNTER (OUTPATIENT)
Dept: RADIOLOGY | Facility: HOSPITAL | Age: 54
Discharge: HOME | End: 2024-04-08
Attending: OBSTETRICS & GYNECOLOGY
Payer: COMMERCIAL

## 2024-04-08 DIAGNOSIS — Z12.31 ENCOUNTER FOR SCREENING MAMMOGRAM FOR MALIGNANT NEOPLASM OF BREAST: ICD-10-CM

## 2024-04-08 PROCEDURE — 77063 BREAST TOMOSYNTHESIS BI: CPT | Mod: 26,,, | Performed by: RADIOLOGY

## 2024-04-08 PROCEDURE — 77067 SCR MAMMO BI INCL CAD: CPT | Mod: 26,,, | Performed by: RADIOLOGY

## 2024-04-08 PROCEDURE — 77063 BREAST TOMOSYNTHESIS BI: CPT | Mod: TC

## 2024-04-09 ENCOUNTER — TELEPHONE (OUTPATIENT)
Dept: RADIOLOGY | Facility: HOSPITAL | Age: 54
End: 2024-04-09
Payer: COMMERCIAL

## 2024-04-09 DIAGNOSIS — M79.642 LEFT HAND PAIN: Primary | ICD-10-CM

## 2024-04-10 ENCOUNTER — HOSPITAL ENCOUNTER (OUTPATIENT)
Dept: RADIOLOGY | Facility: HOSPITAL | Age: 54
Discharge: HOME | End: 2024-04-10
Attending: ORTHOPAEDIC SURGERY
Payer: COMMERCIAL

## 2024-04-10 ENCOUNTER — OFFICE VISIT (OUTPATIENT)
Dept: ORTHOPEDICS | Facility: CLINIC | Age: 54
End: 2024-04-10
Payer: COMMERCIAL

## 2024-04-10 ENCOUNTER — HOSPITAL ENCOUNTER (OUTPATIENT)
Dept: RADIOLOGY | Facility: HOSPITAL | Age: 54
Discharge: HOME | End: 2024-04-10
Attending: OBSTETRICS & GYNECOLOGY
Payer: COMMERCIAL

## 2024-04-10 VITALS — HEIGHT: 64 IN | WEIGHT: 119.06 LBS | BODY MASS INDEX: 20.32 KG/M2

## 2024-04-10 DIAGNOSIS — R92.8 ABNORMAL MAMMOGRAM: ICD-10-CM

## 2024-04-10 DIAGNOSIS — M65.30 TRIGGER FINGER, ACQUIRED: Primary | ICD-10-CM

## 2024-04-10 DIAGNOSIS — M79.642 LEFT HAND PAIN: ICD-10-CM

## 2024-04-10 PROCEDURE — 3072F LOW RISK FOR RETINOPATHY: CPT | Mod: CPTII,S$GLB,, | Performed by: ORTHOPAEDIC SURGERY

## 2024-04-10 PROCEDURE — 1159F MED LIST DOCD IN RCRD: CPT | Mod: CPTII,S$GLB,, | Performed by: ORTHOPAEDIC SURGERY

## 2024-04-10 PROCEDURE — 99213 OFFICE O/P EST LOW 20 MIN: CPT | Mod: 25,S$GLB,, | Performed by: ORTHOPAEDIC SURGERY

## 2024-04-10 PROCEDURE — 3008F BODY MASS INDEX DOCD: CPT | Mod: CPTII,S$GLB,, | Performed by: ORTHOPAEDIC SURGERY

## 2024-04-10 PROCEDURE — 3052F HG A1C>EQUAL 8.0%<EQUAL 9.0%: CPT | Mod: CPTII,S$GLB,, | Performed by: ORTHOPAEDIC SURGERY

## 2024-04-10 PROCEDURE — 3061F NEG MICROALBUMINURIA REV: CPT | Mod: CPTII,S$GLB,, | Performed by: ORTHOPAEDIC SURGERY

## 2024-04-10 PROCEDURE — 77061 BREAST TOMOSYNTHESIS UNI: CPT | Mod: TC,RT

## 2024-04-10 PROCEDURE — 77065 DX MAMMO INCL CAD UNI: CPT | Mod: 26,RT,, | Performed by: RADIOLOGY

## 2024-04-10 PROCEDURE — 73130 X-RAY EXAM OF HAND: CPT | Mod: TC,LT

## 2024-04-10 PROCEDURE — 1160F RVW MEDS BY RX/DR IN RCRD: CPT | Mod: CPTII,S$GLB,, | Performed by: ORTHOPAEDIC SURGERY

## 2024-04-10 PROCEDURE — 77061 BREAST TOMOSYNTHESIS UNI: CPT | Mod: 26,RT,, | Performed by: RADIOLOGY

## 2024-04-10 PROCEDURE — 73130 X-RAY EXAM OF HAND: CPT | Mod: 26,LT,, | Performed by: RADIOLOGY

## 2024-04-10 PROCEDURE — 3066F NEPHROPATHY DOC TX: CPT | Mod: CPTII,S$GLB,, | Performed by: ORTHOPAEDIC SURGERY

## 2024-04-10 PROCEDURE — 77065 DX MAMMO INCL CAD UNI: CPT | Mod: TC,RT

## 2024-04-10 PROCEDURE — 99999 PR PBB SHADOW E&M-EST. PATIENT-LVL III: CPT | Mod: PBBFAC,,, | Performed by: ORTHOPAEDIC SURGERY

## 2024-04-10 PROCEDURE — 20550 NJX 1 TENDON SHEATH/LIGAMENT: CPT | Mod: LT,S$GLB,, | Performed by: ORTHOPAEDIC SURGERY

## 2024-04-10 RX ORDER — TRIAMCINOLONE ACETONIDE 40 MG/ML
40 INJECTION, SUSPENSION INTRA-ARTICULAR; INTRAMUSCULAR
Status: DISCONTINUED | OUTPATIENT
Start: 2024-04-10 | End: 2024-04-10 | Stop reason: HOSPADM

## 2024-04-10 RX ADMIN — TRIAMCINOLONE ACETONIDE 40 MG: 40 INJECTION, SUSPENSION INTRA-ARTICULAR; INTRAMUSCULAR at 09:04

## 2024-04-10 NOTE — PROCEDURES
Tendon Sheath    Date/Time: 4/10/2024 9:00 AM    Performed by: Adarsh Chavez MD  Authorized by: Adarsh Chavez MD    Consent Done?:  Yes (Verbal)  Indications:  Pain  Timeout: prior to procedure the correct patient, procedure, and site was verified    Prep: patient was prepped and draped in usual sterile fashion      Local anesthesia used?: Yes    Local anesthetic:  Lidocaine 2% without epinephrine  Anesthetic total (ml):  0.5    Location:  Ring finger  Site:  L ring flexor tendon sheath  Ultrasonic guidance for needle placement?: No    Needle size:  25 G  Approach:  Volar  Medications:  40 mg triamcinolone acetonide 40 mg/mL

## 2024-04-10 NOTE — PROGRESS NOTES
Subjective:     Patient ID: Chan Dwyer is a 53 y.o. female.    Chief Complaint: Pain of the Left Hand      HPI:  The patient is a 53-year-old female seen in follow-up after left ring trigger finger release 09/20/2022.  She has no longer triggering but does have a flexor tendonitis in that area.  She was last injected 07/07/2023 with good relief until recently and requests reinjection today    Past Medical History:   Diagnosis Date    Abnormal Pap smear     Abnormal Pap smear of cervix     Arthritis, low back     DM (diabetes mellitus) 09/2014    BS didn't check 01/31/2017    GERD (gastroesophageal reflux disease)     Gestational diabetes     Hepatitis C antibody positive in blood 02/11/2022    RNA NEGATIVE    History of abnormal Pap smear     Surgical menopause     Vitamin D deficiency      Past Surgical History:   Procedure Laterality Date    COLONOSCOPY N/A 6/22/2016    Procedure: COLONOSCOPY;  Surgeon: Vishal Mary III, MD;  Location: [facility] ENDO;  Service: Endoscopy;  Laterality: N/A;    Cryotherapy of the cervix  1999    DILATION AND CURETTAGE OF UTERUS      HYSTERECTOMY      LEFT HEART CATHETERIZATION Left 3/24/2023    Procedure: Left heart cath;  Surgeon: Lee Lambert MD;  Location: [facility] CATH LAB;  Service: Cardiology;  Laterality: Left;    [facility]  2/2012    TRIGGER FINGER RELEASE Left 10/10/2022    Procedure: RELEASE, TRIGGER FINGER;  Surgeon: Adarsh Chavez MD;  Location: [facility] OR;  Service: Orthopedics;  Laterality: Left;  left ring trigger finger release    TUBAL LIGATION       Family History   Problem Relation Age of Onset    Diabetes Father     Hypertension Father     Hypertension Mother     Diabetes Mother     Heart attack Mother     No Known Problems Brother     No Known Problems Brother     No Known Problems Brother     No Known Problems Brother     Stomach cancer Sister     No Known Problems Sister     No Known Problems Sister     No Known Problems Sister     No Known  Problems Sister     No Known Problems Son     No Known Problems Son     Cancer Neg Hx     Stroke Neg Hx     Colon cancer Neg Hx     Ovarian cancer Neg Hx     Breast cancer Neg Hx      Social History     Socioeconomic History    Marital status: Single    Number of children: 2   Occupational History    Occupation:      Employer: [employer]     Employer: [employer]   Tobacco Use    Smoking status: Never    Smokeless tobacco: Never   Substance and Sexual Activity    Alcohol use: Yes     Comment: Rare    Drug use: No    Sexual activity: Yes     Partners: Male     Birth control/protection: Surgical   Social History Narrative    She wears seatbelt.     Social Determinants of Health     Financial Resource Strain: Low Risk  (9/26/2018)    Overall Financial Resource Strain (CARDIA)     Difficulty of Paying Living Expenses: Not hard at all   Food Insecurity: No Food Insecurity (9/26/2018)    Hunger Vital Sign     Worried About Running Out of Food in the Last Year: Never true     Ran Out of Food in the Last Year: Never true   Transportation Needs: No Transportation Needs (9/26/2018)    PRAPARE - Transportation     Lack of Transportation (Medical): No     Lack of Transportation (Non-Medical): No   Physical Activity: Insufficiently Active (10/2/2019)    Exercise Vital Sign     Days of Exercise per Week: 3 days     Minutes of Exercise per Session: 30 min   Stress: No Stress Concern Present (9/26/2018)    [employer] [place] [employer] - Occupational Stress Questionnaire     Feeling of Stress : Not at all   Social Connections: Moderately Isolated (9/26/2018)    Social Connection and Isolation Panel [NHANES]     Frequency of Communication with Friends and Family: More than three times a week     Frequency of Social Gatherings with Friends and Family: Once a week     Attends Protestant Services: More than 4 times per year     Active Member of Clubs or Organizations: No     Attends Club or  "Organization Meetings: Never     Marital Status:      Medication List with Changes/Refills   Current Medications    ASPIRIN (ECOTRIN) 81 MG EC TABLET    Take 1 tablet (81 mg total) by mouth once daily.    BLOOD SUGAR DIAGNOSTIC STRP    To check BG 4 times daily, to use with insurance preferred meter    BLOOD-GLUCOSE METER KIT    To check BG 4 times daily, to use with insurance preferred meter    FLUTICASONE PROPIONATE (FLONASE) 50 MCG/ACTUATION NASAL SPRAY    1 spray (50 mcg total) by Each Nostril route once daily.    IBUPROFEN (ADVIL,MOTRIN) 600 MG TABLET    Take 1 tablet (600 mg total) by mouth every 6 (six) hours as needed for Pain.    INSULIN ASPART, NIACINAMIDE, (FIASP FLEXTOUCH U-100 INSULIN) 100 UNIT/ML (3 ML) INPN    Titrate up to 50 units daily as instructed.    LANCETS MISC    To check BG 4 times daily, to use with insurance preferred meter    PANTOPRAZOLE (PROTONIX) 40 MG TABLET    Take 1 tablet (40 mg total) by mouth once daily.    PEN NEEDLE, DIABETIC (BD ULTRA-FINE KIYA PEN NEEDLE) 32 GAUGE X 5/32" NDLE    1 each by Misc.(Non-Drug; Combo Route) route 4 (four) times daily with meals and nightly.    SITAGLIPTIN PHOSPHATE (JANUVIA) 100 MG TAB    Take 1 tablet (100 mg total) by mouth once daily.    TOUJEO SOLOSTAR U-300 INSULIN 300 UNIT/ML (1.5 ML) INPN PEN    INJECT 16 UNITS SUBCUTANEOUSLY ONCE DAILY    TRAZODONE (DESYREL) 50 MG TABLET    Take 1 tablet (50 mg total) by mouth every evening.    TRIAMCINOLONE ACETONIDE 0.1% (KENALOG) 0.1 % CREAM    Apply topically 2 (two) times daily. (Do not get in eye.) for 10 days    VALACYCLOVIR (VALTREX) 1000 MG TABLET    Take 1 tablet (1,000 mg total) by mouth 3 (three) times daily.     Review of patient's allergies indicates:   Allergen Reactions    Hydrocodone Hives and Rash     Review of Systems   Constitutional: Negative for malaise/fatigue.   HENT:  Negative for hearing loss.    Eyes:  Negative for double vision and visual disturbance.   Cardiovascular:  " Positive for chest pain.   Respiratory:  Negative for shortness of breath.    Endocrine: Negative for cold intolerance.   Hematologic/Lymphatic: Does not bruise/bleed easily.   Skin:  Negative for poor wound healing and suspicious lesions.   Musculoskeletal:  Positive for arthritis, joint pain and joint swelling. Negative for gout.   Gastrointestinal:  Positive for heartburn. Negative for nausea and vomiting.   Genitourinary:  Negative for dysuria.   Neurological:  Positive for focal weakness and headaches. Negative for numbness, paresthesias and sensory change.   Psychiatric/Behavioral:  Negative for depression, memory loss and substance abuse. The patient is not nervous/anxious.    Allergic/Immunologic: Negative for persistent infections.       Objective:   Body mass index is 20.43 kg/m².  There were no vitals filed for this visit.             General    Constitutional: She is oriented to person, place, and time. She appears well-developed and well-nourished. No distress.   HENT:   Head: Normocephalic.   Eyes: EOM are normal.   Pulmonary/Chest: Effort normal.   Neurological: She is oriented to person, place, and time.   Psychiatric: She has a normal mood and affect.         Left Hand/Wrist Exam     Inspection   Scars: Wrist - absent Hand -  present  Effusion: Wrist - absent Hand -  absent    Pain   Hand - The patient exhibits pain of the ring MCP.    Other     Sensory Exam  Median Distribution: normal  Ulnar Distribution: normal  Radial Distribution: normal    Comments:  The patient has a well-healed left trigger finger scar.  There is no triggering but she is tender in this area.          Vascular Exam       Capillary Refill  Left Hand: normal capillary refill          Relevant imaging results reviewed and interpreted by me, discussed with the patient and / or family today radiographs left hand showed osteoarthritic change in multiple small joints  Assessment:      Flexor tendonitis left ring finger    Plan:      The patient injected left ring finger flexor tendon with 0.5 cc of Kenalog and 0.5 cc of 2% plain lidocaine under sterile technique.  She will wait at least 3 months between injections.                Disclaimer: This note was prepared using a voice recognition system and is likely to have sound alike errors within the text.

## 2024-04-10 NOTE — PROGRESS NOTES
"Subjective:       Patient ID: Chan Dwyer is a 46 y.o. female.    Chief Complaint: Diabetes and Follow-up    HPI Comments: Ms. Dwyer comes in today for diabetes follow-up.  She is fasting and has only taken vitamin D today.  She states she walks a lot and monitors her diet.  She states she performs home glucose checks every day fasting and after lunch with levels ranging 170-200's.  She denies fever, chills, fatigue, appetite change; shortness of breath, cough, wheezing; chest pain, palpitations, leg swelling; abdominal pain, nausea, vomiting, diarrhea, constipation; unusual urinary symptoms; polydipsia, polyphagia, polyuria; back pain; headaches.    However, she reports having hot flashes.  She states Estrace 1 mg daily prescribed by Dr. Corey on February 21, 2017 caused her to have insomnia without feeling "right" after taking only 1 dose.      Current Outpatient Prescriptions:  blood sugar diagnostic Strp, 1 strip by Misc.(Non-Drug; Combo Route) route 2 (two) times daily.  blood-glucose meter (FREESTYLE SYSTEM KIT) kit, Use as instructed  lancets Misc, 1 lancet by Misc.(Non-Drug; Combo Route) route 3 (three) times daily.  metformin (GLUCOPHAGE) 500 MG tablet, TAKE ONE TABLET BY MOUTH ONCE DAILY WITH BREAKFAST  vitamin D 1000 units Tab, Take 1,000 Units by mouth once daily.  estradiol (ESTRACE) 1 MG tablet, Take 1 tablet (1 mg total) by mouth once daily (not taking).  ibuprofen (ADVIL,MOTRIN) 800 MG tablet, Take 1 tablet (800 mg total) by mouth every meal as needed for Pain (not taking).    Labs:               WBC                      6.82                09/13/2016                 HGB                      12.5                09/13/2016                 HCT                      40.0                09/13/2016                 PLT                      242                 09/13/2016                 CHOL                     154                 09/13/2016                 TRIG                     60      " Pt is going to wait until 5/9 appt.               09/13/2016                 HDL                      52                  09/13/2016                 ALT                      11                  09/13/2016                 AST                      16                  09/13/2016                 NA                       142                 09/13/2016                 K                        4.2                 09/13/2016                 CL                       107                 09/13/2016                 CREATININE               0.7                 09/13/2016                 BUN                      12                  09/13/2016                 CO2                      27                  09/13/2016                 TSH                      1.512               09/13/2016                 GLUF                     118 (H)             06/07/2010                 HGBA1C                   5.8                 09/13/2016                 Prot/Creat Ratio, Ur 0.11       09/13/2016       Vit D, 25-Hydroxy  39         09/13/2016      Hypertension   Pertinent negatives include no chest pain, headaches, palpitations or shortness of breath.   Diabetes   Pertinent negatives for hypoglycemia include no headaches or nervousness/anxiousness. Pertinent negatives for diabetes include no chest pain, no fatigue, no polydipsia, no polyphagia and no polyuria.     Review of Systems   Constitutional: Negative for activity change, appetite change, chills, fatigue and fever.        Weight 58.8 kg (129 lb 10.1 oz) at September 13, 2016 visit.   Eyes:        See history of present illness.    Respiratory: Negative for cough, shortness of breath and wheezing.    Cardiovascular: Negative for chest pain, palpitations and leg swelling.   Gastrointestinal: Negative for abdominal pain, constipation, diarrhea, nausea and vomiting.        See history of present illness.   Endocrine: Positive for heat intolerance. Negative for cold intolerance, polydipsia, polyphagia and polyuria.        See  history of present illness.    Genitourinary: Negative for difficulty urinating.        See history of present illness.    Musculoskeletal: Negative for back pain.   Neurological: Negative for headaches.   Psychiatric/Behavioral: Negative for dysphoric mood and suicidal ideas. The patient is not nervous/anxious.         Negative for homicidal ideas.        Objective:      Physical Exam   Constitutional: She is oriented to person, place, and time. She appears well-developed and well-nourished. No distress.   Pleasant.   Eyes: Conjunctivae and EOM are normal. Pupils are equal, round, and reactive to light. Right eye exhibits no discharge. Left eye exhibits no discharge.   Neck: Normal range of motion. Neck supple. No thyromegaly present.   Cardiovascular: Normal rate, regular rhythm and intact distal pulses.    No murmur heard.  Pulses:       Dorsalis pedis pulses are 3+ on the right side, and 3+ on the left side.        Posterior tibial pulses are 3+ on the right side, and 3+ on the left side.   Pulmonary/Chest: Effort normal and breath sounds normal. No respiratory distress. She has no wheezes.   Abdominal: Soft. Bowel sounds are normal. She exhibits no distension and no mass. There is no tenderness. There is no rebound and no guarding.   No CVA tenderness noted.   Musculoskeletal: Normal range of motion. She exhibits no edema or tenderness.   She is ambulatory without problems.  Feet look okay without ulcerations or skin breaks noted.   Feet:   Right Foot:   Protective Sensation: 7 sites tested. 7 sites sensed.   Skin Integrity: Negative for ulcer or skin breakdown.   Left Foot:   Protective Sensation: 7 sites tested. 7 sites sensed.   Skin Integrity: Negative for ulcer or skin breakdown.   Lymphadenopathy:     She has no cervical adenopathy.   Neurological: She is alert and oriented to person, place, and time.   Skin: She is not diaphoretic.   Psychiatric: She has a normal mood and affect. Her behavior is normal.  Judgment and thought content normal.   Vitals reviewed.      Assessment:       1. Type 2 diabetes mellitus without complication, without long-term current use of insulin    2. Vitamin D deficiency    3. Menopause        Plan:       1.  Labs:  CMP, A1c.  Patient advised to correspond via My Chart for results.  2.  Continue current medications, follow low sodium, low cholesterol, low carb diet, daily walks.  3.  Reviewed indications and potential side effects of Estrace therapy. However, advised patient can try OTC Estroven or Black cohosh for menopausal hot flashes.   4.  Flu shot not available today to give.  5.  Prescription refill - Ibuprofen 800 mg daily with meal prn, #90, 1 refill.  6.  See me in September 2017 for annual wellness examination.  7.  Work excuse for today with return today.

## 2024-04-19 ENCOUNTER — TELEPHONE (OUTPATIENT)
Dept: INTERNAL MEDICINE | Facility: CLINIC | Age: 54
End: 2024-04-19
Payer: COMMERCIAL

## 2024-04-19 NOTE — TELEPHONE ENCOUNTER
----- Message from Ayaka Paz sent at 4/19/2024  1:19 PM CDT -----  Regarding: Patient advice  Contact: Chan  .Type:  Needs Medical Advice    Who Called: Chan   Symptoms (please be specific):    How long has patient had these symptoms:    Pharmacy name and phone #:    Would the patient rather a call back or a response via My [facility]? call  Best Call Back Number:  [phone]  Additional Information:  Chan is requesting a callback from the nurse today in regard to her shot record status.

## 2024-04-19 NOTE — TELEPHONE ENCOUNTER
Patient stated she was informed at the pharmacy she is now due for the zoster , Hep B , and pneumonia vaccine.  She just wanted to verify with you.

## 2024-04-19 NOTE — TELEPHONE ENCOUNTER
Tell her yes she is due for pneumonia and zoster.   Can get hep b if she wants to but I don't think she is high risk for that.

## 2024-05-01 DIAGNOSIS — E10.65 TYPE 1 DIABETES MELLITUS WITH HYPERGLYCEMIA: ICD-10-CM

## 2024-05-01 RX ORDER — INSULIN GLARGINE 300 U/ML
INJECTION, SOLUTION SUBCUTANEOUS
Qty: 2 ML | Refills: 11 | Status: SHIPPED | OUTPATIENT
Start: 2024-05-01

## 2024-05-10 ENCOUNTER — OFFICE VISIT (OUTPATIENT)
Dept: PODIATRY | Facility: CLINIC | Age: 54
End: 2024-05-10
Payer: COMMERCIAL

## 2024-05-10 ENCOUNTER — TELEPHONE (OUTPATIENT)
Dept: DIABETES | Facility: CLINIC | Age: 54
End: 2024-05-10

## 2024-05-10 ENCOUNTER — OFFICE VISIT (OUTPATIENT)
Dept: DIABETES | Facility: CLINIC | Age: 54
End: 2024-05-10
Payer: COMMERCIAL

## 2024-05-10 ENCOUNTER — LAB VISIT (OUTPATIENT)
Dept: LAB | Facility: HOSPITAL | Age: 54
End: 2024-05-10
Attending: PODIATRIST
Payer: COMMERCIAL

## 2024-05-10 VITALS
DIASTOLIC BLOOD PRESSURE: 66 MMHG | BODY MASS INDEX: 20.4 KG/M2 | WEIGHT: 118.81 LBS | HEART RATE: 56 BPM | SYSTOLIC BLOOD PRESSURE: 136 MMHG

## 2024-05-10 DIAGNOSIS — M79.674 GREAT TOE PAIN, RIGHT: ICD-10-CM

## 2024-05-10 DIAGNOSIS — E10.65 TYPE 1 DIABETES MELLITUS WITH HYPERGLYCEMIA: Primary | ICD-10-CM

## 2024-05-10 DIAGNOSIS — E10.65 TYPE 1 DIABETES MELLITUS WITH HYPERGLYCEMIA: ICD-10-CM

## 2024-05-10 DIAGNOSIS — R79.89 LOW SERUM C-PEPTIDE: ICD-10-CM

## 2024-05-10 DIAGNOSIS — M79.674 GREAT TOE PAIN, RIGHT: Primary | ICD-10-CM

## 2024-05-10 LAB
ANION GAP SERPL CALC-SCNC: 11 MMOL/L (ref 8–16)
BUN SERPL-MCNC: 10 MG/DL (ref 6–20)
CALCIUM SERPL-MCNC: 9.9 MG/DL (ref 8.7–10.5)
CHLORIDE SERPL-SCNC: 102 MMOL/L (ref 95–110)
CO2 SERPL-SCNC: 27 MMOL/L (ref 23–29)
CREAT SERPL-MCNC: 0.8 MG/DL (ref 0.5–1.4)
CRP SERPL-MCNC: 6.8 MG/L (ref 0–8.2)
ERYTHROCYTE [SEDIMENTATION RATE] IN BLOOD BY PHOTOMETRIC METHOD: 9 MM/HR (ref 0–36)
EST. GFR  (NO RACE VARIABLE): >60 ML/MIN/1.73 M^2
GLUCOSE SERPL-MCNC: 223 MG/DL (ref 70–110)
GLUCOSE SERPL-MCNC: 233 MG/DL (ref 70–110)
POTASSIUM SERPL-SCNC: 3.6 MMOL/L (ref 3.5–5.1)
SODIUM SERPL-SCNC: 140 MMOL/L (ref 136–145)
URATE SERPL-MCNC: 3 MG/DL (ref 2.4–5.7)

## 2024-05-10 PROCEDURE — 99999 PR PBB SHADOW E&M-EST. PATIENT-LVL III: CPT | Mod: PBBFAC,,, | Performed by: PODIATRIST

## 2024-05-10 PROCEDURE — 3061F NEG MICROALBUMINURIA REV: CPT | Mod: CPTII,S$GLB,, | Performed by: PHYSICIAN ASSISTANT

## 2024-05-10 PROCEDURE — 84550 ASSAY OF BLOOD/URIC ACID: CPT | Performed by: PODIATRIST

## 2024-05-10 PROCEDURE — 1160F RVW MEDS BY RX/DR IN RCRD: CPT | Mod: CPTII,S$GLB,, | Performed by: PODIATRIST

## 2024-05-10 PROCEDURE — 99214 OFFICE O/P EST MOD 30 MIN: CPT | Mod: S$GLB,,, | Performed by: PHYSICIAN ASSISTANT

## 2024-05-10 PROCEDURE — 3066F NEPHROPATHY DOC TX: CPT | Mod: CPTII,S$GLB,, | Performed by: PHYSICIAN ASSISTANT

## 2024-05-10 PROCEDURE — 3075F SYST BP GE 130 - 139MM HG: CPT | Mod: CPTII,S$GLB,, | Performed by: PHYSICIAN ASSISTANT

## 2024-05-10 PROCEDURE — 99204 OFFICE O/P NEW MOD 45 MIN: CPT | Mod: S$GLB,,, | Performed by: PODIATRIST

## 2024-05-10 PROCEDURE — 3072F LOW RISK FOR RETINOPATHY: CPT | Mod: CPTII,S$GLB,, | Performed by: PHYSICIAN ASSISTANT

## 2024-05-10 PROCEDURE — 80048 BASIC METABOLIC PNL TOTAL CA: CPT | Performed by: PODIATRIST

## 2024-05-10 PROCEDURE — 36415 COLL VENOUS BLD VENIPUNCTURE: CPT | Performed by: PODIATRIST

## 2024-05-10 PROCEDURE — 3078F DIAST BP <80 MM HG: CPT | Mod: CPTII,S$GLB,, | Performed by: PHYSICIAN ASSISTANT

## 2024-05-10 PROCEDURE — 99999 PR PBB SHADOW E&M-EST. PATIENT-LVL V: CPT | Mod: PBBFAC,,, | Performed by: PHYSICIAN ASSISTANT

## 2024-05-10 PROCEDURE — 3072F LOW RISK FOR RETINOPATHY: CPT | Mod: CPTII,S$GLB,, | Performed by: PODIATRIST

## 2024-05-10 PROCEDURE — 82962 GLUCOSE BLOOD TEST: CPT | Mod: S$GLB,,, | Performed by: PHYSICIAN ASSISTANT

## 2024-05-10 PROCEDURE — 3061F NEG MICROALBUMINURIA REV: CPT | Mod: CPTII,S$GLB,, | Performed by: PODIATRIST

## 2024-05-10 PROCEDURE — 86140 C-REACTIVE PROTEIN: CPT | Performed by: PODIATRIST

## 2024-05-10 PROCEDURE — 3008F BODY MASS INDEX DOCD: CPT | Mod: CPTII,S$GLB,, | Performed by: PHYSICIAN ASSISTANT

## 2024-05-10 PROCEDURE — 1159F MED LIST DOCD IN RCRD: CPT | Mod: CPTII,S$GLB,, | Performed by: PODIATRIST

## 2024-05-10 PROCEDURE — 3052F HG A1C>EQUAL 8.0%<EQUAL 9.0%: CPT | Mod: CPTII,S$GLB,, | Performed by: PODIATRIST

## 2024-05-10 PROCEDURE — 3066F NEPHROPATHY DOC TX: CPT | Mod: CPTII,S$GLB,, | Performed by: PODIATRIST

## 2024-05-10 PROCEDURE — 85652 RBC SED RATE AUTOMATED: CPT | Performed by: PODIATRIST

## 2024-05-10 PROCEDURE — 95251 CONT GLUC MNTR ANALYSIS I&R: CPT | Mod: S$GLB,,, | Performed by: PHYSICIAN ASSISTANT

## 2024-05-10 PROCEDURE — 3052F HG A1C>EQUAL 8.0%<EQUAL 9.0%: CPT | Mod: CPTII,S$GLB,, | Performed by: PHYSICIAN ASSISTANT

## 2024-05-10 RX ORDER — IBUPROFEN 600 MG/1
600 TABLET ORAL EVERY 6 HOURS PRN
Qty: 30 TABLET | Refills: 0 | Status: SHIPPED | OUTPATIENT
Start: 2024-05-10

## 2024-05-10 NOTE — PATIENT INSTRUCTIONS
CURRENT DM MEDICATIONS:   Toujeo 13 units daily  Januvia 100 mg daily  Lyumjev meal size dosing and correction dosing every 3 hours as needed - as below    Lyumjev Meal Size:   20 units before meals   3 units before snacks     Lyumjev Correction Dosing every 3 hours as needed OUTSIDE OF EATING  If  - 250, may take 2 units of Lyumjev  If  - 300, may take 3 units of Lyumjev  If  - 350, may take 4 units of Lyumjev  If  - 400, may take 5 units of Lyumjev  If +, may take 6 units of Lyumjev

## 2024-05-10 NOTE — PROGRESS NOTES
PCP: Tristian Bearden MD    Subjective:     Chief Complaint: Diabetes - Established Patient    HISTORY OF PRESENT ILLNESS: 53 y.o.   female presenting for diabetes management visit.   The patient's last visit with me was on 11/1/2023.  Patient has had Type I diabetes due to low C peptide since 5 years or more.  Pertinent to decision making is the following comorbidities: Vitamin D Deficiency and Gestational DM in Pregnancy 2003  Patient has the following Diabetes complications: without complications  She  has attended diabetes education in the past.     Patient's most recent A1c of 9.0% was completed 2 months ago.   Patient states since Her last A1c Her blood glucose levels have been high  after meals.   Patient monitors blood glucose 4 times per day and Continuously with personal CGM Dexcom.   Patient blood glucose monitoring device will be uploaded into [employer] [facility] today.        Patient report shows some dropping of BG- may be related to basal or taking rapid after eating. Mostly postprandial hyperglycemia leading to hyperglycemia throughout the day.   Patient endorses the following diabetes related symptoms: None.   Patient is due today for the following diabetes-related health maintenance standards: Foot Exam  and COVID-19 Vaccine .   She denies recent hospital admissions or emergency room visits.   She denies having hypoglycemia as above.   Patient's concerns today include glycemic control.   Patient medication regimen is as below.     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       Lab Results   Component Value Date    CPEPTIDE 1.67 11/20/2023     Lab Results   Component Value Date    GLUTAMICACID 0.00 09/28/2020          //  Weight: 53.9 kg (118 lb 13.3 oz), Body mass index is 20.4 kg/m².  Her blood sugar in clinic today is:    Lab Results   Component Value Date    POCGLU 233 (A) 05/10/2024       Review of Systems   Constitutional:  Negative for activity change, appetite change, chills and fever.   HENT:  Negative for dental problem, mouth sores, nosebleeds, sore throat and trouble swallowing.    Eyes:  Negative for pain and discharge.   Respiratory:  Negative for shortness of breath, wheezing and stridor.    Cardiovascular:  Negative for chest pain, palpitations and leg swelling.   Gastrointestinal:  Negative for abdominal pain, diarrhea, nausea and vomiting.   Endocrine: Negative for polydipsia, polyphagia and polyuria.   Genitourinary:  Negative for dysuria, frequency and urgency.   Musculoskeletal:  Negative for joint swelling and myalgias.   Skin:  Negative for rash and wound.   Neurological:  Negative for dizziness, syncope, weakness and headaches.   Psychiatric/Behavioral:  Negative for behavioral problems and dysphoric mood.          Diabetes Management Status  Statin: Not taking  ACE/ARB: Not taking    Screening or Prevention Patient's value Goal Complete/Controlled?   HgA1C Testing and Control   Lab Results   Component Value Date    HGBA1C 9.0 (H) 03/11/2024      Annually/Less than 8% No   Lipid profile : 11/20/2023 Annually Yes   LDL control  Lab Results   Component Value Date    LDLCALC 99.2 11/20/2023    Annually/Less than 100 mg/dl  Yes    Nephropathy screening Lab Results   Component Value Date    MICALBCREAT 13.0 03/11/2024     Lab Results   Component Value Date    PROTEINUA Negative 01/26/2021    Annually No   Blood pressure BP Readings from Last 1 Encounters:   05/10/24 136/66    Less than 140/90 Yes   Dilated retinal exam : 10/16/2023 Annually Yes    Foot exam   : 11/30/2021 Annually No     Social History     Socioeconomic History    Marital status: Single    Number of children: 2   Occupational History    Occupation:      Employer: [employer]     Employer: [employer]   Tobacco Use    Smoking status: Never    Smokeless tobacco: Never   Substance and Sexual Activity    Alcohol use: Yes     Comment: Rare    Drug use: No    Sexual activity: Yes     Partners: Male     Birth control/protection: Surgical   Social History Narrative    She wears seatbelt.     Social Determinants of Health     Financial Resource Strain: Medium Risk (5/6/2024)    Received from [facility]    Overall Financial Resource Strain (CARDIA)     Difficulty of Paying Living Expenses: Somewhat hard   Food Insecurity: No Food Insecurity (9/26/2018)    Hunger Vital Sign     Worried About Running Out of Food in the Last Year: Never true     Ran Out of Food in the Last Year: Never true   Transportation Needs: No Transportation Needs (5/6/2024)    Received from [facility]    PRAPARE - Transportation     Lack of Transportation (Medical): No     Lack of Transportation (Non-Medical): No   Physical Activity: Insufficiently Active (5/6/2024)    Received from [facility]    Exercise Vital Sign     Days of Exercise per Week: 4 days     Minutes of Exercise per Session: 30 min   Stress: No Stress Concern Present (9/26/2018)    [place] [employer] - Occupational Stress Questionnaire     Feeling of Stress : Not at all     Past Medical History:   Diagnosis Date    Abnormal  Pap smear     Abnormal Pap smear of cervix     Arthritis, low back     DM (diabetes mellitus) 09/2014    BS didn't check 01/31/2017    GERD (gastroesophageal reflux disease)     Gestational diabetes     Hepatitis C antibody positive in blood 02/11/2022    RNA NEGATIVE    History of abnormal Pap smear     Surgical menopause     Vitamin D deficiency        Objective:        Physical Exam  Constitutional:       General: She is not in acute distress.     Appearance: She is well-developed. She is not diaphoretic.   HENT:      Head: Normocephalic and atraumatic.      Right Ear: External ear normal.      Left Ear: External ear normal.      Nose: Nose normal.   Eyes:      General:         Right eye: No discharge.         Left eye: No discharge.      Pupils: Pupils are equal, round, and reactive to light.   Cardiovascular:      Rate and Rhythm: Normal rate and regular rhythm.      Pulses:           Dorsalis pedis pulses are 2+ on the right side and 2+ on the left side.        Posterior tibial pulses are 2+ on the right side and 2+ on the left side.      Heart sounds: Normal heart sounds.   Pulmonary:      Effort: Pulmonary effort is normal. No respiratory distress.      Breath sounds: Normal breath sounds. No stridor.   Abdominal:      Palpations: Abdomen is soft.   Musculoskeletal:         General: Normal range of motion.      Cervical back: Normal range of motion and neck supple.      Right foot: Normal range of motion. No deformity.      Left foot: Normal range of motion. No deformity.        Feet:    Feet:      Right foot:      Protective Sensation: 6 sites tested.  6 sites sensed.      Skin integrity: No ulcer, blister, skin breakdown, erythema, warmth, callus or dry skin.      Left foot:      Protective Sensation: 6 sites tested.  6 sites sensed.      Skin integrity: No ulcer, blister, skin breakdown, erythema, warmth, callus or dry skin.   Skin:     General: Skin is warm and dry.      Capillary Refill: Capillary  refill takes less than 2 seconds.      Coloration: Skin is not pale.   Neurological:      Mental Status: She is alert and oriented to person, place, and time. Mental status is at baseline.      Motor: No abnormal muscle tone.      Coordination: Coordination normal.   Psychiatric:         Mood and Affect: Mood normal.         Behavior: Behavior normal.         Thought Content: Thought content normal.         Judgment: Judgment normal.             Assessment / Plan:     Type 1 diabetes mellitus with hyperglycemia  -     POCT Glucose, Hand-Held Device  -     Hemoglobin A1C; Standing  -     Ambulatory referral/consult to Diabetes Education; Future; Expected date: 05/17/2024    Low serum C-peptide    Great toe pain, right  -     Ambulatory referral/consult to Podiatry; Future; Expected date: 05/17/2024          Additional Plan Details:    - POCT Glucose  - Encouraged continuation of lifestyle changes including regular exercise and limiting carbohydrates to 30-45 grams per meal threes times daily and 15 grams per snack with a limit of two daily.   - Encouraged continued monitoring of blood glucose with maintenance of 4 times daily and Continuously with personal CGM Dexcom.    - Current DM Medication Regimen: Change Toujeo 13 units daily. Continue Januvia 100 mg daily. Change Lyumjev 16 units before meals, 3 units before snack, and correction dosing every 3 hours as needed - as below.   - Referral to CDE - follow up next week ; pt requested  - Referral to podiatry - Great Toe pain; no sign of infection  - Health Maintenance standards addressed today: Foot Exam - completed today and documented in physical exam with feedback to patient about proper diabetic foot care and findings and COVID - 19 Vaccine - patient will schedule outside of [facility]   - Nursing Visit: Patient is below goal range for nursing visit for age group and will not need nursing visit at this time .   - Follow up in 4 weeks.    CURRENT DM MEDICATIONS:    Toujeo 13 units daily  Januvia 100 mg daily  Lyumjev meal size dosing and correction dosing every 3 hours as needed - as below    Lyumjev Meal Size:   20 units before meals   3 units before snacks     Lyumjev Correction Dosing every 3 hours as needed OUTSIDE OF EATING  If  - 250, may take 2 units of Lyumjev  If  - 300, may take 3 units of Lyumjev  If  - 350, may take 4 units of Lyumjev  If  - 400, may take 5 units of Lyumjev  If +, may take 6 units of Lyumjev    Blakeney McKnight, PA-C [facility]          A total of 30 minutes was spent in face to face time, of which over 50 % was spent in counseling patient on disease process, complications, treatment, and side effects of medications.

## 2024-05-10 NOTE — PROGRESS NOTES
Subjective:       Patient ID: Chan Dwyer is a 53 y.o. female.    Chief Complaint: Gout (Paint complains 7/10 pain to right MTP joint, Pt is a diabetic. )      HPI: Chan Dwyer presents to the office t today with the chief complaint of  moderate to severe  pains to the right foot at the 1st metatarsophalangeal joint. Pains are rated at approx. 7/10. Pains are described as sharp and intense in nature. Patient states these symptoms started approx. 7-10 days ago.  Denies any recent changes to her diet.  Denies increase in red meat consumption or alcohol.  Prolonged walking and standing as well as direct palpation and pressure worsens the pains. Patient denies trauma. Reports extreme warmth and redness about the area. States moderate swelling as well. States no recent use of NSAID medications at this juncture. Patient's Primary Care Provider is Tristian Bearden MD.     Review of patient's allergies indicates:   Allergen Reactions    Hydrocodone Hives and Rash       Past Medical History:   Diagnosis Date    Abnormal Pap smear     Abnormal Pap smear of cervix     Arthritis, low back     DM (diabetes mellitus) 09/2014    BS didn't check 01/31/2017    GERD (gastroesophageal reflux disease)     Gestational diabetes     Hepatitis C antibody positive in blood 02/11/2022    RNA NEGATIVE    History of abnormal Pap smear     Surgical menopause     Vitamin D deficiency        Family History   Problem Relation Name Age of Onset    Diabetes Father      Hypertension Father      Hypertension Mother      Diabetes Mother      Heart attack Mother      No Known Problems Brother      No Known Problems Brother      No Known Problems Brother      No Known Problems Brother      Stomach cancer Sister      No Known Problems Sister      No Known Problems Sister      No Known Problems Sister      No Known Problems Sister      No Known Problems Son      No Known Problems Son      Cancer Neg Hx      Stroke Neg Hx       Colon cancer Neg Hx      Ovarian cancer Neg Hx      Breast cancer Neg Hx         Social History     Socioeconomic History    Marital status: Single    Number of children: 2   Occupational History    Occupation:      Employer: [employer]     Employer: [employer]   Tobacco Use    Smoking status: Never    Smokeless tobacco: Never   Substance and Sexual Activity    Alcohol use: Yes     Comment: Rare    Drug use: No    Sexual activity: Yes     Partners: Male     Birth control/protection: Surgical   Social History Narrative    She wears seatbelt.     Social Determinants of Health     Financial Resource Strain: Medium Risk (5/6/2024)    Received from [facility]    Overall Financial Resource Strain (CARDIA)     Difficulty of Paying Living Expenses: Somewhat hard   Food Insecurity: No Food Insecurity (9/26/2018)    Hunger Vital Sign     Worried About Running Out of Food in the Last Year: Never true     Ran Out of Food in the Last Year: Never true   Transportation Needs: No Transportation Needs (5/6/2024)    Received from [facility]    PRAPARE - Transportation     Lack of Transportation (Medical): No     Lack of Transportation (Non-Medical): No   Physical Activity: Insufficiently Active (5/6/2024)    Received from [facility]    Exercise Vital Sign     Days of Exercise per Week: 4 days     Minutes of Exercise per Session: 30 min   Stress: No Stress Concern Present (9/26/2018)    [employer] [place] [employer] - Occupational Stress Questionnaire     Feeling of Stress : Not at all       Past Surgical History:   Procedure Laterality Date    COLONOSCOPY N/A 6/22/2016    Procedure: COLONOSCOPY;  Surgeon: Vishal Mary III, MD;  Location: [facility];  Service: Endoscopy;  Laterality: N/A;    Cryotherapy of the cervix  1999    DILATION AND CURETTAGE OF UTERUS      HYSTERECTOMY      LEFT HEART CATHETERIZATION Left  3/24/2023    Procedure: Left heart cath;  Surgeon: Lee Lambert MD;  Location: [facility];  Service: Cardiology;  Laterality: Left;    [facility]  2/2012    TRIGGER FINGER RELEASE Left 10/10/2022    Procedure: RELEASE, TRIGGER FINGER;  Surgeon: Adarsh Chavez MD;  Location: [facility] OR;  Service: Orthopedics;  Laterality: Left;  left ring trigger finger release    TUBAL LIGATION         Review of Systems      Objective:   There were no vitals taken for this visit.    Physical Exam    LOWER EXTREMITY PHYSICAL EXAMINATION  ORTHOPEDIC:  Pain on palpation of the right 1st metatarsophalangeal joint.  There is guarding noted with range of motion.  Range of motion is decreased with dorsiflexion.  Mild swelling to the great toe joint.    NEUROLOGY: Sensation to light touch is intact. Proprioception is intact.  Vibratory sensation is intact    VASCULAR:  The right dorsalis pedis pulse 2/4 and the right posterior tibial pulse 2/4.  The left dorsalis pedis pulse 2/4 and posterior tibial pulse on the left is 2/4.  Capillary refill is intact.  Pedal hair growth intact    DERMATOLOGY: Minimal erythema is noted to the right great toe. No calor is noted. No cellulitis is noted.      Assessment:     1. Great toe pain, right    2. Type 1 diabetes mellitus with hyperglycemia      Plan:     Great toe pain, right  -     Ambulatory referral/consult to Podiatry  -     Basic Metabolic Panel; Future; Expected date: 05/10/2024  -     Uric acid; Future; Expected date: 05/10/2024  -     Sedimentation rate; Future; Expected date: 05/10/2024  -     C-reactive protein; Future; Expected date: 05/10/2024    Type 1 diabetes mellitus with hyperglycemia    Other orders  -     ibuprofen (ADVIL,MOTRIN) 600 MG tablet; Take 1 tablet (600 mg total) by mouth every 6 (six) hours as needed for Pain.  Dispense: 30 tablet; Refill: 0      Thorough discussion is had with the patient today, concerning the diagnosis, its etiology, and the treatment  algorithm at present.    Did discuss pathologies and etiologies associated with the great toe.  Given patient's range of motion and discomfort, differential includes osteoarthritis versus hallux limitus versus acute gout.    Recommend to obtain updated labs to assess for inflammatory markers and uric acid    Relates needing a refill of anti-inflammatory medication.  Did discussed with patient that if uric acid is up, would consider long-term medication, however for the acute symptoms, anti-inflammatory medication is treatment of choice initially.    Future Appointments   Date Time Provider Department Center   5/10/2024 12:55 PM LABORATORY, [facility]   5/14/2024 11:30 AM Marcella Duke RD, CDE [facility] DIBEDU [facility]   6/11/2024  7:30 AM Odilia Shaffer PA-C [facility] DIABETE [facility]   10/7/2024  8:20 AM Lee Lambert MD ON CARDIO [facility]   10/29/2024  8:00 AM Ramirez Ramírez MD ON OPHTHAL [facility]

## 2024-05-14 ENCOUNTER — TELEPHONE (OUTPATIENT)
Dept: DIABETES | Facility: CLINIC | Age: 54
End: 2024-05-14
Payer: COMMERCIAL

## 2024-05-14 ENCOUNTER — CLINICAL SUPPORT (OUTPATIENT)
Dept: DIABETES | Facility: CLINIC | Age: 54
End: 2024-05-14
Payer: COMMERCIAL

## 2024-05-14 VITALS — WEIGHT: 117.5 LBS | BODY MASS INDEX: 20.17 KG/M2

## 2024-05-14 DIAGNOSIS — E10.65 TYPE 1 DIABETES MELLITUS WITH HYPERGLYCEMIA: Primary | ICD-10-CM

## 2024-05-14 DIAGNOSIS — E10.65 TYPE 1 DIABETES MELLITUS WITH HYPERGLYCEMIA: ICD-10-CM

## 2024-05-14 PROCEDURE — 99999 PR PBB SHADOW E&M-EST. PATIENT-LVL III: CPT | Mod: PBBFAC,,, | Performed by: DIETITIAN, REGISTERED

## 2024-05-14 PROCEDURE — G0108 DIAB MANAGE TRN  PER INDIV: HCPCS | Mod: S$GLB,,, | Performed by: DIETITIAN, REGISTERED

## 2024-05-14 RX ORDER — INSULIN PMP CART,AUT,G6/7,CNTR
1 EACH SUBCUTANEOUS
Qty: 30 EACH | Refills: 3 | Status: SHIPPED | OUTPATIENT
Start: 2024-05-14

## 2024-05-14 RX ORDER — INSULIN PMP CART,AUT,G6/7,CNTR
1 EACH SUBCUTANEOUS
Qty: 1 EACH | Refills: 0 | Status: SHIPPED | OUTPATIENT
Start: 2024-05-14

## 2024-05-14 NOTE — PROGRESS NOTES
Diabetes Care Specialist Follow-up Note  Author: Marcella Duke RD, CDE  Date: 5/14/2024    Program Intake  Reason for Diabetes Program Visit:: Intervention (DM referral 8/27/23, 5/10/24 Odilia Shaffer, PAYoungC)  Type of Intervention:: Individual  Education: Insulin Pump Evaluation  Current diabetes risk level:: moderate  In the last 12 months, have you::  (pt denies ER/hosp visits)    Lab Results   Component Value Date    HGBA1C 9.0 (H) 03/11/2024     Clinical    Weight: 53.3 kg (117 lb 8.1 oz)       Body mass index is 20.17 kg/m².    Problem Review  Active comorbidities affecting diabetes self-care.:  (Gestational DM in Pregnancy 2003, HepC, GERD, Vit D defn)    Clinical Assessment  Current Diabetes Treatment: Insulin, Oral Medication, Diet (Toujeo 13 units daily. Lyumjev ac 20units meals, 3units snacks + s/s. Januvia 100mg 1tab daily.)  Have you ever experienced hypoglycemia (low blood sugar)?: no (none recent)  Have you ever experienced hyperglycemia (high blood sugar)?: no (none recent)    Medication Information  How many days a week do you miss your medications?: 3 or more  Do you sometimes have difficulty refilling your medications?: No  Medication adherence impacting ability to self-manage diabetes?: Yes    Labs  Type of Regular Lab Work: A1c, Cholesterol, Microalbumin, BMP  Lab Compliance Barriers: No    Nutritional Status  Diet:  (deferred due to visit time)    Physical activity/Exercise: deferred    SMBG: Dexcom reports reviewed w/ pt, saved media.        Additional Social History    Support  Does anyone support you with your diabetes care?: no  Is Support an area impacting ability to self-manage diabetes?: No    Access to Mass Media & Technology  Does the patient have access to any of the following devices or technologies?: Smart phone, Home computer, Internet Access  Media or technology needs impacting ability to self-manage diabetes?: No    Cognitive/Behavioral Health  Alert and Oriented:  Yes  Difficulty Thinking: No  Requires Prompting: No  Requires assistance for routine expression?: No  Cognitive or behavioral barriers impacting ability to self-manage diabetes?: No    Culture/Sabianist  Culture or Methodist beliefs that may impact ability to access healthcare: No    Communication  Language preference: English  Hearing Problems: No  Vision Problems: Yes  Vision problem type:: Decreased Vision  Vision Assistance: Glasses  Communication needs impacting ability to self-manage diabetes?: No    Health Literacy  Preferred Learning Method: Face to Face, Demonstration, Hands On  How often do you need to have someone help you read instructions, pamphlets, or written material from your doctor or pharmacy?: Never  Health literacy needs impacting ability to self-manage diabetes?: No      Diabetes Self-Management Skills Assessment     Diabetes Disease Process/Treatment Options  Patient/caregiver able to state what happens when someone has diabetes.: yes  Patient/caregiver knows what type of diabetes they have.: yes  Diabetes Type : Type I  Diabetes Disease Process/Treatment Options: Skills Assessment Completed: Yes  Assessment indicates:: Adequate understanding  Area of need?: No    Nutrition/Healthy Eating  Method of carbohydrate measurement:: eyeballing/guessing  Patient can identify foods that impact blood sugar.: yes (requesting refresher carb ctg)  Patient-identified foods:: starches (bread, pasta, rice, cereal)  Nutrition/Healthy Eating Skills Assessment Completed:: Yes  Assessment indicates:: Instruction Needed, Knowledge deficit  Area of need?: Yes    Physical Activity/Exercise  Physical Activity/Exercise Skills Assessment Completed: : No  Deffered due to:: Time  Area of need?: Deferred    Medications  Patient is able to describe current diabetes management routine.: yes  Diabetes management routine:: insulin, oral medications  Patient is able to identify current diabetes medications, dosages, and  appropriate timing of medications.: yes  Patient understands the purpose of the medications taken for diabetes.: yes  Patient reports problems or concerns with current medication regimen.: yes (Pt in clinic for pre-pump eval. Pt admits to not dosing consistently.)  Medication Skills Assessment Completed:: Yes  Assessment indicates:: Instruction Needed, Knowledge deficit  Area of need?: Yes    Home Blood Glucose Monitoring  Patient states that blood sugar is checked at home daily.: yes  Monitoring Method:: personal continuous glucose monitor  Personal CGM type:: Dexcom G7  Patient is able to use personal CGM appropriately.: yes (Noted pt not logging into Dexcom account when mobile arlen prompts.)  CGM Report reviewed?: yes  Assessment indicates:: Instruction Needed  Area of need?: Yes    During today's follow-up visit,  the following areas required further assessment and content was provided/reviewed.  Based on today's diabetes care assessment, the following areas of need were identified:          5/14/2024    12:01 AM   Social   Support No   Access to Mass Media/Tech No   Cognitive/Behavioral Health No   Culture/Jewish No   Communication No   Health Literacy No          5/14/2024    12:01 AM   Clinical   Medication Adherence Yes - see care plan   Lab Compliance No          5/14/2024    12:01 AM   Diabetes Self-Management Skills   Diabetes Disease Process/Treatment Options No   Nutrition/Healthy Eating Yes - see care plan   Physical Activity/Exercise Deferred   Medication Yes - see care plan   Home Blood Glucose Monitoring Yes - deferred        Today's interventions were provided through individual discussion, instruction, and written materials were provided.    Patient verbalized understanding of instruction and written materials.  Pt was able to return back demonstration of instructions today. Patient understood key points, needs reinforcement and further instruction.     Diabetes Self-Management Care Plan Review  and Evaluation of Progress:  During today's follow-up Chan's Diabetes Self-Management Care Plan progress was reviewed and progress was evaluated including his/her input. Chan has agreed to continue his/her journey to improve/maintain overall diabetes control by continuing to set health goals. See care plan progress below.      Care Plan: Diabetes Management   Updates made since 4/14/2024 12:00 AM        Problem: Blood Glucose Self-Monitoring         Goal: Patient agrees to use Dexcom G7 and backup meter as directed.    Start Date: 9/5/2023   Expected End Date: 8/27/2024   This Visit's Progress: Deferred   Recent Progress: On track   Priority: High        Problem: Medications         Goal: Patient Agrees to take Diabetes Medication(s) as prescribed.    Start Date: 9/5/2023   Expected End Date: 8/27/2024   This Visit's Progress: No change   Recent Progress: On track   Priority: Medium   Barriers: Other (comments)   Note:    Discussed pre-pump topics:   Pump options w/ compatible CGM: OmniPod5 using Dexcom G6, Tandem using Dexcom G6/G7, Mobi using Dexcom G6.  Common terms: basal/bolus insulin, IC, ISF, TBS, IOB  Pros/cons pump therapy, supplies needed, frequency of changing infusion sets and cartridges/pods, and backup pump plan   Allowed pt to see demo pumps and example infusion set    Provided carb counting training using food lists, food label from Diabetes Management Guide. Also, encouraged pt to download and use Dashride mobile arlen for additional support. Pt able demonstrate understanding using examples in clinic.     Pt's phone is compatible with Tandem Mobi; however, pt would like to proceed with OmniPod 5 since it doesn't require infusion set. Provider notified. Instructed pt to notify clinic for training appt once she receives devices. Reminder set to check status 1week.         Problem: Healthy Eating         Goal: Eat 3 meals daily - manage carb 45 grams/meal, 5-15grams/snack    Start Date:  9/5/2023   Expected End Date: 8/27/2024   This Visit's Progress: Deferred   Recent Progress: No change   Priority: Low          Follow Up Plan   Instructed pt to notify clinic for training appt once she receives devices. Reminder set to check status 1week.      Today's care plan and follow up schedule was discussed with patient.  Kelletessa verbalized understanding of the care plan, goals, and agrees to follow up plan.        The patient was encouraged to communicate with his/her health care provider/physician and care team regarding his/her condition(s) and treatment.  I provided the patient with my contact information today and encouraged to contact me via phone or [facility] as needed.     Length of Visit   Total Time: 60 Minutes

## 2024-05-14 NOTE — Clinical Note
Demetrius Hartley, I met w/ Ms Reymundo for pre-pump eval today. She is able to demonstrate understanding carb ctg using examples in clinic.   Pt's phone is compatible with Tandem Mobi; however, she would like to proceed with OmniPod 5 since it doesn't require infusion set. Thank you for assisting w/ Rx if you agree. Instructed pt to notify clinic for training appt once she receives devices. Thanks, Marcella

## 2024-05-14 NOTE — TELEPHONE ENCOUNTER
Called patient to let know her know the omnipod start kit was sent to [facility] Patient voiced understanding

## 2024-05-14 NOTE — TELEPHONE ENCOUNTER
Please let patient know that we have sent OMNIPOD 5 START KIT Rx to the following pharmacy:     [facility]    Please let patient know that once they receive their new pump they will need to contact us to set up a training visit with one of the Diabetes Educators. Referral for training to be placed below.     Please call patient and discuss. If unable to reach, send message through [employer] or leave message to return call.     Odilia Shaffer PA-C  Diabetes Management

## 2024-05-15 ENCOUNTER — TELEPHONE (OUTPATIENT)
Dept: DIABETES | Facility: CLINIC | Age: 54
End: 2024-05-15
Payer: COMMERCIAL

## 2024-05-15 NOTE — TELEPHONE ENCOUNTER
Spoke with patient to inform her that a PA was completed for her Omnipod and she can pick it up tomorrow no charge to her. Patient voiced understanding

## 2024-05-15 NOTE — TELEPHONE ENCOUNTER
----- Message from Odilia Shaffer PA-C sent at 5/15/2024  2:28 PM CDT -----  Contact: nick  Will you call down there and see if a prior auth was started or if they can send us denial letter  ----- Message -----  From: Nette Quintanilla MA  Sent: 5/15/2024   2:19 PM CDT  To: Odilia Shaffer PA-C    We sent it downstairs to our pharmacy. They would have completed the PA if needed.  ----- Message -----  From: Odilia Shaffer PA-C  Sent: 5/15/2024  11:23 AM CDT  To: Nette Quintanilla MA    Can we see if needs prior auth  ----- Message -----  From: Nette Quintanilla MA  Sent: 5/15/2024  11:10 AM CDT  To: Odilia Shaffer PA-C    The pump isn't covered on the patients insurance. Patient wants to know what she should do?  ----- Message -----  From: Yudi Gunderson  Sent: 5/15/2024  11:00 AM CDT  To: Edmund Hartley Staff    The pt is calling in regards to her medication. She stated that the insurance didn't cover her pump and she wants to know what can she do. Please give a call back at [phone]

## 2024-05-16 ENCOUNTER — TELEPHONE (OUTPATIENT)
Dept: DIABETES | Facility: CLINIC | Age: 54
End: 2024-05-16
Payer: COMMERCIAL

## 2024-05-16 DIAGNOSIS — E10.65 TYPE 1 DIABETES MELLITUS WITH HYPERGLYCEMIA: Primary | ICD-10-CM

## 2024-05-16 NOTE — TELEPHONE ENCOUNTER
----- Message from Diamone Speed sent at 5/16/2024  2:30 PM CDT -----  Regarding: self  Type: Patient Call Back       Who called:self        What is the request in detail: pt needs a call back        Can the clinic reply by [facility]? Yes       Would the patient rather a call back or a response via [facility]? Call back       Best call back number:[phone]

## 2024-05-16 NOTE — TELEPHONE ENCOUNTER
Spoke with pt. Assisted in scheduling appt for Omni5 training next Wednesday 5/22 9am.     Pt reports on DexG7 and will need DexG6 for Omni5 automated mode. Also, she needs Rx vial rapid acting insulin. Message to provider Karthikeyan to assist w/ prescriptions, pump rates & instructions.     Pt verbalized understanding. Successful outreach.

## 2024-05-16 NOTE — TELEPHONE ENCOUNTER
----- Message from Odilia Shaffer PA-C sent at 5/16/2024  7:44 AM CDT -----  Contact: nick  Tell her once she gets to call us and we will sched training  ----- Message -----  From: Nette Quintanilla MA  Sent: 5/15/2024   4:06 PM CDT  To: Odilia Shaffer PA-C    The PA was completed and it's no charge to her. She can actually pick it up tomorrow. I will call her.  ----- Message -----  From: Odilia Shaffer PA-C  Sent: 5/15/2024   2:28 PM CDT  To: Nette Quintanilla MA    Will you call down there and see if a prior auth was started or if they can send us denial letter  ----- Message -----  From: Nette Quintanilla MA  Sent: 5/15/2024   2:19 PM CDT  To: Odilia Shaffer PA-C    We sent it downstairs to our pharmacy. They would have completed the PA if needed.  ----- Message -----  From: Odilia Shaffer PA-C  Sent: 5/15/2024  11:23 AM CDT  To: Nette Quintanilla MA    Can we see if needs prior auth  ----- Message -----  From: Nette Quintanilla MA  Sent: 5/15/2024  11:10 AM CDT  To: Odilia Shaffer PA-C    The pump isn't covered on the patients insurance. Patient wants to know what she should do?  ----- Message -----  From: Yudi Gunderson  Sent: 5/15/2024  11:00 AM CDT  To: Edmund Hartley Staff    The pt is calling in regards to her medication. She stated that the insurance didn't cover her pump and she wants to know what can she do. Please give a call back at [phone]

## 2024-05-17 ENCOUNTER — PATIENT MESSAGE (OUTPATIENT)
Dept: DIABETES | Facility: CLINIC | Age: 54
End: 2024-05-17
Payer: COMMERCIAL

## 2024-05-17 RX ORDER — INSULIN ASPART INJECTION 100 [IU]/ML
INJECTION, SOLUTION SUBCUTANEOUS
Qty: 30 ML | Refills: 11 | Status: SHIPPED | OUTPATIENT
Start: 2024-05-17 | End: 2024-05-20 | Stop reason: SDUPTHER

## 2024-05-17 NOTE — TELEPHONE ENCOUNTER
Pump Start Orders:     Pump Type: Omnipod 5  Referral to CDE placed: yes  Settings Orders: See below   Insulin Vials: FIASP vials sent to pharmacy   Instructions to stop Current Regimen: skip Toujeo 18 hours prior to pump start     STAFF:  - please inform pt what pharmacy vials of insulin were sent to  - please review instructions to stop current regimen with patient  - please instruct pt to bring all current pump supplies and insulin vials to upcoming pump training.   - PLEASE SEND DEXCOM G6 ORDERS TO CHIQUI Shaffer PA-C  Diabetes Management

## 2024-05-20 ENCOUNTER — TELEPHONE (OUTPATIENT)
Dept: DIABETES | Facility: CLINIC | Age: 54
End: 2024-05-20
Payer: COMMERCIAL

## 2024-05-20 DIAGNOSIS — E10.65 TYPE 1 DIABETES MELLITUS WITH HYPERGLYCEMIA: ICD-10-CM

## 2024-05-20 RX ORDER — INSULIN ASPART INJECTION 100 [IU]/ML
INJECTION, SOLUTION SUBCUTANEOUS
Qty: 30 ML | Refills: 11 | Status: SHIPPED | OUTPATIENT
Start: 2024-05-20

## 2024-05-20 NOTE — TELEPHONE ENCOUNTER
Spoke with pt. Provided DMS contact number so she can check status of DexG6 order. Also, pt reports issues obtaining FIASP insulin vials from several local [facility].     Message to provider Karthikeyan for additional advice. Pt verbalized understanding. Reminder set to check status and reschedule pump training visit.

## 2024-05-29 ENCOUNTER — TELEPHONE (OUTPATIENT)
Dept: DIABETES | Facility: CLINIC | Age: 54
End: 2024-05-29
Payer: COMMERCIAL

## 2024-05-29 NOTE — TELEPHONE ENCOUNTER
Spoke with pt. Noted Dexcom G6 still pending with DMS. Encouraged pt to notify once she receives, and we'll coord training visit. Successful outreach.    Spoke with a school counselor in the past but no other treatment.  No med trials or inpatient treatment

## 2024-06-10 NOTE — PROGRESS NOTES
PCP: Tristian Bearden MD    Subjective:     Chief Complaint: Diabetes - Established Patient  TELEMEDICINE VISIT:     The patient location is: Home  The chief complaint leading to consultation is: Diabetes Follow up  Visit type: Virtual visit with synchronous audio and video  Total time spent with patient: 10  Each patient to whom he or she provides medical services by telemedicine is:  (1) informed of the relationship between the physician and patient and the respective role of any other health care provider with respect to management of the patient; and (2) notified that he or she may decline to receive medical services by telemedicine and may withdraw from such care at any time.    Notes: N/A      HISTORY OF PRESENT ILLNESS: 53 y.o.   female presenting for diabetes management visit.   .LASTVISITWITHME   Patient has had Type I diabetes due to low C peptide since 5 years or more.  Pertinent to decision making is the following comorbidities: Vitamin D Deficiency and Gestational DM in Pregnancy 2003  Patient has the following Diabetes complications: without complications  She  has attended diabetes education in the past.     Patient's most recent A1c of 9.0% was completed 3 months ago.   Patient states since Her last A1c Her blood glucose levels have been high  after meals.   Patient monitors blood glucose 4 times per day and Continuously with personal CGM Dexcom.   Patient blood glucose monitoring device will be uploaded into [employer] today. Off Dexcom due to needing reorder from [facility].  Patient endorses the following diabetes related symptoms: None.   Patient is due today for the following diabetes-related health maintenance standards: A1c and COVID-19 Vaccine .   She denies recent hospital admissions or emergency room visits.   She denies having hypoglycemia as above.   Patient's concerns today include glycemic control. Of note,Omnipod 5 and Dexcom G6 pending.   Patient medication  regimen is as below.     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       Lab Results   Component Value Date    CPEPTIDE 1.67 11/20/2023     Lab Results   Component Value Date    GLUTAMICACID 0.00 09/28/2020          //   , There is no height or weight on file to calculate BMI.  Her blood sugar in clinic today is:    Lab Results   Component Value Date    POCGLU 233 (A) 05/10/2024       Review of Systems   Constitutional:  Negative for activity change, appetite change, chills and fever.   HENT:  Negative for dental problem, mouth sores, nosebleeds, sore throat and trouble swallowing.    Eyes:  Negative for pain and discharge.   Respiratory:  Negative for shortness of breath, wheezing and stridor.    Cardiovascular:  Negative for chest pain, palpitations and leg swelling.   Gastrointestinal:  Negative for abdominal pain, diarrhea, nausea and vomiting.   Endocrine: Negative for polydipsia, polyphagia and polyuria.   Genitourinary:  Negative for dysuria, frequency and urgency.   Musculoskeletal:  Negative for joint swelling and myalgias.   Skin:  Negative for rash and wound.   Neurological:  Negative for dizziness, syncope, weakness and headaches.   Psychiatric/Behavioral:  Negative for behavioral  problems and dysphoric mood.          Diabetes Management Status  Statin: Not taking  ACE/ARB: Not taking    Screening or Prevention Patient's value Goal Complete/Controlled?   HgA1C Testing and Control   Lab Results   Component Value Date    HGBA1C 9.0 (H) 03/11/2024      Annually/Less than 8% No   Lipid profile : 11/20/2023 Annually Yes   LDL control  Lab Results   Component Value Date    LDLCALC 99.2 11/20/2023    Annually/Less than 100 mg/dl  Yes   Nephropathy screening Lab Results   Component Value Date    MICALBCREAT 13.0 03/11/2024     Lab Results   Component Value Date    PROTEINUA Negative 01/26/2021    Annually No   Blood pressure BP Readings from Last 1 Encounters:   05/10/24 136/66    Less than 140/90 Yes   Dilated retinal exam : 10/16/2023 Annually Yes    Foot exam   : 05/10/2024 Annually No     Social History     Socioeconomic History    Marital status: Single    Number of children: 2   Occupational History    Occupation:      Employer: [employer]     Employer: [employer]   Tobacco Use    Smoking status: Never    Smokeless tobacco: Never   Substance and Sexual Activity    Alcohol use: Yes     Comment: Rare    Drug use: No    Sexual activity: Yes     Partners: Male     Birth control/protection: Surgical   Social History Narrative    She wears seatbelt.     Social Determinants of Health     Financial Resource Strain: Medium Risk (5/6/2024)    Received from [facility]    Overall Financial Resource Strain (CARDIA)     Difficulty of Paying Living Expenses: Somewhat hard   Food Insecurity: No Food Insecurity (9/26/2018)    Hunger Vital Sign     Worried About Running Out of Food in the Last Year: Never true     Ran Out of Food in the Last Year: Never true   Transportation Needs: No Transportation Needs (5/6/2024)    Received from [facility]    PRAPARE - Transportation     Lack of Transportation (Medical): No     Lack of  Transportation (Non-Medical): No   Physical Activity: Insufficiently Active (5/6/2024)    [facility]    Exercise Vital Sign     Days of Exercise per Week: 4 days     Minutes of Exercise per Session: 30 min   Stress: No Stress Concern Present (9/26/2018)    [employer] - Occupational Stress Questionnaire     Feeling of Stress : Not at all     Past Medical History:   Diagnosis Date    Abnormal Pap smear     Abnormal Pap smear of cervix     Arthritis, low back     DM (diabetes mellitus) 09/2014    BS didn't check 01/31/2017    GERD (gastroesophageal reflux disease)     Gestational diabetes     Hepatitis C antibody positive in blood 02/11/2022    RNA NEGATIVE    History of abnormal Pap smear     Surgical menopause     Vitamin D deficiency        Objective:        Physical Exam  Constitutional:       General: She is not in acute distress.     Appearance: She is well-developed. She is not diaphoretic.   HENT:      Head: Normocephalic and atraumatic.      Right Ear: External ear normal.      Left Ear: External ear normal.      Nose: Nose normal.   Eyes:      General:         Right eye: No discharge.         Left eye: No discharge.   Pulmonary:      Effort: Pulmonary effort is normal. No respiratory distress.      Breath sounds: No stridor.   Musculoskeletal:         General: Normal range of motion.      Cervical back: Normal range of motion.   Skin:     Coloration: Skin is not pale.   Neurological:      Mental Status: She is alert and oriented to person, place, and time.      Motor: No abnormal muscle tone.      Coordination: Coordination normal.   Psychiatric:         Behavior: Behavior normal.         Thought Content: Thought content normal.         Judgment: Judgment normal.             Assessment / Plan:     Type 1 diabetes mellitus with hyperglycemia  -     Hemoglobin A1C; Standing    Low serum C-peptide          Additional Plan Details:    - POCT  Glucose  - Encouraged continuation of lifestyle changes including regular exercise and limiting carbohydrates to 30-45 grams per meal threes times daily and 15 grams per snack with a limit of two daily.   - Encouraged continued monitoring of blood glucose with maintenance of 4 times daily and Continuously with personal CGM Dexcom.    - Current DM Medication Regimen: Continue Toujeo 13 units daily. Continue Januvia 100 mg daily. Continue Lyumjev 20 units before meals, 3 units before snack, and correction dosing every 3 hours as needed - as below. Plan for pump start.   - Referral to CDE - referral placed and orders entered. Will call to sched once received G6 supplies. Given number to call.   - Health Maintenance standards addressed today: A1c to be scheduled and COVID - 19 Vaccine - patient will schedule outside of [facility]   - Nursing Visit: Patient is below goal range for nursing visit for age group and will not need nursing visit at this time .   - Follow up in 4 weeks.    CURRENT DM MEDICATIONS:   Toujeo 13 units daily  Januvia 100 mg daily  Lyumjev meal size dosing and correction dosing every 3 hours as needed - as below    Lyumjev Meal Size:   20 units before meals   3 units before snacks     Lyumjev Correction Dosing every 3 hours as needed OUTSIDE OF EATING  If  - 250, may take 2 units of Lyumjev  If  - 300, may take 3 units of Lyumjev  If  - 350, may take 4 units of Lyumjev  If  - 400, may take 5 units of Lyumjev  If +, may take 6 units of Lyumjev    Blakeney McKnight, PA-C [facility]

## 2024-06-10 NOTE — TELEPHONE ENCOUNTER
Provider reached out to [facility] via [employer] for an update on Dexcom G6 order. Reply received that they have anil unable to get in contact with patient. I will send patient info for contacting [facility] so her supplies can be shipped. Contact info provided via [employer] [phone]

## 2024-06-11 ENCOUNTER — OFFICE VISIT (OUTPATIENT)
Dept: DIABETES | Facility: CLINIC | Age: 54
End: 2024-06-11
Payer: COMMERCIAL

## 2024-06-11 DIAGNOSIS — R79.89 LOW SERUM C-PEPTIDE: ICD-10-CM

## 2024-06-11 DIAGNOSIS — E10.65 TYPE 1 DIABETES MELLITUS WITH HYPERGLYCEMIA: Primary | ICD-10-CM

## 2024-06-11 PROCEDURE — 3061F NEG MICROALBUMINURIA REV: CPT | Mod: CPTII,95,, | Performed by: PHYSICIAN ASSISTANT

## 2024-06-11 PROCEDURE — 3072F LOW RISK FOR RETINOPATHY: CPT | Mod: CPTII,95,, | Performed by: PHYSICIAN ASSISTANT

## 2024-06-11 PROCEDURE — 3066F NEPHROPATHY DOC TX: CPT | Mod: CPTII,95,, | Performed by: PHYSICIAN ASSISTANT

## 2024-06-11 PROCEDURE — 3052F HG A1C>EQUAL 8.0%<EQUAL 9.0%: CPT | Mod: CPTII,95,, | Performed by: PHYSICIAN ASSISTANT

## 2024-06-11 PROCEDURE — 99213 OFFICE O/P EST LOW 20 MIN: CPT | Mod: 95,,, | Performed by: PHYSICIAN ASSISTANT

## 2024-06-11 NOTE — TELEPHONE ENCOUNTER
Spoke to pt during virtual visit. Number given. Pt contacting [facility] this am.     Odilia Shaffer PA-C  Diabetes Management

## 2024-06-13 ENCOUNTER — TELEPHONE (OUTPATIENT)
Dept: DIABETES | Facility: CLINIC | Age: 54
End: 2024-06-13
Payer: COMMERCIAL

## 2024-06-13 NOTE — TELEPHONE ENCOUNTER
Patient stated she received dexcom supplies and needed to schedule training. Patient scheduled next available with educator, patient voiced understanding of appointment time and date.  ----- Message from Sharyn Machado sent at 6/13/2024  3:52 PM CDT -----  Regarding: concerns  Name of who is calling:   Chan      What is the request in detail: Pt is requesting a call back in ref to having the DEXCOM stick and need to schedule appt to come in      Can the clinic reply by [facility]:yes      What number to call back if not [facility]: [phone]

## 2024-06-19 ENCOUNTER — TELEPHONE (OUTPATIENT)
Dept: DIABETES | Facility: CLINIC | Age: 54
End: 2024-06-19
Payer: COMMERCIAL

## 2024-06-19 NOTE — TELEPHONE ENCOUNTER
Spoke with pt. Assisted with appt RS per preferences. Reviewed Omni5 & DexG6 supplies to bring, Fiasp vial and to stop Toujeo 18 hours prior to pump start per provider Karthikeyan. Pt verbalized understanding.

## 2024-06-25 ENCOUNTER — TELEPHONE (OUTPATIENT)
Dept: DIABETES | Facility: CLINIC | Age: 54
End: 2024-06-25
Payer: COMMERCIAL

## 2024-06-25 ENCOUNTER — CLINICAL SUPPORT (OUTPATIENT)
Dept: DIABETES | Facility: CLINIC | Age: 54
End: 2024-06-25
Payer: COMMERCIAL

## 2024-06-25 VITALS — WEIGHT: 118.81 LBS | BODY MASS INDEX: 20.4 KG/M2

## 2024-06-25 DIAGNOSIS — E10.65 TYPE 1 DIABETES MELLITUS WITH HYPERGLYCEMIA: Primary | ICD-10-CM

## 2024-06-25 PROCEDURE — G0108 DIAB MANAGE TRN  PER INDIV: HCPCS | Mod: S$GLB,,, | Performed by: DIETITIAN, REGISTERED

## 2024-06-25 PROCEDURE — 99999 PR PBB SHADOW E&M-EST. PATIENT-LVL III: CPT | Mod: PBBFAC,,, | Performed by: DIETITIAN, REGISTERED

## 2024-06-25 NOTE — TELEPHONE ENCOUNTER
Omnipod Pump Therapy Orders:      TDD for Pump Calculations:     Pre Pump TDD: 73 units  Weight: 117 lbs  Pre Pump TDD (73u)  x 0.75 = Pump TDD (54.75u)  Weight based: lbs (117) x 0.23 = Pump TDD (26.9u)  Avg Pump TDD: 40 u    Basal Rate:     Total Daily Basal: Pump TDD (40u) x 0.5 = Total Daily Basal (20 u)  Initial Basal Rate: Total Daily Basal (20u) / 24 hrs = Initial Basal Rate (0.8 u/hr)    Bolus Settings:     IC: 450 / Pump TDD (40u) = IC (11 g / u)  CF: 1700 / Pump TDD (40u) = CF (42 mg/dL/u)      INITIAL PUMP SETTINGS:     Basal Rate:   0000 - 2400: 0.80 U/hr    IC:   11 g / U    CF:  45 mg/dL/U    Duration of Action:   4 hrs    Max Basal Rate:   3 U / hr    Max Bolus:   25 units     Target B mg/dL    Correct Above:   130 mg/dL    Follow up:   24    Odilia Shaffer PA-C  Diabetes Management

## 2024-06-25 NOTE — PROGRESS NOTES
Diabetes Care Specialist Progress Note  Author: Marcella Duke RD, CDE  Date: 6/25/2024    Program Intake  Reason for Diabetes Program Visit:: Intervention (DM referral 8/27/23, 5/10/24 Odilia Shaffer, PAYoungC)  Type of Intervention:: Individual  Device Training: Personal CGM, Insulin Pump Start (OmniPod5 and Dexcom G6)  Current diabetes risk level:: moderate  In the last 12 months, have you::  (pt denies ER/hosp visits)    Lab Results   Component Value Date    HGBA1C 9.0 (H) 03/11/2024       Clinical    Weight: 53.9 kg (118 lb 13.3 oz)       Body mass index is 20.4 kg/m².    Problem Review  Active comorbidities affecting diabetes self-care.:  (Gestational DM in Pregnancy 2003, HepC, GERD, Vit D def)    Clinical Assessment  Current Diabetes Treatment:  Toujeo 13 units daily. Lyumjev ac 20units meals, 3units snacks + s/s. Januvia 100mg 1tab daily. Pt reports last dose Toujeo 7pm yesterday and Lyumjev 1pm yesterday in preparation for pump training.     Nutritional Status  Diet:  (Deferred due time for device training.)    Diabetes Self-Management Skills Assessment    Medications  Patient is able to describe current diabetes management routine.: yes  Diabetes management routine:: insulin, oral medications  Patient is able to identify current diabetes medications, dosages, and appropriate timing of medications.: yes  Patient understands the purpose of the medications taken for diabetes.: yes  Patient reports problems or concerns with current medication regimen.: yes (Pt in clinic for pump training.)  Medication Skills Assessment Completed:: Yes  Assessment indicates:: Instruction Needed, Knowledge deficit  Area of need?: Yes    Home Blood Glucose Monitoring  Monitoring Method:: personal continuous glucose monitor, home glucometer  Home glucometer meter type:: Unavailable  When you check what is your typical blood sugar range? : Pt reports irregular BG testing without BG meter/logs for review.  Patient is able  to use personal CGM appropriately.: no (Pt in clinic for Dexcom G6 training.)  Home Blood Glucose Monitoring Skills Assessment Completed: : Yes  Assessment indicates:: Knowledge deficit, Instruction Needed  Area of need?: Yes    Assessment Summary and Plan  Based on today's diabetes care assessment, the following areas of need were identified:          6/25/2024    12:01 AM   Diabetes Self-Management Skills   Medication Yes - see care plan   Home Blood Glucose Monitoring Yes - see care plan      Today's interventions were provided through individual discussion, instruction, and written materials were provided.    Patient verbalized understanding of instruction and written materials.  Pt was able to return back demonstration of instructions today. Patient understood key points, needs reinforcement and further instruction.     Diabetes Self-Management Care Plan:  Today's Diabetes Self-Management Care Plan was developed with Chan's input. Chan has agreed to work toward the following goal(s) to improve his/her overall diabetes control.      Care Plan: Diabetes Management   Updates made since 5/26/2024 12:00 AM        Problem: Medications         Goal: Patient Agrees to take Diabetes Medication(s) as prescribed.    Start Date: 9/5/2023   Expected End Date: 8/27/2024   This Visit's Progress: On track   Recent Progress: No change   Priority: High   Barriers: No Barriers Identified   Note:    OMNIPOD 5 INSULIN PUMP START  Pump training was provided per Omni Pod protocol.     Patient understands she will no longer take MDI injections daily.  Details of pump therapy were covered included following: controller features and programming, pod activation, pod site selection and rotation, automatic pod priming and insertion, setting & editing basal rates in manual mode, giving bolus and other features in the set up menu.  Patient demonstrated ability to program controller, activate and insert pod using aseptic  technique.  Patient demonstrated ability to program Dexcom transmitter into controller and start automated limited mode.    Instructed pt on use of basic pump features ie...give a bolus, pause insulin, switch from manual to automated mode.  Reviewed features available in manual mode verses automated mode.   Reviewed when and how to use activity function in automated mode.  Reviewed site selection of pods, rotation of sites and hard stop to change pod every 72 hrs.   Instructed that insulin vial is good out of refrigeration for up to 28-30 days .   Reviewed treatment of hypoglycemia, hyperglycemia; sick day care, DKA, and troubleshooting of pump.  Omni Pod 24 hour support can be reached at [phone].     INITIAL SETTINGS: (per provider Karthikeyan)   Basal Rate:   0000 - 2400: 0.80 U/hr     IC:   11 g / U     CF:  45 mg/dL/U     Duration of Action:   4 hrs     Max Basal Rate:   3 U / hr     Max Bolus:   25 units      Target B mg/dL     Correct Above:   130 mg/dL     _______________  Dexcom Account:   [email or web address]  Mable    OmniPod5 Account:  Severo Akins#     Patient has written materials for Omnipod 5 for home use.  Patient verbalized understanding of all instructions given.  Reviewed back up plan in case of pump malfunction.  Educated that if glucose levels increasing and patient is delivering insulin, it is recommended to change pod.       Problem: Healthy Eating         Goal: Eat 3 meals daily - manage carb 45 grams/meal, 5-15grams/snack    Start Date: 2023   Expected End Date: 2024   This Visit's Progress: On track   Recent Progress: Deferred   Priority: Medium   Barriers: No Barriers Identified        Task: Explained how to count carbohydrates using the food label and the use of dry measuring cups for accurate carb counting. Completed 2024        Task: Review the importance of balancing carbohydrates with each meal using portion control techniques to count servings of  "carbohydrate and label reading to identify serving size and amount of total carbs per serving. Completed 6/25/2024        Problem: Blood Glucose Self-Monitoring         Goal: Patient agrees to use Dexcom G6 and backup meter as directed.    Start Date: 6/25/2024   Expected End Date: 8/27/2024   Priority: Low   Barriers: No Barriers Identified   Note:    DEXCOM G6 CGM TRAINING  Dexcom G6 mobile arlen downloaded to phone. Education was provided using "Quick Start Guide" per Dexcom protocol.     Overview:  5min glucose reading updates, trending arrows, BG graph screens, battery life indicator, Blue Tooth Symbol.  Menus: trend Graph, start sensor, enter BG, events, Alerts, Settings, Shutdown, Stop Sensor   Settings:                          * Urgent Low: 55 mg/dL                          * Urgent Low Soon: on                          * Low alert: 75 mg/dl                           * High alert: 300 mg/dl - pt request                           * Rise rate: off                          * Fall Rate: off                          * Signal Loss: on repeat 20 min                          * No Reading: on repeat 20 min      Reviewed additional setting options.   Pt paired sensor and transmitter with .    Reviewed where to find sensor insertion time and sensor expiration date.   Reviewed appropriate calibration techniques.  Reviewed sensor site selection. Pt selected and prepped site using aseptic technique, inserted sensor and transmitter and started session.   Practiced sensor pod/transmitter removal from site, and removal of transmitter from sensor pod.  Patient able to demonstrate without difficulty.  Encouraged to review manual prior to starting another sensor.   Reviewed problem solving aspects of sensor transmission/ variables that can disrupt RF transmission.  range 20 feet, but the first 3 hrs keep within 3 feet of transmitter.  Pt instructed on lag time of interstitial fluid from CBG and was " advised to tx hypoglycemia and dose insulin based on SMBG values.  Dexcom technical support contact number given and examples of when to contact them discussed.         Follow Up Plan   Follow up in about 2 weeks (around 7/9/2024). Reminder set to check progress.     Today's care plan and follow up schedule was discussed with patient.  Chan verbalized understanding of the care plan, goals, and agrees to follow up plan.        The patient was encouraged to communicate with his/her health care provider/physician and care team regarding his/her condition(s) and treatment.  I provided the patient with my contact information today and encouraged to contact me via phone or [facility] Patient Portal as needed.     Length of Visit   Total Time: 120 Minutes

## 2024-06-26 ENCOUNTER — TELEPHONE (OUTPATIENT)
Dept: DIABETES | Facility: CLINIC | Age: 54
End: 2024-06-26
Payer: COMMERCIAL

## 2024-06-26 NOTE — TELEPHONE ENCOUNTER
Spoke with pt. Reviewed glooko reports & saved media. Reminded to enter all carb intake from meals/snacks. Pt verbalized understanding and reports overall doing well. Encouraged progress. Provider updated. Successful outreach.

## 2024-06-27 ENCOUNTER — PATIENT OUTREACH (OUTPATIENT)
Dept: ADMINISTRATIVE | Facility: HOSPITAL | Age: 54
End: 2024-06-27
Payer: COMMERCIAL

## 2024-06-27 DIAGNOSIS — E10.65 TYPE 1 DIABETES MELLITUS WITH HYPERGLYCEMIA: Primary | ICD-10-CM

## 2024-06-27 NOTE — PROGRESS NOTES
VBHM Score: 1     Hemoglobin A1c                 Health Maintenance Topic(s) Outreach Outcomes & Actions Taken:    Lab(s) - Outreach Outcomes & Actions Taken  : Overdue Lab(s) Scheduled    Primary Care Appt - Outreach Outcomes & Actions Taken  : Primary Care Appt Scheduled         Additional Notes:  6 mon follow up scheduled, 7/16/24 with A1C same day.             Care Management, Digital Medicine, and/or Education Referrals    OPCM Risk Score: 22.9         Next Steps - Referral Actions: Referral place for OPCM to [facility] for SDOH Food Insecurity & Transportation Needs if applicable  and Digital Medicine Outcomes and Actions Taken: Pt Declined or Not Eligible        Additional Notes:  SDOH reviewed  Not eligible for Dig Med.

## 2024-07-01 ENCOUNTER — TELEPHONE (OUTPATIENT)
Dept: DIABETES | Facility: CLINIC | Age: 54
End: 2024-07-01
Payer: COMMERCIAL

## 2024-07-01 NOTE — TELEPHONE ENCOUNTER
Spoke with patient. She reports contacting OmniPod customer service for assistance with Pod insertion. She reports successfully changing Pod and using system in Automated mode. Encouraged clinic contact prn. Successful outreach.

## 2024-07-11 ENCOUNTER — PATIENT OUTREACH (OUTPATIENT)
Dept: ADMINISTRATIVE | Facility: OTHER | Age: 54
End: 2024-07-11
Payer: COMMERCIAL

## 2024-07-11 ENCOUNTER — CLINICAL SUPPORT (OUTPATIENT)
Dept: DIABETES | Facility: CLINIC | Age: 54
End: 2024-07-11
Payer: COMMERCIAL

## 2024-07-11 DIAGNOSIS — E10.65 TYPE 1 DIABETES MELLITUS WITH HYPERGLYCEMIA: Primary | ICD-10-CM

## 2024-07-11 NOTE — PROGRESS NOTES
[facility] - Initial Contact    This LPN updated the Social Determinant of Health questionnaire answers that changed with patient via telephone today.    Pt identified barriers of most importance are: Utility assistance and food resources   Referrals to community agencies completed with patient/caregiver consent outside of [facility] include:  No  Referrals were put through [facility] - no:   Support and Services: updated the Social Determinant of Health questionnaire answers that changed, mailed resources and SNAP application instructions.  Other information discussed the patient needs / wants help with: None   Follow up required: Yes  Follow-up Outreach - Due: 8/1/2024       LPN [facility] Utilities, Food pt said she gets disconnect notice for utilites.    Does the patient consent to completing the assessment/enrollment: Yes  Does the patient consent for LPN to speak to a caregiver? No    Health Insurance Coverage:     Does the patient have adequate health insurance coverage? Yes  Education provided: No    PCP Follow-Up Appointments:    Does the patient have a primary care provider? yes - Dr. Bearden  Date of last PCP appointment? 1/23/24  Next PCP appointment:  7/16/24  Was patient provided with education surrounding PCP services/creating a medical home? yes - Educated on the benefits of having a PCP.  Educated on the use of urgent care clinic for non emergent issues when PCP unavailable.        Specialist Appointments:     Does the patient have a pending specialist referral? no  Does the patient have an upcoming specialist appointment? yes - Cardiology 10/7/24, Eye 10/29/24  Is the patient pregnant? No  Does the patient have a mental health provider? No  Patient states she is very stressed since the poss of her son.  She denies SI/HI.  Instructed if she develops SI/HI to present to the ED for evaluation, educated on 988.   Instructed if she feels she needs a counselor to find one  in her network and notify her PCP so a referral could be sent.  Patient verbalized understanding.       Home Medications:     Reviewed medication list with patient? No  Is the patient able to afford their medications? Yes    Care Gaps:     Does the patient have any care gaps that are due? Yes    Care Gaps     Overdue          Never done Aspirin/Antiplatelet Therapy (Yearly)     Never done High Dose Statin (Yearly)     DEC 16  2015 Pneumococcal Vaccines (Age 0-64) (2 of 2 - PCV)  Last completed: Dec 16, 2014     SEP 1  2023 COVID-19 Vaccine (5 - 2023-24 season)  Last completed: Jun 29, 2022 JUN 27 2024 Hemoglobin A1c (Every 3 Months)   Scheduled for: Jul 16, 2024 JUL 5 2024 Shingles Vaccine (2 of 2)  Last completed: May 10, 2024       Recent lab results:  Blood Sugar: 246   Provided education: Yes, reinforced following diabetic diet and taking medication as instructed.  Patient had an appointment with Diabetes Management today.  Blood Pressure: N/A   Provided education: No        Social Determinants of Health (SDOH)    Patient's identified areas of need:  Food Utilities  Education/Resources provided:  Yes        Home Health/DME:    Current Home Health: No  Patient has all healthcare equipment/supplies indicated: yes  Current DME:  continuous glucose monitor and insulin pump.    Additional Documentation:   Identity verified.  Patient states she does not currently receive SNAP and thinks she will qualify for due to her job.  Offered to send SNAP application information in the event she wants to apply, patient accepted. Patient verbalized understanding.      Follow up:   Patient agrees to scheduled follow up call. Yes

## 2024-07-11 NOTE — PROGRESS NOTES
Diabetes Care Specialist Virtual Visit Note     The patient location is: private workplace in [place]  The chief complaint leading to consultation is: Diabetes  Visit type: audiovisual  Total time spent with patient: 30 min   Each patient to whom he or she provides medical services by telemedicine is:  (1) informed of the relationship between the physician and patient and the respective role of any other health care provider with respect to management of the patient; and (2) notified that he or she may decline to receive medical services by telemedicine and may withdraw from such care at any time.    Diabetes Care Specialist Progress Note  Author: Marcella Duke RD, CDE  Date: 7/11/2024    Program Intake  Reason for Diabetes Program Visit:: Intervention (DM referral 8/27/23, 5/10/24 Odilia Shaffer, PAYoungC)  Type of Intervention:: Individual  Education: OmniPod5 and Dexcom G6 - training (6/25/24) follow-up  Current diabetes risk level:: moderate  In the last 12 months, pt denies ER/hosp visits.    Problem Review  Active comorbidities affecting diabetes self-care.: Gestational DM in Pregnancy 2003, HepC, GERD, Vit D def,    Continuous Glucose Monitoring  Patient has CGM: Dexcom G6; pt hasn't been wearing past week.      Medications/Current Diabetes Treatment:  Oral Medication Type/Dose: Januvia 100mg 1tab daily.  Insulin Pump: OmniPod5 and Dexcom G6 - training (6/25/24)    Does patient have back-up plan?: Yes  Any problems obtaining supplies?: No  Patient is able to identify current diabetes medications, dosages, and appropriate timing of medications.: NO  Patient reports problems or concerns with current medication regimen.: Yes   Pt has been off OmniPod and Dexcom for past week. She reports having issues with Pod canula initiating before placing onto her body. She reported issue to OmniPod but states she didn't place new OmniPod or Dexcom sensor. She understands that both systems can work independently. Also,  pt didn't resume backup medication plan.  Pt denies other issues related to pump/CGM and has adequate supplies. Pt denies hypoglycemia, hyperglycemia during past week.    Lab Results   Component Value Date    HGBA1C 9.0 (H) 03/11/2024       Assessment Summary and Plan  Based on today's diabetes care assessment, the following areas of need were identified:          7/11/2024    12:01 AM   Diabetes Self-Management Skills   Medication Yes - see care plan     Today's interventions were provided through individual discussion, instruction, and written materials were provided.    Patient verbalized understanding of instruction and written materials.  Pt was able to return back demonstration of instructions today. Patient understood key points, needs reinforcement and further instruction.     Diabetes Self-Management Care Plan:  Today's Diabetes Self-Management Care Plan was developed with Renaldo's input. Chan has agreed to work toward the following goal(s) to improve his/her overall diabetes control.      Care Plan: Diabetes Management   Updates made since 6/11/2024 12:00 AM        Problem: Medications         Goal: Patient Agrees to take Diabetes Medication(s) as prescribed.    Start Date: 9/5/2023   Expected End Date: 8/27/2024   This Visit's Progress: No change - this visit   Recent Progress: On track - last visit   Priority: High   Barriers: Lack of Motivation to Change - hasn't reported issues to provider team   Note:    Attempted to assist pt with pump restart; however, she forgot OmniPod5 controller at home. Pt reports ability to place new Pod and Dexcom and will do today. Assisted w/ training follow-up visit coordination but instructed pt to call clinic if she needs sooner visit.     Reviewed pump backup plan per Thi last notes from medication plan before pump therapy. Reviewed acute complications with stopping medication and need to communicate problems or concerns with provider team. Pt verbalized  understanding. Provider notified.   _______________  Dexcom Account:   [email or web address]  Mable    OmniPod5 Account:  Severo Akins#     Patient has written materials for Omnipod 5 for home use.  Patient verbalized understanding of all instructions given.  Reviewed back up plan in case of pump malfunction.  Educated that if glucose levels increasing and patient is delivering insulin, it is recommended to change pod.       Problem: Healthy Eating         Goal: Eat 3 meals daily - manage carb 45 grams/meal, 5-15grams/snack    Start Date: 9/5/2023   Expected End Date: 8/27/2024   This Visit's Progress: Deferred     Recent Progress: On track   Priority: Medium   Note:    Deferred due time needed for device review.     Problem: Blood Glucose Self-Monitoring         Goal: Patient agrees to use Dexcom G6 and backup meter as directed.    Start Date: 6/25/2024   Expected End Date: 8/27/2024   This Visit's Progress: No change   Priority: Low   Barriers: No Barriers Identified   Note:    Pt agrees to place new sensor today. Encouraged clinic contact for support prn.     Follow Up Plan   Follow up in about 1 week (around 7/18/2024).  -review Glooko reports  -eval goals    Today's care plan and follow up schedule was discussed with patient.  Kellemimi verbalized understanding of the care plan, goals, and agrees to follow up plan.        The patient was encouraged to communicate with his/her health care provider/physician and care team regarding his/her condition(s) and treatment.  I provided the patient with my contact information today and encouraged to contact me via phone or [facility] Patient Portal as needed.     Length of Visit   Total Time: 30 Minutes

## 2024-07-11 NOTE — PATIENT INSTRUCTIONS
SNAP - How to Apply    Apply by phone, online, or paper application!  Phone Application  Call [phone] ([phone])  Online Application  Go to [email or web address]  Get papers you need.  's license, work or school ID, check stub, or birth certificate.  If not a [place] citizen, forms or cards from [employer]  Last 4 check stubs or statement from employer   If self-employed have tax returns, sales records, or personal wage record.  Proof of any other income, child support, alimony, social security, SSI, VA, halfway checks, unemployment checks  If you have had no income within last 3 months, have pink slip, termination notice, or statement from job.  Proof of any medical expenses, receipts, pharmacy printouts, doctor bills  If you pay child support to someone you do not live with, have court order or other legal papers and have proof of payments.  After doing application, call office Monday- Friday (8 AM- 2 PM) to do a phone interview.  Paper Application  Download and print application on [email or web address]  Different language applications on [email or web address]  Get papers you need (see list above)  Mail application to   [facility] Economic Stability  [address]  [place] [address]   Fax application to   [phone]  Drop it off at a local [facility] office.  After doing application, call office Monday- Friday (8 AM- 2 PM) to do a phone interview.    Utilities     [place] .[facility]  [email or web address]energy -assistance    LIHEAP   [phone]    [employer] www.[employer]helps.org    [facility] (For Entergy)  [phone]    [place] [employer]  [phone]    Water Bill Assistance     [employer] [phone]    Food [place]    [facility] Food [employer]  [phone]    [facility] Food [employer]  [phone]    [employer]  [phone]    [facility]  [phone]    [facility]  [phone]    Hilda Basilio  [facility]  [phone]    [facility]   [phone]

## 2024-07-15 ENCOUNTER — PATIENT OUTREACH (OUTPATIENT)
Dept: ADMINISTRATIVE | Facility: HOSPITAL | Age: 54
End: 2024-07-15
Payer: COMMERCIAL

## 2024-07-15 NOTE — PROGRESS NOTES
Lab visit report: Per chart review, patient has a lab appointment on 7/16/24 for recheck of Hemoglobin A1c.

## 2024-07-16 ENCOUNTER — OFFICE VISIT (OUTPATIENT)
Dept: DIABETES | Facility: CLINIC | Age: 54
End: 2024-07-16
Payer: COMMERCIAL

## 2024-07-16 ENCOUNTER — OFFICE VISIT (OUTPATIENT)
Dept: INTERNAL MEDICINE | Facility: CLINIC | Age: 54
End: 2024-07-16
Payer: COMMERCIAL

## 2024-07-16 ENCOUNTER — TELEPHONE (OUTPATIENT)
Dept: OPHTHALMOLOGY | Facility: CLINIC | Age: 54
End: 2024-07-16
Payer: COMMERCIAL

## 2024-07-16 VITALS
RESPIRATION RATE: 20 BRPM | HEIGHT: 63 IN | TEMPERATURE: 98 F | SYSTOLIC BLOOD PRESSURE: 110 MMHG | HEART RATE: 88 BPM | BODY MASS INDEX: 22.46 KG/M2 | WEIGHT: 126.75 LBS | OXYGEN SATURATION: 98 % | DIASTOLIC BLOOD PRESSURE: 66 MMHG

## 2024-07-16 DIAGNOSIS — H61.23 EXCESSIVE CERUMEN IN EAR CANAL, BILATERAL: ICD-10-CM

## 2024-07-16 DIAGNOSIS — E10.65 TYPE 1 DIABETES MELLITUS WITH HYPERGLYCEMIA: Primary | ICD-10-CM

## 2024-07-16 DIAGNOSIS — Z79.4 TYPE 2 DIABETES MELLITUS WITH HYPERGLYCEMIA, WITH LONG-TERM CURRENT USE OF INSULIN: Primary | ICD-10-CM

## 2024-07-16 DIAGNOSIS — Z96.41 INSULIN PUMP IN PLACE: ICD-10-CM

## 2024-07-16 DIAGNOSIS — R79.89 LOW SERUM C-PEPTIDE: ICD-10-CM

## 2024-07-16 DIAGNOSIS — E78.5 HYPERLIPIDEMIA LDL GOAL <70: ICD-10-CM

## 2024-07-16 DIAGNOSIS — K13.0 LIP DRYNESS: ICD-10-CM

## 2024-07-16 DIAGNOSIS — E11.65 TYPE 2 DIABETES MELLITUS WITH HYPERGLYCEMIA, WITH LONG-TERM CURRENT USE OF INSULIN: Primary | ICD-10-CM

## 2024-07-16 DIAGNOSIS — M79.642 LEFT HAND PAIN: ICD-10-CM

## 2024-07-16 PROCEDURE — 3074F SYST BP LT 130 MM HG: CPT | Mod: CPTII,S$GLB,, | Performed by: FAMILY MEDICINE

## 2024-07-16 PROCEDURE — 99214 OFFICE O/P EST MOD 30 MIN: CPT | Mod: 95,,, | Performed by: PHYSICIAN ASSISTANT

## 2024-07-16 PROCEDURE — G2211 COMPLEX E/M VISIT ADD ON: HCPCS | Mod: S$GLB,,, | Performed by: FAMILY MEDICINE

## 2024-07-16 PROCEDURE — 3061F NEG MICROALBUMINURIA REV: CPT | Mod: CPTII,95,, | Performed by: PHYSICIAN ASSISTANT

## 2024-07-16 PROCEDURE — 3066F NEPHROPATHY DOC TX: CPT | Mod: CPTII,S$GLB,, | Performed by: FAMILY MEDICINE

## 2024-07-16 PROCEDURE — 3072F LOW RISK FOR RETINOPATHY: CPT | Mod: CPTII,95,, | Performed by: PHYSICIAN ASSISTANT

## 2024-07-16 PROCEDURE — 3066F NEPHROPATHY DOC TX: CPT | Mod: CPTII,95,, | Performed by: PHYSICIAN ASSISTANT

## 2024-07-16 PROCEDURE — 1159F MED LIST DOCD IN RCRD: CPT | Mod: CPTII,S$GLB,, | Performed by: FAMILY MEDICINE

## 2024-07-16 PROCEDURE — 3061F NEG MICROALBUMINURIA REV: CPT | Mod: CPTII,S$GLB,, | Performed by: FAMILY MEDICINE

## 2024-07-16 PROCEDURE — 99214 OFFICE O/P EST MOD 30 MIN: CPT | Mod: S$GLB,,, | Performed by: FAMILY MEDICINE

## 2024-07-16 PROCEDURE — 3052F HG A1C>EQUAL 8.0%<EQUAL 9.0%: CPT | Mod: CPTII,95,, | Performed by: PHYSICIAN ASSISTANT

## 2024-07-16 PROCEDURE — 3072F LOW RISK FOR RETINOPATHY: CPT | Mod: CPTII,S$GLB,, | Performed by: FAMILY MEDICINE

## 2024-07-16 PROCEDURE — 3008F BODY MASS INDEX DOCD: CPT | Mod: CPTII,S$GLB,, | Performed by: FAMILY MEDICINE

## 2024-07-16 PROCEDURE — 95251 CONT GLUC MNTR ANALYSIS I&R: CPT | Mod: NDTC,,, | Performed by: PHYSICIAN ASSISTANT

## 2024-07-16 PROCEDURE — 3078F DIAST BP <80 MM HG: CPT | Mod: CPTII,S$GLB,, | Performed by: FAMILY MEDICINE

## 2024-07-16 PROCEDURE — G2211 COMPLEX E/M VISIT ADD ON: HCPCS | Mod: 95,,, | Performed by: PHYSICIAN ASSISTANT

## 2024-07-16 PROCEDURE — 99999 PR PBB SHADOW E&M-EST. PATIENT-LVL V: CPT | Mod: PBBFAC,,, | Performed by: FAMILY MEDICINE

## 2024-07-16 PROCEDURE — 3052F HG A1C>EQUAL 8.0%<EQUAL 9.0%: CPT | Mod: CPTII,S$GLB,, | Performed by: FAMILY MEDICINE

## 2024-07-16 RX ORDER — ASPIRIN 81 MG/1
81 TABLET ORAL DAILY
Start: 2024-07-16 | End: 2025-07-16

## 2024-07-16 RX ORDER — GLUCAGON 3 MG/1
1 POWDER NASAL DAILY PRN
Qty: 2 EACH | Refills: 11 | Status: SHIPPED | OUTPATIENT
Start: 2024-07-16

## 2024-07-16 NOTE — PATIENT INSTRUCTIONS
U100 Pump Failure Plan:   We have decided to develop a plan in the event that your pump breaks or is nonfunctioning. After 4 hours without pump use, you should begin to consider putting your Pump Failure Back Up Plan into action especially if you foresee that you may not be able to use your pump for more than 20 hours. Since you are currently using short acting insulin (Humalog/Novolog/Admelog/Novolin R) in your pump, you will need access to your short acting insulin vial, syringes, and a back up long acting insulin in the event of a pump failure. I have sent a prescription for a long acting insulin to your pharmacy for use during your emergency pump failure plan. It is important that you keep both types of insulin/syringes with you so that you may have access to it at least 3 times daily if needed. This medication and syringes should be brought with you on overnight trips in the case of an emergency pump failure. This means making a plan to keeping your insulin and syringes at school, work, or other places you frequent. If needed, please reach out to my office about any letters you may need for permission to keep these items for emergency purposes. We will outline your plan for pump failure emergencies below, but please remember to contact the insulin pump company (toll free number is printed on the label on the back of the insulin pump) and our office if you have a pump failure. When the insulin pump is restarted, do not restart basal rates until at least 22 hours after the last long acting insulin injection. You can set a 0% temporary basal setting that will last until this time and use your pump to bolus for meals and correction. If you need help with these feature, please call your insulin pump company tech support line or ask them in anticipation of this action.     Dosing:   If the insulin pump is non functional and discontinued for anticipated more than 20 hours, please give daily injections of:    Toujeo 13  units daily  Januvia 100 mg daily  Fiasp meal size dosing and correction dosing every 3 hours as needed - as below    Fiasp Meal Size:   20 units before meals   3 units before snacks     Fiasp Correction Dosing every 3 hours as needed OUTSIDE OF EATING  If  - 250, may take 2 units of Fiasp  If  - 300, may take 3 units of Fiasp  If  - 350, may take 4 units of Fiasp  If  - 400, may take 5 units of Fiasp  If +, may take 6 units of Fiasp    NOTE: This plan is based off of your average insulin needs per recent pump report. Short Acting Insulin should be taken 15 mins before eating a meal and every 2 hours as needed for correction dosing. The dose calculation above may be used for correction dosing by either only calculating the units to cover blood sugar or inputting a carb amount of 0g if not eating. Please not that taking insulin at the 2 hour phillip may mean you still have insulin on board and may lead to hypoglycemia or low blood sugars due to insulin stacking. If calculating correction dosing for the 3rd time or more time, please consider dividing in half to avoid over correction of blood sugar leading to hypoglycemia. Please call office to report to  any large sugar abnormalities including hyperglycemia > 250 and hypoglycemia < 60. Please continue to wear CGM even if not using pump to monitor for blood sugar fluctuations. Please make sure glucose tablets or sugary beverages available in case of hypoglycemia. Please make sure ketone strips are available to you (may buy over the counter from pharmacy) and monitor urine ketones every 12 hours; if you have two readings og BG > 250 mg/dl; or if signs/symptoms concerning for DKA present including abdominal pain, nausea, vomiting, confusion, trouble breathing, dry mouth, fruity breath or breath that smells like nail polish remover, and excessive thirst/urination. If your urine ketone strips shows trace to small ketones, drink plenty of water  and give yourself a correction dose. If BG is less than 250 mg/dL and you do not have moderate/large ketones, you may also exercise to help lower glucose levels. If you have moderate/large ketones, please give correction dosage, drink water, and immediately contact [facility] if during normal business hours. If outside of normal business hours, please give correction dosage, drink water, and report to nearest ED for evaluation/management of DKA.     When the insulin pump is restarted, do not restart basal rates until at least 22 hours after the last long acting insulin injection. You can set a 0% temporary basal setting that will last until this time and still use your pump to bolus for meals and correction. For help with setting temporary basal setting, please call office, view your pump's online resource library, or call 24 hour technical support hotline.     For any technical insulin pump issues, please contact the insulin pump company; the toll free number is printed on the label on the back of the insulin pump.      Baqsimi Glucagon Kit - An intranasal Glucagon kit called Baqsimi has been sent to your pharmacy. I am going to link some instructions for use from the website. It is important that you review these instructions so that you can teach your family members, friends, coworkers, or others who may need to administer the medication in an emergency. Remember that emergency Glucagon kit (intranasal or injectable) are to be used in an emergency situation where you as a patient have lost consciousness secondary to low blood sugar, and it is no longer safe for you to drink or eat anything to bring your blood sugar up. If you are able to eat or drink safely, you should continue to do so to treat low blood sugar and save the Baqsimi glucagon kit for emergency situations. If unable to ingest safely, someone would then administer the medication while calling 911 as you may require additional treatment.  It is important to review this with your loved ones and let them know where you keep your emergency Baqsimi glucagon kit. Please review the link below and let me know if you have any questions or concerns:   [email or web address]    Baqsimi Savings Card - be sure to present this to the pharmacy in order to get 2 kits for $25  [email or web address]       Ketone Strips: A prescription for Ketone strips has been sent to your pharmacy. Some insurance companies cover ketone strips while other do not. If your insurance does not cover ketone strips, I still recommend that you buy some over the counter to keep for screening for Diabetic Ketoacidosis. Patients with Diabetes are at risk developing a condition called diabetic ketoacidosis (DKA). This is a condition where ketones or acid builds up in your blood and can be LIFE THREATENING. Diabetic Ketoacidosis is usually associated with high blood sugars greater than 250 mg/dL, but can also develop in some patients at normal blood sugar levels.  If you notice abdominal pain, diarrhea, nausea, vomiting, lethargy, difficulty breathing, confusion, and unusual fatigue or sleepiness, please check your urine ketones using the ketone strips I gave you. I also recommend checking urine ketones if you are sick, having unexplained or not easily treated high blood sugars, or if you have an insulin pump failure. If your urine ketones are positive, please seek medical care immediately by going to the nearest Emergency Department.   Potential diabetic ketoacidosis (DKA) triggering factors identified in some cases included acute illness (e.g., urinary tract infection, urosepsis, gastroenteritis, influenza, or trauma), reduced caloric or fluid intake, and reduced insulin dose.     Urine Ketone Scale and Action Plan:   NEGATIVE:  Normal Range. Resume monitoring with hyperglycemia > 200 or symptoms of DKA.    TRACE / MILD:   Slightly increased risk of DKA.  Increase H20 intake and correction dose as needed every 2 hours. Monitor glucose and urine ketone level every 2 hours until returns to normal range.     MODERATE:   Moderate risk of DKA. Increase H20 intake. Consider correction dose injection if using a pump to ensure absorption of insulin every hour. Monitor glucose and urine ketone level every 30 mins to 1 hour. Contact [facility] if during normal business hours or consider reporting to nearest Emergency Dept for treatment if symptoms persist and hyperglycemia / urine ketone levels do not return to normal within 3 hours.     SEVERE / HIGH / LARGE:   Severe risk for DKA. Seek emergency treatment at nearest Emergency Dept. If unable to safely transfer to Emergency Dept or if altered mental status (confusion), please call 911.       Symptoms of Diabetic Ketoacidosis (DKA):  DKA most often happens slowly over time. But it can worsen in a few hours if you are vomiting.     The first symptoms are:  - Thirst and dry mouth  - Urinating a lot  - Stomach pain  - Nausea or vomiting  - Breath that smells fruity (from the ketones)    Over time, these symptoms may happen:  - Dry or flushed skin  - Nausea and vomiting  - Loss of appetite  - Weight loss  - Stomach pain  - Trouble breathing  - Trouble thinking or confusion  - Feeling very tired or weak. This can lead to coma    When in doubt, check your ketone levels and contact your healthcare provider.

## 2024-07-16 NOTE — PROGRESS NOTES
PCP: Tristian Bearden MD    Subjective:     Chief Complaint: Diabetes - Established Patient  TELEMEDICINE VISIT:     The patient location is: Home  The chief complaint leading to consultation is: Diabetes Follow up  Visit type: Virtual visit with synchronous audio and video  Total time spent: 25  Each patient to whom he or she provides medical services by telemedicine is:  (1) informed of the relationship between the physician and patient and the respective role of any other health care provider with respect to management of the patient; and (2) notified that he or she may decline to receive medical services by telemedicine and may withdraw from such care at any time.    Notes: N/A      HISTORY OF PRESENT ILLNESS: 53 y.o.   female presenting for diabetes management visit.   .LASTVISITWITHME   Patient has had Type I diabetes due to low C peptide since 5 years or more.  Pertinent to decision making is the following comorbidities: Vitamin D Deficiency and Gestational DM in Pregnancy 2003  Patient has the following Diabetes complications: without complications  She  has attended diabetes education in the past.     Patient's most recent A1c of 9.0% was completed 3 months ago.   Patient states since Her last A1c Her blood glucose levels have been high  after meals.   Patient monitors blood glucose 4 times per day and Continuously with personal CGM Dexcom.   Patient blood glucose monitoring device will be uploaded into [employer] [facility] today.         Patient reports show postprandial hyperglycemia following carb bolusing but pt is unsure if carb counting is correct. Otherwise baseline euglycemia and seemingly appropriate correction dosing.   Patient endorses the following diabetes related symptoms: None.   Patient is due today for the following diabetes-related health maintenance standards: Eye Exam, A1c, ASA / Antiplatelet Therapy, and COVID-19 Vaccine . Sched for eye exam in Oct. Taking ASA 81 mg daily.  Sched for lab today.   She denies recent hospital admissions or emergency room visits.   She denies having hypoglycemia as above.   Patient's concerns today include glycemic control.   Patient medication regimen is as below.     CURRENT DM MEDICATIONS:   CS-II Omnipod 5 with Fiasp  Januvia 100 mg daily    INSULIN PUMP SETTINGS:  Insulin pump type:  Omnipod 5  Insulin Type: Fiasp  Basal rate:   0.80 Units/hour from 0000 hours to 2400 hours  Insulin to Carbohydrate Ratio: 1/11  Insulin Sensitivity Factor: 1/45  Glucose Target: 120 mg/dl   Correct Above: 130 mg/dl  Active Insulin Time: 4 hours    Other Pump Considerations:   Current Glucagon Kit: No  Current Ketone Strips: No  Emergency Plan for Pump Failure:   Toujeo 13 units daily  Fiasp meal size dosing and correction dosing every 3 hours as needed - as below    Fiasp Meal Size:   20 units before meals   3 units before snacks     Fiasp Correction Dosing every 3 hours as needed OUTSIDE OF EATING  If  - 250, may take 2 units of Fiasp  If  - 300, may take 3 units of Fiasp  If  - 350, may take 4 units of Fiasp  If  - 400, may take 5 units of Fiasp  If +, may take 6 units of Fiasp      Patient has failed the following Diabetes medications:   Metformin - GI   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       Lab Results   Component Value Date    CPEPTIDE 1.67 11/20/2023     Lab Results   Component Value Date    GLUTAMICACID 0.00 09/28/2020          //   , There is no height or weight on file to calculate BMI.  Her blood sugar in clinic today is:    Lab Results   Component Value Date    POCGLU 233 (A) 05/10/2024       Review of Systems   Constitutional:  Negative for activity change, appetite change, chills and fever.   HENT:  Negative for dental problem, mouth sores, nosebleeds, sore throat and trouble  swallowing.    Eyes:  Negative for pain and discharge.   Respiratory:  Negative for shortness of breath, wheezing and stridor.    Cardiovascular:  Negative for chest pain, palpitations and leg swelling.   Gastrointestinal:  Negative for abdominal pain, diarrhea, nausea and vomiting.   Endocrine: Negative for polydipsia, polyphagia and polyuria.   Genitourinary:  Negative for dysuria, frequency and urgency.   Musculoskeletal:  Negative for joint swelling and myalgias.   Skin:  Negative for rash and wound.   Neurological:  Negative for dizziness, syncope, weakness and headaches.   Psychiatric/Behavioral:  Negative for behavioral problems and dysphoric mood.          Diabetes Management Status  Statin: Not taking  ACE/ARB: Not taking    Screening or Prevention Patient's value Goal Complete/Controlled?   HgA1C Testing and Control   Lab Results   Component Value Date    HGBA1C 9.0 (H) 03/11/2024      Annually/Less than 8% No   Lipid profile : 03/27/2024 Annually Yes   LDL control  Lab Results   Component Value Date    LDLCALC 99.2 11/20/2023    Annually/Less than 100 mg/dl  Yes   Nephropathy screening Lab Results   Component Value Date    MICALBCREAT 13.0 03/11/2024     Lab Results   Component Value Date    PROTEINUA Negative 01/26/2021    Annually No   Blood pressure BP Readings from Last 1 Encounters:   05/10/24 136/66    Less than 140/90 Yes   Dilated retinal exam : 10/16/2023 Annually Yes    Foot exam   : 05/10/2024 Annually No     Social History     Socioeconomic History    Marital status: Single    Number of children: 2   Occupational History    Occupation:      Employer: [employer]     Employer: [employer]   Tobacco Use    Smoking status: Never    Smokeless tobacco: Never   Substance and Sexual Activity    Alcohol use: Yes     Comment: Rare    Drug use: No    Sexual activity: Yes     Partners: Male     Birth control/protection: Surgical   Social History Narrative    She wears  seatbelt.     Social Determinants of Health     Financial Resource Strain: Medium Risk (6/27/2024)    Overall Financial Resource Strain (CARDIA)     Difficulty of Paying Living Expenses: Somewhat hard   Food Insecurity: Food Insecurity Present (6/27/2024)    Hunger Vital Sign     Worried About Running Out of Food in the Last Year: Sometimes true   Transportation Needs: No Transportation Needs (6/27/2024)    TRANSPORTATION NEEDS     Transportation : No   Physical Activity: Insufficiently Active (5/6/2024)    Received from [facility]    Exercise Vital Sign     Days of Exercise per Week: 4 days     Minutes of Exercise per Session: 30 min   Stress: Stress Concern Present (7/11/2024)    [employer] - Occupational Stress Questionnaire     Feeling of Stress : Very much   Housing Stability: Low Risk  (6/27/2024)    Housing Stability Vital Sign     Unable to Pay for Housing in the Last Year: No     Homeless in the Last Year: No     Past Medical History:   Diagnosis Date    Abnormal Pap smear     Abnormal Pap smear of cervix     Arthritis, low back     DM (diabetes mellitus) 09/2014    BS didn't check 01/31/2017    GERD (gastroesophageal reflux disease)     Gestational diabetes     Hepatitis C antibody positive in blood 02/11/2022    RNA NEGATIVE    History of abnormal Pap smear     Surgical menopause     Vitamin D deficiency        Objective:        Physical Exam  Constitutional:       General: She is not in acute distress.     Appearance: She is well-developed. She is not diaphoretic.   HENT:      Head: Normocephalic and atraumatic.      Right Ear: External ear normal.      Left Ear: External ear normal.      Nose: Nose normal.   Eyes:      General:         Right eye: No discharge.         Left eye: No discharge.   Pulmonary:      Effort: Pulmonary effort is normal. No respiratory distress.      Breath sounds: No stridor.   Musculoskeletal:         General: Normal range  of motion.      Cervical back: Normal range of motion.   Skin:     Coloration: Skin is not pale.   Neurological:      Mental Status: She is alert and oriented to person, place, and time.      Motor: No abnormal muscle tone.      Coordination: Coordination normal.   Psychiatric:         Behavior: Behavior normal.         Thought Content: Thought content normal.         Judgment: Judgment normal.             Assessment / Plan:     Type 1 diabetes mellitus with hyperglycemia  -     aspirin (ECOTRIN) 81 MG EC tablet; Take 1 tablet (81 mg total) by mouth once daily.  -     acetone, urine, test (CHEMSTRIP K) Strp; 1 strip by Misc.(Non-Drug; Combo Route) route 3 (three) times daily.  Dispense: 100 each; Refill: 11  -     glucagon (BAQSIMI) 3 mg/actuation Spry; 1 each by Nasal route daily as needed (severe hypoglycemia).  Dispense: 2 each; Refill: 11    Low serum C-peptide    Insulin pump in place        Additional Plan Details:    - POCT Glucose  - Encouraged continuation of lifestyle changes including regular exercise and limiting carbohydrates to 30-45 grams per meal threes times daily and 15 grams per snack with a limit of two daily.   - Encouraged continued monitoring of blood glucose with maintenance of 4 times daily and Continuously with personal CGM Dexcom.    - Current DM Medication Regimen: Continue CS-II Omnipod 5 with Fiasp. See settings below. Change IC to 9. Continue Januvia 100 mg daily.   - F/u with CDE team for carb counting and omnipod troubleshooting; baqsimi rx; urine ketone strips Rx  - Health Maintenance standards addressed today: Eye Exam - will be completed within [facility] and scheduled today, A1c to be scheduled, ASA/Antiplatelet therapy - recommend ASA 81 mg daily , and COVID - 19 Vaccine [facility]   - Nursing Visit: Patient is below goal range for nursing visit for age group and will not need nursing visit at this time .   - Follow up in 8 weeks.    CURRENT  DM MEDICATIONS:   CS-II Omnipod 5 with Fiasp  Januvia 100 mg daily    INSULIN PUMP SETTINGS:  Insulin pump type: Omnipod 5  Insulin Type: Fiasp  Basal rate:   0.80 Units/hour from 0000 hours to 2400 hours  Insulin to Carbohydrate Ratio: 1/9  Insulin Sensitivity Factor: 1/45  Glucose Target: 120 mg/dl   Correct Above: 130 mg/dl  Active Insulin Time: 4 hours    Blakeney McKnight, PA-C [facility]

## 2024-07-16 NOTE — TELEPHONE ENCOUNTER
Called patient and left vm to call us back when she can to reschedule appointment.     ----- Message from Diamone Speed sent at 7/16/2024  8:41 AM CDT -----  Regarding: self  Type: Patient Call Back       Who called: self        What is the request in detail: pt is calling to see iif she can change her appt to 10/30/2024 at 8        Can the clinic reply by [facility]? Yes       Would the patient rather a call back or a response via [facility]? Call back       Best call back number:[phone]

## 2024-07-16 NOTE — LETTER
July 16, 2024      [facility]  [address] [address]  Phone: [phone]  Fax: [phone]       Patient: Chan Dwyer   YOB: 1970  Date of Visit: 07/16/2024    To Whom It May Concern:    Antonio Dwyer  was at [facility] on 07/16/2024. The patient may return to work  on 07/17/2024 with no restrictions. If you have any questions or concerns, or if I can be of further assistance, please do not hesitate to contact me.    Sincerely,    Lara Chowdary MA

## 2024-07-17 ENCOUNTER — CLINICAL SUPPORT (OUTPATIENT)
Dept: DIABETES | Facility: CLINIC | Age: 54
End: 2024-07-17
Payer: COMMERCIAL

## 2024-07-17 ENCOUNTER — LAB VISIT (OUTPATIENT)
Dept: LAB | Facility: HOSPITAL | Age: 54
End: 2024-07-17
Attending: FAMILY MEDICINE
Payer: COMMERCIAL

## 2024-07-17 DIAGNOSIS — E10.65 TYPE 1 DIABETES MELLITUS WITH HYPERGLYCEMIA: Primary | ICD-10-CM

## 2024-07-17 DIAGNOSIS — E11.65 TYPE 2 DIABETES MELLITUS WITH HYPERGLYCEMIA, WITH LONG-TERM CURRENT USE OF INSULIN: ICD-10-CM

## 2024-07-17 DIAGNOSIS — Z79.4 TYPE 2 DIABETES MELLITUS WITH HYPERGLYCEMIA, WITH LONG-TERM CURRENT USE OF INSULIN: ICD-10-CM

## 2024-07-17 LAB
ALBUMIN SERPL BCP-MCNC: 3.5 G/DL (ref 3.5–5.2)
ALP SERPL-CCNC: 91 U/L (ref 55–135)
ALT SERPL W/O P-5'-P-CCNC: 19 U/L (ref 10–44)
ANION GAP SERPL CALC-SCNC: 9 MMOL/L (ref 8–16)
AST SERPL-CCNC: 18 U/L (ref 10–40)
BASOPHILS # BLD AUTO: 0.02 K/UL (ref 0–0.2)
BASOPHILS NFR BLD: 0.3 % (ref 0–1.9)
BILIRUB SERPL-MCNC: 0.5 MG/DL (ref 0.1–1)
BUN SERPL-MCNC: 13 MG/DL (ref 6–20)
CALCIUM SERPL-MCNC: 9.4 MG/DL (ref 8.7–10.5)
CHLORIDE SERPL-SCNC: 106 MMOL/L (ref 95–110)
CHOLEST SERPL-MCNC: 167 MG/DL (ref 120–199)
CHOLEST/HDLC SERPL: 2.4 {RATIO} (ref 2–5)
CO2 SERPL-SCNC: 25 MMOL/L (ref 23–29)
CREAT SERPL-MCNC: 0.8 MG/DL (ref 0.5–1.4)
DIFFERENTIAL METHOD BLD: ABNORMAL
EOSINOPHIL # BLD AUTO: 0.1 K/UL (ref 0–0.5)
EOSINOPHIL NFR BLD: 1.5 % (ref 0–8)
ERYTHROCYTE [DISTWIDTH] IN BLOOD BY AUTOMATED COUNT: 11.9 % (ref 11.5–14.5)
EST. GFR  (NO RACE VARIABLE): >60 ML/MIN/1.73 M^2
ESTIMATED AVG GLUCOSE: 192 MG/DL (ref 68–131)
GLUCOSE SERPL-MCNC: 152 MG/DL (ref 70–110)
HBA1C MFR BLD: 8.3 % (ref 4–5.6)
HCT VFR BLD AUTO: 41.8 % (ref 37–48.5)
HDLC SERPL-MCNC: 69 MG/DL (ref 40–75)
HDLC SERPL: 41.3 % (ref 20–50)
HGB BLD-MCNC: 12.6 G/DL (ref 12–16)
IMM GRANULOCYTES # BLD AUTO: 0.03 K/UL (ref 0–0.04)
IMM GRANULOCYTES NFR BLD AUTO: 0.4 % (ref 0–0.5)
LDLC SERPL CALC-MCNC: 86.6 MG/DL (ref 63–159)
LYMPHOCYTES # BLD AUTO: 2.2 K/UL (ref 1–4.8)
LYMPHOCYTES NFR BLD: 32.1 % (ref 18–48)
MCH RBC QN AUTO: 29.6 PG (ref 27–31)
MCHC RBC AUTO-ENTMCNC: 30.1 G/DL (ref 32–36)
MCV RBC AUTO: 98 FL (ref 82–98)
MONOCYTES # BLD AUTO: 0.4 K/UL (ref 0.3–1)
MONOCYTES NFR BLD: 6.1 % (ref 4–15)
NEUTROPHILS # BLD AUTO: 4 K/UL (ref 1.8–7.7)
NEUTROPHILS NFR BLD: 59.6 % (ref 38–73)
NONHDLC SERPL-MCNC: 98 MG/DL
NRBC BLD-RTO: 0 /100 WBC
PLATELET # BLD AUTO: 256 K/UL (ref 150–450)
PMV BLD AUTO: 10.6 FL (ref 9.2–12.9)
POTASSIUM SERPL-SCNC: 4.7 MMOL/L (ref 3.5–5.1)
PROT SERPL-MCNC: 6.3 G/DL (ref 6–8.4)
RBC # BLD AUTO: 4.26 M/UL (ref 4–5.4)
SODIUM SERPL-SCNC: 140 MMOL/L (ref 136–145)
T4 FREE SERPL-MCNC: 0.72 NG/DL (ref 0.71–1.51)
TRIGL SERPL-MCNC: 57 MG/DL (ref 30–150)
TSH SERPL DL<=0.005 MIU/L-ACNC: 1.35 UIU/ML (ref 0.4–4)
WBC # BLD AUTO: 6.75 K/UL (ref 3.9–12.7)

## 2024-07-17 PROCEDURE — 84439 ASSAY OF FREE THYROXINE: CPT | Performed by: FAMILY MEDICINE

## 2024-07-17 PROCEDURE — 36415 COLL VENOUS BLD VENIPUNCTURE: CPT | Mod: PO | Performed by: FAMILY MEDICINE

## 2024-07-17 PROCEDURE — 80061 LIPID PANEL: CPT | Performed by: FAMILY MEDICINE

## 2024-07-17 PROCEDURE — 80053 COMPREHEN METABOLIC PANEL: CPT | Performed by: FAMILY MEDICINE

## 2024-07-17 PROCEDURE — 85025 COMPLETE CBC W/AUTO DIFF WBC: CPT | Performed by: FAMILY MEDICINE

## 2024-07-17 PROCEDURE — 83036 HEMOGLOBIN GLYCOSYLATED A1C: CPT | Performed by: FAMILY MEDICINE

## 2024-07-17 PROCEDURE — 99999 PR PBB SHADOW E&M-EST. PATIENT-LVL IV: CPT | Mod: PBBFAC,,, | Performed by: DIETITIAN, REGISTERED

## 2024-07-17 PROCEDURE — G0108 DIAB MANAGE TRN  PER INDIV: HCPCS | Mod: S$GLB,,, | Performed by: DIETITIAN, REGISTERED

## 2024-07-17 PROCEDURE — 84443 ASSAY THYROID STIM HORMONE: CPT | Performed by: FAMILY MEDICINE

## 2024-07-17 NOTE — PATIENT INSTRUCTIONS
Dosing:   If the insulin pump is non functional and discontinued for anticipated more than 20 hours, please give daily injections of:     Toujeo 13 units daily  Januvia 100 mg daily  Fiasp meal size dosing and correction dosing every 3 hours as needed - as below     Fiasp Meal Size:   20 units before meals   3 units before snacks      Fiasp Correction Dosing every 3 hours as needed OUTSIDE OF EATING  If  - 250, may take 2 units of Fiasp  If  - 300, may take 3 units of Fiasp  If  - 350, may take 4 units of Fiasp  If  - 400, may take 5 units of Fiasp  If +, may take 6 units of Fiasp

## 2024-07-17 NOTE — PROGRESS NOTES
Subjective:      Patient ID: Chan Dwyer is a 53 y.o. female.    Chief Complaint: Follow-up (X 6 months)      Patient here for routine follow-up, has not had recent labs.  She has been seeing diabetes management NP, currently on insulin pump, most recent A1c 9.0 in March  Patient reports dryness and flakiness of her lips and around her mouth, has had this for some weeks now.  Reports recurrence of pain in her left hand-she uses both hands frequently in her job, did have injection into the area earlier this year which completely resolved her pain for some months.  Also reports pressure and decreased hearing in both ears    Follow-up  Associated symptoms include arthralgias. Pertinent negatives include no abdominal pain, chest pain or congestion.     Review of Systems   Constitutional:  Negative for activity change, appetite change and unexpected weight change.   HENT:  Positive for hearing loss. Negative for congestion and sinus pressure.    Respiratory:  Negative for shortness of breath.    Cardiovascular:  Negative for chest pain, palpitations and leg swelling.   Gastrointestinal:  Negative for abdominal pain.   Musculoskeletal:  Positive for arthralgias.     Past Medical History:   Diagnosis Date    Abnormal Pap smear     Abnormal Pap smear of cervix     Arthritis, low back     DM (diabetes mellitus) 09/2014    BS didn't check 01/31/2017    GERD (gastroesophageal reflux disease)     Gestational diabetes     Hepatitis C antibody positive in blood 02/11/2022    RNA NEGATIVE    History of abnormal Pap smear     Surgical menopause     Vitamin D deficiency           Past Surgical History:   Procedure Laterality Date    COLONOSCOPY N/A 6/22/2016    Procedure: COLONOSCOPY;  Surgeon: Vishal Mary III, MD;  Location: [facility];  Service: Endoscopy;  Laterality: N/A;    Cryotherapy of the cervix  1999    DILATION AND CURETTAGE OF UTERUS      HYSTERECTOMY      LEFT HEART CATHETERIZATION Left 3/24/2023     Procedure: Left heart cath;  Surgeon: Lee Lambert MD;  Location: [facility] CATH LAB;  Service: Cardiology;  Laterality: Left;    RALH  2/2012    TRIGGER FINGER RELEASE Left 10/10/2022    Procedure: RELEASE, TRIGGER FINGER;  Surgeon: Adarsh Chavez MD;  Location: [facility] OR;  Service: Orthopedics;  Laterality: Left;  left ring trigger finger release    TUBAL LIGATION       Family History   Problem Relation Name Age of Onset    Diabetes Father      Hypertension Father      Hypertension Mother      Diabetes Mother      Heart attack Mother      No Known Problems Brother      No Known Problems Brother      No Known Problems Brother      No Known Problems Brother      Stomach cancer Sister      No Known Problems Sister      No Known Problems Sister      No Known Problems Sister      No Known Problems Sister      No Known Problems Son      No Known Problems Son      Cancer Neg Hx      Stroke Neg Hx      Colon cancer Neg Hx      Ovarian cancer Neg Hx      Breast cancer Neg Hx       Social History     Socioeconomic History    Marital status: Single    Number of children: 2   Occupational History    Occupation:      Employer: [employer]      Employer: [employer]   Tobacco Use    Smoking status: Never    Smokeless tobacco: Never   Substance and Sexual Activity    Alcohol use: Yes     Comment: Rare    Drug use: No    Sexual activity: Yes     Partners: Male     Birth control/protection: Surgical   Social History Narrative    She wears seatbelt.     Social Determinants of Health     Financial Resource Strain: Medium Risk (6/27/2024)    Overall Financial Resource Strain (CARDIA)     Difficulty of Paying Living Expenses: Somewhat hard   Food Insecurity: Food Insecurity Present (6/27/2024)    Hunger Vital Sign     Worried About Running Out of Food in the Last Year: Sometimes true   Transportation Needs: No Transportation Needs (6/27/2024)    TRANSPORTATION NEEDS     Transportation : No   Physical  "Activity: Insufficiently Active (5/6/2024)    Received from [facility]    Exercise Vital Sign     Days of Exercise per Week: 4 days     Minutes of Exercise per Session: 30 min   Stress: Stress Concern Present (7/11/2024)    [employer] - Occupational Stress Questionnaire     Feeling of Stress : Very much   Housing Stability: Low Risk  (6/27/2024)    Housing Stability Vital Sign     Unable to Pay for Housing in the Last Year: No     Homeless in the Last Year: No     Review of patient's allergies indicates:   Allergen Reactions    Hydrocodone Hives and Rash       Objective:       /66 (BP Location: Right arm, Patient Position: Sitting, BP Method: Large (Manual))   Pulse 88   Temp 98 °F (36.7 °C) (Tympanic)   Resp 20   Ht 5' 3" (1.6 m)   Wt 57.5 kg (126 lb 12.2 oz)   SpO2 98%   BMI 22.46 kg/m²   Physical Exam  Vitals reviewed.   Constitutional:       General: She is not in acute distress.     Appearance: Normal appearance. She is well-developed. She is not ill-appearing or diaphoretic.   HENT:      Head: Normocephalic and atraumatic.      Right Ear: Hearing, tympanic membrane, ear canal and external ear normal. There is impacted cerumen.      Left Ear: Hearing, tympanic membrane, ear canal and external ear normal. There is impacted cerumen.      Ears:      Comments: Cerumen removed with lavage from LPN     Nose: Nose normal.      Mouth/Throat:      Pharynx: Uvula midline. No oropharyngeal exudate.   Eyes:      Conjunctiva/sclera: Conjunctivae normal.      Pupils: Pupils are equal, round, and reactive to light.   Neck:      Thyroid: No thyromegaly.      Trachea: No tracheal deviation.   Cardiovascular:      Rate and Rhythm: Normal rate and regular rhythm.      Heart sounds: Normal heart sounds. No murmur heard.  Pulmonary:      Effort: Pulmonary effort is normal. No respiratory distress.      Breath sounds: Normal breath sounds.   Abdominal:      General: " Bowel sounds are normal.      Palpations: Abdomen is soft.      Tenderness: There is no abdominal tenderness. There is no guarding.      Hernia: No hernia is present.   Musculoskeletal:         General: Normal range of motion.      Cervical back: Normal range of motion and neck supple.      Right lower leg: No edema.      Left lower leg: No edema.   Lymphadenopathy:      Cervical: No cervical adenopathy.   Skin:     General: Skin is warm and dry.      Capillary Refill: Capillary refill takes less than 2 seconds.   Neurological:      General: No focal deficit present.      Mental Status: She is alert and oriented to person, place, and time.   Psychiatric:         Mood and Affect: Mood normal.         Behavior: Behavior normal.         Thought Content: Thought content normal.         Judgment: Judgment normal.       Assessment:     1. Type 2 diabetes mellitus with hyperglycemia, with long-term current use of insulin    2. Hyperlipidemia LDL goal <70    3. Left hand pain    4. Lip dryness    5. Excessive cerumen in ear canal, bilateral      Plan:   Type 2 diabetes mellitus with hyperglycemia, with long-term current use of insulin  -     Comprehensive Metabolic Panel; Future; Expected date: 07/16/2024  -     Hemoglobin A1C; Future; Expected date: 07/16/2024  -     CBC Auto Differential; Future; Expected date: 07/16/2024  -     Lipid Panel; Future; Expected date: 07/16/2024  -     TSH; Future; Expected date: 07/16/2024  -     T4, Free; Future; Expected date: 10/16/2024    Hyperlipidemia LDL goal <70    Left hand pain    Lip dryness  -     Ambulatory referral/consult to Dermatology; Future; Expected date: 07/23/2024    Excessive cerumen in ear canal, bilateral    Will have above labs when fasting.  Discussed with patient that her recent lipid readings have been above goal to reduce her risk of adverse effects, may need to start medication if no improvement on these labs  Medication List with Changes/Refills   Current  "Medications    ACETONE, URINE, TEST (CHEMSTRIP K) STRP    1 strip by Misc.(Non-Drug; Combo Route) route 3 (three) times daily.    ASPIRIN (ECOTRIN) 81 MG EC TABLET    Take 1 tablet (81 mg total) by mouth once daily.    BLOOD SUGAR DIAGNOSTIC STRP    To check BG 4 times daily, to use with insurance preferred meter    BLOOD-GLUCOSE METER KIT    To check BG 4 times daily, to use with insurance preferred meter    FLUTICASONE PROPIONATE (FLONASE) 50 MCG/ACTUATION NASAL SPRAY    1 spray (50 mcg total) by Each Nostril route once daily.    GLUCAGON (BAQSIMI) 3 MG/ACTUATION SPRY    1 each by Nasal route daily as needed (severe hypoglycemia).    IBUPROFEN (ADVIL,MOTRIN) 600 MG TABLET    Take 1 tablet (600 mg total) by mouth every 6 (six) hours as needed for Pain.    INSULIN ASPART, NIACINAMIDE, (FIASP FLEXTOUCH U-100 INSULIN) 100 UNIT/ML (3 ML) INPN    Titrate up to 50 units daily as instructed.    INSULIN ASPART, NIACINAMIDE, (FIASP U-100 INSULIN) 100 UNIT/ML SOLN    Titrate up to 100 units daily as instructed in insulin pump.    INSULIN GLARGINE, TOUJEO, (TOUJEO SOLOSTAR U-300 INSULIN) 300 UNIT/ML (1.5 ML) INPN PEN    INJECT 16 UNITS SUBCUTANEOUSLY ONCE DAILY    INSULIN PUMP CART,AUTO,BT-CNTR (OMNIPOD 5 G6 INTRO KIT, GEN 5,) CRTG    Inject 1 each into the skin Every 3 (three) days.    INSULIN PUMP CART,AUTOMATED,BT (OMNIPOD 5 G6 PODS, GEN 5,) CRTG    Inject 1 each into the skin Every 3 (three) days.    LANCETS MISC    To check BG 4 times daily, to use with insurance preferred meter    PEN NEEDLE, DIABETIC (BD ULTRA-FINE KIAY PEN NEEDLE) 32 GAUGE X 5/32" NDLE    1 each by Misc.(Non-Drug; Combo Route) route 4 (four) times daily with meals and nightly.    SITAGLIPTIN PHOSPHATE (JANUVIA) 100 MG TAB    Take 1 tablet (100 mg total) by mouth once daily.    TRIAMCINOLONE ACETONIDE 0.1% (KENALOG) 0.1 % CREAM    Apply topically 2 (two) times daily. (Do not get in eye.) for 10 days    VALACYCLOVIR (VALTREX) 1000 MG TABLET    Take 1 " tablet (1,000 mg total) by mouth 3 (three) times daily.   Discontinued Medications    PANTOPRAZOLE (PROTONIX) 40 MG TABLET    Take 1 tablet (40 mg total) by mouth once daily.    TRAZODONE (DESYREL) 50 MG TABLET    Take 1 tablet (50 mg total) by mouth every evening.

## 2024-07-17 NOTE — PROGRESS NOTES
Diabetes Care Specialist Follow-up Note  Author: Marcella Duke RD, CDE  Date: 7/17/2024    Intake    Program Intake  Reason for Diabetes Program Visit:: Intervention (DM referral 8/27/23, 5/10/24 Odilia Shaffer, PAYoungC)  Type of Intervention:: Individual  Education: Advanced Pump (OmniPod5 and Dexcom G6 - training (6/25/24) follow-up and carb ctg refresher)  Current diabetes risk level:: moderate  In the last 12 months, have you::  (pt denies recent ER/hosp visits)    Problem Review  Active Comorbidities: Gestational DM in Pregnancy 2003, HepC, GERD, Vit D def.    Current Diabetes Treatment: Oral Medications, Insulin (Omnipod 5 with Fiasp. Januvia 100mg 1tab daily. Dexcom G6.)  Does patient have back-up plan?: Yes (Pt requesting printed copy.)    Patient is able to identify current diabetes medications, dosages, and appropriate timing of medications.: no - Pt isn't entering carb grams into bolus calculator accurately.   Patient reports problems or concerns with current medication regimen.: yes  Medication regimen problems/concerns:: does not feel like regimen is working  Patient is  aware that some diabetes medications can cause low blood sugar?: Yes  Medication Skills Assessment Completed:: Yes  Assessment indicates:: Instruction Needed  Area of need?: Yes    Continuous Glucose Monitoring  Patient has CGM: Yes  Personal CGM type:: Dexcom G6    Lab Results   Component Value Date    HGBA1C 9.0 (H) 03/11/2024     Physical activity/Exercise: Deferred    SMBG:  [facility] reports reviewed w/ pt & saved media.           During today's follow-up visit,  the following areas required further assessment and content was provided/reviewed.  Based on today's diabetes care assessment, the following areas of need were identified:          7/17/2024    12:01 AM   Areas of Need   Medications/Current Diabetes Treatment Yes - see care plan        Today's interventions were provided through individual discussion, instruction,  and written materials were provided.    Patient verbalized understanding of instruction and written materials.  Pt was able to return back demonstration of instructions today. Patient understood key points, needs reinforcement and further instruction.     Diabetes Self-Management Care Plan Review and Evaluation of Progress:  During today's follow-up Chan's Diabetes Self-Management Care Plan progress was reviewed and progress was evaluated including his/her input. Chan has agreed to continue his/her journey to improve/maintain overall diabetes control by continuing to set health goals. See care plan progress below.      Care Plan: Diabetes Management   Updates made since 6/17/2024 12:00 AM        Problem: Blood Glucose Self-Monitoring         Goal: Patient agrees to use Dexcom G7 and backup meter as directed. Completed 6/25/2024   Start Date: 9/5/2023   Expected End Date: 8/27/2024   This Visit's Progress: Deferred   Recent Progress: Deferred   Priority: High   Note:    Set new goal.       Problem: Medications         Goal: Patient Agrees to take Diabetes Medication(s) as prescribed.    Start Date: 9/5/2023   Expected End Date: 8/27/2024   This Visit's Progress: On track   Recent Progress: No change   Priority: High   Barriers: Other (comments)   Note:    Encouraged pt progress of resuming OP5. Instructed on how to improve bolus accuracy by providing carb ctg review using food lists, food models and practice w/ recent meals. Encouraged pt to maintain clinic contact for ongoing support. Printed pump backup plan and reviewed with emphasis on need to maintain regimen to prevent acute complications. Pt verbalized understanding.  _______________  Dexcom Account:   [email or web address]  [place]    OmniPod5 Account:  Severo Akins#             Problem: Healthy Eating         Goal: Eat 3 meals daily - manage carb 45 grams/meal, 5-15grams/snack    Start Date: 9/5/2023   Expected End Date: 8/27/2024   This  Visit's Progress: No change   Recent Progress: Deferred   Priority: Medium   Barriers: Knowledge deficit        Task: Provided visual examples using dry measuring cups, food models, and other familiar objects such as computer mouse, deck or cards, tennis ball etc. to help with visualization of portions. Completed 7/17/2024        Task: Explained how to count carbohydrates using the food label and the use of dry measuring cups for accurate carb counting. Completed 7/17/2024        Task: Review the importance of balancing carbohydrates with each meal using portion control techniques to count servings of carbohydrate and label reading to identify serving size and amount of total carbs per serving. Completed 6/25/2024        Problem: Blood Glucose Self-Monitoring         Goal: Patient agrees to use Dexcom G6 and backup meter as directed.    Start Date: 6/25/2024   Expected End Date: 8/27/2024   This Visit's Progress: On track   Recent Progress: No change   Priority: Low   Barriers: Other (comments)   Note:    Pt denies issues with Dexcom or placement. She agrees to improve consistency of usage.          Follow Up Plan   Follow up in about 3 weeks (around 8/7/2024).  -review [facility] reports  -eval goals    Today's care plan and follow up schedule was discussed with patient.  Renaldo verbalized understanding of the care plan, goals, and agrees to follow up plan.        The patient was encouraged to communicate with his/her health care provider/physician and care team regarding his/her condition(s) and treatment.  I provided the patient with my contact information today and encouraged to contact me via phone or [facility]'s Patient Portal as needed.     Length of Visit   Total Time: 30 Minutes

## 2024-07-18 DIAGNOSIS — E78.5 HYPERLIPIDEMIA LDL GOAL <70: ICD-10-CM

## 2024-07-18 DIAGNOSIS — E11.65 TYPE 2 DIABETES MELLITUS WITH HYPERGLYCEMIA, WITH LONG-TERM CURRENT USE OF INSULIN: Primary | ICD-10-CM

## 2024-07-18 DIAGNOSIS — Z79.4 TYPE 2 DIABETES MELLITUS WITH HYPERGLYCEMIA, WITH LONG-TERM CURRENT USE OF INSULIN: Primary | ICD-10-CM

## 2024-08-01 ENCOUNTER — PATIENT OUTREACH (OUTPATIENT)
Dept: ADMINISTRATIVE | Facility: OTHER | Age: 54
End: 2024-08-01
Payer: COMMERCIAL

## 2024-08-05 ENCOUNTER — CLINICAL SUPPORT (OUTPATIENT)
Dept: DIABETES | Facility: CLINIC | Age: 54
End: 2024-08-05
Payer: COMMERCIAL

## 2024-08-05 VITALS — WEIGHT: 129.19 LBS | BODY MASS INDEX: 22.88 KG/M2

## 2024-08-05 DIAGNOSIS — E10.65 TYPE 1 DIABETES MELLITUS WITH HYPERGLYCEMIA: Primary | ICD-10-CM

## 2024-08-05 PROCEDURE — 99999 PR PBB SHADOW E&M-EST. PATIENT-LVL I: CPT | Mod: PBBFAC,,, | Performed by: DIETITIAN, REGISTERED

## 2024-08-05 PROCEDURE — G0108 DIAB MANAGE TRN  PER INDIV: HCPCS | Mod: S$GLB,,, | Performed by: DIETITIAN, REGISTERED

## 2024-08-06 ENCOUNTER — OFFICE VISIT (OUTPATIENT)
Dept: ORTHOPEDICS | Facility: CLINIC | Age: 54
End: 2024-08-06
Payer: COMMERCIAL

## 2024-08-06 VITALS — HEIGHT: 63 IN | WEIGHT: 129.19 LBS | BODY MASS INDEX: 22.89 KG/M2

## 2024-08-06 DIAGNOSIS — G56.03 BILATERAL CARPAL TUNNEL SYNDROME: Primary | ICD-10-CM

## 2024-08-06 PROCEDURE — 3072F LOW RISK FOR RETINOPATHY: CPT | Mod: CPTII,S$GLB,, | Performed by: ORTHOPAEDIC SURGERY

## 2024-08-06 PROCEDURE — 1160F RVW MEDS BY RX/DR IN RCRD: CPT | Mod: CPTII,S$GLB,, | Performed by: ORTHOPAEDIC SURGERY

## 2024-08-06 PROCEDURE — 3052F HG A1C>EQUAL 8.0%<EQUAL 9.0%: CPT | Mod: CPTII,S$GLB,, | Performed by: ORTHOPAEDIC SURGERY

## 2024-08-06 PROCEDURE — 3008F BODY MASS INDEX DOCD: CPT | Mod: CPTII,S$GLB,, | Performed by: ORTHOPAEDIC SURGERY

## 2024-08-06 PROCEDURE — 3061F NEG MICROALBUMINURIA REV: CPT | Mod: CPTII,S$GLB,, | Performed by: ORTHOPAEDIC SURGERY

## 2024-08-06 PROCEDURE — 99999 PR PBB SHADOW E&M-EST. PATIENT-LVL III: CPT | Mod: PBBFAC,,, | Performed by: ORTHOPAEDIC SURGERY

## 2024-08-06 PROCEDURE — 3066F NEPHROPATHY DOC TX: CPT | Mod: CPTII,S$GLB,, | Performed by: ORTHOPAEDIC SURGERY

## 2024-08-06 PROCEDURE — 1159F MED LIST DOCD IN RCRD: CPT | Mod: CPTII,S$GLB,, | Performed by: ORTHOPAEDIC SURGERY

## 2024-08-06 PROCEDURE — 99213 OFFICE O/P EST LOW 20 MIN: CPT | Mod: S$GLB,,, | Performed by: ORTHOPAEDIC SURGERY

## 2024-08-13 NOTE — PROGRESS NOTES
CHW - Follow Up    This LPN completed a follow up visit with patient via telephone today.  Pt/Caregiver reported: Identity verified.  Patient states she received the resources mailed to her, but has not contacted any of the resources.  .  Patient denies SI/HI and states she is still having pain in her left hand.  She states she is wearing the braces provided.  LPN provided: Encouraged to contact the resources at her convenience.  Follow up required: Yes  Follow-up Outreach - Due: 9/10/2024

## 2024-09-04 ENCOUNTER — OFFICE VISIT (OUTPATIENT)
Dept: ORTHOPEDICS | Facility: CLINIC | Age: 54
End: 2024-09-04
Payer: COMMERCIAL

## 2024-09-04 VITALS — HEIGHT: 63 IN | WEIGHT: 129.19 LBS | BODY MASS INDEX: 22.89 KG/M2

## 2024-09-04 DIAGNOSIS — M13.842 ALLERGIC ARTHRITIS OF LEFT HAND: ICD-10-CM

## 2024-09-04 DIAGNOSIS — G56.03 BILATERAL CARPAL TUNNEL SYNDROME: Primary | ICD-10-CM

## 2024-09-04 PROCEDURE — 99999 PR PBB SHADOW E&M-EST. PATIENT-LVL III: CPT | Mod: PBBFAC,,, | Performed by: ORTHOPAEDIC SURGERY

## 2024-09-04 RX ORDER — TRIAMCINOLONE ACETONIDE 40 MG/ML
40 INJECTION, SUSPENSION INTRA-ARTICULAR; INTRAMUSCULAR
Status: DISCONTINUED | OUTPATIENT
Start: 2024-09-04 | End: 2024-09-04 | Stop reason: HOSPADM

## 2024-09-04 RX ADMIN — TRIAMCINOLONE ACETONIDE 40 MG: 40 INJECTION, SUSPENSION INTRA-ARTICULAR; INTRAMUSCULAR at 07:09

## 2024-09-04 NOTE — PROGRESS NOTES
Subjective:     Patient ID: Chan Dwyer is a 53 y.o. female.    Chief Complaint: Pain and Numbness of the Left Hand      HPI:  The patient is a 53-year-old female who has bilateral carpal tunnel syndrome documented by nerve conduction studies 08/23/2023.  She requests injection left carpal tunnel.  She has not responded to splinting.  She also has arthritic pain and swelling of the left hand    Past Medical History:   Diagnosis Date    Abnormal Pap smear     Abnormal Pap smear of cervix     Arthritis, low back     DM (diabetes mellitus) 09/2014    BS didn't check 01/31/2017    GERD (gastroesophageal reflux disease)     Gestational diabetes     Hepatitis C antibody positive in blood 02/11/2022    RNA NEGATIVE    History of abnormal Pap smear     Surgical menopause     Vitamin D deficiency      Past Surgical History:   Procedure Laterality Date    COLONOSCOPY N/A 6/22/2016    Procedure: COLONOSCOPY;  Surgeon: Vishal Mary III, MD;  Location: [facility] ENDO;  Service: Endoscopy;  Laterality: N/A;    Cryotherapy of the cervix  1999    DILATION AND CURETTAGE OF UTERUS      HYSTERECTOMY      LEFT HEART CATHETERIZATION Left 3/24/2023    Procedure: Left heart cath;  Surgeon: Lee Lambert MD;  Location: [facility] CATH LAB;  Service: Cardiology;  Laterality: Left;    [facility]  2/2012    TRIGGER FINGER RELEASE Left 10/10/2022    Procedure: RELEASE, TRIGGER FINGER;  Surgeon: Adarsh Chavez MD;  Location: [facility] OR;  Service: Orthopedics;  Laterality: Left;  left ring trigger finger release    TUBAL LIGATION       Family History   Problem Relation Name Age of Onset    Diabetes Father      Hypertension Father      Hypertension Mother      Diabetes Mother      Heart attack Mother      No Known Problems Brother      No Known Problems Brother      No Known Problems Brother      No Known Problems Brother      Stomach cancer Sister      No Known Problems Sister      No Known Problems Sister      No Known Problems  Sister      No Known Problems Sister      No Known Problems Son      No Known Problems Son      Cancer Neg Hx      Stroke Neg Hx      Colon cancer Neg Hx      Ovarian cancer Neg Hx      Breast cancer Neg Hx       Social History     Socioeconomic History    Marital status: Single    Number of children: 2   Occupational History    Occupation:      Employer: [employer]     Employer: [employer]   Tobacco Use    Smoking status: Never    Smokeless tobacco: Never   Substance and Sexual Activity    Alcohol use: Yes     Comment: Rare    Drug use: No    Sexual activity: Yes     Partners: Male     Birth control/protection: Surgical   Social History Narrative    She wears seatbelt.     Social Determinants of Health     Financial Resource Strain: Medium Risk (6/27/2024)    Overall Financial Resource Strain (CARDIA)     Difficulty of Paying Living Expenses: Somewhat hard   Food Insecurity: Food Insecurity Present (6/27/2024)    Hunger Vital Sign     Worried About Running Out of Food in the Last Year: Sometimes true   Transportation Needs: No Transportation Needs (6/27/2024)    TRANSPORTATION NEEDS     Transportation : No   Physical Activity: Insufficiently Active (5/6/2024)    [facility]    Exercise Vital Sign     Days of Exercise per Week: 4 days     Minutes of Exercise per Session: 30 min   Stress: Stress Concern Present (7/11/2024)    [place] [place] [employer] - Occupational Stress Questionnaire     Feeling of Stress : Very much   Housing Stability: Low Risk  (6/27/2024)    Housing Stability Vital Sign     Unable to Pay for Housing in the Last Year: No     Homeless in the Last Year: No     Medication List with Changes/Refills   Current Medications    ACETONE, URINE, TEST (CHEMSTRIP K) STRP    1 strip by Misc.(Non-Drug; Combo Route) route 3 (three) times daily.    ASPIRIN (ECOTRIN) 81 MG EC TABLET    Take 1 tablet (81 mg total) by mouth once  "daily.    BLOOD SUGAR DIAGNOSTIC STRP    To check BG 4 times daily, to use with insurance preferred meter    BLOOD-GLUCOSE METER KIT    To check BG 4 times daily, to use with insurance preferred meter    FLUTICASONE PROPIONATE (FLONASE) 50 MCG/ACTUATION NASAL SPRAY    1 spray (50 mcg total) by Each Nostril route once daily.    GLUCAGON (BAQSIMI) 3 MG/ACTUATION SPRY    1 each by Nasal route daily as needed (severe hypoglycemia).    IBUPROFEN (ADVIL,MOTRIN) 600 MG TABLET    Take 1 tablet (600 mg total) by mouth every 6 (six) hours as needed for Pain.    INSULIN ASPART, NIACINAMIDE, (FIASP FLEXTOUCH U-100 INSULIN) 100 UNIT/ML (3 ML) INPN    Titrate up to 50 units daily as instructed.    INSULIN ASPART, NIACINAMIDE, (FIASP U-100 INSULIN) 100 UNIT/ML SOLN    Titrate up to 100 units daily as instructed in insulin pump.    INSULIN GLARGINE, TOUJEO, (TOUJEO SOLOSTAR U-300 INSULIN) 300 UNIT/ML (1.5 ML) INPN PEN    INJECT 16 UNITS SUBCUTANEOUSLY ONCE DAILY    INSULIN PUMP CART,AUTO,BT-CNTR (OMNIPOD 5 G6 INTRO KIT, GEN 5,) CRTG    Inject 1 each into the skin Every 3 (three) days.    INSULIN PUMP CART,AUTOMATED,BT (OMNIPOD 5 G6 PODS, GEN 5,) CRTG    Inject 1 each into the skin Every 3 (three) days.    LANCETS MISC    To check BG 4 times daily, to use with insurance preferred meter    PEN NEEDLE, DIABETIC (BD ULTRA-FINE KIYA PEN NEEDLE) 32 GAUGE X 5/32" NDLE    1 each by Misc.(Non-Drug; Combo Route) route 4 (four) times daily with meals and nightly.    SITAGLIPTIN PHOSPHATE (JANUVIA) 100 MG TAB    Take 1 tablet (100 mg total) by mouth once daily.    TRIAMCINOLONE ACETONIDE 0.1% (KENALOG) 0.1 % CREAM    Apply topically 2 (two) times daily. (Do not get in eye.) for 10 days    VALACYCLOVIR (VALTREX) 1000 MG TABLET    Take 1 tablet (1,000 mg total) by mouth 3 (three) times daily.     Review of patient's allergies indicates:   Allergen Reactions    Hydrocodone Hives and Rash     Review of Systems   Constitutional: Negative for " malaise/fatigue.   HENT:  Negative for hearing loss.    Eyes:  Negative for double vision and visual disturbance.   Cardiovascular:  Positive for chest pain.   Respiratory:  Negative for shortness of breath.    Endocrine: Negative for cold intolerance.   Hematologic/Lymphatic: Does not bruise/bleed easily.   Skin:  Negative for poor wound healing and suspicious lesions.   Musculoskeletal:  Positive for arthritis, joint pain and joint swelling. Negative for gout.   Gastrointestinal:  Positive for heartburn. Negative for nausea and vomiting.   Genitourinary:  Negative for dysuria.   Neurological:  Positive for focal weakness and headaches. Negative for numbness, paresthesias and sensory change.   Psychiatric/Behavioral:  Negative for depression, memory loss and substance abuse. The patient is not nervous/anxious.    Allergic/Immunologic: Negative for persistent infections.       Objective:   Body mass index is 22.88 kg/m².  There were no vitals filed for this visit.             General    Constitutional: She is oriented to person, place, and time. She appears well-developed and well-nourished. No distress.   HENT:   Head: Normocephalic.   Eyes: EOM are normal.   Pulmonary/Chest: Effort normal.   Neurological: She is oriented to person, place, and time.   Psychiatric: She has a normal mood and affect.         Left Hand/Wrist Exam     Inspection   Scars: Wrist - absent Hand -  absent  Effusion: Wrist - absent Hand -  absent    Pain   Wrist - The patient exhibits pain of the flexor/pronator group.    Tests   Phalens sign: positive  Tinel's sign (median nerve): positive  Carpal Tunnel Compression Test: positive    Atrophy  Thenar:  Negative  Intrinsic: negative    Other     Sensory Exam  Median Distribution: abnormal  Ulnar Distribution: normal  Radial Distribution: normal    Comments:  The patient has a positive Tinel and positive Phalen sign.  There is no thenar atrophy noted.          Vascular Exam       Capillary  Refill  Left Hand: normal capillary refill          Relevant imaging results reviewed and interpreted by me, discussed with the patient and / or family today radiographs left hand showed osteoarthritic change in multiple small joints  Assessment:     Left hand arthritis   Bilateral carpal tunnel syndrome     Plan:       The patient is injected left carpal tunnel with 1 cc Kenalog and 1 cc 2% plain lidocaine under sterile technique.  She will wait at least 3 months between injections.  She was informed that she does have arthritic change in multiple small joints left hand              Disclaimer: This note was prepared using a voice recognition system and is likely to have sound alike errors within the text.

## 2024-09-04 NOTE — PROCEDURES
Carpal Tunnel    Date/Time: 9/4/2024 7:30 AM    Performed by: Adarsh Chavez MD  Authorized by: Adarsh Chavez MD    Consent Done?:  Yes (Verbal)  Indications:  Pain  Timeout: prior to procedure the correct patient, procedure, and site was verified    Prep: patient was prepped and draped in usual sterile fashion      Local anesthesia used?: Yes    Local anesthetic:  Lidocaine 2% without epinephrine  Anesthetic total (ml):  1    Location:  Wrist  Site:  L carpal tunnel  Ultrasonic Guidance for Needle Placement?: No    Needle size:  25 G  Approach:  Volar  Medications:  40 mg triamcinolone acetonide 40 mg/mL

## 2024-09-10 ENCOUNTER — OFFICE VISIT (OUTPATIENT)
Dept: DIABETES | Facility: CLINIC | Age: 54
End: 2024-09-10
Payer: COMMERCIAL

## 2024-09-10 DIAGNOSIS — R79.89 LOW SERUM C-PEPTIDE: ICD-10-CM

## 2024-09-10 DIAGNOSIS — Z96.41 INSULIN PUMP IN PLACE: ICD-10-CM

## 2024-09-10 DIAGNOSIS — E10.65 TYPE 1 DIABETES MELLITUS WITH HYPERGLYCEMIA: Primary | ICD-10-CM

## 2024-09-10 PROCEDURE — 3061F NEG MICROALBUMINURIA REV: CPT | Mod: CPTII,95,, | Performed by: PHYSICIAN ASSISTANT

## 2024-09-10 PROCEDURE — 3066F NEPHROPATHY DOC TX: CPT | Mod: CPTII,95,, | Performed by: PHYSICIAN ASSISTANT

## 2024-09-10 PROCEDURE — G2211 COMPLEX E/M VISIT ADD ON: HCPCS | Mod: 95,,, | Performed by: PHYSICIAN ASSISTANT

## 2024-09-10 PROCEDURE — 95251 CONT GLUC MNTR ANALYSIS I&R: CPT | Mod: NDTC,,, | Performed by: PHYSICIAN ASSISTANT

## 2024-09-10 PROCEDURE — 3052F HG A1C>EQUAL 8.0%<EQUAL 9.0%: CPT | Mod: CPTII,95,, | Performed by: PHYSICIAN ASSISTANT

## 2024-09-10 PROCEDURE — 3072F LOW RISK FOR RETINOPATHY: CPT | Mod: CPTII,95,, | Performed by: PHYSICIAN ASSISTANT

## 2024-09-10 PROCEDURE — 99214 OFFICE O/P EST MOD 30 MIN: CPT | Mod: 95,,, | Performed by: PHYSICIAN ASSISTANT

## 2024-09-10 NOTE — PROGRESS NOTES
PCP: Tristian Bearden MD    Subjective:     Chief Complaint: Diabetes - Established Patient  TELEMEDICINE VISIT:     The patient location is: Home  The chief complaint leading to consultation is: Diabetes Follow up  Visit type: Virtual visit with synchronous audio and video  Total time spent: 18  Each patient to whom he or she provides medical services by telemedicine is:  (1) informed of the relationship between the physician and patient and the respective role of any other health care provider with respect to management of the patient; and (2) notified that he or she may decline to receive medical services by telemedicine and may withdraw from such care at any time.    Notes: N/A      HISTORY OF PRESENT ILLNESS: 53 y.o.   female presenting for diabetes management visit.   The patient's last visit with me was on 7/16/2024.   Patient has had Type I diabetes due to low C peptide since 5 years or more.  Pertinent to decision making is the following comorbidities: Vitamin D Deficiency and Gestational DM in Pregnancy 2003  Patient has the following Diabetes complications: without complications  She  has attended diabetes education in the past.     Patient's most recent A1c of 8.3% was completed 2 months ago.   Patient states since Her last A1c Her blood glucose levels have been high  after meals.   Patient monitors blood glucose 4 times per day and Continuously with personal CGM Dexcom.   Patient blood glucose monitoring device will be uploaded into [employer] today.         Patient reports show postprandial hyperglycemia following carb bolusing. Otherwise baseline euglycemia and undercorrection of correction dosing.   Patient endorses the following diabetes related symptoms: None.   Patient is due today for the following diabetes-related health maintenance standards: Eye Exam, Influenza Vaccine, and COVID-19 Vaccine . Sched for eye exam.   She denies recent hospital admissions or emergency room  visits.   She denies having hypoglycemia as above.   Patient's concerns today include glycemic control.   Patient medication regimen is as below.     CURRENT DM MEDICATIONS:   CS-II Omnipod 5 with Fiasp  Januvia 100 mg daily    INSULIN PUMP SETTINGS:  Insulin pump type: Omnipod 5  Insulin Type: Fiasp  Basal rate:   0.80 Units/hour from 0000 hours to 2400 hours  Insulin to Carbohydrate Ratio: 1/9  Insulin Sensitivity Factor: 1/45  Glucose Target: 120 mg/dl   Correct Above: 130 mg/dl  Active Insulin Time: 4 hours    Other Pump Considerations:   Current Glucagon Kit: No  Current Ketone Strips: No  Emergency Plan for Pump Failure:   Toujeo 14 units daily  Fiasp meal size dosing and correction dosing every 3 hours as needed - as below    Fiasp Meal Size:   6 units before meals   3 units before snacks     Fiasp Correction Dosing every 3 hours as needed OUTSIDE OF EATING  If  - 250, may take 2 units of Fiasp  If  - 300, may take 3 units of Fiasp  If  - 350, may take 4 units of Fiasp  If  - 400, may take 5 units of Fiasp  If +, may take 6 units of Fiasp      Patient has failed the following Diabetes medications:   Metformin - GI   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       Lab Results   Component Value Date    CPEPTIDE 1.67 11/20/2023     Lab Results   Component Value Date    GLUTAMICACID 0.00 09/28/2020          //   , There is no height or weight on file to calculate BMI.  Her blood sugar in clinic today is:    Lab Results   Component Value Date    POCGLU 233 (A) 05/10/2024       Review of Systems   Constitutional:  Negative for activity change, appetite change, chills and fever.   HENT:  Negative for dental problem, mouth sores, nosebleeds, sore throat and trouble swallowing.    Eyes:  Negative for pain and discharge.   Respiratory:  Negative for  shortness of breath, wheezing and stridor.    Cardiovascular:  Negative for chest pain, palpitations and leg swelling.   Gastrointestinal:  Negative for abdominal pain, diarrhea, nausea and vomiting.   Endocrine: Negative for polydipsia, polyphagia and polyuria.   Genitourinary:  Negative for dysuria, frequency and urgency.   Musculoskeletal:  Negative for joint swelling and myalgias.   Skin:  Negative for rash and wound.   Neurological:  Negative for dizziness, syncope, weakness and headaches.   Psychiatric/Behavioral:  Negative for behavioral problems and dysphoric mood.          Diabetes Management Status  Statin: Not taking  ACE/ARB: Not taking    Screening or Prevention Patient's value Goal Complete/Controlled?   HgA1C Testing and Control   Lab Results   Component Value Date    HGBA1C 8.3 (H) 07/17/2024      Annually/Less than 8% No   Lipid profile : 07/17/2024 Annually Yes   LDL control  Lab Results   Component Value Date    LDLCALC 86.6 07/17/2024    Annually/Less than 100 mg/dl  Yes   Nephropathy screening Lab Results   Component Value Date    MICALBCREAT 13.0 03/11/2024     Lab Results   Component Value Date    PROTEINUA Negative 01/26/2021    Annually No   Blood pressure BP Readings from Last 1 Encounters:   07/16/24 110/66    Less than 140/90 Yes   Dilated retinal exam : 10/16/2023 Annually Yes    Foot exam   : 05/10/2024 Annually No     Social History     Socioeconomic History    Marital status: Single    Number of children: 2   Occupational History    Occupation:      Employer: [employer]      Employer: [employer]   Tobacco Use    Smoking status: Never    Smokeless tobacco: Never   Substance and Sexual Activity    Alcohol use: Yes     Comment: Rare    Drug use: No    Sexual activity: Yes     Partners: Male     Birth control/protection: Surgical   Social History Narrative    She wears seatbelt.     Social Determinants of Health     Financial Resource Strain: Medium Risk  (6/27/2024)    Overall Financial Resource Strain (CARDIA)     Difficulty of Paying Living Expenses: Somewhat hard   Food Insecurity: Food Insecurity Present (6/27/2024)    Hunger Vital Sign     Worried About Running Out of Food in the Last Year: Sometimes true   Transportation Needs: No Transportation Needs (6/27/2024)    TRANSPORTATION NEEDS     Transportation : No   Physical Activity: Insufficiently Active (5/6/2024)    Received from [facility]    Exercise Vital Sign     Days of Exercise per Week: 4 days     Minutes of Exercise per Session: 30 min   Stress: Stress Concern Present (7/11/2024)    [employer] [place] [employer] - Occupational Stress Questionnaire     Feeling of Stress : Very much   Housing Stability: Low Risk  (6/27/2024)    Housing Stability Vital Sign     Unable to Pay for Housing in the Last Year: No     Homeless in the Last Year: No     Past Medical History:   Diagnosis Date    Abnormal Pap smear     Abnormal Pap smear of cervix     Arthritis, low back     DM (diabetes mellitus) 09/2014    BS didn't check 01/31/2017    GERD (gastroesophageal reflux disease)     Gestational diabetes     Hepatitis C antibody positive in blood 02/11/2022    RNA NEGATIVE    History of abnormal Pap smear     Surgical menopause     Vitamin D deficiency        Objective:        Physical Exam  Constitutional:       General: She is not in acute distress.     Appearance: She is well-developed. She is not diaphoretic.   HENT:      Head: Normocephalic and atraumatic.      Right Ear: External ear normal.      Left Ear: External ear normal.      Nose: Nose normal.   Eyes:      General:         Right eye: No discharge.         Left eye: No discharge.   Pulmonary:      Effort: Pulmonary effort is normal. No respiratory distress.      Breath sounds: No stridor.   Musculoskeletal:         General: Normal range of motion.      Cervical back: Normal range of motion.   Skin:     Coloration: Skin is  not pale.   Neurological:      Mental Status: She is alert and oriented to person, place, and time.      Motor: No abnormal muscle tone.      Coordination: Coordination normal.   Psychiatric:         Behavior: Behavior normal.         Thought Content: Thought content normal.         Judgment: Judgment normal.             Assessment / Plan:     Type 1 diabetes mellitus with hyperglycemia  -     Hemoglobin A1C; Standing  -     SITagliptin phosphate (JANUVIA) 100 MG Tab; Take 1 tablet (100 mg total) by mouth once daily.  Dispense: 90 tablet; Refill: 3    Low serum C-peptide    Insulin pump in place        Additional Plan Details:    - POCT Glucose  - Encouraged continuation of lifestyle changes including regular exercise and limiting carbohydrates to 30-45 grams per meal threes times daily and 15 grams per snack with a limit of two daily.   - Encouraged continued monitoring of blood glucose with maintenance of 4 times daily and Continuously with personal CGM Dexcom.    - Current DM Medication Regimen: Continue CS-II Omnipod 5 with Fiasp. See settings below. Change IC to 8. Change CF to 40. Continue Januvia 100 mg daily.   - Updated emergency pump back up plan   - Health Maintenance standards addressed today: Eye Exam - will be completed within [facility] system and scheduled today, Flu Shot - patient would like to complete outside of [facility], COVID - 19 Vaccine - patient will schedule outside of [facility] , and vaccines counseling  - Nursing Visit: Patient is below goal range for nursing visit for age group and will not need nursing visit at this time .   - Follow up in 6 weeks.    CURRENT DM MEDICATIONS:   CS-II Omnipod 5 with Fiasp  Januvia 100 mg daily    INSULIN PUMP SETTINGS:  Insulin pump type: Omnipod 5  Insulin Type: Fiasp  Basal rate:   0.80 Units/hour from 0000 hours to 2400 hours  Insulin to Carbohydrate Ratio: 1/8   Insulin Sensitivity Factor: 1/40  Glucose Target: 120 mg/dl   Correct Above: 130 mg/dl  Active  Insulin Time: 4 hours    Blakeney McKnight, PA-C [facility]

## 2024-09-10 NOTE — PATIENT INSTRUCTIONS
POD PLACEMENT:   - stomach   - arm   - outer thigh   - flank near buttocks     VACCINES NEEDED:   Flu   COVID booster  Pneumonia   Shingles       U100 Pump Failure Plan:   We have decided to develop a plan in the event that your pump breaks or is nonfunctioning. After 4 hours without pump use, you should begin to consider putting your Pump Failure Back Up Plan into action especially if you foresee that you may not be able to use your pump for more than 20 hours. Since you are currently using short acting insulin (Humalog/Novolog/Admelog/Novolin R) in your pump, you will need access to your short acting insulin vial, syringes, and a back up long acting insulin in the event of a pump failure. I have sent a prescription for a long acting insulin to your pharmacy for use during your emergency pump failure plan. It is important that you keep both types of insulin/syringes with you so that you may have access to it at least 3 times daily if needed. This medication and syringes should be brought with you on overnight trips in the case of an emergency pump failure. This means making a plan to keeping your insulin and syringes at school, work, or other places you frequent. If needed, please reach out to my office about any letters you may need for permission to keep these items for emergency purposes. We will outline your plan for pump failure emergencies below, but please remember to contact the insulin pump company (toll free number is printed on the label on the back of the insulin pump) and our office if you have a pump failure. When the insulin pump is restarted, do not restart basal rates until at least 22 hours after the last long acting insulin injection. You can set a 0% temporary basal setting that will last until this time and use your pump to bolus for meals and correction. If you need help with these feature, please call your insulin pump company tech support line or ask them in anticipation of this action.      Dosing:   If the insulin pump is non functional and discontinued for anticipated more than 20 hours, please give daily injections of:    Toujeo 14 units daily  Januvia 100 mg daily  Fiasp meal size dosing and correction dosing every 3 hours as needed - as below    Fiasp Meal Size:   7 units before meals   3 units before snacks     Fiasp Correction Dosing every 3 hours as needed OUTSIDE OF EATING  If  - 250, may take 2 units of Fiasp  If  - 300, may take 3 units of Fiasp  If  - 350, may take 4 units of Fiasp  If  - 400, may take 5 units of Fiasp  If +, may take 6 units of Fiasp    NOTE: This plan is based off of your average insulin needs per recent pump report. Short Acting Insulin should be taken 15 mins before eating a meal and every 2 hours as needed for correction dosing. The dose calculation above may be used for correction dosing by either only calculating the units to cover blood sugar or inputting a carb amount of 0g if not eating. Please not that taking insulin at the 2 hour phillip may mean you still have insulin on board and may lead to hypoglycemia or low blood sugars due to insulin stacking. If calculating correction dosing for the 3rd time or more time, please consider dividing in half to avoid over correction of blood sugar leading to hypoglycemia. Please call office to report to  any large sugar abnormalities including hyperglycemia > 250 and hypoglycemia < 60. Please continue to wear CGM even if not using pump to monitor for blood sugar fluctuations. Please make sure glucose tablets or sugary beverages available in case of hypoglycemia. Please make sure ketone strips are available to you (may buy over the counter from pharmacy) and monitor urine ketones every 12 hours; if you have two readings og BG > 250 mg/dl; or if signs/symptoms concerning for DKA present including abdominal pain, nausea, vomiting, confusion, trouble breathing, dry mouth, fruity breath or breath  that smells like nail polish remover, and excessive thirst/urination. If your urine ketone strips shows trace to small ketones, drink plenty of water and give yourself a correction dose. If BG is less than 250 mg/dL and you do not have moderate/large ketones, you may also exercise to help lower glucose levels. If you have moderate/large ketones, please give correction dosage, drink water, and immediately contact [facility] if during normal business hours. If outside of normal business hours, please give correction dosage, drink water, and report to nearest ED for evaluation/management of DKA.     When the insulin pump is restarted, do not restart basal rates until at least 22 hours after the last long acting insulin injection. You can set a 0% temporary basal setting that will last until this time and still use your pump to bolus for meals and correction. For help with setting temporary basal setting, please call office, view your pump's online resource library, or call 24 hour technical support hotline.     For any technical insulin pump issues, please contact the insulin pump company; the toll free number is printed on the label on the back of the insulin pump.      Baqsimi Glucagon Kit - An intranasal Glucagon kit called Baqsimi has been sent to your pharmacy. I am going to link some instructions for use from the website. It is important that you review these instructions so that you can teach your family members, friends, coworkers, or others who may need to administer the medication in an emergency. Remember that emergency Glucagon kit (intranasal or injectable) are to be used in an emergency situation where you as a patient have lost consciousness secondary to low blood sugar, and it is no longer safe for you to drink or eat anything to bring your blood sugar up. If you are able to eat or drink safely, you should continue to do so to treat low blood sugar and save the Baqsimi glucagon kit for  emergency situations. If unable to ingest safely, someone would then administer the medication while calling 911 as you may require additional treatment. It is important to review this with your loved ones and let them know where you keep your emergency Baqsimi glucagon kit. Please review the link below and let me know if you have any questions or concerns:   https://www.[employer]/how-to-use-baqsimi    Baqsimi Savings Card - be sure to present this to the pharmacy in order to get 2 kits for $25  [email or web address]/patient-support       Ketone Strips: A prescription for Ketone strips has been sent to your pharmacy. Some insurance companies cover ketone strips while other do not. If your insurance does not cover ketone strips, I still recommend that you buy some over the counter to keep for screening for Diabetic Ketoacidosis. Patients with Diabetes are at risk developing a condition called diabetic ketoacidosis (DKA). This is a condition where ketones or acid builds up in your blood and can be LIFE THREATENING. Diabetic Ketoacidosis is usually associated with high blood sugars greater than 250 mg/dL, but can also develop in some patients at normal blood sugar levels.  If you notice abdominal pain, diarrhea, nausea, vomiting, lethargy, difficulty breathing, confusion, and unusual fatigue or sleepiness, please check your urine ketones using the ketone strips I gave you. I also recommend checking urine ketones if you are sick, having unexplained or not easily treated high blood sugars, or if you have an insulin pump failure. If your urine ketones are positive, please seek medical care immediately by going to the nearest Emergency Department.   Potential diabetic ketoacidosis (DKA) triggering factors identified in some cases included acute illness (e.g., urinary tract infection, urosepsis, gastroenteritis, influenza, or trauma), reduced caloric or fluid intake, and reduced insulin dose.     Urine Ketone Scale and  Action Plan:   NEGATIVE:  Normal Range. Resume monitoring with hyperglycemia > 200 or symptoms of DKA.    TRACE / MILD:   Slightly increased risk of DKA. Increase H20 intake and correction dose as needed every 2 hours. Monitor glucose and urine ketone level every 2 hours until returns to normal range.     MODERATE:   Moderate risk of DKA. Increase H20 intake. Consider correction dose injection if using a pump to ensure absorption of insulin every hour. Monitor glucose and urine ketone level every 30 mins to 1 hour. Contact [facility] if during normal business hours or consider reporting to nearest Emergency Dept for treatment if symptoms persist and hyperglycemia / urine ketone levels do not return to normal within 3 hours.     SEVERE / HIGH / LARGE:   Severe risk for DKA. Seek emergency treatment at nearest Emergency Dept. If unable to safely transfer to Emergency Dept or if altered mental status (confusion), please call 911.       Symptoms of Diabetic Ketoacidosis (DKA):  DKA most often happens slowly over time. But it can worsen in a few hours if you are vomiting.     The first symptoms are:  - Thirst and dry mouth  - Urinating a lot  - Stomach pain  - Nausea or vomiting  - Breath that smells fruity (from the ketones)    Over time, these symptoms may happen:  - Dry or flushed skin  - Nausea and vomiting  - Loss of appetite  - Weight loss  - Stomach pain  - Trouble breathing  - Trouble thinking or confusion  - Feeling very tired or weak. This can lead to coma    When in doubt, check your ketone levels and contact your healthcare provider.

## 2024-09-11 ENCOUNTER — PATIENT OUTREACH (OUTPATIENT)
Dept: ADMINISTRATIVE | Facility: OTHER | Age: 54
End: 2024-09-11
Payer: COMMERCIAL

## 2024-10-10 ENCOUNTER — PATIENT OUTREACH (OUTPATIENT)
Dept: ADMINISTRATIVE | Facility: HOSPITAL | Age: 54
End: 2024-10-10
Payer: COMMERCIAL

## 2024-10-10 NOTE — PROGRESS NOTES
VBC activation completed.    VBHM Score: 0     Patient is not due for any topics at this time.                 Pt is current on .

## 2024-10-28 ENCOUNTER — PATIENT OUTREACH (OUTPATIENT)
Dept: ADMINISTRATIVE | Facility: HOSPITAL | Age: 54
End: 2024-10-28
Payer: COMMERCIAL

## 2024-10-31 ENCOUNTER — TELEPHONE (OUTPATIENT)
Dept: OPHTHALMOLOGY | Facility: CLINIC | Age: 54
End: 2024-10-31
Payer: COMMERCIAL

## 2024-11-05 ENCOUNTER — OFFICE VISIT (OUTPATIENT)
Dept: DIABETES | Facility: CLINIC | Age: 54
End: 2024-11-05
Payer: COMMERCIAL

## 2024-11-05 DIAGNOSIS — E10.65 TYPE 1 DIABETES MELLITUS WITH HYPERGLYCEMIA: Primary | ICD-10-CM

## 2024-11-05 DIAGNOSIS — Z96.41 INSULIN PUMP IN PLACE: ICD-10-CM

## 2024-11-05 DIAGNOSIS — R79.89 LOW SERUM C-PEPTIDE: ICD-10-CM

## 2024-11-05 PROCEDURE — 3066F NEPHROPATHY DOC TX: CPT | Mod: CPTII,95,, | Performed by: PHYSICIAN ASSISTANT

## 2024-11-05 PROCEDURE — 95251 CONT GLUC MNTR ANALYSIS I&R: CPT | Mod: NDTC,,, | Performed by: PHYSICIAN ASSISTANT

## 2024-11-05 PROCEDURE — 3061F NEG MICROALBUMINURIA REV: CPT | Mod: CPTII,95,, | Performed by: PHYSICIAN ASSISTANT

## 2024-11-05 PROCEDURE — 3052F HG A1C>EQUAL 8.0%<EQUAL 9.0%: CPT | Mod: CPTII,95,, | Performed by: PHYSICIAN ASSISTANT

## 2024-11-05 PROCEDURE — 3072F LOW RISK FOR RETINOPATHY: CPT | Mod: CPTII,95,, | Performed by: PHYSICIAN ASSISTANT

## 2024-11-05 PROCEDURE — G2211 COMPLEX E/M VISIT ADD ON: HCPCS | Mod: 95,,, | Performed by: PHYSICIAN ASSISTANT

## 2024-11-05 PROCEDURE — 99214 OFFICE O/P EST MOD 30 MIN: CPT | Mod: 95,,, | Performed by: PHYSICIAN ASSISTANT

## 2024-11-05 RX ORDER — BLOOD-GLUCOSE SENSOR
1 EACH MISCELLANEOUS
Qty: 9 EACH | Refills: 3 | Status: SHIPPED | OUTPATIENT
Start: 2024-11-05 | End: 2025-11-05

## 2024-11-05 RX ORDER — DEXTROSE 4 G
TABLET,CHEWABLE ORAL
Qty: 1 EACH | Refills: 0 | Status: SHIPPED | OUTPATIENT
Start: 2024-11-05

## 2024-11-05 RX ORDER — LANCETS
EACH MISCELLANEOUS
Qty: 100 EACH | Refills: 11 | Status: SHIPPED | OUTPATIENT
Start: 2024-11-05

## 2024-11-05 NOTE — PROGRESS NOTES
PCP: Tristian Bearden MD    Subjective:     Chief Complaint: Diabetes - Established Patient  TELEMEDICINE VISIT:     The patient location is: Home  The chief complaint leading to consultation is: Diabetes Follow up  Visit type: Virtual visit with synchronous audio and video  Total time spent: 18  Each patient to whom he or she provides medical services by telemedicine is:  (1) informed of the relationship between the physician and patient and the respective role of any other health care provider with respect to management of the patient; and (2) notified that he or she may decline to receive medical services by telemedicine and may withdraw from such care at any time.    Notes: N/A      HISTORY OF PRESENT ILLNESS: 53 y.o.   female presenting for diabetes management visit.   The patient's last visit with me was on 9/10/2024.  Patient has had Type I diabetes due to low C peptide since 5 years or more.  Pertinent to decision making is the following comorbidities: Vitamin D Deficiency and Gestational DM in Pregnancy 2003  Patient has the following Diabetes complications: without complications  She  has attended diabetes education in the past.     Patient's most recent A1c of 8.3% was completed 4 months ago.   Patient states since Her last A1c Her blood glucose levels have been high  after meals.   Patient monitors blood glucose 4 times per day and Continuously with personal CGM Dexcom.   Patient blood glucose monitoring device will be uploaded into Media [employer] today.         Patient reports show postprandial hyperglycemia following carb bolusing but not when carb counting is accurate.. Otherwise baseline euglycemia and appropriate correction of correction dosing.   Patient endorses the following diabetes related symptoms: None.   Patient is due today for the following diabetes-related health maintenance standards: Eye Exam, A1c, Influenza Vaccine, and COVID-19 Vaccine . Sched for eye exam in Nov.    She denies recent hospital admissions or emergency room visits.   She denies having hypoglycemia as above.   Patient's concerns today include glycemic control.   Patient medication regimen is as below.     CURRENT DM MEDICATIONS:   CS-II Omnipod 5 with Fiasp  Januvia 100 mg daily    INSULIN PUMP SETTINGS:  Insulin pump type: Omnipod 5  Insulin Type: Fiasp  Basal rate:   0.80 Units/hour from 0000 hours to 2400 hours  Insulin to Carbohydrate Ratio: 1/8   Insulin Sensitivity Factor: 1/40  Glucose Target: 120 mg/dl   Correct Above: 130 mg/dl  Active Insulin Time: 4 hours    Other Pump Considerations:   Current Glucagon Kit: Yes  Current Ketone Strips: Yes  Emergency Plan for Pump Failure:   Toujeo 18 units daily  Fiasp meal size dosing and correction dosing every 3 hours as needed - as below    Fiasp Meal Size:   6 units before meals   3 units before snacks     Fiasp Correction Dosing every 3 hours as needed OUTSIDE OF EATING  If  - 250, may take 2 units of Fiasp  If  - 300, may take 3 units of Fiasp  If  - 350, may take 4 units of Fiasp  If  - 400, may take 5 units of Fiasp  If +, may take 6 units of Fiasp      Patient has failed the following Diabetes medications:   Metformin - GI   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       Lab Results   Component Value Date    CPEPTIDE 1.67 11/20/2023     Lab Results   Component Value Date    GLUTAMICACID 0.00 09/28/2020          //   , There is no height or weight on file to calculate BMI.  Her blood sugar in clinic today is:    Lab Results   Component Value Date    POCGLU 233 (A) 05/10/2024       Review of Systems   Constitutional:  Negative for activity change, appetite change, chills and fever.   HENT:  Negative for dental problem, mouth sores, nosebleeds, sore throat and trouble swallowing.    Eyes:   Negative for pain and discharge.   Respiratory:  Negative for shortness of breath, wheezing and stridor.    Cardiovascular:  Negative for chest pain, palpitations and leg swelling.   Gastrointestinal:  Negative for abdominal pain, diarrhea, nausea and vomiting.   Endocrine: Negative for polydipsia, polyphagia and polyuria.   Genitourinary:  Negative for dysuria, frequency and urgency.   Musculoskeletal:  Negative for joint swelling and myalgias.   Skin:  Negative for rash and wound.   Neurological:  Negative for dizziness, syncope, weakness and headaches.   Psychiatric/Behavioral:  Negative for behavioral problems and dysphoric mood.          Diabetes Management Status  Statin: Not taking  ACE/ARB: Not taking    Screening or Prevention Patient's value Goal Complete/Controlled?   HgA1C Testing and Control   Lab Results   Component Value Date    HGBA1C 8.3 (H) 07/17/2024      Annually/Less than 8% No   Lipid profile : 07/17/2024 Annually Yes   LDL control  Lab Results   Component Value Date    LDLCALC 86.6 07/17/2024    Annually/Less than 100 mg/dl  Yes   Nephropathy screening Lab Results   Component Value Date    MICALBCREAT 13.0 03/11/2024     Lab Results   Component Value Date    PROTEINUA Negative 01/26/2021    Annually No   Blood pressure BP Readings from Last 1 Encounters:   07/16/24 110/66    Less than 140/90 Yes   Dilated retinal exam : 10/16/2023 Annually Yes    Foot exam   : 05/10/2024 Annually No     Social History     Socioeconomic History    Marital status: Single    Number of children: 2   Occupational History    Occupation:      Employer: [employer]     Employer: [employer]   Tobacco Use    Smoking status: Never    Smokeless tobacco: Never   Substance and Sexual Activity    Alcohol use: Yes     Comment: Rare    Drug use: No    Sexual activity: Yes     Partners: Male     Birth control/protection: Surgical   Social History Narrative    She wears seatbelt.     Social Drivers  of Health     Financial Resource Strain: Medium Risk (6/27/2024)    Overall Financial Resource Strain (CARDIA)     Difficulty of Paying Living Expenses: Somewhat hard   Food Insecurity: Food Insecurity Present (6/27/2024)    Hunger Vital Sign     Worried About Running Out of Food in the Last Year: Sometimes true   Transportation Needs: No Transportation Needs (6/27/2024)    TRANSPORTATION NEEDS     Transportation : No   Physical Activity: Insufficiently Active (5/6/2024)    Received from [facility]    Exercise Vital Sign     Days of Exercise per Week: 4 days     Minutes of Exercise per Session: 30 min   Stress: Stress Concern Present (7/11/2024)    Citizen of [place] [employer] - Occupational Stress Questionnaire     Feeling of Stress : Very much   Housing Stability: Low Risk  (6/27/2024)    Housing Stability Vital Sign     Unable to Pay for Housing in the Last Year: No     Homeless in the Last Year: No     Past Medical History:   Diagnosis Date    Abnormal Pap smear     Abnormal Pap smear of cervix     Arthritis, low back     DM (diabetes mellitus) 09/2014    BS didn't check 01/31/2017    GERD (gastroesophageal reflux disease)     Gestational diabetes     Hepatitis C antibody positive in blood 02/11/2022    RNA NEGATIVE    History of abnormal Pap smear     Surgical menopause     Vitamin D deficiency        Objective:        Physical Exam  Constitutional:       General: She is not in acute distress.     Appearance: She is well-developed. She is not diaphoretic.   HENT:      Head: Normocephalic and atraumatic.      Right Ear: External ear normal.      Left Ear: External ear normal.      Nose: Nose normal.   Eyes:      General:         Right eye: No discharge.         Left eye: No discharge.   Pulmonary:      Effort: Pulmonary effort is normal. No respiratory distress.      Breath sounds: No stridor.   Musculoskeletal:         General: Normal range of motion.      Cervical back:  Normal range of motion.   Skin:     Coloration: Skin is not pale.   Neurological:      Mental Status: She is alert and oriented to person, place, and time.      Motor: No abnormal muscle tone.      Coordination: Coordination normal.   Psychiatric:         Behavior: Behavior normal.         Thought Content: Thought content normal.         Judgment: Judgment normal.             Assessment / Plan:     Type 1 diabetes mellitus with hyperglycemia  -     blood-glucose sensor (DEXCOM G7 SENSOR) [place]; 1 each by Misc.(Non-Drug; Combo Route) route every 10 days.  Dispense: 9 each; Refill: 3  -     CBC Auto Differential; Future; Expected date: 11/05/2024  -     Comprehensive Metabolic Panel; Future; Expected date: 11/05/2024  -     LIPID PANEL; Future; Expected date: 11/05/2024  -     Hemoglobin A1C; Future; Expected date: 11/05/2024  -     TSH; Future; Expected date: 11/05/2024  -     blood-glucose meter kit; To check BG 3 times daily, to use with insurance preferred meter  Dispense: 1 each; Refill: 0  -     lancets Misc; To check BG 3 times daily, to use with insurance preferred meter  Dispense: 100 each; Refill: 11  -     blood sugar diagnostic Strp; To check BG 3 times daily, to use with insurance preferred meter  Dispense: 100 each; Refill: 11  -     SITagliptin phosphate (JANUVIA) 100 MG Tab; Take 1 tablet (100 mg total) by mouth once daily.  Dispense: 90 tablet; Refill: 3    Low serum C-peptide    Insulin pump in place        Additional Plan Details:    - POCT Glucose  - Encouraged continuation of lifestyle changes including regular exercise and limiting carbohydrates to 30-45 grams per meal threes times daily and 15 grams per snack with a limit of two daily.   - Encouraged continued monitoring of blood glucose with maintenance of 4 times daily and Continuously with personal CGM Dexcom.  Insurance preferred Meter and supplies Rx today.   - Current DM Medication Regimen: Continue CS-II Omnipod 5 with Fiasp. See settings  below. No changes to settings - focus on dosing on time and accurately for carbs. Continue Januvia 100 mg daily.   - Updated emergency pump back up plan   - Fasting labs sched  - Health Maintenance standards addressed today: Eye Exam - will be completed within [facility] and scheduled today, A1c to be scheduled, Flu Shot - patient would like to complete outside of [facility], COVID - 19 Vaccine - patient will schedule outside of [facility] , and vaccines counseling  - Nursing Visit: Patient is below goal range for nursing visit for age group and will not need nursing visit at this time .   - Follow up in 6 weeks.    CURRENT DM MEDICATIONS:   CS-II Omnipod 5 with Fiasp  Januvia 100 mg daily    INSULIN PUMP SETTINGS:  Insulin pump type: Omnipod 5  Insulin Type: Fiasp  Basal rate:   0.80 Units/hour from 0000 hours to 2400 hours  Insulin to Carbohydrate Ratio: 1/8   Insulin Sensitivity Factor: 1/40  Glucose Target: 120 mg/dl   Correct Above: 130 mg/dl  Active Insulin Time: 4 hours    Odilia Shaffer PA-C  [facility]

## 2024-11-05 NOTE — PATIENT INSTRUCTIONS
VACCINES NEEDED:   Flu   COVID booster  Pneumonia   Shingles       U100 Pump Failure Plan:   We have decided to develop a plan in the event that your pump breaks or is nonfunctioning. After 4 hours without pump use, you should begin to consider putting your Pump Failure Back Up Plan into action especially if you foresee that you may not be able to use your pump for more than 20 hours. Since you are currently using short acting insulin (Humalog/Novolog/Admelog/Novolin R) in your pump, you will need access to your short acting insulin vial, syringes, and a back up long acting insulin in the event of a pump failure. I have sent a prescription for a long acting insulin to your pharmacy for use during your emergency pump failure plan. It is important that you keep both types of insulin/syringes with you so that you may have access to it at least 3 times daily if needed. This medication and syringes should be brought with you on overnight trips in the case of an emergency pump failure. This means making a plan to keeping your insulin and syringes at school, work, or other places you frequent. If needed, please reach out to my office about any letters you may need for permission to keep these items for emergency purposes. We will outline your plan for pump failure emergencies below, but please remember to contact the insulin pump company (toll free number is printed on the label on the back of the insulin pump) and our office if you have a pump failure. When the insulin pump is restarted, do not restart basal rates until at least 22 hours after the last long acting insulin injection. You can set a 0% temporary basal setting that will last until this time and use your pump to bolus for meals and correction. If you need help with these feature, please call your insulin pump company tech support line or ask them in anticipation of this action.     Dosing:   If the insulin pump is non functional and discontinued for  anticipated more than 20 hours, please give daily injections of:    Toujeo 18 units daily  Januvia 100 mg daily  Fiasp meal size dosing and correction dosing every 3 hours as needed - as below    Fiasp Meal Size:   6 units before meals   3 units before snacks     Fiasp Correction Dosing every 3 hours as needed OUTSIDE OF EATING  If  - 250, may take 2 units of Fiasp  If  - 300, may take 3 units of Fiasp  If  - 350, may take 4 units of Fiasp  If  - 400, may take 5 units of Fiasp  If +, may take 6 units of Fiasp    NOTE: This plan is based off of your average insulin needs per recent pump report. Short Acting Insulin should be taken 15 mins before eating a meal and every 2 hours as needed for correction dosing. The dose calculation above may be used for correction dosing by either only calculating the units to cover blood sugar or inputting a carb amount of 0g if not eating. Please not that taking insulin at the 2 hour phillip may mean you still have insulin on board and may lead to hypoglycemia or low blood sugars due to insulin stacking. If calculating correction dosing for the 3rd time or more time, please consider dividing in half to avoid over correction of blood sugar leading to hypoglycemia. Please call office to report to  any large sugar abnormalities including hyperglycemia > 250 and hypoglycemia < 60. Please continue to wear CGM even if not using pump to monitor for blood sugar fluctuations. Please make sure glucose tablets or sugary beverages available in case of hypoglycemia. Please make sure ketone strips are available to you (may buy over the counter from pharmacy) and monitor urine ketones every 12 hours; if you have two readings og BG > 250 mg/dl; or if signs/symptoms concerning for DKA present including abdominal pain, nausea, vomiting, confusion, trouble breathing, dry mouth, fruity breath or breath that smells like nail polish remover, and excessive thirst/urination. If  your urine ketone strips shows trace to small ketones, drink plenty of water and give yourself a correction dose. If BG is less than 250 mg/dL and you do not have moderate/large ketones, you may also exercise to help lower glucose levels. If you have moderate/large ketones, please give correction dosage, drink water, and immediately contact [facility] if during normal business hours. If outside of normal business hours, please give correction dosage, drink water, and report to nearest ED for evaluation/management of DKA.     When the insulin pump is restarted, do not restart basal rates until at least 22 hours after the last long acting insulin injection. You can set a 0% temporary basal setting that will last until this time and still use your pump to bolus for meals and correction. For help with setting temporary basal setting, please call office, view your pump's online resource library, or call 24 hour technical support hotline.     For any technical insulin pump issues, please contact the insulin pump company; the toll free number is printed on the label on the back of the insulin pump.      Baqsimi Glucagon Kit - An intranasal Glucagon kit called Baqsimi has been sent to your pharmacy. I am going to link some instructions for use from the website. It is important that you review these instructions so that you can teach your family members, friends, coworkers, or others who may need to administer the medication in an emergency. Remember that emergency Glucagon kit (intranasal or injectable) are to be used in an emergency situation where you as a patient have lost consciousness secondary to low blood sugar, and it is no longer safe for you to drink or eat anything to bring your blood sugar up. If you are able to eat or drink safely, you should continue to do so to treat low blood sugar and save the Baqsimi glucagon kit for emergency situations. If unable to ingest safely, someone would then  administer the medication while calling 911 as you may require additional treatment. It is important to review this with your loved ones and let them know where you keep your emergency Baqsimi glucagon kit. Please review the link below and let me know if you have any questions or concerns:   [email or web address]-to-use-baqsimi    Baqsimi Savings Card - be sure to present this to the pharmacy in order to get 2 kits for $25  [email or web address]       Ketone Strips: A prescription for Ketone strips has been sent to your pharmacy. Some insurance companies cover ketone strips while other do not. If your insurance does not cover ketone strips, I still recommend that you buy some over the counter to keep for screening for Diabetic Ketoacidosis. Patients with Diabetes are at risk developing a condition called diabetic ketoacidosis (DKA). This is a condition where ketones or acid builds up in your blood and can be LIFE THREATENING. Diabetic Ketoacidosis is usually associated with high blood sugars greater than 250 mg/dL, but can also develop in some patients at normal blood sugar levels.  If you notice abdominal pain, diarrhea, nausea, vomiting, lethargy, difficulty breathing, confusion, and unusual fatigue or sleepiness, please check your urine ketones using the ketone strips I gave you. I also recommend checking urine ketones if you are sick, having unexplained or not easily treated high blood sugars, or if you have an insulin pump failure. If your urine ketones are positive, please seek medical care immediately by going to the nearest Emergency Department.   Potential diabetic ketoacidosis (DKA) triggering factors identified in some cases included acute illness (e.g., urinary tract infection, urosepsis, gastroenteritis, influenza, or trauma), reduced caloric or fluid intake, and reduced insulin dose.     Urine Ketone Scale and Action Plan:   NEGATIVE:  Normal Range. Resume monitoring with  hyperglycemia > 200 or symptoms of DKA.    TRACE / MILD:   Slightly increased risk of DKA. Increase H20 intake and correction dose as needed every 2 hours. Monitor glucose and urine ketone level every 2 hours until returns to normal range.     MODERATE:   Moderate risk of DKA. Increase H20 intake. Consider correction dose injection if using a pump to ensure absorption of insulin every hour. Monitor glucose and urine ketone level every 30 mins to 1 hour. Contact [facility] if during normal business hours or consider reporting to nearest Emergency Dept for treatment if symptoms persist and hyperglycemia / urine ketone levels do not return to normal within 3 hours.     SEVERE / HIGH / LARGE:   Severe risk for DKA. Seek emergency treatment at nearest Emergency Dept. If unable to safely transfer to Emergency Dept or if altered mental status (confusion), please call 911.       Symptoms of Diabetic Ketoacidosis (DKA):  DKA most often happens slowly over time. But it can worsen in a few hours if you are vomiting.     The first symptoms are:  - Thirst and dry mouth  - Urinating a lot  - Stomach pain  - Nausea or vomiting  - Breath that smells fruity (from the ketones)    Over time, these symptoms may happen:  - Dry or flushed skin  - Nausea and vomiting  - Loss of appetite  - Weight loss  - Stomach pain  - Trouble breathing  - Trouble thinking or confusion  - Feeling very tired or weak. This can lead to coma    When in doubt, check your ketone levels and contact your healthcare provider.

## 2024-11-19 ENCOUNTER — PATIENT OUTREACH (OUTPATIENT)
Dept: ADMINISTRATIVE | Facility: HOSPITAL | Age: 54
End: 2024-11-19
Payer: COMMERCIAL

## 2024-11-19 ENCOUNTER — CLINICAL SUPPORT (OUTPATIENT)
Dept: DIABETES | Facility: CLINIC | Age: 54
End: 2024-11-19
Payer: COMMERCIAL

## 2024-11-19 DIAGNOSIS — E10.65 TYPE 1 DIABETES MELLITUS WITH HYPERGLYCEMIA: Primary | ICD-10-CM

## 2024-11-19 PROCEDURE — G0108 DIAB MANAGE TRN  PER INDIV: HCPCS | Mod: S$GLB,,, | Performed by: DIETITIAN, REGISTERED

## 2024-11-19 PROCEDURE — 99999 PR PBB SHADOW E&M-EST. PATIENT-LVL III: CPT | Mod: PBBFAC,,, | Performed by: DIETITIAN, REGISTERED

## 2024-11-19 NOTE — PATIENT INSTRUCTIONS
U100 Pump Failure Plan:   We have decided to develop a plan in the event that your pump breaks or is nonfunctioning. After 4 hours without pump use, you should begin to consider putting your Pump Failure Back Up Plan into action especially if you foresee that you may not be able to use your pump for more than 20 hours. Since you are currently using short acting insulin (Humalog/Novolog/Admelog/Novolin R) in your pump, you will need access to your short acting insulin vial, syringes, and a back up long acting insulin in the event of a pump failure. I have sent a prescription for a long acting insulin to your pharmacy for use during your emergency pump failure plan. It is important that you keep both types of insulin/syringes with you so that you may have access to it at least 3 times daily if needed. This medication and syringes should be brought with you on overnight trips in the case of an emergency pump failure. This means making a plan to keeping your insulin and syringes at school, work, or other places you frequent. If needed, please reach out to my office about any letters you may need for permission to keep these items for emergency purposes. We will outline your plan for pump failure emergencies below, but please remember to contact the insulin pump company (toll free number is printed on the label on the back of the insulin pump) and our office if you have a pump failure. When the insulin pump is restarted, do not restart basal rates until at least 22 hours after the last long acting insulin injection. You can set a 0% temporary basal setting that will last until this time and use your pump to bolus for meals and correction. If you need help with these feature, please call your insulin pump company tech support line or ask them in anticipation of this action.      Dosing:   If the insulin pump is non functional and discontinued for anticipated more than 20 hours, please give daily injections of:     Toujeo  18 units daily  Januvia 100 mg daily  Fiasp meal size dosing and correction dosing every 3 hours as needed - as below     Fiasp Meal Size:   6 units before meals   3 units before snacks      Fiasp Correction Dosing every 3 hours as needed OUTSIDE OF EATING  If  - 250, may take 2 units of Fiasp  If  - 300, may take 3 units of Fiasp  If  - 350, may take 4 units of Fiasp  If  - 400, may take 5 units of Fiasp  If +, may take 6 units of Fiasp     NOTE: This plan is based off of your average insulin needs per recent pump report. Short Acting Insulin should be taken 15 mins before eating a meal and every 2 hours as needed for correction dosing. The dose calculation above may be used for correction dosing by either only calculating the units to cover blood sugar or inputting a carb amount of 0g if not eating. Please not that taking insulin at the 2 hour phillip may mean you still have insulin on board and may lead to hypoglycemia or low blood sugars due to insulin stacking. If calculating correction dosing for the 3rd time or more time, please consider dividing in half to avoid over correction of blood sugar leading to hypoglycemia. Please call office to report to  any large sugar abnormalities including hyperglycemia > 250 and hypoglycemia < 60. Please continue to wear CGM even if not using pump to monitor for blood sugar fluctuations. Please make sure glucose tablets or sugary beverages available in case of hypoglycemia. Please make sure ketone strips are available to you (may buy over the counter from pharmacy) and monitor urine ketones every 12 hours; if you have two readings og BG > 250 mg/dl; or if signs/symptoms concerning for DKA present including abdominal pain, nausea, vomiting, confusion, trouble breathing, dry mouth, fruity breath or breath that smells like nail polish remover, and excessive thirst/urination. If your urine ketone strips shows trace to small ketones, drink plenty of  water and give yourself a correction dose. If BG is less than 250 mg/dL and you do not have moderate/large ketones, you may also exercise to help lower glucose levels. If you have moderate/large ketones, please give correction dosage, drink water, and immediately contact [facility] if during normal business hours. If outside of normal business hours, please give correction dosage, drink water, and report to nearest ED for evaluation/management of DKA.      When the insulin pump is restarted, do not restart basal rates until at least 22 hours after the last long acting insulin injection. You can set a 0% temporary basal setting that will last until this time and still use your pump to bolus for meals and correction. For help with setting temporary basal setting, please call office, view your pump's online resource library, or call 24 hour technical support hotline.      For any technical insulin pump issues, please contact the insulin pump company; the toll free number is printed on the label on the back of the insulin pump.

## 2024-11-19 NOTE — PROGRESS NOTES
Diabetes Care Specialist Virtual Visit Note       The patient location is: home in [place]  The chief complaint leading to consultation is: Diabetes  Visit type: audiovisual  Total time spent with patient: 10 min   Each patient to whom he or she provides medical services by telemedicine is:  (1) informed of the relationship between the physician and patient and the respective role of any other health care provider with respect to management of the patient; and (2) notified that he or she may decline to receive medical services by telemedicine and may withdraw from such care at any time.    Diabetes Care Specialist Progress Note  Author: Marcella Duke RD, CDE  Date: 11/19/2024    Intake    Program Intake  Reason for Diabetes Program Visit:: Intervention (DM referral 8/27/23, 5/10/24 Odilia Shaffer, PAYoungC)  Type of Intervention:: Individual  Education: Advanced Pump  Device Training: Personal CGM (Pt requesting support for DexG7 training and linking to OP5.)  Current diabetes risk level:: moderate    Current Diabetes Treatment:  (Omnipod 5 with Fiasp. Januvia 100mg 1tab daily.)      Lab Results   Component Value Date    HGBA1C 8.3 (H) 07/17/2024      Pt unable to complete DexG7 mobile arlen set up on virtual visit with prompts provided. She agrees to come into clinic today 1pm.     Length of Visit   Total Time: 10 Minutes

## 2024-11-20 NOTE — PROGRESS NOTES
Diabetes Care Specialist Progress Note  Author: Marcella Duke RD, CDE  Date: 11/20/2024    Intake    Program Intake  Reason for Diabetes Program Visit:: Intervention (DM referral 8/27/23, 5/10/24 Odilia Shaffer, PAYoungC)  Type of Intervention:: Individual  Education: Advanced Pump - OP5 & switching G6/G7 compatible Pods  Device Training:  DexG7 training   Current diabetes risk level:: moderate  In the last month, have you used the ER or been admitted to the hospital: No    Current Diabetes Treatment:  (Omnipod 5 with Fiasp. Januvia 100mg 1tab daily.)    Continuous Glucose Monitoring  Personal CGM type:: DexG6 and in clinic today to upgrade DexG7    Lab Results   Component Value Date    HGBA1C 8.3 (H) 07/17/2024     Lifestyle Coping Support & Clinical    Problem Review  Active Comorbidities:  (Gestational DM in Pregnancy 2003, HepC, GERD, Vit D def.)    Diabetes Self-Management Skills Assessment    Medication Skills Assessment  Patient is able to identify current diabetes medications, dosages, and appropriate timing of medications.: no - Pt states consecutive days without OmniPod use. Pt states not using pump backup plan.  Patient reports problems or concerns with current medication regimen.: yes -Pt states understanding risks associated with not taking insulin and reports holding insulin due to Dexcom issues. Pt states understanding OmniPod can be used in manual mode.  Patient is  aware that some diabetes medications can cause low blood sugar?: Yes  Medication Skills Assessment Completed:: Yes  Assessment indicates:: Adequate understanding  Area of need?: Yes    Nutrition/Healthy Eating  Patient can identify foods that impact blood sugar.: yes  Challenges to healthy eating::  (irregular meal patterns)  Nutrition/Healthy Eating Skills Assessment Completed:: Yes  Assessment indicates:: Adequate understanding  Area of need?: Yes    Home Blood Glucose Monitoring  Patient states that blood sugar is checked at  home daily.: yes  Monitoring Method:: personal continuous glucose monitor  Personal CGM type:: DexG6 and in clinic today to upgrade DexG7. Pt states issues with Dexcom sensors and reported to Customer Support.   Home Blood Glucose Monitoring Skills Assessment Completed: : Yes  Assessment indicates:: Instruction Needed  Area of need?: Yes    Acute Complications  Have you ever had hypoglycemia (low BG 70 or less)?:  (pt denies symptoms/episodes)  Have you ever had hyperglycemia (high  or more)?:  (pt denies symptoms/episodes)  Acute Complications Skills Assessment Completed: : Yes  Assessment indicates:: Adequate understanding  Area of need?: Yes    Assessment Summary and Plan  Based on today's diabetes care assessment, the following areas of need were identified:      Identified Areas of Need      Medication/Current Diabetes Treatment: Yes - see care plan   Nutrition/Healthy Eating: Yes - see care plan   Home Blood Glucose Monitoring: Yes - see care plan   Acute Complications: Yes - see care plan     Today's interventions were provided through individual discussion, instruction, and written materials were provided.    Patient verbalized understanding of instruction and written materials.  Pt was able to return back demonstration of instructions today. Patient understood key points, needs reinforcement and further instruction.     Diabetes Self-Management Care Plan:  Today's Diabetes Self-Management Care Plan was developed with Chan's input. Chan has agreed to work toward the following goal(s) to improve his/her overall diabetes control.      Care Plan: Diabetes Management   Updates made since 11/21/2023 12:00 AM        Problem: Blood Glucose Self-Monitoring         Problem: Medications         Goal: Patient Agrees to take Diabetes Medication(s) as prescribed.    Start Date: 9/5/2023   Expected End Date: 5/10/2025   This Visit's Progress: Not met   Recent Progress: On track   Priority: High   Barriers:  "Lack of Motivation to Change   Note:    Encouraged consistency of using OP5 in manual mode with glucometer checks when Dexcom unavailable. Reviewed risk DKA in T1D, associated symptoms and when to seek emergency care.     Provided printed copy of pump backup plan from provider Karthikeyan's last encounter notes.     Pt didn't bring OmniPod controller to clinic. She reports understanding process to prepare and place OmniPod G6/G7 compatible Pod. Reviewed steps on how to enter DexG7 sensor code and start automated limited mode.     Pt verbalized/demonstrated understanding of all training and agrees to maintain clinic contact.  _______________  Dexcom Account:   [email or web address]  [email or web address]    [email or web address] Account:  Severo Akins#      Problem: Healthy Eating         Goal: Eat 3 meals daily - manage carb 45 grams/meal, 5-15grams/snack Completed 11/19/2024   Start Date: 9/5/2023   Expected End Date: 8/27/2024   This Visit's Progress: Deferred   Recent Progress: On track   Priority: Medium   Barriers: Other (comments)   Note:    Deferred goal due to time needed with equipment restart. Pt reports not needing additional carb ctg refresher.     Problem: Blood Glucose Self-Monitoring         Problem: Blood Glucose Self-Monitoring         Goal: Patient agrees to use DexG7 and backup meter as directed.    Start Date: 11/19/2024   Expected End Date: 5/10/2025   Priority: Medium   Barriers: Knowledge deficit; Other (comments)   Note:    DEXCOM G7 CGM TRAINING  Dexcom G7 mobile arlen downloaded to phone. Education was provided using "Quick Start Guide" and demo device per Dexcom protocol. [facility] data share set-up.      Overview:  5 minute glucose reading updates, trending arrows, BG graph screens, reports screen,  connectivity and functions.   Menus: Trend graph, start sensor, enter BG, events, alerts, settings, replace sensor, stop sensor, and shutdown  Dexcom G7 mobile arlen/ Settings:                      "     * Urgent Low: 55 mg/dL                          * Urgent Low Soon: On                          * Low Alert: 70 mg/dL, repeat 15min                          * High Alert: 250 mg/dL                          * Signal Loss: on                          * Brief Sensor Issue: on     Reviewed additional setting options.  Patient paired sensor using 4-digit code listed on sensor cap.  Reviewed where to find sensor insertion time and expiration date.   Reviewed appropriate calibration techniques.  Reviewed sensor site selection. Patient selected and prepped site using aseptic technique, inserted sensor, applied overlay tape and started session.   Reviewed sensor removal from site. Discussed times to remove sensor per  guidelines include MRI or diatherapy.   Patient able to demonstrate without difficulty. Encouraged to review manual and/or training videos prior to starting another sensor.   Reviewed problem solving aspects of sensor transmission/variables that can disrupt RF transmission.  range 20 feet, but the first 3 hrs keep within 3 feet of transmitter.  Patient instructed on lag time of interstitial fluid from CBG and was advised to treat hypoglycemia and dose insulin based on SMBG values.  Dexcom technical support contact number given and examples of when to contact them discussed.        Follow Up Plan  Follow up in about 2 months (around 1/19/2025).  -review OP5 reports  -eval goals    Today's care plan and follow up schedule was discussed with patient.  Renaldo verbalized understanding of the care plan, goals, and agrees to follow up plan.        The patient was encouraged to communicate with his/her health care provider/physician and care team regarding his/her condition(s) and treatment.  I provided the patient with my contact information today and encouraged to contact me via phone or [facility] as needed.     Length of Visit   Total Time: 60 Minutes

## 2024-11-22 NOTE — PROGRESS NOTES
is a 35 year old female here for an obstetrics check.  She is      . Gestational age: 28w3d     She reports fetal movement  She denies contractions  She denies bleeding  She denies headaches  She denies edema  She denies abdominal pain  She denies rupture of membranes  She denies vision changes    Tdap today  Flu declined today        See Prenatal Flowsheet for additional comments.   Subjective:     Patient ID: Chan Dwyer is a 52 y.o. female.    Chief Complaint: Pain of the Left Hand      HPI:  The patient is a 52-year-old female status post left ring trigger finger release 10/10/2022.  She did well with the surgery she has no triggering anymore but does have a tender scar.  She does not wish to have an injection and is going on a cruise to [place] but may call after that time for injection    Past Medical History:   Diagnosis Date    Abnormal Pap smear     Abnormal Pap smear of cervix     Arthritis, low back     Diabetes 09/2014     BS 162am 09/16/2014    DM (diabetes mellitus) 09/2014     am 01/26/2016    DM (diabetes mellitus) 09/2014    BS didn't check 01/31/2017    GERD (gastroesophageal reflux disease)     Gestational diabetes     Hepatitis C antibody positive in blood 02/11/2022    RNA NEGATIVE    History of abnormal Pap smear     Surgical menopause     Vitamin D deficiency      Past Surgical History:   Procedure Laterality Date    COLONOSCOPY N/A 6/22/2016    Procedure: COLONOSCOPY;  Surgeon: Vishal Mary III, MD;  Location: [facility] [place] ENDO;  Service: Endoscopy;  Laterality: N/A;    Cryotherapy of the cervix  1999    DILATION AND CURETTAGE OF UTERUS      HYSTERECTOMY      LEFT HEART CATHETERIZATION Left 3/24/2023    Procedure: Left heart cath;  Surgeon: Lee Lambert MD;  Location: [facility] [place] CATH LAB;  Service: Cardiology;  Laterality: Left;    [facility]  2/2012    TRIGGER FINGER RELEASE Left 10/10/2022    Procedure: RELEASE, TRIGGER FINGER;  Surgeon: Adarsh Chavez MD;  Location: [facility] OR;  Service: Orthopedics;  Laterality: Left;  left ring trigger finger release    TUBAL LIGATION       Family History   Problem Relation Age of Onset    Diabetes Father     Hypertension Father     Hypertension Mother     Diabetes Mother     Heart attack Mother     No Known Problems Brother     No Known Problems Brother     No Known Problems Brother     No Known Problems Brother      Stomach cancer Sister     No Known Problems Sister     No Known Problems Sister     No Known Problems Sister     No Known Problems Sister     No Known Problems Son     No Known Problems Son     Cancer Neg Hx     Stroke Neg Hx     Colon cancer Neg Hx     Ovarian cancer Neg Hx     Breast cancer Neg Hx      Social History     Socioeconomic History    Marital status: Single    Number of children: 2   Occupational History    Occupation:      Employer: [employer]     Employer: [employer]   Tobacco Use    Smoking status: Never    Smokeless tobacco: Never   Substance and Sexual Activity    Alcohol use: Yes     Comment: Rare    Drug use: No    Sexual activity: Yes     Partners: Male     Birth control/protection: Surgical   Social History Narrative    She wears seatbelt.     Medication List with Changes/Refills   Current Medications    ALCOHOL SWABS (EASY COMFORT ALCOHOL PAD) PADM    Apply 1 each topically 4 (four) times daily.    ASPIRIN (ECOTRIN) 81 MG EC TABLET    Take 1 tablet (81 mg total) by mouth once daily.    IBUPROFEN (ADVIL,MOTRIN) 600 MG TABLET    Take 1 tablet (600 mg total) by mouth every 6 (six) hours as needed for Pain.    INSULIN GLARGINE, TOUJEO, (TOUJEO) 300 UNIT/ML (1.5 ML) INPN PEN    Inject 20 Units into the skin once daily.    INSULIN LISPRO-AABC (LYUMJEV KWIKPEN U-100 INSULIN) 100 UNIT/ML PEN    Titrate up to 20 units daily.    MELOXICAM (MOBIC) 15 MG TABLET    Take 1 tablet (15 mg total) by mouth once daily. Take with meal    PANTOPRAZOLE (PROTONIX) 40 MG TABLET    Take 1 tablet (40 mg total) by mouth once daily.    PROMETHAZINE-DEXTROMETHORPHAN (PROMETHAZINE-DM) 6.25-15 MG/5 ML SYRP    Take 5 mLs by mouth every 8 (eight) hours as needed (cough).    SITAGLIPTIN PHOSPHATE (JANUVIA) 100 MG TAB    Take 1 tablet (100 mg total) by mouth once daily.     Review of patient's allergies indicates:   Allergen Reactions    Hydrocodone Hives and Rash     Review of Systems    Constitutional: Negative for malaise/fatigue.   HENT:  Negative for hearing loss.    Eyes:  Negative for double vision and visual disturbance.   Cardiovascular:  Negative for chest pain.   Respiratory:  Negative for shortness of breath.    Endocrine: Negative for cold intolerance.   Hematologic/Lymphatic: Does not bruise/bleed easily.   Skin:  Negative for poor wound healing and suspicious lesions.   Musculoskeletal:  Negative for gout, joint pain and joint swelling.   Gastrointestinal:  Negative for nausea and vomiting.   Genitourinary:  Negative for dysuria.   Neurological:  Negative for numbness, paresthesias and sensory change.   Psychiatric/Behavioral:  Negative for depression, memory loss and substance abuse. The patient is not nervous/anxious.    Allergic/Immunologic: Negative for persistent infections.     Objective:   Body mass index is 21.28 kg/m².  There were no vitals filed for this visit.             General    Constitutional: She is oriented to person, place, and time. She appears well-developed and well-nourished. No distress.   HENT:   Head: Normocephalic.   Eyes: EOM are normal.   Pulmonary/Chest: Effort normal.   Neurological: She is oriented to person, place, and time.   Psychiatric: She has a normal mood and affect.         Left Hand/Wrist Exam     Inspection   Scars: Wrist - absent Hand -  present  Effusion: Wrist - absent Hand -  absent    Pain   Hand - The patient exhibits pain of the ring MCP.    Other     Sensory Exam  Median Distribution: normal  Ulnar Distribution: normal  Radial Distribution: normal    Comments:  The patient is tender scar left ring finger and a palpable nodule that moves with tendon excursion but no triggering noted.  There are no motor or sensory deficits.          Vascular Exam       Capillary Refill  Left Hand: normal capillary refill        radiographs were not obtained today  Assessment:     Encounter Diagnosis   Name Primary?    Trigger finger, acquired Yes     Left ring finger tender scar    Plan:       The patient will return after her cruise for consideration of injection              Disclaimer: This note was prepared using a voice recognition system and is likely to have sound alike errors within the text.

## 2024-11-23 ENCOUNTER — PATIENT MESSAGE (OUTPATIENT)
Dept: DIABETES | Facility: CLINIC | Age: 54
End: 2024-11-23
Payer: COMMERCIAL

## 2024-11-23 DIAGNOSIS — E10.65 TYPE 1 DIABETES MELLITUS WITH HYPERGLYCEMIA: Primary | ICD-10-CM

## 2024-11-25 RX ORDER — INSULIN ASPART 100 [IU]/ML
INJECTION, SOLUTION INTRAVENOUS; SUBCUTANEOUS
Qty: 30 ML | Refills: 1 | Status: SHIPPED | OUTPATIENT
Start: 2024-11-25

## 2024-11-25 NOTE — TELEPHONE ENCOUNTER
Please call pharmacy and see if Novolog covered under patient's insurance as alternative to fiasp since on national back order.    If yes and in stock please let patient know we will have to to improve release switch her back to NovoLog due to back order and we can always reassess next month.  She does not need pump setting changes but will need to follow this 10 minutes before eating instead of at mealtime.    Odilia Shaffer PA-C, BC-ADM  [facility]

## 2024-11-25 NOTE — TELEPHONE ENCOUNTER
Spoke with patient about insulin change. Pt voiced understanding Called [facility], novolog is covered

## 2024-12-03 ENCOUNTER — PATIENT OUTREACH (OUTPATIENT)
Dept: ADMINISTRATIVE | Facility: HOSPITAL | Age: 54
End: 2024-12-03
Payer: COMMERCIAL

## 2024-12-03 NOTE — PROGRESS NOTES
Lab visit report: Patient has upcoming lab appointment on 12/6/24 for recheck of diabetic labs.

## 2024-12-06 ENCOUNTER — OFFICE VISIT (OUTPATIENT)
Dept: OPHTHALMOLOGY | Facility: CLINIC | Age: 54
End: 2024-12-06
Payer: COMMERCIAL

## 2024-12-06 ENCOUNTER — LAB VISIT (OUTPATIENT)
Dept: LAB | Facility: HOSPITAL | Age: 54
End: 2024-12-06
Attending: PHYSICIAN ASSISTANT
Payer: COMMERCIAL

## 2024-12-06 DIAGNOSIS — H25.013 CORTICAL AGE-RELATED CATARACT OF BOTH EYES: ICD-10-CM

## 2024-12-06 DIAGNOSIS — H25.13 NUCLEAR SCLEROSIS, BILATERAL: ICD-10-CM

## 2024-12-06 DIAGNOSIS — E11.36 DIABETIC CATARACT OF BOTH EYES: ICD-10-CM

## 2024-12-06 DIAGNOSIS — E10.65 TYPE 1 DIABETES MELLITUS WITH HYPERGLYCEMIA: ICD-10-CM

## 2024-12-06 DIAGNOSIS — E11.9 TYPE 2 DIABETES MELLITUS WITHOUT RETINOPATHY: Primary | ICD-10-CM

## 2024-12-06 LAB
ALBUMIN SERPL BCP-MCNC: 3.5 G/DL (ref 3.5–5.2)
ALP SERPL-CCNC: 89 U/L (ref 40–150)
ALT SERPL W/O P-5'-P-CCNC: 13 U/L (ref 10–44)
ANION GAP SERPL CALC-SCNC: 6 MMOL/L (ref 8–16)
AST SERPL-CCNC: 15 U/L (ref 10–40)
BASOPHILS # BLD AUTO: 0.03 K/UL (ref 0–0.2)
BASOPHILS NFR BLD: 0.4 % (ref 0–1.9)
BILIRUB SERPL-MCNC: 0.6 MG/DL (ref 0.1–1)
BUN SERPL-MCNC: 11 MG/DL (ref 6–20)
CALCIUM SERPL-MCNC: 9.4 MG/DL (ref 8.7–10.5)
CHLORIDE SERPL-SCNC: 107 MMOL/L (ref 95–110)
CHOLEST SERPL-MCNC: 165 MG/DL (ref 120–199)
CHOLEST/HDLC SERPL: 2.5 {RATIO} (ref 2–5)
CO2 SERPL-SCNC: 29 MMOL/L (ref 23–29)
CREAT SERPL-MCNC: 0.7 MG/DL (ref 0.5–1.4)
DIFFERENTIAL METHOD BLD: ABNORMAL
EOSINOPHIL # BLD AUTO: 0.1 K/UL (ref 0–0.5)
EOSINOPHIL NFR BLD: 1.6 % (ref 0–8)
ERYTHROCYTE [DISTWIDTH] IN BLOOD BY AUTOMATED COUNT: 11.7 % (ref 11.5–14.5)
EST. GFR  (NO RACE VARIABLE): >60 ML/MIN/1.73 M^2
ESTIMATED AVG GLUCOSE: 160 MG/DL (ref 68–131)
GLUCOSE SERPL-MCNC: 120 MG/DL (ref 70–110)
HBA1C MFR BLD: 7.2 % (ref 4–5.6)
HCT VFR BLD AUTO: 41.4 % (ref 37–48.5)
HDLC SERPL-MCNC: 65 MG/DL (ref 40–75)
HDLC SERPL: 39.4 % (ref 20–50)
HGB BLD-MCNC: 12.4 G/DL (ref 12–16)
IMM GRANULOCYTES # BLD AUTO: 0.03 K/UL (ref 0–0.04)
IMM GRANULOCYTES NFR BLD AUTO: 0.4 % (ref 0–0.5)
LDLC SERPL CALC-MCNC: 88 MG/DL (ref 63–159)
LYMPHOCYTES # BLD AUTO: 2 K/UL (ref 1–4.8)
LYMPHOCYTES NFR BLD: 28 % (ref 18–48)
MCH RBC QN AUTO: 28.8 PG (ref 27–31)
MCHC RBC AUTO-ENTMCNC: 30 G/DL (ref 32–36)
MCV RBC AUTO: 96 FL (ref 82–98)
MONOCYTES # BLD AUTO: 0.4 K/UL (ref 0.3–1)
MONOCYTES NFR BLD: 5.5 % (ref 4–15)
NEUTROPHILS # BLD AUTO: 4.7 K/UL (ref 1.8–7.7)
NEUTROPHILS NFR BLD: 64.1 % (ref 38–73)
NONHDLC SERPL-MCNC: 100 MG/DL
NRBC BLD-RTO: 0 /100 WBC
PLATELET # BLD AUTO: 258 K/UL (ref 150–450)
PMV BLD AUTO: 10.8 FL (ref 9.2–12.9)
POTASSIUM SERPL-SCNC: 4.1 MMOL/L (ref 3.5–5.1)
PROT SERPL-MCNC: 6.9 G/DL (ref 6–8.4)
RBC # BLD AUTO: 4.3 M/UL (ref 4–5.4)
SODIUM SERPL-SCNC: 142 MMOL/L (ref 136–145)
TRIGL SERPL-MCNC: 60 MG/DL (ref 30–150)
TSH SERPL DL<=0.005 MIU/L-ACNC: 1.73 UIU/ML (ref 0.4–4)
WBC # BLD AUTO: 7.29 K/UL (ref 3.9–12.7)

## 2024-12-06 PROCEDURE — 85025 COMPLETE CBC W/AUTO DIFF WBC: CPT | Performed by: PHYSICIAN ASSISTANT

## 2024-12-06 PROCEDURE — 80053 COMPREHEN METABOLIC PANEL: CPT | Performed by: PHYSICIAN ASSISTANT

## 2024-12-06 PROCEDURE — 84443 ASSAY THYROID STIM HORMONE: CPT | Performed by: PHYSICIAN ASSISTANT

## 2024-12-06 PROCEDURE — 83036 HEMOGLOBIN GLYCOSYLATED A1C: CPT | Performed by: PHYSICIAN ASSISTANT

## 2024-12-06 PROCEDURE — 99999 PR PBB SHADOW E&M-EST. PATIENT-LVL II: CPT | Mod: PBBFAC,,, | Performed by: OPTOMETRIST

## 2024-12-06 PROCEDURE — 80061 LIPID PANEL: CPT | Performed by: PHYSICIAN ASSISTANT

## 2024-12-06 NOTE — PROGRESS NOTES
HPI     Diabetic Eye Exam            Comments:   Lab Results       Component                Value               Date                       HGBA1C                   8.3 (H)             07/17/2024                Vision changes since last eye exam?: fluctuating a lot throughout day,   reading glasses not helping    Any eye pain today: no    Other ocular symptoms: none    Interested in contact lens fitting today? no    No gtts                      Comments      1. DM x 2010   2. REFRACTIVE ERROR             Last edited by Osmani Estrada on 12/6/2024 10:13 AM.            Assessment /Plan     For exam results, see Encounter Report.    Type 2 diabetes mellitus without retinopathy  There was no diabetic retinopathy present in either eye today.   Recommended that pt continue care with PCP and/or specialists regarding diabetes.  Follow-up dilated eye exam recommended in 12 months, sooner with any vision changes or new concerns.    Nuclear sclerosis, bilateral  Cortical age-related cataract of both eyes  Diabetic cataract of both eyes  Cataracts not significantly affecting activities of daily living and therefore surgery is not indicated at this time.   Will continue to monitor over the next 12 months. Pt to call or RTC with any significant change in vision prior to next visit.       RTC 1 yr for dilated eye exam or PRN if any problems.   Discussed above and answered questions.

## 2024-12-17 ENCOUNTER — OFFICE VISIT (OUTPATIENT)
Dept: DIABETES | Facility: CLINIC | Age: 54
End: 2024-12-17
Payer: COMMERCIAL

## 2024-12-17 DIAGNOSIS — Z96.41 INSULIN PUMP IN PLACE: ICD-10-CM

## 2024-12-17 DIAGNOSIS — E10.65 TYPE 1 DIABETES MELLITUS WITH HYPERGLYCEMIA: Primary | ICD-10-CM

## 2024-12-17 DIAGNOSIS — R79.89 LOW SERUM C-PEPTIDE: ICD-10-CM

## 2024-12-17 PROCEDURE — 3061F NEG MICROALBUMINURIA REV: CPT | Mod: CPTII,95,, | Performed by: PHYSICIAN ASSISTANT

## 2024-12-17 PROCEDURE — G2211 COMPLEX E/M VISIT ADD ON: HCPCS | Mod: 95,,, | Performed by: PHYSICIAN ASSISTANT

## 2024-12-17 PROCEDURE — 95251 CONT GLUC MNTR ANALYSIS I&R: CPT | Mod: NDTC,,, | Performed by: PHYSICIAN ASSISTANT

## 2024-12-17 PROCEDURE — 99214 OFFICE O/P EST MOD 30 MIN: CPT | Mod: 95,,, | Performed by: PHYSICIAN ASSISTANT

## 2024-12-17 PROCEDURE — 3051F HG A1C>EQUAL 7.0%<8.0%: CPT | Mod: CPTII,95,, | Performed by: PHYSICIAN ASSISTANT

## 2024-12-17 PROCEDURE — 4010F ACE/ARB THERAPY RXD/TAKEN: CPT | Mod: CPTII,95,, | Performed by: PHYSICIAN ASSISTANT

## 2024-12-17 PROCEDURE — 3066F NEPHROPATHY DOC TX: CPT | Mod: CPTII,95,, | Performed by: PHYSICIAN ASSISTANT

## 2024-12-17 RX ORDER — INSULIN ASPART 100 [IU]/ML
INJECTION, SOLUTION INTRAVENOUS; SUBCUTANEOUS
Qty: 30 ML | Refills: 1 | Status: SHIPPED | OUTPATIENT
Start: 2024-12-17

## 2024-12-17 NOTE — PATIENT INSTRUCTIONS
CURRENT DM MEDICATIONS:   CS-II Omnipod 5 with Fiasp  Januvia 100 mg daily    INSULIN PUMP SETTINGS:  Insulin pump type: Omnipod 5  Insulin Type: Fiasp  Basal rate:   0.70 Units/hour from 0000 hours to 2400 hours  Insulin to Carbohydrate Ratio: 1/7  Insulin Sensitivity Factor: 1/35  Glucose Target: 120 mg/dl   Correct Above: 130 mg/dl  Active Insulin Time: 4 hours    VACCINES NEEDED:   Flu Shot   COVID Booster   Pneumonia vaccine   Tetanus vaccine    U100 Pump Failure Plan:   We have decided to develop a plan in the event that your pump breaks or is nonfunctioning. After 4 hours without pump use, you should begin to consider putting your Pump Failure Back Up Plan into action especially if you foresee that you may not be able to use your pump for more than 20 hours. Since you are currently using short acting insulin (Humalog/Novolog/Admelog/Novolin R) in your pump, you will need access to your short acting insulin vial, syringes, and a back up long acting insulin in the event of a pump failure. I have sent a prescription for a long acting insulin to your pharmacy for use during your emergency pump failure plan. It is important that you keep both types of insulin/syringes with you so that you may have access to it at least 3 times daily if needed. This medication and syringes should be brought with you on overnight trips in the case of an emergency pump failure. This means making a plan to keeping your insulin and syringes at school, work, or other places you frequent. If needed, please reach out to my office about any letters you may need for permission to keep these items for emergency purposes. We will outline your plan for pump failure emergencies below, but please remember to contact the insulin pump company (toll free number is printed on the label on the back of the insulin pump) and our office if you have a pump failure. When the insulin pump is restarted, do not restart basal rates until at least 22 hours  after the last long acting insulin injection. You can set a 0% temporary basal setting that will last until this time and use your pump to bolus for meals and correction. If you need help with these feature, please call your insulin pump company tech support line or ask them in anticipation of this action.      Dosing:   If the insulin pump is non functional and discontinued for anticipated more than 20 hours, please give daily injections of:     Toujeo 18 units daily  Januvia 100 mg daily  Fiasp meal size dosing and correction dosing every 3 hours as needed - as below     Fiasp Meal Size:   6 units before meals   3 units before snacks      Fiasp Correction Dosing every 3 hours as needed OUTSIDE OF EATING  If  - 250, may take 2 units of Fiasp  If  - 300, may take 3 units of Fiasp  If  - 350, may take 4 units of Fiasp  If  - 400, may take 5 units of Fiasp  If +, may take 6 units of Fiasp     NOTE: This plan is based off of your average insulin needs per recent pump report. Short Acting Insulin should be taken 15 mins before eating a meal and every 2 hours as needed for correction dosing. The dose calculation above may be used for correction dosing by either only calculating the units to cover blood sugar or inputting a carb amount of 0g if not eating. Please not that taking insulin at the 2 hour phillip may mean you still have insulin on board and may lead to hypoglycemia or low blood sugars due to insulin stacking. If calculating correction dosing for the 3rd time or more time, please consider dividing in half to avoid over correction of blood sugar leading to hypoglycemia. Please call office to report to  any large sugar abnormalities including hyperglycemia > 250 and hypoglycemia < 60. Please continue to wear CGM even if not using pump to monitor for blood sugar fluctuations. Please make sure glucose tablets or sugary beverages available in case of hypoglycemia. Please make sure ketone  strips are available to you (may buy over the counter from pharmacy) and monitor urine ketones every 12 hours; if you have two readings og BG > 250 mg/dl; or if signs/symptoms concerning for DKA present including abdominal pain, nausea, vomiting, confusion, trouble breathing, dry mouth, fruity breath or breath that smells like nail polish remover, and excessive thirst/urination. If your urine ketone strips shows trace to small ketones, drink plenty of water and give yourself a correction dose. If BG is less than 250 mg/dL and you do not have moderate/large ketones, you may also exercise to help lower glucose levels. If you have moderate/large ketones, please give correction dosage, drink water, and immediately contact [facility] if during normal business hours. If outside of normal business hours, please give correction dosage, drink water, and report to nearest ED for evaluation/management of DKA.      When the insulin pump is restarted, do not restart basal rates until at least 22 hours after the last long acting insulin injection. You can set a 0% temporary basal setting that will last until this time and still use your pump to bolus for meals and correction. For help with setting temporary basal setting, please call office, view your pump's online resource library, or call 24 hour technical support hotline.      For any technical insulin pump issues, please contact the insulin pump company; the toll free number is printed on the label on the back of the insulin pump.

## 2024-12-17 NOTE — PROGRESS NOTES
PCP: Tristian Bearden MD    Subjective:     Chief Complaint: Diabetes - Established Patient  TELEMEDICINE VISIT:     The patient location is: Home  The chief complaint leading to consultation is: Diabetes Follow up  Visit type: Virtual visit with synchronous audio and video  Total time spent: 20  Each patient to whom he or she provides medical services by telemedicine is:  (1) informed of the relationship between the physician and patient and the respective role of any other health care provider with respect to management of the patient; and (2) notified that he or she may decline to receive medical services by telemedicine and may withdraw from such care at any time.    Notes: N/A      HISTORY OF PRESENT ILLNESS: 54 y.o.   female presenting for diabetes management visit.   The patient's last visit with me was on 11/5/2024.   Patient has had Type I diabetes due to low C peptide since 5 years or more.  Pertinent to decision making is the following comorbidities: Vitamin D Deficiency and Gestational DM in Pregnancy 2003  Patient has the following Diabetes complications: without complications  She  has attended diabetes education in the past.     Patient's most recent A1c of 7.2% was completed 2 weeks ago.   Patient states since Her last A1c Her blood glucose levels have been high  after meals.   Patient monitors blood glucose 4 times per day and Continuously with personal CGM Dexcom.   Patient blood glucose monitoring device will be uploaded into [employer] [facility] today.         Patient reports show postprandial hyperglycemia following carb bolusing and correction dosing.Some overcorrection of basal when in manual mode.   Patient endorses the following diabetes related symptoms: None.   Patient is due today for the following diabetes-related health maintenance standards: Eye Exam, A1c, Influenza Vaccine, and COVID-19 Vaccine . Sched for eye exam in Nov.   She denies recent hospital admissions or  emergency room visits.   She denies having hypoglycemia as above.   Patient's concerns today include glycemic control.   Patient medication regimen is as below.     CURRENT DM MEDICATIONS:   CS-II Omnipod 5 with Fiasp  Januvia 100 mg daily    INSULIN PUMP SETTINGS:  Insulin pump type: Omnipod 5  Insulin Type: Fiasp  Basal rate:   0.80 Units/hour from 0000 hours to 2400 hours  Insulin to Carbohydrate Ratio: 1/8   Insulin Sensitivity Factor: 1/40  Glucose Target: 120 mg/dl   Correct Above: 130 mg/dl  Active Insulin Time: 4 hours    Other Pump Considerations:   Current Glucagon Kit: Yes  Current Ketone Strips: Yes  Emergency Plan for Pump Failure:   Toujeo 18 units daily  Fiasp meal size dosing and correction dosing every 3 hours as needed - as below    Fiasp Meal Size:   6 units before meals   3 units before snacks     Fiasp Correction Dosing every 3 hours as needed OUTSIDE OF EATING  If  - 250, may take 2 units of Fiasp  If  - 300, may take 3 units of Fiasp  If  - 350, may take 4 units of Fiasp  If  - 400, may take 5 units of Fiasp  If +, may take 6 units of Fiasp      Patient has failed the following Diabetes medications:   Metformin - GI   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       Lab Results   Component Value Date    CPEPTIDE 1.67 11/20/2023     Lab Results   Component Value Date    GLUTAMICACID 0.00 09/28/2020          //   , There is no height or weight on file to calculate BMI.  Her blood sugar in clinic today is:    Lab Results   Component Value Date    POCGLU 233 (A) 05/10/2024       Review of Systems   Constitutional:  Negative for activity change, appetite change, chills and fever.   HENT:  Negative for dental problem, mouth sores, nosebleeds, sore throat and trouble swallowing.    Eyes:  Negative for pain and discharge.   Respiratory:   Negative for shortness of breath, wheezing and stridor.    Cardiovascular:  Negative for chest pain, palpitations and leg swelling.   Gastrointestinal:  Negative for abdominal pain, diarrhea, nausea and vomiting.   Endocrine: Negative for polydipsia, polyphagia and polyuria.   Genitourinary:  Negative for dysuria, frequency and urgency.   Musculoskeletal:  Negative for joint swelling and myalgias.   Skin:  Negative for rash and wound.   Neurological:  Negative for dizziness, syncope, weakness and headaches.   Psychiatric/Behavioral:  Negative for behavioral problems and dysphoric mood.          Diabetes Management Status  Statin: Not taking  ACE/ARB: Not taking    Screening or Prevention Patient's value Goal Complete/Controlled?   HgA1C Testing and Control   Lab Results   Component Value Date    HGBA1C 7.2 (H) 12/06/2024      Annually/Less than 8% No   Lipid profile : 12/06/2024 Annually Yes   LDL control  Lab Results   Component Value Date    LDLCALC 88.0 12/06/2024    Annually/Less than 100 mg/dl  Yes   Nephropathy screening Lab Results   Component Value Date    MICALBCREAT 13.0 03/11/2024     Lab Results   Component Value Date    PROTEINUA Negative 01/26/2021    Annually No   Blood pressure BP Readings from Last 1 Encounters:   07/16/24 110/66    Less than 140/90 Yes   Dilated retinal exam : 12/06/2024 Annually Yes    Foot exam   : 05/10/2024 Annually No     Social History     Socioeconomic History    Marital status: Single    Number of children: 2   Occupational History    Occupation:      Employer: [employer]      Employer: [employer]   Tobacco Use    Smoking status: Never    Smokeless tobacco: Never   Substance and Sexual Activity    Alcohol use: Yes     Comment: Rare    Drug use: No    Sexual activity: Yes     Partners: Male     Birth control/protection: Surgical   Social History Narrative    She wears seatbelt.     Social Drivers of Health     Financial Resource Strain: Medium  Risk (6/27/2024)    Overall Financial Resource Strain (CARDIA)     Difficulty of Paying Living Expenses: Somewhat hard   Food Insecurity: Food Insecurity Present (6/27/2024)    Hunger Vital Sign     Worried About Running Out of Food in the Last Year: Sometimes true   Transportation Needs: No Transportation Needs (6/27/2024)    TRANSPORTATION NEEDS     Transportation : No   Physical Activity: Insufficiently Active (5/6/2024)    Received from [facility]    Exercise Vital Sign     Days of Exercise per Week: 4 days     Minutes of Exercise per Session: 30 min   Stress: Stress Concern Present (7/11/2024)    [place] [employer] - Occupational Stress Questionnaire     Feeling of Stress : Very much   Housing Stability: Low Risk  (6/27/2024)    Housing Stability Vital Sign     Unable to Pay for Housing in the Last Year: No     Homeless in the Last Year: No     Past Medical History:   Diagnosis Date    Abnormal Pap smear     Abnormal Pap smear of cervix     Arthritis, low back     DM (diabetes mellitus) 09/2014    BS didn't check 01/31/2017    GERD (gastroesophageal reflux disease)     Gestational diabetes     Hepatitis C antibody positive in blood 02/11/2022    RNA NEGATIVE    History of abnormal Pap smear     Surgical menopause     Vitamin D deficiency        Objective:        Physical Exam  Constitutional:       General: She is not in acute distress.     Appearance: She is well-developed. She is not diaphoretic.   HENT:      Head: Normocephalic and atraumatic.      Right Ear: External ear normal.      Left Ear: External ear normal.      Nose: Nose normal.   Eyes:      General:         Right eye: No discharge.         Left eye: No discharge.   Pulmonary:      Effort: Pulmonary effort is normal. No respiratory distress.      Breath sounds: No stridor.   Musculoskeletal:         General: Normal range of motion.      Cervical back: Normal range of motion.   Skin:     Coloration:  Skin is not pale.   Neurological:      Mental Status: She is alert and oriented to person, place, and time.      Motor: No abnormal muscle tone.      Coordination: Coordination normal.   Psychiatric:         Behavior: Behavior normal.         Thought Content: Thought content normal.         Judgment: Judgment normal.             Assessment / Plan:     Type 1 diabetes mellitus with hyperglycemia  -     insulin aspart U-100 (NOVOLOG) 100 unit/mL injection; Titrated to 100 units daily as instructed in insulin pump.  Dispense: 30 mL; Refill: 1  -     SITagliptin phosphate (JANUVIA) 100 MG Tab; Take 1 tablet (100 mg total) by mouth once daily.  Dispense: 90 tablet; Refill: 3    Low serum C-peptide    Insulin pump in place        Additional Plan Details:    - POCT Glucose  - Encouraged continuation of lifestyle changes including regular exercise and limiting carbohydrates to 30-45 grams per meal threes times daily and 15 grams per snack with a limit of two daily.   - Encouraged continued monitoring of blood glucose with maintenance of 4 times daily and Continuously with personal CGM Dexcom.   - Current DM Medication Regimen: Continue CS-II Omnipod 5 with Fiasp. See settings below. Change BR as below. Change IC to 7. Change CF to 35. Continue Januvia 100 mg daily.   - Updated emergency pump back up plan   - Novolog as alt to Fiasp if on backorder  - Health Maintenance standards addressed today: Flu Shot - patient would like to complete outside of [facility], COVID - 19 Vaccine - patient will schedule outside of [facility] , and vaccines counseling  - Nursing Visit: Patient is below goal range for nursing visit for age group and will not need nursing visit at this time .   - Follow up in 6 weeks.    CURRENT DM MEDICATIONS:   CS-II Omnipod 5 with Fiasp  Januvia 100 mg daily    INSULIN PUMP SETTINGS:  Insulin pump type: Omnipod 5  Insulin Type: Fiasp  Basal rate:   0.70 Units/hour from 0000 hours to 2400 hours  Insulin to  Carbohydrate Ratio: 1/7  Insulin Sensitivity Factor: 1/35  Glucose Target: 120 mg/dl   Correct Above: 130 mg/dl  Active Insulin Time: 4 hours    Blakeney McKnight, PA-C [facility]

## 2025-01-02 DIAGNOSIS — M79.642 BILATERAL HAND PAIN: Primary | ICD-10-CM

## 2025-01-02 DIAGNOSIS — M79.641 BILATERAL HAND PAIN: Primary | ICD-10-CM

## 2025-01-03 ENCOUNTER — OFFICE VISIT (OUTPATIENT)
Dept: ORTHOPEDICS | Facility: CLINIC | Age: 55
End: 2025-01-03
Payer: COMMERCIAL

## 2025-01-03 ENCOUNTER — HOSPITAL ENCOUNTER (OUTPATIENT)
Dept: RADIOLOGY | Facility: HOSPITAL | Age: 55
Discharge: HOME | End: 2025-01-03
Attending: ORTHOPAEDIC SURGERY
Payer: COMMERCIAL

## 2025-01-03 VITALS — WEIGHT: 129.19 LBS | HEIGHT: 68 IN | BODY MASS INDEX: 19.58 KG/M2

## 2025-01-03 DIAGNOSIS — M79.641 BILATERAL HAND PAIN: ICD-10-CM

## 2025-01-03 DIAGNOSIS — G56.03 BILATERAL CARPAL TUNNEL SYNDROME: ICD-10-CM

## 2025-01-03 DIAGNOSIS — M79.641 BILATERAL HAND PAIN: Primary | ICD-10-CM

## 2025-01-03 DIAGNOSIS — M65.30 TRIGGER FINGER, ACQUIRED: Primary | ICD-10-CM

## 2025-01-03 DIAGNOSIS — M79.642 BILATERAL HAND PAIN: Primary | ICD-10-CM

## 2025-01-03 DIAGNOSIS — M19.049 ARTHRITIS OF HAND: ICD-10-CM

## 2025-01-03 DIAGNOSIS — M79.642 BILATERAL HAND PAIN: ICD-10-CM

## 2025-01-03 PROCEDURE — 73130 X-RAY EXAM OF HAND: CPT | Mod: TC,50

## 2025-01-03 PROCEDURE — 99999 PR PBB SHADOW E&M-EST. PATIENT-LVL III: CPT | Mod: PBBFAC,,, | Performed by: ORTHOPAEDIC SURGERY

## 2025-01-03 PROCEDURE — 73130 X-RAY EXAM OF HAND: CPT | Mod: 26,,, | Performed by: STUDENT IN AN ORGANIZED HEALTH CARE EDUCATION/TRAINING PROGRAM

## 2025-01-03 RX ORDER — TRIAMCINOLONE ACETONIDE 40 MG/ML
40 INJECTION, SUSPENSION INTRA-ARTICULAR; INTRAMUSCULAR
Status: DISCONTINUED | OUTPATIENT
Start: 2025-01-03 | End: 2025-01-03 | Stop reason: HOSPADM

## 2025-01-03 RX ADMIN — TRIAMCINOLONE ACETONIDE 40 MG: 40 INJECTION, SUSPENSION INTRA-ARTICULAR; INTRAMUSCULAR at 10:01

## 2025-01-03 NOTE — PROGRESS NOTES
Subjective:     Patient ID: Chan Dwyer is a 54 y.o. female.    Chief Complaint: Pain of the Left Hand      HPI:  The patient is a 54-year-old female status post left ring trigger finger release date of surgery was 10/10/2022.  She has left ring finger limited motion and continued triggering.  She does seem to have a flexor synovitis in this area.  She has osteoarthritic pain and bilateral carpal tunnel documented by nerve conduction studies 08/23/2023.    Past Medical History:   Diagnosis Date    Abnormal Pap smear     Abnormal Pap smear of cervix     Arthritis, low back     DM (diabetes mellitus) 09/2014    BS didn't check 01/31/2017    GERD (gastroesophageal reflux disease)     Gestational diabetes     Hepatitis C antibody positive in blood 02/11/2022    RNA NEGATIVE    History of abnormal Pap smear     Surgical menopause     Vitamin D deficiency      Past Surgical History:   Procedure Laterality Date    COLONOSCOPY N/A 6/22/2016    Procedure: COLONOSCOPY;  Surgeon: Vishal Mary III, MD;  Location: [facility] ENDO;  Service: Endoscopy;  Laterality: N/A;    Cryotherapy of the cervix  1999    DILATION AND CURETTAGE OF UTERUS      HYSTERECTOMY      LEFT HEART CATHETERIZATION Left 3/24/2023    Procedure: Left heart cath;  Surgeon: Lee Lambert MD;  Location: [facility] CATH LAB;  Service: Cardiology;  Laterality: Left;    [facility]  2/2012    TRIGGER FINGER RELEASE Left 10/10/2022    Procedure: RELEASE, TRIGGER FINGER;  Surgeon: Adarsh Chavez MD;  Location: [facility] OR;  Service: Orthopedics;  Laterality: Left;  left ring trigger finger release    TUBAL LIGATION       Family History   Problem Relation Name Age of Onset    Diabetes Father      Hypertension Father      Hypertension Mother      Diabetes Mother      Heart attack Mother      No Known Problems Brother      No Known Problems Brother      No Known Problems Brother      No Known Problems Brother      Stomach cancer Sister      No Known Problems  Sister      No Known Problems Sister      No Known Problems Sister      No Known Problems Sister      No Known Problems Son      No Known Problems Son      Cancer Neg Hx      Stroke Neg Hx      Colon cancer Neg Hx      Ovarian cancer Neg Hx      Breast cancer Neg Hx       Social History     Socioeconomic History    Marital status: Single    Number of children: 2   Occupational History    Occupation:      Employer: [employer]     Employer: [employer]   Tobacco Use    Smoking status: Never    Smokeless tobacco: Never   Substance and Sexual Activity    Alcohol use: Yes     Comment: Rare    Drug use: No    Sexual activity: Yes     Partners: Male     Birth control/protection: Surgical   Social History Narrative    She wears seatbelt.     Social Drivers of Health     Financial Resource Strain: Medium Risk (6/27/2024)    Overall Financial Resource Strain (CARDIA)     Difficulty of Paying Living Expenses: Somewhat hard   Food Insecurity: Food Insecurity Present (6/27/2024)    Hunger Vital Sign     Worried About Running Out of Food in the Last Year: Sometimes true   Transportation Needs: No Transportation Needs (6/27/2024)    TRANSPORTATION NEEDS     Transportation : No   Physical Activity: Insufficiently Active (5/6/2024)    Received from [facility]    Exercise Vital Sign     Days of Exercise per Week: 4 days     Minutes of Exercise per Session: 30 min   Stress: Stress Concern Present (7/11/2024)    [place] [employer] - Occupational Stress Questionnaire     Feeling of Stress : Very much   Housing Stability: Low Risk  (6/27/2024)    Housing Stability Vital Sign     Unable to Pay for Housing in the Last Year: No     Homeless in the Last Year: No     Medication List with Changes/Refills   Current Medications    ACETONE, URINE, TEST (CHEMSTRIP K) STRP    1 strip by Misc.(Non-Drug; Combo Route) route 3 (three) times daily.    ASPIRIN (ECOTRIN) 81 MG EC  TABLET    Take 1 tablet (81 mg total) by mouth once daily.    BLOOD SUGAR DIAGNOSTIC STRP    To check BG 3 times daily, to use with insurance preferred meter    BLOOD-GLUCOSE METER MISC    To check BG 3 times daily, to use with insurance preferred meter    BLOOD-GLUCOSE SENSOR (DEXCOM G7 SENSOR) MARGARET    1 each by Misc.(Non-Drug; Combo Route) route every 10 days.    FLUTICASONE PROPIONATE (FLONASE) 50 MCG/ACTUATION NASAL SPRAY    1 spray (50 mcg total) by Each Nostril route once daily.    GLUCAGON (BAQSIMI) 3 MG/ACTUATION SPRY    1 each by Nasal route daily as needed (severe hypoglycemia).    IBUPROFEN (ADVIL,MOTRIN) 600 MG TABLET    Take 1 tablet (600 mg total) by mouth every 6 (six) hours as needed for Pain.    INSULIN ASPART U-100 (NOVOLOG) 100 UNIT/ML INJECTION    Titrated to 100 units daily as instructed in insulin pump.    INSULIN ASPART, NIACINAMIDE, (FIASP FLEXTOUCH U-100 INSULIN) 100 UNIT/ML (3 ML) INPN    Titrate up to 50 units daily as instructed.    INSULIN ASPART, NIACINAMIDE, (FIASP U-100 INSULIN) 100 UNIT/ML SOLN    Titrate up to 100 units daily as instructed in insulin pump.    INSULIN GLARGINE, TOUJEO, (TOUJEO SOLOSTAR U-300 INSULIN) 300 UNIT/ML (1.5 ML) INPN PEN    INJECT 16 UNITS SUBCUTANEOUSLY ONCE DAILY    INSULIN PUMP CART,AUTO,BT,G6/7 (OMNIPOD 5 G6-G7 PODS, GEN 5,) CRTG    Inject 1 each into the skin every 3 days    INSULIN PUMP CART,AUTO,BT-CNTR (OMNIPOD 5 G6 INTRO KIT, GEN 5,) CRTG    Inject 1 each into the skin Every 3 (three) days.    LANCETS MISC    To check BG 3 times daily, to use with insurance preferred meter    SITAGLIPTIN PHOSPHATE (JANUVIA) 100 MG TAB    Take 1 tablet (100 mg total) by mouth once daily.    TRIAMCINOLONE ACETONIDE 0.1% (KENALOG) 0.1 % CREAM    Apply topically 2 (two) times daily. (Do not get in eye.) for 10 days    VALACYCLOVIR (VALTREX) 1000 MG TABLET    Take 1 tablet (1,000 mg total) by mouth 3 (three) times daily.     Review of patient's allergies indicates:    Allergen Reactions    Hydrocodone Hives and Rash     Review of Systems   Constitutional: Negative for malaise/fatigue.   HENT:  Negative for hearing loss.    Eyes:  Negative for double vision and visual disturbance.   Cardiovascular:  Positive for chest pain.   Respiratory:  Negative for shortness of breath.    Endocrine: Negative for cold intolerance.   Hematologic/Lymphatic: Does not bruise/bleed easily.   Skin:  Negative for poor wound healing and suspicious lesions.   Musculoskeletal:  Positive for arthritis, back pain, joint pain and joint swelling. Negative for gout.   Gastrointestinal:  Positive for heartburn. Negative for nausea and vomiting.   Genitourinary:  Negative for dysuria.   Neurological:  Positive for headaches, numbness, paresthesias and sensory change.   Psychiatric/Behavioral:  Negative for depression, memory loss and substance abuse. The patient is not nervous/anxious.    Allergic/Immunologic: Negative for persistent infections.       Objective:   Body mass index is 19.64 kg/m².  There were no vitals filed for this visit.             General    Constitutional: She is oriented to person, place, and time. She appears well-developed and well-nourished. No distress.   HENT:   Head: Normocephalic.   Eyes: EOM are normal.   Pulmonary/Chest: Effort normal.   Neurological: She is oriented to person, place, and time.   Psychiatric: She has a normal mood and affect.         Left Hand/Wrist Exam     Inspection   Scars: Wrist - absent Hand -  present  Effusion: Wrist - absent Hand -  absent    Pain   Hand - The patient exhibits pain of the ring MCP.  Wrist - The patient exhibits pain of the flexor/pronator group.    Tests   Phalens sign: positive  Tinel's sign (median nerve): positive  Carpal Tunnel Compression Test: positive    Atrophy  Thenar:  Negative  Intrinsic: negative    Other     Sensory Exam  Median Distribution: abnormal  Ulnar Distribution: normal  Radial Distribution: normal    Comments:   The patient has osteoarthritic Heberden's nodes in multiple digits.  She has a well-healed surgical scar A1 pulley left ring finger.  She does have active triggering noted but limited motion and what seems like synovitis of the flexor tendon volar to the scar.  She has a positive Tinel and positive Phalen sign.  There is no thenar atrophy noted.          Vascular Exam       Capillary Refill  Left Hand: normal capillary refill          Relevant imaging results reviewed and interpreted by me, discussed with the patient and / or family today radiographs left hand showed osteoarthritic change in multiple digits  Assessment:     Encounter Diagnoses   Name Primary?    Trigger finger, acquired Yes    Bilateral carpal tunnel syndrome     Arthritis of hand         Plan:     The patient was injected left ring trigger finger with 0.5 cc of Kenalog and 0.5 cc of 2% plain lidocaine.  She does not wish carpal tunnel injection today.  She will return in 3 weeks if she continues to be symptomatic we will review her rheumatologic workup that was drawn today including rheumatoid factor NIKOLAS C-reactive protein and sed rate.  If she has A positive blood test she may be a candidate for rheumatologic referral.  Additionally she continues to be symptomatic she may need a more extensive synovectomy left ring finger.  In any event she does have limited motion from the severe arthritis of the distal interphalangeal joint left ring finger              Disclaimer: This note was prepared using a voice recognition system and is likely to have sound alike errors within the text.

## 2025-01-03 NOTE — PROCEDURES
Tendon Sheath    Date/Time: 1/3/2025 10:15 AM    Performed by: Adarsh Chavez MD  Authorized by: Adarsh Chavez MD    Consent Done?:  Yes (Verbal)  Indications:  Pain  Timeout: prior to procedure the correct patient, procedure, and site was verified    Prep: patient was prepped and draped in usual sterile fashion      Local anesthesia used?: Yes    Local anesthetic:  Lidocaine 2% without epinephrine  Anesthetic total (ml):  0.5    Location:  Ring finger  Site:  L ring flexor tendon sheath  Ultrasonic guidance for needle placement?: No    Needle size:  25 G  Approach:  Volar  Medications:  40 mg triamcinolone acetonide 40 mg/mL

## 2025-01-07 ENCOUNTER — TELEPHONE (OUTPATIENT)
Dept: ORTHOPEDICS | Facility: CLINIC | Age: 55
End: 2025-01-07
Payer: COMMERCIAL

## 2025-01-07 DIAGNOSIS — M79.641 BILATERAL HAND PAIN: Primary | ICD-10-CM

## 2025-01-07 DIAGNOSIS — M79.642 BILATERAL HAND PAIN: Primary | ICD-10-CM

## 2025-01-07 DIAGNOSIS — M13.842 ALLERGIC ARTHRITIS OF LEFT HAND: ICD-10-CM

## 2025-01-07 NOTE — TELEPHONE ENCOUNTER
----- Message from Sharifa sent at 1/7/2025  2:11 PM CST -----  Good Afternoon,     The above patient was schedule for labs on 01/03 - however, the parent requested to reschedule. I have cancelled the labs.     If still needed, could you place a new order and reschedule the patient?      Thank you.

## 2025-01-08 ENCOUNTER — TELEPHONE (OUTPATIENT)
Dept: ORTHOPEDICS | Facility: CLINIC | Age: 55
End: 2025-01-08
Payer: COMMERCIAL

## 2025-01-08 NOTE — TELEPHONE ENCOUNTER
Spoke to the patient an was able to get her rescheduled for her labs for central the patient verbalized understanding              ----- Message from Nurse Mayberry sent at 1/7/2025  3:25 PM CST -----  done  ----- Message -----  From: Sharifa Aguilar  Sent: 1/7/2025   2:12 PM CST  To: Kathy Rodrigez    Good Afternoon,     The above patient was schedule for labs on 01/03 - however, the parent requested to reschedule. I have cancelled the labs.     If still needed, could you place a new order and reschedule the patient?      Thank you.

## 2025-01-09 ENCOUNTER — LAB VISIT (OUTPATIENT)
Dept: LAB | Facility: HOSPITAL | Age: 55
End: 2025-01-09
Attending: ORTHOPAEDIC SURGERY
Payer: COMMERCIAL

## 2025-01-09 DIAGNOSIS — M79.641 BILATERAL HAND PAIN: ICD-10-CM

## 2025-01-09 DIAGNOSIS — M79.642 BILATERAL HAND PAIN: ICD-10-CM

## 2025-01-09 DIAGNOSIS — M13.842 ALLERGIC ARTHRITIS OF LEFT HAND: ICD-10-CM

## 2025-01-09 LAB
BASOPHILS # BLD AUTO: 0.03 K/UL (ref 0–0.2)
BASOPHILS NFR BLD: 0.3 % (ref 0–1.9)
CRP SERPL-MCNC: 7.9 MG/L (ref 0–8.2)
DIFFERENTIAL METHOD BLD: ABNORMAL
EOSINOPHIL # BLD AUTO: 0.1 K/UL (ref 0–0.5)
EOSINOPHIL NFR BLD: 1.4 % (ref 0–8)
ERYTHROCYTE [DISTWIDTH] IN BLOOD BY AUTOMATED COUNT: 11.4 % (ref 11.5–14.5)
ERYTHROCYTE [SEDIMENTATION RATE] IN BLOOD BY PHOTOMETRIC METHOD: 49 MM/HR (ref 0–36)
HCT VFR BLD AUTO: 40.6 % (ref 37–48.5)
HGB BLD-MCNC: 12.6 G/DL (ref 12–16)
IMM GRANULOCYTES # BLD AUTO: 0.02 K/UL (ref 0–0.04)
IMM GRANULOCYTES NFR BLD AUTO: 0.2 % (ref 0–0.5)
LYMPHOCYTES # BLD AUTO: 2.6 K/UL (ref 1–4.8)
LYMPHOCYTES NFR BLD: 29.9 % (ref 18–48)
MCH RBC QN AUTO: 29 PG (ref 27–31)
MCHC RBC AUTO-ENTMCNC: 31 G/DL (ref 32–36)
MCV RBC AUTO: 93 FL (ref 82–98)
MONOCYTES # BLD AUTO: 0.5 K/UL (ref 0.3–1)
MONOCYTES NFR BLD: 5.9 % (ref 4–15)
NEUTROPHILS # BLD AUTO: 5.4 K/UL (ref 1.8–7.7)
NEUTROPHILS NFR BLD: 62.3 % (ref 38–73)
NRBC BLD-RTO: 0 /100 WBC
PLATELET # BLD AUTO: 261 K/UL (ref 150–450)
PMV BLD AUTO: 10.7 FL (ref 9.2–12.9)
RBC # BLD AUTO: 4.35 M/UL (ref 4–5.4)
WBC # BLD AUTO: 8.6 K/UL (ref 3.9–12.7)

## 2025-01-09 PROCEDURE — 36415 COLL VENOUS BLD VENIPUNCTURE: CPT | Mod: PO | Performed by: ORTHOPAEDIC SURGERY

## 2025-01-09 PROCEDURE — 86038 ANTINUCLEAR ANTIBODIES: CPT | Performed by: ORTHOPAEDIC SURGERY

## 2025-01-09 PROCEDURE — 85025 COMPLETE CBC W/AUTO DIFF WBC: CPT | Performed by: ORTHOPAEDIC SURGERY

## 2025-01-09 PROCEDURE — 85652 RBC SED RATE AUTOMATED: CPT | Performed by: ORTHOPAEDIC SURGERY

## 2025-01-09 PROCEDURE — 86140 C-REACTIVE PROTEIN: CPT | Performed by: ORTHOPAEDIC SURGERY

## 2025-01-09 PROCEDURE — 86431 RHEUMATOID FACTOR QUANT: CPT | Performed by: ORTHOPAEDIC SURGERY

## 2025-01-10 LAB
ANA SER QL IF: NORMAL
RHEUMATOID FACT SERPL-ACNC: <13 IU/ML (ref 0–15)

## 2025-01-14 ENCOUNTER — TELEPHONE (OUTPATIENT)
Dept: DIABETES | Facility: CLINIC | Age: 55
End: 2025-01-14

## 2025-01-14 ENCOUNTER — CLINICAL SUPPORT (OUTPATIENT)
Dept: DIABETES | Facility: CLINIC | Age: 55
End: 2025-01-14
Payer: COMMERCIAL

## 2025-01-14 DIAGNOSIS — E10.65 TYPE 1 DIABETES MELLITUS WITH HYPERGLYCEMIA: Primary | ICD-10-CM

## 2025-01-14 PROCEDURE — G0108 DIAB MANAGE TRN  PER INDIV: HCPCS | Mod: 95,,, | Performed by: DIETITIAN, REGISTERED

## 2025-01-14 NOTE — PROGRESS NOTES
Diabetes Care Specialist Virtual Visit Note     The patient location is: private work location in [place]  The chief complaint leading to consultation is: Diabetes  Visit type: audiovisual  Total time spent with patient: 30 min   Each patient to whom he or she provides medical services by telemedicine is:  (1) informed of the relationship between the physician and patient and the respective role of any other health care provider with respect to management of the patient; and (2) notified that he or she may decline to receive medical services by telemedicine and may withdraw from such care at any time.    Diabetes Care Specialist Progress Note  Author: Marcella Duke, RD, CDE  Date: 1/14/2025    Intake    Program Intake  Reason for Diabetes Program Visit:: Post Program Follow-Up (DM referral 8/27/23, 5/10/24 Odilia Shaffer P)  Type of Intervention:: Individual  Education: Advanced Pump (OP5 (training 6/25/24) w/ DexG7)  Current diabetes risk level:: moderate  In the last month, have you used the ER or been admitted to the hospital: No    Current Diabetes Treatment:  (Omnipod 5 with Fiasp. Januvia 100mg 1tab daily.)    Continuous Glucose Monitoring  Patient has CGM: Yes  GMI Date: 01/14/25  GMI Value: 8.4 %    Lab Results   Component Value Date    HGBA1C 7.2 (H) 12/06/2024      Latest Ref Rng 3/11/2024   Diabetes Management     HbA1c 4.0 - 5.6 % 9.0 (H)       Lifestyle Coping Support & Clinical    Problem Review  Active Comorbidities:  (Gestational DM in Pregnancy 2003, HepC, GERD, Vit D def.)    Diabetes Self-Management Skills Assessment    Medication Skills Assessment  Patient is able to identify current diabetes medications, dosages, and appropriate timing of medications.: no (Noted several episodes without OmniPod use. Pt states not using pump backup plan. Pt cannot give reason for not wearing Pods or giving backup insulin.)  Patient reports problems or concerns with current medication regimen.: yes (Pt  states understanding risks associated with not taking insulin including DKA. Pt denies device or site placement issues. Pt denies symptoms hypoglycemia/hyperglycemia.)  Patient is  aware that some diabetes medications can cause low blood sugar?: Yes  Medication Skills Assessment Completed:: Yes  Assessment indicates:: Adequate understanding  Area of need?: Yes    Nutrition/Healthy Eating  Meal Plan 24 Hour Recall - Breakfast: none  Meal Plan 24 Hour Recall - Lunch: none OR sandwich (wheat, turkey), fruit  Meal Plan 24 Hour Recall - Dinner: bkd fish, collards, cornbread OR red or white beans, rice, cornbread, bkd chix  Meal Plan 24 Hour Recall - Snack: bkd chips  Meal Plan 24 Hour Recall - Beverage: water, sf flv  Patient can identify foods that impact blood sugar.: yes  Nutrition/Healthy Eating Skills Assessment Completed:: Yes  Assessment indicates:: Instruction Needed  Area of need?: Yes    Assessment Summary and Plan  Based on today's diabetes care assessment, the following areas of need were identified:      Identified Areas of Need      Medication/Current Diabetes Treatment: Yes -see care plan   Nutrition/Healthy Eating: Yes -see care plan     Today's interventions were provided through individual discussion, instruction, and written materials were provided.    Patient verbalized understanding of instruction and written materials.  Pt was able to return back demonstration of instructions today. Patient understood key points, needs reinforcement and further instruction.     Diabetes Self-Management Care Plan:  Today's Diabetes Self-Management Care Plan was developed with Chan's input. Chan has agreed to work toward the following goal(s) to improve his/her overall diabetes control.      Care Plan: Diabetes Management   Updates made since 1/15/2024 12:00 AM        Problem: Medications         Goal: Patient Agrees to take Diabetes Medication(s) as prescribed. Completed 1/14/2025   Start Date: 9/5/2023    Expected End Date: 5/10/2025   This Visit's Progress: Not met   Recent Progress: Not met   Priority: High   Barriers: Lack of Motivation to Change   Note:    Encouraged consistency of using OP5 and backup insulin plan if not wearing Pods. Reviewed risk DKA in T1D, associated symptoms and when to seek emergency care.     Reviewed tips to maintain automated mode and need for consistency of meal and correction bolus dosing. Discussed examples of bolus dosing using pt's OP5 reports. Reviewed carb gram ctg and sending pt copy carb food list per her request. Also discussed using Nutrition arlen such as [employer] or [employer] for support. Encouraged using Nutrition Facts label as well and reviewed where to find total carb grams based on serving size. Several examples discussed.        Pt verbalized understanding of all training and agrees to maintain clinic contact.Pt requesting ongoing visits for pump and diabetes educ support.   _______________  Dexcom Account:   [email or web address]  [email or web address]    [email or web address] Account:  Severo Akins#      Problem: Blood Glucose Self-Monitoring         Goal: Patient agrees to use DexG7 and backup meter as directed. Completed 1/14/2025   Start Date: 11/19/2024   Expected End Date: 5/10/2025   This Visit's Progress: Met   Priority: Medium   Barriers: No Barriers Identified   Note:    Pt meeting goal to her best ability.     Follow Up Plan  Follow up in about 2 months (around 3/14/2025), or Pt requesting continued visits for pump and diabetes education support..    Today's care plan and follow up schedule was discussed with patient.  Chan verbalized understanding of the care plan, goals, and agrees to follow up plan.        The patient was encouraged to communicate with his/her health care provider/physician and care team regarding his/her condition(s) and treatment.  I provided the patient with my contact information today and encouraged to contact me via phone or [facility] Patient  Portal as needed.     Length of Visit   Total Time: 30 Minutes

## 2025-01-14 NOTE — LETTER
January 14, 2025      Odilia Shaffer PA-C  [address]  [place] [address]         Patient: Chan Dwyer   MR Number: [id number]   YOB: 1970   Date of Visit: 1/14/2025       Dear Dr. Shaffer:    Thank you for referring Chan for diabetes self-management education and support services. She has completed all components of our Diabetes Management Program. Below is a summary of her clinical outcomes and goal progress.    Patient Outcomes:    A1c Status:   Lab Results   Component Value Date    HGBA1C 7.2 (H) 12/06/2024    HGBA1C 8.3 (H) 07/17/2024       Care Plan: Diabetes Management   Updates made since 1/15/2024 12:00 AM        Problem: Blood Glucose Self-Monitoring         Goal: Patient agrees to use Dexcom G7 and backup meter as directed. Completed 6/25/2024   Start Date: 9/5/2023   Expected End Date: 8/27/2024   This Visit's Progress: Deferred   Recent Progress: Deferred   Priority: High   Note:    Set new goal.       Problem: Medications         Goal: Patient Agrees to take Diabetes Medication(s) as prescribed. Completed 1/14/2025   Start Date: 9/5/2023   Expected End Date: 5/10/2025   This Visit's Progress: Not met   Recent Progress: Not met   Priority: High   Barriers: Lack of Motivation to Change   Note:    Encouraged consistency of using OP5 and backup insulin plan if not wearing Pods. Reviewed risk DKA in T1D, associated symptoms and when to seek emergency care.     Reviewed tips to maintain automated mode and need for consistency of meal and correction bolus dosing. Discussed examples of bolus dosing using pt's OP5 reports. Reviewed carb gram ctg and sending pt copy carb food list per her request. Also discussed using Nutrition arlen such as [employer] or [employer] for support. Encouraged using Nutrition Facts label as well and reviewed where to find total carb grams based on serving size. Several examples discussed.        Pt verbalized understanding of all  training and agrees to maintain clinic contact.Pt requesting ongoing visits for pump and diabetes educ support.   _______________  Dexcom Account:   [email or web address]  [email or web address]    [email or web address] Account:  Severo Akins#          Problem: Healthy Eating         Goal: Eat 3 meals daily - manage carb 45 grams/meal, 5-15grams/snack Completed 11/19/2024   Start Date: 9/5/2023   Expected End Date: 8/27/2024   This Visit's Progress: Deferred   Recent Progress: On track   Priority: Medium   Barriers: Other (comments)   Note:    Deferred goal due to time needed with equipment restart.     Pt reports not needing additional carb ctg refresher.       Task: Provided visual examples using dry measuring cups, food models, and other familiar objects such as computer mouse, deck or cards, tennis ball etc. to help with visualization of portions. Completed 7/17/2024        Task: Explained how to count carbohydrates using the food label and the use of dry measuring cups for accurate carb counting. Completed 7/17/2024        Task: Review the importance of balancing carbohydrates with each meal using portion control techniques to count servings of carbohydrate and label reading to identify serving size and amount of total carbs per serving. Completed 6/25/2024        Problem: Blood Glucose Self-Monitoring         Goal: Patient agrees to use Dexcom G6 and backup meter as directed. Completed 11/20/2024   Start Date: 6/25/2024   Expected End Date: 8/27/2024   This Visit's Progress: Deferred   Recent Progress: On track   Priority: Low   Barriers: Other (comments)   Note:    New goal set.       Problem: Blood Glucose Self-Monitoring         Goal: Patient agrees to use DexG7 and backup meter as directed. Completed 1/14/2025   Start Date: 11/19/2024   Expected End Date: 5/10/2025   This Visit's Progress: Met   Priority: Medium   Barriers: No Barriers Identified   Note:    Pt meeting goal to her best ability.       Task: Reviewed the  importance of self-monitoring blood glucose and keeping logs. Completed 11/20/2024        Task: Reviewed appropriate timing and frequency to SMBG, education on parameters on when to notify provider and advised patient to bring logs to all appts with PCP/Endocrinologist/Diabetes Care Specialist. Completed 11/20/2024          Follow up:   Chan to attend medical appointments as scheduled  Renaldosa to update you on her DM education progress as needed      If you have questions, please do not hesitate to call me. I look forward to providing additional education and support as needed.    Sincerely,    Marcella Duke, RD, CDE  Diabetes Care and

## 2025-01-28 ENCOUNTER — OFFICE VISIT (OUTPATIENT)
Dept: DIABETES | Facility: CLINIC | Age: 55
End: 2025-01-28
Payer: COMMERCIAL

## 2025-01-28 DIAGNOSIS — E10.65 TYPE 1 DIABETES MELLITUS WITH HYPERGLYCEMIA: Primary | ICD-10-CM

## 2025-01-28 DIAGNOSIS — Z96.41 INSULIN PUMP IN PLACE: ICD-10-CM

## 2025-01-28 DIAGNOSIS — Z12.31 SCREENING MAMMOGRAM FOR BREAST CANCER: ICD-10-CM

## 2025-01-28 DIAGNOSIS — R79.89 LOW SERUM C-PEPTIDE: ICD-10-CM

## 2025-01-28 RX ORDER — ATORVASTATIN CALCIUM 10 MG/1
10 TABLET, FILM COATED ORAL DAILY
Qty: 30 TABLET | Refills: 11 | Status: SHIPPED | OUTPATIENT
Start: 2025-01-28 | End: 2026-01-28

## 2025-01-28 NOTE — PATIENT INSTRUCTIONS
CURRENT DM MEDICATIONS:   CS-II Omnipod 5 with Fiasp  Januvia 100 mg daily    INSULIN PUMP SETTINGS:  Insulin pump type: Omnipod 5  Insulin Type: Fiasp  Basal rate:   0.70 Units/hour from 0000 hours to 2400 hours  Insulin to Carbohydrate Ratio: 1/6  Insulin Sensitivity Factor: 1/30  Glucose Target: 120 mg/dl   Correct Above: 130 mg/dl  Active Insulin Time: 4 hours    VACCINES NEEDED:   Flu Shot   COVID Booster   Pneumonia vaccine   Tetanus vaccine    U100 Pump Failure Plan:   We have decided to develop a plan in the event that your pump breaks or is nonfunctioning. After 4 hours without pump use, you should begin to consider putting your Pump Failure Back Up Plan into action especially if you foresee that you may not be able to use your pump for more than 20 hours. Since you are currently using short acting insulin (Humalog/Novolog/Admelog/Novolin R) in your pump, you will need access to your short acting insulin vial, syringes, and a back up long acting insulin in the event of a pump failure. I have sent a prescription for a long acting insulin to your pharmacy for use during your emergency pump failure plan. It is important that you keep both types of insulin/syringes with you so that you may have access to it at least 3 times daily if needed. This medication and syringes should be brought with you on overnight trips in the case of an emergency pump failure. This means making a plan to keeping your insulin and syringes at school, work, or other places you frequent. If needed, please reach out to my office about any letters you may need for permission to keep these items for emergency purposes. We will outline your plan for pump failure emergencies below, but please remember to contact the insulin pump company (toll free number is printed on the label on the back of the insulin pump) and our office if you have a pump failure. When the insulin pump is restarted, do not restart basal rates until at least 22 hours  after the last long acting insulin injection. You can set a 0% temporary basal setting that will last until this time and use your pump to bolus for meals and correction. If you need help with these feature, please call your insulin pump company tech support line or ask them in anticipation of this action.      Dosing:   If the insulin pump is non functional and discontinued for anticipated more than 20 hours, please give daily injections of:     Toujeo 20 units daily  Januvia 100 mg daily  Fiasp meal size dosing and correction dosing every 3 hours as needed - as below     Fiasp Meal Size:   7 units before meals   3 units before snacks      Fiasp Correction Dosing every 3 hours as needed OUTSIDE OF EATING  If  - 250, may take 2 units of Fiasp  If  - 300, may take 3 units of Fiasp  If  - 350, may take 4 units of Fiasp  If  - 400, may take 5 units of Fiasp  If +, may take 6 units of Fiasp     NOTE: This plan is based off of your average insulin needs per recent pump report. Short Acting Insulin should be taken 15 mins before eating a meal and every 2 hours as needed for correction dosing. The dose calculation above may be used for correction dosing by either only calculating the units to cover blood sugar or inputting a carb amount of 0g if not eating. Please not that taking insulin at the 2 hour phillip may mean you still have insulin on board and may lead to hypoglycemia or low blood sugars due to insulin stacking. If calculating correction dosing for the 3rd time or more time, please consider dividing in half to avoid over correction of blood sugar leading to hypoglycemia. Please call office to report to  any large sugar abnormalities including hyperglycemia > 250 and hypoglycemia < 60. Please continue to wear CGM even if not using pump to monitor for blood sugar fluctuations. Please make sure glucose tablets or sugary beverages available in case of hypoglycemia. Please make sure ketone  strips are available to you (may buy over the counter from pharmacy) and monitor urine ketones every 12 hours; if you have two readings og BG > 250 mg/dl; or if signs/symptoms concerning for DKA present including abdominal pain, nausea, vomiting, confusion, trouble breathing, dry mouth, fruity breath or breath that smells like nail polish remover, and excessive thirst/urination. If your urine ketone strips shows trace to small ketones, drink plenty of water and give yourself a correction dose. If BG is less than 250 mg/dL and you do not have moderate/large ketones, you may also exercise to help lower glucose levels. If you have moderate/large ketones, please give correction dosage, drink water, and immediately contact [facility] if during normal business hours. If outside of normal business hours, please give correction dosage, drink water, and report to nearest ED for evaluation/management of DKA.      When the insulin pump is restarted, do not restart basal rates until at least 22 hours after the last long acting insulin injection. You can set a 0% temporary basal setting that will last until this time and still use your pump to bolus for meals and correction. For help with setting temporary basal setting, please call office, view your pump's online resource library, or call 24 hour technical support hotline.      For any technical insulin pump issues, please contact the insulin pump company; the toll free number is printed on the label on the back of the insulin pump.

## 2025-01-28 NOTE — PROGRESS NOTES
PCP: Tristian Bearden MD    Subjective:     Chief Complaint: Diabetes - Established Patient  TELEMEDICINE VISIT:     The patient location is: Home  The chief complaint leading to consultation is: Diabetes Follow up  Visit type: Virtual visit with synchronous audio and video  Each patient to whom he or she provides medical services by telemedicine is:  (1) informed of the relationship between the physician and patient and the respective role of any other health care provider with respect to management of the patient; and (2) notified that he or she may decline to receive medical services by telemedicine and may withdraw from such care at any time.    Notes: N/A      HISTORY OF PRESENT ILLNESS: 54 y.o.   female presenting for diabetes management visit.   The patient's last visit with me was on 12/17/2024.   Patient has had Type I diabetes due to low C peptide since 5 years or more.  Pertinent to decision making is the following comorbidities: Vitamin D Deficiency and Gestational DM in Pregnancy 2003  Patient has the following Diabetes complications: without complications  She  has attended diabetes education in the past.     Patient's most recent A1c of 7.2% was completed 2 weeks ago.   Patient states since Her last A1c Her blood glucose levels have been high  after meals.   Patient monitors blood glucose 4 times per day and Continuously with personal CGM Dexcom.   Patient blood glucose monitoring device will be uploaded into [employer] [facility] today.         Interpretation of CGM / pump report as following:  postprandial hyperglycemia following carb bolusing and undercorrection of correction dosing.  Patient endorses the following diabetes related symptoms: None.   Patient is due today for the following diabetes-related health maintenance standards: Statin therapy per ADA guidelines, Influenza Vaccine, COVID-19 Vaccine , and Mammo and pneumonia vaccine and tetanus vaccine .   She denies recent  hospital admissions or emergency room visits.   She denies having hypoglycemia as above.   Patient's concerns today include glycemic control.   Patient medication regimen is as below.     CURRENT DM MEDICATIONS:   CS-II Omnipod 5 with Novolog  Januvia 100 mg daily    INSULIN PUMP SETTINGS:  Insulin pump type: Omnipod 5  Insulin Type: Fiasp  Basal rate:   0.70 Units/hour from 0000 hours to 2400 hours  Insulin to Carbohydrate Ratio: 1/7  Insulin Sensitivity Factor: 1/35  Glucose Target: 120 mg/dl   Correct Above: 130 mg/dl  Active Insulin Time: 4 hours    Other Pump Considerations:   Current Glucagon Kit: Yes  Current Ketone Strips: Yes  Emergency Plan for Pump Failure:   Toujeo 20 units daily  Januvia 100 mg daily  Novolog / Fiasp meal size dosing and correction dosing every 3 hours as needed - as below     Novolog / Fiasp Meal Size:   7 units before meals   3 units before snacks      Novolog / Fiasp Correction Dosing every 3 hours as needed OUTSIDE OF EATING  If  - 250, may take 2 units of Fiasp  If  - 300, may take 3 units of Fiasp  If  - 350, may take 4 units of Fiasp  If  - 400, may take 5 units of Fiasp  If +, may take 6 units of Fiasp      Patient has failed the following Diabetes medications:   Metformin - GI   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       Lab Results   Component Value Date    CPEPTIDE 1.67 11/20/2023     Lab Results   Component Value Date    GLUTAMICACID 0.00 09/28/2020          //   , There is no height or weight on file to calculate BMI.  Her blood sugar in clinic today is:    Lab Results   Component Value Date    POCGLU 233 (A) 05/10/2024       Review of Systems   Constitutional:  Negative for activity change, appetite change, chills and fever.   HENT:  Negative for dental problem, mouth sores, nosebleeds, sore throat and  trouble swallowing.    Eyes:  Negative for pain and discharge.   Respiratory:  Negative for shortness of breath, wheezing and stridor.    Cardiovascular:  Negative for chest pain, palpitations and leg swelling.   Gastrointestinal:  Negative for abdominal pain, diarrhea, nausea and vomiting.   Endocrine: Negative for polydipsia, polyphagia and polyuria.   Genitourinary:  Negative for dysuria, frequency and urgency.   Musculoskeletal:  Negative for joint swelling and myalgias.   Skin:  Negative for rash and wound.   Neurological:  Negative for dizziness, syncope, weakness and headaches.   Psychiatric/Behavioral:  Negative for behavioral problems and dysphoric mood.          Diabetes Management Status  Statin: Not taking  ACE/ARB: Not taking    Screening or Prevention Patient's value Goal Complete/Controlled?   HgA1C Testing and Control   Lab Results   Component Value Date    HGBA1C 7.2 (H) 12/06/2024      Annually/Less than 8% No   Lipid profile : 12/06/2024 Annually Yes   LDL control  Lab Results   Component Value Date    LDLCALC 88.0 12/06/2024    Annually/Less than 100 mg/dl  Yes   Nephropathy screening Lab Results   Component Value Date    MICALBCREAT 13.0 03/11/2024     Lab Results   Component Value Date    PROTEINUA Negative 01/26/2021    Annually No   Blood pressure BP Readings from Last 1 Encounters:   07/16/24 110/66    Less than 140/90 Yes   Dilated retinal exam : 12/06/2024 Annually Yes    Foot exam   : 05/10/2024 Annually No     Social History     Socioeconomic History    Marital status: Single    Number of children: 2   Occupational History    Occupation:      Employer: [employer]     Employer: [employer]   Tobacco Use    Smoking status: Never    Smokeless tobacco: Never   Substance and Sexual Activity    Alcohol use: Yes     Comment: Rare    Drug use: No    Sexual activity: Yes     Partners: Male     Birth control/protection: Surgical   Social History Narrative    She wears  seatbelt.     Social Drivers of Health     Financial Resource Strain: Medium Risk (6/27/2024)    Overall Financial Resource Strain (CARDIA)     Difficulty of Paying Living Expenses: Somewhat hard   Food Insecurity: Food Insecurity Present (6/27/2024)    Hunger Vital Sign     Worried About Running Out of Food in the Last Year: Sometimes true   Transportation Needs: No Transportation Needs (6/27/2024)    TRANSPORTATION NEEDS     Transportation : No   Physical Activity: Insufficiently Active (5/6/2024)    [facility]    Exercise Vital Sign     Days of Exercise per Week: 4 days     Minutes of Exercise per Session: 30 min   Stress: Stress Concern Present (7/11/2024)    [employer] - Occupational Stress Questionnaire     Feeling of Stress : Very much   Housing Stability: Low Risk  (6/27/2024)    Housing Stability Vital Sign     Unable to Pay for Housing in the Last Year: No     Homeless in the Last Year: No     Past Medical History:   Diagnosis Date    Abnormal Pap smear     Abnormal Pap smear of cervix     Arthritis, low back     DM (diabetes mellitus) 09/2014    BS didn't check 01/31/2017    GERD (gastroesophageal reflux disease)     Gestational diabetes     Hepatitis C antibody positive in blood 02/11/2022    RNA NEGATIVE    History of abnormal Pap smear     Surgical menopause     Vitamin D deficiency        Objective:        Physical Exam  Constitutional:       General: She is not in acute distress.     Appearance: She is well-developed. She is not diaphoretic.   HENT:      Head: Normocephalic and atraumatic.      Right Ear: External ear normal.      Left Ear: External ear normal.      Nose: Nose normal.   Eyes:      General:         Right eye: No discharge.         Left eye: No discharge.   Pulmonary:      Effort: Pulmonary effort is normal. No respiratory distress.      Breath sounds: No stridor.   Musculoskeletal:         General: Normal range of  motion.      Cervical back: Normal range of motion.   Skin:     Coloration: Skin is not pale.   Neurological:      Mental Status: She is alert and oriented to person, place, and time.      Motor: No abnormal muscle tone.      Coordination: Coordination normal.   Psychiatric:         Behavior: Behavior normal.         Thought Content: Thought content normal.         Judgment: Judgment normal.             Assessment / Plan:     Type 1 diabetes mellitus with hyperglycemia  -     atorvastatin (LIPITOR) 10 MG tablet; Take 1 tablet (10 mg total) by mouth once daily.  Dispense: 30 tablet; Refill: 11  -     Hemoglobin A1C; Future; Expected date: 01/28/2025    Low serum C-peptide    Insulin pump in place    Screening mammogram for breast cancer  -     Mammo Digital Screening Bilat w/ Haroon; Future; Expected date: 01/28/2025        Additional Plan Details:    - POCT Glucose  - Encouraged continuation of lifestyle changes including regular exercise and limiting carbohydrates to 30-45 grams per meal threes times daily and 15 grams per snack with a limit of two daily.   - Encouraged continued monitoring of blood glucose with maintenance of 4 times daily and Continuously with personal CGM Dexcom.   - Current DM Medication Regimen: Continue CS-II Omnipod 5 with Novolog. See settings below. Change IC to 6. Change CF to 30. Continue Januvia 100 mg daily.   - Updated emergency pump back up plan   - A1c sched in March   - Health Maintenance standards addressed today: Statin therapy - Start Lipitor 10 mg daily, Flu Shot - patient would like to complete outside of [facility], COVID - 19 Vaccine - [facility] , and pneumonia vaccine and tetanus vaccines - vaccine counseling given and mammo orders to be entered  - Nursing Visit: Patient is below goal range for nursing visit for age group and will not need nursing visit at this time .   - Follow up in 12 weeks.    CURRENT DM MEDICATIONS:   CS-II Omnipod 5 with  Novolog  Januvia 100 mg daily    INSULIN PUMP SETTINGS:  Insulin pump type: Omnipod 5  Insulin Type: Novolog  Basal rate:   0.70 Units/hour from 0000 hours to 2400 hours  Insulin to Carbohydrate Ratio: 1/6  Insulin Sensitivity Factor: 1/30  Glucose Target: 120 mg/dl   Correct Above: 130 mg/dl  Active Insulin Time: 4 hours    Blakeney McKnight, PA-C [facility]

## 2025-01-28 NOTE — Clinical Note
"4/15 8a virtual "Omni 5 sharing"  Sched Mammo at [facility] if possible after 4/10/25. If not available there then [facility] in am "

## 2025-02-17 DIAGNOSIS — E10.65 TYPE 1 DIABETES MELLITUS WITH HYPERGLYCEMIA: Primary | ICD-10-CM

## 2025-03-19 DIAGNOSIS — E11.9 TYPE 2 DIABETES MELLITUS WITHOUT COMPLICATION: ICD-10-CM

## 2025-03-27 ENCOUNTER — TELEPHONE (OUTPATIENT)
Dept: DIABETES | Facility: CLINIC | Age: 55
End: 2025-03-27
Payer: COMMERCIAL

## 2025-04-03 ENCOUNTER — CLINICAL SUPPORT (OUTPATIENT)
Dept: DIABETES | Facility: CLINIC | Age: 55
End: 2025-04-03
Payer: COMMERCIAL

## 2025-04-03 ENCOUNTER — TELEPHONE (OUTPATIENT)
Dept: DIABETES | Facility: CLINIC | Age: 55
End: 2025-04-03
Payer: COMMERCIAL

## 2025-04-03 DIAGNOSIS — E10.65 TYPE 1 DIABETES MELLITUS WITH HYPERGLYCEMIA: ICD-10-CM

## 2025-04-03 PROCEDURE — G0108 DIAB MANAGE TRN  PER INDIV: HCPCS | Mod: 95,,, | Performed by: DIETITIAN, REGISTERED

## 2025-04-03 NOTE — PROGRESS NOTES
Diabetes Care Specialist Virtual Visit Note     The patient location is: private room at work in [place]  The chief complaint leading to consultation is: Diabetes  Visit type: audiovisual  Total time spent with patient: 30 min   Each patient to whom he or she provides medical services by telemedicine is:  (1) informed of the relationship between the physician and patient and the respective role of any other health care provider with respect to management of the patient; and (2) notified that he or she may decline to receive medical services by telemedicine and may withdraw from such care at any time.    Diabetes Care Specialist Progress Note  Author: Marcella Duke RD, [facility]  Date: 4/3/2025    Intake    Program Intake  Reason for Diabetes Program Visit:: Intervention (DM referral 2/17/25 Odilia Shaffer, CAROL)  Type of Intervention:: Individual  Education: Self-Management Skill Review, Advanced Pump  Current diabetes risk level:: high  In the last month, have you used the ER or been admitted to the hospital: No    Current Diabetes Treatment:  (Omnipod 5 with Fiasp. Januvia 100mg 1tab daily.)    Continuous Glucose Monitoring  Patient has CGM: Yes  Personal CGM type:: DexG7 - pt denies device issues but requesting advise on how to manage tape residue  GMI Date: 04/03/25  GMI Value: 8.8 %    Lab Results   Component Value Date    HGBA1C 7.2 (H) 12/06/2024     Lifestyle Coping Support & Clinical    Problem Review  Active Comorbidities:  (Gestational DM in Pregnancy 2003, HepC, GERD, Vit D def.)    Diabetes Self-Management Skills Assessment    Medication Skills Assessment  Patient is able to identify current diabetes medications, dosages, and appropriate timing of medications.: no (Improved Pod usage, forgetting to switch back to automated mode. Pt trying to improve bolus dosing but underestimating carb gram entries.)  Patient reports problems or concerns with current medication regimen.: no (Pt denies issues. Noted  "continued pp excursions.)   Patient is  aware that some diabetes medications can cause low blood sugar?: Yes  Medication Skills Assessment Completed:: Yes  Assessment indicates:: Instruction Needed, Knowledge deficit  Area of need?: Yes    Nutrition/Healthy Eating  Meal Plan 24 Hour Recall - Breakfast: none  Meal Plan 24 Hour Recall - Lunch: red beans1/2c, sm amt rice, corn bread ~2" pc, fried chix (thigh, wing), water  Meal Plan 24 Hour Recall - Dinner: sonic fried fish sandwich, couple onion rings, reg urban OR subway 6", cookie, reg urban  Patient can identify foods that impact blood sugar.:  (Pt is underestimating carb gram ctg. She is requesting review of tools to support.)  Challenges to healthy eating::  (freq restaurant meals, sugary urban)  Nutrition/Healthy Eating Skills Assessment Completed:: Yes  Assessment indicates:: Instruction Needed, Knowledge deficit  Area of need?: Yes     Home Blood Glucose Monitoring  Patient states that blood sugar is checked at home daily.: yes  Personal CGM type:: DexG7 - pt denies device issues but requesting advise on how to manage tape residue  Home Blood Glucose Monitoring Skills Assessment Completed: : Yes  Assessment indicates:: Instruction Needed  Area of need?: Yes    Assessment Summary and Plan  Based on today's diabetes care assessment, the following areas of need were identified:      Identified Areas of Need      Medication/Current Diabetes Treatment: Yes -see care plan   Nutrition/Healthy Eating: Yes -see care plan   Home Blood Glucose Monitoring: Yes -see care plan     Today's interventions were provided through individual discussion, instruction, and written materials were provided.    Patient verbalized understanding of instruction and written materials.  Pt was able to return back demonstration of instructions today. Patient understood key points, needs reinforcement and further instruction.     Diabetes Self-Management Care Plan:  Today's Diabetes Self-Management " Care Plan was developed with Renaldo's input. Chan has agreed to work toward the following goal(s) to improve his/her overall diabetes control.      Care Plan: Diabetes Management   Updates made since 4/3/2024 12:00 AM        Problem: Medications         Goal: Maintain consistent OP5 Pod usage and bolus accuracy.    Start Date: 4/3/2025   Expected End Date: 2/17/2026   Priority: High   Barriers: No Barriers Identified   Note:    Encouraged improvements in Pod placement and continued work on consistency. Reminded pt to check OP5 controller and maintain automated mode. Reviewed benefits, applications OP5 automated mode. Instructed on skin care tips to remove device adhesive (Dex and OP5).         Task: Reviewed with patient all current diabetes medications and provided basic review of the purpose, dosage, frequency, side effects, and storage of both oral and injectable diabetes medications. Completed 4/3/2025        Task: Discussed guidelines for preventing, detecting and treating hypoglycemia and hyperglycemia and reviewed the importance of meal and medication timing with diabetes mediations for prevention of hypoglycemia and maximum drug benefit. Completed 4/3/2025        Problem: Healthy Eating         Goal: Count carb grams and enter accurately into OP5 bolus calculator.    Start Date: 4/3/2025   Expected End Date: 2/17/2026   Priority: Medium   Barriers: Knowledge deficit   Note:    Reviewed carb gram ctg resources and mobile apps. Detailed msg to [employer] with references.       Task: Reviewed the sources and role of Carbohydrate, Protein, and Fat and how each nutrient impacts blood sugar. Completed 4/3/2025        Task: Explained how to count carbohydrates using the food label and the use of dry measuring cups for accurate carb counting. Completed 4/3/2025        Task: Recommended replacing beverages containing high sugar content with noncaloric/sugar free options and/or water. Completed 4/3/2025         Follow Up Plan  Follow up in about 4 weeks (around 5/1/2025).  -review OP5/Dex reports  -eval goals    Today's care plan and follow up schedule was discussed with patient.  Kellemimi verbalized understanding of the care plan, goals, and agrees to follow up plan.        The patient was encouraged to communicate with his/her health care provider/physician and care team regarding his/her condition(s) and treatment.  I provided the patient with my contact information today and encouraged to contact me via phone or [facility] as needed.     Length of Visit   Total Time: 30 Minutes

## 2025-04-04 ENCOUNTER — PATIENT OUTREACH (OUTPATIENT)
Dept: ADMINISTRATIVE | Facility: HOSPITAL | Age: 55
End: 2025-04-04
Payer: COMMERCIAL

## 2025-04-04 DIAGNOSIS — E11.65 UNCONTROLLED TYPE 2 DIABETES MELLITUS WITH HYPERGLYCEMIA, WITHOUT LONG-TERM CURRENT USE OF INSULIN: Primary | ICD-10-CM

## 2025-04-04 DIAGNOSIS — Z71.89 COORDINATION OF COMPLEX CARE: ICD-10-CM

## 2025-04-04 DIAGNOSIS — E10.65 TYPE 1 DIABETES MELLITUS WITH HYPERGLYCEMIA: ICD-10-CM

## 2025-04-04 NOTE — PROGRESS NOTES
[facility] Score: 1     Urine Screening                 Assisted to schedule follow up, 5/13/25 and will do labs same day.  All other health screenings up to date.  Referral for community health worker placed.

## 2025-04-07 ENCOUNTER — PATIENT OUTREACH (OUTPATIENT)
Dept: ADMINISTRATIVE | Facility: OTHER | Age: 55
End: 2025-04-07
Payer: COMMERCIAL

## 2025-04-07 NOTE — PROGRESS NOTES
CHW - Initial Contact    This Community Health Worker completed OR updated the Social Determinant of Health questionnaire with patient via telephone today.    Pt identified barriers of most importance are:  Utility and Food assistance   Support and Services:  A referral was placed for Ms. Dwyer from [facility] based patients. Ms. Quiroz stated she works as a  and walks about 30 min a day in total up and down stairs. Her homes is paid off and she has been living there for 20 plus years. She often has to often request extension on her utility bills in order to make the payments. I have sent an email to Ms. Dwyer with a list of food [place] that is near her zip code. I have informed her of [facility] of Social Service number and community center number to apply for [place] for utility assistance. No additional needs at this time, will follow for outcome of food [place] and [place]eron arlen.   Other information discussed the patient needs / wants help with:    Follow up required: Yes  Follow-up Outreach - Due: 4/14/2025

## 2025-04-14 ENCOUNTER — PATIENT OUTREACH (OUTPATIENT)
Dept: ADMINISTRATIVE | Facility: OTHER | Age: 55
End: 2025-04-14
Payer: COMMERCIAL

## 2025-04-14 DIAGNOSIS — E10.65 TYPE 1 DIABETES MELLITUS WITH HYPERGLYCEMIA: ICD-10-CM

## 2025-04-14 RX ORDER — ASPIRIN 81 MG/1
81 TABLET ORAL DAILY
Qty: 30 TABLET | Refills: 11
Start: 2025-04-14 | End: 2026-04-14

## 2025-04-14 NOTE — PROGRESS NOTES
CHW - Case Closure    This Community Health Worker spoke to patient via telephone today.   Pt/Caregiver reported: Ms. Rayo;benedict stated she has not visit the food [place] or completed her LIHEAP application due to dealing wit her grand baby. She stated she will completed and contact me.   Pt denied any additional needs at this time and agrees with episode closure at this time.  Provided patient with Community Health Worker's contact information and encouraged him/her to contact this Community Health Worker if additional needs arise.

## 2025-04-21 ENCOUNTER — PATIENT MESSAGE (OUTPATIENT)
Dept: DIABETES | Facility: CLINIC | Age: 55
End: 2025-04-21
Payer: COMMERCIAL

## 2025-04-21 ENCOUNTER — TELEPHONE (OUTPATIENT)
Dept: DIABETES | Facility: CLINIC | Age: 55
End: 2025-04-21
Payer: COMMERCIAL

## 2025-04-21 DIAGNOSIS — E10.65 TYPE 1 DIABETES MELLITUS WITH HYPERGLYCEMIA: ICD-10-CM

## 2025-04-21 DIAGNOSIS — E10.65 TYPE 1 DIABETES MELLITUS WITH HYPERGLYCEMIA: Primary | ICD-10-CM

## 2025-04-21 RX ORDER — INSULIN GLARGINE 300 [IU]/ML
INJECTION, SOLUTION SUBCUTANEOUS
Qty: 2 ML | Refills: 11 | Status: SHIPPED | OUTPATIENT
Start: 2025-04-21 | End: 2025-04-22 | Stop reason: SDUPTHER

## 2025-04-21 RX ORDER — INSULIN PMP CART,AUT,G6/7,CNTR
1 EACH SUBCUTANEOUS ONCE
Qty: 1 EACH | Refills: 0 | Status: SHIPPED | OUTPATIENT
Start: 2025-04-21 | End: 2025-04-21

## 2025-04-21 RX ORDER — INSULIN GLARGINE 300 [IU]/ML
INJECTION, SOLUTION SUBCUTANEOUS
Qty: 2 ML | Refills: 11 | Status: SHIPPED | OUTPATIENT
Start: 2025-04-21 | End: 2025-04-21 | Stop reason: SDUPTHER

## 2025-04-21 NOTE — TELEPHONE ENCOUNTER
----- Message from Juanvictor mcarmelina sent at 4/21/2025 10:43 AM CDT -----  Contact: HANSA PATIÑO [[id number]]  ..Type:  Patient Requesting CallWho Called:HANSA PATIÑO [[id number]]Does the patient know what this is regarding?:left omnipod on table at restuarant Would the patient rather a call back or a response via [facility]? [facility]:[phone] (home) Additional Information: asking what can she do to get another one

## 2025-04-21 NOTE — TELEPHONE ENCOUNTER
Lvm for pt letting pt know that Odilia sent over Omnipod new pdm rx. Tell her to let us know if too expensive or if she gets so we can get her in with education to set back up again.

## 2025-04-21 NOTE — TELEPHONE ENCOUNTER
Pt states she left her controller at a restaurant and she did call restaurant to see if someone turned it in, but no one turned it in. I explained to her what Odilia explained to me, that provider will try to run it through insurance to get another controller but it might not be approved. I provided pt with [employer] customer care number to reach out to them to see if they can send her a replacement. Pt said she is going to call Omnipod to see, and she will keep us updated. I also told pt to follow her pump failure plan in the mean time.

## 2025-04-21 NOTE — TELEPHONE ENCOUNTER
Omnipod new pdm rx. Tell her to let us know if too expensive or if she gets so we can get her in with education to set back up again     Odilia Shaffer PA-C, BC-ADM  [facility]

## 2025-04-21 NOTE — TELEPHONE ENCOUNTER
----- Message from Odilia Shaffer PA-C sent at 4/21/2025  4:56 PM CDT -----  Please pend her toujeo to me

## 2025-04-21 NOTE — TELEPHONE ENCOUNTER
Pt is requesting to have medication that was sent to the [facility] be sent over to [facility] pharmacy.

## 2025-04-22 DIAGNOSIS — E10.65 TYPE 1 DIABETES MELLITUS WITH HYPERGLYCEMIA: ICD-10-CM

## 2025-04-22 RX ORDER — BLOOD-GLUCOSE SENSOR
1 EACH MISCELLANEOUS
Qty: 3 EACH | Refills: 11 | Status: SHIPPED | OUTPATIENT
Start: 2025-04-22 | End: 2026-04-22

## 2025-04-22 RX ORDER — BLOOD-GLUCOSE TRANSMITTER
1 EACH MISCELLANEOUS
Qty: 1 EACH | Refills: 3 | Status: SHIPPED | OUTPATIENT
Start: 2025-04-22 | End: 2026-04-22

## 2025-04-22 RX ORDER — INSULIN GLARGINE 300 [IU]/ML
INJECTION, SOLUTION SUBCUTANEOUS
Qty: 4.5 ML | Refills: 11 | Status: SHIPPED | OUTPATIENT
Start: 2025-04-22

## 2025-04-22 RX ORDER — INSULIN GLARGINE 300 [IU]/ML
INJECTION, SOLUTION SUBCUTANEOUS
Qty: 2 ML | Refills: 11 | OUTPATIENT
Start: 2025-04-22

## 2025-04-22 NOTE — TELEPHONE ENCOUNTER
Spoke with pt to let pt know to stop Toujeo 18 hours before pump restart. I also informed pt to bring in pump supplies and insulin to appointment on 4/25. Pt stated she understands. Pt is also informed that Dexcom G6 Rx was sent to pharmacy.

## 2025-04-22 NOTE — TELEPHONE ENCOUNTER
Patient set up for transfer of Omnipod settings to Phone with CDE team secondary to losing pdm.     [facility]o Rx to [facility].    Pump Start Orders:     Pump Type: Omnipod - Phone as replacement for PDM   Referral to CDE placed: yes  Settings Orders:     INSULIN PUMP SETTINGS:  Insulin pump type: Omnipod 5  Insulin Type: Novolog  Basal rate:   0.70 Units/hour from 0000 hours to 2400 hours  Insulin to Carbohydrate Ratio: 1/6  Insulin Sensitivity Factor: 1/30  Glucose Target: 120 mg/dl   Correct Above: 130 mg/dl  Active Insulin Time: 4 hours  Max BASAL: 3u/hr  Max Bolus: 25u    Insulin Vials: Pt has Rx for Novolog vials  Instructions to stop Current Regimen: stop Toujeo 18 hrs before pump start    STAFF:  - please review instructions to stop current regimen with patient  - please instruct pt to bring all current pump supplies and insulin vials to upcoming pump training.   - will Rx G6 supplies; will switch once able to fill    Odilia Shaffer PA-C, BC-ADM  [facility]

## 2025-04-22 NOTE — TELEPHONE ENCOUNTER
Spoke with pt to see which kind of phone she has. Pt has the iPhone 12, she is requesting to have her omnipod switched to her phone due to pt misplacing Omnipod controller. Pt also stated that she is currently using the Dexcom g7, she is okay with having to switch back to the Dexcom G6 since having to use Omnipod on her phone.     Pt is also requesting to have her Toujeo sent back to the [facility] pharmacy instead of [facility].      Pt also has questions about when she should stop her long acting insulin before converting back to the Omnipod on 4/25 at 1 pm.

## 2025-04-25 ENCOUNTER — CLINICAL SUPPORT (OUTPATIENT)
Dept: DIABETES | Facility: CLINIC | Age: 55
End: 2025-04-25
Payer: COMMERCIAL

## 2025-04-25 ENCOUNTER — TELEPHONE (OUTPATIENT)
Dept: DIABETES | Facility: CLINIC | Age: 55
End: 2025-04-25
Payer: COMMERCIAL

## 2025-04-25 DIAGNOSIS — E10.65 TYPE 1 DIABETES MELLITUS WITH HYPERGLYCEMIA: Primary | ICD-10-CM

## 2025-04-25 PROCEDURE — 99999 PR PBB SHADOW E&M-EST. PATIENT-LVL III: CPT | Mod: PBBFAC,,,

## 2025-04-25 NOTE — PROGRESS NOTES
Diabetes Care Specialist Follow-up Note  Author: Samira Johnson RN  Date: 4/25/2025    Intake    Program Intake  Reason for Diabetes Program Visit:: Intervention  Type of Intervention:: Individual  Individual: Device Training  Device Training: Insulin Pump Upgrade (Upgrade from PDM to iPhone.)  Current diabetes risk level:: high  In the last month, have you used the ER or been admitted to the hospital: No  Permission to speak with others about care:: yes    Lab Results   Component Value Date    HGBA1C 7.2 (H) 12/06/2024   During today's follow-up visit,  the following areas required further assessment and content was provided/reviewed.    Based on today's diabetes care assessment, the following areas of need were identified:      Identified Areas of Need      Medication/Current Diabetes Treatment: See care plan for update.         Today's interventions were provided through individual discussion, instruction, and written materials were provided.    Patient verbalized understanding of instruction and written materials.  Pt was able to return back demonstration of instructions today. Patient understood key points, needs reinforcement and further instruction.     Diabetes Self-Management Care Plan Review and Evaluation of Progress:    During today's follow-up Chan's Diabetes Self-Management Care Plan progress was reviewed and progress was evaluated including his/her input. Chan has agreed to continue his/her journey to improve/maintain overall diabetes control by continuing to set health goals. See care plan progress below.      Care Plan: Diabetes Management   Updates made since 4/25/2024 12:00 AM        Problem: Medications         Goal: Maintain consistent OP5 Pod usage and bolus accuracy.    Start Date: 4/3/2025   Expected End Date: 2/17/2026   Priority: High   Barriers: No Barriers Identified   Note:    Encouraged improvements in Pod placement and continued work on consistency. Reminded pt to check  OP5 controller and maintain automated mode. Instructed on skin care tips to remove device adhesive (Dex and OP5).      4/25/2025: Switched from controller to iPhone.       Task: Reviewed with patient all current diabetes medications and provided basic review of the purpose, dosage, frequency, side effects, and storage of both oral and injectable diabetes medications. Completed 4/3/2025        Task: Discussed guidelines for preventing, detecting and treating hypoglycemia and hyperglycemia and reviewed the importance of meal and medication timing with diabetes mediations for prevention of hypoglycemia and maximum drug benefit. Completed 4/3/2025        Problem: Healthy Eating         Goal: Count carb grams and enter accurately into OP5 bolus calculator.    Start Date: 4/3/2025   Expected End Date: 2/17/2026   Priority: Medium   Barriers: Knowledge deficit   Note:    Reviewed carb gram ctg resources and mobile apps. Detailed msg to [employer] with references.       Task: Reviewed the sources and role of Carbohydrate, Protein, and Fat and how each nutrient impacts blood sugar. Completed 4/3/2025        Task: Explained how to count carbohydrates using the food label and the use of dry measuring cups for accurate carb counting. Completed 4/3/2025        Task: Recommended replacing beverages containing high sugar content with noncaloric/sugar free options and/or water. Completed 4/3/2025          Follow Up Plan     Follow up if symptoms worsen or fail to improve.    Today's care plan and follow up schedule was discussed with patient.  Chan verbalized understanding of the care plan, goals, and agrees to follow up plan.        The patient was encouraged to communicate with his/her health care provider/physician and care team regarding his/her condition(s) and treatment.  I provided the patient with my contact information today and encouraged to contact me via phone or [facility] as needed.     Length of Visit    Total Time: 30 Minutes

## 2025-04-30 ENCOUNTER — OFFICE VISIT (OUTPATIENT)
Dept: DIABETES | Facility: CLINIC | Age: 55
End: 2025-04-30
Payer: COMMERCIAL

## 2025-04-30 DIAGNOSIS — E10.65 TYPE 1 DIABETES MELLITUS WITH HYPERGLYCEMIA: Primary | ICD-10-CM

## 2025-04-30 DIAGNOSIS — R79.89 LOW SERUM C-PEPTIDE: ICD-10-CM

## 2025-04-30 DIAGNOSIS — Z96.41 INSULIN PUMP IN PLACE: ICD-10-CM

## 2025-04-30 NOTE — PROGRESS NOTES
PCP: Tristian Bearden MD    Subjective:     Chief Complaint: Diabetes - Established Patient  TELEMEDICINE VISIT:     The patient location is: Home  The chief complaint leading to consultation is: Diabetes Follow up  Visit type: Virtual visit with synchronous audio and video  Each patient to whom he or she provides medical services by telemedicine is:  (1) informed of the relationship between the physician and patient and the respective role of any other health care provider with respect to management of the patient; and (2) notified that he or she may decline to receive medical services by telemedicine and may withdraw from such care at any time.    Notes: N/A      HISTORY OF PRESENT ILLNESS: 54 y.o.   female presenting for diabetes management visit.   The patient's last visit with me was on 1/28/2025.   Patient has had Type I diabetes due to low C peptide since 5 years or more.  Pertinent to decision making is the following comorbidities: Vitamin D Deficiency and Gestational DM in Pregnancy 2003  Patient has the following Diabetes complications: without complications  She  has attended diabetes education in the past.     Patient's most recent A1c of 7.2% was completed 4 months ago.   Patient states since Her last A1c Her blood glucose levels have been high  after meals.   Patient monitors blood glucose 4 times per day and Continuously with personal CGM Dexcom.   Patient blood glucose monitoring device will be uploaded into Media Section today.         Interpretation of CGM / pump report as following: postprandial hyperglycemia following carb bolusing which she is often missing boluses and undercorrection of correction dosing.  Patient endorses the following diabetes related symptoms: None.   Patient is due today for the following diabetes-related health maintenance standards: Foot Exam , Urine Microalbumin/creatinine ratio, A1c, Influenza Vaccine, COVID-19 Vaccine , and Mammo and pneumonia  vaccine and tetanus vaccine .   She denies recent hospital admissions or emergency room visits.   She denies having hypoglycemia as above.   Patient's concerns today include glycemic control.   Patient medication regimen is as below.     CURRENT DM MEDICATIONS:   CS-II Omnipod 5 with Novolog  Januvia 100 mg daily    INSULIN PUMP SETTINGS:  Insulin pump type: Omnipod 5  Insulin Type: Novolog  Basal rate:   0.70 Units/hour from 0000 hours to 2400 hours  Insulin to Carbohydrate Ratio: 1/6  Insulin Sensitivity Factor: 1/30  Glucose Target: 120 mg/dl   Correct Above: 130 mg/dl  Active Insulin Time: 4 hours    Other Pump Considerations:   Current Glucagon Kit: Yes  Current Ketone Strips: Yes  Emergency Plan for Pump Failure:   Toujeo 14 units daily  Januvia 100 mg daily  Novolog / Fiasp meal size dosing and correction dosing every 3 hours as needed - as below     Novolog / Fiasp Meal Size:   7 units before meals   3 units before snacks      Novolog / Fiasp Correction Dosing every 3 hours as needed OUTSIDE OF EATING  If  - 250, may take 2 units of Fiasp  If  - 300, may take 3 units of Fiasp  If  - 350, may take 4 units of Fiasp  If  - 400, may take 5 units of Fiasp  If +, may take 6 units of Fiasp      Patient has failed the following Diabetes medications:   Metformin - GI   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       Lab Results   Component Value Date    CPEPTIDE 1.67 11/20/2023     Lab Results   Component Value Date    GLUTAMICACID 0.00 09/28/2020          //   , There is no height or weight on file to calculate BMI.  Her blood sugar in clinic today is:    Lab Results   Component Value Date    POCGLU 233 (A) 05/10/2024       Review of Systems   Constitutional:  Negative for activity change, appetite change, chills and fever.   HENT:  Negative for dental  problem, mouth sores, nosebleeds, sore throat and trouble swallowing.    Eyes:  Negative for pain and discharge.   Respiratory:  Negative for shortness of breath, wheezing and stridor.    Cardiovascular:  Negative for chest pain, palpitations and leg swelling.   Gastrointestinal:  Negative for abdominal pain, diarrhea, nausea and vomiting.   Endocrine: Negative for polydipsia, polyphagia and polyuria.   Genitourinary:  Negative for dysuria, frequency and urgency.   Musculoskeletal:  Negative for joint swelling and myalgias.   Skin:  Negative for rash and wound.   Neurological:  Negative for dizziness, syncope, weakness and headaches.   Psychiatric/Behavioral:  Negative for behavioral problems and dysphoric mood.          Diabetes Management Status  Statin: Not taking  ACE/ARB: Not taking    Screening or Prevention Patient's value Goal Complete/Controlled?   HgA1C Testing and Control   Lab Results   Component Value Date    HGBA1C 7.2 (H) 12/06/2024      Annually/Less than 8% No   Lipid profile : 12/06/2024 Annually Yes   LDL control  Lab Results   Component Value Date    LDLCALC 88.0 12/06/2024    Annually/Less than 100 mg/dl  Yes   Nephropathy screening Lab Results   Component Value Date    MICALBCREAT 13.0 03/11/2024     Lab Results   Component Value Date    PROTEINUA Negative 01/26/2021    Annually No   Blood pressure BP Readings from Last 1 Encounters:   07/16/24 110/66    Less than 140/90 Yes   Dilated retinal exam : 12/06/2024 Annually Yes    Foot exam   : 05/10/2024 Annually No     Social History     Socioeconomic History    Marital status: Single    Number of children: 2   Occupational History    Occupation:      Employer: [employer]     Employer: [employer]   Tobacco Use    Smoking status: Never    Smokeless tobacco: Never   Substance and Sexual Activity    Alcohol use: Yes     Comment: Rare    Drug use: No    Sexual activity: Yes     Partners: Male     Birth control/protection:  Surgical   Social History Narrative    She wears seatbelt.     Social Drivers of Health     Financial Resource Strain: High Risk (4/7/2025)    Overall Financial Resource Strain (CARDIA)     Difficulty of Paying Living Expenses: Hard   Food Insecurity: Food Insecurity Present (4/7/2025)    Hunger Vital Sign     Worried About Running Out of Food in the Last Year: Sometimes true     Ran Out of Food in the Last Year: Sometimes true   Transportation Needs: No Transportation Needs (4/7/2025)    PRAPARE - Transportation     Lack of Transportation (Medical): No     Lack of Transportation (Non-Medical): No   Physical Activity: Sufficiently Active (4/7/2025)    Exercise Vital Sign     Days of Exercise per Week: 5 days     Minutes of Exercise per Session: 30 min   Stress: Stress Concern Present (7/11/2024)    [place] [facility] - Occupational Stress Questionnaire     Feeling of Stress : Very much   Housing Stability: Low Risk  (4/7/2025)    Housing Stability Vital Sign     Unable to Pay for Housing in the Last Year: No     Number of Times Moved in the Last Year: 0     Homeless in the Last Year: No     Past Medical History:   Diagnosis Date    Abnormal Pap smear     Abnormal Pap smear of cervix     Arthritis, low back     DM (diabetes mellitus) 09/2014    BS didn't check 01/31/2017    GERD (gastroesophageal reflux disease)     Gestational diabetes     Hepatitis C antibody positive in blood 02/11/2022    RNA NEGATIVE    History of abnormal Pap smear     Surgical menopause     Vitamin D deficiency        Objective:        Physical Exam  Constitutional:       General: She is not in acute distress.     Appearance: She is well-developed. She is not diaphoretic.   HENT:      Head: Normocephalic and atraumatic.      Right Ear: External ear normal.      Left Ear: External ear normal.      Nose: Nose normal.   Eyes:      General:         Right eye: No discharge.         Left eye: No discharge.   Pulmonary:       Effort: Pulmonary effort is normal. No respiratory distress.      Breath sounds: No stridor.   Musculoskeletal:         General: Normal range of motion.      Cervical back: Normal range of motion.   Skin:     Coloration: Skin is not pale.   Neurological:      Mental Status: She is alert and oriented to person, place, and time.      Motor: No abnormal muscle tone.      Coordination: Coordination normal.   Psychiatric:         Behavior: Behavior normal.         Thought Content: Thought content normal.         Judgment: Judgment normal.             Assessment / Plan:     Type 1 diabetes mellitus with hyperglycemia    Low serum C-peptide    Insulin pump in place        Additional Plan Details:    - POCT Glucose  - Encouraged continuation of lifestyle changes including regular exercise and limiting carbohydrates to 30-45 grams per meal threes times daily and 15 grams per snack with a limit of two daily.   - Encouraged continued monitoring of blood glucose with maintenance of 4 times daily and Continuously with personal CGM Dexcom.   - Current DM Medication Regimen: Continue CS-II Omnipod 5 with Novolog. See settings below. Change IC to 5. Change CF to 25. Continue Januvia 100 mg daily.   - Updated emergency pump back up plan   - Health Maintenance standards addressed today: Foot Exam - deferred by patient today because Patient request, Urine Microalbumin / Creatinine Ratio scheduled, A1c to be scheduled, Flu Shot - patient would like to complete outside of [facility], COVID - 19 Vaccine - patient will schedule outside of [facility] , and pneumonia vaccine and tetanus vaccines - vaccine counseling given and mammo orders to be entered  - Nursing Visit: Patient is below goal range for nursing visit for age group and will not need nursing visit at this time .   - Follow up in 6 weeks.    CURRENT DM MEDICATIONS:   CS-II Omnipod 5 with Novolog  Januvia 100 mg daily    INSULIN PUMP SETTINGS:  Insulin pump type: Omnipod 5  Insulin  Type: Novolog  Basal rate:   0.70 Units/hour from 0000 hours to 2400 hours  Insulin to Carbohydrate Ratio: 1/5  Insulin Sensitivity Factor: 1/25  Glucose Target: 120 mg/dl   Correct Above: 130 mg/dl  Active Insulin Time: 4 hours    Blakeney McKnight, PA-C [facility]

## 2025-04-30 NOTE — PATIENT INSTRUCTIONS
CURRENT DM MEDICATIONS:   CS-II Omnipod 5 with Fiasp  Januvia 100 mg daily    INSULIN PUMP SETTINGS:  Insulin pump type: Omnipod 5  Insulin Type: Fiasp  Basal rate:   0.70 Units/hour from 0000 hours to 2400 hours  Insulin to Carbohydrate Ratio: 1/5  Insulin Sensitivity Factor: 1/25  Glucose Target: 120 mg/dl   Correct Above: 130 mg/dl  Active Insulin Time: 4 hours      U100 Pump Failure Plan:   We have decided to develop a plan in the event that your pump breaks or is nonfunctioning. After 4 hours without pump use, you should begin to consider putting your Pump Failure Back Up Plan into action especially if you foresee that you may not be able to use your pump for more than 20 hours. Since you are currently using short acting insulin (Humalog/Novolog/Admelog/Novolin R) in your pump, you will need access to your short acting insulin vial, syringes, and a back up long acting insulin in the event of a pump failure. I have sent a prescription for a long acting insulin to your pharmacy for use during your emergency pump failure plan. It is important that you keep both types of insulin/syringes with you so that you may have access to it at least 3 times daily if needed. This medication and syringes should be brought with you on overnight trips in the case of an emergency pump failure. This means making a plan to keeping your insulin and syringes at school, work, or other places you frequent. If needed, please reach out to my office about any letters you may need for permission to keep these items for emergency purposes. We will outline your plan for pump failure emergencies below, but please remember to contact the insulin pump company (toll free number is printed on the label on the back of the insulin pump) and our office if you have a pump failure. When the insulin pump is restarted, do not restart basal rates until at least 22 hours after the last long acting insulin injection. You can set a 0% temporary basal setting  that will last until this time and use your pump to bolus for meals and correction. If you need help with these feature, please call your insulin pump company tech support line or ask them in anticipation of this action.      Dosing:   If the insulin pump is non functional and discontinued for anticipated more than 20 hours, please give daily injections of:     Toujeo 14 units daily  Januvia 100 mg daily  Fiasp meal size dosing and correction dosing every 3 hours as needed - as below     Fiasp Meal Size:   7 units before meals   3 units before snacks      Fiasp Correction Dosing every 3 hours as needed OUTSIDE OF EATING  If  - 250, may take 2 units of Fiasp  If  - 300, may take 3 units of Fiasp  If  - 350, may take 4 units of Fiasp  If  - 400, may take 5 units of Fiasp  If +, may take 6 units of Fiasp     NOTE: This plan is based off of your average insulin needs per recent pump report. Short Acting Insulin should be taken 15 mins before eating a meal and every 2 hours as needed for correction dosing. The dose calculation above may be used for correction dosing by either only calculating the units to cover blood sugar or inputting a carb amount of 0g if not eating. Please not that taking insulin at the 2 hour phillip may mean you still have insulin on board and may lead to hypoglycemia or low blood sugars due to insulin stacking. If calculating correction dosing for the 3rd time or more time, please consider dividing in half to avoid over correction of blood sugar leading to hypoglycemia. Please call office to report to  any large sugar abnormalities including hyperglycemia > 250 and hypoglycemia < 60. Please continue to wear CGM even if not using pump to monitor for blood sugar fluctuations. Please make sure glucose tablets or sugary beverages available in case of hypoglycemia. Please make sure ketone strips are available to you (may buy over the counter from pharmacy) and monitor urine  ketones every 12 hours; if you have two readings og BG > 250 mg/dl; or if signs/symptoms concerning for DKA present including abdominal pain, nausea, vomiting, confusion, trouble breathing, dry mouth, fruity breath or breath that smells like nail polish remover, and excessive thirst/urination. If your urine ketone strips shows trace to small ketones, drink plenty of water and give yourself a correction dose. If BG is less than 250 mg/dL and you do not have moderate/large ketones, you may also exercise to help lower glucose levels. If you have moderate/large ketones, please give correction dosage, drink water, and immediately contact [facility] if during normal business hours. If outside of normal business hours, please give correction dosage, drink water, and report to nearest ED for evaluation/management of DKA.      When the insulin pump is restarted, do not restart basal rates until at least 22 hours after the last long acting insulin injection. You can set a 0% temporary basal setting that will last until this time and still use your pump to bolus for meals and correction. For help with setting temporary basal setting, please call office, view your pump's online resource library, or call 24 hour technical support hotline.      For any technical insulin pump issues, please contact the insulin pump company; the toll free number is printed on the label on the back of the insulin pump.

## 2025-05-06 ENCOUNTER — PATIENT OUTREACH (OUTPATIENT)
Dept: ADMINISTRATIVE | Facility: HOSPITAL | Age: 55
End: 2025-05-06
Payer: COMMERCIAL

## 2025-05-08 ENCOUNTER — TELEPHONE (OUTPATIENT)
Dept: DIABETES | Facility: CLINIC | Age: 55
End: 2025-05-08
Payer: COMMERCIAL

## 2025-05-08 ENCOUNTER — CLINICAL SUPPORT (OUTPATIENT)
Dept: DIABETES | Facility: CLINIC | Age: 55
End: 2025-05-08
Payer: COMMERCIAL

## 2025-05-08 ENCOUNTER — TELEPHONE (OUTPATIENT)
Dept: DIABETES | Facility: CLINIC | Age: 55
End: 2025-05-08

## 2025-05-08 DIAGNOSIS — E10.65 TYPE 1 DIABETES MELLITUS WITH HYPERGLYCEMIA: Primary | ICD-10-CM

## 2025-05-08 DIAGNOSIS — E10.65 TYPE 1 DIABETES MELLITUS WITH HYPERGLYCEMIA: ICD-10-CM

## 2025-05-08 RX ORDER — INSULIN PMP CART,AUT,G6/7,CNTR
EACH SUBCUTANEOUS
Qty: 30 EACH | Refills: 3 | Status: SHIPPED | OUTPATIENT
Start: 2025-05-08

## 2025-05-08 RX ORDER — BLOOD-GLUCOSE SENSOR
1 EACH MISCELLANEOUS
Qty: 9 EACH | Refills: 3 | Status: SHIPPED | OUTPATIENT
Start: 2025-05-08 | End: 2026-05-08

## 2025-05-08 RX ORDER — BLOOD-GLUCOSE TRANSMITTER
1 EACH MISCELLANEOUS
Qty: 1 EACH | Refills: 3 | Status: SHIPPED | OUTPATIENT
Start: 2025-05-08 | End: 2026-05-08

## 2025-05-08 NOTE — Clinical Note
Demetrius Hartley,  I met with Ms Giles today. She's requesting Rx 90day DexG6 supplies and OP5 supplies to either pharm or mail order, whichever most cost effective. Also, I'm coaching her on meal size estimation since she's still having trouble with carb gram ctg process (20 small, 45 medium, 60 large); IC ratio 5. Hopefully, this will help with both meal/correction bolus.  Thanks, Marcella   Yes

## 2025-05-08 NOTE — TELEPHONE ENCOUNTER
Spoke with patient to let her know that the 90 day supply Omni 5 and Dex G6 supplies to [facility]. Patient voiced understanding

## 2025-05-08 NOTE — TELEPHONE ENCOUNTER
90 day supply Omni 5 and Dex G6 supplies to [facility].     Odilia Shaffer PA-C, BC-ADM [facility]

## 2025-05-08 NOTE — PROGRESS NOTES
Diabetes Care Specialist Virtual Visit Note       The patient location is: home in [place]  The chief complaint leading to consultation is: Diabetes  Visit type: audiovisual  Total time spent with patient: 30 min   Each patient to whom he or she provides medical services by telemedicine is:  (1) informed of the relationship between the physician and patient and the respective role of any other health care provider with respect to management of the patient; and (2) notified that he or she may decline to receive medical services by telemedicine and may withdraw from such care at any time.      Diabetes Care Specialist Progress Note  Author: Marcella Duke RD, [facility]  Date: 5/8/2025    Intake    Program Intake  Reason for Diabetes Program Visit:: Intervention (DM referral 2/17/25 Odilia Shaffer, PAYoungC)  Type of Intervention:: Individual  Education: Self-Management Skill Review, Advanced Pump (OP5 using iphone & DexG6)  Current diabetes risk level:: high  Permission to speak with others about care:: yes    Current Diabetes Treatment:  (Fiasp via OP5. Januvia 100 mg daily.)    Continuous Glucose Monitoring  Patient has CGM: Yes  Personal CGM type:: DexG6 - Pt states sensor failure but didn't realize she could report to Customer Service for replacement. Pt without dexcom several days but states having glucometer; BG logs unavail.    Lab Results   Component Value Date    HGBA1C 7.2 (H) 12/06/2024     Lifestyle Coping Support & Clinical    Problem Review  Active Comorbidities:  (Gestational DM in Pregnancy 2003, HepC, GERD, Vit D def.)    Diabetes Self-Management Skills Assessment    Medication Skills Assessment  Patient is able to identify current diabetes medications, dosages, and appropriate timing of medications.: no (Pt missing meal, correction boluses.)  Patient reports problems or concerns with current medication regimen.: yes  Medication regimen problems/concerns:: unsure of doses (Pt states difficulty carb  lalo ctg. She has resources to support but forgetting to use.)  Patient is  aware that some diabetes medications can cause low blood sugar?: Yes  Medication Skills Assessment Completed:: Yes  Assessment indicates:: Instruction Needed  Area of need?: Yes    Nutrition/Healthy Eating  Patient can identify foods that impact blood sugar.: yes  Challenges to healthy eating:: other (see comments) (Pt describes examples of meal sizes with averages ~20 grams carb small meal, ~45 grams medium meal, ~60 grams large meal.)  Nutrition/Healthy Eating Skills Assessment Completed:: Yes  Assessment indicates:: Instruction Needed  Area of need?: Yes    Home Blood Glucose Monitoring  Personal CGM type:: DexG6 - Pt states sensor failure but didn't realize she could report to Customer Service for replacement. Pt without dexcom several days but states having glucometer; BG logs unavail.    Assessment Summary and Plan  Based on today's diabetes care assessment, the following areas of need were identified:      Identified Areas of Need      Medication/Current Diabetes Treatment: Yes - see care plan   Nutrition/Healthy Eating: Yes - see care plan     Today's interventions were provided through individual discussion, instruction, and written materials were provided.    Patient verbalized understanding of instruction and written materials.  Pt was able to return back demonstration of instructions today. Patient understood key points, needs reinforcement and further instruction.     Diabetes Self-Management Care Plan:  Today's Diabetes Self-Management Care Plan was developed with Chan's input. Renaldo has agreed to work toward the following goal(s) to improve his/her overall diabetes control.      Care Plan: Diabetes Management   Updates made since 5/8/2024 12:00 AM        Problem: Medications         Goal: Maintain consistent OP5 Pod usage and bolus accuracy.    Start Date: 4/3/2025   Expected End Date: 2/17/2026   This Visit's  Progress: On track   Priority: High   Barriers: Other (comments)   Pt requesting Rx for 90day DexG6 supplies and OP5 supplies to either pharm or mail order; msg sent to provider Karthikeyan for support.     Encouraged consistency of OP5 Pod use and pump backup plan as directed. Provided Dexcom Customer Support contact and reviewed process for reporting device failures.      Problem: Healthy Eating         Goal: Count carb grams and enter accurately into OP5 bolus calculator.    Start Date: 4/3/2025   Expected End Date: 2/17/2026   This Visit's Progress: No change   Priority: Medium   Barriers: Knowledge deficit; Other (comments)   Assisted pt with carb gram entry based on her usual meal sizes: ~20 grams carb small meal, ~45 grams medium meal, ~60 grams large meal. Pt verbalized understanding and agrees to work towards goal progress.        Follow Up Plan  Follow up in about 4 weeks (around 6/5/2025).  -review OP5 reports, eval goals    Today's care plan and follow up schedule was discussed with patient.  Chan verbalized understanding of the care plan, goals, and agrees to follow up plan.        The patient was encouraged to communicate with his/her health care provider/physician and care team regarding his/her condition(s) and treatment.  I provided the patient with my contact information today and encouraged to contact me via phone or [facility] as needed.     Length of Visit   Total Time: 30 Minutes

## 2025-05-13 ENCOUNTER — OFFICE VISIT (OUTPATIENT)
Dept: INTERNAL MEDICINE | Facility: CLINIC | Age: 55
End: 2025-05-13
Payer: COMMERCIAL

## 2025-05-13 VITALS
DIASTOLIC BLOOD PRESSURE: 68 MMHG | HEART RATE: 74 BPM | TEMPERATURE: 98 F | WEIGHT: 139.75 LBS | SYSTOLIC BLOOD PRESSURE: 114 MMHG | HEIGHT: 64 IN | BODY MASS INDEX: 23.86 KG/M2 | OXYGEN SATURATION: 97 %

## 2025-05-13 DIAGNOSIS — Z23 NEED FOR PNEUMOCOCCAL VACCINATION: ICD-10-CM

## 2025-05-13 DIAGNOSIS — E78.5 HYPERLIPIDEMIA ASSOCIATED WITH TYPE 2 DIABETES MELLITUS: ICD-10-CM

## 2025-05-13 DIAGNOSIS — M79.644 FINGER PAIN, RIGHT: ICD-10-CM

## 2025-05-13 DIAGNOSIS — E11.65 UNCONTROLLED TYPE 2 DIABETES MELLITUS WITH HYPERGLYCEMIA, WITHOUT LONG-TERM CURRENT USE OF INSULIN: Primary | ICD-10-CM

## 2025-05-13 DIAGNOSIS — E11.69 HYPERLIPIDEMIA ASSOCIATED WITH TYPE 2 DIABETES MELLITUS: ICD-10-CM

## 2025-05-13 PROCEDURE — 90677 PCV20 VACCINE IM: CPT | Mod: S$GLB,,, | Performed by: FAMILY MEDICINE

## 2025-05-13 PROCEDURE — 3078F DIAST BP <80 MM HG: CPT | Mod: CPTII,S$GLB,, | Performed by: FAMILY MEDICINE

## 2025-05-13 PROCEDURE — 3074F SYST BP LT 130 MM HG: CPT | Mod: CPTII,S$GLB,, | Performed by: FAMILY MEDICINE

## 2025-05-13 PROCEDURE — 99999 PR PBB SHADOW E&M-EST. PATIENT-LVL V: CPT | Mod: PBBFAC,,, | Performed by: FAMILY MEDICINE

## 2025-05-13 PROCEDURE — 4010F ACE/ARB THERAPY RXD/TAKEN: CPT | Mod: CPTII,S$GLB,, | Performed by: FAMILY MEDICINE

## 2025-05-13 PROCEDURE — 90471 IMMUNIZATION ADMIN: CPT | Mod: S$GLB,,, | Performed by: FAMILY MEDICINE

## 2025-05-13 PROCEDURE — G2211 COMPLEX E/M VISIT ADD ON: HCPCS | Mod: S$GLB,,, | Performed by: FAMILY MEDICINE

## 2025-05-13 PROCEDURE — 3072F LOW RISK FOR RETINOPATHY: CPT | Mod: CPTII,S$GLB,, | Performed by: FAMILY MEDICINE

## 2025-05-13 PROCEDURE — 1159F MED LIST DOCD IN RCRD: CPT | Mod: CPTII,S$GLB,, | Performed by: FAMILY MEDICINE

## 2025-05-13 PROCEDURE — 3008F BODY MASS INDEX DOCD: CPT | Mod: CPTII,S$GLB,, | Performed by: FAMILY MEDICINE

## 2025-05-13 PROCEDURE — 99214 OFFICE O/P EST MOD 30 MIN: CPT | Mod: 25,S$GLB,, | Performed by: FAMILY MEDICINE

## 2025-05-13 RX ORDER — ATORVASTATIN CALCIUM 40 MG/1
40 TABLET, FILM COATED ORAL DAILY
COMMUNITY

## 2025-05-13 RX ORDER — ENALAPRIL MALEATE 2.5 MG/1
2.5 TABLET ORAL DAILY
COMMUNITY
Start: 2025-05-09

## 2025-05-13 NOTE — LETTER
May 13, 2025    Chan Dwyer  [address] [place] [address]             [facility]  Internal Medicine  [address] [address]  Phone: [phone]  Fax: [phone]   May 13, 2025     Patient: Chan Dwyer   YOB: 1970   Date of Visit: 5/13/2025       To Whom it May Concern:    Chan Dwyer was seen in my clinic on 5/13/2025.    Please excuse her from any classes or work missed.    If you have any questions or concerns, please don't hesitate to call.    Sincerely,         Tristian Bearden MD

## 2025-05-16 DIAGNOSIS — E10.65 TYPE 1 DIABETES MELLITUS WITH HYPERGLYCEMIA: ICD-10-CM

## 2025-05-16 RX ORDER — INSULIN ASPART INJECTION 100 [IU]/ML
INJECTION, SOLUTION SUBCUTANEOUS
Qty: 30 ML | Refills: 5 | Status: SHIPPED | OUTPATIENT
Start: 2025-05-16

## 2025-05-16 NOTE — TELEPHONE ENCOUNTER
----- Message from [employer] sent at 5/16/2025  7:40 AM CDT -----  Contact: self  ..Type:  RX Refill RequestWho Called: .Chan DwyerRefill or New Rx:Refill RX Name and Strength: insulin aspart, niacinamide, (FIASP U-100 INSULIN) 100 unit/mL SolnHow is the patient currently taking it? (ex. 1XDay):before meals Is this a 30 day or 90 day RX: Preferred Pharmacy with phone number:.[facility] [address] [address] [address]Yrcfz: [phone] Fax: [phone] or Mail Order:local Ordering Provider:Eleni Would the patient rather a call back or a response via [facility]? Call back Best Call Back Number:.[phone] (home) Additional Information:

## 2025-06-04 DIAGNOSIS — E11.9 TYPE 2 DIABETES MELLITUS WITHOUT COMPLICATION: ICD-10-CM

## 2025-06-06 ENCOUNTER — TELEPHONE (OUTPATIENT)
Dept: DIABETES | Facility: CLINIC | Age: 55
End: 2025-06-06
Payer: COMMERCIAL

## 2025-06-06 ENCOUNTER — CLINICAL SUPPORT (OUTPATIENT)
Dept: DIABETES | Facility: CLINIC | Age: 55
End: 2025-06-06
Payer: COMMERCIAL

## 2025-06-06 DIAGNOSIS — E10.65 TYPE 1 DIABETES MELLITUS WITH HYPERGLYCEMIA: Primary | ICD-10-CM

## 2025-06-24 ENCOUNTER — OFFICE VISIT (OUTPATIENT)
Dept: DIABETES | Facility: CLINIC | Age: 55
End: 2025-06-24
Payer: COMMERCIAL

## 2025-06-24 DIAGNOSIS — R79.89 LOW SERUM C-PEPTIDE: ICD-10-CM

## 2025-06-24 DIAGNOSIS — E10.65 TYPE 1 DIABETES MELLITUS WITH HYPERGLYCEMIA: Primary | ICD-10-CM

## 2025-06-24 DIAGNOSIS — Z96.41 INSULIN PUMP IN PLACE: ICD-10-CM

## 2025-06-24 PROCEDURE — 98006 SYNCH AUDIO-VIDEO EST MOD 30: CPT | Mod: 95,,, | Performed by: PHYSICIAN ASSISTANT

## 2025-06-24 PROCEDURE — 95251 CONT GLUC MNTR ANALYSIS I&R: CPT | Mod: NDTC,,, | Performed by: PHYSICIAN ASSISTANT

## 2025-06-24 PROCEDURE — 3072F LOW RISK FOR RETINOPATHY: CPT | Mod: CPTII,95,, | Performed by: PHYSICIAN ASSISTANT

## 2025-06-24 PROCEDURE — 4010F ACE/ARB THERAPY RXD/TAKEN: CPT | Mod: CPTII,95,, | Performed by: PHYSICIAN ASSISTANT

## 2025-06-24 RX ORDER — BLOOD-GLUCOSE SENSOR
1 EACH MISCELLANEOUS
Qty: 9 EACH | Refills: 3 | Status: SHIPPED | OUTPATIENT
Start: 2025-06-24 | End: 2026-06-24

## 2025-06-24 NOTE — PATIENT INSTRUCTIONS
CURRENT DM MEDICATIONS:   CS-II Omnipod 5 with Novolog  Januvia 100 mg daily    INSULIN PUMP SETTINGS:  Insulin pump type: Omnipod 5  Insulin Type: Novolog  Basal rate:   0.70 Units/hour from 0000 hours to 2400 hours  Insulin to Carbohydrate Ratio: 1/4  Insulin Sensitivity Factor: 1/25  Glucose Target: 120 mg/dl   Correct Above: 130 mg/dl  Active Insulin Time: 3 hours  Presets:    Low Carb Meal 20g - 5u   Medium Carb Meal 40g - 10u   High Carb Meal 60g - 15u   Active Insulin Time: 4 hours      U100 Pump Failure Plan:   We have decided to develop a plan in the event that your pump breaks or is nonfunctioning. After 4 hours without pump use, you should begin to consider putting your Pump Failure Back Up Plan into action especially if you foresee that you may not be able to use your pump for more than 20 hours. Since you are currently using short acting insulin (Humalog/Novolog/Admelog/Novolin R) in your pump, you will need access to your short acting insulin vial, syringes, and a back up long acting insulin in the event of a pump failure. I have sent a prescription for a long acting insulin to your pharmacy for use during your emergency pump failure plan. It is important that you keep both types of insulin/syringes with you so that you may have access to it at least 3 times daily if needed. This medication and syringes should be brought with you on overnight trips in the case of an emergency pump failure. This means making a plan to keeping your insulin and syringes at school, work, or other places you frequent. If needed, please reach out to my office about any letters you may need for permission to keep these items for emergency purposes. We will outline your plan for pump failure emergencies below, but please remember to contact the insulin pump company (toll free number is printed on the label on the back of the insulin pump) and our office if you have a pump failure. When the insulin pump is restarted, do not  restart basal rates until at least 22 hours after the last long acting insulin injection. You can set a 0% temporary basal setting that will last until this time and use your pump to bolus for meals and correction. If you need help with these feature, please call your insulin pump company tech support line or ask them in anticipation of this action.      Dosing:   If the insulin pump is non functional and discontinued for anticipated more than 20 hours, please give daily injections of:     Toujeo 24 units daily  Januvia 100 mg daily  Novolog / Fiasp meal size dosing and correction dosing every 3 hours as needed - as below    Novolog / Fiasp Meal Size:   5 units before low carb meals   10 units before medium carb meals   15 units before heavy carb meals     Novolog / Fiasp Correction Dosing every 3 hours as needed OUTSIDE OF EATING  If  - 250, may take 2 units of Fiasp  If  - 300, may take 3 units of Fiasp  If  - 350, may take 4 units of Fiasp  If  - 400, may take 5 units of Fiasp  If +, may take 6 units of Fiasp     NOTE: This plan is based off of your average insulin needs per recent pump report. Short Acting Insulin should be taken 15 mins before eating a meal and every 2 hours as needed for correction dosing. The dose calculation above may be used for correction dosing by either only calculating the units to cover blood sugar or inputting a carb amount of 0g if not eating. Please not that taking insulin at the 2 hour phillip may mean you still have insulin on board and may lead to hypoglycemia or low blood sugars due to insulin stacking. If calculating correction dosing for the 3rd time or more time, please consider dividing in half to avoid over correction of blood sugar leading to hypoglycemia. Please call office to report to  any large sugar abnormalities including hyperglycemia > 250 and hypoglycemia < 60. Please continue to wear CGM even if not using pump to monitor for blood sugar  fluctuations. Please make sure glucose tablets or sugary beverages available in case of hypoglycemia. Please make sure ketone strips are available to you (may buy over the counter from pharmacy) and monitor urine ketones every 12 hours; if you have two readings og BG > 250 mg/dl; or if signs/symptoms concerning for DKA present including abdominal pain, nausea, vomiting, confusion, trouble breathing, dry mouth, fruity breath or breath that smells like nail polish remover, and excessive thirst/urination. If your urine ketone strips shows trace to small ketones, drink plenty of water and give yourself a correction dose. If BG is less than 250 mg/dL and you do not have moderate/large ketones, you may also exercise to help lower glucose levels. If you have moderate/large ketones, please give correction dosage, drink water, and immediately contact [facility] if during normal business hours. If outside of normal business hours, please give correction dosage, drink water, and report to nearest ED for evaluation/management of DKA.      When the insulin pump is restarted, do not restart basal rates until at least 22 hours after the last long acting insulin injection. You can set a 0% temporary basal setting that will last until this time and still use your pump to bolus for meals and correction. For help with setting temporary basal setting, please call office, view your pump's online resource library, or call 24 hour technical support hotline.      For any technical insulin pump issues, please contact the insulin pump company; the toll free number is printed on the label on the back of the insulin pump.

## 2025-06-24 NOTE — PROGRESS NOTES
PCP: Tristian Bearden MD    Subjective:     Chief Complaint: Diabetes - Established Patient  TELEMEDICINE VISIT:     The patient location is: Home  The chief complaint leading to consultation is: Diabetes Follow up  Visit type: Virtual visit with synchronous audio and video  Each patient to whom he or she provides medical services by telemedicine is:  (1) informed of the relationship between the physician and patient and the respective role of any other health care provider with respect to management of the patient; and (2) notified that he or she may decline to receive medical services by telemedicine and may withdraw from such care at any time.    Notes: N/A      HISTORY OF PRESENT ILLNESS: 54 y.o.   female presenting for diabetes management visit.   The patient's last visit with me was on 4/30/2025.    Patient has had Type I diabetes due to low C peptide since 5 years or more.  Pertinent to decision making is the following comorbidities: Vitamin D Deficiency and Gestational DM in Pregnancy 2003  Patient has the following Diabetes complications: without complications  She  has attended diabetes education in the past.     Patient's most recent A1c of 7.2% was completed 6 months ago.   Patient states since Her last A1c Her blood glucose levels have been high  after meals.   Patient monitors blood glucose 4 times per day and Continuously with personal CGM Dexcom.   Patient blood glucose monitoring device will be uploaded into Media Section today.         Interpretation of CGM / pump report as following: postprandial hyperglycemia following carb bolusing estimation and undercorrection of correction dosing since being on steroids since sunday.  Patient endorses the following diabetes related symptoms: None.   Patient is due today for the following diabetes-related health maintenance standards: Foot Exam , Urine Microalbumin/creatinine ratio, A1c, Influenza Vaccine, COVID-19 Vaccine , and Mammo and  pneumonia vaccine and tetanus vaccine.   She denies recent hospital admissions or emergency room visits.   She denies having hypoglycemia as above.   Patient's concerns today include glycemic control.   Patient medication regimen is as below.     CURRENT DM MEDICATIONS:   CS-II Omnipod 5 with Novolog  Januvia 100 mg daily    INSULIN PUMP SETTINGS:  Insulin pump type: Omnipod 5  Insulin Type: Novolog  Basal rate:   0.70 Units/hour from 0000 hours to 2400 hours  Insulin to Carbohydrate Ratio: 1/5  Insulin Sensitivity Factor: 1/25  Glucose Target: 120 mg/dl   Correct Above: 130 mg/dl  Active Insulin Time: 4 hours  Presets:    Low Carb Meal 20g - 4u   Medium Carb Meal 40g - 8u   High Carb Meal 60g - 12u    Other Pump Considerations:   Current Glucagon Kit: Yes  Current Ketone Strips: Yes  Emergency Plan for Pump Failure:   Toujeo 24 units daily  Januvia 100 mg daily  Novolog / Fiasp meal size dosing and correction dosing every 3 hours as needed - as below     Novolog / Fiasp Meal Size:   5 units before low carb meals   10 units before medium carb meals   15 units before heavy carb meals     Novolog / Fiasp Correction Dosing every 3 hours as needed OUTSIDE OF EATING  If  - 250, may take 2 units of Fiasp  If  - 300, may take 3 units of Fiasp  If  - 350, may take 4 units of Fiasp  If  - 400, may take 5 units of Fiasp  If +, may take 6 units of Fiasp      Patient has failed the following Diabetes medications:   Metformin - GI   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       Lab Results   Component Value Date    CPEPTIDE 1.67 11/20/2023     Lab Results   Component Value Date    GLUTAMICACID 0.00 09/28/2020          //   , There is no height or weight on file to calculate BMI.  Her blood sugar in clinic today is:    Lab Results   Component Value Date    POCGLU  233 (A) 05/10/2024       Review of Systems   Constitutional:  Negative for activity change, appetite change, chills and fever.   HENT:  Negative for dental problem, mouth sores, nosebleeds, sore throat and trouble swallowing.    Eyes:  Negative for pain and discharge.   Respiratory:  Negative for shortness of breath, wheezing and stridor.    Cardiovascular:  Negative for chest pain, palpitations and leg swelling.   Gastrointestinal:  Negative for abdominal pain, diarrhea, nausea and vomiting.   Endocrine: Negative for polydipsia, polyphagia and polyuria.   Genitourinary:  Negative for dysuria, frequency and urgency.   Musculoskeletal:  Negative for joint swelling and myalgias.   Skin:  Negative for rash and wound.   Neurological:  Negative for dizziness, syncope, weakness and headaches.   Psychiatric/Behavioral:  Negative for behavioral problems and dysphoric mood.          Diabetes Management Status  Statin: Not taking  ACE/ARB: Not taking    Screening or Prevention Patient's value Goal Complete/Controlled?   HgA1C Testing and Control   Lab Results   Component Value Date    HGBA1C 7.2 (H) 12/06/2024      Annually/Less than 8% No   Lipid profile : 04/28/2025 Annually Yes   LDL control  Lab Results   Component Value Date    LDLCALC 88.0 12/06/2024    Annually/Less than 100 mg/dl  Yes   Nephropathy screening Lab Results   Component Value Date    MICALBCREAT 13.0 03/11/2024     Lab Results   Component Value Date    PROTEINUA Negative 01/26/2021    Annually No   Blood pressure BP Readings from Last 1 Encounters:   05/13/25 114/68    Less than 140/90 Yes   Dilated retinal exam : 12/06/2024 Annually Yes    Foot exam   : 05/10/2024 Annually No     Social History     Socioeconomic History    Marital status: Single    Number of children: 2   Occupational History    Occupation:      Employer: [employer]     Employer: [employer]   Tobacco Use    Smoking status: Never    Smokeless tobacco: Never    Substance and Sexual Activity    Alcohol use: Yes     Comment: Rare    Drug use: No    Sexual activity: Yes     Partners: Male     Birth control/protection: Surgical   Social History Narrative    She wears seatbelt.     Social Drivers of Health     Financial Resource Strain: High Risk (4/7/2025)    Overall Financial Resource Strain (CARDIA)     Difficulty of Paying Living Expenses: Hard   Food Insecurity: Food Insecurity Present (4/7/2025)    Hunger Vital Sign     Worried About Running Out of Food in the Last Year: Sometimes true     Ran Out of Food in the Last Year: Sometimes true   Transportation Needs: No Transportation Needs (4/7/2025)    PRAPARE - Transportation     Lack of Transportation (Medical): No     Lack of Transportation (Non-Medical): No   Physical Activity: Sufficiently Active (4/7/2025)    Exercise Vital Sign     Days of Exercise per Week: 5 days     Minutes of Exercise per Session: 30 min   Stress: Stress Concern Present (7/11/2024)    [place] citizen [place] [employer] - Occupational Stress Questionnaire     Feeling of Stress : Very much   Housing Stability: Low Risk  (4/7/2025)    Housing Stability Vital Sign     Unable to Pay for Housing in the Last Year: No     Number of Times Moved in the Last Year: 0     Homeless in the Last Year: No     Past Medical History:   Diagnosis Date    Abnormal Pap smear     Abnormal Pap smear of cervix     Arthritis, low back     DM (diabetes mellitus) 09/2014    BS didn't check 01/31/2017    GERD (gastroesophageal reflux disease)     Gestational diabetes     Hepatitis C antibody positive in blood 02/11/2022    RNA NEGATIVE    History of abnormal Pap smear     Surgical menopause     Vitamin D deficiency        Objective:        Physical Exam  Constitutional:       General: She is not in acute distress.     Appearance: She is well-developed. She is not diaphoretic.   HENT:      Head: Normocephalic and atraumatic.      Right Ear: External ear normal.      Left  Ear: External ear normal.      Nose: Nose normal.   Eyes:      General:         Right eye: No discharge.         Left eye: No discharge.   Pulmonary:      Effort: Pulmonary effort is normal. No respiratory distress.      Breath sounds: No stridor.   Musculoskeletal:         General: Normal range of motion.      Cervical back: Normal range of motion.   Skin:     Coloration: Skin is not pale.   Neurological:      Mental Status: She is alert and oriented to person, place, and time.      Motor: No abnormal muscle tone.      Coordination: Coordination normal.   Psychiatric:         Behavior: Behavior normal.         Thought Content: Thought content normal.         Judgment: Judgment normal.             Assessment / Plan:     Type 1 diabetes mellitus with hyperglycemia  -     blood-glucose sensor (DEXCOM G7 SENSOR) Roselia; 1 each by Misc.(Non-Drug; Combo Route) route every 10 days.  Dispense: 9 each; Refill: 3  -     Hemoglobin A1C; Future; Expected date: 06/24/2025  -     Microalbumin/Creatinine Ratio, Urine; Future; Expected date: 06/24/2025  -     Glucose, Fasting; Future; Expected date: 06/24/2025  -     C-Peptide; Future; Expected date: 06/24/2025    Low serum C-peptide    Insulin pump in place        Additional Plan Details:    - POCT Glucose  - Encouraged continuation of lifestyle changes including regular exercise and limiting carbohydrates to 30-45 grams per meal threes times daily and 15 grams per snack with a limit of two daily.   - Encouraged continued monitoring of blood glucose with maintenance of 4 times daily and Continuously with personal CGM Dexcom.   - Current DM Medication Regimen: Continue CS-II Omnipod 5 with Novolog. See settings below. Change IC to 4. Change Active Insulin time to 3 hours. Continue Januvia 100 mg daily.   - Updated emergency pump back up plan   - Fasting labs sched Thurs at [facility]   - Health Maintenance standards addressed today: Foot Exam - deferred by patient today because Patient  request, Urine Microalbumin / Creatinine Ratio scheduled, A1c to be scheduled, Flu Shot - patient would like to complete outside of [facility], COVID - 19 Vaccine - patient will schedule outside of [facility] , and pneumonia vaccine and tetanus vaccines - vaccine counseling given and mammo orders to be entered  - Nursing Visit: Patient is below goal range for nursing visit for age group and will not need nursing visit at this time .   - Follow up in 6 weeks.    CURRENT DM MEDICATIONS:   CS-II Omnipod 5 with Novolog  Januvia 100 mg daily    INSULIN PUMP SETTINGS:  Insulin pump type: Omnipod 5  Insulin Type: Novolog  Basal rate:   0.70 Units/hour from 0000 hours to 2400 hours  Insulin to Carbohydrate Ratio: 1/4  Insulin Sensitivity Factor: 1/25  Glucose Target: 120 mg/dl   Correct Above: 130 mg/dl  Active Insulin Time: 3 hours  Presets:    Low Carb Meal 20g - 5u   Medium Carb Meal 40g - 10u   High Carb Meal 60g - 15u       Blakeney McKnight, PA-C [facility]

## 2025-06-26 ENCOUNTER — TELEPHONE (OUTPATIENT)
Dept: INTERNAL MEDICINE | Facility: CLINIC | Age: 55
End: 2025-06-26
Payer: COMMERCIAL

## 2025-06-26 ENCOUNTER — LAB VISIT (OUTPATIENT)
Dept: LAB | Facility: HOSPITAL | Age: 55
End: 2025-06-26
Attending: PHYSICIAN ASSISTANT
Payer: COMMERCIAL

## 2025-06-26 DIAGNOSIS — E10.65 TYPE 1 DIABETES MELLITUS WITH HYPERGLYCEMIA: ICD-10-CM

## 2025-06-26 LAB
ALBUMIN/CREAT UR: 6.5 UG/MG
C PEPTIDE SERPL-MCNC: 1.02 NG/ML (ref 0.78–5.19)
CREAT UR-MCNC: 214 MG/DL (ref 15–325)
EAG (OHS): 169 MG/DL (ref 68–131)
GLUCOSE P FAST SERPL-MCNC: 162 MG/DL (ref 70–110)
HBA1C MFR BLD: 7.5 % (ref 4–5.6)
MICROALBUMIN UR-MCNC: 14 UG/ML (ref ?–5000)

## 2025-06-26 PROCEDURE — 36415 COLL VENOUS BLD VENIPUNCTURE: CPT | Mod: PO

## 2025-06-26 PROCEDURE — 84681 ASSAY OF C-PEPTIDE: CPT

## 2025-06-26 PROCEDURE — 82570 ASSAY OF URINE CREATININE: CPT

## 2025-06-26 PROCEDURE — 82947 ASSAY GLUCOSE BLOOD QUANT: CPT

## 2025-06-26 PROCEDURE — 83036 HEMOGLOBIN GLYCOSYLATED A1C: CPT

## 2025-06-26 NOTE — LETTER
June 26, 2025      [facility]  [address] [address]  Phone: [phone]  Fax: [phone]       Patient: Chan Dwyer   YOB: 1970  Date of Visit: 06/26/2025    To Whom It May Concern:    Antonio Dwyer  was at [facility] on 06/26/2025 for lab work. The patient may return to work/school on 6/27/25 with no restrictions. If you have any questions or concerns, or if I can be of further assistance, please do not hesitate to contact me.    Sincerely,    Rachael Curiel LPN

## 2025-07-03 ENCOUNTER — PATIENT MESSAGE (OUTPATIENT)
Dept: OBSTETRICS AND GYNECOLOGY | Facility: CLINIC | Age: 55
End: 2025-07-03

## 2025-07-03 ENCOUNTER — HOSPITAL ENCOUNTER (OUTPATIENT)
Dept: RADIOLOGY | Facility: HOSPITAL | Age: 55
Discharge: HOME | End: 2025-07-03
Attending: PHYSICIAN ASSISTANT
Payer: COMMERCIAL

## 2025-07-03 ENCOUNTER — OFFICE VISIT (OUTPATIENT)
Dept: OBSTETRICS AND GYNECOLOGY | Facility: CLINIC | Age: 55
End: 2025-07-03
Attending: PHYSICIAN ASSISTANT
Payer: COMMERCIAL

## 2025-07-03 VITALS
BODY MASS INDEX: 23.9 KG/M2 | DIASTOLIC BLOOD PRESSURE: 60 MMHG | WEIGHT: 140 LBS | SYSTOLIC BLOOD PRESSURE: 104 MMHG | HEIGHT: 64 IN

## 2025-07-03 VITALS — HEIGHT: 64 IN | BODY MASS INDEX: 23.71 KG/M2 | WEIGHT: 138.88 LBS

## 2025-07-03 DIAGNOSIS — N95.2 VAGINAL ATROPHY: ICD-10-CM

## 2025-07-03 DIAGNOSIS — Z12.31 SCREENING MAMMOGRAM FOR BREAST CANCER: ICD-10-CM

## 2025-07-03 DIAGNOSIS — Z01.419 ENCOUNTER FOR GYNECOLOGICAL EXAMINATION WITHOUT ABNORMAL FINDING: Primary | ICD-10-CM

## 2025-07-03 PROBLEM — E11.9 DIABETES: Status: ACTIVE | Noted: 2024-05-06

## 2025-07-03 PROCEDURE — 1159F MED LIST DOCD IN RCRD: CPT | Mod: CPTII,S$GLB,, | Performed by: NURSE PRACTITIONER

## 2025-07-03 PROCEDURE — 4010F ACE/ARB THERAPY RXD/TAKEN: CPT | Mod: CPTII,S$GLB,, | Performed by: NURSE PRACTITIONER

## 2025-07-03 PROCEDURE — 3074F SYST BP LT 130 MM HG: CPT | Mod: CPTII,S$GLB,, | Performed by: NURSE PRACTITIONER

## 2025-07-03 PROCEDURE — 77063 BREAST TOMOSYNTHESIS BI: CPT | Mod: 26,,, | Performed by: RADIOLOGY

## 2025-07-03 PROCEDURE — 3078F DIAST BP <80 MM HG: CPT | Mod: CPTII,S$GLB,, | Performed by: NURSE PRACTITIONER

## 2025-07-03 PROCEDURE — 77067 SCR MAMMO BI INCL CAD: CPT | Mod: 26,,, | Performed by: RADIOLOGY

## 2025-07-03 PROCEDURE — 3072F LOW RISK FOR RETINOPATHY: CPT | Mod: CPTII,S$GLB,, | Performed by: NURSE PRACTITIONER

## 2025-07-03 PROCEDURE — 99396 PREV VISIT EST AGE 40-64: CPT | Mod: S$GLB,,, | Performed by: NURSE PRACTITIONER

## 2025-07-03 PROCEDURE — 3008F BODY MASS INDEX DOCD: CPT | Mod: CPTII,S$GLB,, | Performed by: NURSE PRACTITIONER

## 2025-07-03 PROCEDURE — 1160F RVW MEDS BY RX/DR IN RCRD: CPT | Mod: CPTII,S$GLB,, | Performed by: NURSE PRACTITIONER

## 2025-07-03 PROCEDURE — 3066F NEPHROPATHY DOC TX: CPT | Mod: CPTII,S$GLB,, | Performed by: NURSE PRACTITIONER

## 2025-07-03 PROCEDURE — 3061F NEG MICROALBUMINURIA REV: CPT | Mod: CPTII,S$GLB,, | Performed by: NURSE PRACTITIONER

## 2025-07-03 PROCEDURE — 99999 PR PBB SHADOW E&M-EST. PATIENT-LVL IV: CPT | Mod: PBBFAC,,, | Performed by: NURSE PRACTITIONER

## 2025-07-03 PROCEDURE — 77063 BREAST TOMOSYNTHESIS BI: CPT | Mod: TC

## 2025-07-03 PROCEDURE — 3051F HG A1C>EQUAL 7.0%<8.0%: CPT | Mod: CPTII,S$GLB,, | Performed by: NURSE PRACTITIONER

## 2025-07-03 RX ORDER — SITAGLIPTIN 100 MG/1
TABLET, FILM COATED ORAL
COMMUNITY

## 2025-07-03 NOTE — PROGRESS NOTES
Subjective:       Patient ID: Chan Dwyer is a 54 y.o. female.    Chief Complaint:  Well Woman      History of Present Illness  HPI  Annual Exam-Postmenopausal   presents for annual exam. The patient has no complaints today. The patient is sexually active; MM relationship with male partner;  x23 years. GYN screening history: last pap: approximate date 2014 and was normal and last mammogram: approximate date 4/10/2024 and was normal.  Had one before the visit today. Results pending.   The patient has never taken hormone replacement therapy.  The patient wears seatbelts: yes. The patient participates in regular exercise: yes. Has the patient ever been transfused or tattooed?: no. The patient reports that there is not domestic violence in her life.    No bladder/bowel issues.  Colonoscopy 2016 q10y    No hot flashes, night sweats, trouble sleeping.      GYN & OB History  No LMP recorded. Patient has had a hysterectomy.   Date of Last Pap: 10/24/2014    OB History    Para Term  AB Living   2 2 2   2   SAB IAB Ectopic Multiple Live Births             # Outcome Date GA Lbr Rajesh/2nd Weight Sex Type Anes PTL Lv   2 Term      Vag-Spont      1 Term      Vag-Spont          Review of Systems  Review of Systems   Constitutional:  Negative for activity change, appetite change, chills, fatigue and fever.   HENT:  Negative for nasal congestion and mouth sores.    Respiratory:  Negative for cough, shortness of breath and wheezing.    Cardiovascular:  Negative for chest pain.   Gastrointestinal:  Negative for abdominal pain, constipation, diarrhea, nausea and vomiting.   Endocrine: Negative for hair loss and hot flashes.   Genitourinary:  Positive for vaginal dryness. Negative for bladder incontinence, decreased libido, dyspareunia, dysuria, frequency, genital sores, hot flashes, pelvic pain, urgency, vaginal discharge, vaginal pain, urinary incontinence, postcoital bleeding,  postmenopausal bleeding and vaginal odor.   Musculoskeletal:  Negative for back pain.   Integumentary:  Negative for breast mass, nipple discharge, breast skin changes and breast tenderness.   Neurological:  Negative for headaches and memory loss.   Hematological:  Negative for adenopathy.   Psychiatric/Behavioral:  Negative for depression and sleep disturbance. The patient is not nervous/anxious.    All other systems reviewed and are negative.  Breast: Negative for breast self exam, lump, mass, mastodynia, nipple discharge, skin changes and tenderness          Objective:      Physical Exam:   Constitutional: She is oriented to person, place, and time. She appears well-developed and well-nourished. No distress.    HENT:   Head: Normocephalic and atraumatic.   Nose: Nose normal.    Eyes: Pupils are equal, round, and reactive to light. Conjunctivae and EOM are normal. Right eye exhibits no discharge. Left eye exhibits no discharge.     Cardiovascular:  Normal rate, regular rhythm and normal heart sounds.      Exam reveals no gallop, no friction rub, no clubbing, no cyanosis and no edema.       No murmur heard.   Pulmonary/Chest: Effort normal and breath sounds normal. No respiratory distress. She has no decreased breath sounds. She has no wheezes. She has no rhonchi. She has no rales. She exhibits no tenderness. Right breast exhibits no inverted nipple, no mass, no nipple discharge, no skin change, no tenderness, no bleeding and no swelling. Left breast exhibits no inverted nipple, no mass, no nipple discharge, no skin change, no tenderness, no bleeding and no swelling. Breasts are symmetrical.        Abdominal: Soft. Bowel sounds are normal. She exhibits no distension. There is no abdominal tenderness. There is no rebound and no guarding. Hernia confirmed negative in the right inguinal area and confirmed negative in the left inguinal area.     Genitourinary:    Inguinal canal, right adnexa and left adnexa normal.    Rectum:      No external hemorrhoid.      Pelvic exam was performed with patient in the lithotomy position.   The external female genitalia was normal.   No external genitalia lesions identified,Genitalia hair distrobution normal .     Labial bartholins normal.There is no rash, tenderness, lesion or injury on the right labia. There is no rash, tenderness, lesion or injury on the left labia. Right adnexum displays no mass, no tenderness and no fullness. Left adnexum displays no mass, no tenderness and no fullness. No erythema, vaginal discharge, tenderness or bleeding in the vagina.    No foreign body in the vagina.      No signs of injury in the vagina.   Vaginal atrophy noted. Cervix is absent.Uterus is absent. Normal urethral meatus.Urethral Meatus exhibits: no urethral lesionUrethra findings:   prolapsed  Urethra findings: no urethral mass, no tenderness and no urethral scarringBladder findings: no bladder distention and no bladder tenderness          Musculoskeletal: Normal range of motion and moves all extremeties.      Lymphadenopathy: No inguinal adenopathy noted on the right or left side.    Neurological: She is alert and oriented to person, place, and time.    Skin: Skin is warm and dry. No rash noted. She is not diaphoretic. No cyanosis or erythema. No pallor. Nails show no clubbing.    Psychiatric: She has a normal mood and affect. Her speech is normal and behavior is normal. Judgment and thought content normal.             Assessment:        1. Encounter for gynecological examination without abnormal finding    2. Vaginal atrophy               Plan:   Ensure adequate calcium and vitamin D intake and daily weight bearing exercises.    Discussed oil based lubricants for dryness; RTC if not effective.    Make sure to wipe from front to back, void after intercourse, avoid bubble baths, void regularly.    Reviewed updated recommendations for pap smears (every 3 years) in low risk patients.  Recommend annual  pelvic exams.  Reviewed recommendations for annual CBE.  Pap not indicated due to hx of nml pap and hx of lee  RTC 1 year or sooner prn.  To PCP for other health maintenance.      Encounter for gynecological examination without abnormal finding    Vaginal atrophy

## 2025-07-06 ENCOUNTER — RESULTS FOLLOW-UP (OUTPATIENT)
Dept: DIABETES | Facility: CLINIC | Age: 55
End: 2025-07-06

## 2025-07-09 ENCOUNTER — OFFICE VISIT (OUTPATIENT)
Dept: ORTHOPEDICS | Facility: CLINIC | Age: 55
End: 2025-07-09
Payer: COMMERCIAL

## 2025-07-09 DIAGNOSIS — M65.30 TRIGGER FINGER, ACQUIRED: Primary | ICD-10-CM

## 2025-07-09 PROCEDURE — 99999 PR PBB SHADOW E&M-EST. PATIENT-LVL I: CPT | Mod: PBBFAC,,, | Performed by: ORTHOPAEDIC SURGERY

## 2025-07-09 RX ORDER — TRIAMCINOLONE ACETONIDE 40 MG/ML
40 INJECTION, SUSPENSION INTRA-ARTICULAR; INTRAMUSCULAR
Status: DISCONTINUED | OUTPATIENT
Start: 2025-07-09 | End: 2025-07-09 | Stop reason: HOSPADM

## 2025-07-09 RX ADMIN — TRIAMCINOLONE ACETONIDE 40 MG: 40 INJECTION, SUSPENSION INTRA-ARTICULAR; INTRAMUSCULAR at 09:07

## 2025-07-09 NOTE — PROCEDURES
Tendon Sheath    Date/Time: 7/9/2025 9:45 AM    Performed by: Adarsh Chavez MD  Authorized by: Adarsh Chavez MD    Consent Done?:  Yes (Verbal)  Indications:  Pain  Timeout: prior to procedure the correct patient, procedure, and site was verified    Prep: patient was prepped and draped in usual sterile fashion      Local anesthesia used?: Yes    Local anesthetic:  Lidocaine 2% without epinephrine  Anesthetic total (ml):  0.5    Location:  Thumb  Site:  L thumb flexor tendon sheath  Ultrasonic guidance for needle placement?: No    Needle size:  25 G  Approach:  Volar  Medications:  40 mg triamcinolone acetonide 40 mg/mL

## 2025-07-09 NOTE — PROGRESS NOTES
Subjective:     Patient ID: Chan Dwyer is a 54 y.o. female.    Chief Complaint: Pain of the Left Hand (thumb)      HPI:  The patient is a 54-year-old female with a left trigger thumb for the last month.  She requests injection today.    Past Medical History:   Diagnosis Date    Abnormal Pap smear     Abnormal Pap smear of cervix     Arthritis, low back     DM (diabetes mellitus) 09/2014    BS didn't check 01/31/2017    GERD (gastroesophageal reflux disease)     Gestational diabetes     Hepatitis C antibody positive in blood 02/11/2022    RNA NEGATIVE    History of abnormal Pap smear     Surgical menopause     Vitamin D deficiency      Past Surgical History:   Procedure Laterality Date    COLONOSCOPY N/A 06/22/2016    Procedure: COLONOSCOPY;  Surgeon: Vishal Mary III, MD;  Location: [facility] ENDO;  Service: Endoscopy;  Laterality: N/A;    Cryotherapy of the cervix  1999    DILATION AND CURETTAGE OF UTERUS      LEFT HEART CATHETERIZATION Left 03/24/2023    Procedure: Left heart cath;  Surgeon: Lee Lambert MD;  Location: [facility] CATH LAB;  Service: Cardiology;  Laterality: Left;    RALH  02/2012    partial    TRIGGER FINGER RELEASE Left 10/10/2022    Procedure: RELEASE, TRIGGER FINGER;  Surgeon: Adarsh Chavez MD;  Location: [facility] OR;  Service: Orthopedics;  Laterality: Left;  left ring trigger finger release    TUBAL LIGATION       Family History   Problem Relation Name Age of Onset    Diabetes Father      Hypertension Father      Hypertension Mother      Diabetes Mother      Heart attack Mother      No Known Problems Brother      No Known Problems Brother      No Known Problems Brother      No Known Problems Brother      Stomach cancer Sister      No Known Problems Sister      No Known Problems Sister      No Known Problems Sister      No Known Problems Sister      No Known Problems Son      No Known Problems Son      Cancer Neg Hx      Stroke Neg Hx      Colon cancer Neg Hx      Ovarian  cancer Neg Hx      Breast cancer Neg Hx       Social History[1]  Medication List with Changes/Refills   Current Medications    ASPIRIN (ECOTRIN) 81 MG EC TABLET    Take 1 tablet (81 mg total) by mouth once daily.    ATORVASTATIN (LIPITOR) 40 MG TABLET    Take 40 mg by mouth once daily.    BLOOD SUGAR DIAGNOSTIC STRP    To check BG 3 times daily, to use with insurance preferred meter    BLOOD-GLUCOSE METER MISC    To check BG 3 times daily, to use with insurance preferred meter    BLOOD-GLUCOSE SENSOR (DEXCOM G7 SENSOR) MARGARET    1 each by Misc.(Non-Drug; Combo Route) route every 10 days.    ENALAPRIL (VASOTEC) 2.5 MG TABLET    Take 2.5 mg by mouth once daily.    IBUPROFEN (ADVIL,MOTRIN) 600 MG TABLET    Take 1 tablet (600 mg total) by mouth every 6 (six) hours as needed for Pain.    INSULIN ASPART, NIACINAMIDE, (FIASP FLEXTOUCH U-100 INSULIN) 100 UNIT/ML (3 ML) INPN    Titrate up to 50 units daily as instructed.    INSULIN ASPART, NIACINAMIDE, (FIASP U-100 INSULIN) 100 UNIT/ML SOLN    Titrate up to 100 units daily as instructed in insulin pump.    INSULIN GLARGINE, TOUJEO, (TOUJEO SOLOSTAR U-300 INSULIN) 300 UNIT/ML (1.5 ML) INPN PEN    INJECT 16 UNITS INTO THE SKIN ONCE DAILY    INSULIN PUMP CART,AUTO,BT,G6/7 (OMNIPOD 5 G6-G7 PODS, GEN 5,) CRTG    Inject 1 each into the skin every 3 days    INSULIN PUMP CART,AUTO,BT-CNTR (OMNIPOD 5 G6 INTRO KIT, GEN 5,) CRTG    Inject 1 each into the skin Every 3 (three) days.    JANUVIA 100 MG TAB    Oral; Duration: 30 Days    LANCETS MISC    To check BG 3 times daily, to use with insurance preferred meter     Review of patient's allergies indicates:   Allergen Reactions    Hydrocodone Hives and Rash     Review of Systems   Constitutional: Negative for malaise/fatigue.   HENT:  Negative for hearing loss.    Eyes:  Negative for double vision and visual disturbance.   Cardiovascular:  Positive for chest pain.   Respiratory:  Negative for shortness of breath.    Endocrine: Negative for  cold intolerance.   Hematologic/Lymphatic: Does not bruise/bleed easily.   Skin:  Negative for poor wound healing and suspicious lesions.   Musculoskeletal:  Positive for arthritis, joint pain and joint swelling. Negative for gout.   Gastrointestinal:  Positive for heartburn. Negative for nausea and vomiting.   Genitourinary:  Negative for dysuria.   Neurological:  Positive for focal weakness and headaches. Negative for numbness, paresthesias and sensory change.   Psychiatric/Behavioral:  Negative for depression, memory loss and substance abuse. The patient is not nervous/anxious.    Allergic/Immunologic: Negative for persistent infections.       Objective:   There is no height or weight on file to calculate BMI.  There were no vitals filed for this visit.             General    Constitutional: She is oriented to person, place, and time. She appears well-developed and well-nourished. No distress.   HENT:   Head: Normocephalic.   Eyes: EOM are normal.   Pulmonary/Chest: Effort normal.   Neurological: She is oriented to person, place, and time.   Psychiatric: She has a normal mood and affect.         Left Hand/Wrist Exam     Inspection   Scars: Wrist - absent Hand -  absent  Effusion: Wrist - absent Hand -  absent    Pain   Hand - The patient exhibits pain of the thumb MCP.    Other     Sensory Exam  Median Distribution: normal  Ulnar Distribution: normal  Radial Distribution: normal    Comments:  The patient has tenderness left thumb A1 pulley with palpable nodule that moves with tendon excursion and active triggering noted          Vascular Exam       Capillary Refill  Left Hand: normal capillary refill       radiographs were not obtained today  Assessment:     Encounter Diagnosis   Name Primary?    Trigger finger, acquired Yes    Left thumb    Plan:     The patient is injected left trigger thumb with 0.5 cc Kenalog and 0.5 cc 2% plain lidocaine under sterile technique.  She will wait at least 3 months between  injections.                Disclaimer: This note was prepared using a voice recognition system and is likely to have sound alike errors within the text.          [1]   Social History  Socioeconomic History    Marital status: Single    Number of children: 2   Occupational History    Occupation:      Employer: [employer]      Employer: [employer]   Tobacco Use    Smoking status: Never    Smokeless tobacco: Never   Substance and Sexual Activity    Alcohol use: Yes     Comment: Rare    Drug use: No    Sexual activity: Yes     Partners: Male     Birth control/protection: Surgical   Social History Narrative    She wears seatbelt.     Social Drivers of Health     Financial Resource Strain: High Risk (4/7/2025)    Overall Financial Resource Strain (CARDIA)     Difficulty of Paying Living Expenses: Hard   Food Insecurity: Food Insecurity Present (4/7/2025)    Hunger Vital Sign     Worried About Running Out of Food in the Last Year: Sometimes true     Ran Out of Food in the Last Year: Sometimes true   Transportation Needs: No Transportation Needs (4/7/2025)    PRAPARE - Transportation     Lack of Transportation (Medical): No     Lack of Transportation (Non-Medical): No   Physical Activity: Sufficiently Active (4/7/2025)    Exercise Vital Sign     Days of Exercise per Week: 5 days     Minutes of Exercise per Session: 30 min   Stress: Stress Concern Present (7/11/2024)    [employer] [place] [employer] - Occupational Stress Questionnaire     Feeling of Stress : Very much   Housing Stability: Low Risk  (4/7/2025)    Housing Stability Vital Sign     Unable to Pay for Housing in the Last Year: No     Number of Times Moved in the Last Year: 0     Homeless in the Last Year: No

## 2025-07-21 ENCOUNTER — TELEPHONE (OUTPATIENT)
Dept: DIABETES | Facility: CLINIC | Age: 55
End: 2025-07-21
Payer: COMMERCIAL

## 2025-07-29 ENCOUNTER — CLINICAL SUPPORT (OUTPATIENT)
Dept: DIABETES | Facility: CLINIC | Age: 55
End: 2025-07-29
Payer: COMMERCIAL

## 2025-07-29 ENCOUNTER — TELEPHONE (OUTPATIENT)
Dept: DIABETES | Facility: CLINIC | Age: 55
End: 2025-07-29
Payer: COMMERCIAL

## 2025-07-29 VITALS — WEIGHT: 138.44 LBS | BODY MASS INDEX: 23.76 KG/M2

## 2025-07-29 DIAGNOSIS — E10.65 TYPE 1 DIABETES MELLITUS WITH HYPERGLYCEMIA: Primary | ICD-10-CM

## 2025-07-29 DIAGNOSIS — E10.65 TYPE 1 DIABETES MELLITUS WITH HYPERGLYCEMIA: ICD-10-CM

## 2025-07-29 PROCEDURE — G0108 DIAB MANAGE TRN  PER INDIV: HCPCS | Mod: S$GLB,,, | Performed by: DIETITIAN, REGISTERED

## 2025-07-29 PROCEDURE — 99999 PR PBB SHADOW E&M-EST. PATIENT-LVL III: CPT | Mod: PBBFAC,,, | Performed by: DIETITIAN, REGISTERED

## 2025-07-29 RX ORDER — BLOOD-GLUCOSE SENSOR
1 EACH MISCELLANEOUS
Qty: 9 EACH | Refills: 3 | Status: SHIPPED | OUTPATIENT
Start: 2025-07-29 | End: 2026-07-29

## 2025-07-29 NOTE — PROGRESS NOTES
Diabetes Care Specialist Progress Note  Author: Marcella Duke RD, CDCES  Date: 7/29/2025    Intake    Program Intake  Reason for Diabetes Program Visit:: Intervention (DM referral 2/17/25 Odilia hSaffer, PA-C)  Type of Intervention:: Individual  Education: Advanced Pump (OP5 using iphone - switch from DexG6 to G7)  Current diabetes risk level:: high  In the last month, have you used the ER or been admitted to the hospital: No    Current Diabetes Treatment:  (Fiasp / Novolog via OP5 mobile. Januvia 100 mg daily.)    Continuous Glucose Monitoring  Patient has CGM: Yes  Personal CGM type:: DexG6 - pt denies device or site placement issues. GMI unavail. Pt in clinic for DexG7 training.    Lab Results   Component Value Date    HGBA1C 7.5 (H) 06/26/2025       Weight: 62.8 kg (138 lb 7.2 oz)       Body mass index is 23.76 kg/m².    Lifestyle Coping Support & Clinical    Problem Review  Active Comorbidities:  (Gestational DM in Pregnancy 2003, HepC, GERD, Vit D def.)    Diabetes Self-Management Skills Assessment    Medication Skills Assessment  Patient is able to identify current diabetes medications, dosages, and appropriate timing of medications.: no (Pt missing meal, correction boluses.)  Patient reports problems or concerns with current medication regimen.: yes  Medication regimen problems/concerns:: other (see comments) (Pt in clinic for OP5 mobile change from DexG6 to G7.)  Patient is  aware that some diabetes medications can cause low blood sugar?: Yes  Medication Skills Assessment Completed:: Yes  Assessment indicates:: Instruction Needed  Area of need?: Yes    Home Blood Glucose Monitoring  Patient states that blood sugar is checked at home daily.: yes  Monitoring Method:: personal continuous glucose monitor, home glucometer  Home glucometer meter type:: Unavailable  Personal CGM type:: DexG6 - pt denies device or site placement issues. GMI unavail. Pt in clinic for DexG7 training.  Home Blood Glucose  Monitoring Skills Assessment Completed: : Yes  Assessment indicates:: Instruction Needed  Area of need?: Yes    Assessment Summary and Plan  Based on today's diabetes care assessment, the following areas of need were identified:      Identified Areas of Need      Medication/Current Diabetes Treatment: Yes - see care plan   Home Blood Glucose Monitoring: Yes - see care plan     Today's interventions were provided through individual discussion, instruction, and written materials were provided.    Patient verbalized understanding of instruction and written materials.  Pt was able to return back demonstration of instructions today. Patient understood key points, needs reinforcement and further instruction.     Diabetes Self-Management Care Plan:  Today's Diabetes Self-Management Care Plan was developed with Chan's input. Chan has agreed to work toward the following goal(s) to improve his/her overall diabetes control.      Care Plan: Diabetes Management   Updates made since 7/29/2024 12:00 AM        Problem: Medications         Goal: Maintain consistent OP5 Pod usage and bolus accuracy.    Start Date: 4/3/2025   Expected End Date: 2/17/2026   This Visit's Progress: On track   Recent Progress: On track   Priority: High   Barriers: Other (comments)   Note:    Assisted pt in switching OP5 mobile from DexG6 to G7. Pt filled and placed new Pod using aseptic technique. Reviewed how to start new DexG7 sensor for future use. Encouraged pt to maintain Automated mode. Msg to provider for support of sensor refills.      Problem: Healthy Eating         Goal: Count carb grams and enter accurately into OP5 bolus calculator.    Start Date: 4/3/2025   Expected End Date: 2/17/2026   This Visit's Progress: No change   Recent Progress: No change   Priority: Medium   Barriers: Other (comments)   Note:    Encouraged accuracy of bolus dosing for meals and BG corrections.     Problem: Blood Glucose Self-Monitoring         Goal:  "Patient agrees to use DexG7 & backup meter as directed.    Start Date: 7/29/2025   Expected End Date: 2/17/2026   Priority: Low   Barriers: No Barriers Identified   Note:    DEXCOM G7 CGM TRAINING  Dexcom G7 mobile arlen downloaded to phone. Education was provided using "Quick Start Guide" and demo device per Dexcom protocol. [facility] data share set-up.      Overview:  5 minute glucose reading updates, trending arrows, BG graph screens, reports screen,  connectivity and functions.   Menus: Trend graph, start sensor, enter BG, events, alerts, settings, replace sensor, stop sensor, and shutdown.  Dexcom G7 mobile arlen/ Settings:                          * Urgent Low: 55 mg/dL                          * Urgent Low Soon: On                          * Low Alert: 70 mg/dL                          * High Alert: 300 mg/dL                          * Signal Loss: on                          * Brief Sensor Issue: on     Reviewed additional setting options.  Patient paired sensor using 4-digit code listed on sensor cap.  Reviewed where to find sensor insertion time and expiration date.   Reviewed appropriate calibration techniques.  Reviewed sensor site selection. Patient selected and prepped site using aseptic technique, inserted sensor, applied overlay tape and started session.   Reviewed sensor removal from site. Discussed times to remove sensor per  guidelines include MRI or diatherapy.   Patient able to demonstrate without difficulty. Encouraged to review manual and/or training videos prior to starting another sensor.   Reviewed problem solving aspects of sensor transmission/variables that can disrupt RF transmission.  range 20 feet, but the first 3 hrs keep within 3 feet of transmitter.  Patient instructed on lag time of interstitial fluid from CBG and was advised to treat hypoglycemia and dose insulin based on SMBG values.  Dexcom technical support contact number given and examples " of when to contact them discussed.           Follow Up Plan  Follow up if symptoms worsen or fail to improve.    Today's care plan and follow up schedule was discussed with patient.  Quintessa verbalized understanding of the care plan, goals, and agrees to follow up plan.        The patient was encouraged to communicate with his/her health care provider/physician and care team regarding his/her condition(s) and treatment.  I provided the patient with my contact information today and encouraged to contact me via phone or [facility] Patient Portal as needed.     Length of Visit   Total Time: 60 Minutes

## 2025-07-29 NOTE — TELEPHONE ENCOUNTER
Dex G7 Rx     Odilia Shaffer PA-C, BC-ADM  [facility]

## 2025-08-03 ENCOUNTER — TELEPHONE (OUTPATIENT)
Dept: PHARMACY | Facility: CLINIC | Age: 55
End: 2025-08-03
Payer: COMMERCIAL

## 2025-08-04 NOTE — TELEPHONE ENCOUNTER
[facility]/Population Health Chart Review & Patient Outreach Details For Medication Adherence Project    Reason for Outreach Encounter: 3rd Party payor non-compliance report ([employer], [employer], C, etc)  2.  Patient Outreach Method: Reviewed Patient Chart  3.   Medication in question: Atprvastatin     Hyperlipidemia Medications              atorvastatin (LIPITOR) 40 MG tablet Take 40 mg by mouth once daily.               4.  Reviewed and or Updates Made To: Patient Chart  5. Outreach Outcomes and/or actions taken: Patient filled medication and is on track to be adherent

## 2025-08-26 DIAGNOSIS — E10.65 TYPE 1 DIABETES MELLITUS WITH HYPERGLYCEMIA: ICD-10-CM

## 2025-08-26 RX ORDER — ASPIRIN 81 MG/1
81 TABLET ORAL DAILY
Qty: 30 TABLET | Refills: 11 | Status: SHIPPED | OUTPATIENT
Start: 2025-08-26 | End: 2026-08-26

## (undated) DEVICE — SHEATH INTRODUCER 5FR 10CM

## (undated) DEVICE — CATH INFINITI MP JL3.5 JR4 5FR

## (undated) DEVICE — UNDERGLOVES BIOGEL PI SIZE 7.5

## (undated) DEVICE — DRAPE U SPLIT SHEET 54X76IN

## (undated) DEVICE — CATH PIG145 INFINITI 5X110CM

## (undated) DEVICE — APPLICATOR CHLORAPREP ORN 26ML

## (undated) DEVICE — ANGIOTOUCH KIT

## (undated) DEVICE — DRAPE THREE-QTR REINF 53X77IN

## (undated) DEVICE — PACK HEART CATH BR

## (undated) DEVICE — OMNIPAQUE 300MG 150ML VIAL

## (undated) DEVICE — ALCOHOL 70% ISOP RUBBING 4OZ

## (undated) DEVICE — TOWEL OR DISP STRL BLUE 4/PK

## (undated) DEVICE — GLOVE BIOGEL SZ 8 1/2

## (undated) DEVICE — GOWN POLY REINF BRTH SLV XL

## (undated) DEVICE — SUPPORT ULNA NERVE PROTECTOR

## (undated) DEVICE — POSITIONER HEAD DONUT 9IN FOAM

## (undated) DEVICE — SYR 10CC LUER LOCK

## (undated) DEVICE — CATH JR4 5FR

## (undated) DEVICE — DRAPE ANGIO BRACH 38X44IN

## (undated) DEVICE — GUIDEWIRE WHOLEY HI TORQ 175CM

## (undated) DEVICE — TOURNIQUET SB QC SP 24X4IN

## (undated) DEVICE — NDL PERCUTANEOUS 21G 7CM VASC

## (undated) DEVICE — DRESSING XEROFORM FOIL PK 1X8

## (undated) DEVICE — CATH JL3.5 5FR

## (undated) DEVICE — PAD CAST SPECIALIST STRL 3

## (undated) DEVICE — SCRUB HIBICLENS 4% CHG 4OZ

## (undated) DEVICE — GUIDEWIRE EMERALD .035IN 260CM

## (undated) DEVICE — PACK BASIC SETUP SC BR

## (undated) DEVICE — KIT GLIDESHEATH SLEND 6FR 10CM

## (undated) DEVICE — GOWN NONREINF SET-IN SLV 2XL

## (undated) DEVICE — GUIDE LAUNCHER 5FR JL 4.0

## (undated) DEVICE — COVER CAMERA OPERATING ROOM

## (undated) DEVICE — PAD ABD 8X10 STERILE

## (undated) DEVICE — BANDAGE ESMARK ELASTIC ST 4X9

## (undated) DEVICE — KIT MANIFOLD LOW PRESS TUBING

## (undated) DEVICE — GAUZE SPONGE 4X4 12PLY

## (undated) DEVICE — UNDERGLOVES BIOGEL PI SIZE 8.5

## (undated) DEVICE — KIT SYR REUSABLE

## (undated) DEVICE — NDL SAFETY 25G X 1.5 ECLIPSE

## (undated) DEVICE — COVER LIGHT HANDLE 80/CA

## (undated) DEVICE — BANDAGE ELASTIC 3X5 VELCRO ST

## (undated) DEVICE — SOL 9P NACL IRR PIC IL

## (undated) DEVICE — SUT 4-0 ETHILON 18 PS-2

## (undated) DEVICE — DEVICE CLOSURE MYNX GRIP 5FR

## (undated) DEVICE — DRAPE HAND STERILE